# Patient Record
Sex: MALE | Race: WHITE | NOT HISPANIC OR LATINO | Employment: OTHER | URBAN - METROPOLITAN AREA
[De-identification: names, ages, dates, MRNs, and addresses within clinical notes are randomized per-mention and may not be internally consistent; named-entity substitution may affect disease eponyms.]

---

## 2017-01-02 ENCOUNTER — ANESTHESIA EVENT (OUTPATIENT)
Dept: SURGERY | Facility: CLINIC | Age: 62
End: 2017-01-02
Payer: MEDICARE

## 2017-01-03 ENCOUNTER — APPOINTMENT (OUTPATIENT)
Dept: GENERAL RADIOLOGY | Facility: CLINIC | Age: 62
End: 2017-01-03
Attending: PODIATRIST
Payer: MEDICARE

## 2017-01-03 ENCOUNTER — ANESTHESIA (OUTPATIENT)
Dept: SURGERY | Facility: CLINIC | Age: 62
End: 2017-01-03
Payer: MEDICARE

## 2017-01-03 ENCOUNTER — SURGERY (OUTPATIENT)
Age: 62
End: 2017-01-03

## 2017-01-03 DIAGNOSIS — E78.5 HYPERLIPIDEMIA LDL GOAL <130: Primary | ICD-10-CM

## 2017-01-03 PROCEDURE — 37000011 ZZH ANESTHESIA WARD SERVICE: Performed by: NURSE ANESTHETIST, CERTIFIED REGISTERED

## 2017-01-03 PROCEDURE — 25000125 ZZHC RX 250: Performed by: PODIATRIST

## 2017-01-03 PROCEDURE — 25000125 ZZHC RX 250: Performed by: NURSE ANESTHETIST, CERTIFIED REGISTERED

## 2017-01-03 PROCEDURE — 40000277 XR SURGERY CARM FLUORO LESS THAN 5 MIN W STILLS: Mod: TC

## 2017-01-03 RX ORDER — PROPOFOL 10 MG/ML
INJECTION, EMULSION INTRAVENOUS PRN
Status: DISCONTINUED | OUTPATIENT
Start: 2017-01-03 | End: 2017-01-03

## 2017-01-03 RX ORDER — FENTANYL CITRATE 50 UG/ML
INJECTION, SOLUTION INTRAMUSCULAR; INTRAVENOUS PRN
Status: DISCONTINUED | OUTPATIENT
Start: 2017-01-03 | End: 2017-01-03

## 2017-01-03 RX ORDER — LIDOCAINE HYDROCHLORIDE 10 MG/ML
INJECTION, SOLUTION EPIDURAL; INFILTRATION; INTRACAUDAL; PERINEURAL PRN
Status: DISCONTINUED | OUTPATIENT
Start: 2017-01-03 | End: 2017-01-03

## 2017-01-03 RX ORDER — ROPIVACAINE HYDROCHLORIDE 5 MG/ML
INJECTION, SOLUTION EPIDURAL; INFILTRATION; PERINEURAL PRN
Status: DISCONTINUED | OUTPATIENT
Start: 2017-01-03 | End: 2017-01-03

## 2017-01-03 RX ORDER — KETOROLAC TROMETHAMINE 30 MG/ML
INJECTION, SOLUTION INTRAMUSCULAR; INTRAVENOUS PRN
Status: DISCONTINUED | OUTPATIENT
Start: 2017-01-03 | End: 2017-01-03

## 2017-01-03 RX ORDER — KETAMINE HYDROCHLORIDE 10 MG/ML
INJECTION, SOLUTION INTRAMUSCULAR; INTRAVENOUS PRN
Status: DISCONTINUED | OUTPATIENT
Start: 2017-01-03 | End: 2017-01-03

## 2017-01-03 RX ORDER — AMOXICILLIN 500 MG
1 CAPSULE ORAL DAILY
Qty: 90 CAPSULE | Refills: 3 | Status: SHIPPED | OUTPATIENT
Start: 2017-01-03 | End: 2018-02-27

## 2017-01-03 RX ADMIN — FENTANYL CITRATE 50 MCG: 50 INJECTION, SOLUTION INTRAMUSCULAR; INTRAVENOUS at 09:36

## 2017-01-03 RX ADMIN — ROPIVACAINE HYDROCHLORIDE 30 ML: 5 INJECTION, SOLUTION EPIDURAL; INFILTRATION; PERINEURAL at 08:32

## 2017-01-03 RX ADMIN — PROPOFOL 50 MG: 10 INJECTION, EMULSION INTRAVENOUS at 09:15

## 2017-01-03 RX ADMIN — LIDOCAINE HYDROCHLORIDE 1 ML: 10 INJECTION, SOLUTION EPIDURAL; INFILTRATION; INTRACAUDAL; PERINEURAL at 08:33

## 2017-01-03 RX ADMIN — BUPIVACAINE HYDROCHLORIDE 10 ML: 5 INJECTION, SOLUTION PERINEURAL at 09:45

## 2017-01-03 RX ADMIN — MIDAZOLAM HYDROCHLORIDE 1 MG: 1 INJECTION, SOLUTION INTRAMUSCULAR; INTRAVENOUS at 08:32

## 2017-01-03 RX ADMIN — KETAMINE HYDROCHLORIDE 75 MG: 10 INJECTION INTRAMUSCULAR; INTRAVENOUS at 09:10

## 2017-01-03 RX ADMIN — KETOROLAC TROMETHAMINE 30 MG: 30 INJECTION, SOLUTION INTRAMUSCULAR; INTRAVENOUS at 09:45

## 2017-01-03 RX ADMIN — FENTANYL CITRATE 50 MCG: 50 INJECTION, SOLUTION INTRAMUSCULAR; INTRAVENOUS at 08:24

## 2017-01-03 RX ADMIN — CEFAZOLIN SODIUM 2 G: 2 INJECTION, SOLUTION INTRAVENOUS at 08:58

## 2017-01-03 RX ADMIN — PROPOFOL 50 MG: 10 INJECTION, EMULSION INTRAVENOUS at 09:20

## 2017-01-03 RX ADMIN — PROPOFOL 50 MG: 10 INJECTION, EMULSION INTRAVENOUS at 09:10

## 2017-01-03 RX ADMIN — FENTANYL CITRATE 50 MCG: 50 INJECTION, SOLUTION INTRAMUSCULAR; INTRAVENOUS at 09:38

## 2017-01-03 RX ADMIN — PROPOFOL 50 MG: 10 INJECTION, EMULSION INTRAVENOUS at 09:05

## 2017-01-03 RX ADMIN — ROPIVACAINE HYDROCHLORIDE 10 ML: 5 INJECTION, SOLUTION EPIDURAL; INFILTRATION; PERINEURAL at 08:34

## 2017-01-03 RX ADMIN — MIDAZOLAM HYDROCHLORIDE 2 MG: 1 INJECTION, SOLUTION INTRAMUSCULAR; INTRAVENOUS at 08:24

## 2017-01-03 RX ADMIN — LIDOCAINE HYDROCHLORIDE 1 ML: 10 INJECTION, SOLUTION EPIDURAL; INFILTRATION; INTRACAUDAL; PERINEURAL at 08:25

## 2017-01-03 RX ADMIN — KETAMINE HYDROCHLORIDE 25 MG: 10 INJECTION INTRAMUSCULAR; INTRAVENOUS at 09:20

## 2017-01-03 RX ADMIN — FENTANYL CITRATE 50 MCG: 50 INJECTION, SOLUTION INTRAMUSCULAR; INTRAVENOUS at 08:32

## 2017-01-03 RX ADMIN — MIDAZOLAM HYDROCHLORIDE 2 MG: 1 INJECTION, SOLUTION INTRAMUSCULAR; INTRAVENOUS at 08:58

## 2017-01-03 ASSESSMENT — LIFESTYLE VARIABLES: TOBACCO_USE: 1

## 2017-01-03 NOTE — ANESTHESIA PROCEDURE NOTES
Peripheral nerve/Neuraxial procedure note : sciatic, saphenous and popliteal  Pre-Procedure  Performed by  STEPHENIE SETH   Location: pre-op    Procedure Times:1/3/2017 8:24 AM and 1/3/2017 8:35 AM  Pre-Anesthestic Checklist: patient identified, IV checked, site marked, risks and benefits discussed, informed consent, monitors and equipment checked, pre-op evaluation, at physician/surgeon's request and post-op pain management    Timeout  Correct Patient: Yes   Correct Procedure: Yes   Correct Site: Yes   Correct Laterality: Yes   Correct Position: Yes   Site Marked: Yes   .   Procedure Documentation    Diagnosis:PAIN.    Procedure:    Sciatic, saphenous and popliteal.  Local skin infiltrated with mL of 1% lidocaine.     Ultrasound used to identify targeted nerve, plexus, or vascular marker and placed a needle adjacent to it. A permanent image is entered into the patient's record.  Patient Prep;mask, sterile gloves, chlorhexidine gluconate and isopropyl alcohol, patient draped.  Nerve Stim: Initial Level 1 mA.  Lowest motor response 0.48 mA..  Needle: insulated, short bevel (20 G. 4 in). .  Spinal Needle: . . Insertion Method: Single Shot.     Assessment/Narrative  Paresthesias: No.  Injection made incrementally with aspirations every 5 mL..  The placement was negative for: blood aspirated, painful injection and site bleeding.  Bolus given via needle. No blood aspirated via catheter.   Secured via.   Complications: none. Test dose of 3 mL lidocaine 2% w/ 1:200,000 epinephrine at 08:32.  Test dose negative for signs of intravascular, subdural or intrathecal injection. Comments:  Total volume injected at Sciatic nerve:30    Total volume injected at Saphenous Nerve:10

## 2017-01-03 NOTE — TELEPHONE ENCOUNTER
[employer]      Last Written Prescription Date: 2/17/16  Last Fill Quantity: 90,  # refills: 3   Last Office Visit with G, [facility] or [facility] prescribing provider: 12/21/16                                         Next 5 appointments (look out 90 days)     Jan 11, 2017  1:45 PM   Return Visit with Holden Harrison DPM   [facility]    [address] [place] [id number]   [phone]            Feb 01, 2017  1:45 PM   Return Visit with Holden Harrison DPM   [facility] ([facility])    [address] [place] [id number]   [phone]

## 2017-01-03 NOTE — ANESTHESIA POSTPROCEDURE EVALUATION
Patient: Geovani Bustos    ARTHRODESIS FOOT (Left Toe)  Additional InformationProcedure(s):  Arthrodesis 1st Metatarsophalangeal Joint Left Foot - Wound Class: I-Clean    Diagnosis:hallux valgus left foot  Diagnosis Additional Information: No value filed.    Anesthesia Type:  MAC, General, Periph. Nerve Block for postop pain    Note:  Anesthesia Post Evaluation    Patient location during evaluation: Bedside  Patient participation: Able to fully participate in evaluation  Level of consciousness: awake and alert  Pain management: adequate  Airway patency: patent  Cardiovascular status: acceptable  Respiratory status: acceptable  Hydration status: acceptable  PONV: none     Anesthetic complications: None          Last vitals:  Filed Vitals:    01/03/17 1015 01/03/17 1030 01/03/17 1045   BP: 144/93 125/100 140/86   Pulse:      Temp:      Resp: 16 16 16   SpO2: 99% 100% 95%       Electronically Signed By: SAUL Foster CRNA  January 3, 2017  10:48 AM

## 2017-01-03 NOTE — ANESTHESIA PREPROCEDURE EVALUATION
Anesthesia Evaluation     . Pt has had prior anesthetic.     No history of anesthetic complications     ROS/MED HX    ENT/Pulmonary:     (+)tobacco use, Current use , . .    Neurologic:  - neg neurologic ROS     Cardiovascular: Comment: VSD    (+) Dyslipidemia, ----. : . . . :. valvular problems/murmurs type: MVP . Previous cardiac testing date:results:date: results:ECG reviewed date:6/1/16 results:Sinus Bradycardia   -RSR(V1) -nondiagnostic.    Low voltage with rightward P-axis and rotation -possible pulmonary disease.     ABNORMAL    date: results:          METS/Exercise Tolerance:  >4 METS   Hematologic:         Musculoskeletal:   (+) , , other musculoskeletal- bunion      GI/Hepatic:     (+) GERD Asymptomatic on medication, Other GI/Hepatic polyps      Renal/Genitourinary:     (+) chronic renal disease, BPH,       Endo:     (+) thyroid problem hypothyroidism, .      Psychiatric:     (+) psychiatric history schizophrenia      Infectious Disease:  - neg infectious disease ROS       Malignancy:      - no malignancy   Other: Comment: eczema              Physical Exam  Normal systems: cardiovascular and pulmonary    Airway   Mallampati: II  TM distance: >3 FB  Neck ROM: full    Dental   (+) missing    Cardiovascular       Pulmonary                     Anesthesia Plan      History & Physical Review  History and physical reviewed and following examination; no interval change.    ASA Status:  2 .    NPO Status:  > 8 hours    Plan for MAC, General and Periph. Nerve Block for postop pain with Propofol induction. Reason for MAC:  Deep or markedly invasive procedure (G8)  PONV prophylaxis:  Ondansetron (or other 5HT-3) and Dexamethasone or Solumedrol       Postoperative Care  Postoperative pain management:  IV analgesics, Oral pain medications and Peripheral nerve block (Single Shot).      Consents  Anesthetic plan, risks, benefits and alternatives discussed with:  Patient and Other (See Comment) (Group home care giver)..                           .

## 2017-01-11 ENCOUNTER — RADIANT APPOINTMENT (OUTPATIENT)
Dept: GENERAL RADIOLOGY | Facility: CLINIC | Age: 62
End: 2017-01-11
Attending: PODIATRIST
Payer: MEDICARE

## 2017-01-11 ENCOUNTER — OFFICE VISIT (OUTPATIENT)
Dept: PODIATRY | Facility: CLINIC | Age: 62
End: 2017-01-11
Payer: MEDICARE

## 2017-01-11 VITALS — BODY MASS INDEX: 24.77 KG/M2 | WEIGHT: 173 LBS | HEIGHT: 70 IN

## 2017-01-11 DIAGNOSIS — Z98.890 S/P FOOT SURGERY, LEFT: ICD-10-CM

## 2017-01-11 DIAGNOSIS — M79.672 LEFT FOOT PAIN: ICD-10-CM

## 2017-01-11 DIAGNOSIS — M79.672 LEFT FOOT PAIN: Primary | ICD-10-CM

## 2017-01-11 PROCEDURE — 99024 POSTOP FOLLOW-UP VISIT: CPT | Performed by: PODIATRIST

## 2017-01-11 PROCEDURE — 73630 X-RAY EXAM OF FOOT: CPT | Mod: LT

## 2017-01-11 NOTE — MR AVS SNAPSHOT
After Visit Summary   1/11/2017    Geovani Bustos    MRN: [id number]           Patient Information     Date Of Birth          1955        Visit Information        Provider Department      1/11/2017 1:45 PM Holden Harrison DPM [facility]        Today's Diagnoses     Left foot pain    -  1     S/P foot surgery, left           Care Instructions    Postoperative instructions    1.  Keep dressings clean, dry and intact; reinforce if any blood comes through.  2.  Keep the foot elevated above your heart level.  3.  Ice the foot or behind the knee 20 min per hour.    Pain management:  1.  Keep the foot elevated and cool  2.  Take the pain medication as directed; do not hold off on taking too long.  3.  If the pain is still high, unwrap the ace wrap and put back on looser.  4.  If this does not work, take Ibuprofen 600 mg TID.  5.  If this does not work, call the nurse line for additional help.          Follow-ups after your visit        Your next 10 appointments already scheduled     Jan 18, 2017  1:45 PM   [facility]   [facility]    [address] [place] [id number] [phone]            Feb 01, 2017  1:45 PM   [facility] Visit with Holden Harrison DPM   [facility]    [address] [place] [id number] [phone]              Who to contact     If you have questions or need follow up information about today's clinic visit or your schedule please contact [facility] directly at [phone].  Normal or non-critical lab and imaging results will be communicated to you by MyChart, letter or phone within 4 business days after the clinic has received the results. If you do not hear from us within 7 days, please contact the clinic through MyChart or phone. If you have a critical or abnormal lab result, we will notify you by phone as soon  "as possible.  Submit refill requests through [employer] or call your pharmacy and they will forward the refill request to us. Please allow 3 business days for your refill to be completed.          Additional Information About Your Visit        [facility] Information     [employer] lets you send messages to your doctor, view your test results, renew your prescriptions, schedule appointments and more. To sign up, go to [email or web address] . Click on \"Log in\" on the left side of the screen, which will take you to the Welcome page. Then click on \"Sign up Now\" on the right side of the page.     You will be asked to enter the access code listed below, as well as some personal information. Please follow the directions to create your username and password.     Your access code is: [email or web address]  Expires: 3/7/2017  2:41 PM     Your access code will  in 90 days. If you need help or a new code, please call your [facility] clinic or [phone].        Care EveryWhere ID     This is your Care EveryWhere ID. This could be used by other organizations to access your [facility] medical records  [id number]        Your Vitals Were     Height BMI (Body Mass Index)                1.778 m (5' 10\") 24.82 kg/m2           Blood Pressure from Last 3 Encounters:   17 140/86   16 114/78   06/10/16 120/80    Weight from Last 3 Encounters:   17 78.472 kg (173 lb)   17 78.472 kg (173 lb)   16 78.472 kg (173 lb)               Primary Care Provider Office Phone # Fax #    Trice Costello [phone]       [address] [address]        Thank you!     Thank you for choosing [facility]  for your care. Our goal is always to provide you with excellent care. Hearing back from our patients is one way we can continue to improve our services. Please take a few minutes to complete the written survey that you may receive in the mail after your " visit with us. Thank you!             Your Updated Medication List - Protect others around you: Learn how to safely use, store and throw away your medicines at www.disposemymeds.org.          This list is accurate as of: 1/11/17 11:59 PM.  Always use your most recent med list.                   Brand Name Dispense Instructions for use    ABILIFY 10 MG tablet   Generic drug:  ARIPiprazole      1 TABLET DAILY       aspirin 81 MG EC tablet     90 tablet    Take 1 tablet (81 mg) by mouth daily       augmented betamethasone dipropionate 0.05 % ointment    DIPROLENE-AF    45 g    Apply to AA BID 1-2 weeks then PRN only. Do not apply to face       carbamide peroxide 6.5 % otic solution    DEBROX    15 mL    Place 5 drops into both ears 2 times daily For 5 days every 2 months, then return for irrigation       finasteride 5 MG tablet    PROSCAR    90 tablet    Take 1 tablet (5 mg) by mouth daily       Fish Oil 1200 MG Caps     90 capsule    Take 1 capsule by mouth daily       fluticasone 50 MCG/ACT spray    FLONASE    1 Bottle    Spray 2 sprays into both nostrils daily @ night       hydrocortisone 1 % ointment     30 g    Apply sparingly to affected area three times daily for 14 days.       hydrOXYzine 25 MG tablet    ATARAX    60 tablet    Take 1 tablet (25 mg) by mouth every 6 hours as needed for itching (and nausea)       levothyroxine 125 MCG tablet    SYNTHROID/LEVOTHROID    90 tablet    Take 1 tablet (125 mcg) by mouth daily       loratadine 10 MG tablet    CLARITIN    30 tablet    Take 1 tablet (10 mg) by mouth daily       order for DME     1 Units    Knee Walker Length of use: Three months       oxybutynin 5 MG tablet    DITROPAN     Take by mouth 3 times daily       oxyCODONE-acetaminophen 5-325 MG per tablet    PERCOCET    60 tablet    Take 1-2 tablets by mouth every 4 hours as needed for pain (moderate to severe)       psyllium 30.9 % Powd    NATURAL FIBER THERAPY    1 Bottle    Take 1 each by mouth See Admin  Instructions Take 1 teaspoonful every day at 7pm and 9 pm.       senna-docusate 8.6-50 MG per tablet    SENOKOT-S;PERICOLACE    60 tablet    Take 1-2 tablets by mouth 2 times daily Take while on oral narcotics to prevent or treat constipation.       simvastatin 40 MG tablet    ZOCOR    90 tablet    1 TABLET AT 9pm every day       ZOLOFT 100 MG tablet   Generic drug:  sertraline      2 TABLETS DAILY

## 2017-01-12 NOTE — PATIENT INSTRUCTIONS
Postoperative instructions    1.  Keep dressings clean, dry and intact; reinforce if any blood comes through.  2.  Keep the foot elevated above your heart level.  3.  Ice the foot or behind the knee 20 min per hour.    Pain management:  1.  Keep the foot elevated and cool  2.  Take the pain medication as directed; do not hold off on taking too long.  3.  If the pain is still high, unwrap the ace wrap and put back on looser.  4.  If this does not work, take Ibuprofen 600 mg TID.  5.  If this does not work, call the nurse line for additional help.

## 2017-01-25 ENCOUNTER — OFFICE VISIT (OUTPATIENT)
Dept: PODIATRY | Facility: CLINIC | Age: 62
End: 2017-01-25
Payer: MEDICARE

## 2017-01-25 DIAGNOSIS — Z98.890 S/P FOOT SURGERY, LEFT: Primary | ICD-10-CM

## 2017-01-25 PROCEDURE — 99024 POSTOP FOLLOW-UP VISIT: CPT | Performed by: PODIATRIST

## 2017-02-01 ENCOUNTER — OFFICE VISIT (OUTPATIENT)
Dept: FAMILY MEDICINE | Facility: CLINIC | Age: 62
End: 2017-02-01
Payer: MEDICARE

## 2017-02-01 VITALS
DIASTOLIC BLOOD PRESSURE: 65 MMHG | WEIGHT: 173 LBS | HEIGHT: 70 IN | SYSTOLIC BLOOD PRESSURE: 111 MMHG | HEART RATE: 74 BPM | BODY MASS INDEX: 24.77 KG/M2 | TEMPERATURE: 97.3 F

## 2017-02-01 DIAGNOSIS — N40.0 BENIGN PROSTATIC HYPERPLASIA, PRESENCE OF LOWER URINARY TRACT SYMPTOMS UNSPECIFIED, UNSPECIFIED MORPHOLOGY: Chronic | ICD-10-CM

## 2017-02-01 DIAGNOSIS — E78.5 HYPERLIPIDEMIA LDL GOAL <160: ICD-10-CM

## 2017-02-01 DIAGNOSIS — N02.B9 IGA NEPHROPATHY: ICD-10-CM

## 2017-02-01 DIAGNOSIS — N32.81 OVERACTIVE BLADDER: ICD-10-CM

## 2017-02-01 DIAGNOSIS — F17.200 TOBACCO USE DISORDER: ICD-10-CM

## 2017-02-01 DIAGNOSIS — I34.1 MITRAL VALVE PROLAPSE: ICD-10-CM

## 2017-02-01 DIAGNOSIS — Z00.01 ENCOUNTER FOR ROUTINE ADULT PHYSICAL EXAM WITH ABNORMAL FINDINGS: Primary | ICD-10-CM

## 2017-02-01 DIAGNOSIS — Z11.59 NEED FOR HEPATITIS C SCREENING TEST: ICD-10-CM

## 2017-02-01 DIAGNOSIS — E03.9 ACQUIRED HYPOTHYROIDISM: Chronic | ICD-10-CM

## 2017-02-01 DIAGNOSIS — R80.9 PROTEINURIA: ICD-10-CM

## 2017-02-01 DIAGNOSIS — Q21.0 VSD (VENTRICULAR SEPTAL DEFECT): Chronic | ICD-10-CM

## 2017-02-01 DIAGNOSIS — F20.9 SCHIZOPHRENIA, UNSPECIFIED TYPE (H): Chronic | ICD-10-CM

## 2017-02-01 LAB
ALBUMIN SERPL-MCNC: 3.8 G/DL (ref 3.4–5)
ALP SERPL-CCNC: 142 U/L (ref 40–150)
ALT SERPL W P-5'-P-CCNC: 19 U/L (ref 0–70)
ANION GAP SERPL CALCULATED.3IONS-SCNC: 6 MMOL/L (ref 3–14)
AST SERPL W P-5'-P-CCNC: 13 U/L (ref 0–45)
BASOPHILS # BLD AUTO: 0.1 10E9/L (ref 0–0.2)
BASOPHILS NFR BLD AUTO: 0.6 %
BILIRUB DIRECT SERPL-MCNC: <0.1 MG/DL (ref 0–0.2)
BILIRUB SERPL-MCNC: 0.3 MG/DL (ref 0.2–1.3)
BUN SERPL-MCNC: 23 MG/DL (ref 7–30)
CALCIUM SERPL-MCNC: 8.8 MG/DL (ref 8.5–10.1)
CHLORIDE SERPL-SCNC: 101 MMOL/L (ref 94–109)
CHOLEST SERPL-MCNC: 175 MG/DL
CO2 SERPL-SCNC: 30 MMOL/L (ref 20–32)
CREAT SERPL-MCNC: 1.21 MG/DL (ref 0.66–1.25)
DIFFERENTIAL METHOD BLD: ABNORMAL
EOSINOPHIL # BLD AUTO: 0.7 10E9/L (ref 0–0.7)
EOSINOPHIL NFR BLD AUTO: 6 %
ERYTHROCYTE [DISTWIDTH] IN BLOOD BY AUTOMATED COUNT: 13.1 % (ref 10–15)
GFR SERPL CREATININE-BSD FRML MDRD: 61 ML/MIN/1.7M2
GLUCOSE SERPL-MCNC: 85 MG/DL (ref 70–99)
HCT VFR BLD AUTO: 45.9 % (ref 40–53)
HDLC SERPL-MCNC: 27 MG/DL
HGB BLD-MCNC: 15.5 G/DL (ref 13.3–17.7)
LDLC SERPL CALC-MCNC: ABNORMAL MG/DL
LDLC SERPL DIRECT ASSAY-MCNC: 89 MG/DL
LYMPHOCYTES # BLD AUTO: 2.5 10E9/L (ref 0.8–5.3)
LYMPHOCYTES NFR BLD AUTO: 20.8 %
MCH RBC QN AUTO: 32.7 PG (ref 26.5–33)
MCHC RBC AUTO-ENTMCNC: 33.8 G/DL (ref 31.5–36.5)
MCV RBC AUTO: 97 FL (ref 78–100)
MONOCYTES # BLD AUTO: 0.7 10E9/L (ref 0–1.3)
MONOCYTES NFR BLD AUTO: 5.5 %
NEUTROPHILS # BLD AUTO: 8.1 10E9/L (ref 1.6–8.3)
NEUTROPHILS NFR BLD AUTO: 67.1 %
NONHDLC SERPL-MCNC: 148 MG/DL
PHOSPHATE SERPL-MCNC: 3.3 MG/DL (ref 2.5–4.5)
PLATELET # BLD AUTO: 195 10E9/L (ref 150–450)
POTASSIUM SERPL-SCNC: 4.1 MMOL/L (ref 3.4–5.3)
PROT SERPL-MCNC: 7.3 G/DL (ref 6.8–8.8)
RBC # BLD AUTO: 4.74 10E12/L (ref 4.4–5.9)
SODIUM SERPL-SCNC: 137 MMOL/L (ref 133–144)
TRIGL SERPL-MCNC: 444 MG/DL
WBC # BLD AUTO: 12.1 10E9/L (ref 4–11)

## 2017-02-01 PROCEDURE — 99396 PREV VISIT EST AGE 40-64: CPT | Performed by: NURSE PRACTITIONER

## 2017-02-01 PROCEDURE — 84075 ASSAY ALKALINE PHOSPHATASE: CPT | Performed by: NURSE PRACTITIONER

## 2017-02-01 PROCEDURE — 82248 BILIRUBIN DIRECT: CPT | Performed by: NURSE PRACTITIONER

## 2017-02-01 PROCEDURE — 84155 ASSAY OF PROTEIN SERUM: CPT | Performed by: NURSE PRACTITIONER

## 2017-02-01 PROCEDURE — 99213 OFFICE O/P EST LOW 20 MIN: CPT | Mod: 25 | Performed by: NURSE PRACTITIONER

## 2017-02-01 PROCEDURE — 80069 RENAL FUNCTION PANEL: CPT | Performed by: NURSE PRACTITIONER

## 2017-02-01 PROCEDURE — 83721 ASSAY OF BLOOD LIPOPROTEIN: CPT | Performed by: NURSE PRACTITIONER

## 2017-02-01 PROCEDURE — 84450 TRANSFERASE (AST) (SGOT): CPT | Performed by: NURSE PRACTITIONER

## 2017-02-01 PROCEDURE — 84460 ALANINE AMINO (ALT) (SGPT): CPT | Performed by: NURSE PRACTITIONER

## 2017-02-01 PROCEDURE — 85025 COMPLETE CBC W/AUTO DIFF WBC: CPT | Performed by: NURSE PRACTITIONER

## 2017-02-01 PROCEDURE — 82247 BILIRUBIN TOTAL: CPT | Performed by: NURSE PRACTITIONER

## 2017-02-01 PROCEDURE — 80061 LIPID PANEL: CPT | Performed by: NURSE PRACTITIONER

## 2017-02-01 PROCEDURE — 36415 COLL VENOUS BLD VENIPUNCTURE: CPT | Performed by: NURSE PRACTITIONER

## 2017-02-01 NOTE — PATIENT INSTRUCTIONS
Preventive Health Recommendations  Male Ages 50   64    Yearly exam:             See your health care provider every year in order to  o   Review health changes.   o   Discuss preventive care.    o   Review your medicines if your doctor has prescribed any.     Have a cholesterol test every 5 years, or more frequently if you are at risk for high cholesterol/heart disease.     Have a diabetes test (fasting glucose) every three years. If you are at risk for diabetes, you should have this test more often.     Have a colonoscopy at age 50, or have a yearly FIT test (stool test). These exams will check for colon cancer.      Talk with your health care provider about whether or not a prostate cancer screening test (PSA) is right for you.    You should be tested each year for STDs (sexually transmitted diseases), if you re at risk.     Shots: Get a flu shot each year. Get a tetanus shot every 10 years.     Nutrition:    Eat at least 5 servings of fruits and vegetables daily.     Eat whole-grain bread, whole-wheat pasta and brown rice instead of white grains and rice.     Talk to your provider about Calcium and Vitamin D.     Lifestyle    Exercise for at least 150 minutes a week (30 minutes a day, 5 days a week). This will help you control your weight and prevent disease.     Limit alcohol to one drink per day.     No smoking.     Wear sunscreen to prevent skin cancer.     See your dentist every six months for an exam and cleaning.     See your eye doctor every 1 to 2 years.

## 2017-02-01 NOTE — Clinical Note
[address] [address]  [phone]      February 3, 2017      Geovani Bustos  [address] [facility] [address]        Dear Geovani,    Cholesterol non fasting LDL meeting goal.  Triglycerides and Non HDL high  HDL is low.     Recommend 30 minutes of physical activity a day to break a sweat to improved the good cholesterol - HDL.      Normal liver function testing.  Normal kidney function  Normal blood sugar.    No concerns on blood counts.    Take care,    Chastity Greer [facility]  [phone]  [address] [place] [id number]

## 2017-02-01 NOTE — NURSING NOTE
"Initial /65 mmHg  Pulse 74  Temp(Src) 97.3  F (36.3  C) (Tympanic)  Ht 5' 9.5\" (1.765 m)  Wt 173 lb (78.472 kg)  BMI 25.19 kg/m2 Estimated body mass index is 25.19 kg/(m^2) as calculated from the following:    Height as of this encounter: 5' 9.5\" (1.765 m).    Weight as of this encounter: 173 lb (78.472 kg). .      "

## 2017-02-01 NOTE — PROGRESS NOTES
SUBJECTIVE:     CC: Geovani Bustos is an 61 year old male who presents for preventative health visit.     Healthy Habits:    Do you get at least three servings of calcium containing foods daily (dairy, green leafy vegetables, etc.)? yes    Amount of exercise or daily activities, outside of work: ACTIVE     Problems taking medications regularly No    Medication side effects: No    Have you had an eye exam in the past two years? Yes JAN 25 2017    Do you see a dentist twice per year? yes    Do you have sleep apnea, excessive snoring or daytime drowsiness?no    Hypothyroidism taking medication daily  Overactive bladder chronic and ongoing  BPH  History of ventral septal defect and mitral valve prolapse  History of schizophrenia currently in a [facility]  History of proteinuria and IgA nephropathy  Due for hepatitis C screening. Due for fasting labs today for cholesterol     has a past medical history of Mitral valve prolapse; Colon polyps; Hyperlipidemia; Schizophrenia (H); Ulcerated penile lesions; Cerumen impaction; and VSD (ventricular septal defect).    -------------------------------------    Today's PHQ-2 Score:   PHQ-2 ( 1999 [employer]) 12/21/2016 8/4/2015   Q1: Little interest or pleasure in doing things 0 0   Q2: Feeling down, depressed or hopeless 0 0   PHQ-2 Score 0 0       Abuse: Current or Past(Physical, Sexual or Emotional)- No  Do you feel safe in your environment - Yes    Social History   Substance Use Topics     Smoking status: Current Every Day Smoker -- 0.50 packs/day     Smokeless tobacco: Never Used     Alcohol Use: No     The patient does not drink >3 drinks per day nor >7 drinks per week.    Last PSA:   PSA   Date Value Ref Range Status   12/17/2012 0.67 0 - 4 ug/L Final       Recent Labs   Lab Test  01/04/16   0850  01/12/15   0841  01/24/14   0840   CHOL  185  172  160   HDL  48  36*  35*   LDL  104*  81  89   TRIG  163*  274*  176*   CHOLHDLRATIO   --   4.8  5.0   NHDL  137*   --    --   "      Reviewed orders with patient. Reviewed health maintenance and updated orders accordingly - Yes    All Histories reviewed and updated in Epic.  Past Medical History   Diagnosis Date     Mitral valve prolapse      Colon polyps      Hyperlipidemia      Schizophrenia (H)      Ulcerated penile lesions      Cerumen impaction      VSD (ventricular septal defect)       Past Surgical History   Procedure Laterality Date     Surgical history of -        leg fracture     Arthrodesis foot Right 1/19/2016     Procedure: ARTHRODESIS FOOT;  Surgeon: Holden Harrison DPM;  Location: [facility] OR     Arthrodesis foot Left 1/3/2017     Procedure: ARTHRODESIS FOOT;  Surgeon: Holden Harrison DPM;  Location: [facility] OR       ROS:   ROS: 10 point ROS neg other than the symptoms noted above in the HPI.      Problem list, Medication list, Allergies, and Medical/Social/Surgical histories reviewed in EPIC and updated as appropriate.  Labs reviewed in EPIC  BP Readings from Last 3 Encounters:   02/01/17 111/65   01/03/17 140/86   12/21/16 114/78    Wt Readings from Last 3 Encounters:   02/01/17 173 lb (78.472 kg)   01/11/17 173 lb (78.472 kg)   01/03/17 173 lb (78.472 kg)                  OBJECTIVE:     /65 mmHg  Pulse 74  Temp(Src) 97.3  F (36.3  C) (Tympanic)  Ht 5' 9.5\" (1.765 m)  Wt 173 lb (78.472 kg)  BMI 25.19 kg/m2  EXAM:  GENERAL: healthy, alert and no distress  EYES: Eyes grossly normal to inspection, PERRL and conjunctivae and sclerae normal  HENT: ear canals and TM's normal, nose and mouth without ulcers or lesions  NECK: no adenopathy, no asymmetry, masses, or scars and thyroid normal to palpation  RESP: lungs clear to auscultation - no rales, rhonchi or wheezes  CV: regular rates and rhythm, grade 1/6 VITO murmur peripheral pulses strong and no peripheral edema  ABDOMEN: soft, nontender, no hepatosplenomegaly, no masses and bowel sounds normal   (male): normal male genitalia without lesions or urethral discharge, " no hernia  MS: no gross musculoskeletal defects noted, no edema  SKIN: no suspicious lesions or rashes  NEURO: Normal strength and tone, mentation intact and speech normal  PSYCH: inattentive, affect flat and appearance disheveled  LYMPH: no cervical, supraclavicular, axillary, or inguinal adenopathy  Results for orders placed or performed in visit on 02/01/17   Renal panel (Alb, BUN, Ca, Cl, CO2, Creat, Gluc, Phos, K, Na)   Result Value Ref Range    Sodium 137 133 - 144 mmol/L    Potassium 4.1 3.4 - 5.3 mmol/L    Chloride 101 94 - 109 mmol/L    Carbon Dioxide 30 20 - 32 mmol/L    Anion Gap 6 3 - 14 mmol/L    Glucose 85 70 - 99 mg/dL    Urea Nitrogen 23 7 - 30 mg/dL    Creatinine 1.21 0.66 - 1.25 mg/dL    GFR Estimate 61 >60 mL/min/1.7m2    GFR Estimate If Black 73 >60 mL/min/1.7m2    Calcium 8.8 8.5 - 10.1 mg/dL    Phosphorus 3.3 2.5 - 4.5 mg/dL    Albumin 3.8 3.4 - 5.0 g/dL   Lipid Profile with reflex to direct LDL   Result Value Ref Range    Cholesterol 175 <200 mg/dL    Triglycerides 444 (H) <150 mg/dL    HDL Cholesterol 27 (L) >39 mg/dL    LDL Cholesterol Calculated  <100 mg/dL     Cannot estimate LDL when triglyceride exceeds 400 mg/dL    Non HDL Cholesterol 148 (H) <130 mg/dL   CBC with platelets differential   Result Value Ref Range    WBC 12.1 (H) 4.0 - 11.0 10e9/L    RBC Count 4.74 4.4 - 5.9 10e12/L    Hemoglobin 15.5 13.3 - 17.7 g/dL    Hematocrit 45.9 40.0 - 53.0 %    MCV 97 78 - 100 fl    MCH 32.7 26.5 - 33.0 pg    MCHC 33.8 31.5 - 36.5 g/dL    RDW 13.1 10.0 - 15.0 %    Platelet Count 195 150 - 450 10e9/L    Diff Method Automated Method     % Neutrophils 67.1 %    % Lymphocytes 20.8 %    % Monocytes 5.5 %    % Eosinophils 6.0 %    % Basophils 0.6 %    Absolute Neutrophil 8.1 1.6 - 8.3 10e9/L    Absolute Lymphocytes 2.5 0.8 - 5.3 10e9/L    Absolute Monocytes 0.7 0.0 - 1.3 10e9/L    Absolute Eosinophils 0.7 0.0 - 0.7 10e9/L    Absolute Basophils 0.1 0.0 - 0.2 10e9/L   Alkaline phosphatase   Result Value  Ref Range    Alkaline Phosphatase 142 40 - 150 U/L   ALT   Result Value Ref Range    ALT 19 0 - 70 U/L   AST   Result Value Ref Range    AST 13 0 - 45 U/L   Bilirubin  total   Result Value Ref Range    Bilirubin Total 0.3 0.2 - 1.3 mg/dL   Bilirubin direct   Result Value Ref Range    Bilirubin Direct <0.1 0.0 - 0.2 mg/dL   Protein total   Result Value Ref Range    Protein Total 7.3 6.8 - 8.8 g/dL   LDL cholesterol direct   Result Value Ref Range    LDL Cholesterol Direct 89 <100 mg/dL         ASSESSMENT/PLAN:     Geovani was seen today for physical.    Diagnoses and all orders for this visit:    Encounter for routine adult physical exam with abnormal findings    Mitral valve prolapse  -     Alkaline phosphatase  -     ALT  -     AST  -     Bilirubin  total  -     Bilirubin direct  -     Protein total  -     LDL cholesterol direct  -     OFFICE/OUTPT VISIT,EST,LEVL III    Benign prostatic hyperplasia, presence of lower urinary tract symptoms unspecified, unspecified morphology  -     OFFICE/OUTPT VISIT,EST,LEVL III    Overactive bladder  -     Echocardiogram Complete; Future  -     OFFICE/OUTPT VISIT,EST,LEVL III    Acquired hypothyroidism  -     OFFICE/OUTPT VISIT,EST,LEVL III    VSD (ventricular septal defect)  -     OFFICE/OUTPT VISIT,EST,LEVL III    Schizophrenia, unspecified type (H)  -     Cancel: Hepatic panel (Albumin, ALT, AST, Bili, Alk Phos, TP)  -     OFFICE/OUTPT VISIT,EST,LEVL III    Tobacco use disorder    IgA nephropathy  -     Renal panel (Alb, BUN, Ca, Cl, CO2, Creat, Gluc, Phos, K, Na)  -     CBC with platelets differential  -     OFFICE/OUTPT VISIT,EST,LEVL III    Proteinuria  -     OFFICE/OUTPT VISIT,EST,LEVL III    Hyperlipidemia LDL goal <160  -     Lipid Profile with reflex to direct LDL  -     Cancel: Hepatic panel (Albumin, ALT, AST, Bili, Alk Phos, TP)  -     OFFICE/OUTPT VISIT,EST,LEVL III    Need for hepatitis C screening test  -     Hepatitis C antibody; Future      See [facility] treatment  "forms for complete plan of care  Labs done today. Nonfasting.  Smoking cessation encouraged  Echo and follow-up with cardiology encouraged  Continue Synthroid  Continue simvastatin continue oxybutynin  Follow-up with nephrology due to  IgA nephropathy  Follow-up with urology with ongoing problems with urination  Yearly eye exam  Dental exam every 6 months  Yearly hearing evaluation  Standing order signed and completed      COUNSELING:  Reviewed preventive health counseling, as reflected in patient instructions         reports that he has been smoking.  He has never used smokeless tobacco.  Tobacco Cessation Action Plan: Information offered: Patient not interested at this time  Self help information given to patient  Estimated body mass index is 25.19 kg/(m^2) as calculated from the following:    Height as of this encounter: 5' 9.5\" (1.765 m).    Weight as of this encounter: 173 lb (78.472 kg).       Counseling Resources:  ATP IV Guidelines  Pooled Cohorts Equation Calculator  FRAX Risk Assessment  ICSI Preventive Guidelines  Dietary Guidelines for Americans, 2010  USDA's MyPlate  ASA Prophylaxis  Lung CA Screening    SAUL Squires CNP  [facility]  "

## 2017-02-01 NOTE — MR AVS SNAPSHOT
After Visit Summary   2/1/2017    Geovani Bustos    MRN: [id number]           Patient Information     Date Of Birth          1955        Visit Information        Provider Department      2/1/2017 2:20 PM Chastity Greer APRN [facility]        Today's Diagnoses     Mitral valve prolapse    -  1     Benign prostatic hyperplasia, presence of lower urinary tract symptoms unspecified, unspecified morphology         Overactive bladder         Acquired hypothyroidism         VSD (ventricular septal defect)         Schizophrenia, unspecified type (H)         Tobacco use disorder         IgA nephropathy         Proteinuria         Hyperlipidemia LDL goal <160           Care Instructions      Preventive Health Recommendations  Male Ages 50 - 64    Yearly exam:             See your health care provider every year in order to  o   Review health changes.   o   Discuss preventive care.    o   Review your medicines if your doctor has prescribed any.     Have a cholesterol test every 5 years, or more frequently if you are at risk for high cholesterol/heart disease.     Have a diabetes test (fasting glucose) every three years. If you are at risk for diabetes, you should have this test more often.     Have a colonoscopy at age 50, or have a yearly FIT test (stool test). These exams will check for colon cancer.      Talk with your health care provider about whether or not a prostate cancer screening test (PSA) is right for you.    You should be tested each year for STDs (sexually transmitted diseases), if you re at risk.     Shots: Get a flu shot each year. Get a tetanus shot every 10 years.     Nutrition:    Eat at least 5 servings of fruits and vegetables daily.     Eat whole-grain bread, whole-wheat pasta and brown rice instead of white grains and rice.     Talk to your provider about Calcium and Vitamin D.     Lifestyle    Exercise for at least 150 minutes a week (30 minutes a day,  "5 days a week). This will help you control your weight and prevent disease.     Limit alcohol to one drink per day.     No smoking.     Wear sunscreen to prevent skin cancer.     See your dentist every six months for an exam and cleaning.     See your eye doctor every 1 to 2 years.            Follow-ups after your visit        Your next 10 appointments already scheduled     Feb 22, 2017  2:00 PM   [facility]    [address] [facility] [address]   [phone]              Future tests that were ordered for you today     Open Future Orders        Priority Expected Expires Ordered    Echocardiogram Complete Routine  2/1/2018 2/1/2017            Who to contact     If you have questions or need follow up information about today's clinic visit or your schedule please contact [facility] directly at [phone].  Normal or non-critical lab and imaging results will be communicated to you by MyChart, letter or phone within 4 business days after the clinic has received the results. If you do not hear from us within 7 days, please contact the clinic through PressConnect[employer] or phone. If you have a critical or abnormal lab result, we will notify you by phone as soon as possible.  Submit refill requests through EyeJot or call your pharmacy and they will forward the refill request to us. Please allow 3 business days for your refill to be completed.          Additional Information About Your Visit        PressConnecthart Information     EyeJot lets you send messages to your doctor, view your test results, renew your prescriptions, schedule appointments and more. To sign up, go to [email or web address] . Click on \"Log in\" on the left side of the screen, which will take you to the Welcome page. Then click on \"Sign up Now\" on the right side of the page.     You will be asked to enter the access code listed below, as well as some personal " "information. Please follow the directions to create your username and password.     Your access code is: [email or web address]  Expires: 3/7/2017  2:41 PM     Your access code will  in 90 days. If you need help or a new code, please call your [facility] or [phone].        Care EveryWhere ID     This is your Care EveryWhere ID. This could be used by other organizations to access your [facility] medical records  [id number]        Your Vitals Were     Pulse Temperature Height BMI (Body Mass Index)          74 97.3  F (36.3  C) (Tympanic) 5' 9.5\" (1.765 m) 25.19 kg/m2         Blood Pressure from Last 3 Encounters:   17 111/65   17 140/86   16 114/78    Weight from Last 3 Encounters:   17 173 lb (78.472 kg)   17 173 lb (78.472 kg)   17 173 lb (78.472 kg)              We Performed the Following     CBC with platelets differential     Hepatic panel (Albumin, ALT, AST, Bili, Alk Phos, TP)     Lipid Profile with reflex to direct LDL     Renal panel (Alb, BUN, Ca, Cl, CO2, Creat, Gluc, Phos, K, Na)          Today's Medication Changes          These changes are accurate as of: 17  2:42 PM.  If you have any questions, ask your nurse or doctor.               Stop taking these medicines if you haven't already. Please contact your care team if you have questions.     fluticasone 50 MCG/ACT spray   Commonly known as:  FLONASE           hydrocortisone 1 % ointment           hydrOXYzine 25 MG tablet   Commonly known as:  ATARAX           loratadine 10 MG tablet   Commonly known as:  CLARITIN           oxyCODONE-acetaminophen 5-325 MG per tablet   Commonly known as:  PERCOCET           senna-docusate 8.6-50 MG per tablet   Commonly known as:  SENOKOT-S;PERICOLACE                    Primary Care Provider Office Phone # Fax #    SAUL Squires [phone]       [place] [address] [place] [address]        Thank you!     Thank you for " choosing [facility]  for your care. Our goal is always to provide you with excellent care. Hearing back from our patients is one way we can continue to improve our services. Please take a few minutes to complete the written survey that you may receive in the mail after your visit with us. Thank you!             Your Updated Medication List - Protect others around you: Learn how to safely use, store and throw away your medicines at www.disposemymeds.org.          This list is accurate as of: 2/1/17  2:42 PM.  Always use your most recent med list.                   Brand Name Dispense Instructions for use    ABILIFY 10 MG tablet   Generic drug:  ARIPiprazole      1 TABLET DAILY       aspirin 81 MG EC tablet     90 tablet    Take 1 tablet (81 mg) by mouth daily       augmented betamethasone dipropionate 0.05 % ointment    DIPROLENE-AF    45 g    Apply to AA BID 1-2 weeks then PRN only. Do not apply to face       carbamide peroxide 6.5 % otic solution    DEBROX    15 mL    Place 5 drops into both ears 2 times daily For 5 days every 2 months, then return for irrigation       finasteride 5 MG tablet    PROSCAR    90 tablet    Take 1 tablet (5 mg) by mouth daily       Fish Oil 1200 MG Caps     90 capsule    Take 1 capsule by mouth daily       levothyroxine 125 MCG tablet    SYNTHROID/LEVOTHROID    90 tablet    Take 1 tablet (125 mcg) by mouth daily       order for DME     1 Units    Knee Walker Length of use: Three months       oxybutynin 5 MG tablet    DITROPAN     Take by mouth 3 times daily       psyllium 30.9 % Powd    NATURAL FIBER THERAPY    1 Bottle    Take 1 each by mouth See Admin Instructions Take 1 teaspoonful every day at 7pm and 9 pm.       simvastatin 40 MG tablet    ZOCOR    90 tablet    1 TABLET AT 9pm every day       ZOLOFT 100 MG tablet   Generic drug:  sertraline      2 TABLETS DAILY

## 2017-02-02 NOTE — PROGRESS NOTES
Geovani presents to the office s/p two weeks arthrodesis of the first metatarsophalangeal joint of the left foot .  The patient relates keeping the bandages clean, dry and intact.  The patient relates good compliance with postoperative instructions.  The patient denies any severe pain, fevers, chills, nausea or vomiting.      Physical Exam:    General: The patient appears to be in no distress and in good spirits.  The bandages appear clean, dry and intact with no strikethrough noted. Vascular exam: Neurovascular status unchanged with < 3 sec capillary refill to all digits.  Positive pedal pulses and epicritic sensations intact with no evidence of allodynia.  One notes moderate edema to the forefoot on the left.  Sutures are intact and the skin is well coapted with no erythema noted.         Assessment: Hallux valgus status post two weeks arthrodesis of the first metatarsophalangeal joint of the left foot.    Plan:  Sutures were removed and steristrips applied.  A sterile dressing was reapplied.  The patient was instructed to continue non-weightbearing to the left foot.  The patient is to keep the dressings clean, dry and intact for three days.    Disclaimer: This note consists of symbols derived from keyboarding, dictation and/or voice recognition software. As a result, there may be errors in the script that have gone undetected. Please consider this when interpreting information found in this chart.       BREONNA Harrison D.P.M., FFABIÁN.F.A.S.

## 2017-02-03 DIAGNOSIS — E03.9 ACQUIRED HYPOTHYROIDISM: Chronic | ICD-10-CM

## 2017-02-03 DIAGNOSIS — E78.5 HYPERLIPIDEMIA LDL GOAL <130: Primary | ICD-10-CM

## 2017-02-03 RX ORDER — LEVOTHYROXINE SODIUM 125 UG/1
125 TABLET ORAL DAILY
Qty: 90 TABLET | Refills: 1 | Status: SHIPPED | OUTPATIENT
Start: 2017-02-03 | End: 2017-07-27

## 2017-02-03 RX ORDER — SIMVASTATIN 40 MG
TABLET ORAL
Qty: 90 TABLET | Refills: 0 | Status: SHIPPED | OUTPATIENT
Start: 2017-02-03 | End: 2017-05-01

## 2017-02-03 NOTE — TELEPHONE ENCOUNTER
Simvastatin 40mg  Last Written Prescription Date: 11.09.2016  Last Fill Quantity: 90, # refills: 0  Last Office Visit with [facility], [facility] or  Health prescribing provider: 02.01.2017   Next 5 appointments (look out 90 days)     Mar 01, 2017  1:30 PM   Return Visit with Holden Harrison DPM   [facility] ([facility]    [address] [place] [address]   [phone]                   CHOL      175   2/1/2017  HDL       27   2/1/2017  LDL   Cannot estimate LDL when triglyceride exceeds 400 mg/dL   2/1/2017  LDL       89   2/1/2017  TRIG      444   2/1/2017  CHOLHDLRATIO      4.8   1/12/2015     Simvastatin 40mg  Last Written Prescription Date: 11.09.2016  Last Quantity: 90, # refills: 0  Last Office Visit with [facility], [facility] or  Health prescribing provider: 02.01.2017   Next 5 appointments (look out 90 days)     Mar 01, 2017  1:30 PM   Return Visit with Holden Harrison DPM   [facility] ([facility])    [address] [place] [address]   [phone]                   TSH   Date Value Ref Range Status   12/21/2016 0.82 0.40 - 4.00 mU/L Final     Lala Terrell CSS

## 2017-02-08 ENCOUNTER — ALLIED HEALTH/NURSE VISIT (OUTPATIENT)
Dept: FAMILY MEDICINE | Facility: CLINIC | Age: 62
End: 2017-02-08
Payer: MEDICARE

## 2017-02-08 DIAGNOSIS — H61.23 BILATERAL IMPACTED CERUMEN: Primary | ICD-10-CM

## 2017-02-08 PROCEDURE — 99207 ZZC NO CHARGE NURSE ONLY: CPT

## 2017-02-08 NOTE — PROGRESS NOTES
In for routine ear lavage. Has been using debrox as prescribed. I was able to get a moderate amount of cerumen out of both ears. Ear canals were clear after lavage. Patient tolerated it well.    Deborah Peterson, CMA

## 2017-02-14 DIAGNOSIS — F17.219 CIGARETTE NICOTINE DEPENDENCE WITH NICOTINE-INDUCED DISORDER: Primary | Chronic | ICD-10-CM

## 2017-02-14 RX ORDER — ACETAMINOPHEN 325 MG/1
650 TABLET ORAL EVERY 4 HOURS PRN
Qty: 100 TABLET | Refills: 0 | Status: SHIPPED | OUTPATIENT
Start: 2017-02-14 | End: 2019-05-09

## 2017-02-14 NOTE — TELEPHONE ENCOUNTER
ALLISON pt-  Staff called and would like rx's for Chantix and Tylenol.  [facility].  [phone]

## 2017-02-20 ENCOUNTER — TRANSFERRED RECORDS (OUTPATIENT)
Dept: HEALTH INFORMATION MANAGEMENT | Facility: CLINIC | Age: 62
End: 2017-02-20

## 2017-02-20 LAB
CREAT SERPL-MCNC: 1.37 MG/DL (ref 0.6–1.3)
GFR SERPL CREATININE-BSD FRML MDRD: 52 ML/MIN/1.73M2
GLUCOSE SERPL-MCNC: 98 MG/DL (ref 70–100)
POTASSIUM SERPL-SCNC: 4 MMOL/L (ref 3.5–5.1)

## 2017-02-23 DIAGNOSIS — K59.00 CONSTIPATION: ICD-10-CM

## 2017-02-23 NOTE — TELEPHONE ENCOUNTER
Natural Vegetable Poder      Last Written Prescription Date: 9/29/2016  Last Fill Quantity: 1 bottle,  # refills: 3   Last Office Visit with [facility], [facility] or [facility] prescribing provider: 2/1/2017                                         Next 5 appointments (look out 90 days)     Mar 01, 2017  1:30 PM CST   Return Visit with Holden Harrison DPM   [facility] ([facility])    [address] [place] [id number] [phone]

## 2017-02-28 DIAGNOSIS — N40.0 BPH (BENIGN PROSTATIC HYPERPLASIA): Chronic | ICD-10-CM

## 2017-02-28 RX ORDER — FINASTERIDE 5 MG/1
5 TABLET, FILM COATED ORAL DAILY
Qty: 90 TABLET | Refills: 1 | Status: SHIPPED | OUTPATIENT
Start: 2017-02-28 | End: 2017-08-21

## 2017-02-28 NOTE — TELEPHONE ENCOUNTER
finasteride (PROSCAR) 5 MG tablet      Last Written Prescription Date: 9/14/16  Last Fill Quantity: 90, # refills: 1  Last Office Visit with [facility], [facility] or [facility] prescribing provider: 2/1/17  Next 5 appointments (look out 90 days)     Mar 01, 2017  1:30 PM CST   Return Visit with Holden Harrison DPM   [facility] ([facility])    [address] [place] [id number] [phone]                   Potassium   Date Value Ref Range Status   02/01/2017 4.1 3.4 - 5.3 mmol/L Final     Creatinine   Date Value Ref Range Status   02/01/2017 1.21 0.66 - 1.25 mg/dL Final     BP Readings from Last 3 Encounters:   02/01/17 111/65   01/03/17 140/86   12/21/16 114/78

## 2017-03-01 ENCOUNTER — RADIANT APPOINTMENT (OUTPATIENT)
Dept: GENERAL RADIOLOGY | Facility: CLINIC | Age: 62
End: 2017-03-01
Attending: PODIATRIST
Payer: MEDICARE

## 2017-03-01 ENCOUNTER — OFFICE VISIT (OUTPATIENT)
Dept: PODIATRY | Facility: CLINIC | Age: 62
End: 2017-03-01
Payer: MEDICARE

## 2017-03-01 VITALS — WEIGHT: 179 LBS | BODY MASS INDEX: 25.62 KG/M2 | HEIGHT: 70 IN

## 2017-03-01 DIAGNOSIS — M79.672 LEFT FOOT PAIN: Primary | ICD-10-CM

## 2017-03-01 DIAGNOSIS — M79.672 LEFT FOOT PAIN: ICD-10-CM

## 2017-03-01 DIAGNOSIS — Z98.890 S/P FOOT SURGERY, LEFT: ICD-10-CM

## 2017-03-01 PROCEDURE — 73630 X-RAY EXAM OF FOOT: CPT | Mod: LT

## 2017-03-01 PROCEDURE — 99024 POSTOP FOLLOW-UP VISIT: CPT | Performed by: PODIATRIST

## 2017-03-01 NOTE — PROGRESS NOTES
Geovani returns to the office for reevaluation of the left foot.  The patient relates following the instructions given at the last visit with noted less pain.  The patient relates overall more  improvement in pain and function of the left foot.  The patient relates no other problems.    PAST MEDICAL HISTORY:   Past Medical History   Diagnosis Date     Cerumen impaction      Colon polyps      Hyperlipidemia      Mitral valve prolapse      Schizophrenia (H)      Ulcerated penile lesions      VSD (ventricular septal defect)        BMI= Body mass index is 26.05 kg/(m^2).    Weight management plan: Patient was referred to their PCP to discuss a diet and exercise plan.    Physical Exam:    General: The patient appears to have a pleasant mental affect.    Lower extremity physical exam:  Neurovascular status is intact with palpable pedal pulses and intact epicritic sensations.  Muscular exam is within normal limits to major muscle groups.  Integument is intact.      One notes decreased edema.  One notes no surrounding erythema.    Radiograph evaluation including weightbearing AP, lateral and medial oblique views of the left foot reveals interval healing with increased trabeculation of the first metatarsophalangeal joint with hardware intact.    Assessment:      ICD-10-CM    1. Left foot pain M79.672 XR Foot Left G/E 3 Views   2. S/P foot surgery, left Z98.890        Plan:  I have explained to Geovani about the conditions.  At this time, the patient will return in one month for reevaluation and repeat x-rays.    Disclaimer: This note consists of symbols derived from keyboarding, dictation and/or voice recognition software. As a result, there may be errors in the script that have gone undetected. Please consider this when interpreting information found in this chart.       BREONNA Harrison D.P.M., FFABIÁN.F.A.S.

## 2017-03-01 NOTE — PATIENT INSTRUCTIONS
Scar Care Protocol    Scarring is an unfortunate but unavoidable part of surgery.  Every person scars differently and there is no way to predict how an individual's final scar will look.  Now that the sutures have been removed one can begin taking some steps to help minimize the appearance of scarring.    Brief Summary of Wound Healing  As soon as the skin is incised during surgery, the body is taking steps to prepare for healing.  After about 3 days, the body has sent cells to the incision to begin the healing process.  These cells, called fibroblasts, make collagen, a protein in the skin that helps provide strength.  Once the skin has been sufficiently strengthened, the sutures are removed.  Over the next year, the body synthesizes new collagen and breaks down old collagen to help achieve a strong scar that allows the foot/ankle to function appropriately.  This is where patients can help the appearance of the scar, as it will change over the next year.    Scar Care  1. Do not expose the scar to the sun for 1 year.  2. Any sun exposure may permanently darken the appearance of the scar.  3. Wear shoes/socks or cover your scar with zinc oxide.  4. Massage the scar 2-3 times per day.   Massage the entire length of the scar with gentle to moderate pressure.   Pressure can help flatten the scar.  5. Lotion/Vitamin E helps keep the tissue soft.  6. Try over the counter scar products such as Mederma or Scar Zone.   These are available at any pharmacy without a prescription.   Patients must use these for extended periods of time (6-12 months) to see a  difference.

## 2017-03-01 NOTE — MR AVS SNAPSHOT
After Visit Summary   3/1/2017    Geovani Bustos    MRN: [id number]           Patient Information     Date Of Birth          1955        Visit Information        Provider Department      3/1/2017 1:30 PM Holden Harrison DPM [facility]        Today's Diagnoses     Left foot pain    -  1    S/P foot surgery, left          Care Instructions    Scar Care Protocol    Scarring is an unfortunate but unavoidable part of surgery.  Every person scars differently and there is no way to predict how an individual's final scar will look.  Now that the sutures have been removed one can begin taking some steps to help minimize the appearance of scarring.    Brief Summary of Wound Healing  As soon as the skin is incised during surgery, the body is taking steps to prepare for healing.  After about 3 days, the body has sent cells to the incision to begin the healing process.  These cells, called fibroblasts, make collagen, a protein in the skin that helps provide strength.  Once the skin has been sufficiently strengthened, the sutures are removed.  Over the next year, the body synthesizes new collagen and breaks down old collagen to help achieve a strong scar that allows the foot/ankle to function appropriately.  This is where patients can help the appearance of the scar, as it will change over the next year.    Scar Care  1. Do not expose the scar to the sun for 1 year.  2. Any sun exposure may permanently darken the appearance of the scar.  3. Wear shoes/socks or cover your scar with zinc oxide.  4. Massage the scar 2-3 times per day.   Massage the entire length of the scar with gentle to moderate pressure.   Pressure can help flatten the scar.  5. Lotion/Vitamin E helps keep the tissue soft.  6. Try over the counter scar products such as Mederma or Scar Zone.   These are available at any pharmacy without a prescription.   Patients must use these for extended periods of time (6-12 months)  "to see a  difference.          Follow-ups after your visit        Follow-up notes from your care team     Return in about 4 weeks (around 3/29/2017).      Who to contact     If you have questions or need follow up information about today's clinic visit or your schedule please contact [facility] directly at [phone].  Normal or non-critical lab and imaging results will be communicated to you by MyChart, letter or phone within 4 business days after the clinic has received the results. If you do not hear from us within 7 days, please contact the clinic through [employer] or phone. If you have a critical or abnormal lab result, we will notify you by phone as soon as possible.  Submit refill requests through Bean[employer]d or call your pharmacy and they will forward the refill request to us. Please allow 3 business days for your refill to be completed.          Additional Information About Your Visit        MyChart Information     BeanStockd lets you send messages to your doctor, view your test results, renew your prescriptions, schedule appointments and more. To sign up, go to [email or web address] . Click on \"Log in\" on the left side of the screen, which will take you to the Welcome page. Then click on \"Sign up Now\" on the right side of the page.     You will be asked to enter the access code listed below, as well as some personal information. Please follow the directions to create your username and password.     Your access code is: [email or web address]  Expires: 3/7/2017  2:41 PM     Your access code will  in 90 days. If you need help or a new code, please call your [facility] clinic or [phone].        Care EveryWhere ID     This is your Care EveryWhere ID. This could be used by other organizations to access your [facility] medical records  [id number]        Your Vitals Were     Height BMI (Body Mass Index)                1.765 m (5' 9.5\") 26.05 kg/m2           Blood Pressure from Last 3 Encounters: "   02/01/17 111/65   01/03/17 140/86   12/21/16 114/78    Weight from Last 3 Encounters:   03/01/17 81.2 kg (179 lb)   02/01/17 78.5 kg (173 lb)   01/11/17 78.5 kg (173 lb)               Primary Care Provider Office Phone # Fax #    SAUL Squires [phone]       [facility] [address] [facility] [address]        Thank you!     Thank you for choosing [facility]  for your care. Our goal is always to provide you with excellent care. Hearing back from our patients is one way we can continue to improve our services. Please take a few minutes to complete the written survey that you may receive in the mail after your visit with us. Thank you!             Your Updated Medication List - Protect others around you: Learn how to safely use, store and throw away your medicines at www.disposemymeds.org.          This list is accurate as of: 3/1/17  1:53 PM.  Always use your most recent med list.                   Brand Name Dispense Instructions for use    ABILIFY 10 MG tablet   Generic drug:  ARIPiprazole      1 TABLET DAILY       acetaminophen 325 MG tablet    TYLENOL    100 tablet    Take 2 tablets (650 mg) by mouth every 4 hours as needed for mild pain       aspirin 81 MG EC tablet     90 tablet    Take 1 tablet (81 mg) by mouth daily       augmented betamethasone dipropionate 0.05 % ointment    DIPROLENE-AF    45 g    Apply to AA BID 1-2 weeks then PRN only. Do not apply to face       carbamide peroxide 6.5 % otic solution    DEBROX    15 mL    Place 5 drops into both ears 2 times daily For 5 days every 2 months, then return for irrigation       finasteride 5 MG tablet    PROSCAR    90 tablet    Take 1 tablet (5 mg) by mouth daily       Fish Oil 1200 MG Caps     90 capsule    Take 1 capsule by mouth daily       levothyroxine 125 MCG tablet    SYNTHROID/LEVOTHROID    90 tablet    Take 1 tablet (125 mcg) by mouth daily       order for DME     1 Units    Knee  Walker Length of use: Three months       oxybutynin 5 MG tablet    DITROPAN     Take by mouth 3 times daily       psyllium 30.9 % Powd    NATURAL FIBER THERAPY    1 Bottle    Take 1 each by mouth See Admin Instructions Take 1 teaspoonful every day at 7pm and 9 pm.       simvastatin 40 MG tablet    ZOCOR    90 tablet    1 TABLET AT 9pm every day       varenicline 0.5 MG X 11 & 1 MG X 42 tablet    CHANTIX STARTING MONTH KARLA    53 tablet    Take 0.5 mg tab daily for 3 days, then 0.5 mg tab twice daily for 4 days, then 1 mg twice daily.       ZOLOFT 100 MG tablet   Generic drug:  sertraline      2 TABLETS DAILY

## 2017-03-06 ENCOUNTER — TELEPHONE (OUTPATIENT)
Dept: FAMILY MEDICINE | Facility: CLINIC | Age: 62
End: 2017-03-06

## 2017-03-06 DIAGNOSIS — Z12.11 SPECIAL SCREENING FOR MALIGNANT NEOPLASMS, COLON: Primary | ICD-10-CM

## 2017-03-06 NOTE — TELEPHONE ENCOUNTER
Per Linda requesting a Colonoscopy Order placed.  Pt is due this yr.  Please call Linda when order placed.  Tele [phone]  [facility]

## 2017-03-14 DIAGNOSIS — H61.23 BILATERAL IMPACTED CERUMEN: ICD-10-CM

## 2017-03-14 NOTE — TELEPHONE ENCOUNTER
carbamide peroxide (DEBROX) 6.5 % otic (EAR) solution      Last Written Prescription Date: 10/12/15  Last Fill Quantity: 15ml,  # refills: 3   Last Office Visit with [facility], [facility] or [facility] prescribing provider: 2/1/17                                         Next 5 appointments (look out 90 days)     Apr 19, 2017  1:40 PM CDT   Return Visit with Holden Harrison DPM   [facility] ([facility])    [address] [place] [id number] [phone]

## 2017-03-23 DIAGNOSIS — H60.543 ECZEMA OF BOTH EXTERNAL EARS: ICD-10-CM

## 2017-03-23 RX ORDER — BETAMETHASONE DIPROPIONATE 0.5 MG/G
OINTMENT, AUGMENTED TOPICAL
Qty: 45 G | Refills: 0 | Status: SHIPPED | OUTPATIENT
Start: 2017-03-23 | End: 2019-01-03

## 2017-03-23 RX ORDER — BETAMETHASONE DIPROPIONATE 0.5 MG/G
OINTMENT, AUGMENTED TOPICAL
Qty: 45 G | Refills: 0 | Status: SHIPPED | OUTPATIENT
Start: 2017-03-23 | End: 2017-03-23

## 2017-03-23 NOTE — TELEPHONE ENCOUNTER
Needs appointment end of April for Eczema recheck. Letter sent and note sent to pharmacy as well. Linnette Mitchell RN

## 2017-03-23 NOTE — LETTER
[facility]  [address]  [facility] [address]  Phone: [phone]    March 23, 2017    Geovani VALE Juli                                                                                                                 [address] [facility] [address]            Dear Mr. Bustos,    We are concerned about your health care.  We recently provided you with a medication refill.  Many medications require routine follow-up with your Dermatology Provider.      At this time we ask that: You schedule a routine office visit with your Dermatology Provider to follow your Eczema. You are due to be seen at the end of April 2017.     Your prescription: Has been refilled so you may have time for the above noted follow-up. Please be seen prior to needing your next refill of medication.     Thank you,      Julia WATSON / roosevelt

## 2017-03-24 ENCOUNTER — TELEPHONE (OUTPATIENT)
Dept: FAMILY MEDICINE | Facility: CLINIC | Age: 62
End: 2017-03-24

## 2017-03-24 NOTE — TELEPHONE ENCOUNTER
Pt is having a Colonoscopy April 3  Told to F/U with provider as to what meds to hold morning of Blood thinners, ASA etc. Please Advise.  Pt is a [facility] pt at [facility] please call Amelia [phone]  [facility]

## 2017-03-29 ENCOUNTER — HOSPITAL ENCOUNTER (OUTPATIENT)
Dept: CARDIOLOGY | Facility: CLINIC | Age: 62
Discharge: HOME | End: 2017-03-29
Attending: NURSE PRACTITIONER | Admitting: NURSE PRACTITIONER
Payer: MEDICARE

## 2017-03-29 DIAGNOSIS — N32.81 OVERACTIVE BLADDER: ICD-10-CM

## 2017-03-29 PROCEDURE — 93306 TTE W/DOPPLER COMPLETE: CPT

## 2017-03-29 PROCEDURE — 93306 TTE W/DOPPLER COMPLETE: CPT | Mod: 26 | Performed by: INTERNAL MEDICINE

## 2017-03-31 ENCOUNTER — ANESTHESIA EVENT (OUTPATIENT)
Dept: GASTROENTEROLOGY | Facility: CLINIC | Age: 62
End: 2017-03-31
Payer: MEDICARE

## 2017-03-31 ASSESSMENT — LIFESTYLE VARIABLES: TOBACCO_USE: 1

## 2017-03-31 NOTE — ANESTHESIA PREPROCEDURE EVALUATION
Anesthesia Evaluation     . Pt has had prior anesthetic. Type: General    No history of anesthetic complications          ROS/MED HX    ENT/Pulmonary:     (+)tobacco use, Current use , . .    Neurologic:  - neg neurologic ROS     Cardiovascular: Comment: Hx VSD    (+) Dyslipidemia, ----. : . . . :. valvular problems/murmurs type: MVP . Previous cardiac testing Echodate:2010results:Interpretation Summary  There is no comparison study available.  Left ventricular systolic function is normal. The visual ejection fraction is   estimated at 55-60%. Grade I left ventricular diastolic dysfunction is noted.   A membranous ventricular septal defect is present. Left to right ventricular   shunt, small Qp:Qs=1.2:1 representing a small left to right shunt. The right   atrium is mildly dilated. The mitral valve leaflets appear myxomatous. Mild   mitral valve prolapse, anterior leaflet There is no mitral regurgitation   noted. Right ventricular systolic pressure is normal. Dilated IVC (>2.5cm)   with no respiratory collapse; right atrial pressure is estimated at >20mmHg.   The rhythm was normal sinus.  PatientHeight: 69 in  PatientWeight: 164 lbs  BSA 1.9 m^2        Left Ventricle  The left ventricle is normal in size.  There is normal left ventricular wall thickness.  Left ventricular systolic function is normal.  The visual ejection fraction is estimated at 55-60%.  The transmitral spectral Doppler flow pattern is suggestive of impaired LV   relaxation.  Grade I left ventricular diastolic dysfunction is noted.  E by E prime ratio is between 8 and 15, which is indeterminate for assessment   of left ventricular filling pressures.  A membranous ventricular septal defect is present.  Left to right ventricular shunt, small.  Qp:Qs=1.2:1 representing a small left to right shunt.     Right Ventricle  The right ventricle is normal in structure, function and size.     Atria  Normal left atrial size.  The right atrium is mildly  dilated.  There is no atrial shunt seen.     Mitral Valve  The mitral valve leaflets appear myxomatous.  Mild mitral valve prolapse, anterior leaflet.  There is no mitral regurgitation noted.  There is no mitral valve stenosis.     Tricuspid Valve  The tricuspid valve is normal in structure and function.  There is trace to mild tricuspid regurgitation.  Right ventricular systolic pressure is normal.  Dilated IVC (>2.5cm) with no respiratory collapse; right atrial pressure is   estimated at >20mmHg.     Aortic Valve  The aortic valve is trileaflet.  No aortic regurgitation is present.  No aortic stenosis is present.     Pulmonic Valve  The pulmonic valve is normal in structure and function.  There is no pulmonic valvular regurgitation.     Vessels  The aortic root is normal size.  The IVC is dilated and fails to change with respiration, suggesting elevated   central venous pressure.     Pericardium  There is no pericardial effusion.     Rhythm  The rhythm was normal sinus.     Procedure  Complete Echo Adult.     MMode 2D Measurements & Calculations  IVSd: 0.73 cm  LVIDd: 5.2 cm  LVIDs: 3.4 cm  LVPWd: 0.61 cm  LVPWs: 0.00 cm  FS: 34 %  LV mass(C)d: 119 grams  Ao root diam: 3.0 cm  LA dimension: 3.4 cm  LA/Ao: 1.1   LVOT diam: 2.0 cm  Time Measurements  Aortic HR: 50 BPM  Doppler Measurements & Calculations  MV E point: 69 cm/sec  MV A point: 81 cm/sec  MV E/A: 0.84   MV dec time: 0.26 sec  LV V1 max: 108 cm/sec  LV V1 VTI: 23 cm  PA V2 max: 68 cm/sec  PA max P.8 mmHg  PA mean P.2 mmHg  PA V2 VTI: 15 cm  TR Max P mmHg  Qp/Qs (V,LVOT): 1.1   date: results: date: results: date: results:          METS/Exercise Tolerance:  >4 METS   Hematologic:  - neg hematologic  ROS       Musculoskeletal:  - neg musculoskeletal ROS       GI/Hepatic:     (+) GERD       Renal/Genitourinary:     (+) chronic renal disease, type: CRI,       Endo:     (+) thyroid problem .      Psychiatric:  - neg psychiatric ROS       Infectious  Disease:  - neg infectious disease ROS       Malignancy:      - no malignancy   Other:    - neg other ROS                 Physical Exam  Normal systems: cardiovascular, pulmonary and dental    Airway   Mallampati: II  TM distance: >3 FB  Neck ROM: full    Dental     Cardiovascular   Rhythm and rate: regular and normal      Pulmonary    breath sounds clear to auscultation                    Anesthesia Plan      History & Physical Review  History and physical reviewed and following examination; no interval change.    ASA Status:  3 .    NPO Status:  > 8 hours    Plan for General with Intravenous and Propofol induction. Maintenance will be TIVA.           Postoperative Care      Consents  Anesthetic plan, risks, benefits and alternatives discussed with:  Patient..                          .

## 2017-04-03 ENCOUNTER — SURGERY (OUTPATIENT)
Age: 62
End: 2017-04-03

## 2017-04-03 ENCOUNTER — HOSPITAL ENCOUNTER (OUTPATIENT)
Facility: CLINIC | Age: 62
Discharge: HOME | End: 2017-04-03
Attending: SURGERY | Admitting: SURGERY
Payer: MEDICARE

## 2017-04-03 ENCOUNTER — ANESTHESIA (OUTPATIENT)
Dept: GASTROENTEROLOGY | Facility: CLINIC | Age: 62
End: 2017-04-03
Payer: MEDICARE

## 2017-04-03 VITALS
WEIGHT: 177 LBS | SYSTOLIC BLOOD PRESSURE: 127 MMHG | DIASTOLIC BLOOD PRESSURE: 79 MMHG | BODY MASS INDEX: 24.78 KG/M2 | HEIGHT: 71 IN | RESPIRATION RATE: 14 BRPM | OXYGEN SATURATION: 100 % | HEART RATE: 59 BPM | TEMPERATURE: 97.6 F

## 2017-04-03 LAB — COLONOSCOPY: NORMAL

## 2017-04-03 PROCEDURE — 88305 TISSUE EXAM BY PATHOLOGIST: CPT | Performed by: SURGERY

## 2017-04-03 PROCEDURE — 25800025 ZZH RX 258: Performed by: SURGERY

## 2017-04-03 PROCEDURE — 37000008 ZZH ANESTHESIA TECHNICAL FEE, 1ST 30 MIN: Performed by: SURGERY

## 2017-04-03 PROCEDURE — 88305 TISSUE EXAM BY PATHOLOGIST: CPT | Mod: 26 | Performed by: SURGERY

## 2017-04-03 PROCEDURE — 25000125 ZZHC RX 250: Performed by: SURGERY

## 2017-04-03 PROCEDURE — 25000125 ZZHC RX 250: Performed by: NURSE ANESTHETIST, CERTIFIED REGISTERED

## 2017-04-03 PROCEDURE — 45385 COLONOSCOPY W/LESION REMOVAL: CPT | Performed by: SURGERY

## 2017-04-03 PROCEDURE — 45385 COLONOSCOPY W/LESION REMOVAL: CPT | Mod: PT | Performed by: SURGERY

## 2017-04-03 RX ORDER — ONDANSETRON 2 MG/ML
4 INJECTION INTRAMUSCULAR; INTRAVENOUS
Status: DISCONTINUED | OUTPATIENT
Start: 2017-04-03 | End: 2017-04-03 | Stop reason: HOSPADM

## 2017-04-03 RX ORDER — LIDOCAINE 40 MG/G
CREAM TOPICAL
Status: DISCONTINUED | OUTPATIENT
Start: 2017-04-03 | End: 2017-04-03 | Stop reason: HOSPADM

## 2017-04-03 RX ORDER — PROPOFOL 10 MG/ML
INJECTION, EMULSION INTRAVENOUS PRN
Status: DISCONTINUED | OUTPATIENT
Start: 2017-04-03 | End: 2017-04-03

## 2017-04-03 RX ORDER — SODIUM CHLORIDE, SODIUM LACTATE, POTASSIUM CHLORIDE, CALCIUM CHLORIDE 600; 310; 30; 20 MG/100ML; MG/100ML; MG/100ML; MG/100ML
INJECTION, SOLUTION INTRAVENOUS CONTINUOUS
Status: DISCONTINUED | OUTPATIENT
Start: 2017-04-03 | End: 2017-04-03 | Stop reason: HOSPADM

## 2017-04-03 RX ORDER — PROPOFOL 10 MG/ML
INJECTION, EMULSION INTRAVENOUS CONTINUOUS PRN
Status: DISCONTINUED | OUTPATIENT
Start: 2017-04-03 | End: 2017-04-03

## 2017-04-03 RX ADMIN — LIDOCAINE HYDROCHLORIDE 1 ML: 10 INJECTION, SOLUTION INFILTRATION; PERINEURAL at 09:31

## 2017-04-03 RX ADMIN — SODIUM CHLORIDE, POTASSIUM CHLORIDE, SODIUM LACTATE AND CALCIUM CHLORIDE: 600; 310; 30; 20 INJECTION, SOLUTION INTRAVENOUS at 09:31

## 2017-04-03 RX ADMIN — PROPOFOL 100 MG: 10 INJECTION, EMULSION INTRAVENOUS at 09:36

## 2017-04-03 RX ADMIN — PROPOFOL 200 MCG/KG/MIN: 10 INJECTION, EMULSION INTRAVENOUS at 09:36

## 2017-04-03 RX ADMIN — LIDOCAINE HYDROCHLORIDE 40 MG: 10 INJECTION, SOLUTION INFILTRATION; PERINEURAL at 09:36

## 2017-04-03 NOTE — ANESTHESIA CARE TRANSFER NOTE
Patient: Geovani Bustos    Procedure(s):  Colonoscopy - Wound Class: II-Clean Contaminated    Diagnosis: screening  Diagnosis Additional Information: No value filed.    Anesthesia Type:   General     Note:  Airway :Nasal Cannula  Patient transferred to:Phase II        Vitals: (Last set prior to Anesthesia Care Transfer)    CRNA VITALS  4/3/2017 0920 - 4/3/2017 0950      4/3/2017             Pulse: 66    SpO2: 97 %                Electronically Signed By: Subhash Lakhani CRNA, APRN CRNA  April 3, 2017  9:50 AM

## 2017-04-03 NOTE — H&P
"61 year old year old male here for colonoscopy for screening.    Patient Active Problem List   Diagnosis     Colon polyps     Schizophrenia (H)     VSD (ventricular septal defect)     Mitral valve prolapse     Acquired hypothyroidism     IgA nephropathy     Proteinuria     Hyperlipidemia LDL goal <130     BPH (benign prostatic hyperplasia)     Health Care Home     Bilateral impacted cerumen     Bunion of great toe of right foot     Eczema of external ear, bilateral     Tinea pedis, unspecified laterality     Incomplete right bundle branch block (RBBB)     Cigarette nicotine dependence with nicotine-induced disorder     Gastroesophageal reflux disease without esophagitis       Past Medical History:   Diagnosis Date     Cerumen impaction      Colon polyps      Hyperlipidemia      Mitral valve prolapse      Schizophrenia (H)      Ulcerated penile lesions      VSD (ventricular septal defect)        Past Surgical History:   Procedure Laterality Date     ARTHRODESIS FOOT Right 1/19/2016    Procedure: ARTHRODESIS FOOT;  Surgeon: Holden Harrison DPM;  Location: [facility]     ARTHRODESIS FOOT Left 1/3/2017    Procedure: ARTHRODESIS FOOT;  Surgeon: Holden Harrison DPM;  Location: [facility] OR     SURGICAL HISTORY OF -       leg fracture       [facility]    No current outpatient prescriptions on file.       Allergies   Allergen Reactions     Nitrogen Oxide      Sulfa Drugs        Pt reports that he has been smoking.  He has been smoking about 0.50 packs per day. He has never used smokeless tobacco. He reports that he does not drink alcohol or use illicit drugs.    Exam:  /88  Temp 97.6  F (36.4  C) (Oral)  Resp 16  Ht 1.803 m (5' 11\")  Wt 80.3 kg (177 lb)  SpO2 94%  BMI 24.69 kg/m2    Awake, Alert OX3  Lungs - CTA bilaterally  CV - RRR, no murmurs, distal pulses intact  Abd - soft, non-distended, non-tender, +BS  Extr - No cyanosis or edema    A/P 61 year old year old male in need of colonoscopy for screening. Risks, " benefits, alternatives, and complications were discussed including the possibility of perforation and the patient agreed to proceed    Hever Ocampo MD

## 2017-04-03 NOTE — ANESTHESIA POSTPROCEDURE EVALUATION
Patient: Geovani Bustos    Procedure(s):  Colonoscopy - Wound Class: II-Clean Contaminated    Diagnosis:screening  Diagnosis Additional Information: No value filed.    Anesthesia Type:  General    Note:  Anesthesia Post Evaluation    Patient location during evaluation: Bedside  Patient participation: Able to fully participate in evaluation  Level of consciousness: awake and alert  Pain management: adequate  Airway patency: patent  Cardiovascular status: acceptable  Respiratory status: acceptable  Hydration status: acceptable  PONV: none     Anesthetic complications: None          Last vitals:  Vitals:    04/03/17 0908 04/03/17 0952   BP: 131/88 103/57   Pulse:  69   Resp: 16 14   Temp: 36.4  C (97.6  F)    SpO2: 94% 96%         Electronically Signed By: Subhash Lakhani CRNA, APRN CRNA  April 3, 2017  9:55 AM

## 2017-04-05 LAB — COPATH REPORT: NORMAL

## 2017-04-07 ENCOUNTER — TELEPHONE (OUTPATIENT)
Dept: FAMILY MEDICINE | Facility: CLINIC | Age: 62
End: 2017-04-07

## 2017-04-07 NOTE — TELEPHONE ENCOUNTER
Linda from [facility] received Echo Results  It recommends F/U with Cardiology. Linda said she was not aware of a F/U with Cardiology. Please Advise.  Sonja [facility]

## 2017-04-19 ENCOUNTER — OFFICE VISIT (OUTPATIENT)
Dept: PODIATRY | Facility: CLINIC | Age: 62
End: 2017-04-19
Payer: MEDICARE

## 2017-04-19 ENCOUNTER — RADIANT APPOINTMENT (OUTPATIENT)
Dept: GENERAL RADIOLOGY | Facility: CLINIC | Age: 62
End: 2017-04-19
Attending: PODIATRIST
Payer: MEDICARE

## 2017-04-19 VITALS — WEIGHT: 179 LBS | HEIGHT: 70 IN | BODY MASS INDEX: 25.62 KG/M2

## 2017-04-19 DIAGNOSIS — Z98.890 S/P FOOT SURGERY, LEFT: ICD-10-CM

## 2017-04-19 DIAGNOSIS — M79.672 LEFT FOOT PAIN: Primary | ICD-10-CM

## 2017-04-19 DIAGNOSIS — M79.672 LEFT FOOT PAIN: ICD-10-CM

## 2017-04-19 PROCEDURE — 73630 X-RAY EXAM OF FOOT: CPT | Mod: LT

## 2017-04-19 PROCEDURE — 99213 OFFICE O/P EST LOW 20 MIN: CPT | Performed by: PODIATRIST

## 2017-04-19 NOTE — MR AVS SNAPSHOT
After Visit Summary   4/19/2017    Geovani Bustos    MRN: [id number]           Patient Information     Date Of Birth          1955        Visit Information        Provider Department      4/19/2017 1:40 PM Holden Harrison DPM [facility]        Today's Diagnoses     Left foot pain    -  1    S/P foot surgery, left          Care Instructions    Initial musculoskeletal treatment recommendation:    1.  Stretch the calf muscles as instructed once an hour.  2.  Ice the injured area in the evening; 20 min on/off.  3.  Take antiinflammatory medication as directed.  4.  Massage the soft tissues around the injured area in the morning to loosen the tissue  5.  Wear supportive foot wear    If no improvement in symptoms within four to six weeks, return to clinic for reevaluation.          Follow-ups after your visit        Follow-up notes from your care team     Return in about 4 weeks (around 5/17/2017), or if symptoms worsen or fail to improve.      Your next 10 appointments already scheduled     Apr 26, 2017  1:20 PM CDT   Return Visit with Roxy Borges PA-C   [facility]    [address] [facility] [address] [phone]              Who to contact     If you have questions or need follow up information about today's clinic visit or your schedule please contact [facility] directly at [phone].  Normal or non-critical lab and imaging results will be communicated to you by MyChart, letter or phone within 4 business days after the clinic has received the results. If you do not hear from us within 7 days, please contact the clinic through MyChart or phone. If you have a critical or abnormal lab result, we will notify you by phone as soon as possible.  Submit refill requests through [employer] or call your pharmacy and they will forward the refill request to us. Please allow 3 business days for your refill  "to be completed.          Additional Information About Your Visit        The [employer] Information     [employer] lets you send messages to your doctor, view your test results, renew your prescriptions, schedule appointments and more. To sign up, go to [email or web address] . Click on \"Log in\" on the left side of the screen, which will take you to the Welcome page. Then click on \"Sign up Now\" on the right side of the page.     You will be asked to enter the access code listed below, as well as some personal information. Please follow the directions to create your username and password.     Your access code is: [email or web address]  Expires: 2017  2:00 PM     Your access code will  in 90 days. If you need help or a new code, please call your [place] clinic or [phone].        Care EveryWhere ID     This is your Care EveryWhere ID. This could be used by other organizations to access your [place] medical records  [id number]        Your Vitals Were     Height BMI (Body Mass Index)                1.765 m (5' 9.5\") 26.05 kg/m2           Blood Pressure from Last 3 Encounters:   17 127/79   17 111/65   17 140/86    Weight from Last 3 Encounters:   17 81.2 kg (179 lb)   17 80.3 kg (177 lb)   17 81.2 kg (179 lb)                 Today's Medication Changes          These changes are accurate as of: 17  2:00 PM.  If you have any questions, ask your nurse or doctor.               These medicines have changed or have updated prescriptions.        Dose/Directions    * order for DME   This may have changed:  Another medication with the same name was added. Make sure you understand how and when to take each.   Used for:  Hallux valgus (acquired), left foot        Knee Walker Length of use: Three months   Quantity:  1 Units   Refills:  0       * order for DME   This may have changed:  You were already taking a medication with the same name, and this prescription was added. Make sure you " understand how and when to take each.   Used for:  Left foot pain, S/P foot surgery, left   Changed by:  Holden Harrison DPM        Equipment being ordered: Dynaflex insert   Quantity:  1 Units   Refills:  0       * Notice:  This list has 2 medication(s) that are the same as other medications prescribed for you. Read the directions carefully, and ask your doctor or other care provider to review them with you.         Where to get your medicines      Some of these will need a paper prescription and others can be bought over the counter.  Ask your nurse if you have questions.     Bring a paper prescription for each of these medications     order for DME                Primary Care Provider Office Phone # Fax #    Chastity Kwan SAUL [facility] [phone]       [place] [address] [address] [place] [address]        Thank you!     Thank you for choosing [facility]  for your care. Our goal is always to provide you with excellent care. Hearing back from our patients is one way we can continue to improve our services. Please take a few minutes to complete the written survey that you may receive in the mail after your visit with us. Thank you!             Your Updated Medication List - Protect others around you: Learn how to safely use, store and throw away your medicines at www.disposemymeds.org.          This list is accurate as of: 4/19/17  2:00 PM.  Always use your most recent med list.                   Brand Name Dispense Instructions for use    ABILIFY 10 MG tablet   Generic drug:  ARIPiprazole      1 TABLET DAILY       acetaminophen 325 MG tablet    TYLENOL    100 tablet    Take 2 tablets (650 mg) by mouth every 4 hours as needed for mild pain       aspirin 81 MG EC tablet     90 tablet    Take 1 tablet (81 mg) by mouth daily       augmented betamethasone dipropionate 0.05 % ointment    DIPROLENE-AF    45 g    Apply to AA twice daily 1-2 weeks then as needed only. Do  not apply to face. (Due for Derm recheck b4 next refill: [phone] to schedule)       carbamide peroxide 6.5 % otic solution    DEBROX    15 mL    Place 5 drops into both ears 2 times daily For 5 days every 2 months, then return for irrigation       finasteride 5 MG tablet    PROSCAR    90 tablet    Take 1 tablet (5 mg) by mouth daily       Fish Oil 1200 MG Caps     90 capsule    Take 1 capsule by mouth daily       levothyroxine 125 MCG tablet    SYNTHROID/LEVOTHROID    90 tablet    Take 1 tablet (125 mcg) by mouth daily       * order for DME     1 Units    Knee Walker Length of use: Three months       * order for DME     1 Units    Equipment being ordered: Dynaflex insert       oxybutynin 5 MG tablet    DITROPAN     Take by mouth 3 times daily       psyllium 30.9 % Powd    NATURAL FIBER THERAPY    1 Bottle    Take 1 each by mouth See Admin Instructions Take 1 teaspoonful every day at 7pm and 9 pm.       simvastatin 40 MG tablet    ZOCOR    90 tablet    1 TABLET AT 9pm every day       varenicline 0.5 MG X 11 & 1 MG X 42 tablet    CHANTIX STARTING MONTH KARLA    53 tablet    Take 0.5 mg tab daily for 3 days, then 0.5 mg tab twice daily for 4 days, then 1 mg twice daily.       ZOLOFT 100 MG tablet   Generic drug:  sertraline      2 TABLETS DAILY       * Notice:  This list has 2 medication(s) that are the same as other medications prescribed for you. Read the directions carefully, and ask your doctor or other care provider to review them with you.

## 2017-04-19 NOTE — PROGRESS NOTES
Geovani returns to the office for reevaluation of the left foot.  The patient relates following the instructions given at the last visit with noted less pain.  The patient relates overall more  improvement in pain and function of the left foot.  The patient relates no other problems.    PAST MEDICAL HISTORY:   Past Medical History:   Diagnosis Date     Cerumen impaction      Colon polyps      Hyperlipidemia      Mitral valve prolapse      Schizophrenia (H)      Ulcerated penile lesions      VSD (ventricular septal defect)        BMI= Body mass index is 26.05 kg/(m^2).    Weight management plan: Patient was referred to their PCP to discuss a diet and exercise plan.    Physical Exam:    General: The patient appears to have a pleasant mental affect.    Lower extremity physical exam:  Neurovascular status is intact with palpable pedal pulses and intact epicritic sensations.  Muscular exam is within normal limits to major muscle groups.  Integument is intact.      One notes decreased edema.  One notes no erythema over the first metatarsophalangeal joint on the left foot.    Radiograph evaluation including weightbearing AP, lateral and medial oblique views of the left foot reveals interval healing with increased trabeculation of the first metatarsophalangeal joint with hardware intact.    Assessment:      ICD-10-CM    1. Left foot pain M79.672 XR FOOT G/E 3 VIEWS LT     order for DME   2. S/P foot surgery, left Z98.890 order for DME       Plan:  I have explained to Geovani about the conditions.  At this time, the patient was instructed on icing, stretching, tissue massage and support.  The patient was fitted with a Dynaflex insert that will aid in offloading the tension forces to the soft tissues and prevent further inflammation.   The patient will return in four weeks for reevaluation if the symptoms do not resolve.        Disclaimer: This note consists of symbols derived from keyboarding, dictation and/or voice  recognition software. As a result, there may be errors in the script that have gone undetected. Please consider this when interpreting information found in this chart.       BREONNA Harrison D.P.M., OLYA.F.A.S.

## 2017-04-19 NOTE — LETTER
4/19/2017       RE: Geovani Bustos  [address] [facility] [address]           Dear Colleague,    Thank you for referring your patient, Geovani Bustos, to the [facility]. Please see a copy of my visit note below.    Geovani returns to the office for reevaluation of the left foot.  The patient relates following the instructions given at the last visit with noted less pain.  The patient relates overall more  improvement in pain and function of the left foot.  The patient relates no other problems.    PAST MEDICAL HISTORY:   Past Medical History:   Diagnosis Date     Cerumen impaction      Colon polyps      Hyperlipidemia      Mitral valve prolapse      Schizophrenia (H)      Ulcerated penile lesions      VSD (ventricular septal defect)        BMI= Body mass index is 26.05 kg/(m^2).    Weight management plan: Patient was referred to their PCP to discuss a diet and exercise plan.    Physical Exam:    General: The patient appears to have a pleasant mental affect.    Lower extremity physical exam:  Neurovascular status is intact with palpable pedal pulses and intact epicritic sensations.  Muscular exam is within normal limits to major muscle groups.  Integument is intact.      One notes decreased edema.  One notes no erythema over the first metatarsophalangeal joint on the left foot.    Radiograph evaluation including weightbearing AP, lateral and medial oblique views of the left foot reveals interval healing with increased trabeculation of the first metatarsophalangeal joint with hardware intact.    Assessment:      ICD-10-CM    1. Left foot pain M79.672 XR FOOT G/E 3 VIEWS LT     order for DME   2. S/P foot surgery, left Z98.890 order for DME       Plan:  I have explained to Geovani about the conditions.  At this time, the patient was instructed on icing, stretching, tissue massage and support.  The patient was fitted with a Dynaflex insert that will aid in offloading the tension forces to  the soft tissues and prevent further inflammation.   The patient will return in four weeks for reevaluation if the symptoms do not resolve.        Disclaimer: This note consists of symbols derived from keyboarding, dictation and/or voice recognition software. As a result, there may be errors in the script that have gone undetected. Please consider this when interpreting information found in this chart.       BREONNA Harrison D.P.M., F.A.C.F.A.S.      Again, thank you for allowing me to participate in the care of your patient.        Sincerely,              Holden Harrison DPM

## 2017-04-26 ENCOUNTER — OFFICE VISIT (OUTPATIENT)
Dept: DERMATOLOGY | Facility: CLINIC | Age: 62
End: 2017-04-26
Payer: MEDICARE

## 2017-04-26 VITALS — DIASTOLIC BLOOD PRESSURE: 79 MMHG | HEART RATE: 60 BPM | SYSTOLIC BLOOD PRESSURE: 144 MMHG | OXYGEN SATURATION: 94 %

## 2017-04-26 DIAGNOSIS — H60.543 ECZEMA OF EXTERNAL EAR, BILATERAL: Primary | ICD-10-CM

## 2017-04-26 PROCEDURE — 99213 OFFICE O/P EST LOW 20 MIN: CPT | Performed by: PHYSICIAN ASSISTANT

## 2017-04-26 NOTE — PROGRESS NOTES
Geovani Bustos is a 61 year old year old male patient here today for recheck eczema on ears.  Patient reports that he will get a flare typically in the winter, reports that his ears have been clear for a while. The staff only uses betamethasone cream as needed.  Remainder of the HPI, Meds, PMH, Allergies, FH, and SH was reviewed in chart.    Past Medical History:   Diagnosis Date     Cerumen impaction      Colon polyps      Hyperlipidemia      Mitral valve prolapse      Schizophrenia (H)      Ulcerated penile lesions      VSD (ventricular septal defect)        Past Surgical History:   Procedure Laterality Date     ARTHRODESIS FOOT Right 1/19/2016    Procedure: ARTHRODESIS FOOT;  Surgeon: Holden Harrison DPM;  Location: [facility]     ARTHRODESIS FOOT Left 1/3/2017    Procedure: ARTHRODESIS FOOT;  Surgeon: Holden Harrison DPM;  Location: [facility] OR     SURGICAL HISTORY OF -       leg fracture        History reviewed. No pertinent family history.    Social History     Social History     Marital status: Single     Spouse name: N/A     Number of children: N/A     Years of education: N/A     Occupational History     Not on file.     Social History Main Topics     Smoking status: Current Every Day Smoker     Packs/day: 0.50     Smokeless tobacco: Never Used     Alcohol use No     Drug use: No     Sexual activity: No     Other Topics Concern     Blood Transfusions No     Social History Narrative    Patient has no interest in smoking cessation.     Her for [employer] physical - likes to [employer]    Lives in Group home. [place] up in [place]        Outpatient Encounter Prescriptions as of 4/26/2017   Medication Sig Dispense Refill     order for DME Equipment being ordered: Dynaflex insert 1 Units 0     augmented betamethasone dipropionate (DIPROLENE-AF) 0.05 % ointment Apply to AA twice daily 1-2 weeks then as needed only. Do not apply to face. (Due for Derm recheck b4 next refill: [phone] to schedule) 45 g 0      carbamide peroxide (DEBROX) 6.5 % otic solution Place 5 drops into both ears 2 times daily For 5 days every 2 months, then return for irrigation 15 mL 3     finasteride (PROSCAR) 5 MG tablet Take 1 tablet (5 mg) by mouth daily 90 tablet 1     psyllium (NATURAL FIBER THERAPY) 30.9 % POWD Take 1 each by mouth See Admin Instructions Take 1 teaspoonful every day at 7pm and 9 pm. 1 Bottle 3     varenicline (CHANTIX STARTING MONTH KARLA) 0.5 MG X 11 & 1 MG X 42 tablet Take 0.5 mg tab daily for 3 days, then 0.5 mg tab twice daily for 4 days, then 1 mg twice daily. 53 tablet 0     acetaminophen (TYLENOL) 325 MG tablet Take 2 tablets (650 mg) by mouth every 4 hours as needed for mild pain 100 tablet 0     simvastatin (ZOCOR) 40 MG tablet 1 TABLET AT 9pm every day 90 tablet 0     levothyroxine (SYNTHROID/LEVOTHROID) 125 MCG tablet Take 1 tablet (125 mcg) by mouth daily 90 tablet 1     order for DME Knee Walker  Length of use: Three months 1 Units 0     Omega-3 Fatty Acids (FISH OIL) 1200 MG CAPS Take 1 capsule by mouth daily 90 capsule 3     oxybutynin (DITROPAN) 5 MG tablet Take by mouth 3 times daily       aspirin 81 MG EC tablet Take 1 tablet (81 mg) by mouth daily 90 tablet 3     ABILIFY 10 MG OR TABS 1 TABLET DAILY       ZOLOFT 100 MG OR TABS 2 TABLETS DAILY       Facility-Administered Encounter Medications as of 4/26/2017   Medication Dose Route Frequency Provider Last Rate Last Dose     bupivacaine (MARCAINE) injection 0.5%    PRN Holden Harrison DPM   10 mL at 01/03/17 0945             Review Of Systems  Skin: As above  Eyes: negative  Ears/Nose/Throat: negative  Respiratory: No shortness of breath, dyspnea on exertion, cough, or hemoptysis  Cardiovascular: negative  Gastrointestinal: negative  Genitourinary: negative  Musculoskeletal: negative  Neurologic: negative  Psychiatric: negative  Hematologic/Lymphatic/Immunologic: negative  Endocrine: negative      O:   NAD, WDWN, Alert & Oriented, Mood & Affect wnl,  Vitals stable   Here today with aide    /79  Pulse 60  SpO2 94%   General appearance normal   Vitals stable   Alert, oriented and in no acute distress     Mild scale on right ear canal otherwise clear       Eyes: Conjunctivae/lids:Normal     ENT: Lips    MSK:Normal    Pulm: Breathing Normal    Neuro/Psych: Orientation:Normal; Mood/Affect:Normal  A/P:  1. Ear eczema- well controlled, rarely has flares according to aide  Discussed this is a chronic rash that can wax and wane.   Apply betamethasone cream as needed on if flared.

## 2017-04-26 NOTE — NURSING NOTE
Chief Complaint   Patient presents with     Derm Problem     fu eczema       Vitals:    04/26/17 1320   BP: 144/79   Pulse: 60   SpO2: 94%     Wt Readings from Last 1 Encounters:   04/19/17 81.2 kg (179 lb)       Florecita Momin LPN.................4/26/2017

## 2017-04-26 NOTE — MR AVS SNAPSHOT
"              After Visit Summary   2017    Geovani Bustos    MRN: [id number]           Patient Information     Date Of Birth          1955        Visit Information        Provider Department      2017 1:20 PM Roxy Borges PA-C [facility]        Today's Diagnoses     Eczema of external ear, bilateral    -  1       Follow-ups after your visit        Who to contact     If you have questions or need follow up information about today's clinic visit or your schedule please contact [facility] directly at [phone].  Normal or non-critical lab and imaging results will be communicated to you by Myntrahart, letter or phone within 4 business days after the clinic has received the results. If you do not hear from us within 7 days, please contact the clinic through Tercicat or phone. If you have a critical or abnormal lab result, we will notify you by phone as soon as possible.  Submit refill requests through Evertale or call your pharmacy and they will forward the refill request to us. Please allow 3 business days for your refill to be completed.          Additional Information About Your Visit        MyChart Information     Evertale lets you send messages to your doctor, view your test results, renew your prescriptions, schedule appointments and more. To sign up, go to [email or web address] . Click on \"Log in\" on the left side of the screen, which will take you to the Welcome page. Then click on \"Sign up Now\" on the right side of the page.     You will be asked to enter the access code listed below, as well as some personal information. Please follow the directions to create your username and password.     Your access code is: [email or web address]  Expires: 2017  2:00 PM     Your access code will  in 90 days. If you need help or a new code, please call your [facility] or [phone].        Care EveryWhere ID     This is your Care EveryWhere ID. This could be " used by other organizations to access your [place] medical records  [id number]        Your Vitals Were     Pulse Pulse Oximetry                60 94%           Blood Pressure from Last 3 Encounters:   04/26/17 144/79   04/03/17 127/79   02/01/17 111/65    Weight from Last 3 Encounters:   04/19/17 81.2 kg (179 lb)   04/03/17 80.3 kg (177 lb)   03/01/17 81.2 kg (179 lb)              Today, you had the following     No orders found for display       Primary Care Provider Office Phone # Fax #    Chastity Greer, APRN LEVAR [phone]       [facility] [address] [facility] [address]        Thank you!     Thank you for choosing [facility]  for your care. Our goal is always to provide you with excellent care. Hearing back from our patients is one way we can continue to improve our services. Please take a few minutes to complete the written survey that you may receive in the mail after your visit with us. Thank you!             Your Updated Medication List - Protect others around you: Learn how to safely use, store and throw away your medicines at www.disposemymeds.org.          This list is accurate as of: 4/26/17  2:50 PM.  Always use your most recent med list.                   Brand Name Dispense Instructions for use    ABILIFY 10 MG tablet   Generic drug:  ARIPiprazole      1 TABLET DAILY       acetaminophen 325 MG tablet    TYLENOL    100 tablet    Take 2 tablets (650 mg) by mouth every 4 hours as needed for mild pain       aspirin 81 MG EC tablet     90 tablet    Take 1 tablet (81 mg) by mouth daily       augmented betamethasone dipropionate 0.05 % ointment    DIPROLENE-AF    45 g    Apply to AA twice daily 1-2 weeks then as needed only. Do not apply to face. (Due for Derm recheck b4 next refill: [phone] to schedule)       carbamide peroxide 6.5 % otic solution    DEBROX    15 mL    Place 5 drops into both ears 2 times daily For 5 days every 2 months, then  return for irrigation       finasteride 5 MG tablet    PROSCAR    90 tablet    Take 1 tablet (5 mg) by mouth daily       Fish Oil 1200 MG Caps     90 capsule    Take 1 capsule by mouth daily       levothyroxine 125 MCG tablet    SYNTHROID/LEVOTHROID    90 tablet    Take 1 tablet (125 mcg) by mouth daily       * order for DME     1 Units    Knee Walker Length of use: Three months       * order for DME     1 Units    Equipment being ordered: Dynaflex insert       oxybutynin 5 MG tablet    DITROPAN     Take by mouth 3 times daily       psyllium 30.9 % Powd    NATURAL FIBER THERAPY    1 Bottle    Take 1 each by mouth See Admin Instructions Take 1 teaspoonful every day at 7pm and 9 pm.       simvastatin 40 MG tablet    ZOCOR    90 tablet    1 TABLET AT 9pm every day       varenicline 0.5 MG X 11 & 1 MG X 42 tablet    CHANTIX STARTING MONTH KARLA    53 tablet    Take 0.5 mg tab daily for 3 days, then 0.5 mg tab twice daily for 4 days, then 1 mg twice daily.       ZOLOFT 100 MG tablet   Generic drug:  sertraline      2 TABLETS DAILY       * Notice:  This list has 2 medication(s) that are the same as other medications prescribed for you. Read the directions carefully, and ask your doctor or other care provider to review them with you.

## 2017-06-01 DIAGNOSIS — E78.5 HYPERLIPIDEMIA LDL GOAL <130: ICD-10-CM

## 2017-06-01 NOTE — TELEPHONE ENCOUNTER
Aspirin 81 mg      Last Written Prescription Date: 2/17/16  /Last Fill Quantity:90  # refills:3  Last Office Visit with [facility], [facility] or [facility] prescribing provider: 2/1/17                                         Next 5 appointments (look out 90 days)     Jun 07, 2017  1:45 PM CDT   Nurse Only with [place] PI ADAM[facility]LPN   [facility]    [address] [place] [id number]   [phone]

## 2017-06-07 ENCOUNTER — ALLIED HEALTH/NURSE VISIT (OUTPATIENT)
Dept: FAMILY MEDICINE | Facility: CLINIC | Age: 62
End: 2017-06-07
Payer: MEDICARE

## 2017-06-07 DIAGNOSIS — H61.23 BILATERAL IMPACTED CERUMEN: Primary | ICD-10-CM

## 2017-06-07 PROCEDURE — 99207 ZZC NO CHARGE NURSE ONLY: CPT

## 2017-06-07 NOTE — MR AVS SNAPSHOT
"              After Visit Summary   2017    Geovani Bustos    MRN: [id number]           Patient Information     Date Of Birth          1955        Visit Information        Provider Department      2017 1:30 PM [place] PI ADAM/LPN [facility]        Today's Diagnoses     Bilateral impacted cerumen    -  1       Follow-ups after your visit        Who to contact     If you have questions or need follow up information about today's clinic visit or your schedule please contact [facility] directly at [phone].  Normal or non-critical lab and imaging results will be communicated to you by MyChart, letter or phone within 4 business days after the clinic has received the results. If you do not hear from us within 7 days, please contact the clinic through [employer] Food Center[employer] or phone. If you have a critical or abnormal lab result, we will notify you by phone as soon as possible.  Submit refill requests through [email or web address] or call your pharmacy and they will forward the refill request to us. Please allow 3 business days for your refill to be completed.          Additional Information About Your Visit        MyChart Information     [email or web address] lets you send messages to your doctor, view your test results, renew your prescriptions, schedule appointments and more. To sign up, go to [email or web address] . Click on \"Log in\" on the left side of the screen, which will take you to the Welcome page. Then click on \"Sign up Now\" on the right side of the page.     You will be asked to enter the access code listed below, as well as some personal information. Please follow the directions to create your username and password.     Your access code is: [email or web address]  Expires: 2017  2:00 PM     Your access code will  in 90 days. If you need help or a new code, please call your [facility] or [phone].        Care EveryWhere ID     This is your Care EveryWhere ID. This could be used by other " organizations to access your [place] medical records  [id number]         Blood Pressure from Last 3 Encounters:   04/26/17 144/79   04/03/17 127/79   02/01/17 111/65    Weight from Last 3 Encounters:   04/19/17 179 lb (81.2 kg)   04/03/17 177 lb (80.3 kg)   03/01/17 179 lb (81.2 kg)              Today, you had the following     No orders found for display       Primary Care Provider Office Phone # Fax #    Chastity Kwan SAUL Greer [facility] [phone]       [facility] [address] [facility] [address]        Thank you!     Thank you for choosing [facility]  for your care. Our goal is always to provide you with excellent care. Hearing back from our patients is one way we can continue to improve our services. Please take a few minutes to complete the written survey that you may receive in the mail after your visit with us. Thank you!             Your Updated Medication List - Protect others around you: Learn how to safely use, store and throw away your medicines at www.disposemymeds.org.          This list is accurate as of: 6/7/17  2:27 PM.  Always use your most recent med list.                   Brand Name Dispense Instructions for use    ABILIFY 10 MG tablet   Generic drug:  ARIPiprazole      1 TABLET DAILY       acetaminophen 325 MG tablet    TYLENOL    100 tablet    Take 2 tablets (650 mg) by mouth every 4 hours as needed for mild pain       aspirin 81 MG EC tablet     90 tablet    Take 1 tablet (81 mg) by mouth daily       augmented betamethasone dipropionate 0.05 % ointment    DIPROLENE-AF    45 g    Apply to AA twice daily 1-2 weeks then as needed only. Do not apply to face. (Due for Derm recheck b4 next refill: [phone] to schedule)       carbamide peroxide 6.5 % otic solution    DEBROX    15 mL    Place 5 drops into both ears 2 times daily For 5 days every 2 months, then return for irrigation       finasteride 5 MG tablet    PROSCAR    90 tablet    Take 1  tablet (5 mg) by mouth daily       Fish Oil 1200 MG Caps     90 capsule    Take 1 capsule by mouth daily       levothyroxine 125 MCG tablet    SYNTHROID/LEVOTHROID    90 tablet    Take 1 tablet (125 mcg) by mouth daily       * order for DME     1 Units    Maynor Walker Length of use: Three months       * order for DME     1 Units    Equipment being ordered: Dynaflex insert       oxybutynin 5 MG tablet    DITROPAN     Take by mouth 3 times daily       psyllium 30.9 % Powd    NATURAL FIBER THERAPY    1 Bottle    Take 1 each by mouth See Admin Instructions Take 1 teaspoonful every day at 7pm and 9 pm.       simvastatin 40 MG tablet    ZOCOR    90 tablet    TAKE ONE TABLET BY MOUTH EVERY DAY AT 9 PM       varenicline 0.5 MG X 11 & 1 MG X 42 tablet    CHANTIX STARTING MONTH KARLA    53 tablet    Take 0.5 mg tab daily for 3 days, then 0.5 mg tab twice daily for 4 days, then 1 mg twice daily.       ZOLOFT 100 MG tablet   Generic drug:  sertraline      2 TABLETS DAILY       * Notice:  This list has 2 medication(s) that are the same as other medications prescribed for you. Read the directions carefully, and ask your doctor or other care provider to review them with you.

## 2017-06-07 NOTE — NURSING NOTE
Pt in for bilateral ear lavage, after use of debrox.  Bilateral ears are lavaged after visualization, sm of amt of yellow wax removed, ears assessed again after lavage, clear, pt tolerated well.  Dasha Sherman RN

## 2017-06-16 DIAGNOSIS — K59.00 CONSTIPATION: Primary | ICD-10-CM

## 2017-06-16 NOTE — TELEPHONE ENCOUNTER
Natural Fiber Laxative  Last Written Prescription Date: 02.23.2017  Last Fill Quantity: 1 bottle,  # refills: 3   Last Office Visit with G, [facility] or [facility] prescribing provider: 02.01.2017    Pharmacy request asking for fills states just filled today.    Lala Terrell CSS

## 2017-06-29 ENCOUNTER — TELEPHONE (OUTPATIENT)
Dept: FAMILY MEDICINE | Facility: CLINIC | Age: 62
End: 2017-06-29

## 2017-06-29 NOTE — TELEPHONE ENCOUNTER
Linda is requesting verification as to how often pt should get his ear flushed. Pt does have a frequent build up of Ear wax.  Directions on the Ear drops and often he should come in are different. Please Advise.  Please Fax new order to [facility].  [facility]

## 2017-06-30 NOTE — TELEPHONE ENCOUNTER
I would flush the ears on an as needed basis. Try the Debrox drops 5-10 drops twice a day for 4 days then rinse out. If the Debrox doesn't clear the wax, then come into the clinic to have them flushed.  Clemencia Egan RNC, NP  [facility]

## 2017-07-11 ENCOUNTER — OFFICE VISIT (OUTPATIENT)
Dept: FAMILY MEDICINE | Facility: CLINIC | Age: 62
End: 2017-07-11
Payer: MEDICARE

## 2017-07-11 VITALS
DIASTOLIC BLOOD PRESSURE: 72 MMHG | HEART RATE: 72 BPM | OXYGEN SATURATION: 91 % | RESPIRATION RATE: 19 BRPM | WEIGHT: 176 LBS | BODY MASS INDEX: 25.62 KG/M2 | SYSTOLIC BLOOD PRESSURE: 144 MMHG | TEMPERATURE: 97.3 F
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
## 2017-07-11 PROCEDURE — 99212 OFFICE O/P EST SF 10 MIN: CPT | Performed by: FAMILY MEDICINE

## 2017-07-11 RX ORDER — AMOXICILLIN AND CLAVULANATE POTASSIUM 562.5; 437.5; 62.5 MG/1; MG/1; MG/1
2 TABLET, MULTILAYER, EXTENDED RELEASE ORAL 2 TIMES DAILY
COMMUNITY
End: 2017-09-06

## 2017-07-11 NOTE — PATIENT INSTRUCTIONS
"  Tick Bites  Ticks are insects that feed on the blood of animals and humans. They may gorge themselves for days before you find and remove them. The bites themselves aren't cause for concern. But ticks can carry and transmit illnesses such as Lyme disease and [place] spotted fever. Both diseases begin with a rash and symptoms similar to the flu. In advanced stages, these diseases can be quite serious.     A \"bull's eye\" rash is a common symptom of Lyme disease.   When to go to the emergency department (ED)  Not all ticks carry disease. And a tick must remain attached for at least 24 hours to infect you. If you find a tick, don't panic. Try to carefully remove it with tweezers. Grasp the insect near its head and pull without twisting. If you can't easily dislodge the tick or if you leave the head in your skin, get medical care right away.  What to expect in the ED    The tick or any remnants will be removed and the bite cleaned.    To prevent disease, you may be given antibiotics. Both Lyme disease and [place] spotted fever respond quickly to these medications.    You may be asked to see your health care provider for a blood test to check for Lyme disease.  Follow-up care  Some states and counties have services that test ticks for the presence of Lyme's disease and other diseases. You may check with your local officials to see if this service is available in your area.  If you remove a tick yourself, watch for signs of a tick-borne illness. Symptoms may appear within a few days or weeks after a bite. Call your health care provider if you notice any of the following:    Rash (This may spread outward in a ring from a hard white lump. Or, it may move up your arms and legs to your chest.)    Chills and fever    Body aches and joint pain    Severe headache  Date Last Reviewed: 12/1/2016 2000-2017 [employer]. [address] [place] [address], [place] [address]. All rights reserved. This information " is not intended as a substitute for professional medical care. Always follow your healthcare professional's instructions.

## 2017-07-11 NOTE — PROGRESS NOTES
SUBJECTIVE:                                                    Geovani Bustos is a 61 year old male who presents to clinic today for the following health issues:      Tick bite      Duration: 6 days    Description (location/character/radiation): back left calf    Intensity:  mild, moderate    Accompanying signs and symptoms: itching and stinging     History (similar episodes/previous evaluation): None    Precipitating or alleviating factors: wood tick bite    Therapies tried and outcome: None         Problem list and histories reviewed & adjusted, as indicated.  Additional history: as documented    Patient Active Problem List   Diagnosis     Colon polyps     Schizophrenia (H)     VSD (ventricular septal defect)     Mitral valve prolapse     Acquired hypothyroidism     IgA nephropathy     Proteinuria     Hyperlipidemia LDL goal <130     BPH (benign prostatic hyperplasia)     Health Care Home     Bilateral impacted cerumen     Bunion of great toe of right foot     Eczema of external ear, bilateral     Tinea pedis, unspecified laterality     Incomplete right bundle branch block (RBBB)     Cigarette nicotine dependence with nicotine-induced disorder     Gastroesophageal reflux disease without esophagitis     Past Surgical History:   Procedure Laterality Date     ARTHRODESIS FOOT Right 1/19/2016    Procedure: ARTHRODESIS FOOT;  Surgeon: Holden Harrison DPM;  Location: [facility]     ARTHRODESIS FOOT Left 1/3/2017    Procedure: ARTHRODESIS FOOT;  Surgeon: Holden Harrison DPM;  Location: [facility]     SURGICAL HISTORY OF -       leg fracture       Social History   Substance Use Topics     Smoking status: Current Every Day Smoker     Packs/day: 0.50     Smokeless tobacco: Never Used     Alcohol use No     No family history on file.      Current Outpatient Prescriptions   Medication Sig Dispense Refill     psyllium (NATURAL FIBER THERAPY) 30.9 % POWD Take 1 each by mouth See Admin Instructions Take 1 teaspoonful every  day at 7pm and 9 pm. 1 Bottle 3     aspirin 81 MG EC tablet Take 1 tablet (81 mg) by mouth daily 90 tablet 2     simvastatin (ZOCOR) 40 MG tablet TAKE ONE TABLET BY MOUTH EVERY DAY AT 9 PM 90 tablet 2     order for DME Equipment being ordered: Dynaflex insert 1 Units 0     augmented betamethasone dipropionate (DIPROLENE-AF) 0.05 % ointment Apply to AA twice daily 1-2 weeks then as needed only. Do not apply to face. (Due for Derm recheck b4 next refill: [phone] to schedule) 45 g 0     carbamide peroxide (DEBROX) 6.5 % otic solution Place 5 drops into both ears 2 times daily For 5 days every 2 months, then return for irrigation 15 mL 3     finasteride (PROSCAR) 5 MG tablet Take 1 tablet (5 mg) by mouth daily 90 tablet 1     acetaminophen (TYLENOL) 325 MG tablet Take 2 tablets (650 mg) by mouth every 4 hours as needed for mild pain 100 tablet 0     levothyroxine (SYNTHROID/LEVOTHROID) 125 MCG tablet Take 1 tablet (125 mcg) by mouth daily 90 tablet 1     Omega-3 Fatty Acids (FISH OIL) 1200 MG CAPS Take 1 capsule by mouth daily 90 capsule 3     oxybutynin (DITROPAN) 5 MG tablet Take by mouth 3 times daily       ABILIFY 10 MG OR TABS 1 TABLET DAILY       ZOLOFT 100 MG OR TABS 2 TABLETS DAILY       amoxicillin-clavulanate (AUGMENTIN XR) 1000-62.5 MG per 12 hr tablet Take 2 tablets by mouth 2 times daily       Allergies   Allergen Reactions     Nitrogen Oxide      Sulfa Drugs      Recent Labs   Lab Test 02/20/17 02/01/17   1457  12/21/16   1405  01/04/16   1640  01/04/16   0850  01/12/15   0841   02/01/13   0815   05/14/12   0830   01/10/11   0850   A1C   --    --    --    --    --    --    --   5.5   --   5.6   --   5.2   LDL   --   Cannot estimate LDL when triglyceride exceeds 400 mg/dL  89   --    --   104*  81   < >  117   < >  101   < >  127   HDL   --   27*   --    --   48  36*   < >  45   < >  43   < >  41   TRIG   --   444*   --    --   163*  274*   < >  123   < >  143   < >  119   ALT   --   19   --   33  31   32   < >   --    < >   --    --    --    CR  1.37*  1.21   --   1.27*   --    --    < >   --    < >   --    --    --    GFRESTIMATED  52*  61   --   57*   --    --    < >   --    < >   --    --    --    GFRESTBLACK  >60  73   --   70   --    --    < >   --    < >   --    --    --    POTASSIUM  4.0  4.1   --   4.1   --    --    < >   --    < >   --    --    --    TSH   --    --   0.82  2.68  2.18  1.17   < >   --    < >   --    < >   --     < > = values in this interval not displayed.      BP Readings from Last 3 Encounters:   07/11/17 144/72   04/26/17 144/79   04/03/17 127/79    Wt Readings from Last 3 Encounters:   07/11/17 176 lb (79.8 kg)   04/19/17 179 lb (81.2 kg)   04/03/17 177 lb (80.3 kg)                  Labs reviewed in EPIC    Reviewed and updated as needed this visit by clinical staff  Allergies       Reviewed and updated as needed this visit by Provider         ROS:  Constitutional, HEENT, cardiovascular, pulmonary, gi and gu systems are negative, except as otherwise noted.    OBJECTIVE:     /72 (BP Location: Right arm, Patient Position: Chair, Cuff Size: Adult Large)  Pulse 72  Temp 97.3  F (36.3  C) (Tympanic)  Resp 19  Wt 176 lb (79.8 kg)  SpO2 91%  BMI 25.62 kg/m2  Body mass index is 25.62 kg/(m^2).  GENERAL: healthy, alert and no distress  NECK: no adenopathy, no asymmetry, masses, or scars and thyroid normal to palpation  MS: no gross musculoskeletal defects noted, no edema  SKIN: erythematous circular macular rash, 1.5 x 1.5 cm in size, involving left calf, no drainage, warmth, tenderness or drainage noted      ASSESSMENT/PLAN:           ICD-10-CM    1. Tick bite, initial encounter W57.XXXA      No sign of infection. Reassurance provided, Lyme's prophylaxis not indicated. Suggested to keep the area dry and clean, can use hydrocortisone topically for itch. Follow-up criteria discussed in detail. All questions answered.      Patient Instructions       Tick Bites  Ticks are insects  "that feed on the blood of animals and humans. They may gorge themselves for days before you find and remove them. The bites themselves aren't cause for concern. But ticks can carry and transmit illnesses such as Lyme disease and [place] spotted fever. Both diseases begin with a rash and symptoms similar to the flu. In advanced stages, these diseases can be quite serious.     A \"bull's eye\" rash is a common symptom of Lyme disease.   When to go to the emergency department (ED)  Not all ticks carry disease. And a tick must remain attached for at least 24 hours to infect you. If you find a tick, don't panic. Try to carefully remove it with tweezers. Grasp the insect near its head and pull without twisting. If you can't easily dislodge the tick or if you leave the head in your skin, get medical care right away.  What to expect in the ED    The tick or any remnants will be removed and the bite cleaned.    To prevent disease, you may be given antibiotics. Both Lyme disease and [place] spotted fever respond quickly to these medications.    You may be asked to see your health care provider for a blood test to check for Lyme disease.  Follow-up care  Some [facility] and [facility] have services that test ticks for the presence of Lyme's disease and other diseases. You may check with your local officials to see if this service is available in your area.  If you remove a tick yourself, watch for signs of a tick-borne illness. Symptoms may appear within a few days or weeks after a bite. Call your health care provider if you notice any of the following:    Rash (This may spread outward in a ring from a hard white lump. Or, it may move up your arms and legs to your chest.)    Chills and fever    Body aches and joint pain    Severe headache  Date Last Reviewed: 12/1/2016 2000-2017 [employer]. [address] [place] [address], [place] [address]. All rights reserved. This information is not intended as a substitute " for professional medical care. Always follow your healthcare professional's instructions.            Alexandru Vera MD  [facility]

## 2017-07-11 NOTE — MR AVS SNAPSHOT
"              After Visit Summary   7/11/2017    Geovani Bustos    MRN: [id number]           Patient Information     Date Of Birth          1955        Visit Information        Provider Department      7/11/2017 8:40 AM Alexandru Vera MD [facility]        Care Instructions      Tick Bites  Ticks are insects that feed on the blood of animals and humans. They may gorge themselves for days before you find and remove them. The bites themselves aren't cause for concern. But ticks can carry and transmit illnesses such as Lyme disease and [place] spotted fever. Both diseases begin with a rash and symptoms similar to the flu. In advanced stages, these diseases can be quite serious.     A \"bull's eye\" rash is a common symptom of Lyme disease.   When to go to the emergency department (ED)  Not all ticks carry disease. And a tick must remain attached for at least 24 hours to infect you. If you find a tick, don't panic. Try to carefully remove it with tweezers. Grasp the insect near its head and pull without twisting. If you can't easily dislodge the tick or if you leave the head in your skin, get medical care right away.  What to expect in the ED    The tick or any remnants will be removed and the bite cleaned.    To prevent disease, you may be given antibiotics. Both Lyme disease and [place] spotted fever respond quickly to these medications.    You may be asked to see your health care provider for a blood test to check for Lyme disease.  Follow-up care  Some states and counties have services that test ticks for the presence of Lyme's disease and other diseases. You may check with your local officials to see if this service is available in your area.  If you remove a tick yourself, watch for signs of a tick-borne illness. Symptoms may appear within a few days or weeks after a bite. Call your health care provider if you notice any of the following:    Rash (This may spread outward in a " "ring from a hard white lump. Or, it may move up your arms and legs to your chest.)    Chills and fever    Body aches and joint pain    Severe headache  Date Last Reviewed: 12/1/2016 2000-2017 [employer]. [address] [place] [address]. All rights reserved. This information is not intended as a substitute for professional medical care. Always follow your healthcare professional's instructions.                Follow-ups after your visit        Your next 10 appointments already scheduled     Aug 02, 2017  1:00 PM CDT   Office Visit with SAUL Squires CNP   [facility] ([facility])    [address] [facility] [id number] [phone]           Bring a current list of meds and any records pertaining to this visit.  For Physicals, please bring immunization records and any forms needing to be filled out.  Please arrive 10 minutes early to complete paperwork.              Who to contact     If you have questions or need follow up information about today's clinic visit or your schedule please contact [facility] directly at [phone].  Normal or non-critical lab and imaging results will be communicated to you by [employer], letter or phone within 4 business days after the clinic has received the results. If you do not hear from us within 7 days, please contact the clinic through [employer] or phone. If you have a critical or abnormal lab result, we will notify you by phone as soon as possible.  Submit refill requests through [facility] or call your pharmacy and they will forward the refill request to us. Please allow 3 business days for your refill to be completed.          Additional Information About Your Visit        [employer] Information     Out[facility] Referrals lets you send messages to your doctor, view your test results, renew your prescriptions, schedule appointments and more. To sign up, go to [email or web address] . Click on \"Log in\" on the " "left side of the screen, which will take you to the Welcome page. Then click on \"Sign up Now\" on the right side of the page.     You will be asked to enter the access code listed below, as well as some personal information. Please follow the directions to create your username and password.     Your access code is: [email or web address]  Expires: 2017  2:00 PM     Your access code will  in 90 days. If you need help or a new code, please call your [place] clinic or [phone].        Care EveryWhere ID     This is your Care EveryWhere ID. This could be used by other organizations to access your [place] medical records  [id number]        Your Vitals Were     Pulse Temperature Respirations Pulse Oximetry BMI (Body Mass Index)       72 97.3  F (36.3  C) (Tympanic) 19 91% 25.62 kg/m2        Blood Pressure from Last 3 Encounters:   17 144/72   17 144/79   17 127/79    Weight from Last 3 Encounters:   17 176 lb (79.8 kg)   17 179 lb (81.2 kg)   17 177 lb (80.3 kg)              Today, you had the following     No orders found for display       Primary Care Provider Office Phone # Fax #    SAUL Squires [facility] [phone]       [facility] [address] [place] [address]        Equal Access to Services     MEENU AMATO : Hadii reece ku hadasho Soomaali, waaxda luqadaha, qaybta kaalmada adeegyada, maday whyte. So [facility] [phone].    ATENCIÓN: Si habla español, tiene a topete disposición servicios gratuitos de asistencia lingüística. Llame al [phone].    We comply with applicable federal civil rights laws and [place] laws. We do not discriminate on the basis of race, color, national origin, age, disability sex, sexual orientation or gender identity.            Thank you!     Thank you for choosing [facility]  for your care. Our goal is always to provide you with excellent care. Hearing back from " our patients is one way we can continue to improve our services. Please take a few minutes to complete the written survey that you may receive in the mail after your visit with us. Thank you!             Your Updated Medication List - Protect others around you: Learn how to safely use, store and throw away your medicines at www.disposemymeds.org.          This list is accurate as of: 7/11/17  9:02 AM.  Always use your most recent med list.                   Brand Name Dispense Instructions for use Diagnosis    ABILIFY 10 MG tablet   Generic drug:  ARIPiprazole      1 TABLET DAILY        acetaminophen 325 MG tablet    TYLENOL    100 tablet    Take 2 tablets (650 mg) by mouth every 4 hours as needed for mild pain    Cigarette nicotine dependence with nicotine-induced disorder       amoxicillin-clavulanate 1000-62.5 MG per 12 hr tablet    AUGMENTIN XR     Take 2 tablets by mouth 2 times daily        aspirin 81 MG EC tablet     90 tablet    Take 1 tablet (81 mg) by mouth daily    Hyperlipidemia LDL goal <130       augmented betamethasone dipropionate 0.05 % ointment    DIPROLENE-AF    45 g    Apply to AA twice daily 1-2 weeks then as needed only. Do not apply to face. (Due for Derm recheck b4 next refill: [phone] to schedule)    Eczema of both external ears       carbamide peroxide 6.5 % otic solution    DEBROX    15 mL    Place 5 drops into both ears 2 times daily For 5 days every 2 months, then return for irrigation    Bilateral impacted cerumen       finasteride 5 MG tablet    PROSCAR    90 tablet    Take 1 tablet (5 mg) by mouth daily    BPH (benign prostatic hyperplasia)       Fish Oil 1200 MG Caps     90 capsule    Take 1 capsule by mouth daily    Hyperlipidemia LDL goal <130       levothyroxine 125 MCG tablet    SYNTHROID/LEVOTHROID    90 tablet    Take 1 tablet (125 mcg) by mouth daily    Acquired hypothyroidism       order for DME     1 Units    Equipment being ordered: Dynaflex insert    Left foot pain,  S/P foot surgery, left       oxybutynin 5 MG tablet    DITROPAN     Take by mouth 3 times daily        psyllium 30.9 % Powd    NATURAL FIBER THERAPY    1 Bottle    Take 1 each by mouth See Admin Instructions Take 1 teaspoonful every day at 7pm and 9 pm.    Constipation       simvastatin 40 MG tablet    ZOCOR    90 tablet    TAKE ONE TABLET BY MOUTH EVERY DAY AT 9 PM    Hyperlipidemia LDL goal <130       ZOLOFT 100 MG tablet   Generic drug:  sertraline      2 TABLETS DAILY

## 2017-07-11 NOTE — NURSING NOTE
"Chief Complaint   Patient presents with     Insect Bites       Initial /72 (BP Location: Right arm, Patient Position: Chair, Cuff Size: Adult Large)  Pulse 72  Temp 97.3  F (36.3  C) (Tympanic)  Resp 19  Wt 176 lb (79.8 kg)  SpO2 91%  BMI 25.62 kg/m2 Estimated body mass index is 25.62 kg/(m^2) as calculated from the following:    Height as of 4/19/17: 5' 9.5\" (1.765 m).    Weight as of this encounter: 176 lb (79.8 kg).  Medication Reconciliation: complete    Health Maintenance that is potentially due pending provider review:  NONE    n/a    "

## 2017-08-02 ENCOUNTER — OFFICE VISIT (OUTPATIENT)
Dept: FAMILY MEDICINE | Facility: CLINIC | Age: 62
End: 2017-08-02
Payer: MEDICARE

## 2017-08-02 VITALS
BODY MASS INDEX: 26.05 KG/M2 | RESPIRATION RATE: 14 BRPM | TEMPERATURE: 98 F | WEIGHT: 179 LBS | DIASTOLIC BLOOD PRESSURE: 62 MMHG | HEART RATE: 72 BPM | SYSTOLIC BLOOD PRESSURE: 122 MMHG | OXYGEN SATURATION: 99 %

## 2017-08-02 DIAGNOSIS — H61.23 BILATERAL IMPACTED CERUMEN: ICD-10-CM

## 2017-08-02 PROCEDURE — 99213 OFFICE O/P EST LOW 20 MIN: CPT | Performed by: NURSE PRACTITIONER

## 2017-08-02 NOTE — NURSING NOTE
"Chief Complaint   Patient presents with     Ear Problem     bilateral ear plugges- feels good today        Initial There were no vitals taken for this visit. Estimated body mass index is 25.62 kg/(m^2) as calculated from the following:    Height as of 4/19/17: 5' 9.5\" (1.765 m).    Weight as of 7/11/17: 176 lb (79.8 kg).  Medication Reconciliation: complete    Health Maintenance that is potentially due pending provider review:  NONE    n/a    Is there anyone who you would like to be able to receive your results? No  If yes have patient fill out S[facility]    "

## 2017-08-02 NOTE — PROGRESS NOTES
SUBJECTIVE:                                                    Geovani Bustos is a 61 year old male who presents to clinic today for the following health issues:      Ear flush?       Duration: ONGOING     Description (location/character/radiation): BILATERAL EAR PLUGGED     Intensity:  moderate    Accompanying signs and symptoms: NO PAIN     History (similar episodes/previous evaluation): NEEDS CLARIFICATION ON DEBROX DROPS    Precipitating or alleviating factors: None    Therapies tried and outcome: DEPROX     HERE FROM [facility] FOR DISCUSSION ABOUT EAR DROPS  FEELING FINE>   NO HEARING DEFICITS TODAY     -------------------------------------    Problem list and histories reviewed & adjusted, as indicated.  Additional history: as documented    Labs reviewed in EPIC    Reviewed and updated as needed this visit by clinical staffAllergies       Reviewed and updated as needed this visit by Provider         ROS:  Decreased hearing    OBJECTIVE:                                                    /62  Pulse 72  Temp 98  F (36.7  C) (Tympanic)  Resp 14  Wt 179 lb (81.2 kg)  SpO2 99%  BMI 26.05 kg/m2  Body mass index is 26.05 kg/(m^2).   GENERAL: healthy, alert, well nourished, well hydrated, no distress  HENT: ear canals- normal; TMs- normal; Nose- normal; Mouth- no ulcers, no lesions  NECK: no tenderness, no adenopathy, no asymmetry, no masses, no stiffness; thyroid- normal to palpation  RESP: lungs clear to auscultation - no rales, no rhonchi, no wheezes  CV: regular rates and rhythm, normal S1 S2, no S3 or S4 and no murmur, no click or rub -  ABDOMEN: soft, no tenderness, no  hepatosplenomegaly, no masses, normal bowel sounds    Diagnostic test results:  Results for orders placed or performed in visit on 04/19/17   XR FOOT G/E 3 VIEWS LT    Narrative    LEFT FOOT THREE OR MORE VIEWS 4/19/2017 1:55 PM     HISTORY: Pain in left foot.    COMPARISON: 3/1/2017.      Impression    IMPRESSION: First  metatarsophalangeal joint arthrodesis is again  noted. No change in appearance of hardware. Old healed fracture distal  fifth metatarsal diaphysis is also again seen. A fixation adeola is also  seen in the visualized distal tibia and there are 2 screws transfixing  an old healed distal fibular fracture. No new abnormalities.    HALINA DAVID MD          ASSESSMENT/PLAN:                                                    1. Bilateral impacted cerumen  Clear today  For future reference can use   - carbamide peroxide (DEBROX) 6.5 % otic solution; Place 5 drops into both ears 2 times daily For 5 days August 1-5th and Feb 1-5 th  then return for irrigation after the 5th day.  Dispense: 15 mL; Refill: 3    Follow up with Provider - Call or return to the clinic with any worsening of symptoms or no resolution. Patient/Parent verbalized understanding and is in agreement. Medication side effects reviewed.   Current Outpatient Prescriptions   Medication Sig Dispense Refill     carbamide peroxide (DEBROX) 6.5 % otic solution Place 5 drops into both ears 2 times daily For 5 days August 1-5th and Feb 1-5 th  then return for irrigation after the 5th day. 15 mL 3     levothyroxine (SYNTHROID/LEVOTHROID) 125 MCG tablet TAKE ONE TABLET BY MOUTH EVERY DAY 90 tablet 0     psyllium (NATURAL FIBER THERAPY) 30.9 % POWD Take 1 each by mouth See Admin Instructions Take 1 teaspoonful every day at 7pm and 9 pm. 1 Bottle 3     aspirin 81 MG EC tablet Take 1 tablet (81 mg) by mouth daily 90 tablet 2     simvastatin (ZOCOR) 40 MG tablet TAKE ONE TABLET BY MOUTH EVERY DAY AT 9 PM 90 tablet 2     augmented betamethasone dipropionate (DIPROLENE-AF) 0.05 % ointment Apply to AA twice daily 1-2 weeks then as needed only. Do not apply to face. (Due for Derm recheck b4 next refill: [phone] to schedule) 45 g 0     finasteride (PROSCAR) 5 MG tablet Take 1 tablet (5 mg) by mouth daily 90 tablet 1     Omega-3 Fatty Acids (FISH OIL) 1200 MG CAPS Take 1  capsule by mouth daily 90 capsule 3     oxybutynin (DITROPAN) 5 MG tablet Take by mouth 3 times daily       ABILIFY 10 MG OR TABS 1 TABLET DAILY       ZOLOFT 100 MG OR TABS 2 TABLETS DAILY       amoxicillin-clavulanate (AUGMENTIN XR) 1000-62.5 MG per 12 hr tablet Take 2 tablets by mouth 2 times daily       order for DME Equipment being ordered: Dynaflex insert 1 Units 0     acetaminophen (TYLENOL) 325 MG tablet Take 2 tablets (650 mg) by mouth every 4 hours as needed for mild pain 100 tablet 0        See Patient Instructions    SAUL Squires [facility]

## 2017-08-02 NOTE — PATIENT INSTRUCTIONS
Earwax Removal    The ear canal makes earwax from the canal s lining. The ears make wax to lubricate and protect the ear canal. The ear canal is the tube that connects the middle ear to the outside of the ear. The wax protects the ear from bacteria, infection, and damage from water or trauma.  The wax that forms in the canal naturally moves toward the outside of the ear and falls out. In some cases, the ear may make too much wax. If the wax causes problems or keeps the healthcare provider from seeing into the ear, the extra wax may be removed.  Too much wax can affect your hearing. It can cause itching. In rare cases, it can be painful. Earwax should not be removed unless it is causing a problem. You should not stick objects into your ear to remove wax unless told to do so by your healthcare provider.  Healthcare providers can remove earwax safely. It is important to stay still during the procedure to avoid damage to the ear canal. But removing earwax generally doesn t hurt. You will not usually need anesthesia or pain medicine when the provider removes the earwax.  A number of conditions lead to earwax buildup. These include some skin problems, a narrow ear canal, or ears that make too much earwax. Using cotton swabs in the canal pushes earwax deeper into the ear and contributes to the buildup of earwax.  Home care    The healthcare provider may recommend mineral oil or an over-the-counter eardrop to use at home to soften the earwax. Use these products only if the provider recommends them. Use these products only if the provider recommends them. Carefully follow the instructions given.    Don t use mineral oil or OTC eardrops if you might have an ear infection or a ruptured eardrum. Tell your healthcare provider right away if you have diabetes or an immune disorder.    Don t use cotton swabs in your ears. Cotton swabs may push wax deeper into the ear canal or damage the eardrum. Use cotton gauze or a wet  washcloth  to gently remove wax on the outside of the ear and around the opening to the ear canal.    Don't use any probing device or object such as cotton-tipped swabs or mary pins to clean the inside of your ears.    Don t use ear candles to clean your ears. Candling can be dangerous. It can burn the ear canal. It can also make the condition worse instead of better.    Don t use cold water to rinse the ear. This will make you dizzy. If your provider tells you to rinse your ear, use only warm water or follow his or her instructions.    Check the ear for signs of infection or irritation listed below under When to seek medical advice.  Steps for using eardrops  1. Warm the medicine bottle by rubbing it between your hands for a few minutes.  2. Lie down on your side, with the affected ear up.  3. Place the recommended number of drops in the ear. Wet a cotton ball with the medicine. Gently put the cotton ball into the ear opening.  Follow-up care  Follow up with your healthcare provider, or as directed.  When to seek medical advice  Call the provider right away if you have:    Ear pain that gets worse    Fever of 100.4F F (38 C) or higher, or as directed by your healthcare provider    Worsening wax buildup    Severe pain, dizziness, or nausea    Bleeding from the ear    Hearing problems    Signs of irritation from the eardrops, such as burning, stinging, or swelling and tenderness    Foul-smelling fluid draining from the ear    Swelling, redness, or tenderness of the outer ear    Headache, neck pain, or stiff neck  Date Last Reviewed: 3/22/2015    6394-4821 [employer]. [address] [place] [address]. All rights reserved. This information is not intended as a substitute for professional medical care. Always follow your healthcare professional's instructions.          Earwax, Home Treatment    Everyone produces earwax from the lining of the ear canal. It serves to lubricate and protect the ear.  The wax that forms in the canal naturally moves toward the outside of the ear and falls out. Sometimes the ear canal may contain too much wax. This can cause a blockage and loss of hearing. Directions are given below for home treatment.  Home care  If your doctor has advised you to remove a wax blockage yourself, follow these directions:    Unless a medicine was prescribed, you may use an over-the-counter product made for clearing earwax. These contain carbamide peroxide. Lie down with the blocked ear facing upward. Apply one dropper full of medicine and wait a few minutes. Grasp the outer ear and wiggle it to help the solution enter the canal.    Lean over a sink or basin with the blocked ear facing downward. Use a bulb syringe filled with warm (not hot or cold) water to rinse the ear several times. Use gentle pressure only.    If you are having trouble draining the water out of your ear canal, put a few drops of rubbing alcohol (isopropyl alcohol) into the ear canal. This will help remove the remaining water.    Repeat this procedure once a day for up to three days, or until your hearing is back to normal. Do not use this treatment for more than three days in a row.  Don ts    Don t use cold water to rinse the ear. This will make you dizzy.    Don t perform this procedure if you have an ear infection.    Don t perform this procedure if you have a ruptured eardrum.    Don t use cotton swabs, matches, hairpins, keys, or other objects to  clean  the ear canal. This can cause infection of the ear canal or rupture the eardrum. Because of their size and shape, cotton swabs can push earwax deeper into the ear canal instead of removing it.  Follow-up care  Follow up with your health care provider if you are not improving after three cleaning attempts, or as advised.  When to seek medical advice  Call your health care provider right away if any of these occur:    Worsening ear pain    Fever of 101 F (38.3 C) or higher, or  as directed by your health care provider    Hearing does not return to normal after three days of treatment    Fluid drainage or bleeding from the ear canal    Swelling, redness, or tenderness of the outer ear    Headache, neck pain, or stiff neck    9881-7360 [employer] [address] [place] [address]. All rights reserved. This information is not intended as a substitute for professional medical care. Always follow your healthcare professional's instructions.

## 2017-08-02 NOTE — MR AVS SNAPSHOT
After Visit Summary   8/2/2017    Geovani Bustos    MRN: [id number]           Patient Information     Date Of Birth          1955        Visit Information        Provider Department      8/2/2017 1:00 PM Chastity Greer APRN [facility]        Today's Diagnoses     Bilateral impacted cerumen          Care Instructions      Earwax Removal    The ear canal makes earwax from the canal s lining. The ears make wax to lubricate and protect the ear canal. The ear canal is the tube that connects the middle ear to the outside of the ear. The wax protects the ear from bacteria, infection, and damage from water or trauma.  The wax that forms in the canal naturally moves toward the outside of the ear and falls out. In some cases, the ear may make too much wax. If the wax causes problems or keeps the healthcare provider from seeing into the ear, the extra wax may be removed.  Too much wax can affect your hearing. It can cause itching. In rare cases, it can be painful. Earwax should not be removed unless it is causing a problem. You should not stick objects into your ear to remove wax unless told to do so by your healthcare provider.  Healthcare providers can remove earwax safely. It is important to stay still during the procedure to avoid damage to the ear canal. But removing earwax generally doesn t hurt. You will not usually need anesthesia or pain medicine when the provider removes the earwax.  A number of conditions lead to earwax buildup. These include some skin problems, a narrow ear canal, or ears that make too much earwax. Using cotton swabs in the canal pushes earwax deeper into the ear and contributes to the buildup of earwax.  Home care    The healthcare provider may recommend mineral oil or an over-the-counter eardrop to use at home to soften the earwax. Use these products only if the provider recommends them. Use these products only if the provider recommends them.  Carefully follow the instructions given.    Don t use mineral oil or OTC eardrops if you might have an ear infection or a ruptured eardrum. Tell your healthcare provider right away if you have diabetes or an immune disorder.    Don t use cotton swabs in your ears. Cotton swabs may push wax deeper into the ear canal or damage the eardrum. Use cotton gauze or a wet washcloth  to gently remove wax on the outside of the ear and around the opening to the ear canal.    Don't use any probing device or object such as cotton-tipped swabs or mary pins to clean the inside of your ears.    Don t use ear candles to clean your ears. Candling can be dangerous. It can burn the ear canal. It can also make the condition worse instead of better.    Don t use cold water to rinse the ear. This will make you dizzy. If your provider tells you to rinse your ear, use only warm water or follow his or her instructions.    Check the ear for signs of infection or irritation listed below under When to seek medical advice.  Steps for using eardrops  1. Warm the medicine bottle by rubbing it between your hands for a few minutes.  2. Lie down on your side, with the affected ear up.  3. Place the recommended number of drops in the ear. Wet a cotton ball with the medicine. Gently put the cotton ball into the ear opening.  Follow-up care  Follow up with your healthcare provider, or as directed.  When to seek medical advice  Call the provider right away if you have:    Ear pain that gets worse    Fever of 100.4F F (38 C) or higher, or as directed by your healthcare provider    Worsening wax buildup    Severe pain, dizziness, or nausea    Bleeding from the ear    Hearing problems    Signs of irritation from the eardrops, such as burning, stinging, or swelling and tenderness    Foul-smelling fluid draining from the ear    Swelling, redness, or tenderness of the outer ear    Headache, neck pain, or stiff neck  Date Last Reviewed: 3/22/2015    0525-7374  [employer]. [address] [place] [address], [place] [address]. All rights reserved. This information is not intended as a substitute for professional medical care. Always follow your healthcare professional's instructions.          Earwax, Home Treatment    Everyone produces earwax from the lining of the ear canal. It serves to lubricate and protect the ear. The wax that forms in the canal naturally moves toward the outside of the ear and falls out. Sometimes the ear canal may contain too much wax. This can cause a blockage and loss of hearing. Directions are given below for home treatment.  Home care  If your doctor has advised you to remove a wax blockage yourself, follow these directions:    Unless a medicine was prescribed, you may use an over-the-counter product made for clearing earwax. These contain carbamide peroxide. Lie down with the blocked ear facing upward. Apply one dropper full of medicine and wait a few minutes. Grasp the outer ear and wiggle it to help the solution enter the canal.    Lean over a sink or basin with the blocked ear facing downward. Use a bulb syringe filled with warm (not hot or cold) water to rinse the ear several times. Use gentle pressure only.    If you are having trouble draining the water out of your ear canal, put a few drops of rubbing alcohol (isopropyl alcohol) into the ear canal. This will help remove the remaining water.    Repeat this procedure once a day for up to three days, or until your hearing is back to normal. Do not use this treatment for more than three days in a row.  Don ts    Don t use cold water to rinse the ear. This will make you dizzy.    Don t perform this procedure if you have an ear infection.    Don t perform this procedure if you have a ruptured eardrum.    Don t use cotton swabs, matches, hairpins, keys, or other objects to  clean  the ear canal. This can cause infection of the ear canal or rupture the eardrum. Because of their size and  "shape, cotton swabs can push earwax deeper into the ear canal instead of removing it.  Follow-up care  Follow up with your health care provider if you are not improving after three cleaning attempts, or as advised.  When to seek medical advice  Call your health care provider right away if any of these occur:    Worsening ear pain    Fever of 101 F (38.3 C) or higher, or as directed by your health care provider    Hearing does not return to normal after three days of treatment    Fluid drainage or bleeding from the ear canal    Swelling, redness, or tenderness of the outer ear    Headache, neck pain, or stiff neck    7820-5780 [employer]. [address] [place], [place] [address]. All rights reserved. This information is not intended as a substitute for professional medical care. Always follow your healthcare professional's instructions.                Follow-ups after your visit        Who to contact     If you have questions or need follow up information about today's clinic visit or your schedule please contact [facility] directly at [phone].  Normal or non-critical lab and imaging results will be communicated to you by [employer], letter or phone within 4 business days after the clinic has received the results. If you do not hear from us within 7 days, please contact the clinic through [employer] or phone. If you have a critical or abnormal lab result, we will notify you by phone as soon as possible.  Submit refill requests through [employer] or call your pharmacy and they will forward the refill request to us. Please allow 3 business days for your refill to be completed.          Additional Information About Your Visit        [facility] Information     [employer] lets you send messages to your doctor, view your test results, renew your prescriptions, schedule appointments and more. To sign up, go to [email or web address] . Click on \"Log in\" on the left side of the screen, which will take " "you to the Welcome page. Then click on \"Sign up Now\" on the right side of the page.     You will be asked to enter the access code listed below, as well as some personal information. Please follow the directions to create your username and password.     Your access code is: [email or web address]  Expires: 10/31/2017  1:43 PM     Your access code will  in 90 days. If you need help or a new code, please call your [place] clinic or [phone].        Care EveryWhere ID     This is your Care EveryWhere ID. This could be used by other organizations to access your [place] medical records  [id number]        Your Vitals Were     Pulse Temperature Respirations Pulse Oximetry BMI (Body Mass Index)       72 98  F (36.7  C) (Tympanic) 14 99% 26.05 kg/m2        Blood Pressure from Last 3 Encounters:   17 122/62   17 144/72   17 144/79    Weight from Last 3 Encounters:   17 179 lb (81.2 kg)   17 176 lb (79.8 kg)   17 179 lb (81.2 kg)              Today, you had the following     No orders found for display         Today's Medication Changes          These changes are accurate as of: 17  1:43 PM.  If you have any questions, ask your nurse or doctor.               These medicines have changed or have updated prescriptions.        Dose/Directions    carbamide peroxide 6.5 % otic solution   Commonly known as:  DEBROX   This may have changed:  additional instructions   Used for:  Bilateral impacted cerumen        Dose:  5 drop   Place 5 drops into both ears 2 times daily For 5 days - and -  then return for irrigation after the 5th day.   Quantity:  15 mL   Refills:  3            Where to get your medicines      These medications were sent to [facility]  [place] - [address]  [address], [facility] [address]    Hours:  test rx sent successfully  05  kr Phone:  [phone]     carbamide peroxide 6.5 % otic solution          "       Primary Care Provider Office Phone # Fax #    Chastity Greer, SAUL LEVAR [phone]       [facility] [address] [facility] [address]        Equal Access to Services     MEENU AMATO : Hadii aad ku hadreagano Soarthurali, waaxda luqadaha, qaybta kaalmada adeegyada, maday zamoramirna mcgeeana lusia devlin jonah whyte. So [facility] [phone].    ATENCIÓN: Si habla español, tiene a topete disposición servicios gratuitos de asistencia lingüística. Llame al [phone].    We comply with applicable federal civil rights laws and [place] laws. We do not discriminate on the basis of race, color, national origin, age, disability sex, sexual orientation or gender identity.            Thank you!     Thank you for choosing [facility]  for your care. Our goal is always to provide you with excellent care. Hearing back from our patients is one way we can continue to improve our services. Please take a few minutes to complete the written survey that you may receive in the mail after your visit with us. Thank you!             Your Updated Medication List - Protect others around you: Learn how to safely use, store and throw away your medicines at www.disposemymeds.org.          This list is accurate as of: 8/2/17  1:43 PM.  Always use your most recent med list.                   Brand Name Dispense Instructions for use Diagnosis    ABILIFY 10 MG tablet   Generic drug:  ARIPiprazole      1 TABLET DAILY        acetaminophen 325 MG tablet    TYLENOL    100 tablet    Take 2 tablets (650 mg) by mouth every 4 hours as needed for mild pain    Cigarette nicotine dependence with nicotine-induced disorder       amoxicillin-clavulanate 1000-62.5 MG per 12 hr tablet    AUGMENTIN XR     Take 2 tablets by mouth 2 times daily        aspirin 81 MG EC tablet     90 tablet    Take 1 tablet (81 mg) by mouth daily    Hyperlipidemia LDL goal <130       augmented betamethasone dipropionate 0.05 % ointment     DIPROLENE-AF    45 g    Apply to AA twice daily 1-2 weeks then as needed only. Do not apply to face. (Due for Derm recheck b4 next refill: [phone] to schedule)    Eczema of both external ears       carbamide peroxide 6.5 % otic solution    DEBROX    15 mL    Place 5 drops into both ears 2 times daily For 5 days August 1-5th and Feb 1-5 th  then return for irrigation after the 5th day.    Bilateral impacted cerumen       finasteride 5 MG tablet    PROSCAR    90 tablet    Take 1 tablet (5 mg) by mouth daily    BPH (benign prostatic hyperplasia)       Fish Oil 1200 MG Caps     90 capsule    Take 1 capsule by mouth daily    Hyperlipidemia LDL goal <130       levothyroxine 125 MCG tablet    SYNTHROID/LEVOTHROID    90 tablet    TAKE ONE TABLET BY MOUTH EVERY DAY    Acquired hypothyroidism       order for DME     1 Units    Equipment being ordered: Dynaflex insert    Left foot pain, S/P foot surgery, left       oxybutynin 5 MG tablet    DITROPAN     Take by mouth 3 times daily        psyllium 30.9 % Powd    NATURAL FIBER THERAPY    1 Bottle    Take 1 each by mouth See Admin Instructions Take 1 teaspoonful every day at 7pm and 9 pm.    Constipation       simvastatin 40 MG tablet    ZOCOR    90 tablet    TAKE ONE TABLET BY MOUTH EVERY DAY AT 9 PM    Hyperlipidemia LDL goal <130       ZOLOFT 100 MG tablet   Generic drug:  sertraline      2 TABLETS DAILY

## 2017-08-21 DIAGNOSIS — N40.0 BPH (BENIGN PROSTATIC HYPERPLASIA): Chronic | ICD-10-CM

## 2017-08-21 NOTE — TELEPHONE ENCOUNTER
finasteride (PROSCAR) 5 MG tablet      Last Written Prescription Date: 2/28/17  Last Fill Quantity: 90, # refills: 1  Last Office Visit with G, P or [facility] prescribing provider: 8/2/17       Potassium   Date Value Ref Range Status   02/20/2017 4.0 3.5 - 5.1 mmol/L Final     Creatinine   Date Value Ref Range Status   02/20/2017 1.37 (A) 0.60 - 1.30 mg/dL Final     BP Readings from Last 3 Encounters:   08/02/17 122/62   07/11/17 144/72   04/26/17 144/79

## 2017-08-22 RX ORDER — FINASTERIDE 5 MG/1
TABLET, FILM COATED ORAL
Qty: 30 TABLET | Refills: 5 | Status: SHIPPED | OUTPATIENT
Start: 2017-08-22 | End: 2018-01-31

## 2017-09-06 ENCOUNTER — OFFICE VISIT (OUTPATIENT)
Dept: UROLOGY | Facility: CLINIC | Age: 62
End: 2017-09-06
Payer: MEDICARE

## 2017-09-06 VITALS
BODY MASS INDEX: 25.91 KG/M2 | RESPIRATION RATE: 16 BRPM | HEIGHT: 70 IN | DIASTOLIC BLOOD PRESSURE: 79 MMHG | SYSTOLIC BLOOD PRESSURE: 126 MMHG | WEIGHT: 181 LBS | HEART RATE: 69 BPM

## 2017-09-06 DIAGNOSIS — N40.0 BENIGN PROSTATIC HYPERPLASIA, PRESENCE OF LOWER URINARY TRACT SYMPTOMS UNSPECIFIED: Primary | ICD-10-CM

## 2017-09-06 PROCEDURE — 99203 OFFICE O/P NEW LOW 30 MIN: CPT | Performed by: UROLOGY

## 2017-09-06 NOTE — NURSING NOTE
"Chief Complaint   Patient presents with     Consult     OAB     Medication Refill     Oxybutynin, Finasteride        Initial /79 (BP Location: Right arm, Patient Position: Chair, Cuff Size: Adult Regular)  Pulse 69  Resp 16  Ht 1.778 m (5' 10\")  Wt 82.1 kg (181 lb)  BMI 25.97 kg/m2 Estimated body mass index is 25.97 kg/(m^2) as calculated from the following:    Height as of this encounter: 1.778 m (5' 10\").    Weight as of this encounter: 82.1 kg (181 lb).  BP completed using cuff size: regular      Violeta Wharton CMA     "

## 2017-09-07 ENCOUNTER — TELEPHONE (OUTPATIENT)
Dept: FAMILY MEDICINE | Facility: CLINIC | Age: 62
End: 2017-09-07

## 2017-09-07 DIAGNOSIS — N40.0 BENIGN PROSTATIC HYPERPLASIA, PRESENCE OF LOWER URINARY TRACT SYMPTOMS UNSPECIFIED: Primary | Chronic | ICD-10-CM

## 2017-09-07 RX ORDER — OXYBUTYNIN CHLORIDE 5 MG/1
5 TABLET ORAL 3 TIMES DAILY
Qty: 90 TABLET | Refills: 4 | Status: SHIPPED | OUTPATIENT
Start: 2017-09-07 | End: 2017-12-07

## 2017-09-07 NOTE — PROGRESS NOTES
REASON FOR VISIT TODAY:  Overactive bladder.      BRIEF COURSE:  Mr. Geovani Bustos is a 61-year-old gentleman with a history of schizophrenia who lives in a group home who comes to see us today for history of overactive bladder.  The patient has been managed by Dr. Stratton for several years for his overactive bladder.  The patient and his caregiver note that they likely will be transferring care to our institution just due to being closer.  The patient is currently managing his overactivity with oxybutynin 3 times a day, which is the dose he has been on since 08/2016.  He was on 2 pills per day for a short period of time prior to that.  Also of note, the patient has been on finasteride for approximately 19 years.  The patient more recently was having some urge incontinence, but this appears to be better.  The patient is currently voiding about every 1-2 hours on average, though his voiding frequency increases significant after caffeine.  The patient does consume several cans of Mountain Dew and also occasionally drinks Monster drinks, all of which have significant amounts of caffeine.  He has nocturia 2-3 times per night.      PAST MEDICAL HISTORY:  Significant for GERD, hypothyroidism, schizophrenia, mitral prolapse and IgA nephropathy.      PAST SURGICAL HISTORY:  Includes cataract surgery, bunion surgery.  The patient also has a adeola in his leg and screws in his ankle.      MEDICATIONS:  Include finasteride, Synthroid, psyllium, aspirin, Zocor, omega-3 fatty acid, oxybutynin, Abilify and Zoloft.      ALLERGIES:  Nitrogen oxide and sulfa.      FAMILY HISTORY:  Significant for father with bladder cancer the patient believes.      SOCIAL HISTORY:  The patient smokes and has smoked a pack a day for 46 years.  He drinks no alcohol.      REVIEW OF SYSTEMS:  Negative for fevers, chills, sweats, nausea, vomiting or unexplained weight changes.      PHYSICAL EXAMINATION:   VITAL:  His blood pressure is 126/79, pulse is  69.   GENERAL:  He is in no acute distress.   ABDOMEN:  Soft, nontender, nondistended.   GENITOURINARY:  He has a circumcised phallus, normal meatus.  Testes are descended bilaterally.  There may be early hernias, left greater than right.  Digital rectal exam revealed a prostate that was smooth and without nodularity.      DIAGNOSTIC DATA:  The patient was able to void 200 mL with a postvoid residual of 23.  He had a creatinine of 1.37 on 2017.      ASSESSMENT AND PLAN:  Over half of today's 30-minute visit was spent counseling the patient regarding his bladder overactivity.  It appears at this point in time that the patient's symptoms are largely controlled, although the patient notes that they are not perfect.  At this point, we will endeavor to get the patient's outside records so we can see what workup the patient has had to try to minimize any redundancy as we continue to evaluate the patient.  We will obtain consent from the patient's appropriate guardian.  In addition, we will order a PSA as the patient is in the age range for PSA screening.  The risks and benefits of PSA screening were discussed with the patient.  At this point, we will subsequently ask his caregivers to call us back on the next Wednesday or Thursday after his PSA is drawn to give them the result.  Otherwise, we will plan on seeing the patient back in 1 years' time to check in on his bladder overactivity.         RAQUEL CHAVES MD             D: 2017 18:15   T: 2017 10:29   MT: TS      Name:     JAZZ MACE   MRN:      [id number]        Account:      [id number]   :      1955           Visit Date:   2017      Document: [id number]

## 2017-09-07 NOTE — TELEPHONE ENCOUNTER
Seen by urology yesterday.  Medication refilled by Primary Care Provider team today.  Thanks Chastity Greer [facility]

## 2017-09-07 NOTE — TELEPHONE ENCOUNTER
oxybutynin (DITROPAN) 5 MG tablet      Last Written Prescription Date:    Last Fill Quantity: ,   # refills:   Last Office Visit with G, P or [facility] prescribing provider: 8/2/2017  Future Office visit:       Routing refill request to provider for review/approval because:  Medication is reported/historical

## 2017-09-13 ENCOUNTER — TELEPHONE (OUTPATIENT)
Dept: UROLOGY | Facility: CLINIC | Age: 62
End: 2017-09-13

## 2017-09-13 DIAGNOSIS — Z11.59 NEED FOR HEPATITIS C SCREENING TEST: ICD-10-CM

## 2017-09-13 LAB
HCV AB SERPL QL IA: NONREACTIVE
PSA SERPL-MCNC: 0.78 UG/L (ref 0–4)

## 2017-09-13 PROCEDURE — 84153 ASSAY OF PSA TOTAL: CPT | Performed by: NURSE PRACTITIONER

## 2017-09-13 PROCEDURE — G0472 HEP C SCREEN HIGH RISK/OTHER: HCPCS | Performed by: NURSE PRACTITIONER

## 2017-09-13 PROCEDURE — 36415 COLL VENOUS BLD VENIPUNCTURE: CPT | Performed by: NURSE PRACTITIONER

## 2017-09-13 NOTE — TELEPHONE ENCOUNTER
The PSA that was ordered by Dr Zepeda is still in process and we will watch for it. The Hep C was ordered by another provider. Chani CUADRA RN

## 2017-09-13 NOTE — TELEPHONE ENCOUNTER
Reason for Call:  Other FYI    Detailed comments: pt had labs for PSA and Hep C drawn today. Please watch for results.     Phone Number Patient can be reached at: Home number on file [phone] (home)    Best Time: any     Can we leave a detailed message on this number? YES    Call taken on 9/13/2017 at 2:05 PM by Estephanie Melton

## 2017-09-29 ENCOUNTER — MEDICAL CORRESPONDENCE (OUTPATIENT)
Dept: HEALTH INFORMATION MANAGEMENT | Facility: CLINIC | Age: 62
End: 2017-09-29

## 2017-10-11 DIAGNOSIS — F25.0 SCHIZOAFFECTIVE DISORDER, BIPOLAR TYPE (H): Primary | ICD-10-CM

## 2017-10-11 DIAGNOSIS — E03.9 ACQUIRED HYPOTHYROIDISM: Chronic | ICD-10-CM

## 2017-10-11 PROCEDURE — 84443 ASSAY THYROID STIM HORMONE: CPT

## 2017-10-11 PROCEDURE — 36415 COLL VENOUS BLD VENIPUNCTURE: CPT | Performed by: FAMILY MEDICINE

## 2017-10-12 DIAGNOSIS — E78.5 HYPERLIPIDEMIA LDL GOAL <130: ICD-10-CM

## 2017-10-12 DIAGNOSIS — E03.9 ACQUIRED HYPOTHYROIDISM: Chronic | ICD-10-CM

## 2017-10-12 LAB — TSH SERPL DL<=0.005 MIU/L-ACNC: 1.24 MU/L (ref 0.4–4)

## 2017-10-12 RX ORDER — LEVOTHYROXINE SODIUM 125 UG/1
TABLET ORAL
Qty: 90 TABLET | Refills: 1 | Status: SHIPPED | OUTPATIENT
Start: 2017-10-12 | End: 2018-03-26

## 2017-10-12 NOTE — TELEPHONE ENCOUNTER
aspirin 81 MG EC tablet      Last Written Prescription Date: 6/1/2017  Last Fill Quantity: 90,  # refills: 2   Last Office Visit with [facility], [facility] or [facility] prescribing provider: 8/2/2017

## 2017-10-17 ENCOUNTER — RADIANT APPOINTMENT (OUTPATIENT)
Dept: GENERAL RADIOLOGY | Facility: CLINIC | Age: 62
End: 2017-10-17
Attending: FAMILY MEDICINE
Payer: MEDICARE

## 2017-10-17 ENCOUNTER — OFFICE VISIT (OUTPATIENT)
Dept: FAMILY MEDICINE | Facility: CLINIC | Age: 62
End: 2017-10-17
Payer: MEDICARE

## 2017-10-17 VITALS
HEIGHT: 70 IN | HEART RATE: 90 BPM | DIASTOLIC BLOOD PRESSURE: 84 MMHG | BODY MASS INDEX: 26.2 KG/M2 | SYSTOLIC BLOOD PRESSURE: 126 MMHG | TEMPERATURE: 99.2 F | WEIGHT: 183 LBS | RESPIRATION RATE: 18 BRPM

## 2017-10-17 DIAGNOSIS — R07.89 CHEST WALL PAIN: ICD-10-CM

## 2017-10-17 DIAGNOSIS — S99.912A INJURY OF LEFT ANKLE, INITIAL ENCOUNTER: ICD-10-CM

## 2017-10-17 DIAGNOSIS — S99.912A INJURY OF LEFT ANKLE, INITIAL ENCOUNTER: Primary | ICD-10-CM

## 2017-10-17 PROCEDURE — 99213 OFFICE O/P EST LOW 20 MIN: CPT | Performed by: FAMILY MEDICINE

## 2017-10-17 PROCEDURE — 71101 X-RAY EXAM UNILAT RIBS/CHEST: CPT | Mod: LT

## 2017-10-17 PROCEDURE — 73610 X-RAY EXAM OF ANKLE: CPT | Mod: LT

## 2017-10-17 NOTE — NURSING NOTE
"Chief Complaint   Patient presents with     Musculoskeletal Problem       Initial /84 (Cuff Size: Adult Regular)  Pulse 90  Temp 99.2  F (37.3  C) (Tympanic)  Resp 18  Ht 5' 10\" (1.778 m)  Wt 183 lb (83 kg)  BMI 26.26 kg/m2 Estimated body mass index is 26.26 kg/(m^2) as calculated from the following:    Height as of this encounter: 5' 10\" (1.778 m).    Weight as of this encounter: 183 lb (83 kg).  Medication Reconciliation: complete    Health Maintenance that is potentially due pending provider review:  NONE    n/a    Is there anyone who you would like to be able to receive your results? Not Applicable  If yes have patient fill out [facility]    "

## 2017-10-17 NOTE — PROGRESS NOTES
SUBJECTIVE:   Geovani Bustos is a 61 year old male who presents to clinic today for the following health issues:      Musculoskeletal problem/pain      Duration: Yesterday     Description  Location: Left foot and ribs- left side    Intensity:  moderate    Accompanying signs and symptoms: radiation of pain to ankle and swelling    History  Previous similar problem: no   Previous evaluation:  none    Precipitating or alleviating factors:  Trauma or overuse: YES- Tripped at work yesterday and fell, twisted ankle and hit his ribs on the concrete  Aggravating factors include: standing, walking, climbing stairs, exercise and overuse    Therapies tried and outcome: rest/inactivity and iced it last night, took a warm shower today and let the water run on it.           Problem list and histories reviewed & adjusted, as indicated.  Additional history: as documented    Patient Active Problem List   Diagnosis     Colon polyps     Schizophrenia (H)     VSD (ventricular septal defect)     Mitral valve prolapse     Acquired hypothyroidism     IgA nephropathy     Proteinuria     Hyperlipidemia LDL goal <130     BPH (benign prostatic hyperplasia)     Health Care Home     Bilateral impacted cerumen     Bunion of great toe of right foot     Eczema of external ear, bilateral     Tinea pedis, unspecified laterality     Incomplete right bundle branch block (RBBB)     Cigarette nicotine dependence with nicotine-induced disorder     Gastroesophageal reflux disease without esophagitis     Past Surgical History:   Procedure Laterality Date     ARTHRODESIS FOOT Right 1/19/2016    Procedure: ARTHRODESIS FOOT;  Surgeon: Holden Harrison DPM;  Location: [facility] OR     ARTHRODESIS FOOT Left 1/3/2017    Procedure: ARTHRODESIS FOOT;  Surgeon: Holden Harrison DPM;  Location: [facility] OR     SURGICAL HISTORY OF -       leg fracture       Social History   Substance Use Topics     Smoking status: Current Every Day Smoker     Packs/day: 1.00      Types: Cigarettes     Smokeless tobacco: Never Used     Alcohol use No     History reviewed. No pertinent family history.      Current Outpatient Prescriptions   Medication Sig Dispense Refill     aspirin 81 MG EC tablet Take 1 tablet (81 mg) by mouth daily 90 tablet 1     levothyroxine (SYNTHROID/LEVOTHROID) 125 MCG tablet TAKE ONE TABLET BY MOUTH EVERY DAY 90 tablet 1     oxybutynin (DITROPAN) 5 MG tablet Take 1 tablet (5 mg) by mouth 3 times daily 90 tablet 4     finasteride (PROSCAR) 5 MG tablet Take 1 tablet (5 mg) by mouth daily 30 tablet 5     carbamide peroxide (DEBROX) 6.5 % otic solution Place 5 drops into both ears 2 times daily For 5 days August 1-5th and Feb 1-5 th  then return for irrigation after the 5th day. 15 mL 3     psyllium (NATURAL FIBER THERAPY) 30.9 % POWD Take 1 each by mouth See Admin Instructions Take 1 teaspoonful every day at 7pm and 9 pm. 1 Bottle 3     simvastatin (ZOCOR) 40 MG tablet TAKE ONE TABLET BY MOUTH EVERY DAY AT 9 PM 90 tablet 2     order for DME Equipment being ordered: Dynaflex insert 1 Units 0     augmented betamethasone dipropionate (DIPROLENE-AF) 0.05 % ointment Apply to AA twice daily 1-2 weeks then as needed only. Do not apply to face. (Due for Derm recheck b4 next refill: [phone] to schedule) 45 g 0     acetaminophen (TYLENOL) 325 MG tablet Take 2 tablets (650 mg) by mouth every 4 hours as needed for mild pain 100 tablet 0     Omega-3 Fatty Acids (FISH OIL) 1200 MG CAPS Take 1 capsule by mouth daily 90 capsule 3     ABILIFY 10 MG OR TABS 1 TABLET DAILY       ZOLOFT 100 MG OR TABS 2 TABLETS DAILY       Allergies   Allergen Reactions     Nitrogen Oxide      Sulfa Drugs      Recent Labs   Lab Test  10/11/17   1325 02/20/17 02/01/17   1457  12/21/16   1405  01/04/16   1640  01/04/16   0850  01/12/15   0841   02/01/13   0815   05/14/12   0830   01/10/11   0850   A1C   --    --    --    --    --    --    --    --   5.5   --   5.6   --   5.2   LDL   --    --   Cannot  "estimate LDL when triglyceride exceeds 400 mg/dL  89   --    --   104*  81   < >  117   < >  101   < >  127   HDL   --    --   27*   --    --   48  36*   < >  45   < >  43   < >  41   TRIG   --    --   444*   --    --   163*  274*   < >  123   < >  143   < >  119   ALT   --    --   19   --   33  31  32   < >   --    < >   --    --    --    CR   --   1.37*  1.21   --   1.27*   --    --    < >   --    < >   --    --    --    GFRESTIMATED   --   52*  61   --   57*   --    --    < >   --    < >   --    --    --    GFRESTBLACK   --   >60  73   --   70   --    --    < >   --    < >   --    --    --    POTASSIUM   --   4.0  4.1   --   4.1   --    --    < >   --    < >   --    --    --    TSH  1.24   --    --   0.82  2.68  2.18  1.17   < >   --    < >   --    < >   --     < > = values in this interval not displayed.      BP Readings from Last 3 Encounters:   10/17/17 126/84   09/06/17 126/79   08/02/17 122/62    Wt Readings from Last 3 Encounters:   10/17/17 183 lb (83 kg)   09/06/17 181 lb (82.1 kg)   08/02/17 179 lb (81.2 kg)                  Labs reviewed in EPIC          Reviewed and updated as needed this visit by clinical staff     Reviewed and updated as needed this visit by Provider         ROS:  Constitutional, HEENT, cardiovascular, pulmonary, gi and gu systems are negative, except as otherwise noted.      OBJECTIVE:   /84 (Cuff Size: Adult Regular)  Pulse 90  Temp 99.2  F (37.3  C) (Tympanic)  Resp 18  Ht 5' 10\" (1.778 m)  Wt 183 lb (83 kg)  BMI 26.26 kg/m2  Body mass index is 26.26 kg/(m^2).  GENERAL: healthy, alert and no distress  NECK: no adenopathy, no asymmetry, masses, or scars and thyroid normal to palpation  RESP: lungs clear to auscultation - no rales, rhonchi or wheezes  CV: regular rates and rhythm, normal S1 S2, no S3 or S4 and no murmur, click or rub, left lateral chest wall tender on palpation, no swelling or skin discoloration noted  ABDOMEN: soft, nontender, no hepatosplenomegaly, no " masses and bowel sounds normal  MS: left ankle mildly swollen and tender over deltoid ligament area, able to weight-bear, pulses 2+ bilaterally, sensation to touch and pressure intact, ROM normal  NEURO:grossly intact    XR RIBS & CHEST LT 3VW 10/17/2017 6:33 PM      HISTORY: left chest wall pain, Other chest pain         IMPRESSION: No evidence of acute displaced left rib fracture. The  lungs appear clear. No pleural effusion or pneumothorax.      ANKLE LEFT THREE OR MORE VIEWS 10/17/2017 6:32 PM      HISTORY: Left ankle pain. Unspecified injury of left ankle, initial  encounter.         IMPRESSION: Tip of the tibial intramedullary adeola is noted transfixing  a healed distal diaphyseal fracture. Two screws are also noted in the  fibula transfixing a healed fibular fracture. No acute fracture is  demonstrated at the ankle. The ankle mortise appears congruent.    ASSESSMENT/PLAN:         ICD-10-CM    1. Injury of left ankle, initial encounter S99.912A XR Ankle Left G/E 3 Views   2. Chest wall pain R07.89 XR Ribs & Chest Left G/E 3 Views       Symptoms likely secondary to soft tissue injury. X-ray chest and left ankle came back unremarkable. Reassurance provided as adjusted to continue icing, over-the-counter analgesia and rest. Written information provided as below. Patient/wife understood and in agreement with the above plan. All questions answered.      Patient Instructions     Ankle Sprain (Adult)  An ankle sprain is a stretching or tearing of the ligaments that hold the ankle joint together. There are no broken bones.  An ankle sprain is a common injury for both children and adults. It happens when the ankle turns, twists, or rolls in an awkward way. This can be caused by a sports injury. Or it can happen from doing something as simple as stepping on an uneven surface.  Ligaments are made of tough connective tissue. Normally, ligaments stretch a certain amount and then go back to their normal place. A sprain  happens when a ligament is forced to stretch more than the normal amount. A severe sprain can actually tear the ligaments. If you have a severe sprain, you may have felt or heard something like a pop when you were injured.  Ankle sprains are given a grade depending on whether they are mild, moderate, or severe:    Grade 1 sprain. A mild sprain with minor stretching and damage to the ligament.    Grade 2 sprain. A moderate sprain where the ligament is partly torn.    Grade 3 sprain. The most severe kind of sprain. The ligament is completely torn.  Most sprains take about 4 to 6 weeks to heal. A severe sprain can take several months to recover.  Your healthcare provider may order X-rays to be sure you don t have a fracture, or broken bone.  The injured area will feel sore.  Swelling and pain may make it hard to walk. You may need crutches if walking is painful. Or your provider may have you use a cast boot or air splint. This will depend on the grade of ankle sprain that you have.    Home care    For a Grade 1 sprain, use RICE (rest, ice, compression, and elevation):    Rest your ankle. Don t walk on it.    Ice should be used right away to help control swelling. Place an ice pack over the injured area for 20 minutes. Do this every 3 to 6 hours for the first 24 to 48 hours. Keep using ice packs to ease pain and swelling as needed. To make an ice pack, put ice cubes in a plastic bag that seals at the top. Wrap the bag in a clean, thin towel or cloth. Never put ice or an ice pack directly on the skin. The ice pack can be put right on the cast, bandage, or splint. As the ice melts, be careful that the cast, bandage, or splint doesn t get wet. If you have a boot, open it to apply an ice pack, unless told otherwise by your provider.    Compression devices help to control swelling. They also keep the ankle from moving and support your injured ankle. These devices include dressings, bandages, and wraps.    Elevate or raise  your ankle above the level of your heart when sitting or lying down. This is very important for the first 48 hours.    Follow the RICE guidelines for a Grade 2 sprain. This type of sprain will take longer to heal. Your provider may have you wear a splint, cast, or brace to keep your ankle from moving.     If you have a Grade 3 sprain, you are at risk for long-term ankle instability. In rare cases, surgery may be needed. Your provider may have you wear a short leg cast or a walking boot for 2 to 3 weeks.    After 48 hours, it may be helpful to apply heat for 20 minutes several times a day. You can do this with a heating pad or warm compress. Or you may want to go back and forth between using ice and heat. Never apply heat directly to the skin. Always wrap the heating pad or warm compress in a clean, thin towel or cloth.    You may use over-the-counter pain medicine (NSAIDS or nonsteroidal anti-inflammatory drugs) to control pain, unless another pain medicine was prescribed. Talk with your provider before using these medicines if you have chronic liver or kidney disease, or have ever had a stomach ulcer or GI (gastrointestinal) bleeding.    Follow any rehabilitation exercises your provider gives you. These can help you be more flexible and improve your balance and coordination. This is helpful in preventing long-term ankle problems.  Prevention  To help prevent ankle sprains, it s important to have good strength, balance, and flexibility. Be sure to:    Always warm up before you exercise or do something very active    Be careful when walking or running on uneven or cracked surfaces    Wear shoes that are in good condition and fit well    Listen to your body s signals to slow down when you are in pain or tired  Follow-up care  Any X-rays you had today don t show any broken bones, breaks, or fractures. Sometimes fractures don t show up on the first X-ray. Bruises and sprains can sometimes hurt as much as a fracture.  These injuries can take time to heal completely. If your symptoms don t get better or they get worse, talk with your healthcare provider. You may need a repeat X-ray.  Follow up with your healthcare provider, or as advised. Check for any warning signs listed below.  When to seek medical advice  Call your healthcare provider right away if any of these occur:    Fever of 100.4 F (38 C) or higher, or as directed by your healthcare provider    The injury doesn t seem to be healing    The swelling comes back    The cast has a bad smell    The plaster cast or splint gets wet or soft    The fiberglass cast or splint gets wet and does not dry for 24 hours    The pain or swelling increases, or redness appears    Your toes become cold, blue, numb, or tingly    The skin is discolored (looks blue, purple, or gray), has blisters, or is irritated    You re-injure your ankle  Date Last Reviewed: 11/20/2015 2000-2017 [employer]. [address] [place] [address], [place] [address]. All rights reserved. This information is not intended as a substitute for professional medical care. Always follow your healthcare professional's instructions.            Alexandru Vera MD  [facility]

## 2017-10-17 NOTE — MR AVS SNAPSHOT
After Visit Summary   10/17/2017    Geovani Bustos    MRN: [id number]           Patient Information     Date Of Birth          1955        Visit Information        Provider Department      10/17/2017 5:20 PM Alexandru Vera MD [facility]        Today's Diagnoses     Injury of left ankle, initial encounter    -  1    Chest wall pain          Care Instructions      Ankle Sprain (Adult)  An ankle sprain is a stretching or tearing of the ligaments that hold the ankle joint together. There are no broken bones.  An ankle sprain is a common injury for both children and adults. It happens when the ankle turns, twists, or rolls in an awkward way. This can be caused by a sports injury. Or it can happen from doing something as simple as stepping on an uneven surface.  Ligaments are made of tough connective tissue. Normally, ligaments stretch a certain amount and then go back to their normal place. A sprain happens when a ligament is forced to stretch more than the normal amount. A severe sprain can actually tear the ligaments. If you have a severe sprain, you may have felt or heard something like a pop when you were injured.  Ankle sprains are given a grade depending on whether they are mild, moderate, or severe:    Grade 1 sprain. A mild sprain with minor stretching and damage to the ligament.    Grade 2 sprain. A moderate sprain where the ligament is partly torn.    Grade 3 sprain. The most severe kind of sprain. The ligament is completely torn.  Most sprains take about 4 to 6 weeks to heal. A severe sprain can take several months to recover.  Your healthcare provider may order X-rays to be sure you don t have a fracture, or broken bone.  The injured area will feel sore.  Swelling and pain may make it hard to walk. You may need crutches if walking is painful. Or your provider may have you use a cast boot or air splint. This will depend on the grade of ankle sprain that you have.    Home  care    For a Grade 1 sprain, use RICE (rest, ice, compression, and elevation):    Rest your ankle. Don t walk on it.    Ice should be used right away to help control swelling. Place an ice pack over the injured area for 20 minutes. Do this every 3 to 6 hours for the first 24 to 48 hours. Keep using ice packs to ease pain and swelling as needed. To make an ice pack, put ice cubes in a plastic bag that seals at the top. Wrap the bag in a clean, thin towel or cloth. Never put ice or an ice pack directly on the skin. The ice pack can be put right on the cast, bandage, or splint. As the ice melts, be careful that the cast, bandage, or splint doesn t get wet. If you have a boot, open it to apply an ice pack, unless told otherwise by your provider.    Compression devices help to control swelling. They also keep the ankle from moving and support your injured ankle. These devices include dressings, bandages, and wraps.    Elevate or raise your ankle above the level of your heart when sitting or lying down. This is very important for the first 48 hours.    Follow the RICE guidelines for a Grade 2 sprain. This type of sprain will take longer to heal. Your provider may have you wear a splint, cast, or brace to keep your ankle from moving.     If you have a Grade 3 sprain, you are at risk for long-term ankle instability. In rare cases, surgery may be needed. Your provider may have you wear a short leg cast or a walking boot for 2 to 3 weeks.    After 48 hours, it may be helpful to apply heat for 20 minutes several times a day. You can do this with a heating pad or warm compress. Or you may want to go back and forth between using ice and heat. Never apply heat directly to the skin. Always wrap the heating pad or warm compress in a clean, thin towel or cloth.    You may use over-the-counter pain medicine (NSAIDS or nonsteroidal anti-inflammatory drugs) to control pain, unless another pain medicine was prescribed. Talk with your  provider before using these medicines if you have chronic liver or kidney disease, or have ever had a stomach ulcer or GI (gastrointestinal) bleeding.    Follow any rehabilitation exercises your provider gives you. These can help you be more flexible and improve your balance and coordination. This is helpful in preventing long-term ankle problems.  Prevention  To help prevent ankle sprains, it s important to have good strength, balance, and flexibility. Be sure to:    Always warm up before you exercise or do something very active    Be careful when walking or running on uneven or cracked surfaces    Wear shoes that are in good condition and fit well    Listen to your body s signals to slow down when you are in pain or tired  Follow-up care  Any X-rays you had today don t show any broken bones, breaks, or fractures. Sometimes fractures don t show up on the first X-ray. Bruises and sprains can sometimes hurt as much as a fracture. These injuries can take time to heal completely. If your symptoms don t get better or they get worse, talk with your healthcare provider. You may need a repeat X-ray.  Follow up with your healthcare provider, or as advised. Check for any warning signs listed below.  When to seek medical advice  Call your healthcare provider right away if any of these occur:    Fever of 100.4 F (38 C) or higher, or as directed by your healthcare provider    The injury doesn t seem to be healing    The swelling comes back    The cast has a bad smell    The plaster cast or splint gets wet or soft    The fiberglass cast or splint gets wet and does not dry for 24 hours    The pain or swelling increases, or redness appears    Your toes become cold, blue, numb, or tingly    The skin is discolored (looks blue, purple, or gray), has blisters, or is irritated    You re-injure your ankle  Date Last Reviewed: 11/20/2015 2000-2017 [employer]. [address] [place] [address], [place] [address]. All rights  "reserved. This information is not intended as a substitute for professional medical care. Always follow your healthcare professional's instructions.                Follow-ups after your visit        Who to contact     If you have questions or need follow up information about today's clinic visit or your schedule please contact [facility] directly at [phone].  Normal or non-critical lab and imaging results will be communicated to you by MyChart, letter or phone within 4 business days after the clinic has received the results. If you do not hear from us within 7 days, please contact the clinic through [employer] or phone. If you have a critical or abnormal lab result, we will notify you by phone as soon as possible.  Submit refill requests through [employer] or call your pharmacy and they will forward the refill request to us. Please allow 3 business days for your refill to be completed.          Additional Information About Your Visit        MyChart Information     [employer] lets you send messages to your doctor, view your test results, renew your prescriptions, schedule appointments and more. To sign up, go to [email or web address] . Click on \"Log in\" on the left side of the screen, which will take you to the Welcome page. Then click on \"Sign up Now\" on the right side of the page.     You will be asked to enter the access code listed below, as well as some personal information. Please follow the directions to create your username and password.     Your access code is: [email or web address]  Expires: 10/31/2017  1:43 PM     Your access code will  in 90 days. If you need help or a new code, please call your [facility] or [phone].        Care EveryWhere ID     This is your Care EveryWhere ID. This could be used by other organizations to access your [place] medical records  [id number]        Your Vitals Were     Pulse Temperature Respirations Height BMI (Body Mass Index)       90 99.2  F (37.3 " " C) (Tympanic) 18 5' 10\" (1.778 m) 26.26 kg/m2        Blood Pressure from Last 3 Encounters:   10/17/17 126/84   09/06/17 126/79   08/02/17 122/62    Weight from Last 3 Encounters:   10/17/17 183 lb (83 kg)   09/06/17 181 lb (82.1 kg)   08/02/17 179 lb (81.2 kg)               Primary Care Provider Office Phone # Fax #    Chastity Kwan Percy, APRN [facility] [phone]       [address] [place] [address]        Equal Access to Services     [facility] : Hadii aad ku hadasho Soomaali, waaxda luqadaha, qaybta kaalmada adeegyada, maday mckenzie . So [facility] [phone].    ATENCIÓN: Si habla español, tiene a topete disposición servicios gratuitos de asistencia lingüística. Llame al [phone].    We comply with applicable federal civil rights laws and [place] laws. We do not discriminate on the basis of race, color, national origin, age, disability, sex, sexual orientation, or gender identity.            Thank you!     Thank you for choosing [facility]  for your care. Our goal is always to provide you with excellent care. Hearing back from our patients is one way we can continue to improve our services. Please take a few minutes to complete the written survey that you may receive in the mail after your visit with us. Thank you!             Your Updated Medication List - Protect others around you: Learn how to safely use, store and throw away your medicines at www.disposemymeds.org.          This list is accurate as of: 10/17/17  6:42 PM.  Always use your most recent med list.                   Brand Name Dispense Instructions for use Diagnosis    ABILIFY 10 MG tablet   Generic drug:  ARIPiprazole      1 TABLET DAILY        acetaminophen 325 MG tablet    TYLENOL    100 tablet    Take 2 tablets (650 mg) by mouth every 4 hours as needed for mild pain    Cigarette nicotine dependence with nicotine-induced disorder       aspirin 81 MG EC tablet     90 tablet    Take 1 " tablet (81 mg) by mouth daily    Hyperlipidemia LDL goal <130       augmented betamethasone dipropionate 0.05 % ointment    DIPROLENE-AF    45 g    Apply to AA twice daily 1-2 weeks then as needed only. Do not apply to face. (Due for Derm recheck b4 next refill: [phone] to schedule)    Eczema of both external ears       carbamide peroxide 6.5 % otic solution    DEBROX    15 mL    Place 5 drops into both ears 2 times daily For 5 days August 1-5th and Feb 1-5 th  then return for irrigation after the 5th day.    Bilateral impacted cerumen       finasteride 5 MG tablet    PROSCAR    30 tablet    Take 1 tablet (5 mg) by mouth daily    BPH (benign prostatic hyperplasia)       fish Oil 1200 MG capsule     90 capsule    Take 1 capsule by mouth daily    Hyperlipidemia LDL goal <130       levothyroxine 125 MCG tablet    SYNTHROID/LEVOTHROID    90 tablet    TAKE ONE TABLET BY MOUTH EVERY DAY    Acquired hypothyroidism       order for DME     1 Units    Equipment being ordered: Dynaflex insert    Left foot pain, S/P foot surgery, left       oxybutynin 5 MG tablet    DITROPAN    90 tablet    Take 1 tablet (5 mg) by mouth 3 times daily    Benign prostatic hyperplasia, presence of lower urinary tract symptoms unspecified       psyllium 30.9 % Powd    NATURAL FIBER THERAPY    1 Bottle    Take 1 each by mouth See Admin Instructions Take 1 teaspoonful every day at 7pm and 9 pm.    Constipation       simvastatin 40 MG tablet    ZOCOR    90 tablet    TAKE ONE TABLET BY MOUTH EVERY DAY AT 9 PM    Hyperlipidemia LDL goal <130       ZOLOFT 100 MG tablet   Generic drug:  sertraline      2 TABLETS DAILY

## 2017-10-17 NOTE — PATIENT INSTRUCTIONS
Ankle Sprain (Adult)  An ankle sprain is a stretching or tearing of the ligaments that hold the ankle joint together. There are no broken bones.  An ankle sprain is a common injury for both children and adults. It happens when the ankle turns, twists, or rolls in an awkward way. This can be caused by a sports injury. Or it can happen from doing something as simple as stepping on an uneven surface.  Ligaments are made of tough connective tissue. Normally, ligaments stretch a certain amount and then go back to their normal place. A sprain happens when a ligament is forced to stretch more than the normal amount. A severe sprain can actually tear the ligaments. If you have a severe sprain, you may have felt or heard something like a pop when you were injured.  Ankle sprains are given a grade depending on whether they are mild, moderate, or severe:    Grade 1 sprain. A mild sprain with minor stretching and damage to the ligament.    Grade 2 sprain. A moderate sprain where the ligament is partly torn.    Grade 3 sprain. The most severe kind of sprain. The ligament is completely torn.  Most sprains take about 4 to 6 weeks to heal. A severe sprain can take several months to recover.  Your healthcare provider may order X-rays to be sure you don t have a fracture, or broken bone.  The injured area will feel sore.  Swelling and pain may make it hard to walk. You may need crutches if walking is painful. Or your provider may have you use a cast boot or air splint. This will depend on the grade of ankle sprain that you have.    Home care    For a Grade 1 sprain, use RICE (rest, ice, compression, and elevation):    Rest your ankle. Don t walk on it.    Ice should be used right away to help control swelling. Place an ice pack over the injured area for 20 minutes. Do this every 3 to 6 hours for the first 24 to 48 hours. Keep using ice packs to ease pain and swelling as needed. To make an ice pack, put ice cubes in a plastic bag  that seals at the top. Wrap the bag in a clean, thin towel or cloth. Never put ice or an ice pack directly on the skin. The ice pack can be put right on the cast, bandage, or splint. As the ice melts, be careful that the cast, bandage, or splint doesn t get wet. If you have a boot, open it to apply an ice pack, unless told otherwise by your provider.    Compression devices help to control swelling. They also keep the ankle from moving and support your injured ankle. These devices include dressings, bandages, and wraps.    Elevate or raise your ankle above the level of your heart when sitting or lying down. This is very important for the first 48 hours.    Follow the RICE guidelines for a Grade 2 sprain. This type of sprain will take longer to heal. Your provider may have you wear a splint, cast, or brace to keep your ankle from moving.     If you have a Grade 3 sprain, you are at risk for long-term ankle instability. In rare cases, surgery may be needed. Your provider may have you wear a short leg cast or a walking boot for 2 to 3 weeks.    After 48 hours, it may be helpful to apply heat for 20 minutes several times a day. You can do this with a heating pad or warm compress. Or you may want to go back and forth between using ice and heat. Never apply heat directly to the skin. Always wrap the heating pad or warm compress in a clean, thin towel or cloth.    You may use over-the-counter pain medicine (NSAIDS or nonsteroidal anti-inflammatory drugs) to control pain, unless another pain medicine was prescribed. Talk with your provider before using these medicines if you have chronic liver or kidney disease, or have ever had a stomach ulcer or GI (gastrointestinal) bleeding.    Follow any rehabilitation exercises your provider gives you. These can help you be more flexible and improve your balance and coordination. This is helpful in preventing long-term ankle problems.  Prevention  To help prevent ankle sprains, it s  important to have good strength, balance, and flexibility. Be sure to:    Always warm up before you exercise or do something very active    Be careful when walking or running on uneven or cracked surfaces    Wear shoes that are in good condition and fit well    Listen to your body s signals to slow down when you are in pain or tired  Follow-up care  Any X-rays you had today don t show any broken bones, breaks, or fractures. Sometimes fractures don t show up on the first X-ray. Bruises and sprains can sometimes hurt as much as a fracture. These injuries can take time to heal completely. If your symptoms don t get better or they get worse, talk with your healthcare provider. You may need a repeat X-ray.  Follow up with your healthcare provider, or as advised. Check for any warning signs listed below.  When to seek medical advice  Call your healthcare provider right away if any of these occur:    Fever of 100.4 F (38 C) or higher, or as directed by your healthcare provider    The injury doesn t seem to be healing    The swelling comes back    The cast has a bad smell    The plaster cast or splint gets wet or soft    The fiberglass cast or splint gets wet and does not dry for 24 hours    The pain or swelling increases, or redness appears    Your toes become cold, blue, numb, or tingly    The skin is discolored (looks blue, purple, or gray), has blisters, or is irritated    You re-injure your ankle  Date Last Reviewed: 11/20/2015 2000-2017 [employer]. [address] [place] [address], [place] [address]. All rights reserved. This information is not intended as a substitute for professional medical care. Always follow your healthcare professional's instructions.

## 2017-10-27 ENCOUNTER — TELEPHONE (OUTPATIENT)
Dept: FAMILY MEDICINE | Facility: CLINIC | Age: 62
End: 2017-10-27

## 2017-10-27 DIAGNOSIS — H60.543 ECZEMA OF EXTERNAL EAR, BILATERAL: Primary | ICD-10-CM

## 2017-10-27 DIAGNOSIS — H61.23 BILATERAL IMPACTED CERUMEN: ICD-10-CM

## 2017-10-27 DIAGNOSIS — Z76.89 HEALTH CARE HOME: ICD-10-CM

## 2017-10-27 NOTE — TELEPHONE ENCOUNTER
Reason for Call:  Form, our goal is to have forms completed with 72 hours, however, some forms may require a visit or additional information.    Type of letter, form or note:  [facility] Ex[employer] Gloves    Who is the form from?: [facility] [employer] [employer]oves (if other please explain)    Where did the form come from: form was faxed in    What clinic location was the form placed at?: [place]    Where the form was placed: 's Box    Call taken on 10/27/2017 at 10:49 AM by Sonja Roberston

## 2017-10-30 NOTE — TELEPHONE ENCOUNTER
Spoke with Linda, .  States that these gloves are necessary for administration of body lotion, ear drops and powders for care of patient.  These used to come through another provider but will not come through [employer].  Order will have to be locally printed and faxed to [phone].  Phone number with questions to [email or web address] is [phone].    Order pended for printing on Chastity's return to clinic.    Soledad NUNO RN

## 2017-10-31 NOTE — TELEPHONE ENCOUNTER
Go ahead and write the order for DME and I will sign.   Thanks Chastity Greer [facility],

## 2017-11-08 DIAGNOSIS — F25.0 SCHIZOAFFECTIVE DISORDER, BIPOLAR TYPE (H): Primary | ICD-10-CM

## 2017-11-08 DIAGNOSIS — K59.00 CONSTIPATION: ICD-10-CM

## 2017-11-08 DIAGNOSIS — E78.5 HYPERLIPIDEMIA LDL GOAL <130: ICD-10-CM

## 2017-11-08 LAB
ALBUMIN SERPL-MCNC: 3.8 G/DL (ref 3.4–5)
ALP SERPL-CCNC: 188 U/L (ref 40–150)
ALT SERPL W P-5'-P-CCNC: 22 U/L (ref 0–70)
ANION GAP SERPL CALCULATED.3IONS-SCNC: 1 MMOL/L (ref 3–14)
AST SERPL W P-5'-P-CCNC: 15 U/L (ref 0–45)
BASOPHILS # BLD AUTO: 0 10E9/L (ref 0–0.2)
BASOPHILS NFR BLD AUTO: 0.3 %
BILIRUB SERPL-MCNC: 0.3 MG/DL (ref 0.2–1.3)
BUN SERPL-MCNC: 18 MG/DL (ref 7–30)
CALCIUM SERPL-MCNC: 9.2 MG/DL (ref 8.5–10.1)
CHLORIDE SERPL-SCNC: 104 MMOL/L (ref 94–109)
CHOLEST SERPL-MCNC: 151 MG/DL
CO2 SERPL-SCNC: 33 MMOL/L (ref 20–32)
CREAT SERPL-MCNC: 1.31 MG/DL (ref 0.66–1.25)
DIFFERENTIAL METHOD BLD: ABNORMAL
EOSINOPHIL # BLD AUTO: 0.5 10E9/L (ref 0–0.7)
EOSINOPHIL NFR BLD AUTO: 3.9 %
ERYTHROCYTE [DISTWIDTH] IN BLOOD BY AUTOMATED COUNT: 13.1 % (ref 10–15)
GFR SERPL CREATININE-BSD FRML MDRD: 55 ML/MIN/1.7M2
GLUCOSE SERPL-MCNC: 85 MG/DL (ref 70–99)
HCT VFR BLD AUTO: 47.1 % (ref 40–53)
HDLC SERPL-MCNC: 47 MG/DL
HGB BLD-MCNC: 15.5 G/DL (ref 13.3–17.7)
LDLC SERPL CALC-MCNC: 80 MG/DL
LYMPHOCYTES # BLD AUTO: 1.8 10E9/L (ref 0.8–5.3)
LYMPHOCYTES NFR BLD AUTO: 14.8 %
MCH RBC QN AUTO: 31.9 PG (ref 26.5–33)
MCHC RBC AUTO-ENTMCNC: 32.9 G/DL (ref 31.5–36.5)
MCV RBC AUTO: 97 FL (ref 78–100)
MONOCYTES # BLD AUTO: 0.5 10E9/L (ref 0–1.3)
MONOCYTES NFR BLD AUTO: 4.3 %
NEUTROPHILS # BLD AUTO: 9.4 10E9/L (ref 1.6–8.3)
NEUTROPHILS NFR BLD AUTO: 76.7 %
NONHDLC SERPL-MCNC: 104 MG/DL
PLATELET # BLD AUTO: 218 10E9/L (ref 150–450)
POTASSIUM SERPL-SCNC: 4.6 MMOL/L (ref 3.4–5.3)
PROT SERPL-MCNC: 7.2 G/DL (ref 6.8–8.8)
RBC # BLD AUTO: 4.86 10E12/L (ref 4.4–5.9)
SODIUM SERPL-SCNC: 138 MMOL/L (ref 133–144)
TRIGL SERPL-MCNC: 119 MG/DL
WBC # BLD AUTO: 12.2 10E9/L (ref 4–11)

## 2017-11-08 PROCEDURE — 80061 LIPID PANEL: CPT

## 2017-11-08 PROCEDURE — 36415 COLL VENOUS BLD VENIPUNCTURE: CPT | Performed by: FAMILY MEDICINE

## 2017-11-08 PROCEDURE — 85025 COMPLETE CBC W/AUTO DIFF WBC: CPT | Performed by: FAMILY MEDICINE

## 2017-11-08 PROCEDURE — 80053 COMPREHEN METABOLIC PANEL: CPT

## 2017-11-08 NOTE — TELEPHONE ENCOUNTER
psyllium (NATURAL FIBER THERAPY) 30.9 % POWD      Last Written Prescription Date: 6/16/17  Last Fill Quantity: 1,  # refills: 3   Last Office Visit with [facility], [facility] or [facility] prescribing provider: 10/17/17

## 2017-12-07 DIAGNOSIS — N40.0 BENIGN PROSTATIC HYPERPLASIA, PRESENCE OF LOWER URINARY TRACT SYMPTOMS UNSPECIFIED: Chronic | ICD-10-CM

## 2017-12-08 RX ORDER — OXYBUTYNIN CHLORIDE 5 MG/1
TABLET ORAL
Qty: 90 TABLET | Refills: 1 | Status: SHIPPED | OUTPATIENT
Start: 2017-12-08 | End: 2018-01-31

## 2018-01-05 DIAGNOSIS — E78.5 HYPERLIPIDEMIA LDL GOAL <130: ICD-10-CM

## 2018-01-08 RX ORDER — SIMVASTATIN 40 MG
TABLET ORAL
Qty: 90 TABLET | Refills: 1 | Status: SHIPPED | OUTPATIENT
Start: 2018-01-08 | End: 2018-06-21

## 2018-01-15 ENCOUNTER — TELEPHONE (OUTPATIENT)
Dept: FAMILY MEDICINE | Facility: CLINIC | Age: 63
End: 2018-01-15

## 2018-01-15 DIAGNOSIS — Z01.10 ENCOUNTER FOR HEARING EXAMINATION: Primary | ICD-10-CM

## 2018-01-15 NOTE — TELEPHONE ENCOUNTER
Pt needs referral for [facility] in [place] for yearly check. Fax referral to Linda at [phone].  Thanks.  Sima Germain  CSS

## 2018-01-31 DIAGNOSIS — N40.0 BPH (BENIGN PROSTATIC HYPERPLASIA): Chronic | ICD-10-CM

## 2018-01-31 DIAGNOSIS — N40.0 BENIGN PROSTATIC HYPERPLASIA, PRESENCE OF LOWER URINARY TRACT SYMPTOMS UNSPECIFIED: Chronic | ICD-10-CM

## 2018-01-31 RX ORDER — FINASTERIDE 5 MG/1
TABLET, FILM COATED ORAL
Qty: 30 TABLET | Refills: 3 | Status: SHIPPED | OUTPATIENT
Start: 2018-01-31 | End: 2018-05-31

## 2018-01-31 RX ORDER — OXYBUTYNIN CHLORIDE 5 MG/1
TABLET ORAL
Qty: 90 TABLET | Refills: 3 | Status: SHIPPED | OUTPATIENT
Start: 2018-01-31 | End: 2018-05-31

## 2018-01-31 NOTE — TELEPHONE ENCOUNTER
"Requested Prescriptions   Pending Prescriptions Disp Refills     finasteride (PROSCAR) 5 MG tablet [Pharmacy Med Name: FINASTERIDE 5 MG TABLET] 30 tablet      Sig: TAKE ONE TABLET BY MOUTH EVERY DAY    There is no refill protocol information for this order    finasteride (PROSCAR) 5 MG tablet      Last Written Prescription Date:  8/22/17  Last Fill Quantity: 30,   # refills: 5  Last Office Visit: 10/17/17  Future Office visit:    Next 5 appointments (look out 90 days)     Feb 07, 2018  1:00 PM CST   PHYSICAL with SAUL Squires CNP   [facility]    [address] [facility] [address]   [phone]                   Routing refill request to provider for review/approval because:  Drug not on the [facility], [facility] or [facility] refill protocol or controlled substance          oxybutynin (DITROPAN) 5 MG tablet [Pharmacy Med Name: OXYBUTYNIN CHLORIDE 5 MG TABLET] 90 tablet      Sig: TAKE ONE TABLET BY MOUTH THREE TIMES DAILY    Muscarinic Antagonists (Urinary Incontinence Agents) Passed    1/31/2018 12:47 PM       Passed - Recent or future visit with authorizing provider's specialty    Patient had office visit in the last year or has a visit in the next 30 days with authorizing provider.  See \"Patient Info\" tab in inbasket, or \"Choose Columns\" in Meds & Orders section of the refill encounter.     Last Written Prescription Date:  12/8/17  Last Fill Quantity: 90,  # refills: 1   Last Office Visit with [facility] provider:  10/17/17   Future Office Visit:    Next 5 appointments (look out 90 days)     Feb 07, 2018  1:00 PM CST   PHYSICAL with SAUL Squires CNP   [facility] ([facility])    [address] [facility] [address]   [phone]                            Passed - Medication is Oxybutynin and patient is 5 years of age or older       Passed - Patient does not have a diagnosis of glaucoma on the problem list    If glaucoma " diagnosis is new, refer refill to physician.         Passed - Patient is 18 years of age or older

## 2018-02-07 ENCOUNTER — OFFICE VISIT (OUTPATIENT)
Dept: FAMILY MEDICINE | Facility: CLINIC | Age: 63
End: 2018-02-07
Payer: MEDICARE

## 2018-02-07 ENCOUNTER — MEDICAL CORRESPONDENCE (OUTPATIENT)
Dept: HEALTH INFORMATION MANAGEMENT | Facility: CLINIC | Age: 63
End: 2018-02-07

## 2018-02-07 VITALS
HEART RATE: 89 BPM | HEIGHT: 68 IN | SYSTOLIC BLOOD PRESSURE: 112 MMHG | DIASTOLIC BLOOD PRESSURE: 72 MMHG | OXYGEN SATURATION: 92 % | TEMPERATURE: 97.2 F | WEIGHT: 182.2 LBS | BODY MASS INDEX: 27.61 KG/M2

## 2018-02-07 DIAGNOSIS — H61.23 BILATERAL IMPACTED CERUMEN: ICD-10-CM

## 2018-02-07 DIAGNOSIS — I34.1 MITRAL VALVE PROLAPSE: Chronic | ICD-10-CM

## 2018-02-07 DIAGNOSIS — F17.200 CURRENT SMOKER: ICD-10-CM

## 2018-02-07 DIAGNOSIS — Z23 NEED FOR PROPHYLACTIC VACCINATION AND INOCULATION AGAINST INFLUENZA: Primary | ICD-10-CM

## 2018-02-07 DIAGNOSIS — R80.9 PROTEINURIA, UNSPECIFIED TYPE: ICD-10-CM

## 2018-02-07 DIAGNOSIS — N02.B9 IGA NEPHROPATHY: ICD-10-CM

## 2018-02-07 DIAGNOSIS — E03.9 ACQUIRED HYPOTHYROIDISM: Chronic | ICD-10-CM

## 2018-02-07 DIAGNOSIS — Z00.01 ENCOUNTER FOR ROUTINE ADULT PHYSICAL EXAM WITH ABNORMAL FINDINGS: ICD-10-CM

## 2018-02-07 PROCEDURE — 80069 RENAL FUNCTION PANEL: CPT | Performed by: NURSE PRACTITIONER

## 2018-02-07 PROCEDURE — 99213 OFFICE O/P EST LOW 20 MIN: CPT | Mod: 25 | Performed by: NURSE PRACTITIONER

## 2018-02-07 PROCEDURE — 90686 IIV4 VACC NO PRSV 0.5 ML IM: CPT | Performed by: NURSE PRACTITIONER

## 2018-02-07 PROCEDURE — G0008 ADMIN INFLUENZA VIRUS VAC: HCPCS | Performed by: NURSE PRACTITIONER

## 2018-02-07 PROCEDURE — 36415 COLL VENOUS BLD VENIPUNCTURE: CPT | Performed by: NURSE PRACTITIONER

## 2018-02-07 PROCEDURE — 69209 REMOVE IMPACTED EAR WAX UNI: CPT | Performed by: NURSE PRACTITIONER

## 2018-02-07 PROCEDURE — G0438 PPPS, INITIAL VISIT: HCPCS | Performed by: NURSE PRACTITIONER

## 2018-02-07 RX ORDER — AMOXICILLIN 250 MG/1
250 CAPSULE ORAL PRN
COMMUNITY
End: 2018-10-16

## 2018-02-07 RX ORDER — GABAPENTIN 100 MG/1
100 CAPSULE ORAL 2 TIMES DAILY
COMMUNITY
End: 2022-11-16

## 2018-02-07 NOTE — MR AVS SNAPSHOT
After Visit Summary   2/7/2018    Geovani Bustos    MRN: [id number]           Patient Information     Date Of Birth          1955        Visit Information        Provider Department      2/7/2018 1:00 PM Chastity Greer APRN [facility]        Today's Diagnoses     IgA nephropathy    -  1    Proteinuria, unspecified type        Current smoker        Mitral valve prolapse        Acquired hypothyroidism          Care Instructions      Preventive Health Recommendations:       Yearly exam:             See your health care provider every year in order to  o   Review health changes.   o   Discuss preventive care.    o   Review your medicines if your doctor has prescribed any.    Talk with your health care provider about whether you should have a test to screen for prostate cancer (PSA).    Every 3 years, have a diabetes test (fasting glucose). If you are at risk for diabetes, you should have this test more often.    Every 5 years, have a cholesterol test. Have this test more often if you are at risk for high cholesterol or heart disease.     Every 10 years, have a colonoscopy. Or, have a yearly FIT test (stool test). These exams will check for colon cancer.    Talk to with your health care provider about screening for Abdominal Aortic Aneurysm if you have a family history of AAA or have a history of smoking.  Shots:     Get a flu shot each year.     Get a tetanus shot every 10 years.     Talk to your doctor about your pneumonia vaccines. There are now two you should receive - Pneumovax (PPSV 23) and Prevnar (PCV 13).    Talk to your doctor about a shingles vaccine.     Talk to your doctor about the hepatitis B vaccine.  Nutrition:     Eat at least 5 servings of fruits and vegetables each day.     Eat whole-grain bread, whole-wheat pasta and brown rice instead of white grains and rice.     Talk to your doctor about Calcium and Vitamin D.   Lifestyle    Exercise for at least  150 minutes a week (30 minutes a day, 5 days a week). This will help you control your weight and prevent disease.     Limit alcohol to one drink per day.     No smoking.     Wear sunscreen to prevent skin cancer.     See your dentist every six months for an exam and cleaning.     See your eye doctor every 1 to 2 years to screen for conditions such as glaucoma, macular degeneration and cataracts.        The Benefits of Living Smoke Free  What do you want to gain from quitting? Check off some reasons to quit.  Health benefits  ___  Improve my ability to breathe without coughing or shortness of breath  ___  Reduce my risk of lung cancer, heart disease, chronic lung disease  ___  Have fewer wrinkles and softer skin  ___  Improve my sense of taste and smell  ___  For pregnant women--reduce the risk of having a miscarriage, stillbirth, premature birth, or low-birth-weight baby  Personal benefits  ___  Feel more in control of my life  ___  Have better-smelling hair, breath, clothes, home, and car  ___  Save time by not having to take smoke breaks, buy cigarettes, or hunt for a light  ___  Have whiter teeth  Family benefits  ___  Reduce my children s respiratory tract infections  ___  Set a good example for my children  ___  Reduce my family s cancer risk  Financial benefits  ___  Save hundreds of dollars each year that would be spent on cigarettes  ___  Save money on medical bills  ___  Save on life, health, and car insurance premiums     Those dollars add up!  Cigarettes are expensive, and getting more expensive all the time. Do you realize how much money you are spending on cigarettes per year? What is the average amount you spend on a pack of cigarettes? What is the average number of packs that you smoke per day? Using your answers to these questions, fill in this formula to help you find out:  ($ _____ per pack) ×  ( _____ number of packs per day) × (365 days) =  $ _____ yearly cost of smoking  Besides tobacco, there  are other costs, including extra cleaning bills and replacement costs for clothing and furniture; medical expenses for smoking-related illnesses; and higher health, life, and car insurance premiums.  Cigars and pipes count too!  Cigars and pipes are also dangerous. So are smokeless (chewing) tobacco and snuff. All of these products contain nicotine, a highly addictive substance that has harmful effects on your body. Quitting smoking means giving up all tobacco products.      For more information    https://smokefree.gov/hayu-ca-ua-expert    National Cancer [place] Smoking Quitline: 877-44U-QUIT (228-435-9572)   Date Last Reviewed: 2/1/2017 2000-2017 [employer]. [address] [place] [address], [place] [address]. All rights reserved. This information is not intended as a substitute for professional medical care. Always follow your healthcare professional's instructions.        Why Do You Smoke?  The more you know about why you smoke, the easier it will be to quit. You may reach for a cigarette during a stressful commute. Or you may want to smoke when you first wake up in the morning. Learn what your smoking triggers are, and how to handle them.  Common Triggers    Frustration    Fatigue    Anger    Stress    Hunger    Boredom or loneliness    Drinking or socializing    Watching others smoke  Track Your Smoking Habits  To learn about your smoking habits, track them for a week. Attach a small notebook or piece of paper to your cigarette pack. With each cigarette you smoke, write down the time, where you are, who you re with, and how you feel.  How to [place] with Your Triggers    Change the habits that lead you to smoke. For instance, if you often smoke at a morning break, go for a walk instead.    Distract yourself from smoking. Keep your hands busy by playing with a paper clip or doodling. Keep your mouth busy by chewing on gum or a carrot stick.    Limit contact with people who are smoking. When you eat out,  "sit in the nonsmoking section.    2034-5129 [employer]. [address] [place] [address], [place] [address]. All rights reserved. This information is not intended as a substitute for professional medical care. Always follow your healthcare professional's instructions.  This information has been modified by your health care provider with permission from the publisher.                Follow-ups after your visit        Future tests that were ordered for you today     Open Future Orders        Priority Expected Expires Ordered    General PFT Lab (Please always keep checked) Routine  2/7/2019 2/7/2018    Pulmonary Function Test Routine  2/7/2019 2/7/2018            Who to contact     If you have questions or need follow up information about today's clinic visit or your schedule please contact [facility] directly at [phone].  Normal or non-critical lab and imaging results will be communicated to you by MyChart, letter or phone within 4 business days after the clinic has received the results. If you do not hear from us within 7 days, please contact the clinic through MyChart or phone. If you have a critical or abnormal lab result, we will notify you by phone as soon as possible.  Submit refill requests through dermSearch or call your pharmacy and they will forward the refill request to us. Please allow 3 business days for your refill to be completed.          Additional Information About Your Visit        [employer] Information     dermSearch lets you send messages to your doctor, view your test results, renew your prescriptions, schedule appointments and more. To sign up, go to [email or web address] . Click on \"Log in\" on the left side of the screen, which will take you to the Welcome page. Then click on \"Sign up Now\" on the right side of the page.     You will be asked to enter the access code listed below, as well as some personal information. Please follow the directions to create your username and " "password.     Your access code is: [email or web address]  Expires: 2018  1:46 PM     Your access code will  in 90 days. If you need help or a new code, please call your [facility] clinic or [phone].        Care EveryWhere ID     This is your Care EveryWhere ID. This could be used by other organizations to access your [facility] medical records  [id number]        Your Vitals Were     Pulse Temperature Height Pulse Oximetry BMI (Body Mass Index)       89 97.2  F (36.2  C) (Tympanic) 5' 8\" (1.727 m) 92% 27.7 kg/m2        Blood Pressure from Last 3 Encounters:   18 112/72   10/17/17 126/84   17 126/79    Weight from Last 3 Encounters:   18 182 lb 3.2 oz (82.6 kg)   10/17/17 183 lb (83 kg)   17 181 lb (82.1 kg)              We Performed the Following     Renal panel (Alb, BUN, Ca, Cl, CO2, Creat, Gluc, Phos, K, Na)        Primary Care Provider Office Phone # Fax #    Chastity Kwan Percy, SAUL [facility] [phone]       [address] [place] [address]        Equal Access to Services     MEENU AMATO : Hadii reece ku hadasho Soomaali, waaxda luqadaha, qaybta kaalmada adeegyada, waxay idiin hayhowien jorge l mckenzie . [facility] [phone].    ATENCIÓN: Si habla español, tiene a topete disposición servicios gratuitos de asistencia lingüística. Llame al [phone].    We comply with applicable federal civil rights laws and [place] laws. We do not discriminate on the basis of race, color, national origin, age, disability, sex, sexual orientation, or gender identity.            Thank you!     Thank you for choosing [facility]  for your care. Our goal is always to provide you with excellent care. Hearing back from our patients is one way we can continue to improve our services. Please take a few minutes to complete the written survey that you may receive in the mail after your visit with us. Thank you!             Your Updated Medication List - Protect others around you: " Learn how to safely use, store and throw away your medicines at www.disposemymeds.org.          This list is accurate as of 2/7/18  1:55 PM.  Always use your most recent med list.                   Brand Name Dispense Instructions for use Diagnosis    ABILIFY 10 MG tablet   Generic drug:  ARIPiprazole      1 TABLET DAILY        acetaminophen 325 MG tablet    TYLENOL    100 tablet    Take 2 tablets (650 mg) by mouth every 4 hours as needed for mild pain    Cigarette nicotine dependence with nicotine-induced disorder       amoxicillin 250 MG capsule    AMOXIL     Take 250 mg by mouth        aspirin 81 MG EC tablet     90 tablet    Take 1 tablet (81 mg) by mouth daily    Hyperlipidemia LDL goal <130       augmented betamethasone dipropionate 0.05 % ointment    DIPROLENE-AF    45 g    Apply to AA twice daily 1-2 weeks then as needed only. Do not apply to face. (Due for Derm recheck b4 next refill: [phone] to schedule)    Eczema of both external ears       carbamide peroxide 6.5 % otic solution    DEBROX    15 mL    Place 5 drops into both ears 2 times daily For 5 days August 1-5th and Feb 1-5 th  then return for irrigation after the 5th day.    Bilateral impacted cerumen       finasteride 5 MG tablet    PROSCAR    30 tablet    TAKE ONE TABLET BY MOUTH EVERY DAY    BPH (benign prostatic hyperplasia)       fish Oil 1200 MG capsule     90 capsule    Take 1 capsule by mouth daily    Hyperlipidemia LDL goal <130       gabapentin 100 MG capsule    NEURONTIN     Take 100 mg by mouth 2 times daily        levothyroxine 125 MCG tablet    SYNTHROID/LEVOTHROID    90 tablet    TAKE ONE TABLET BY MOUTH EVERY DAY    Acquired hypothyroidism       Nitrile Exam Gloves Medium Misc     4 each    1 each as needed Use PRN daily with administration of otic drops, body lotion and powder to treat dry itchy skin.    Eczema of external ear, bilateral, Bilateral impacted cerumen, [facility] Care Home       order for DME     1 Units    Equipment  being ordered: Dynaflex insert    Left foot pain, S/P foot surgery, left       oxybutynin 5 MG tablet    DITROPAN    90 tablet    TAKE ONE TABLET BY MOUTH THREE TIMES DAILY    Benign prostatic hyperplasia, presence of lower urinary tract symptoms unspecified       REGULOID 48.57 % Powd   Generic drug:  Psyllium     369 g    Take 1 each by mouth See Admin Instructions Take 1 teaspoonful every day at 7pm and 9 pm.    Constipation       simvastatin 40 MG tablet    ZOCOR    90 tablet    TAKE ONE TABLET BY MOUTH EVERY DAY AT 9 PM    Hyperlipidemia LDL goal <130       ZOLOFT 100 MG tablet   Generic drug:  sertraline      2 TABLETS DAILY

## 2018-02-07 NOTE — PROGRESS NOTES
SUBJECTIVE:   Geovani Bustos is a 62 year old male who presents for Preventive Visit.    Are you in the first 12 months of your Medicare Part B coverage?  No    [facility] PAT[facility]  TOD I SEEN HIM FOR DEPRESSION AND ANXIETY  RECENT ADD IN GABAPENTIN  RESTARTED WORK AT [employer] AGAIN  HOPING THE CHANGE IN WORK SITUATION HELPS.  He was struggling with not having any money  Frequent urination. Caffeine intake a day. 6 cans a day of soda.. Mt dew.   Diabetic diet for everyone in the house.  Due to housemates having diabetes  Ate last at 12:15 chili and hot dogs.   Smoker-1ppd for 47 yrs.  Not ready to quit has tried Chantix in the past  Scaling to the ears    History of mitral valve prolapse moderate EF 60-65% last echo March 29, 2017    Healthy Habits:    Do you get at least three servings of calcium containing foods daily (dairy, green leafy vegetables, etc.)? yes and no, taking calcium and/or vitamin D supplement: no    Amount of exercise or daily activities, outside of work: None    Problems taking medications regularly No    Medication side effects: No    Have you had an eye exam in the past two years? yes    Do you see a dentist twice per year? Once per year     Do you have sleep apnea, excessive snoring or daytime drowsiness?Yes daytime drowsiness due to not sleeping at night       Ability to successfully perform activities of daily living: No, needs assistance with: phone, shopping, preparing meals, housework, bathing and laundry    Home safety:  throw rugs in the hallway     Hearing impairment: No    Fall risk:  Fallen 2 or more times in the past year?: No  Any fall with injury in the past year?: No        COGNITIVE SCREEN  1) Repeat 3 items (Banana, Sunrise, Chair)    2) Clock draw: NORMAL  3) 3 item recall: Recalls NO objects   Results: 0 items recalled: PROBABLE COGNITIVE IMPAIRMENT, **INFORM PROVIDER**    Mini-CogTM Copyright NAILA Correia. Licensed by the author for use in [facility];  reprinted with permission ([email or web address]). All rights reserved.            -------------------------------------    Reviewed and updated as needed this visit by clinical staff  Tobacco  Allergies  Med Hx  Surg Hx  Fam Hx  Soc Hx        Reviewed and updated as needed this visit by Provider        Social History   Substance Use Topics     Smoking status: Current Every Day Smoker     Packs/day: 1.00     Types: Cigarettes     Smokeless tobacco: Never Used     Alcohol use No       If you drink alcohol do you typically have >3 drinks per day or >7 drinks per week? No                        Today's PHQ-2 Score:   PHQ-2 ( 1999 Pfizer) 12/21/2016 8/4/2015   Q1: Little interest or pleasure in doing things 0 0   Q2: Feeling down, depressed or hopeless 0 0   PHQ-2 Score 0 0       Do you feel safe in your environment - YES    Do you have a Health Care Directive?: [facility]  DEDREE THROUGH THE [facility]    THROUGH [facility]     Current providers sharing in care for this patient include:   Patient Care Team:  Chastity Greer APRN CNP as PCP - General (Family Practice)    The following health maintenance items are reviewed in Epic and correct as of today:  Health Maintenance   Topic Date Due     ADVANCE DIRECTIVE PLANNING Q5 YRS  12/05/2008     INFLUENZA VACCINE (SYSTEM ASSIGNED)  09/01/2017     TSH Q1 YEAR  10/11/2018     COLONOSCOPY Q5 YR  04/03/2022     LIPID SCREEN Q5 YR MALE (SYSTEM ASSIGNED)  11/08/2022     TETANUS IMMUNIZATION (SYSTEM ASSIGNED)  12/10/2022     HEPATITIS C SCREENING  Completed     BP Readings from Last 3 Encounters:   02/07/18 112/72   10/17/17 126/84   09/06/17 126/79    Wt Readings from Last 3 Encounters:   02/07/18 182 lb 3.2 oz (82.6 kg)   10/17/17 183 lb (83 kg)   09/06/17 181 lb (82.1 kg)                  Recent Labs   Lab Test  11/08/17   0840  10/11/17   1325 02/20/17 02/01/17   1457  12/21/16   1405  01/04/16   1640  01/04/16   0850   02/01/13   0815    "05/14/12   0830   01/10/11   0850   A1C   --    --    --    --    --    --    --    --   5.5   --   5.6   --   5.2   LDL  80   --    --   Cannot estimate LDL when triglyceride exceeds 400 mg/dL  89   --    --   104*   < >  117   < >  101   < >  127   HDL  47   --    --   27*   --    --   48   < >  45   < >  43   < >  41   TRIG  119   --    --   444*   --    --   163*   < >  123   < >  143   < >  119   ALT  22   --    --   19   --   33  31   < >   --    < >   --    --    --    CR  1.31*   --   1.37*  1.21   --   1.27*   --    < >   --    < >   --    --    --    GFRESTIMATED  55*   --   52*  61   --   57*   --    < >   --    < >   --    --    --    GFRESTBLACK  67   --   >60  73   --   70   --    < >   --    < >   --    --    --    POTASSIUM  4.6   --   4.0  4.1   --   4.1   --    < >   --    < >   --    --    --    TSH   --   1.24   --    --   0.82  2.68  2.18   < >   --    < >   --    < >   --     < > = values in this interval not displayed.            ROS:   ROS: 10 point ROS neg other than the symptoms noted above in the HPI.      OBJECTIVE:   /72  Pulse 89  Temp 97.2  F (36.2  C) (Tympanic)  Ht 5' 8\" (1.727 m)  Wt 182 lb 3.2 oz (82.6 kg)  SpO2 92%  BMI 27.7 kg/m2 Estimated body mass index is 27.7 kg/(m^2) as calculated from the following:    Height as of this encounter: 5' 8\" (1.727 m).    Weight as of this encounter: 182 lb 3.2 oz (82.6 kg).  EXAM:   GENERAL: healthy, alert and no distress  EYES: Eyes grossly normal to inspection, PERRL and conjunctivae and sclerae normal  HENT: ear canals and TM's normal after cerumen impaction was resolved with warm water irrigation.  Nose and mouth without ulcers or lesions  NECK: no adenopathy, no asymmetry, masses, or scars and thyroid normal to palpation  RESP: lungs clear to auscultation - no rales, rhonchi or wheezes  CV: regular rate and rhythm, normal S1 S2, no S3 or S4, no murmur, click or rub, no peripheral edema and peripheral pulses strong  ABDOMEN: " soft, nontender, no hepatosplenomegaly, no masses and bowel sounds normal   (male): normal male genitalia without lesions or urethral discharge, no hernia  MS: no gross musculoskeletal defects noted, no edema  SKIN: no suspicious lesions or rashes scaling rash scale and ears  NEURO: Normal strength and tone, mentation intact and speech normal  PSYCH: mentation appears normal, affect normal/bright  LYMPH: no cervical, supraclavicular, axillary, or inguinal adenopathy    ASSESSMENT / PLAN:   Geovani was seen today for medicare visit and flu shot.    Diagnoses and all orders for this visit:    Need for prophylactic vaccination and inoculation against influenza  -     FLU VAC, SPLIT VIRUS IM > 3 YO (QUADRIVALENT) [30097]  -     Vaccine Administration, Initial [13887]    IgA nephropathy  -     Renal panel (Alb, BUN, Ca, Cl, CO2, Creat, Gluc, Phos, K, Na)    Proteinuria, unspecified type  -     Renal panel (Alb, BUN, Ca, Cl, CO2, Creat, Gluc, Phos, K, Na)    Current smoker  -     General PFT Lab (Please always keep checked); Future  -     Pulmonary Function Test; Future    Mitral valve prolapse    Acquired hypothyroidism    Encounter for routine adult physical exam with abnormal findings    Bilateral impacted cerumen  -     HC REMOVAL IMPACTED CERUMEN IRRIGATION/LVG UNILAT      Labs today to monitor his proteinuria and his IgA nephropathy.  Smoking cessation is strongly encouraged.  TSH level to be done with his next labs.  Betamethasone to the ears as needed.  Recommend pulmonary function tests.  Cerumen impaction removed with warm water irrigation.  Flu shot given today  Follow-up with psychiatry as planned  Follow-up with cardiology yearly as planned  Forms completed for sleep service treatment referral.  [email or web address] annual physical assessment and standing orders.    End of Life Planning:  Patient currently has an advanced directive: NO HAS STATE GUARDIAN     COUNSELING:  Reviewed preventive health counseling,  "as reflected in patient instructions        Estimated body mass index is 27.7 kg/(m^2) as calculated from the following:    Height as of this encounter: 5' 8\" (1.727 m).    Weight as of this encounter: 182 lb 3.2 oz (82.6 kg).  Weight management plan: Discussed healthy diet and exercise guidelines and patient will follow up in 12 months in clinic to re-evaluate.     reports that he has been smoking Cigarettes.  He has been smoking about 1.00 pack per day. He has never used smokeless tobacco.  Tobacco Cessation Action Plan: Information offered: Patient not interested at this time    Appropriate preventive services were discussed with this patient, including applicable screening as appropriate for cardiovascular disease, diabetes, osteopenia/osteoporosis, and glaucoma.  As appropriate for age/gender, discussed screening for colorectal cancer, prostate cancer, breast cancer, and cervical cancer. Checklist reviewing preventive services available has been given to the patient.    Reviewed patients plan of care and provided an AVS. The Complex Care Plan (for patients with higher acuity and needing more deliberate coordination of services) for Geovani meets the Care Plan requirement. This Care Plan has been established and reviewed with the caregiver.    Counseling Resources:  ATP IV Guidelines  Pooled Cohorts Equation Calculator  Breast Cancer Risk Calculator  FRAX Risk Assessment  ICSI Preventive Guidelines  Dietary Guidelines for Americans, 2010  USDA's MyPlate  ASA Prophylaxis  Lung CA Screening  Call or return to the clinic with any worsening of symptoms or no resolution. Patient/Parent verbalized understanding and is in agreement. Medication side effects reviewed.   Current Outpatient Prescriptions   Medication Sig Dispense Refill     gabapentin (NEURONTIN) 100 MG capsule Take 100 mg by mouth 2 times daily       finasteride (PROSCAR) 5 MG tablet TAKE ONE TABLET BY MOUTH EVERY DAY 30 tablet 3     oxybutynin (DITROPAN) " 5 MG tablet TAKE ONE TABLET BY MOUTH THREE TIMES DAILY 90 tablet 3     simvastatin (ZOCOR) 40 MG tablet TAKE ONE TABLET BY MOUTH EVERY DAY AT 9 PM 90 tablet 1     REGULOID 48.57 % POWD Take 1 each by mouth See Admin Instructions Take 1 teaspoonful every day at 7pm and 9 pm. 369 g 11     Disposable Gloves (NITRILE EXAM GLOVES MEDIUM) MISC 1 each as needed Use PRN daily with administration of otic drops, body lotion and powder to treat dry itchy skin. 4 each 11     aspirin 81 MG EC tablet Take 1 tablet (81 mg) by mouth daily 90 tablet 1     levothyroxine (SYNTHROID/LEVOTHROID) 125 MCG tablet TAKE ONE TABLET BY MOUTH EVERY DAY 90 tablet 1     carbamide peroxide (DEBROX) 6.5 % otic solution Place 5 drops into both ears 2 times daily For 5 days August 1-5th and Feb 1-5 th  then return for irrigation after the 5th day. 15 mL 3     order for DME Equipment being ordered: Dynaflex insert 1 Units 0     augmented betamethasone dipropionate (DIPROLENE-AF) 0.05 % ointment Apply to AA twice daily 1-2 weeks then as needed only. Do not apply to face. (Due for Derm recheck b4 next refill: [phone] to schedule) 45 g 0     acetaminophen (TYLENOL) 325 MG tablet Take 2 tablets (650 mg) by mouth every 4 hours as needed for mild pain 100 tablet 0     Omega-3 Fatty Acids (FISH OIL) 1200 MG CAPS Take 1 capsule by mouth daily 90 capsule 3     ABILIFY 10 MG OR TABS 1 TABLET DAILY       ZOLOFT 100 MG OR TABS 2 TABLETS DAILY       amoxicillin (AMOXIL) 250 MG capsule Take 250 mg by mouth           SAUL Squires [facility]

## 2018-02-07 NOTE — PROGRESS NOTES

## 2018-02-07 NOTE — PATIENT INSTRUCTIONS
Preventive Health Recommendations:       Yearly exam:             See your health care provider every year in order to  o   Review health changes.   o   Discuss preventive care.    o   Review your medicines if your doctor has prescribed any.    Talk with your health care provider about whether you should have a test to screen for prostate cancer (PSA).    Every 3 years, have a diabetes test (fasting glucose). If you are at risk for diabetes, you should have this test more often.    Every 5 years, have a cholesterol test. Have this test more often if you are at risk for high cholesterol or heart disease.     Every 10 years, have a colonoscopy. Or, have a yearly FIT test (stool test). These exams will check for colon cancer.    Talk to with your health care provider about screening for Abdominal Aortic Aneurysm if you have a family history of AAA or have a history of smoking.  Shots:     Get a flu shot each year.     Get a tetanus shot every 10 years.     Talk to your doctor about your pneumonia vaccines. There are now two you should receive - Pneumovax (PPSV 23) and Prevnar (PCV 13).    Talk to your doctor about a shingles vaccine.     Talk to your doctor about the hepatitis B vaccine.  Nutrition:     Eat at least 5 servings of fruits and vegetables each day.     Eat whole-grain bread, whole-wheat pasta and brown rice instead of white grains and rice.     Talk to your doctor about Calcium and Vitamin D.   Lifestyle    Exercise for at least 150 minutes a week (30 minutes a day, 5 days a week). This will help you control your weight and prevent disease.     Limit alcohol to one drink per day.     No smoking.     Wear sunscreen to prevent skin cancer.     See your dentist every six months for an exam and cleaning.     See your eye doctor every 1 to 2 years to screen for conditions such as glaucoma, macular degeneration and cataracts.        The Benefits of Living Smoke Free  What do you want to gain from quitting?  Check off some reasons to quit.  Health benefits  ___  Improve my ability to breathe without coughing or shortness of breath  ___  Reduce my risk of lung cancer, heart disease, chronic lung disease  ___  Have fewer wrinkles and softer skin  ___  Improve my sense of taste and smell  ___  For pregnant women--reduce the risk of having a miscarriage, stillbirth, premature birth, or low-birth-weight baby  Personal benefits  ___  Feel more in control of my life  ___  Have better-smelling hair, breath, clothes, home, and car  ___  Save time by not having to take smoke breaks, buy cigarettes, or hunt for a light  ___  Have whiter teeth  Family benefits  ___  Reduce my children s respiratory tract infections  ___  Set a good example for my children  ___  Reduce my family s cancer risk  Financial benefits  ___  Save hundreds of dollars each year that would be spent on cigarettes  ___  Save money on medical bills  ___  Save on life, health, and car insurance premiums     Those dollars add up!  Cigarettes are expensive, and getting more expensive all the time. Do you realize how much money you are spending on cigarettes per year? What is the average amount you spend on a pack of cigarettes? What is the average number of packs that you smoke per day? Using your answers to these questions, fill in this formula to help you find out:  ($ _____ per pack) ×  ( _____ number of packs per day) × (365 days) =  $ _____ yearly cost of smoking  Besides tobacco, there are other costs, including extra cleaning bills and replacement costs for clothing and furniture; medical expenses for smoking-related illnesses; and higher health, life, and car insurance premiums.  Cigars and pipes count too!  Cigars and pipes are also dangerous. So are smokeless (chewing) tobacco and snuff. All of these products contain nicotine, a highly addictive substance that has harmful effects on your body. Quitting smoking means giving up all tobacco products.       For more information    https://smokefree.gov/zwro-xk-kz-expert    National Cancer [place] Smoking Quitline: 877-44U-QUIT (415-521-4546)   Date Last Reviewed: 2/1/2017 2000-2017 [employer]. [address] 1576467. All rights reserved. This information is not intended as a substitute for professional medical care. Always follow your healthcare professional's instructions.        Why Do You Smoke?  The more you know about why you smoke, the easier it will be to quit. You may reach for a cigarette during a stressful commute. Or you may want to smoke when you first wake up in the morning. Learn what your smoking triggers are, and how to handle them.  Common Triggers    Frustration    Fatigue    Anger    Stress    Hunger    Boredom or loneliness    Drinking or socializing    Watching others smoke  Track Your Smoking Habits  To learn about your smoking habits, track them for a week. Attach a small notebook or piece of paper to your cigarette pack. With each cigarette you smoke, write down the time, where you are, who you re with, and how you feel.  How to [place] with Your Triggers    Change the habits that lead you to smoke. For instance, if you often smoke at a morning break, go for a walk instead.    Distract yourself from smoking. Keep your hands busy by playing with a paper clip or doodling. Keep your mouth busy by chewing on gum or a carrot stick.    Limit contact with people who are smoking. When you eat out, sit in the nonsmoking section.    9657-2762 [employer]. [address] [place] [address]. All rights reserved. This information is not intended as a substitute for professional medical care. Always follow your healthcare professional's instructions.  This information has been modified by your health care provider with permission from the publisher.

## 2018-02-08 LAB
ALBUMIN SERPL-MCNC: 4 G/DL (ref 3.4–5)
ANION GAP SERPL CALCULATED.3IONS-SCNC: 6 MMOL/L (ref 3–14)
BUN SERPL-MCNC: 34 MG/DL (ref 7–30)
CALCIUM SERPL-MCNC: 8.3 MG/DL (ref 8.5–10.1)
CHLORIDE SERPL-SCNC: 107 MMOL/L (ref 94–109)
CO2 SERPL-SCNC: 28 MMOL/L (ref 20–32)
CREAT SERPL-MCNC: 1.48 MG/DL (ref 0.66–1.25)
GFR SERPL CREATININE-BSD FRML MDRD: 48 ML/MIN/1.7M2
GLUCOSE SERPL-MCNC: 91 MG/DL (ref 70–99)
PHOSPHATE SERPL-MCNC: 3.7 MG/DL (ref 2.5–4.5)
POTASSIUM SERPL-SCNC: 4.5 MMOL/L (ref 3.4–5.3)
SODIUM SERPL-SCNC: 141 MMOL/L (ref 133–144)

## 2018-02-12 DIAGNOSIS — N02.B9 IGA NEPHROPATHY: Primary | ICD-10-CM

## 2018-02-15 ENCOUNTER — HOSPITAL ENCOUNTER (OUTPATIENT)
Dept: RESPIRATORY THERAPY | Facility: CLINIC | Age: 63
Discharge: HOME | End: 2018-02-15
Attending: INTERNAL MEDICINE | Admitting: INTERNAL MEDICINE
Payer: MEDICARE

## 2018-02-15 DIAGNOSIS — F17.200 CURRENT SMOKER: ICD-10-CM

## 2018-02-15 LAB — HGB BLD-MCNC: 15.8 G/DL (ref 13.3–17.7)

## 2018-02-15 PROCEDURE — 94726 PLETHYSMOGRAPHY LUNG VOLUMES: CPT | Mod: 26 | Performed by: INTERNAL MEDICINE

## 2018-02-15 PROCEDURE — 94726 PLETHYSMOGRAPHY LUNG VOLUMES: CPT

## 2018-02-15 PROCEDURE — 94060 EVALUATION OF WHEEZING: CPT | Mod: 26 | Performed by: INTERNAL MEDICINE

## 2018-02-15 PROCEDURE — 36415 COLL VENOUS BLD VENIPUNCTURE: CPT | Performed by: NURSE PRACTITIONER

## 2018-02-15 PROCEDURE — 94060 EVALUATION OF WHEEZING: CPT

## 2018-02-15 PROCEDURE — 85018 HEMOGLOBIN: CPT | Performed by: NURSE PRACTITIONER

## 2018-02-15 PROCEDURE — 25000125 ZZHC RX 250: Performed by: NURSE PRACTITIONER

## 2018-02-15 PROCEDURE — 94729 DIFFUSING CAPACITY: CPT

## 2018-02-15 RX ORDER — ALBUTEROL SULFATE 0.83 MG/ML
2.5 SOLUTION RESPIRATORY (INHALATION) ONCE
Status: COMPLETED | OUTPATIENT
Start: 2018-02-15 | End: 2018-02-15

## 2018-02-15 RX ADMIN — ALBUTEROL SULFATE 2.5 MG: 2.5 SOLUTION RESPIRATORY (INHALATION) at 11:30

## 2018-02-15 NOTE — LETTER
February 15, 2018      Geovani Bustos  [address] [facility] [address]        Dear ,    We are writing to inform you of your test results.    Your test results fall within the expected range(s) or remain unchanged from previous results.  Please continue with current treatment plan.    Resulted Orders   Hemoglobin   Result Value Ref Range    Hemoglobin 15.8 13.3 - 17.7 g/dL       If you have any questions or concerns, please call the clinic at the number listed above.       Sincerely,        [facility]

## 2018-02-19 ENCOUNTER — TELEPHONE (OUTPATIENT)
Dept: FAMILY MEDICINE | Facility: CLINIC | Age: 63
End: 2018-02-19

## 2018-02-19 DIAGNOSIS — J44.1 OBSTRUCTIVE CHRONIC BRONCHITIS WITH EXACERBATION (H): Primary | ICD-10-CM

## 2018-02-19 LAB
DLCOCOR-%PRED-PRE: 27 %
DLCOCOR-PRE: 7.36 ML/MIN/MMHG
DLCOUNC-%PRED-PRE: 28 %
DLCOUNC-PRE: 7.6 ML/MIN/MMHG
DLCOUNC-PRED: 26.63 ML/MIN/MMHG
ERV-%PRED-PRE: 89 %
ERV-PRE: 0.89 L
ERV-PRED: 0.99 L
EXPTIME-PRE: 6.51 SEC
FEF2575-%PRED-POST: 82 %
FEF2575-%PRED-PRE: 37 %
FEF2575-POST: 2.17 L/SEC
FEF2575-PRE: 0.98 L/SEC
FEF2575-PRED: 2.62 L/SEC
FEFMAX-%PRED-PRE: 61 %
FEFMAX-PRE: 5.19 L/SEC
FEFMAX-PRED: 8.45 L/SEC
FEV1-%PRED-PRE: 58 %
FEV1-PRE: 1.89 L
FEV1FEV6-PRE: 69 %
FEV1FEV6-PRED: 78 %
FEV1FVC-PRE: 67 %
FEV1FVC-PRED: 77 %
FEV1SVC-PRE: 58 %
FEV1SVC-PRED: 71 %
FIFMAX-PRE: 4.27 L/SEC
FRCPLETH-%PRED-PRE: 135 %
FRCPLETH-PRE: 4.78 L
FRCPLETH-PRED: 3.53 L
FVC-%PRED-PRE: 67 %
FVC-PRE: 2.8 L
FVC-PRED: 4.17 L
IC-%PRED-PRE: 64 %
IC-PRE: 2.29 L
IC-PRED: 3.55 L
RVPLETH-%PRED-PRE: 154 %
RVPLETH-PRE: 3.79 L
RVPLETH-PRED: 2.45 L
TLCPLETH-%PRED-PRE: 105 %
TLCPLETH-PRE: 7.06 L
TLCPLETH-PRED: 6.72 L
VA-%PRED-PRE: 40 %
VA-PRE: 2.63 L
VC-%PRED-PRE: 72 %
VC-PRE: 3.27 L
VC-PRED: 4.54 L

## 2018-02-19 NOTE — TELEPHONE ENCOUNTER
calling. Patient is anxious about Pulmonary fucntion test and would like the results as soon as possible.    Sima Germain  CSS

## 2018-02-20 NOTE — TELEPHONE ENCOUNTER
Comments   Your provider has referred you to: [facility]MR[facility]: [facility] [phone]   [email or web address]

## 2018-02-20 NOTE — TELEPHONE ENCOUNTER
Spoke with Linda at [facility]. Number given to Linda Mckeon  will call back after she makes appt with Pulmonologist to make appt with Chastity HUGHES-LEVAR [place] [facility]

## 2018-02-26 NOTE — ADDENDUM NOTE
Encounter addended by: Serafin García on: 2/26/2018  8:05 AM<BR>     Actions taken: Charge Capture section accepted

## 2018-02-27 DIAGNOSIS — E78.5 HYPERLIPIDEMIA LDL GOAL <130: ICD-10-CM

## 2018-02-27 RX ORDER — AMOXICILLIN 500 MG
1 CAPSULE ORAL DAILY
Qty: 90 CAPSULE | Refills: 3 | Status: SHIPPED | OUTPATIENT
Start: 2018-02-27 | End: 2019-02-14

## 2018-02-27 NOTE — TELEPHONE ENCOUNTER
[facility] says they got denial yesterday for refill for his Omega-3 Fatty Acids (FISH OIL) 1200 MG CAPS saying that we have never prescribed this. He has been on this all along. The last time it was ordered by Rashida Costello 1/3/17 for #90 and 3 refills.     Requested Prescriptions   Pending Prescriptions Disp Refills     Omega-3 Fatty Acids (FISH OIL) 1200 MG capsule 90 capsule 3     Sig: Take 1 capsule (1.2 g) by mouth daily    There is no refill protocol information for this order        Last Written Prescription Date:  1/3/17  Last Fill Quantity: 90,  # refills: 0   Last office visit: 2/7/2018 with prescribing provider:  Chastity Greer   Future Office Visit:   Next 5 appointments (look out 90 days)     Apr 18, 2018 12:40 PM CDT   Office Visit with SAUL Squires CNP   [facility]    [address] [place] [address]   [phone]

## 2018-02-28 DIAGNOSIS — E78.5 HYPERLIPIDEMIA LDL GOAL <130: ICD-10-CM

## 2018-02-28 DIAGNOSIS — R82.90 ABNORMAL URINE: Primary | ICD-10-CM

## 2018-02-28 DIAGNOSIS — N02.B9 IGA NEPHROPATHY: ICD-10-CM

## 2018-02-28 LAB
CREAT UR-MCNC: 175 MG/DL
MICROALBUMIN UR-MCNC: 136 MG/L
MICROALBUMIN/CREAT UR: 77.71 MG/G CR (ref 0–17)

## 2018-02-28 PROCEDURE — 82043 UR ALBUMIN QUANTITATIVE: CPT | Performed by: NURSE PRACTITIONER

## 2018-02-28 NOTE — TELEPHONE ENCOUNTER
"Requested Prescriptions   Pending Prescriptions Disp Refills     aspirin 81 MG EC tablet [Pharmacy Med Name: ASPIRIN EC 81 MG TABLET DR] 90 tablet      Sig: Take 1 tablet (81 mg) by mouth daily    Analgesics (Non-Narcotic Tylenol and ASA Only) Passed    2/28/2018  3:27 PM       Passed - Recent or future visit with authorizing provider's specialty    Patient had office visit in the last year or has a visit in the next 30 days with authorizing provider.  See \"Patient Info\" tab in inbasket, or \"Choose Columns\" in Meds & Orders section of the refill encounter.            Passed - Patient is age 20 years or older    If ASA is flagged for ages under 20 years old. Forward to provider for confirmation Ryes Syndrome is not a concern.              aspirin 81 MG EC tablet  Last Written Prescription Date:  10/12/2017  Last Fill Quantity: 90 tablet,  # refills: 1   Last office visit: 2/7/2018 with prescribing provider:  02/07/2018 with JOANNA Greer   Future Office Visit:   Next 5 appointments (look out 90 days)     Apr 18, 2018 12:40 PM CDT   Office Visit with SAUL Squires [facility] ([facility]note 3[facility]    [address] [facility] [address] [phone]                 Yara WALKER (R) (M)    "

## 2018-02-28 NOTE — TELEPHONE ENCOUNTER
I do not see a refill request for this for him.  When was it sent ??  Ok to refill Omega 3 fish oil  x 1 year.   Cholesterol 151  <200 mg/dL Final 11/08/2017  8:40 AM 59   Triglycerides 119  <150 mg/dL Final 11/08/2017  8:40 AM 59   HDL Cholesterol 47  >39 mg/dL Final 11/08/2017  8:40 AM 59   LDL Cholesterol Calculated 80  <100 mg/dL Final 11/08/2017  8:40 AM 59   Comment:   Desirable:       <100 mg/dl   Non HDL Cholesterol            Last cholesterol in November looks great.   Thanks Chastity Greer [facility]

## 2018-03-14 ENCOUNTER — HOSPITAL ENCOUNTER (OUTPATIENT)
Dept: RESPIRATORY THERAPY | Facility: CLINIC | Age: 63
Discharge: HOME | End: 2018-03-14
Attending: INTERNAL MEDICINE | Admitting: INTERNAL MEDICINE
Payer: MEDICARE

## 2018-03-14 PROCEDURE — 94729 DIFFUSING CAPACITY: CPT

## 2018-03-14 PROCEDURE — 94729 DIFFUSING CAPACITY: CPT | Mod: 26 | Performed by: INTERNAL MEDICINE

## 2018-03-14 NOTE — LETTER
March 19, 2018      Geovani Bustos  [address] [facility] [address]        Dear ,    We are writing to inform you of your test results.    Mild diffusion defect on repeat PFT previously reported as severe.   Recommend he see Cardiology to follow up on Mitral Valve prolapse.   See Pulmonology as planned.   Smoking cessation strongly recommended    Resulted Orders   PFT General Lab Testing   Result Value Ref Range    VC-Pred 4.54 L    VC-Pre 3.35 L    VC-%Pred-Pre 73 %    IC-Pred 3.55 L    IC-Pre 2.70 L    IC-%Pred-Pre 75 %    ERV-Pred 0.99 L    ERV-Pre 0.55 L    ERV-%Pred-Pre 55 %    DLCOunc-Pred 26.62 ml/min/mmHg    DLCOunc-Pre 19.16 ml/min/mmHg    DLCOunc-%Pred-Pre 71 %    VA-Pre 5.48 L    VA-%Pred-Pre 85 %    Narrative    Testing done due to suspected equipment malfunction during PFTs 2/5/8  The reduced diffusing capacity indicates a Mild loss of functional alveolar capillary surface.  However, the diffusing capacity was not corrected for the patient's hemoglobin.  The diffusion defect is consistent with a pulmonary vascular process.  Anemia cannot be excluded as a potential cause of the diffusion defect without correcting the observed diffusing capacity for hemoglobin.  IMPRESSION:  Mild Diffusion Defect   DLCO previously reported as severely reduced (29% of predicted)  ____________________________________________Haider Fontenot       If you have any questions or concerns, please call the clinic at the number listed above.       Sincerely,        ELMER Tubbs

## 2018-03-16 DIAGNOSIS — R82.90 ABNORMAL URINE: ICD-10-CM

## 2018-03-16 LAB
ALBUMIN UR-MCNC: NEGATIVE MG/DL
APPEARANCE UR: CLEAR
BILIRUB UR QL STRIP: NEGATIVE
COLOR UR AUTO: YELLOW
DLCOUNC-%PRED-PRE: 71 %
DLCOUNC-PRE: 19.16 ML/MIN/MMHG
DLCOUNC-PRED: 26.62 ML/MIN/MMHG
ERV-%PRED-PRE: 55 %
ERV-PRE: 0.55 L
ERV-PRED: 0.99 L
GLUCOSE UR STRIP-MCNC: 250 MG/DL
HGB UR QL STRIP: ABNORMAL
IC-%PRED-PRE: 75 %
IC-PRE: 2.7 L
IC-PRED: 3.55 L
KETONES UR STRIP-MCNC: NEGATIVE MG/DL
LEUKOCYTE ESTERASE UR QL STRIP: NEGATIVE
NITRATE UR QL: NEGATIVE
PH UR STRIP: 5.5 PH (ref 5–7)
RBC #/AREA URNS AUTO: NORMAL /HPF
SOURCE: ABNORMAL
SP GR UR STRIP: <=1.005 (ref 1–1.03)
UROBILINOGEN UR STRIP-ACNC: 0.2 EU/DL (ref 0.2–1)
VA-%PRED-PRE: 85 %
VA-PRE: 5.48 L
VC-%PRED-PRE: 73 %
VC-PRE: 3.35 L
VC-PRED: 4.54 L
WBC #/AREA URNS AUTO: NORMAL /HPF

## 2018-03-16 PROCEDURE — 81001 URINALYSIS AUTO W/SCOPE: CPT | Performed by: NURSE PRACTITIONER

## 2018-03-16 PROCEDURE — 87086 URINE CULTURE/COLONY COUNT: CPT | Performed by: NURSE PRACTITIONER

## 2018-03-17 LAB
BACTERIA SPEC CULT: NO GROWTH
Lab: NORMAL
SPECIMEN SOURCE: NORMAL

## 2018-03-19 DIAGNOSIS — E55.9 VITAMIN D DEFICIENCY: ICD-10-CM

## 2018-03-19 DIAGNOSIS — R80.9 PROTEINURIA: Primary | ICD-10-CM

## 2018-03-19 DIAGNOSIS — N02.B9 IGA NEPHROPATHY: ICD-10-CM

## 2018-03-21 ENCOUNTER — DOCUMENTATION ONLY (OUTPATIENT)
Dept: LAB | Facility: CLINIC | Age: 63
End: 2018-03-21

## 2018-03-21 DIAGNOSIS — R79.9 ABNORMAL BLOOD CHEMISTRY: ICD-10-CM

## 2018-03-21 DIAGNOSIS — N02.B9 IGA NEPHROPATHY: ICD-10-CM

## 2018-03-21 DIAGNOSIS — E55.9 VITAMIN D DEFICIENCY: ICD-10-CM

## 2018-03-21 DIAGNOSIS — R81 GLUCOSE FOUND IN URINE ON EXAMINATION: ICD-10-CM

## 2018-03-21 DIAGNOSIS — R81 GLUCOSE FOUND IN URINE ON EXAMINATION: Primary | ICD-10-CM

## 2018-03-21 DIAGNOSIS — R80.9 PROTEINURIA: ICD-10-CM

## 2018-03-21 LAB
ALBUMIN SERPL-MCNC: 3.7 G/DL (ref 3.4–5)
ALBUMIN UR-MCNC: NEGATIVE MG/DL
ANION GAP SERPL CALCULATED.3IONS-SCNC: 3 MMOL/L (ref 3–14)
APPEARANCE UR: CLEAR
BILIRUB UR QL STRIP: NEGATIVE
BUN SERPL-MCNC: 21 MG/DL (ref 7–30)
CALCIUM SERPL-MCNC: 8.8 MG/DL (ref 8.5–10.1)
CHLORIDE SERPL-SCNC: 106 MMOL/L (ref 94–109)
CO2 SERPL-SCNC: 31 MMOL/L (ref 20–32)
COLOR UR AUTO: YELLOW
CREAT SERPL-MCNC: 1.36 MG/DL (ref 0.66–1.25)
CREAT UR-MCNC: 129 MG/DL
DEPRECATED CALCIDIOL+CALCIFEROL SERPL-MC: 14 UG/L (ref 20–75)
ERYTHROCYTE [DISTWIDTH] IN BLOOD BY AUTOMATED COUNT: 14.1 % (ref 10–15)
FERRITIN SERPL-MCNC: 60 NG/ML (ref 26–388)
GFR SERPL CREATININE-BSD FRML MDRD: 53 ML/MIN/1.7M2
GLUCOSE SERPL-MCNC: 102 MG/DL (ref 70–99)
GLUCOSE SERPL-MCNC: 104 MG/DL (ref 70–99)
GLUCOSE UR STRIP-MCNC: NEGATIVE MG/DL
HBA1C MFR BLD: 5.6 % (ref 4.3–6)
HCT VFR BLD AUTO: 47.8 % (ref 40–53)
HGB BLD-MCNC: 15.7 G/DL (ref 13.3–17.7)
HGB UR QL STRIP: ABNORMAL
IRON SATN MFR SERPL: 40 % (ref 15–46)
IRON SERPL-MCNC: 108 UG/DL (ref 35–180)
KETONES UR STRIP-MCNC: NEGATIVE MG/DL
LEUKOCYTE ESTERASE UR QL STRIP: NEGATIVE
MCH RBC QN AUTO: 31.7 PG (ref 26.5–33)
MCHC RBC AUTO-ENTMCNC: 32.8 G/DL (ref 31.5–36.5)
MCV RBC AUTO: 97 FL (ref 78–100)
MICROALBUMIN UR-MCNC: 35 MG/L
MICROALBUMIN/CREAT UR: 26.82 MG/G CR (ref 0–17)
NITRATE UR QL: NEGATIVE
NON-SQ EPI CELLS #/AREA URNS LPF: ABNORMAL /LPF
PH UR STRIP: 6 PH (ref 5–7)
PHOSPHATE SERPL-MCNC: 3 MG/DL (ref 2.5–4.5)
PLATELET # BLD AUTO: 180 10E9/L (ref 150–450)
POTASSIUM SERPL-SCNC: 4.4 MMOL/L (ref 3.4–5.3)
PROT UR-MCNC: 0.15 G/L
PROT/CREAT 24H UR: 0.12 G/G CR (ref 0–0.2)
PTH-INTACT SERPL-MCNC: 62 PG/ML (ref 18–80)
RBC # BLD AUTO: 4.95 10E12/L (ref 4.4–5.9)
RBC #/AREA URNS AUTO: ABNORMAL /HPF
SODIUM SERPL-SCNC: 140 MMOL/L (ref 133–144)
SOURCE: ABNORMAL
SP GR UR STRIP: 1.01 (ref 1–1.03)
TIBC SERPL-MCNC: 267 UG/DL (ref 240–430)
UROBILINOGEN UR STRIP-ACNC: 1 EU/DL (ref 0.2–1)
WBC # BLD AUTO: 9.4 10E9/L (ref 4–11)
WBC #/AREA URNS AUTO: ABNORMAL /HPF

## 2018-03-21 PROCEDURE — 84156 ASSAY OF PROTEIN URINE: CPT | Performed by: INTERNAL MEDICINE

## 2018-03-21 PROCEDURE — 83036 HEMOGLOBIN GLYCOSYLATED A1C: CPT | Performed by: NURSE PRACTITIONER

## 2018-03-21 PROCEDURE — 83550 IRON BINDING TEST: CPT | Performed by: INTERNAL MEDICINE

## 2018-03-21 PROCEDURE — 81001 URINALYSIS AUTO W/SCOPE: CPT | Performed by: INTERNAL MEDICINE

## 2018-03-21 PROCEDURE — 82043 UR ALBUMIN QUANTITATIVE: CPT | Performed by: INTERNAL MEDICINE

## 2018-03-21 PROCEDURE — 83970 ASSAY OF PARATHORMONE: CPT | Performed by: INTERNAL MEDICINE

## 2018-03-21 PROCEDURE — 80069 RENAL FUNCTION PANEL: CPT | Performed by: INTERNAL MEDICINE

## 2018-03-21 PROCEDURE — 83540 ASSAY OF IRON: CPT | Performed by: INTERNAL MEDICINE

## 2018-03-21 PROCEDURE — 82306 VITAMIN D 25 HYDROXY: CPT | Performed by: INTERNAL MEDICINE

## 2018-03-21 PROCEDURE — 82728 ASSAY OF FERRITIN: CPT | Performed by: INTERNAL MEDICINE

## 2018-03-21 PROCEDURE — 36415 COLL VENOUS BLD VENIPUNCTURE: CPT | Performed by: INTERNAL MEDICINE

## 2018-03-21 PROCEDURE — 85027 COMPLETE CBC AUTOMATED: CPT | Performed by: INTERNAL MEDICINE

## 2018-03-21 PROCEDURE — 82947 ASSAY GLUCOSE BLOOD QUANT: CPT | Mod: 91 | Performed by: NURSE PRACTITIONER

## 2018-03-22 ENCOUNTER — OFFICE VISIT (OUTPATIENT)
Dept: NEPHROLOGY | Facility: CLINIC | Age: 63
End: 2018-03-22
Attending: INTERNAL MEDICINE
Payer: MEDICARE

## 2018-03-22 VITALS
TEMPERATURE: 98.4 F | HEART RATE: 70 BPM | OXYGEN SATURATION: 90 % | DIASTOLIC BLOOD PRESSURE: 79 MMHG | BODY MASS INDEX: 27.83 KG/M2 | SYSTOLIC BLOOD PRESSURE: 122 MMHG | WEIGHT: 183.6 LBS | HEIGHT: 68 IN

## 2018-03-22 DIAGNOSIS — N18.30 CKD (CHRONIC KIDNEY DISEASE) STAGE 3, GFR 30-59 ML/MIN (H): ICD-10-CM

## 2018-03-22 DIAGNOSIS — E78.5 HYPERLIPIDEMIA LDL GOAL <130: Primary | ICD-10-CM

## 2018-03-22 DIAGNOSIS — N02.B9 IGA NEPHROPATHY: ICD-10-CM

## 2018-03-22 DIAGNOSIS — R31.29 MICROSCOPIC HEMATURIA: ICD-10-CM

## 2018-03-22 PROCEDURE — G0463 HOSPITAL OUTPT CLINIC VISIT: HCPCS | Mod: ZF

## 2018-03-22 ASSESSMENT — PAIN SCALES - GENERAL: PAINLEVEL: NO PAIN (0)

## 2018-03-22 NOTE — NURSING NOTE
"Chief Complaint   Patient presents with     Consult     IgA nephropathy        Initial /77  Pulse 70  Temp 98.4  F (36.9  C) (Oral)  Ht 1.727 m (5' 8\")  Wt 83.3 kg (183 lb 9.6 oz)  SpO2 90%  BMI 27.92 kg/m2 Estimated body mass index is 27.92 kg/(m^2) as calculated from the following:    Height as of this encounter: 1.727 m (5' 8\").    Weight as of this encounter: 83.3 kg (183 lb 9.6 oz).  Medication Reconciliation: complete   Umm Gonzalez, CMA      "

## 2018-03-22 NOTE — MR AVS SNAPSHOT
After Visit Summary   3/22/2018    Geovani Bustos    MRN: [id number]           Patient Information     Date Of Birth          1955        Visit Information        Provider Department      3/22/2018 10:00 AM Ryan Dalton MD [facility]        Today's Diagnoses     Hyperlipidemia LDL goal <130    -  1    IgA nephropathy        Microscopic hematuria        CKD (chronic kidney disease) stage 3, GFR 30-59 ml/min           Follow-ups after your visit        Follow-up notes from your care team     Return in about 1 year (around 3/22/2019).      Your next 10 appointments already scheduled     Apr 13, 2018  8:00 AM CDT   New Visit with Dakota Pinzon MD   [facility]    [address] [facility] [address] [phone]            Apr 18, 2018 12:40 PM CDT   Office Visit with SAUL Squires CNP   [facility]    [address] [place] [address] [phone]           Bring a current list of meds and any records pertaining to this visit. For Physicals, please bring immunization records and any forms needing to be filled out. Please arrive 10 minutes early to complete paperwork.              Who to contact     If you have questions or need follow up information about today's clinic visit or your schedule please contact [facility] directly at [phone].  Normal or non-critical lab and imaging results will be communicated to you by MyChart, letter or phone within 4 business days after the clinic has received the results. If you do not hear from us within 7 days, please contact the clinic through MyChart or phone. If you have a critical or abnormal lab result, we will notify you by phone as soon as possible.  Submit refill requests through [employer] or call your pharmacy and they will forward the refill request to us. Please allow 3 business days for your refill to be  "completed.          Additional Information About Your Visit        [employer] Information     [employer] lets you send messages to your doctor, view your test results, renew your prescriptions, schedule appointments and more. To sign up, go to [email or web address] . Click on \"Log in\" on the left side of the screen, which will take you to the Welcome page. Then click on \"Sign up Now\" on the right side of the page.     You will be asked to enter the access code listed below, as well as some personal information. Please follow the directions to create your username and password.     Your access code is: [email or web address]  Expires: 2018  2:46 PM     Your access code will  in 90 days. If you need help or a new code, please call your [facility] clinic or [phone].        Care EveryWhere ID     This is your Care EveryWhere ID. This could be used by other organizations to access your [facility] medical records  [id number]        Your Vitals Were     Pulse Temperature Height Pulse Oximetry BMI (Body Mass Index)       70 98.4  F (36.9  C) (Oral) 1.727 m (5' 8\") 90% 27.92 kg/m2        Blood Pressure from Last 3 Encounters:   18 122/79   18 112/72   10/17/17 126/84    Weight from Last 3 Encounters:   18 83.3 kg (183 lb 9.6 oz)   18 82.6 kg (182 lb 3.2 oz)   10/17/17 83 kg (183 lb)              Today, you had the following     No orders found for display       Primary Care Provider Office Phone # Fax #    Chastity Greer, SAUL CRISTOBAL [phone]       [facility] [address] [place] [address]        Equal Access to Services     MEENU AMATO : Laura Archuleta, emely mcdonald, ac choudharymaeliceo downing, maday whyte. So [facility] [phone].    ATENCIÓN: Si habla español, tiene a topete disposición servicios gratuitos de asistencia lingüística. Llame al [phone].    We comply with applicable federal civil rights laws and [place] laws. We do not " discriminate on the basis of race, color, national origin, age, disability, sex, sexual orientation, or gender identity.            Thank you!     Thank you for choosing [facility]  for your care. Our goal is always to provide you with excellent care. Hearing back from our patients is one way we can continue to improve our services. Please take a few minutes to complete the written survey that you may receive in the mail after your visit with us. Thank you!             Your Updated Medication List - Protect others around you: Learn how to safely use, store and throw away your medicines at www.disposemymeds.org.          This list is accurate as of 3/22/18 10:13 AM.  Always use your most recent med list.                   Brand Name Dispense Instructions for use Diagnosis    ABILIFY 10 MG tablet   Generic drug:  ARIPiprazole      1 TABLET DAILY        acetaminophen 325 MG tablet    TYLENOL    100 tablet    Take 2 tablets (650 mg) by mouth every 4 hours as needed for mild pain    Cigarette nicotine dependence with nicotine-induced disorder       amoxicillin 250 MG capsule    AMOXIL     Take 250 mg by mouth        aspirin 81 MG EC tablet     90 tablet    Take 1 tablet (81 mg) by mouth daily    Hyperlipidemia LDL goal <130       augmented betamethasone dipropionate 0.05 % ointment    DIPROLENE-AF    45 g    Apply to AA twice daily 1-2 weeks then as needed only. Do not apply to face. (Due for Derm recheck b4 next refill: [phone] to schedule)    Eczema of both external ears       carbamide peroxide 6.5 % otic solution    DEBROX    15 mL    Place 5 drops into both ears 2 times daily For 5 days August 1-5th and Feb 1-5 th  then return for irrigation after the 5th day.    Bilateral impacted cerumen       finasteride 5 MG tablet    PROSCAR    30 tablet    TAKE ONE TABLET BY MOUTH EVERY DAY    BPH (benign prostatic hyperplasia)       fish Oil 1200 MG capsule     90 capsule    Take 1 capsule (1.2 g) by mouth daily     Hyperlipidemia LDL goal <130       gabapentin 100 MG capsule    NEURONTIN     Take 100 mg by mouth 2 times daily        levothyroxine 125 MCG tablet    SYNTHROID/LEVOTHROID    90 tablet    TAKE ONE TABLET BY MOUTH EVERY DAY    Acquired hypothyroidism       Nitrile Exam Gloves Medium Misc     4 each    1 each as needed Use PRN daily with administration of otic drops, body lotion and powder to treat dry itchy skin.    Eczema of external ear, bilateral, Bilateral impacted cerumen, [facility] Care [facility]       order for DME     1 Units    Equipment being ordered: Dynaflex insert    Left foot pain, S/P foot surgery, left       oxybutynin 5 MG tablet    DITROPAN    90 tablet    TAKE ONE TABLET BY MOUTH THREE TIMES DAILY    Benign prostatic hyperplasia, presence of lower urinary tract symptoms unspecified       REGULOID 48.57 % Powd   Generic drug:  Psyllium     369 g    Take 1 each by mouth See Admin Instructions Take 1 teaspoonful every day at 7pm and 9 pm.    Constipation       simvastatin 40 MG tablet    ZOCOR    90 tablet    TAKE ONE TABLET BY MOUTH EVERY DAY AT 9 PM    Hyperlipidemia LDL goal <130       ZOLOFT 100 MG tablet   Generic drug:  sertraline      2 TABLETS DAILY

## 2018-03-22 NOTE — PROGRESS NOTES
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 Overall stable gfr and no proteinuria over several years. Low level microscopic hematuria on and off. Stable disease.   Continue with conservative mx including management of BP ( target < 130/80), avoiding nephrotoxins, may continue Fish oil.   Given lack of proteinuria and fairly low BP, no need to start RASi at this time but should have low threshold if Bp goes up or if there is detectable proteinuria.     CKD 3 - stable for ~10 yrs.     BMD - PTH in target. Ca/ Phos okay .    F/u - 1 yr with  Routine CKD labs.     REASON FOR CONSULT: IgAN    HISTORY OF PRESENT ILLNESS:   Geovani Bustos is a 62 year old male with hx of biopsy proven IGAN ( 4/2009, Dr Robbie Harris. 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 Other pertinent hx include Hypothyroidism, Schizophrenia , HPL.     Renal hx-   IgAN on bx as listed above. Managed conservatively , no IS. On Fish Oil  Cr stable at 1.3 - 1.4 since atleast 2009   UA - persistently negative dipstick proteinuria ( UPCR 0.12) , 5-10 RBC per hpf    Mr Bustos reports he is doing well.   No new rash, joint pain or swelling, gross hematuria, hemoptysis, leg swelling, dyspnea or chest pain.    No new vision or hearing problems. No syncopal episodes.  No NSAID or other OTC med use  No recent weight loss, bone pain, diarrhea.   Continues to smoke, no plans of quitting.     PAST MEDICAL HISTORY:  Past Medical History:   Diagnosis Date     Cerumen impaction      Colon polyps      Hyperlipidemia      Mitral valve prolapse      Schizophrenia (H)      Ulcerated penile lesions      VSD (ventricular septal defect)      PAST SURGICAL  HISTORY:  Past Surgical History:   Procedure Laterality Date     ARTHRODESIS FOOT Right 1/19/2016    Procedure: ARTHRODESIS FOOT;  Surgeon: Holden Harrison DPM;  Location: [facility]     ARTHRODESIS FOOT Left 1/3/2017    Procedure: ARTHRODESIS FOOT;  Surgeon: Holden Harrison DPM;  Location: [facility]     SURGICAL HISTORY OF -       leg fracture     MEDICATIONS:  Prescription Medications as of 3/22/2018             aspirin 81 MG EC tablet Take 1 tablet (81 mg) by mouth daily    Omega-3 Fatty Acids (FISH OIL) 1200 MG capsule Take 1 capsule (1.2 g) by mouth daily    amoxicillin (AMOXIL) 250 MG capsule Take 250 mg by mouth    gabapentin (NEURONTIN) 100 MG capsule Take 100 mg by mouth 2 times daily    finasteride (PROSCAR) 5 MG tablet TAKE ONE TABLET BY MOUTH EVERY DAY    oxybutynin (DITROPAN) 5 MG tablet TAKE ONE TABLET BY MOUTH THREE TIMES DAILY    simvastatin (ZOCOR) 40 MG tablet TAKE ONE TABLET BY MOUTH EVERY DAY AT 9 PM    REGULOID 48.57 % POWD Take 1 each by mouth See Admin Instructions Take 1 teaspoonful every day at 7pm and 9 pm.    Disposable Gloves (NITRILE EXAM GLOVES MEDIUM) MISC 1 each as needed Use PRN daily with administration of otic drops, body lotion and powder to treat dry itchy skin.    levothyroxine (SYNTHROID/LEVOTHROID) 125 MCG tablet TAKE ONE TABLET BY MOUTH EVERY DAY    carbamide peroxide (DEBROX) 6.5 % otic solution Place 5 drops into both ears 2 times daily For 5 days August 1-5th and Feb 1-5 th  then return for irrigation after the 5th day.    order for DME Equipment being ordered: Dynaflex insert    augmented betamethasone dipropionate (DIPROLENE-AF) 0.05 % ointment Apply to AA twice daily 1-2 weeks then as needed only. Do not apply to face. (Due for Derm recheck b4 next refill: [phone] to schedule)    acetaminophen (TYLENOL) 325 MG tablet Take 2 tablets (650 mg) by mouth every 4 hours as needed for mild pain    ABILIFY 10 MG OR TABS 1 TABLET DAILY    ZOLOFT 100 MG OR TABS 2 TABLETS  "DAILY      Facility Administered Medications as of 3/22/2018             bupivacaine (MARCAINE) injection 0.5% as needed         ALLERGIES:    Allergies   Allergen Reactions     Nitrogen Oxide      Sulfa Drugs      REVIEW OF SYSTEMS:  A comprehensive review of systems was performed and found to be negative except as described here or above.   SOCIAL HISTORY:   Social History     Social History     Marital status: Single     Spouse name: N/A     Number of children: N/A     Years of education: N/A     Occupational History     Not on file.     Social History Main Topics     Smoking status: Current Every Day Smoker     Packs/day: 1.00     Types: Cigarettes     Smokeless tobacco: Never Used     Alcohol use No     Drug use: No     Sexual activity: No     Other Topics Concern     Blood Transfusions No     Social History Narrative    Patient has no interest in smoking cessation.     Her for [employer] physical - likes to [employer]    Lives in Group home. Grew up in [place].      FAMILY MEDICAL HISTORY:   No family history on file.  PHYSICAL EXAM:   Ht 1.727 m (5' 8\")  Wt 83.3 kg (183 lb 9.6 oz)  BMI 27.92 kg/m2  GENERAL APPEARANCE: alert and no distress  EYES: nonicteric  HENT: mouth without ulcers or lesions  NECK: supple, no adenopathy  RESP: lungs clear to auscultation   CV: regular rhythm, normal rate, no rub  ABDOMEN: soft, nontender,  Extremities: no clubbing, cyanosis, or edema  MS: no evidence of inflammation in joints, no muscle tenderness  SKIN: no rash  NEURO: mentation intact and speech normal  PSYCH: affect normal/bright   LABS:   CMP  Recent Labs   Lab Test  03/21/18   0900  02/07/18   1404  11/08/17   0840 02/20/17 02/01/17   1457  01/04/16   1640  01/04/16   0850   11/05/14   2040   NA  140  141  138   --   137  137   --    < >  140   POTASSIUM  4.4  4.5  4.6  4.0  4.1  4.1   --    < >  3.7   CHLORIDE  106  107  104   --   101  105   --    < >  107   CO2  31  28  33*   --   30  27   --    < >  28 "   ANIONGAP  3  6  1*   --   6  5   --    < >  5   GLC  104*  102*  91  85  98  85  93  95   < >  119*   BUN  21  34*  18   --   23  33*   --    < >  30   CR  1.36*  1.48*  1.31*  1.37*  1.21  1.27*   --    < >  1.38*   GFRESTIMATED  53*  48*  55*  52*  61  57*   --    < >  52*   GFRESTBLACK  64  58*  67  >60  73  70   --    < >  63   RAYRAY  8.8  8.3*  9.2   --   8.8  8.7   --    < >  8.9   PHOS  3.0  3.7   --    --   3.3   --    --    --    --    PROTTOTAL   --    --   7.2   --   7.3  7.6   --    --   6.7*   ALBUMIN  3.7  4.0  3.8   --   3.8  4.1   --    --   3.6   BILITOTAL   --    --   0.3   --   0.3  0.3   --    --   0.4   ALKPHOS   --    --   188*   --   142  127   --    --   84   AST   --    --   15   --   13  21  22   < >  25   ALT   --    --   22   --   19  33  31   < >  25    < > = values in this interval not displayed.     CBC  Recent Labs   Lab Test  03/21/18   0900  02/15/18   1149  11/08/17   0840  02/01/17   1457  12/21/16   1405   HGB  15.7  15.8  15.5  15.5  14.0   WBC  9.4   --   12.2*  12.1*  12.1*   RBC  4.95   --   4.86  4.74  4.36*   HCT  47.8   --   47.1  45.9  41.6   MCV  97   --   97  97  95   MCH  31.7   --   31.9  32.7  32.1   MCHC  32.8   --   32.9  33.8  33.7   RDW  14.1   --   13.1  13.1  12.9   PLT  180   --   218  195  238       URINE STUDIES  Recent Labs   Lab Test  03/21/18   0900  03/16/18   1135  11/05/14   2235  10/03/11   1606  09/08/11   1623   COLOR  Yellow  Yellow  Straw  Yellow  Yellow   APPEARANCE  Clear  Clear  Clear  Clear  Clear   URINEGLC  Negative  250*  Negative  Negative  Negative   URINEBILI  Negative  Negative  Negative  Negative  Negative   URINEKETONE  Negative  Negative  Negative  Negative  Negative   SG  1.015  <=1.005  1.002*  1.025  1.025   UBLD  Moderate*  Moderate*  Small*  Large*  Large*   URINEPH  6.0  5.5  5.0  5.0  5.5   PROTEIN  Negative  Negative  Negative  Negative  Negative   UROBILINOGEN  1.0  0.2   --   0.2  0.2   NITRITE  Negative  Negative   Negative  Negative  Negative   LEUKEST  Negative  Negative  Negative  Negative  Negative   RBCU  5-10*  O - 2  1  2-5*  10-25*   WBCU  0 - 5  0 - 5  1  O - 2  O - 2     Recent Labs   Lab Test  03/21/18   0900  03/18/10   0930   UTPG  0.12  0.03     PTH  Recent Labs   Lab Test  03/21/18   0900   PTHI  62     IRON STUDIES  Recent Labs   Lab Test  03/21/18   0900   IRON  108   FEB  267   IRONSAT  40   FREDI  60       Ryan Dalton MD

## 2018-03-22 NOTE — LETTER
3/22/2018       RE: Geovani Bustos  [id number]  [facility] [address]     Dear Colleague,    Thank you for referring your patient, Geovani Bustos, to the [facility] NEPHROLOGY at [facility]. Please see a copy of my visit note below.    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 Overall stable gfr and no proteinuria over several years. Low level microscopic hematuria on and off. Stable disease.   Continue with conservative mx including management of BP ( target < 130/80), avoiding nephrotoxins, may continue Fish oil.   Given lack of proteinuria and fairly low BP, no need to start RASi at this time but should have low threshold if Bp goes up or if there is detectable proteinuria.     CKD 3 - stable for ~10 yrs.     BMD - PTH in target. Ca/ Phos okay .    F/u - 1 yr with  Routine CKD labs.     REASON FOR CONSULT: IgAN    HISTORY OF PRESENT ILLNESS:   Geovani Bustos is a 62 year old male with hx of biopsy proven IGAN ( 4/2009, Dr Robbie Harris. 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 Other pertinent hx include Hypothyroidism, Schizophrenia , HPL.     Renal hx-   IgAN on bx as listed above. Managed conservatively , no IS. On Fish Oil  Cr stable at 1.3 - 1.4 since atleast 2009   UA - persistently negative dipstick proteinuria ( UPCR 0.12) , 5-10 RBC per hpf    Mr Bustos reports he is doing well.   No new rash, joint pain or swelling, gross hematuria, hemoptysis, leg swelling, dyspnea or chest pain.    No new vision or hearing problems. No syncopal episodes.  No NSAID or other OTC med use  No recent weight loss, bone pain, diarrhea.   Continues to smoke, no plans  of quitting.     PAST MEDICAL HISTORY:  Past Medical History:   Diagnosis Date     Cerumen impaction      Colon polyps      Hyperlipidemia      Mitral valve prolapse      Schizophrenia (H)      Ulcerated penile lesions      VSD (ventricular septal defect)      PAST SURGICAL HISTORY:  Past Surgical History:   Procedure Laterality Date     ARTHRODESIS FOOT Right 1/19/2016    Procedure: ARTHRODESIS FOOT;  Surgeon: Holden Harrison DPM;  Location: [facility]     ARTHRODESIS FOOT Left 1/3/2017    Procedure: ARTHRODESIS FOOT;  Surgeon: Holden Harrison DPM;  Location: [facility]     SURGICAL HISTORY OF -       leg fracture     MEDICATIONS:  Prescription Medications as of 3/22/2018             aspirin 81 MG EC tablet Take 1 tablet (81 mg) by mouth daily    Omega-3 Fatty Acids (FISH OIL) 1200 MG capsule Take 1 capsule (1.2 g) by mouth daily    amoxicillin (AMOXIL) 250 MG capsule Take 250 mg by mouth    gabapentin (NEURONTIN) 100 MG capsule Take 100 mg by mouth 2 times daily    finasteride (PROSCAR) 5 MG tablet TAKE ONE TABLET BY MOUTH EVERY DAY    oxybutynin (DITROPAN) 5 MG tablet TAKE ONE TABLET BY MOUTH THREE TIMES DAILY    simvastatin (ZOCOR) 40 MG tablet TAKE ONE TABLET BY MOUTH EVERY DAY AT 9 PM    REGULOID 48.57 % POWD Take 1 each by mouth See Admin Instructions Take 1 teaspoonful every day at 7pm and 9 pm.    Disposable Gloves (NITRILE EXAM GLOVES MEDIUM) MISC 1 each as needed Use PRN daily with administration of otic drops, body lotion and powder to treat dry itchy skin.    levothyroxine (SYNTHROID/LEVOTHROID) 125 MCG tablet TAKE ONE TABLET BY MOUTH EVERY DAY    carbamide peroxide (DEBROX) 6.5 % otic solution Place 5 drops into both ears 2 times daily For 5 days August 1-5th and Feb 1-5 th  then return for irrigation after the 5th day.    order for DME Equipment being ordered: Dynaflex insert    augmented betamethasone dipropionate (DIPROLENE-AF) 0.05 % ointment Apply to AA twice daily 1-2 weeks then as needed only.  "Do not apply to face. (Due for Derm recheck b4 next refill: [phone] to schedule)    acetaminophen (TYLENOL) 325 MG tablet Take 2 tablets (650 mg) by mouth every 4 hours as needed for mild pain    ABILIFY 10 MG OR TABS 1 TABLET DAILY    ZOLOFT 100 MG OR TABS 2 TABLETS DAILY      Facility Administered Medications as of 3/22/2018             bupivacaine (MARCAINE) injection 0.5% as needed         ALLERGIES:    Allergies   Allergen Reactions     Nitrogen Oxide      Sulfa Drugs      REVIEW OF SYSTEMS:  A comprehensive review of systems was performed and found to be negative except as described here or above.   SOCIAL HISTORY:   Social History     Social History     Marital status: Single     Spouse name: N/A     Number of children: N/A     Years of education: N/A     Occupational History     Not on file.     Social History Main Topics     Smoking status: Current Every Day Smoker     Packs/day: 1.00     Types: Cigarettes     Smokeless tobacco: Never Used     Alcohol use No     Drug use: No     Sexual activity: No     Other Topics Concern     Blood Transfusions No     Social History Narrative    Patient has no interest in smoking cessation.     Her for [employer] physical [employer] likes to [employer]    Lives in Group home. Grew up in [place].      FAMILY MEDICAL HISTORY:   No family history on file.  PHYSICAL EXAM:   Ht 1.727 m (5' 8\")  Wt 83.3 kg (183 lb 9.6 oz)  BMI 27.92 kg/m2  GENERAL APPEARANCE: alert and no distress  EYES: nonicteric  HENT: mouth without ulcers or lesions  NECK: supple, no adenopathy  RESP: lungs clear to auscultation   CV: regular rhythm, normal rate, no rub  ABDOMEN: soft, nontender,  Extremities: no clubbing, cyanosis, or edema  MS: no evidence of inflammation in joints, no muscle tenderness  SKIN: no rash  NEURO: mentation intact and speech normal  PSYCH: affect normal/bright   LABS:   CMP  Recent Labs   Lab Test  03/21/18   0900  02/07/18   1404  11/08/17   0840 02/20/17 02/01/17   1457  " 01/04/16   1640  01/04/16   0850   11/05/14   2040   NA  140  141  138   --   137  137   --    < >  140   POTASSIUM  4.4  4.5  4.6  4.0  4.1  4.1   --    < >  3.7   CHLORIDE  106  107  104   --   101  105   --    < >  107   CO2  31  28  33*   --   30  27   --    < >  28   ANIONGAP  3  6  1*   --   6  5   --    < >  5   GLC  104*  102*  91  85  98  85  93  95   < >  119*   BUN  21  34*  18   --   23  33*   --    < >  30   CR  1.36*  1.48*  1.31*  1.37*  1.21  1.27*   --    < >  1.38*   GFRESTIMATED  53*  48*  55*  52*  61  57*   --    < >  52*   GFRESTBLACK  64  58*  67  >60  73  70   --    < >  63   RAYRAY  8.8  8.3*  9.2   --   8.8  8.7   --    < >  8.9   PHOS  3.0  3.7   --    --   3.3   --    --    --    --    PROTTOTAL   --    --   7.2   --   7.3  7.6   --    --   6.7*   ALBUMIN  3.7  4.0  3.8   --   3.8  4.1   --    --   3.6   BILITOTAL   --    --   0.3   --   0.3  0.3   --    --   0.4   ALKPHOS   --    --   188*   --   142  127   --    --   84   AST   --    --   15   --   13  21  22   < >  25   ALT   --    --   22   --   19  33  31   < >  25    < > = values in this interval not displayed.     CBC  Recent Labs   Lab Test  03/21/18   0900  02/15/18   1149  11/08/17   0840  02/01/17   1457  12/21/16   1405   HGB  15.7  15.8  15.5  15.5  14.0   WBC  9.4   --   12.2*  12.1*  12.1*   RBC  4.95   --   4.86  4.74  4.36*   HCT  47.8   --   47.1  45.9  41.6   MCV  97   --   97  97  95   MCH  31.7   --   31.9  32.7  32.1   MCHC  32.8   --   32.9  33.8  33.7   RDW  14.1   --   13.1  13.1  12.9   PLT  180   --   218  195  238       URINE STUDIES  Recent Labs   Lab Test  03/21/18   0900  03/16/18   1135  11/05/14   2235  10/03/11   1606  09/08/11   1623   COLOR  Yellow  Yellow  Straw  Yellow  Yellow   APPEARANCE  Clear  Clear  Clear  Clear  Clear   URINEGLC  Negative  250*  Negative  Negative  Negative   URINEBILI  Negative  Negative  Negative  Negative  Negative   URINEKETONE  Negative  Negative  Negative  Negative  Negative    SG  1.015  <=1.005  1.002*  1.025  1.025   UBLD  Moderate*  Moderate*  Small*  Large*  Large*   URINEPH  6.0  5.5  5.0  5.0  5.5   PROTEIN  Negative  Negative  Negative  Negative  Negative   UROBILINOGEN  1.0  0.2   --   0.2  0.2   NITRITE  Negative  Negative  Negative  Negative  Negative   LEUKEST  Negative  Negative  Negative  Negative  Negative   RBCU  5-10*  O - 2  1  2-5*  10-25*   WBCU  0 - 5  0 - 5  1  O - 2  O - 2     Recent Labs   Lab Test  03/21/18   0900  03/18/10   0930   UTPG  0.12  0.03     PTH  Recent Labs   Lab Test  03/21/18   0900   PTHI  62     IRON STUDIES  Recent Labs   Lab Test  03/21/18   0900   IRON  108   FEB  267   IRONSAT  40   FREDI  60       Ryan Dalton MD

## 2018-04-13 ENCOUNTER — OFFICE VISIT (OUTPATIENT)
Dept: PULMONOLOGY | Facility: CLINIC | Age: 63
End: 2018-04-13
Payer: MEDICARE

## 2018-04-13 VITALS
HEART RATE: 58 BPM | BODY MASS INDEX: 27.74 KG/M2 | WEIGHT: 183 LBS | HEIGHT: 68 IN | SYSTOLIC BLOOD PRESSURE: 118 MMHG | TEMPERATURE: 97 F | OXYGEN SATURATION: 92 % | RESPIRATION RATE: 16 BRPM | DIASTOLIC BLOOD PRESSURE: 81 MMHG

## 2018-04-13 DIAGNOSIS — J41.0 SIMPLE CHRONIC BRONCHITIS (H): Primary | ICD-10-CM

## 2018-04-13 PROCEDURE — 99204 OFFICE O/P NEW MOD 45 MIN: CPT

## 2018-04-13 ASSESSMENT — PAIN SCALES - GENERAL: PAINLEVEL: NO PAIN (0)

## 2018-04-13 NOTE — PROGRESS NOTES
[facility]  Pulmonary Medicine  Visit Clinic Note  April 13, 2018         ASSESSMENT & PLAN       Airflow obstruction: Mr. Bustos has a long history of smoking, and spirometry done recently shows airflow obstruction with a significant bronchodilator response.  At this point I think he likely has COPD, but he does have quite a significant bronchodilator response which brings asthma into the differential.  It is possible with good inhaler use over time his spirometry may normalize, if the diagnosis is asthma.    I am going to prescribe him Breo to take daily.  He needs to have repeat spirometry in 3 months.  Should have pre-and postbronchodilator measurements.  If he still has airflow obstruction at this time, he will have a diagnosis of COPD.  If his flow volume loops normalize, I think he likely has asthma.    History of smoking: He is a long history of smoking.  But a 47-pack-year right now.  We talked about the benefits of quitting today, but he is not interested in quitting.  His mental illness definitely deteriorates when he stopped smoking.  He may be at least able to cut back.  I told him that there is risk of lung cancer as well as other organ problems with smoking.    He would actually likely qualify for lung cancer screening by CT scan.  This is a difficult topic to fully understand and consent to.  He currently has a guardian that is helping him to make medical decisions.  I will need to recheck of her to talk about the benefits and risks of CT scan screening for lung cancer.  Based on the conversation that I had with him in the clinic, I do not think that he would want this.  He has expressed the people that he is not interested never receiving chemotherapy or radiation therapy for medical illness.        RTC in 3 months after his repeat spirometry.      Silver Pinzon MD      guardian # C - [phone].  [phone]. email [email or web address]       Today's visit note:       Referred  by: Chastity Greer    Chief Complaint: Geovani Bustos is a 62 year old year old male who is being seen for Consult (COPD)      HISTORY OF PRESENT ILLNESS:  Mr. Bustos is a 62-year-old male with schizoaffective disorder who currently lives in a group home, is presenting to the pulmonary clinic today for evaluation of abnormal pulmonary function testing.  He was recently seen by his primary care physician.  The patient expressed that he has a long smoking history, and given his age pulmonary function testing was ordered.  Airflow obstruction was noted on the pulmonary function testing and is now been referred to me.    He has a 47 year history of smoking cigarettes.  Probably on average about 1 pack per day.  Symptomatically, he does not have any limitations.  He coughs a lot in the morning mostly.  He does cough up some phlegm.  No blood.  Occasional have such vigorous coughing that he has posttussive emesis.  He does have some dyspnea with exertion on occasion, but it is mild.  He only notes it when he is walking fast.  He never exerts himself more than that.    He denies hemoptysis, fevers, unintentional weight loss.           Past Medical and Surgical History:     Past Medical History:   Diagnosis Date     Cerumen impaction      Chronic kidney disease      Colon polyps      Hyperlipidemia      Mitral valve prolapse      Schizophrenia (H)      Ulcerated penile lesions      VSD (ventricular septal defect)      Schizoaffective  OCD  Mixed personality disorder    Past Surgical History:   Procedure Laterality Date     ARTHRODESIS FOOT Right 1/19/2016    Procedure: ARTHRODESIS FOOT;  Surgeon: Holden Harrison DPM;  Location: [facility]     ARTHRODESIS FOOT Left 1/3/2017    Procedure: ARTHRODESIS FOOT;  Surgeon: Holden Harrison DPM;  Location: [facility] OR     SURGICAL HISTORY OF -       leg fracture           Family History:     Family History   Problem Relation Age of Onset     Emphysema Mother      Coronary Artery  "Disease Father               Social History:     Social History     Social History     Marital status: Single     Spouse name: N/A     Number of children: N/A     Years of education: N/A     Occupational History     Not on file.     Social History Main Topics     Smoking status: Current Every Day Smoker     Packs/day: 1.00     Years: 47.00     Types: Cigarettes     Smokeless tobacco: Never Used     Alcohol use No     Drug use: No     Sexual activity: No     Other Topics Concern     Blood Transfusions No     Social History Narrative    Patient has no interest in smoking cessation.     Her for [employer] physical - likes to Bowl    Lives in Group home. Grew up in [place].             Medications:     Current Outpatient Prescriptions   Medication     levothyroxine (SYNTHROID/LEVOTHROID) 125 MCG tablet     aspirin 81 MG EC tablet     Omega-3 Fatty Acids (FISH OIL) 1200 MG capsule     gabapentin (NEURONTIN) 100 MG capsule     finasteride (PROSCAR) 5 MG tablet     oxybutynin (DITROPAN) 5 MG tablet     simvastatin (ZOCOR) 40 MG tablet     REGULOID 48.57 % POWD     Disposable Gloves (NITRILE EXAM GLOVES MEDIUM) MISC     carbamide peroxide (DEBROX) 6.5 % otic solution     order for DME     augmented betamethasone dipropionate (DIPROLENE-AF) 0.05 % ointment     acetaminophen (TYLENOL) 325 MG tablet     ABILIFY 10 MG OR TABS     ZOLOFT 100 MG OR TABS     amoxicillin (AMOXIL) 250 MG capsule     No current facility-administered medications for this visit.      Facility-Administered Medications Ordered in Other Visits   Medication     bupivacaine (MARCAINE) injection 0.5%            Review of Systems:       A complete review of systems was otherwise negative except as noted in the HPI.      PHYSICAL EXAM:  /81 (BP Location: Right arm, Patient Position: Chair, Cuff Size: Adult Regular)  Pulse 58  Temp 97  F (36.1  C) (Temporal)  Resp 16  Ht 5' 8\" (1.727 m)  Wt 183 lb (83 kg)  SpO2 92%  BMI 27.83 kg/m2 "     General: Well developed, well nourished, No apparent distress  Eyes: Anicteric  Nose: Nasal mucosa with no edema or hyperemia.  No polyps  Ears: Hearing grossly normal  Mouth: Poor oral dentition, no oral ulcerations.  Neck: supple, no thyromegaly  Lymphatics: No cervical or supraclavicular nodes  Respiratory: Good air movement. No crackles. No rhonchi. No wheezes  Cardiac: RRR, normal S1, S2. No murmurs. No JVD  Abdomen: Soft, NT/ND  Musculoskeletal: Extremities normal. No clubbing. No cyanosis. No edema.  Skin: No rash on limited exam  Neuro: Gross motor activity appears normal.  Psych: Makes good eye contact, answers questions appropriately.  A bit slow in his responses.           Data:   All laboratory and imaging data reviewed.      PFT:    FEV1/FVC is 67%.  His FEV1 is 1.89 L which is 58% predicted.  This improved to 2.31 which is 71% predicted after administration of albuterol.  This is a 22% increase.  His FVC is 2.8 L which is 67% predicted, and increases to 3.49 L which is 83% predicted.  Albuterol made a 24% increase in his FVC.  His residual volume is 154% predicted, and his total lung capacity is 105% predicted.  His diffusion capacity is uninterpretable as the test was not performed appropriately.    Diffusion capacity measured 1 month later showed that it is 71% predicted.    PFT Interpretation:  It appears that the testing is valid.  I do have concerns that he may have improved in his spirometry just based on a learning effect.  That aside it appears that he has airflow obstruction, with a significant bronchodilator response.  There is gas trapping without hyperinflation.  His diffusion capacity is normal.

## 2018-04-13 NOTE — LETTER
4/13/2018         RE: Geovani Bustos  [address]  [place] [address]        Dear Colleague,    Thank you for referring your patient, Geovani Bustos, to the [facility]. Please see a copy of my visit note below.    [facility]  Pulmonary Medicine  Visit Clinic Note  April 13, 2018         ASSESSMENT & PLAN       Airflow obstruction: Mr. Bustos has a long history of smoking, and spirometry done recently shows airflow obstruction with a significant bronchodilator response.  At this point I think he likely has COPD, but he does have quite a significant bronchodilator response which brings asthma into the differential.  It is possible with good inhaler use over time his spirometry may normalize, if the diagnosis is asthma.    I am going to prescribe him Breo to take daily.  He needs to have repeat spirometry in 3 months.  Should have pre-and postbronchodilator measurements.  If he still has airflow obstruction at this time, he will have a diagnosis of COPD.  If his flow volume loops normalize, I think he likely has asthma.    History of smoking: He is a long history of smoking.  But a 47-pack-year right now.  We talked about the benefits of quitting today, but he is not interested in quitting.  His mental illness definitely deteriorates when he stopped smoking.  He may be at least able to cut back.  I told him that there is risk of lung cancer as well as other organ problems with smoking.    He would actually likely qualify for lung cancer screening by CT scan.  This is a difficult topic to fully understand and consent to.  He currently has a guardian that is helping him to make medical decisions.  I will need to recheck of her to talk about the benefits and risks of CT scan screening for lung cancer.  Based on the conversation that I had with him in the clinic, I do not think that he would want this.  He has expressed the people that he is not interested never  receiving chemotherapy or radiation therapy for medical illness.        RTC in 3 months after his repeat spirometry.      Silver Pinzon MD      guardian # C - [phone].  [phone]. email [email or web address]       Today's visit note:       Referred by: Chastity Greer    Chief Complaint: Geovani Bustos is a 62 year old year old male who is being seen for Consult (COPD)      HISTORY OF PRESENT ILLNESS:  Mr. Bustos is a 62-year-old male with schizoaffective disorder who currently lives in a group home, is presenting to the pulmonary clinic today for evaluation of abnormal pulmonary function testing.  He was recently seen by his primary care physician.  The patient expressed that he has a long smoking history, and given his age pulmonary function testing was ordered.  Airflow obstruction was noted on the pulmonary function testing and is now been referred to me.    He has a 47 year history of smoking cigarettes.  Probably on average about 1 pack per day.  Symptomatically, he does not have any limitations.  He coughs a lot in the morning mostly.  He does cough up some phlegm.  No blood.  Occasional have such vigorous coughing that he has posttussive emesis.  He does have some dyspnea with exertion on occasion, but it is mild.  He only notes it when he is walking fast.  He never exerts himself more than that.    He denies hemoptysis, fevers, unintentional weight loss.           Past Medical and Surgical History:     Past Medical History:   Diagnosis Date     Cerumen impaction      Chronic kidney disease      Colon polyps      Hyperlipidemia      Mitral valve prolapse      Schizophrenia (H)      Ulcerated penile lesions      VSD (ventricular septal defect)      Schizoaffective  OCD  Mixed personality disorder    Past Surgical History:   Procedure Laterality Date     ARTHRODESIS FOOT Right 1/19/2016    Procedure: ARTHRODESIS FOOT;  Surgeon: Holden Harrison DPM;  Location: [facility]     ARTHRODESIS FOOT Left  1/3/2017    Procedure: ARTHRODESIS FOOT;  Surgeon: Holden Harrison DPM;  Location: [facility] OR     SURGICAL HISTORY OF -       leg fracture           Family History:     Family History   Problem Relation Age of Onset     Emphysema Mother      Coronary Artery Disease Father               Social History:     Social History     Social History     Marital status: Single     Spouse name: N/A     Number of children: N/A     Years of education: N/A     Occupational History     Not on file.     Social History Main Topics     Smoking status: Current Every Day Smoker     Packs/day: 1.00     Years: 47.00     Types: Cigarettes     Smokeless tobacco: Never Used     Alcohol use No     Drug use: No     Sexual activity: No     Other Topics Concern     Blood Transfusions No     Social History Narrative    Patient has no interest in smoking cessation.     Her for [employer] physical - likes to Bowl    Lives in Group home. Grew up in [place].             Medications:     Current Outpatient Prescriptions   Medication     levothyroxine (SYNTHROID/LEVOTHROID) 125 MCG tablet     aspirin 81 MG EC tablet     Omega-3 Fatty Acids (FISH OIL) 1200 MG capsule     gabapentin (NEURONTIN) 100 MG capsule     finasteride (PROSCAR) 5 MG tablet     oxybutynin (DITROPAN) 5 MG tablet     simvastatin (ZOCOR) 40 MG tablet     REGULOID 48.57 % POWD     Disposable Gloves (NITRILE EXAM GLOVES MEDIUM) MISC     carbamide peroxide (DEBROX) 6.5 % otic solution     order for DME     augmented betamethasone dipropionate (DIPROLENE-AF) 0.05 % ointment     acetaminophen (TYLENOL) 325 MG tablet     ABILIFY 10 MG OR TABS     ZOLOFT 100 MG OR TABS     amoxicillin (AMOXIL) 250 MG capsule     No current facility-administered medications for this visit.      Facility-Administered Medications Ordered in Other Visits   Medication     bupivacaine (MARCAINE) injection 0.5%            Review of Systems:       A complete review of systems was otherwise negative except  "as noted in the HPI.      PHYSICAL EXAM:  /81 (BP Location: Right arm, Patient Position: Chair, Cuff Size: Adult Regular)  Pulse 58  Temp 97  F (36.1  C) (Temporal)  Resp 16  Ht 5' 8\" (1.727 m)  Wt 183 lb (83 kg)  SpO2 92%  BMI 27.83 kg/m2     General: Well developed, well nourished, No apparent distress  Eyes: Anicteric  Nose: Nasal mucosa with no edema or hyperemia.  No polyps  Ears: Hearing grossly normal  Mouth: Poor oral dentition, no oral ulcerations.  Neck: supple, no thyromegaly  Lymphatics: No cervical or supraclavicular nodes  Respiratory: Good air movement. No crackles. No rhonchi. No wheezes  Cardiac: RRR, normal S1, S2. No murmurs. No JVD  Abdomen: Soft, NT/ND  Musculoskeletal: Extremities normal. No clubbing. No cyanosis. No edema.  Skin: No rash on limited exam  Neuro: Gross motor activity appears normal.  Psych: Makes good eye contact, answers questions appropriately.  A bit slow in his responses.           Data:   All laboratory and imaging data reviewed.      PFT:    FEV1/FVC is 67%.  His FEV1 is 1.89 L which is 58% predicted.  This improved to 2.31 which is 71% predicted after administration of albuterol.  This is a 22% increase.  His FVC is 2.8 L which is 67% predicted, and increases to 3.49 L which is 83% predicted.  Albuterol made a 24% increase in his FVC.  His residual volume is 154% predicted, and his total lung capacity is 105% predicted.  His diffusion capacity is uninterpretable as the test was not performed appropriately.    Diffusion capacity measured 1 month later showed that it is 71% predicted.    PFT Interpretation:  It appears that the testing is valid.  I do have concerns that he may have improved in his spirometry just based on a learning effect.  That aside it appears that he has airflow obstruction, with a significant bronchodilator response.  There is gas trapping without hyperinflation.  His diffusion capacity is normal.                                "             Again, thank you for allowing me to participate in the care of your patient.        Sincerely,        Dakota Pinzon MD

## 2018-04-13 NOTE — NURSING NOTE
"Chief Complaint   Patient presents with     Consult     COPD       Initial /81 (BP Location: Right arm, Patient Position: Chair, Cuff Size: Adult Regular)  Pulse 58  Temp 97  F (36.1  C) (Temporal)  Resp 16  Ht 5' 8\" (1.727 m)  Wt 183 lb (83 kg)  SpO2 92%  BMI 27.83 kg/m2 Estimated body mass index is 27.83 kg/(m^2) as calculated from the following:    Height as of this encounter: 5' 8\" (1.727 m).    Weight as of this encounter: 183 lb (83 kg).  Medication Reconciliation: complete   CHRISTIANO Sauer  "

## 2018-04-13 NOTE — MR AVS SNAPSHOT
After Visit Summary   4/13/2018    Geovani Bustos    MRN: [id number]           Patient Information     Date Of Birth          1955        Visit Information        Provider Department      4/13/2018 8:00 AM Dakota Pinzon MD [facility]        Today's Diagnoses     Simple chronic bronchitis (H)    -  1       Follow-ups after your visit        Your next 10 appointments already scheduled     Apr 20, 2018 11:00 AM CDT   New Visit with Geoffrey Car MD   [facility] ([facility])    [address] [address]   [phone]            Apr 25, 2018  1:00 PM CDT   Office Visit with SAUL Squires [facility] ([facility])    [address] [place] [id number] [phone]           Bring a current list of meds and any records pertaining to this visit. For Physicals, please bring immunization records and any forms needing to be filled out. Please arrive 10 minutes early to complete paperwork.            May 02, 2018  1:00 PM CDT   Return Visit with Geovani Krishnamurthy MD   [facility]    [address] [address]   [phone]              Future tests that were ordered for you today     Open Future Orders        Priority Expected Expires Ordered    Pulmonary Function Test Routine  4/13/2019 4/13/2018            Who to contact     If you have questions or need follow up information about today's clinic visit or your schedule please contact [facility] directly at [phone].  Normal or non-critical lab and imaging results will be communicated to you by MyChart, letter or phone within 4 business days after the clinic has received the results. If you do not hear from us within 7 days, please contact the clinic through MyChart or phone. If you have a critical or abnormal lab result, we  "will notify you by phone as soon as possible.  Submit refill requests through [employer] or call your pharmacy and they will forward the refill request to us. Please allow 3 business days for your refill to be completed.          Additional Information About Your Visit        [employer] Information     [employer] lets you send messages to your doctor, view your test results, renew your prescriptions, schedule appointments and more. To sign up, go to [email or web address] . Click on \"Log in\" on the left side of the screen, which will take you to the Welcome page. Then click on \"Sign up Now\" on the right side of the page.     You will be asked to enter the access code listed below, as well as some personal information. Please follow the directions to create your username and password.     Your access code is: [email or web address]  Expires: 2018  2:46 PM     Your access code will  in 90 days. If you need help or a new code, please call your [place] clinic or [phone].        Care EveryWhere ID     This is your Care EveryWhere ID. This could be used by other organizations to access your [place] medical records  [id number]        Your Vitals Were     Pulse Temperature Respirations Height Pulse Oximetry BMI (Body Mass Index)    58 97  F (36.1  C) (Temporal) 16 5' 8\" (1.727 m) 92% 27.83 kg/m2       Blood Pressure from Last 3 Encounters:   18 118/81   18 122/79   18 112/72    Weight from Last 3 Encounters:   18 183 lb (83 kg)   18 183 lb 9.6 oz (83.3 kg)   18 182 lb 3.2 oz (82.6 kg)                 Today's Medication Changes          These changes are accurate as of 18 10:12 AM.  If you have any questions, ask your nurse or doctor.               Start taking these medicines.        Dose/Directions    fluticasone-vilanterol 100-25 MCG/INH oral inhaler   Commonly known as:  BREO ELLIPTA   Used for:  Simple chronic bronchitis (H)   Started by:  Dakota Pinzon MD        Dose:  " 1 puff   Inhale 1 puff into the lungs daily   Quantity:  1 Inhaler   Refills:  11            Where to get your medicines      These medications were sent to [facility] - [place],  - [place], [place] - [address]  [address] [address]    Hours:  test rx sent successfully  2/8/05  kr Phone:  [phone]     fluticasone-vilanterol 100-25 MCG/INH oral inhaler                Primary Care Provider Office Phone # Fax #    Chastity Kwan Percy, APRN -[phone] [address] [place] [address]        Equal Access to Services     [facility]: Hadii aad ku hadasho Soomaali, waaxda luqadaha, qaybta kaalmada adeegyada, maday mckenzie . [facility] [phone].    ATENCIÓN: Si habla español, tiene a topete disposición servicios gratuitos de asistencia lingüística. [facility] [phone].    We comply with applicable federal civil rights laws and [place] laws. We do not discriminate on the basis of race, color, national origin, age, disability, sex, sexual orientation, or gender identity.            Thank you!     Thank you for choosing [facility]  for your care. Our goal is always to provide you with excellent care. Hearing back from our patients is one way we can continue to improve our services. Please take a few minutes to complete the written survey that you may receive in the mail after your visit with us. Thank you!             Your Updated Medication List - Protect others around you: Learn how to safely use, store and throw away your medicines at www.disposemymeds.org.          This list is accurate as of 4/13/18 10:12 AM.  Always use your most recent med list.                   Brand Name Dispense Instructions for use Diagnosis    ABILIFY 10 MG tablet   Generic drug:  ARIPiprazole      1 TABLET DAILY        acetaminophen 325 MG tablet    TYLENOL    100 tablet    Take 2 tablets (650 mg) by mouth every 4 hours as needed for  mild pain    Cigarette nicotine dependence with nicotine-induced disorder       amoxicillin 250 MG capsule    AMOXIL     Take 250 mg by mouth        aspirin 81 MG EC tablet     90 tablet    Take 1 tablet (81 mg) by mouth daily    Hyperlipidemia LDL goal <130       augmented betamethasone dipropionate 0.05 % ointment    DIPROLENE-AF    45 g    Apply to AA twice daily 1-2 weeks then as needed only. Do not apply to face. (Due for Derm recheck b4 next refill: [phone] to schedule)    Eczema of both external ears       carbamide peroxide 6.5 % otic solution    DEBROX    15 mL    Place 5 drops into both ears 2 times daily For 5 days August 1-5th and Feb 1-5 th  then return for irrigation after the 5th day.    Bilateral impacted cerumen       finasteride 5 MG tablet    PROSCAR    30 tablet    TAKE ONE TABLET BY MOUTH EVERY DAY    BPH (benign prostatic hyperplasia)       fish Oil 1200 MG capsule     90 capsule    Take 1 capsule (1.2 g) by mouth daily    Hyperlipidemia LDL goal <130       fluticasone-vilanterol 100-25 MCG/INH oral inhaler    BREO ELLIPTA    1 Inhaler    Inhale 1 puff into the lungs daily    Simple chronic bronchitis (H)       gabapentin 100 MG capsule    NEURONTIN     Take 100 mg by mouth 2 times daily        levothyroxine 125 MCG tablet    SYNTHROID/LEVOTHROID    90 tablet    TAKE ONE TABLET BY MOUTH EVERY DAY    Acquired hypothyroidism       Nitrile Exam Gloves Medium Misc     4 each    1 each as needed Use PRN daily with administration of otic drops, body lotion and powder to treat dry itchy skin.    Eczema of external ear, bilateral, Bilateral impacted cerumen, [facility]       order for DME     1 Units    Equipment being ordered: Dynaflex insert    Left foot pain, S/P foot surgery, left       oxybutynin 5 MG tablet    DITROPAN    90 tablet    TAKE ONE TABLET BY MOUTH THREE TIMES DAILY    Benign prostatic hyperplasia, presence of lower urinary tract symptoms unspecified       REGULOID 48.57 %  Powd   Generic drug:  Psyllium     369 g    Take 1 each by mouth See Admin Instructions Take 1 teaspoonful every day at 7pm and 9 pm.    Constipation       simvastatin 40 MG tablet    ZOCOR    90 tablet    TAKE ONE TABLET BY MOUTH EVERY DAY AT 9 PM    Hyperlipidemia LDL goal <130       ZOLOFT 100 MG tablet   Generic drug:  sertraline      2 TABLETS DAILY

## 2018-04-20 ENCOUNTER — OFFICE VISIT (OUTPATIENT)
Dept: CARDIOLOGY | Facility: CLINIC | Age: 63
End: 2018-04-20
Payer: MEDICARE

## 2018-04-20 VITALS
DIASTOLIC BLOOD PRESSURE: 76 MMHG | OXYGEN SATURATION: 92 % | WEIGHT: 181.4 LBS | HEART RATE: 60 BPM | SYSTOLIC BLOOD PRESSURE: 121 MMHG | BODY MASS INDEX: 27.58 KG/M2

## 2018-04-20 DIAGNOSIS — I34.1 MITRAL VALVE PROLAPSE: Primary | Chronic | ICD-10-CM

## 2018-04-20 PROCEDURE — 99203 OFFICE O/P NEW LOW 30 MIN: CPT | Performed by: INTERNAL MEDICINE

## 2018-04-20 NOTE — LETTER
4/20/2018      Chastity Grere, APRN CNP  [address] [facility] [address]      RE: Geovani AKANKSHA Bustos       Dear Colleague,    I had the pleasure of seeing Geovani Bustos in the [facility].    Service Date: 04/20/2018      REQUESTING PROVIDER:  Percy.      INDICATION:  Mitral valve prolapse.      HISTORY OF PRESENT ILLNESS:  Mr. Bustos is a pleasant 62-year-old gentleman with a history of chronic kidney disease secondary to IgA nephropathy, dyslipidemia, hypothyroidism, schizophrenia for which he lives in a group home, a prior echocardiogram showing a membranous VSD, history of myxomatous mitral valve who recently underwent a transthoracic echocardiogram a year ago.  The patient was asked by his family doctor to be seen in Cardiology for the mitral valve prolapse.      The patient has been working with a pulmonologist recently due to some shortness of breath.  He is a longstanding smoker and there are concerns for possible underlying reactive airway disease.  Other than shortness of breath, he is essentially asymptomatic from a cardiovascular standpoint and denies any chest pain, chest heaviness, orthopnea or paroxysmal nocturnal dyspnea.      The patient underwent a transthoracic echocardiogram approximately a year ago.  This showed an ejection fraction of 60-65%.  The patient's mitral valve is myxomatous with prolapse of both leaflets.  There was only trace to mild mitral regurgitation.  There was no VSD imaged on the present study, although that was mentioned on the prior study from 2010.  Of note, on my physical examination, I did not hear a VSD murmur.      Please see my separate note with his full physical examination.      IMPRESSION AND PLAN:  Mr. 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  He is completely asymptomatic from a cardiovascular standpoint.  In the past, there were concerns about  a small membranous VSD without significant shunting.  On his most recent transthoracic echocardiogram, there was no VSD, nor did I hear a VSD murmur on exam.      At this time, I would recommend that the patient follow back with his family doctor and repeat a transthoracic echocardiogram in the next 3-5 years.  If the degree of mitral regurgitation worsens, then he will need to have transthoracic echocardiograms done more frequently.         MISTY LEONE MD             D: 2018   T: 2018   MT: SEBAS      Name:     JAZZ MACE   MRN:      [id number]        Account:      [id number]   :      1955           Service Date: 2018      Document: [id number]         Outpatient Encounter Prescriptions as of 2018   Medication Sig Dispense Refill     ABILIFY 10 MG OR TABS 1 TABLET DAILY       acetaminophen (TYLENOL) 325 MG tablet Take 2 tablets (650 mg) by mouth every 4 hours as needed for mild pain 100 tablet 0     amoxicillin (AMOXIL) 250 MG capsule Take 250 mg by mouth as needed        aspirin 81 MG EC tablet Take 1 tablet (81 mg) by mouth daily 90 tablet 1     augmented betamethasone dipropionate (DIPROLENE-AF) 0.05 % ointment Apply to AA twice daily 1-2 weeks then as needed only. Do not apply to face. (Due for Derm recheck b4 next refill: [phone] to schedule) 45 g 0     carbamide peroxide (DEBROX) 6.5 % otic solution Place 5 drops into both ears 2 times daily For 5 days - and -  then return for irrigation after the 5th day. 15 mL 3     Disposable Gloves (NITRILE EXAM GLOVES MEDIUM) MISC 1 each as needed Use PRN daily with administration of otic drops, body lotion and powder to treat dry itchy skin. 4 each 11     finasteride (PROSCAR) 5 MG tablet TAKE ONE TABLET BY MOUTH EVERY DAY 30 tablet 3     fluticasone-vilanterol (BREO ELLIPTA) 100-25 MCG/INH oral inhaler Inhale 1 puff into the lungs daily 1 Inhaler 11     gabapentin (NEURONTIN) 100 MG capsule Take 100 mg by  mouth 2 times daily       levothyroxine (SYNTHROID/LEVOTHROID) 125 MCG tablet TAKE ONE TABLET BY MOUTH EVERY DAY 90 tablet 0     Omega-3 Fatty Acids (FISH OIL) 1200 MG capsule Take 1 capsule (1.2 g) by mouth daily 90 capsule 3     order for DME Equipment being ordered: Dynaflex insert 1 Units 0     oxybutynin (DITROPAN) 5 MG tablet TAKE ONE TABLET BY MOUTH THREE TIMES DAILY 90 tablet 3     REGULOID 48.57 % POWD Take 1 each by mouth See Admin Instructions Take 1 teaspoonful every day at 7pm and 9 pm. 369 g 11     simvastatin (ZOCOR) 40 MG tablet TAKE ONE TABLET BY MOUTH EVERY DAY AT 9 PM 90 tablet 1     ZOLOFT 100 MG OR TABS 2 TABLETS DAILY       Facility-Administered Encounter Medications as of 4/20/2018   Medication Dose Route Frequency Provider Last Rate Last Dose     bupivacaine (MARCAINE) injection 0.5%    PRN Holden Harrison, DPM   10 mL at 01/03/17 0945       Again, thank you for allowing me to participate in the care of your patient.      Sincerely,    Geoffrey Car MD     [facility]

## 2018-04-20 NOTE — PROGRESS NOTES
Service Date: 04/20/2018      REQUESTING PROVIDER:  Percy.      INDICATION:  Mitral valve prolapse.      HISTORY OF PRESENT ILLNESS:  Mr. Bustos is a pleasant 62-year-old gentleman with a history of chronic kidney disease secondary to IgA nephropathy, dyslipidemia, hypothyroidism, schizophrenia for which he lives in a group home, a prior echocardiogram showing a membranous VSD, history of myxomatous mitral valve who recently underwent a transthoracic echocardiogram a year ago.  The patient was asked by his family doctor to be seen in Cardiology for the mitral valve prolapse.      The patient has been working with a pulmonologist recently due to some shortness of breath.  He is a longstanding smoker and there are concerns for possible underlying reactive airway disease.  Other than shortness of breath, he is essentially asymptomatic from a cardiovascular standpoint and denies any chest pain, chest heaviness, orthopnea or paroxysmal nocturnal dyspnea.      The patient underwent a transthoracic echocardiogram approximately a year ago.  This showed an ejection fraction of 60-65%.  The patient's mitral valve is myxomatous with prolapse of both leaflets.  There was only trace to mild mitral regurgitation.  There was no VSD imaged on the present study, although that was mentioned on the prior study from 2010.  Of note, on my physical examination, I did not hear a VSD murmur.      Please see my separate note with his full physical examination.      IMPRESSION AND PLAN:  Mr. 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  He is completely asymptomatic from a cardiovascular standpoint.  In the past, there were concerns about a small membranous VSD without significant shunting.  On his most recent transthoracic echocardiogram, there was no VSD, nor did I hear a VSD murmur on exam.      At this time, I would recommend that the patient follow back with  his family doctor and repeat a transthoracic echocardiogram in the next 3-5 years.  If the degree of mitral regurgitation worsens, then he will need to have transthoracic echocardiograms done more frequently.         MISTY LEONE MD             D: 2018   T: 2018   MT: SEBAS      Name:     JAZZ MACE   MRN:      [id number]        Account:      [id number]   :      1955           Service Date: 2018      Document: [id number]

## 2018-04-20 NOTE — PROGRESS NOTES
HPI and Plan:   See dictation    No orders of the defined types were placed in this encounter.      No orders of the defined types were placed in this encounter.      There are no discontinued medications.      No diagnosis found.    CURRENT MEDICATIONS:  Current Outpatient Prescriptions   Medication Sig Dispense Refill     ABILIFY 10 MG OR TABS 1 TABLET DAILY       acetaminophen (TYLENOL) 325 MG tablet Take 2 tablets (650 mg) by mouth every 4 hours as needed for mild pain 100 tablet 0     amoxicillin (AMOXIL) 250 MG capsule Take 250 mg by mouth       aspirin 81 MG EC tablet Take 1 tablet (81 mg) by mouth daily 90 tablet 1     augmented betamethasone dipropionate (DIPROLENE-AF) 0.05 % ointment Apply to AA twice daily 1-2 weeks then as needed only. Do not apply to face. (Due for Derm recheck b4 next refill: [phone] to schedule) 45 g 0     carbamide peroxide (DEBROX) 6.5 % otic solution Place 5 drops into both ears 2 times daily For 5 days August 1-5th and Feb 1-5 th  then return for irrigation after the 5th day. 15 mL 3     Disposable Gloves (NITRILE EXAM GLOVES MEDIUM) MISC 1 each as needed Use PRN daily with administration of otic drops, body lotion and powder to treat dry itchy skin. 4 each 11     finasteride (PROSCAR) 5 MG tablet TAKE ONE TABLET BY MOUTH EVERY DAY 30 tablet 3     fluticasone-vilanterol (BREO ELLIPTA) 100-25 MCG/INH oral inhaler Inhale 1 puff into the lungs daily 1 Inhaler 11     gabapentin (NEURONTIN) 100 MG capsule Take 100 mg by mouth 2 times daily       levothyroxine (SYNTHROID/LEVOTHROID) 125 MCG tablet TAKE ONE TABLET BY MOUTH EVERY DAY 90 tablet 0     Omega-3 Fatty Acids (FISH OIL) 1200 MG capsule Take 1 capsule (1.2 g) by mouth daily 90 capsule 3     order for DME Equipment being ordered: Dynaflex insert 1 Units 0     oxybutynin (DITROPAN) 5 MG tablet TAKE ONE TABLET BY MOUTH THREE TIMES DAILY 90 tablet 3     REGULOID 48.57 % POWD Take 1 each by mouth See Admin Instructions Take 1  teaspoonful every day at 7pm and 9 pm. 369 g 11     simvastatin (ZOCOR) 40 MG tablet TAKE ONE TABLET BY MOUTH EVERY DAY AT 9 PM 90 tablet 1     ZOLOFT 100 MG OR TABS 2 TABLETS DAILY         ALLERGIES     Allergies   Allergen Reactions     Nitrogen Oxide      Sulfa Drugs        PAST MEDICAL HISTORY:  Past Medical History:   Diagnosis Date     Cerumen impaction      Chronic kidney disease      Colon polyps      Hyperlipidemia      Mitral valve prolapse      Schizophrenia (H)      Ulcerated penile lesions      VSD (ventricular septal defect)        PAST SURGICAL HISTORY:  Past Surgical History:   Procedure Laterality Date     ARTHRODESIS FOOT Right 1/19/2016    Procedure: ARTHRODESIS FOOT;  Surgeon: Holden Harrison DPM;  Location: [facility] OR     ARTHRODESIS FOOT Left 1/3/2017    Procedure: ARTHRODESIS FOOT;  Surgeon: Holden Harrison DPM;  Location: [facility] OR     SURGICAL HISTORY OF -       leg fracture       FAMILY HISTORY:  Family History   Problem Relation Age of Onset     Emphysema Mother      Coronary Artery Disease Father        SOCIAL HISTORY:  Social History     Social History     Marital status: Single     Spouse name: N/A     Number of children: N/A     Years of education: N/A     Social History Main Topics     Smoking status: Current Every Day Smoker     Packs/day: 1.00     Years: 47.00     Types: Cigarettes     Smokeless tobacco: Never Used     Alcohol use No     Drug use: No     Sexual activity: No     Other Topics Concern     Blood Transfusions No     Social History Narrative    Patient has no interest in smoking cessation.     Her for [employer] physical - likes to Bowl    Lives in Group home. Grew up in [place].        Review of Systems:  Skin:  Negative       Eyes:  Positive for glasses    ENT:  Negative      Respiratory:  Positive for cough     Cardiovascular:    Positive for;lower extremity symptoms;edema    Gastroenterology: Negative      Genitourinary:  Positive for urinary  frequency;urgency    Musculoskeletal:  Positive for arthritis;joint pain;joint swelling    Neurologic:  Negative      Psychiatric:  Positive for anxiety;depression    Heme/Lymph/Imm:  Negative      Endocrine:  Negative        Physical Exam:  Vitals: /76 (BP Location: Right arm, Patient Position: Sitting, Cuff Size: Adult Regular)  Pulse 60  Wt 82.3 kg (181 lb 6.4 oz)  SpO2 92%  BMI 27.58 kg/m2    Constitutional:  cooperative;in no acute distress        Skin:  warm and dry to the touch          Head:  normocephalic        Eyes:  sclera white        Lymph:No Cervical lymphadenopathy present     ENT:  no pallor or cyanosis        Neck:  JVP normal        Respiratory:  clear to auscultation         Cardiac: regular rhythm     no presence of murmur          pulses full and equal                                        GI:  abdomen soft        Extremities and Muscular Skeletal:  no edema              Neurological:  affect appropriate        Psych:  Alert and Oriented x 3        CC  No referring provider defined for this encounter.

## 2018-04-20 NOTE — MR AVS SNAPSHOT
After Visit Summary   4/20/2018    Geovani Bustos    MRN: [id number]           Patient Information     Date Of Birth          1955        Visit Information        Provider Department      4/20/2018 11:00 AM Geoffrey Car MD [facility]        Today's Diagnoses     Mitral valve prolapse    -  1       Follow-ups after your visit        Your next 10 appointments already scheduled     Apr 25, 2018  1:00 PM CDT   [facility] SAUL [facility]ires C[facility]   [facility]    [address] [facility] [id number] [phone]           Bring a current list of meds and any records pertaining to this visit. For Physicals, please bring immunization records and any forms needing to be filled out. Please arrive 10 minutes early to complete paperwork.            May 02, 2018  1:00 PM CDT   [facility] with [facility]ovani [facility]namurthy [facility]   [facility]    [address]  [facility] [address]   [phone]            May 02, 2018  2:00 PM CDT   Spirometry Bronchodilator with [place] [facility]   [facility] ([facility])    [address]  [facility] [address]   [phone]           No inhalers for 6 hours prior to test              Who to contact     If you have questions or need follow up information about today's clinic visit or your schedule please contact [facility] directly at [phone].  Normal or non-critical lab and imaging results will be communicated to you by MyChart, letter or phone within 4 business days after the clinic has received the results. If you do not hear from us within 7 days, please contact the clinic through MyChart or phone. If you have a critical or abnormal lab result, we will notify you by phone as soon as possible.  Submit refill requests through  "MyChart or call your pharmacy and they will forward the refill request to us. Please allow 3 business days for your refill to be completed.          Additional Information About Your Visit        MyChart Information     [employer] lets you send messages to your doctor, view your test results, renew your prescriptions, schedule appointments and more. To sign up, go to [email or web address] . Click on \"Log in\" on the left side of the screen, which will take you to the Welcome page. Then click on \"Sign up Now\" on the right side of the page.     You will be asked to enter the access code listed below, as well as some personal information. Please follow the directions to create your username and password.     Your access code is: [email or web address]  Expires: 2018  2:46 PM     Your access code will  in 90 days. If you need help or a new code, please call your [place] clinic or [phone].        Care EveryWhere ID     This is your Care EveryWhere ID. This could be used by other organizations to access your [facility] medical records  [id number]        Your Vitals Were     Pulse Pulse Oximetry BMI (Body Mass Index)             60 92% 27.58 kg/m2          Blood Pressure from Last 3 Encounters:   18 121/76   18 118/81   18 122/79    Weight from Last 3 Encounters:   18 82.3 kg (181 lb 6.4 oz)   18 83 kg (183 lb)   18 83.3 kg (183 lb 9.6 oz)              Today, you had the following     No orders found for display       Primary Care Provider Office Phone # Fax #    SAUL Squires [phone]       [address] [place] [address]        Equal Access to Services     MEENU AMATO : Laura Archuleta, emely mcdonald, ac kaalmada chang, maday whyte. So [facility] [phone].    ATENCIÓN: Si habla español, tiene a topete disposición servicios gratuitos de asistencia lingüística. Llame al [phone].    We comply " with applicable federal civil rights laws and [place] laws. We do not discriminate on the basis of race, color, national origin, age, disability, sex, sexual orientation, or gender identity.            Thank you!     Thank you for choosing [facility]  for your care. Our goal is always to provide you with excellent care. Hearing back from our patients is one way we can continue to improve our services. Please take a few minutes to complete the written survey that you may receive in the mail after your visit with us. Thank you!             Your Updated Medication List - Protect others around you: Learn how to safely use, store and throw away your medicines at www.disposemymeds.org.          This list is accurate as of 4/20/18 11:43 AM.  Always use your most recent med list.                   Brand Name Dispense Instructions for use Diagnosis    ABILIFY 10 MG tablet   Generic drug:  ARIPiprazole      1 TABLET DAILY        acetaminophen 325 MG tablet    TYLENOL    100 tablet    Take 2 tablets (650 mg) by mouth every 4 hours as needed for mild pain    Cigarette nicotine dependence with nicotine-induced disorder       amoxicillin 250 MG capsule    AMOXIL     Take 250 mg by mouth        aspirin 81 MG EC tablet     90 tablet    Take 1 tablet (81 mg) by mouth daily    Hyperlipidemia LDL goal <130       augmented betamethasone dipropionate 0.05 % ointment    DIPROLENE-AF    45 g    Apply to AA twice daily 1-2 weeks then as needed only. Do not apply to face. (Due for Derm recheck b4 next refill: [phone] to schedule)    Eczema of both external ears       carbamide peroxide 6.5 % otic solution    DEBROX    15 mL    Place 5 drops into both ears 2 times daily For 5 days August 1-5th and Feb 1-5 th  then return for irrigation after the 5th day.    Bilateral impacted cerumen       finasteride 5 MG tablet    PROSCAR    30 tablet    TAKE ONE TABLET BY MOUTH EVERY DAY    BPH (benign  prostatic hyperplasia)       fish Oil 1200 MG capsule     90 capsule    Take 1 capsule (1.2 g) by mouth daily    Hyperlipidemia LDL goal <130       fluticasone-vilanterol 100-25 MCG/INH oral inhaler    BREO ELLIPTA    1 Inhaler    Inhale 1 puff into the lungs daily    Simple chronic bronchitis (H)       gabapentin 100 MG capsule    NEURONTIN     Take 100 mg by mouth 2 times daily        levothyroxine 125 MCG tablet    SYNTHROID/LEVOTHROID    90 tablet    TAKE ONE TABLET BY MOUTH EVERY DAY    Acquired hypothyroidism       Nitrile Exam Gloves Medium Misc     4 each    1 each as needed Use PRN daily with administration of otic drops, body lotion and powder to treat dry itchy skin.    Eczema of external ear, bilateral, Bilateral impacted cerumen, [facility]       order for DME     1 Units    Equipment being ordered: Dynaflex insert    Left foot pain, S/P foot surgery, left       oxybutynin 5 MG tablet    DITROPAN    90 tablet    TAKE ONE TABLET BY MOUTH THREE TIMES DAILY    Benign prostatic hyperplasia, presence of lower urinary tract symptoms unspecified       REGULOID 48.57 % Powd   Generic drug:  Psyllium     369 g    Take 1 each by mouth See Admin Instructions Take 1 teaspoonful every day at 7pm and 9 pm.    Constipation       simvastatin 40 MG tablet    ZOCOR    90 tablet    TAKE ONE TABLET BY MOUTH EVERY DAY AT 9 PM    Hyperlipidemia LDL goal <130       ZOLOFT 100 MG tablet   Generic drug:  sertraline      2 TABLETS DAILY

## 2018-04-20 NOTE — LETTER
4/20/2018    Chastity Greer, SAUL CNP  [address] [facility] [address]    RE: Geovani Bustos       Dear Colleague,    I had the pleasure of seeing Geovani Bustos in the [facility].    HPI and Plan:   See dictation    No orders of the defined types were placed in this encounter.      No orders of the defined types were placed in this encounter.      There are no discontinued medications.      No diagnosis found.    CURRENT MEDICATIONS:  Current Outpatient Prescriptions   Medication Sig Dispense Refill     ABILIFY 10 MG OR TABS 1 TABLET DAILY       acetaminophen (TYLENOL) 325 MG tablet Take 2 tablets (650 mg) by mouth every 4 hours as needed for mild pain 100 tablet 0     amoxicillin (AMOXIL) 250 MG capsule Take 250 mg by mouth       aspirin 81 MG EC tablet Take 1 tablet (81 mg) by mouth daily 90 tablet 1     augmented betamethasone dipropionate (DIPROLENE-AF) 0.05 % ointment Apply to AA twice daily 1-2 weeks then as needed only. Do not apply to face. (Due for Derm recheck b4 next refill: [phone] to schedule) 45 g 0     carbamide peroxide (DEBROX) 6.5 % otic solution Place 5 drops into both ears 2 times daily For 5 days August 1-5th and Feb 1-5 th  then return for irrigation after the 5th day. 15 mL 3     Disposable Gloves (NITRILE EXAM GLOVES MEDIUM) MISC 1 each as needed Use PRN daily with administration of otic drops, body lotion and powder to treat dry itchy skin. 4 each 11     finasteride (PROSCAR) 5 MG tablet TAKE ONE TABLET BY MOUTH EVERY DAY 30 tablet 3     fluticasone-vilanterol (BREO ELLIPTA) 100-25 MCG/INH oral inhaler Inhale 1 puff into the lungs daily 1 Inhaler 11     gabapentin (NEURONTIN) 100 MG capsule Take 100 mg by mouth 2 times daily       levothyroxine (SYNTHROID/LEVOTHROID) 125 MCG tablet TAKE ONE TABLET BY MOUTH EVERY DAY 90 tablet 0     Omega-3 Fatty Acids (FISH OIL) 1200 MG capsule Take 1 capsule (1.2 g) by mouth daily 90 capsule 3     order for  DME Equipment being ordered: Dynaflex insert 1 Units 0     oxybutynin (DITROPAN) 5 MG tablet TAKE ONE TABLET BY MOUTH THREE TIMES DAILY 90 tablet 3     REGULOID 48.57 % POWD Take 1 each by mouth See Admin Instructions Take 1 teaspoonful every day at 7pm and 9 pm. 369 g 11     simvastatin (ZOCOR) 40 MG tablet TAKE ONE TABLET BY MOUTH EVERY DAY AT 9 PM 90 tablet 1     ZOLOFT 100 MG OR TABS 2 TABLETS DAILY         ALLERGIES     Allergies   Allergen Reactions     Nitrogen Oxide      Sulfa Drugs        PAST MEDICAL HISTORY:  Past Medical History:   Diagnosis Date     Cerumen impaction      Chronic kidney disease      Colon polyps      Hyperlipidemia      Mitral valve prolapse      Schizophrenia (H)      Ulcerated penile lesions      VSD (ventricular septal defect)        PAST SURGICAL HISTORY:  Past Surgical History:   Procedure Laterality Date     ARTHRODESIS FOOT Right 1/19/2016    Procedure: ARTHRODESIS FOOT;  Surgeon: Holden Harrison DPM;  Location: [facility]     ARTHRODESIS FOOT Left 1/3/2017    Procedure: ARTHRODESIS FOOT;  Surgeon: Holden Harrison DPM;  Location: [facility] OR     SURGICAL HISTORY OF -       leg fracture       FAMILY HISTORY:  Family History   Problem Relation Age of Onset     Emphysema Mother      Coronary Artery Disease Father        SOCIAL HISTORY:  Social History     Social History     Marital status: Single     Spouse name: N/A     Number of children: N/A     Years of education: N/A     Social History Main Topics     Smoking status: Current Every Day Smoker     Packs/day: 1.00     Years: 47.00     Types: Cigarettes     Smokeless tobacco: Never Used     Alcohol use No     Drug use: No     Sexual activity: No     Other Topics Concern     Blood Transfusions No     Social History Narrative    Patient has no interest in smoking cessation.     Her for [employer] physical - likes to Bowl    [place]ives in Group home[place]        Review of Systems:  Skin:  Negative       Eyes:   Positive for glasses    ENT:  Negative      Respiratory:  Positive for cough     Cardiovascular:    Positive for;lower extremity symptoms;edema    Gastroenterology: Negative      Genitourinary:  Positive for urinary frequency;urgency    Musculoskeletal:  Positive for arthritis;joint pain;joint swelling    Neurologic:  Negative      Psychiatric:  Positive for anxiety;depression    Heme/Lymph/Imm:  Negative      Endocrine:  Negative        Physical Exam:  Vitals: /76 (BP Location: Right arm, Patient Position: Sitting, Cuff Size: Adult Regular)  Pulse 60  Wt 82.3 kg (181 lb 6.4 oz)  SpO2 92%  BMI 27.58 kg/m2    Constitutional:  cooperative;in no acute distress        Skin:  warm and dry to the touch          Head:  normocephalic        Eyes:  sclera white        Lymph:No Cervical lymphadenopathy present     ENT:  no pallor or cyanosis        Neck:  JVP normal        Respiratory:  clear to auscultation         Cardiac: regular rhythm     no presence of murmur          pulses full and equal                                        GI:  abdomen soft        Extremities and Muscular Skeletal:  no edema              Neurological:  affect appropriate        Psych:  Alert and Oriented x 3        CC  No referring provider defined for this encounter.                Thank you for allowing me to participate in the care of your patient.      Sincerely,     Geoffrey Car MD     [facility]    cc:   No referring provider defined for this encounter.

## 2018-04-25 ENCOUNTER — OFFICE VISIT (OUTPATIENT)
Dept: FAMILY MEDICINE | Facility: CLINIC | Age: 63
End: 2018-04-25
Payer: MEDICARE

## 2018-04-25 VITALS
SYSTOLIC BLOOD PRESSURE: 112 MMHG | HEART RATE: 67 BPM | HEIGHT: 68 IN | TEMPERATURE: 98.2 F | BODY MASS INDEX: 27.74 KG/M2 | WEIGHT: 183 LBS | DIASTOLIC BLOOD PRESSURE: 72 MMHG | OXYGEN SATURATION: 90 %

## 2018-04-25 DIAGNOSIS — K59.00 CONSTIPATION, UNSPECIFIED CONSTIPATION TYPE: ICD-10-CM

## 2018-04-25 DIAGNOSIS — E55.9 VITAMIN D DEFICIENCY: Primary | ICD-10-CM

## 2018-04-25 DIAGNOSIS — H60.392 INFECTIVE OTITIS EXTERNA, LEFT: ICD-10-CM

## 2018-04-25 PROCEDURE — 99214 OFFICE O/P EST MOD 30 MIN: CPT | Performed by: NURSE PRACTITIONER

## 2018-04-25 RX ORDER — CHOLECALCIFEROL (VITAMIN D3) 50 MCG
2000 TABLET ORAL DAILY
Qty: 100 TABLET | Refills: 3 | Status: SHIPPED | OUTPATIENT
Start: 2018-04-25 | End: 2018-10-09

## 2018-04-25 RX ORDER — OFLOXACIN 3 MG/ML
10 SOLUTION AURICULAR (OTIC) 2 TIMES DAILY
Qty: 10 ML | Refills: 0 | Status: SHIPPED | OUTPATIENT
Start: 2018-04-25 | End: 2018-05-02

## 2018-04-25 RX ORDER — POLYETHYLENE GLYCOL 3350 17 G/17G
1 POWDER, FOR SOLUTION ORAL DAILY
Qty: 510 G | Refills: 1 | Status: SHIPPED | OUTPATIENT
Start: 2018-04-25 | End: 2018-06-04

## 2018-04-25 NOTE — PATIENT INSTRUCTIONS
NEPHROLOGY   ASSESSMENT AND RECOMMENDATIONS:    IgAN - biopsy proven IGAN in past. Overall stable gfr and no proteinuria over several years. Low level microscopic hematuria on and off. Stable disease.   Continue with conservative mx including management of BP ( target < 130/80), avoiding nephrotoxins, may continue Fish oil.   Given lack of proteinuria and fairly low BP, no need to start RASi at this time but should have low threshold if Bp goes up or if there is detectable proteinuria.      CKD 3 - stable for ~10 yrs.      BMD - PTH in target. Ca/ Phos okay .     F/u - 1 yr with  Routine CKD labs.     LUNG SPECIALIST    Airflow obstruction: Mr. Bustos has a long history of smoking, and spirometry done recently shows airflow obstruction with a significant bronchodilator response.  At this point I think he likely has COPD, but he does have quite a significant bronchodilator response which brings asthma into the differential.  It is possible with good inhaler use over time his spirometry may normalize, if the diagnosis is asthma.     I am going to prescribe him Breo to take daily.  He needs to have repeat spirometry in 3 months.  Should have pre-and postbronchodilator measurements.  If he still has airflow obstruction at this time, he will have a diagnosis of COPD.  If his flow volume loops normalize, I think he likely has asthma.     History of smoking: He is a long history of smoking.  But a 47-pack-year right now.  We talked about the benefits of quitting today, but he is not interested in quitting.  His mental illness definitely deteriorates when he stopped smoking.  He may be at least able to cut back.  I told him that there is risk of lung cancer as well as other organ problems with smoking.     He would actually likely qualify for lung cancer screening by CT scan.  This is a difficult topic to fully understand and consent to.  He currently has a guardian that is helping him to make medical decisions.  I will  need to recheck of her to talk about the benefits and risks of CT scan screening for lung cancer.  Based on the conversation that I had with him in the clinic, I do not think that he would want this.  He has expressed the people that he is not interested never receiving chemotherapy or radiation therapy for medical illness.           RTC in 3 months after his repeat spirometry.      CARDIOLOGY     IMPRESSION AND PLAN:  Mr. 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  He is completely asymptomatic from a cardiovascular standpoint.  In the past, there were concerns about a small membranous VSD without significant shunting.  On his most recent transthoracic echocardiogram, there was no VSD, nor did I hear a VSD murmur on exam.       At this time, I would recommend that the patient follow back with his family doctor and repeat a transthoracic echocardiogram in the next 3-5 years.  If the degree of mitral regurgitation worsens, then he will need to have transthoracic echocardiograms done more frequently.             External Ear Infection (Adult)    External otitis (also called  swimmer s ear ) is an infection in the ear canal. It is often caused by bacteria or fungus. It can occur a few days after water gets trapped in the ear canal (from swimming or bathing). It can also occur after cleaning too deeply in the ear canal with a cotton swab or other object. Sometimes, hair care products get into the ear canal and cause this problem.  Symptoms can include pain, fever, itching, redness, drainage, or swelling of the ear canal. Temporary hearing loss may also occur.  Home care    Do not try to clean the ear canal. This can push pus and bacteria deeper into the canal.    Use prescribed ear drops as directed. These help reduce swelling and fight the infection. If an ear wick was placed in the ear canal, apply drops right onto the end of the wick. The  wick will draw the medicine into the ear canal even if it is swollen closed.    A cotton ball may be loosely placed in the outer ear to absorb any drainage.    You may use acetaminophen or ibuprofen to control pain, unless another medicine was prescribed. Note: If you have chronic liver or kidney disease or ever had a stomach ulcer or GI bleeding, talk to your healthcare provider before taking any of these medicines.    Do not allow water to get into your ear when bathing. Also, don't swim until the infection has cleared.  Prevention    Keep your ears dry. This helps lower the risk of infection. Dry your ears with a towel or hair dryer after getting wet. Also, use ear plugs when swimming.    Do not stick any objects in the ear to remove wax.    If you feel water trapped in your ear, use ear drops right away. You can get these drops over the counter at most drugstores. They work by removing water from the ear canal.  Follow-up care  Follow up with your healthcare provider in 1 week, or as advised.  When to seek medical advice  Call your healthcare provider right away if any of these occur:    Ear pain becomes worse or doesn t improve after 3 days of treatment    Redness or swelling of the outer ear occurs or gets worse    Headache    Painful or stiff neck    Drowsiness or confusion    Fever of 100.4 F (38 C) or higher, or as directed by your healthcare provider    Seizure  Date Last Reviewed: 10/1/2017    0683-5924 [employer]. [address] [place] [address]. All rights reserved. This information is not intended as a substitute for professional medical care. Always follow your healthcare professional's instructions.

## 2018-04-25 NOTE — PROGRESS NOTES
SUBJECTIVE:   Geovani Bustos is a 62 year old male who presents to clinic today for the following health issues:    Chief Complaint   Patient presents with     RECHECK     Was seen by the Pulmonologist on 4/13/2018, Would like to discuss what happened at that appointment.      Otalgia     Left Ear Pain, 1 week, achy pain,  when he swallows it hurts down the side of his neck. Last night he states it was draining. He has not taken anything for pain.      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 Overall stable gfr and no proteinuria over several years. Low level microscopic hematuria on and off. Stable disease.   Continue with conservative mx including management of BP ( target < 130/80), avoiding nephrotoxins, may continue Fish oil.   Given lack of proteinuria and fairly low BP, no need to start RASi at this time but should have low threshold if Bp goes up or if there is detectable proteinuria.      CKD 3 - stable for ~10 yrs.      BMD - PTH in target. Ca/ Phos okay .     F/u - 1 yr with  Routine CKD labs.         Silver Pinzon MD       guardian # C - [phone]. H [phone]. email [email or web address]   CARDIOLOGY CONSULT  IMPRESSION AND PLAN:  Mr. 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  He is completely asymptomatic from a cardiovascular standpoint.  In the past, there were concerns about a small membranous VSD without significant shunting.  On his most recent transthoracic echocardiogram, there was no VSD, nor did I hear a VSD murmur on exam.       At this time, I would recommend that the patient follow back with his family doctor and repeat a transthoracic echocardiogram in the next 3-5 years.  If the degree of mitral regurgitation worsens, then he will need to have transthoracic echocardiograms done more frequently.       PULMONOLOGY CONSULT    LUNG SPECIALIST    Airflow obstruction:  Mr. Bustos has a long history of smoking, and spirometry done recently shows airflow obstruction with a significant bronchodilator response.  At this point I think he likely has COPD, but he does have quite a significant bronchodilator response which brings asthma into the differential.  It is possible with good inhaler use over time his spirometry may normalize, if the diagnosis is asthma.     I am going to prescribe him Breo to take daily.  He needs to have repeat spirometry in 3 months.  Should have pre-and postbronchodilator measurements.  If he still has airflow obstruction at this time, he will have a diagnosis of COPD.  If his flow volume loops normalize, I think he likely has asthma.     History of smoking: He is a long history of smoking.  But a 47-pack-year right now.  We talked about the benefits of quitting today, but he is not interested in quitting.  His mental illness definitely deteriorates when he stopped smoking.  He may be at least able to cut back.  I told him that there is risk of lung cancer as well as other organ problems with smoking.     He would actually likely qualify for lung cancer screening by CT scan.  This is a difficult topic to fully understand and consent to.  He currently has a guardian that is helping him to make medical decisions.  I will need to recheck of her to talk about the benefits and risks of CT scan screening for lung cancer.  Based on the conversation that I had with him in the clinic, I do not think that he would want this.  He has expressed the people that he is not interested never receiving chemotherapy or radiation therapy for medical illness.           RTC in 3 months after his repeat spirometry.  Problem list and histories reviewed & adjusted, as indicated.      Additional history: as documented    BP Readings from Last 3 Encounters:   04/25/18 112/72   04/20/18 121/76   04/13/18 118/81    Wt Readings from Last 3 Encounters:   04/25/18 183 lb (83 kg)   04/20/18  "181 lb 6.4 oz (82.3 kg)   04/13/18 183 lb (83 kg)           has a past medical history of Cerumen impaction; Chronic kidney disease; Colon polyps; Hyperlipidemia; Mitral valve prolapse; Schizophrenia (H); Ulcerated penile lesions; and VSD (ventricular septal defect). He also has no past medical history of Basal cell carcinoma or Squamous cell carcinoma.     Patient states he has no desire to stop smoking.  He is not on a daily physical activity plan  Elevated glucose with his last fasting labs.  Staff states he is on essentially a diabetic diet.  Patient has switched to diet soda.  We did discuss the sodium content of that.    Has an upcoming appointment with dermatology  Most recent labs from nephrology showed vitamin D deficiency with recommendations to take 2000 international units of D3 daily with a recheck on vitamin D labs in 3 months    He has been having some intermittent problems with constipation goes every 3 days.  Taking fiber powder twice daily.  Last colonoscopy showed some internal hemorrhoids.  He has noticed some bleeding on the toilet paper.  Wondering what else can be done for this.        Labs reviewed in EPIC    Reviewed and updated as needed this visit by clinical staff  Tobacco  Allergies  Meds  Med Hx  Surg Hx  Fam Hx  Soc Hx      Reviewed and updated as needed this visit by Provider         ROS:   ROS: 10 point ROS neg other than the symptoms noted above in the HPI.      OBJECTIVE:                                                    /72  Pulse 67  Temp 98.2  F (36.8  C) (Tympanic)  Ht 5' 8\" (1.727 m)  Wt 183 lb (83 kg)  SpO2 90%  BMI 27.83 kg/m2  Body mass index is 27.83 kg/(m^2).   GENERAL: Geovani is a group home patient who is here with his staff he is alert, well nourished, well hydrated, no distress  HENT: ear canals-right normal.  Left ear canal has yellowish green drainage.  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  TMs- normal; Nose- normal; Mouth- no " ulcers, no lesions  NECK: no tenderness, no adenopathy, no asymmetry, no masses, no stiffness; thyroid- normal to palpation  RESP: lungs clear to auscultation - no rales, no rhonchi, no wheezes  CV: regular rates and rhythm, normal S1 S2, no S3 or S4 and no murmur, no click or rub -  ABDOMEN: soft, no tenderness, no  hepatosplenomegaly, no masses, normal bowel sounds  Bowel tones active ×4.  No hepatosplenomegaly is noted.    Diagnostic test results:  Results for orders placed or performed in visit on 03/21/18   **Glucose FUTURE anytime   Result Value Ref Range    Glucose 104 (H) 70 - 99 mg/dL   Hemoglobin A1c   Result Value Ref Range    Hemoglobin A1C 5.6 4.3 - 6.0 %        ASSESSMENT/PLAN:                                                    1. Vitamin D deficiency  Begin vitamin D  - Cholecalciferol (VITAMIN D) 2000 units tablet; Take 2,000 Units by mouth daily  Dispense: 100 tablet; Refill: 3  Recheck level in 3 months    2. Constipation, unspecified constipation type  Daily physical activity for 30-60 minutes to lower blood sugars and to help with stooling.  Increase water consumption.  Patient is strongly encouraged to get rid of his caffeine and his sodium intake.  He is strongly encouraged to watch his sugar intake.  Begin  - polyethylene glycol (MIRALAX) powder; Take 17 g (1 capful) by mouth daily  Dispense: 510 g; Refill: 1    3. Infective otitis externa, left  Keep the left ear clean and dry.  Begin eardrops.  Recheck here 10 days  - ofloxacin (FLOXIN) 0.3 % otic solution; Place 10 drops Into the left ear 2 times daily for 7 days  Dispense: 10 mL; Refill: 0      Smoking cessation strongly encouraged.  Follow-up with nephrology, cardiology, pulmonology as planned.  Follow up with Provider - Call or return to the clinic with any worsening of symptoms or no resolution. Patient/Parent verbalized understanding and is in agreement. Medication side effects reviewed.   Current Outpatient Prescriptions   Medication  Sig Dispense Refill     Cholecalciferol (VITAMIN D) 2000 units tablet Take 2,000 Units by mouth daily 100 tablet 3     ofloxacin (FLOXIN) 0.3 % otic solution Place 10 drops Into the left ear 2 times daily for 7 days 10 mL 0     polyethylene glycol (MIRALAX) powder Take 17 g (1 capful) by mouth daily 510 g 1     ABILIFY 10 MG OR TABS 1 TABLET DAILY       acetaminophen (TYLENOL) 325 MG tablet Take 2 tablets (650 mg) by mouth every 4 hours as needed for mild pain 100 tablet 0     amoxicillin (AMOXIL) 250 MG capsule Take 250 mg by mouth as needed        aspirin 81 MG EC tablet Take 1 tablet (81 mg) by mouth daily 90 tablet 1     augmented betamethasone dipropionate (DIPROLENE-AF) 0.05 % ointment Apply to AA twice daily 1-2 weeks then as needed only. Do not apply to face. (Due for Derm recheck b4 next refill: [phone] to schedule) 45 g 0     carbamide peroxide (DEBROX) 6.5 % otic solution Place 5 drops into both ears 2 times daily For 5 days August 1-5th and Feb 1-5 th  then return for irrigation after the 5th day. 15 mL 3     Disposable Gloves (NITRILE EXAM GLOVES MEDIUM) MISC 1 each as needed Use PRN daily with administration of otic drops, body lotion and powder to treat dry itchy skin. 4 each 11     finasteride (PROSCAR) 5 MG tablet TAKE ONE TABLET BY MOUTH EVERY DAY 30 tablet 3     fluticasone-vilanterol (BREO ELLIPTA) 100-25 MCG/INH oral inhaler Inhale 1 puff into the lungs daily 1 Inhaler 11     gabapentin (NEURONTIN) 100 MG capsule Take 100 mg by mouth 2 times daily       levothyroxine (SYNTHROID/LEVOTHROID) 125 MCG tablet TAKE ONE TABLET BY MOUTH EVERY DAY 90 tablet 0     Omega-3 Fatty Acids (FISH OIL) 1200 MG capsule Take 1 capsule (1.2 g) by mouth daily 90 capsule 3     order for DME Equipment being ordered: Dynaflex insert 1 Units 0     oxybutynin (DITROPAN) 5 MG tablet TAKE ONE TABLET BY MOUTH THREE TIMES DAILY 90 tablet 3     REGULOID 48.57 % POWD Take 1 each by mouth See Admin Instructions Take 1  teaspoonful every day at 7pm and 9 pm. 369 g 11     simvastatin (ZOCOR) 40 MG tablet TAKE ONE TABLET BY MOUTH EVERY DAY AT 9 PM 90 tablet 1     ZOLOFT 100 MG OR TABS 2 TABLETS DAILY       Recheck ears 10 days    SAUL Squires [facility]

## 2018-04-25 NOTE — MR AVS SNAPSHOT
After Visit Summary   4/25/2018    Geovani Bustos    MRN: [id number]           Patient Information     Date Of Birth          1955        Visit Information        Provider Department      4/25/2018 1:00 PM Chastity Greer APRN [facility]        Today's Diagnoses     Vitamin D deficiency    -  1    Constipation, unspecified constipation type        Infective otitis externa, left          Care Instructions    NEPHROLOGY   ASSESSMENT AND RECOMMENDATIONS:    IgAN - biopsy proven IGAN in past. Overall stable gfr and no proteinuria over several years. Low level microscopic hematuria on and off. Stable disease.   Continue with conservative mx including management of BP ( target < 130/80), avoiding nephrotoxins, may continue Fish oil.   Given lack of proteinuria and fairly low BP, no need to start RASi at this time but should have low threshold if Bp goes up or if there is detectable proteinuria.      CKD 3 - stable for ~10 yrs.      BMD - PTH in target. Ca/ Phos okay .     F/u - 1 yr with  Routine CKD labs.     LUNG SPECIALIST    Airflow obstruction: Mr. Bustos has a long history of smoking, and spirometry done recently shows airflow obstruction with a significant bronchodilator response.  At this point I think he likely has COPD, but he does have quite a significant bronchodilator response which brings asthma into the differential.  It is possible with good inhaler use over time his spirometry may normalize, if the diagnosis is asthma.     I am going to prescribe him Breo to take daily.  He needs to have repeat spirometry in 3 months.  Should have pre-and postbronchodilator measurements.  If he still has airflow obstruction at this time, he will have a diagnosis of COPD.  If his flow volume loops normalize, I think he likely has asthma.     History of smoking: He is a long history of smoking.  But a 47-pack-year right now.  We talked about the benefits of quitting today,  but he is not interested in quitting.  His mental illness definitely deteriorates when he stopped smoking.  He may be at least able to cut back.  I told him that there is risk of lung cancer as well as other organ problems with smoking.     He would actually likely qualify for lung cancer screening by CT scan.  This is a difficult topic to fully understand and consent to.  He currently has a guardian that is helping him to make medical decisions.  I will need to recheck of her to talk about the benefits and risks of CT scan screening for lung cancer.  Based on the conversation that I had with him in the clinic, I do not think that he would want this.  He has expressed the people that he is not interested never receiving chemotherapy or radiation therapy for medical illness.           RTC in 3 months after his repeat spirometry.      CARDIOLOGY     IMPRESSION AND PLAN:  Mr. 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  He is completely asymptomatic from a cardiovascular standpoint.  In the past, there were concerns about a small membranous VSD without significant shunting.  On his most recent transthoracic echocardiogram, there was no VSD, nor did I hear a VSD murmur on exam.       At this time, I would recommend that the patient follow back with his family doctor and repeat a transthoracic echocardiogram in the next 3-5 years.  If the degree of mitral regurgitation worsens, then he will need to have transthoracic echocardiograms done more frequently.             External Ear Infection (Adult)    External otitis (also called  swimmer s ear ) is an infection in the ear canal. It is often caused by bacteria or fungus. It can occur a few days after water gets trapped in the ear canal (from swimming or bathing). It can also occur after cleaning too deeply in the ear canal with a cotton swab or other object. Sometimes, hair care products get into  the ear canal and cause this problem.  Symptoms can include pain, fever, itching, redness, drainage, or swelling of the ear canal. Temporary hearing loss may also occur.  Home care    Do not try to clean the ear canal. This can push pus and bacteria deeper into the canal.    Use prescribed ear drops as directed. These help reduce swelling and fight the infection. If an ear wick was placed in the ear canal, apply drops right onto the end of the wick. The wick will draw the medicine into the ear canal even if it is swollen closed.    A cotton ball may be loosely placed in the outer ear to absorb any drainage.    You may use acetaminophen or ibuprofen to control pain, unless another medicine was prescribed. Note: If you have chronic liver or kidney disease or ever had a stomach ulcer or GI bleeding, talk to your healthcare provider before taking any of these medicines.    Do not allow water to get into your ear when bathing. Also, don't swim until the infection has cleared.  Prevention    Keep your ears dry. This helps lower the risk of infection. Dry your ears with a towel or hair dryer after getting wet. Also, use ear plugs when swimming.    Do not stick any objects in the ear to remove wax.    If you feel water trapped in your ear, use ear drops right away. You can get these drops over the counter at most drugstores. They work by removing water from the ear canal.  Follow-up care  Follow up with your healthcare provider in 1 week, or as advised.  When to seek medical advice  Call your healthcare provider right away if any of these occur:    Ear pain becomes worse or doesn t improve after 3 days of treatment    Redness or swelling of the outer ear occurs or gets worse    Headache    Painful or stiff neck    Drowsiness or confusion    Fever of 100.4 F (38 C) or higher, or as directed by your healthcare provider    Seizure  Date Last Reviewed: 10/1/2017    9924-2931 [employer] [address] [facility],  "[place] [address]. All rights reserved. This information is not intended as a substitute for professional medical care. Always follow your healthcare professional's instructions.                Follow-ups after your visit        Your next 10 appointments already scheduled     May 02, 2018  1:00 PM CDT   [facility] Visit [facility] Geovani Krishnamurthy [facility]   [facility]    [address]  [facility] [address]   [phone]            Jul 25, 2018  1:00 PM CDT   Spirometry Bronchodilator with [place] [facility]   [facility] ([facility])    [address] [facility] [address]   [phone]           No inhalers for 6 hours prior to test              Who to contact     If you have questions or need follow up information about today's clinic visit or your schedule please contact [facility] directly at [phone].  Normal or non-critical lab and imaging results will be communicated to you by [employer], letter or phone within 4 business days after the clinic has received the results. If you do not hear from us within 7 days, please contact the clinic through [employer] or phone. If you have a critical or abnormal lab result, we will notify you by phone as soon as possible.  Submit refill requests through [employer] or call your pharmacy and they will forward the refill request to us. Please allow 3 business days for your refill to be completed.          Additional Information About Your Visit        [employer] Information     [employer] lets you send messages to your doctor, view your test results, renew your prescriptions, schedule appointments and more. To sign up, go to [email or web address] . Click on \"Log in\" on the left side of the screen, which will take you to the Welcome page. Then click on \"Sign up Now\" on the right side of the page.     You will be asked to enter the access code listed below, as well as some " "personal information. Please follow the directions to create your username and password.     Your access code is: [email or web address]  Expires: 2018  2:46 PM     Your access code will  in 90 days. If you need help or a new code, please call your [place] clinic or [phone].        Care EveryWhere ID     This is your Care EveryWhere ID. This could be used by other organizations to access your [place] medical records  [id number]        Your Vitals Were     Pulse Temperature Height Pulse Oximetry BMI (Body Mass Index)       67 98.2  F (36.8  C) (Tympanic) 5' 8\" (1.727 m) 90% 27.83 kg/m2        Blood Pressure from Last 3 Encounters:   18 112/72   18 121/76   18 118/81    Weight from Last 3 Encounters:   18 183 lb (83 kg)   18 181 lb 6.4 oz (82.3 kg)   18 183 lb (83 kg)              Today, you had the following     No orders found for display         Today's Medication Changes          These changes are accurate as of 18  1:52 PM.  If you have any questions, ask your nurse or doctor.               Start taking these medicines.        Dose/Directions    ofloxacin 0.3 % otic solution   Commonly known as:  FLOXIN   Used for:  Infective otitis externa, left   Started by:  Chastity Greer APRN CNP        Dose:  10 drop   Place 10 drops Into the left ear 2 times daily for 7 days   Quantity:  10 mL   Refills:  0       polyethylene glycol powder   Commonly known as:  MIRALAX   Used for:  Constipation, unspecified constipation type   Started by:  Chastity Greer APRN CNP        Dose:  1 capful   Take 17 g (1 capful) by mouth daily   Quantity:  510 g   Refills:  1       vitamin D 2000 units tablet   Used for:  Vitamin D deficiency   Started by:  Chastity Greer APRN CNP        Dose:  2000 Units   Take 2,000 Units by mouth daily   Quantity:  100 tablet   Refills:  3            Where to get your medicines      These medications were sent to [facility] " - [place],  - [place], [place] - [address]  [address], [facility] [address]    Hours:  test rx sent successfully  2/8/05  kr Phone:  [phone]     ofloxacin 0.3 % otic solution    polyethylene glycol powder    vitamin D 2000 units tablet                Primary Care Provider Office Phone # Fax #    SAUL Squires [facility] [phone] [address] [place] [address]        Equal Access to Services     MEENU AMATO AH: Hadii aad ku hadasho Soomaali, waaxda luqadaha, qaybta kaalmada adeegyada, waxay idiin hayaan adeeg kharash la'abdirizak . So [facility] [phone].    ATENCIÓN: Si habla español, tiene a topete disposición servicios gratuitos de asistencia lingüística. [facility] [phone].    We comply with applicable federal civil rights laws and [place] laws. We do not discriminate on the basis of race, color, national origin, age, disability, sex, sexual orientation, or gender identity.            Thank you!     Thank you for choosing [facility]  for your care. Our goal is always to provide you with excellent care. Hearing back from our patients is one way we can continue to improve our services. Please take a few minutes to complete the written survey that you may receive in the mail after your visit with us. Thank you!             Your Updated Medication List - Protect others around you: Learn how to safely use, store and throw away your medicines at www.disposemymeds.org.          This list is accurate as of 4/25/18  1:52 PM.  Always use your most recent med list.                   Brand Name Dispense Instructions for use Diagnosis    ABILIFY 10 MG tablet   Generic drug:  ARIPiprazole      1 TABLET DAILY        acetaminophen 325 MG tablet    TYLENOL    100 tablet    Take 2 tablets (650 mg) by mouth every 4 hours as needed for mild pain    Cigarette nicotine dependence with nicotine-induced disorder       amoxicillin 250 MG capsule    AMOXIL     Take 250 mg by  mouth as needed        aspirin 81 MG EC tablet     90 tablet    Take 1 tablet (81 mg) by mouth daily    Hyperlipidemia LDL goal <130       augmented betamethasone dipropionate 0.05 % ointment    DIPROLENE-AF    45 g    Apply to AA twice daily 1-2 weeks then as needed only. Do not apply to face. (Due for Derm recheck b4 next refill: [phone] to schedule)    Eczema of both external ears       carbamide peroxide 6.5 % otic solution    DEBROX    15 mL    Place 5 drops into both ears 2 times daily For 5 days August 1-5th and Feb 1-5 th  then return for irrigation after the 5th day.    Bilateral impacted cerumen       finasteride 5 MG tablet    PROSCAR    30 tablet    TAKE ONE TABLET BY MOUTH EVERY DAY    BPH (benign prostatic hyperplasia)       fish Oil 1200 MG capsule     90 capsule    Take 1 capsule (1.2 g) by mouth daily    Hyperlipidemia LDL goal <130       fluticasone-vilanterol 100-25 MCG/INH oral inhaler    BREO ELLIPTA    1 Inhaler    Inhale 1 puff into the lungs daily    Simple chronic bronchitis (H)       gabapentin 100 MG capsule    NEURONTIN     Take 100 mg by mouth 2 times daily        levothyroxine 125 MCG tablet    SYNTHROID/LEVOTHROID    90 tablet    TAKE ONE TABLET BY MOUTH EVERY DAY    Acquired hypothyroidism       Nitrile Exam Gloves Medium Misc     4 each    1 each as needed Use PRN daily with administration of otic drops, body lotion and powder to treat dry itchy skin.    Eczema of external ear, bilateral, Bilateral impacted cerumen, [facility] Care [facility]       ofloxacin 0.3 % otic solution    FLOXIN    10 mL    Place 10 drops Into the left ear 2 times daily for 7 days    Infective otitis externa, left       order for DME     1 Units    Equipment being ordered: Dynaflex insert    Left foot pain, S/P foot surgery, left       oxybutynin 5 MG tablet    DITROPAN    90 tablet    TAKE ONE TABLET BY MOUTH THREE TIMES DAILY    Benign prostatic hyperplasia, presence of lower urinary tract symptoms unspecified        polyethylene glycol powder    MIRALAX    510 g    Take 17 g (1 capful) by mouth daily    Constipation, unspecified constipation type       REGULOID 48.57 % Powd   Generic drug:  Psyllium     369 g    Take 1 each by mouth See Admin Instructions Take 1 teaspoonful every day at 7pm and 9 pm.    Constipation       simvastatin 40 MG tablet    ZOCOR    90 tablet    TAKE ONE TABLET BY MOUTH EVERY DAY AT 9 PM    Hyperlipidemia LDL goal <130       vitamin D 2000 units tablet     100 tablet    Take 2,000 Units by mouth daily    Vitamin D deficiency       ZOLOFT 100 MG tablet   Generic drug:  sertraline      2 TABLETS DAILY

## 2018-05-02 ENCOUNTER — OFFICE VISIT (OUTPATIENT)
Dept: DERMATOLOGY | Facility: CLINIC | Age: 63
End: 2018-05-02
Payer: MEDICARE

## 2018-05-02 VITALS — DIASTOLIC BLOOD PRESSURE: 76 MMHG | TEMPERATURE: 98.1 F | HEART RATE: 71 BPM | SYSTOLIC BLOOD PRESSURE: 130 MMHG

## 2018-05-02 DIAGNOSIS — L82.1 SK (SEBORRHEIC KERATOSIS): ICD-10-CM

## 2018-05-02 DIAGNOSIS — L20.81 ATOPIC NEURODERMATITIS: Primary | ICD-10-CM

## 2018-05-02 PROCEDURE — 99213 OFFICE O/P EST LOW 20 MIN: CPT | Performed by: DERMATOLOGY

## 2018-05-02 NOTE — PROGRESS NOTES
Geovani Bustos is a 62 year old year old male patient here today for f/u atopic derm. Today he notes spots on hands.  His arms, ankles and ears are clear.   .  Patient states this has been present for months.  Patient reports the following symptoms:  none .  Patient reports the following previous treatments none.  Patient reports the following modifying factors none.  Associated symptoms: none.  Patient has no other skin complaints today.  Remainder of the HPI, Meds, PMH, Allergies, FH, and SH was reviewed in chart.      Past Medical History:   Diagnosis Date     Cerumen impaction      Chronic kidney disease      Colon polyps      Hyperlipidemia      Mitral valve prolapse      Schizophrenia (H)      Ulcerated penile lesions      VSD (ventricular septal defect)        Past Surgical History:   Procedure Laterality Date     ARTHRODESIS FOOT Right 1/19/2016    Procedure: ARTHRODESIS FOOT;  Surgeon: Holden Harrison DPM;  Location: [facility] OR     ARTHRODESIS FOOT Left 1/3/2017    Procedure: ARTHRODESIS FOOT;  Surgeon: Holden Harrison DPM;  Location: [facility] OR     SURGICAL HISTORY OF -       leg fracture        Family History   Problem Relation Age of Onset     Emphysema Mother      Coronary Artery Disease Father        Social History     Social History     Marital status: Single     Spouse name: N/A     Number of children: N/A     Years of education: N/A     Occupational History     Not on file.     Social History Main Topics     Smoking status: Current Every Day Smoker     Packs/day: 1.00     Years: 47.00     Types: Cigarettes     Smokeless tobacco: Never Used     Alcohol use No     Drug use: No     Sexual activity: No     Other Topics Concern     Blood Transfusions No     Social History Narrative    Patient has no interest in smoking cessation.     Her for [employer] physical - likes to Bowl    Lives in Group home. Grew up in [place].        Outpatient Encounter Prescriptions as of 5/2/2018   Medication  Sig Dispense Refill     ABILIFY 10 MG OR TABS 1 TABLET DAILY       acetaminophen (TYLENOL) 325 MG tablet Take 2 tablets (650 mg) by mouth every 4 hours as needed for mild pain 100 tablet 0     amoxicillin (AMOXIL) 250 MG capsule Take 250 mg by mouth as needed        aspirin 81 MG EC tablet Take 1 tablet (81 mg) by mouth daily 90 tablet 1     augmented betamethasone dipropionate (DIPROLENE-AF) 0.05 % ointment Apply to AA twice daily 1-2 weeks then as needed only. Do not apply to face. (Due for Derm recheck b4 next refill: [phone] to schedule) 45 g 0     carbamide peroxide (DEBROX) 6.5 % otic solution Place 5 drops into both ears 2 times daily For 5 days August 1-5th and Feb 1-5 th  then return for irrigation after the 5th day. 15 mL 3     Cholecalciferol (VITAMIN D) 2000 units tablet Take 2,000 Units by mouth daily 100 tablet 3     Disposable Gloves (NITRILE EXAM GLOVES MEDIUM) MISC 1 each as needed Use PRN daily with administration of otic drops, body lotion and powder to treat dry itchy skin. 4 each 11     finasteride (PROSCAR) 5 MG tablet TAKE ONE TABLET BY MOUTH EVERY DAY 30 tablet 3     fluticasone-vilanterol (BREO ELLIPTA) 100-25 MCG/INH oral inhaler Inhale 1 puff into the lungs daily 1 Inhaler 11     gabapentin (NEURONTIN) 100 MG capsule Take 100 mg by mouth 2 times daily       levothyroxine (SYNTHROID/LEVOTHROID) 125 MCG tablet TAKE ONE TABLET BY MOUTH EVERY DAY 90 tablet 0     ofloxacin (FLOXIN) 0.3 % otic solution Place 10 drops Into the left ear 2 times daily for 7 days 10 mL 0     Omega-3 Fatty Acids (FISH OIL) 1200 MG capsule Take 1 capsule (1.2 g) by mouth daily 90 capsule 3     order for DME Equipment being ordered: Dynaflex insert 1 Units 0     oxybutynin (DITROPAN) 5 MG tablet TAKE ONE TABLET BY MOUTH THREE TIMES DAILY 90 tablet 3     polyethylene glycol (MIRALAX) powder Take 17 g (1 capful) by mouth daily 510 g 1     REGULOID 48.57 % POWD Take 1 each by mouth See Admin Instructions Take 1  teaspoonful every day at 7pm and 9 pm. 369 g 11     simvastatin (ZOCOR) 40 MG tablet TAKE ONE TABLET BY MOUTH EVERY DAY AT 9 PM 90 tablet 1     ZOLOFT 100 MG OR TABS 2 TABLETS DAILY       Facility-Administered Encounter Medications as of 5/2/2018   Medication Dose Route Frequency Provider Last Rate Last Dose     bupivacaine (MARCAINE) injection 0.5%    PRN Holden Harrison, DPM   10 mL at 01/03/17 0945             Review Of Systems  Skin: As above  Eyes: negative  Ears/Nose/Throat: negative  Respiratory: No shortness of breath, dyspnea on exertion, cough, or hemoptysis  Cardiovascular: negative  Gastrointestinal: negative  Genitourinary: negative  Musculoskeletal: negative  Neurologic: negative  Psychiatric: negative  Hematologic/Lymphatic/Immunologic: negative  Endocrine: negative      O:   NAD, WDWN, Alert & Oriented, Mood & Affect wnl, Vitals stable   Here today alone   /76  Pulse 71  Temp 98.1  F (36.7  C) (Tympanic)   General appearance normal   Vitals stable   Alert, oriented and in no acute distress     Stuck on papule on hands  PIH on arms and ankles   Faint scale BL ears      The remainder of expanded problem focused exam was unremarkable; the following areas were examined:  scalp/hair, conjunctiva/lids, face, neck, lips      Eyes: Conjunctivae/lids:Normal     ENT: Lips, buccal mucosa, tongue: normal    MSK:Normal    Cardiovascular: peripheral edema none    Pulm: Breathing Normal    Neuro/Psych: Orientation:Normal; Mood/Affect:Normal      A/P:  1. atopiuc derm stable  Betamethasone prn  Skin care discussed with patient   Emollient use discussed with patient   2. Seborrheic keratosis   BENIGN LESIONS DISCUSSED WITH PATIENT:  I discussed the specifics of tumor, prognosis, and genetics of benign lesions.  I explained that treatment of these lesions would be purely cosmetic and not medically neccessary.  I discussed with patient different removal options including excision, cautery and /or laser.     UV precautions reviewed with patient.  Skin care regimen reviewed with patient: Eliminate harsh soaps, i.e. Dial, zest, irsih spring; Mild soaps such as Cetaphil or Dove sensitive skin, avoid hot or cold showers, aggressive use of emollients including vanicream, cetaphil or cerave discussed with patient.    Return to clinic 12 months

## 2018-05-02 NOTE — LETTER
5/2/2018         RE: Geovani Bustos  [address]  [facility] [address]        Dear Colleague,    Thank you for referring your patient, Geovani Bustos, to the [facility]. Please see a copy of my visit note below.    Geovani Bustos is a 62 year old year old male patient here today for f/u atopic derm. Today he notes spots on hands.  His arms, ankles and ears are clear.   .  Patient states this has been present for months.  Patient reports the following symptoms:  none .  Patient reports the following previous treatments none.  Patient reports the following modifying factors none.  Associated symptoms: none.  Patient has no other skin complaints today.  Remainder of the HPI, Meds, PMH, Allergies, FH, and SH was reviewed in chart.      Past Medical History:   Diagnosis Date     Cerumen impaction      Chronic kidney disease      Colon polyps      Hyperlipidemia      Mitral valve prolapse      Schizophrenia (H)      Ulcerated penile lesions      VSD (ventricular septal defect)        Past Surgical History:   Procedure Laterality Date     ARTHRODESIS FOOT Right 1/19/2016    Procedure: ARTHRODESIS FOOT;  Surgeon: Holden Harrison DPM;  Location: [facility]     ARTHRODESIS FOOT Left 1/3/2017    Procedure: ARTHRODESIS FOOT;  Surgeon: Holden Harrison DPM;  Location: [facility] OR     SURGICAL HISTORY OF -       leg fracture        Family History   Problem Relation Age of Onset     Emphysema Mother      Coronary Artery Disease Father        Social History     Social History     Marital status: Single     Spouse name: N/A     Number of children: N/A     Years of education: N/A     Occupational History     Not on file.     Social History Main Topics     Smoking status: Current Every Day Smoker     Packs/day: 1.00     Years: 47.00     Types: Cigarettes     Smokeless tobacco: Never Used     Alcohol use No     Drug use: No     Sexual activity: No     Other Topics Concern     Blood Transfusions No      Social History Narrative    Patient has no interest in smoking cessation.     Her for [employer] - likes to Bowl    Lives in Group home. Grew up in [place].        Outpatient Encounter Prescriptions as of 5/2/2018   Medication Sig Dispense Refill     ABILIFY 10 MG OR TABS 1 TABLET DAILY       acetaminophen (TYLENOL) 325 MG tablet Take 2 tablets (650 mg) by mouth every 4 hours as needed for mild pain 100 tablet 0     amoxicillin (AMOXIL) 250 MG capsule Take 250 mg by mouth as needed        aspirin 81 MG EC tablet Take 1 tablet (81 mg) by mouth daily 90 tablet 1     augmented betamethasone dipropionate (DIPROLENE-AF) 0.05 % ointment Apply to AA twice daily 1-2 weeks then as needed only. Do not apply to face. (Due for Derm recheck b4 next refill: [phone] to schedule) 45 g 0     carbamide peroxide (DEBROX) 6.5 % otic solution Place 5 drops into both ears 2 times daily For 5 days August 1-5th and Feb 1-5 th  then return for irrigation after the 5th day. 15 mL 3     Cholecalciferol (VITAMIN D) 2000 units tablet Take 2,000 Units by mouth daily 100 tablet 3     Disposable Gloves (NITRILE EXAM GLOVES MEDIUM) MISC 1 each as needed Use PRN daily with administration of otic drops, body lotion and powder to treat dry itchy skin. 4 each 11     finasteride (PROSCAR) 5 MG tablet TAKE ONE TABLET BY MOUTH EVERY DAY 30 tablet 3     fluticasone-vilanterol (BREO ELLIPTA) 100-25 MCG/INH oral inhaler Inhale 1 puff into the lungs daily 1 Inhaler 11     gabapentin (NEURONTIN) 100 MG capsule Take 100 mg by mouth 2 times daily       levothyroxine (SYNTHROID/LEVOTHROID) 125 MCG tablet TAKE ONE TABLET BY MOUTH EVERY DAY 90 tablet 0     ofloxacin (FLOXIN) 0.3 % otic solution Place 10 drops Into the left ear 2 times daily for 7 days 10 mL 0     Omega-3 Fatty Acids (FISH OIL) 1200 MG capsule Take 1 capsule (1.2 g) by mouth daily 90 capsule 3     order for DME Equipment being ordered: Dynaflex insert 1 Units 0      oxybutynin (DITROPAN) 5 MG tablet TAKE ONE TABLET BY MOUTH THREE TIMES DAILY 90 tablet 3     polyethylene glycol (MIRALAX) powder Take 17 g (1 capful) by mouth daily 510 g 1     REGULOID 48.57 % POWD Take 1 each by mouth See Admin Instructions Take 1 teaspoonful every day at 7pm and 9 pm. 369 g 11     simvastatin (ZOCOR) 40 MG tablet TAKE ONE TABLET BY MOUTH EVERY DAY AT 9 PM 90 tablet 1     ZOLOFT 100 MG OR TABS 2 TABLETS DAILY       Facility-Administered Encounter Medications as of 5/2/2018   Medication Dose Route Frequency Provider Last Rate Last Dose     bupivacaine (MARCAINE) injection 0.5%    PRN Holden Harrison, DPTYRESE   10 mL at 01/03/17 0945             Review Of Systems  Skin: As above  Eyes: negative  Ears/Nose/Throat: negative  Respiratory: No shortness of breath, dyspnea on exertion, cough, or hemoptysis  Cardiovascular: negative  Gastrointestinal: negative  Genitourinary: negative  Musculoskeletal: negative  Neurologic: negative  Psychiatric: negative  Hematologic/Lymphatic/Immunologic: negative  Endocrine: negative      O:   NAD, WDWN, Alert & Oriented, Mood & Affect wnl, Vitals stable   Here today alone   /76  Pulse 71  Temp 98.1  F (36.7  C) (Tympanic)   General appearance normal   Vitals stable   Alert, oriented and in no acute distress     Stuck on papule on hands  PIH on arms and ankles   Faint scale BL ears      The remainder of expanded problem focused exam was unremarkable; the following areas were examined:  scalp/hair, conjunctiva/lids, face, neck, lips      Eyes: Conjunctivae/lids:Normal     ENT: Lips, buccal mucosa, tongue: normal    MSK:Normal    Cardiovascular: peripheral edema none    Pulm: Breathing Normal    Neuro/Psych: Orientation:Normal; Mood/Affect:Normal      A/P:  1. atopiuc derm stable  Betamethasone prn  Skin care discussed with patient   Emollient use discussed with patient   2. Seborrheic keratosis   BENIGN LESIONS DISCUSSED WITH PATIENT:  I discussed the specifics  of tumor, prognosis, and genetics of benign lesions.  I explained that treatment of these lesions would be purely cosmetic and not medically neccessary.  I discussed with patient different removal options including excision, cautery and /or laser.    UV precautions reviewed with patient.  Skin care regimen reviewed with patient: Eliminate harsh soaps, i.e. Dial, zest, irsih spring; Mild soaps such as Cetaphil or Dove sensitive skin, avoid hot or cold showers, aggressive use of emollients including vanicream, cetaphil or cerave discussed with patient.    Return to clinic 12 months      Again, thank you for allowing me to participate in the care of your patient.        Sincerely,        Geovani Krishnamurthy MD

## 2018-05-02 NOTE — NURSING NOTE
"Chief Complaint   Patient presents with     Derm Problem     skin check       Initial /76  Pulse 71  Temp 98.1  F (36.7  C) (Tympanic) Estimated body mass index is 27.83 kg/(m^2) as calculated from the following:    Height as of 4/25/18: 1.727 m (5' 8\").    Weight as of 4/25/18: 83 kg (183 lb).  BP completed using cuff size: laya Griffith LPN    "

## 2018-05-02 NOTE — PATIENT INSTRUCTIONS
- Apply Betamethasone to ears twice daily as needed for flaking/itching  -Apply Eucerin Cream twice daily  -Recheck in one year, sooner if condition worsens

## 2018-05-16 ENCOUNTER — OFFICE VISIT (OUTPATIENT)
Dept: FAMILY MEDICINE | Facility: CLINIC | Age: 63
End: 2018-05-16
Payer: MEDICARE

## 2018-05-16 DIAGNOSIS — H60.392 INFECTIVE OTITIS EXTERNA, LEFT: Primary | ICD-10-CM

## 2018-05-16 PROCEDURE — 99214 OFFICE O/P EST MOD 30 MIN: CPT | Performed by: NURSE PRACTITIONER

## 2018-05-16 RX ORDER — TRAZODONE HYDROCHLORIDE 50 MG/1
25 TABLET, FILM COATED ORAL AT BEDTIME
COMMUNITY

## 2018-05-16 NOTE — MR AVS SNAPSHOT
After Visit Summary   5/16/2018    Geovani Bustos    MRN: [id number]           Patient Information     Date Of Birth          1955        Visit Information        Provider Department      5/16/2018 1:00 PM Chastity Greer APRN [facility]        Today's Diagnoses     Infective otitis externa, left    -  1      Care Instructions      How to Quit Smoking  Smoking is one of the hardest habits to break. About half of all people who have ever smoked have been able to quit. Most people who still smoke want to quit. Here are some of the best ways to stop smoking.    Keep trying  Most smokers make many attempts at quitting before they are successful. It s important not to give up.  Go cold turkey  Most former smokers quit cold turkey (all at once). Trying to cut back gradually doesn't seem to work as well, perhaps because it continues the smoking habit. Also, it is possible to inhale more while smoking fewer cigarettes. This results in the same amount of nicotine in your body.  Get support  Support programs can be a big help, especially for heavy smokers. These groups offer lectures, ways to change behavior, and peer support. Here are some ways to find a support program:    Free national quitline: 800-QUIT-NOW (077-819-8194).    [facility] quit-smoking programs.    American Lung Association: (767.926.4080).    American Cancer Society (068-143-8724).  Support at home is important too. Nonsmokers can offer praise and encouragement. If the smoker in your life finds it hard to quit, encourage them to keep trying.  Over-the-counter medicines  Nicotine replacement therapy may make quitting easier. Certain aids, such as the nicotine patch, gum, and lozenges, are available without a prescription. It is best to use these under a doctor s care, though. The skin patch provides a steady supply of nicotine. Nicotine gum and lozenges give temporary bursts of low levels of nicotine. Both  "methods reduce the craving for cigarettes. Warning: If you have nausea, vomiting, dizziness, weakness, or a fast heartbeat, stop using these products and see your doctor.  Prescription medicines  After reviewing your smoking patterns and past attempts to quit, your doctor may offer a prescription medicine such as bupropion, varenicline, a nicotine inhaler, or nasal spray. Each has advantages and side effects. Your doctor can review these with you.  Health benefits of quitting  The benefits of quitting start right away and keep improving the longer you go without smoking. These benefits occur at any age.  So whether you are 17 or 70, quitting is a good decision. Some of the benefits include:    20 minutes: Blood pressure and pulse return to normal.    8 hours: Oxygen levels return to normal.    2 days: Ability to smell and taste begin to improve as damaged nerves regrow.    2 to 3 weeks: Circulation and lung function improve.    1 to 9 months: Coughing, congestion, and shortness of breath decrease; tiredness decreases.    1 year: Risk of heart attack decreases by half.    5 years: Risk of lung cancer decreases by half; risk of stroke becomes the same as a nonsmoker s.  For more on how to quit smoking, try these online resources:     Smokefree.gov    \"Clearing the Air\" booklet from the National Cancer [place]: smokefree.gov/sites/default/files/pdf/clearing-the-air-accessible.pdf  Date Last Reviewed: 3/1/2017    1512-9798 [employer]. [address] [place] [address]. All rights reserved. This information is not intended as a substitute for professional medical care. Always follow your healthcare professional's instructions.                Follow-ups after your visit        Your next 10 appointments already scheduled     Jul 25, 2018  1:00 PM CDT   Spirometry Bronchodilator with [facility]   [facility] ([facility])    [address] " [place] [address]   [phone]           No inhalers for 6 hours prior to test              Who to contact     If you have questions or need follow up information about today's clinic visit or your schedule please contact [facility] directly at [phone].  Normal or non-critical lab and imaging results will be communicated to you by MyChart, letter or phone within 4 business days after the clinic has received the results. If you do not hear from us within 7 days, please contact the clinic through MyChart or phone. If you have a critical or abnormal lab result, we will notify you by phone as soon as possible.  Submit refill requests through [employer] or call your pharmacy and they will forward the refill request to us. Please allow 3 business days for your refill to be completed.          Additional Information About Your Visit        Care EveryWhere ID     This is your Care EveryWhere ID. This could be used by other organizations to access your [facility] medical records  [id number]         Blood Pressure from Last 3 Encounters:   05/02/18 130/76   04/25/18 112/72   04/20/18 121/76    Weight from Last 3 Encounters:   04/25/18 183 lb (83 kg)   04/20/18 181 lb 6.4 oz (82.3 kg)   04/13/18 183 lb (83 kg)              Today, you had the following     No orders found for display       Primary Care Provider Office Phone # Fax #    Chastity MckinleySAUL [facility] [phone]       [address] [facility] [address]        Equal Access to Services     MEENU AMATO AH: Hadii aad ku hadasho Soomaali, waaxda luqadaha, qaybta kaalmada adeegyada, maday ford adeana luisa garcia'abdirizak . So [facility] [place] [phone].    ATENCIÓN: Si habla español, tiene a topete disposición servicios gratuitos de asistencia lingüística. Llsal al [phone].    We comply with applicable federal civil rights laws and [place] laws. We do not discriminate on the basis of race, color, national origin, age, disability, sex, sexual  orientation, or gender identity.            Thank you!     Thank you for choosing [facility]  for your care. Our goal is always to provide you with excellent care. Hearing back from our patients is one way we can continue to improve our services. Please take a few minutes to complete the written survey that you may receive in the mail after your visit with us. Thank you!             Your Updated Medication List - Protect others around you: Learn how to safely use, store and throw away your medicines at www.disposemymeds.org.          This list is accurate as of 5/16/18 11:59 PM.  Always use your most recent med list.                   Brand Name Dispense Instructions for use Diagnosis    ABILIFY 10 MG tablet   Generic drug:  ARIPiprazole      1 TABLET DAILY        acetaminophen 325 MG tablet    TYLENOL    100 tablet    Take 2 tablets (650 mg) by mouth every 4 hours as needed for mild pain    Cigarette nicotine dependence with nicotine-induced disorder       amoxicillin 250 MG capsule    AMOXIL     Take 250 mg by mouth as needed        aspirin 81 MG EC tablet     90 tablet    Take 1 tablet (81 mg) by mouth daily    Hyperlipidemia LDL goal <130       augmented betamethasone dipropionate 0.05 % ointment    DIPROLENE-AF    45 g    Apply to AA twice daily 1-2 weeks then as needed only. Do not apply to face. (Due for Derm recheck b4 next refill: [phone] to schedule)    Eczema of both external ears       carbamide peroxide 6.5 % otic solution    DEBROX    15 mL    Place 5 drops into both ears 2 times daily For 5 days August 1-5th and Feb 1-5 th  then return for irrigation after the 5th day.    Bilateral impacted cerumen       finasteride 5 MG tablet    PROSCAR    30 tablet    TAKE ONE TABLET BY MOUTH EVERY DAY    BPH (benign prostatic hyperplasia)       fish Oil 1200 MG capsule     90 capsule    Take 1 capsule (1.2 g) by mouth daily    Hyperlipidemia LDL goal <130       fluticasone-vilanterol 100-25  MCG/INH oral inhaler    BREO ELLIPTA    1 Inhaler    Inhale 1 puff into the lungs daily    Simple chronic bronchitis (H)       gabapentin 100 MG capsule    NEURONTIN     Take 100 mg by mouth 2 times daily        levothyroxine 125 MCG tablet    SYNTHROID/LEVOTHROID    90 tablet    TAKE ONE TABLET BY MOUTH EVERY DAY    Acquired hypothyroidism       Nitrile Exam Gloves Medium Misc     4 each    1 each as needed Use PRN daily with administration of otic drops, body lotion and powder to treat dry itchy skin.    Eczema of external ear, bilateral, Bilateral impacted cerumen, [facility]       order for DME     1 Units    Equipment being ordered: Dynaflex insert    Left foot pain, S/P foot surgery, left       oxybutynin 5 MG tablet    DITROPAN    90 tablet    TAKE ONE TABLET BY MOUTH THREE TIMES DAILY    Benign prostatic hyperplasia, presence of lower urinary tract symptoms unspecified       polyethylene glycol powder    MIRALAX    510 g    Take 17 g (1 capful) by mouth daily    Constipation, unspecified constipation type       REGULOID 48.57 % Powd   Generic drug:  Psyllium     369 g    Take 1 each by mouth See Admin Instructions Take 1 teaspoonful every day at 7pm and 9 pm.    Constipation       simvastatin 40 MG tablet    ZOCOR    90 tablet    TAKE ONE TABLET BY MOUTH EVERY DAY AT 9 PM    Hyperlipidemia LDL goal <130       traZODone 50 MG tablet    DESYREL     Take 50 mg by mouth At Bedtime        vitamin D 2000 units tablet     100 tablet    Take 2,000 Units by mouth daily    Vitamin D deficiency       ZOLOFT 100 MG tablet   Generic drug:  sertraline      2 TABLETS DAILY

## 2018-05-16 NOTE — PROGRESS NOTES
SUBJECTIVE:   Geovani Bustos is a 62 year old male who presents to clinic today for the following health issues:      Recheck left ear infection   Started trazadone 50mg at HS and then additional dose in 1/2 hour.   For depression and anxiety . Helps with sleep .     -------------------------------------    Problem list and histories reviewed & adjusted, as indicated.  Additional history: as documented    Labs reviewed in EPIC    Reviewed and updated as needed this visit by clinical staff  Allergies  Meds       Reviewed and updated as needed this visit by Provider         ROS:   ROS: 10 point ROS neg other than the symptoms noted above in the HPI.      OBJECTIVE:                                                    There were no vitals taken for this visit.  There is no height or weight on file to calculate BMI.   GENERAL: healthy, alert, well nourished, well hydrated, no distress  HENT: ear canals-drainage has resolved TMs-intact bilaterally; Nose- normal; Mouth- no ulcers, no lesions  NECK: no tenderness, no adenopathy, no asymmetry, no masses, no stiffness; thyroid- normal to palpation  RESP: lungs clear to auscultation - no rales, no rhonchi, no wheezes  CV: regular rates and rhythm, normal S1 S2,   ABDOMEN: soft, no tenderness, no  hepatosplenomegaly, no masses, normal bowel sounds    Diagnostic test results:  none      ASSESSMENT/PLAN:                                                    ASSESSMENT / PLAN:  (H60.392) Infective otitis externa, left  (primary encounter diagnosis)  Comment: Resolved.  Plan: Follow-up as needed    Smoking cessation again recommended  [facility] group home forms completed        Follow up with Provider - Call or return to the clinic with any worsening of symptoms or no resolution. Patient/Parent verbalized understanding and is in agreement. Medication side effects reviewed.   Current Outpatient Prescriptions   Medication Sig Dispense Refill     ABILIFY 10 MG OR TABS 1 TABLET DAILY        acetaminophen (TYLENOL) 325 MG tablet Take 2 tablets (650 mg) by mouth every 4 hours as needed for mild pain 100 tablet 0     amoxicillin (AMOXIL) 250 MG capsule Take 250 mg by mouth as needed        aspirin 81 MG EC tablet Take 1 tablet (81 mg) by mouth daily 90 tablet 1     augmented betamethasone dipropionate (DIPROLENE-AF) 0.05 % ointment Apply to AA twice daily 1-2 weeks then as needed only. Do not apply to face. (Due for Derm recheck b4 next refill: [phone] to schedule) 45 g 0     carbamide peroxide (DEBROX) 6.5 % otic solution Place 5 drops into both ears 2 times daily For 5 days August 1-5th and Feb 1-5 th  then return for irrigation after the 5th day. 15 mL 3     Cholecalciferol (VITAMIN D) 2000 units tablet Take 2,000 Units by mouth daily 100 tablet 3     Disposable Gloves (NITRILE EXAM GLOVES MEDIUM) MISC 1 each as needed Use PRN daily with administration of otic drops, body lotion and powder to treat dry itchy skin. 4 each 11     finasteride (PROSCAR) 5 MG tablet TAKE ONE TABLET BY MOUTH EVERY DAY 30 tablet 3     fluticasone-vilanterol (BREO ELLIPTA) 100-25 MCG/INH oral inhaler Inhale 1 puff into the lungs daily 1 Inhaler 11     gabapentin (NEURONTIN) 100 MG capsule Take 100 mg by mouth 2 times daily       levothyroxine (SYNTHROID/LEVOTHROID) 125 MCG tablet TAKE ONE TABLET BY MOUTH EVERY DAY 90 tablet 0     Omega-3 Fatty Acids (FISH OIL) 1200 MG capsule Take 1 capsule (1.2 g) by mouth daily 90 capsule 3     order for DME Equipment being ordered: Dynaflex insert 1 Units 0     oxybutynin (DITROPAN) 5 MG tablet TAKE ONE TABLET BY MOUTH THREE TIMES DAILY 90 tablet 3     polyethylene glycol (MIRALAX) powder Take 17 g (1 capful) by mouth daily 510 g 1     REGULOID 48.57 % POWD Take 1 each by mouth See Admin Instructions Take 1 teaspoonful every day at 7pm and 9 pm. 369 g 11     simvastatin (ZOCOR) 40 MG tablet TAKE ONE TABLET BY MOUTH EVERY DAY AT 9 PM 90 tablet 1     traZODone (DESYREL) 50 MG tablet Take  50 mg by mouth At Bedtime       ZOLOFT 100 MG OR TABS 2 TABLETS DAILY          See Patient Instructions    SAUL Squires [facility]

## 2018-05-21 NOTE — PATIENT INSTRUCTIONS

## 2018-06-04 DIAGNOSIS — K59.00 CONSTIPATION, UNSPECIFIED CONSTIPATION TYPE: ICD-10-CM

## 2018-06-04 RX ORDER — POLYETHYLENE GLYCOL 3350 17 G/17G
POWDER, FOR SOLUTION ORAL
Qty: 527 G | Refills: 0 | Status: SHIPPED | OUTPATIENT
Start: 2018-06-04 | End: 2018-07-16

## 2018-06-21 DIAGNOSIS — E03.9 ACQUIRED HYPOTHYROIDISM: Chronic | ICD-10-CM

## 2018-06-21 DIAGNOSIS — E78.5 HYPERLIPIDEMIA LDL GOAL <130: ICD-10-CM

## 2018-06-21 RX ORDER — LEVOTHYROXINE SODIUM 125 UG/1
TABLET ORAL
Qty: 90 TABLET | Refills: 0 | Status: SHIPPED | OUTPATIENT
Start: 2018-06-21 | End: 2018-09-11

## 2018-06-21 RX ORDER — SIMVASTATIN 40 MG
TABLET ORAL
Qty: 90 TABLET | Refills: 0 | Status: SHIPPED | OUTPATIENT
Start: 2018-06-21 | End: 2018-09-11

## 2018-06-21 NOTE — TELEPHONE ENCOUNTER
"Requested Prescriptions   Pending Prescriptions Disp Refills     simvastatin (ZOCOR) 40 MG tablet [Pharmacy Med Name: SIMVASTATIN 40 MG TABLET] 30 tablet      Sig: TAKE ONE TABLET BY MOUTH EVERY DAY AT 9 PM    Statins Protocol Passed    6/21/2018 10:46 AM       Passed - LDL on file in past 12 months    Recent Labs   Lab Test  11/08/17   0840   LDL  80            Passed - No abnormal creatine kinase in past 12 months    No lab results found.            Passed - Recent (12 mo) or future (30 days) visit within the authorizing provider's specialty    Patient had office visit in the last 12 months or has a visit in the next 30 days with authorizing provider or within the authorizing provider's specialty.  See \"Patient Info\" tab in inbasket, or \"Choose Columns\" in Meds & Orders section of the refill encounter.      Last Written Prescription Date:  1/18/18  Last Fill Quantity: 90,  # refills: 1   Last office visit: 5/16/2018 with prescribing provider:     Future Office Visit:   Next 5 appointments (look out 90 days)     Jul 27, 2018 10:00 AM CDT   Return Visit with Dakota Pinzon MD   [facility] ([address] [address] [phone]                          Passed - Patient is age 18 or older        levothyroxine (SYNTHROID/LEVOTHROID) 125 MCG tablet [Pharmacy Med Name: LEVOTHYROXINE SODIUM 125 MCG TABLET] 30 tablet      Sig: TAKE ONE TABLET BY MOUTH EVERY DAY    Thyroid Protocol Passed    6/21/2018 10:46 AM       Passed - Patient is 12 years or older       Passed - Recent (12 mo) or future (30 days) visit within the authorizing provider's specialty    Patient had office visit in the last 12 months or has a visit in the next 30 days with authorizing provider or within the authorizing provider's specialty.  See \"Patient Info\" tab in inbasket, or \"Choose Columns\" in Meds & Orders section of the refill encounter.           Passed - Normal TSH on file " in past 12 months    Recent Labs   Lab Test  10/11/17   1325   TSH  1.24              Last Written Prescription Date:  3/26/18  Last Fill Quantity: 90,  # refills: 0  Last office visit: 5/16/2018 with prescribing provider:     Future Office Visit:   Next 5 appointments (look out 90 days)     Jul 27, 2018 10:00 AM CDT   [facility] Visit with Dakota Pinzon MD   [facility]    [address] [place] [address] [phone]

## 2018-07-11 ENCOUNTER — OFFICE VISIT (OUTPATIENT)
Dept: FAMILY MEDICINE | Facility: CLINIC | Age: 63
End: 2018-07-11
Payer: MEDICARE

## 2018-07-11 VITALS
HEART RATE: 67 BPM | RESPIRATION RATE: 16 BRPM | OXYGEN SATURATION: 90 % | BODY MASS INDEX: 27.92 KG/M2 | DIASTOLIC BLOOD PRESSURE: 76 MMHG | WEIGHT: 183.6 LBS | SYSTOLIC BLOOD PRESSURE: 110 MMHG | TEMPERATURE: 98.7 F

## 2018-07-11 DIAGNOSIS — R10.32 LEFT INGUINAL PAIN: Primary | ICD-10-CM

## 2018-07-11 LAB
ALBUMIN UR-MCNC: NEGATIVE MG/DL
APPEARANCE UR: CLEAR
BILIRUB UR QL STRIP: NEGATIVE
COLOR UR AUTO: YELLOW
GLUCOSE UR STRIP-MCNC: NEGATIVE MG/DL
HGB UR QL STRIP: ABNORMAL
KETONES UR STRIP-MCNC: NEGATIVE MG/DL
LEUKOCYTE ESTERASE UR QL STRIP: NEGATIVE
NITRATE UR QL: NEGATIVE
PH UR STRIP: 6 PH (ref 5–7)
RBC #/AREA URNS AUTO: ABNORMAL /HPF
SOURCE: ABNORMAL
SP GR UR STRIP: <=1.005 (ref 1–1.03)
UROBILINOGEN UR STRIP-ACNC: 0.2 EU/DL (ref 0.2–1)
WBC #/AREA URNS AUTO: ABNORMAL /HPF

## 2018-07-11 PROCEDURE — 99213 OFFICE O/P EST LOW 20 MIN: CPT | Performed by: FAMILY MEDICINE

## 2018-07-11 PROCEDURE — 81001 URINALYSIS AUTO W/SCOPE: CPT | Performed by: FAMILY MEDICINE

## 2018-07-11 ASSESSMENT — PAIN SCALES - GENERAL: PAINLEVEL: MODERATE PAIN (4)

## 2018-07-11 NOTE — PROGRESS NOTES
SUBJECTIVE:   Geovani Bustos is a 62 year old male who presents to clinic today for the following health issues:      Possible hernia      Duration: 1 week    Description (location/character/radiation): left inguinal side    Intensity:  Moderate - pt has high pain tolerance    Accompanying signs and symptoms: pt noticed some swelling    History (similar episodes/previous evaluation): was pulling heavy materials at work last Thursday and then played softball that night. Pt notified some bulge Thursday night    Precipitating or alleviating factors: None    Therapies tried and outcome: None         Problem list and histories reviewed & adjusted, as indicated.  Additional history: as documented    Patient Active Problem List   Diagnosis     Colon polyps     Schizophrenia (H)     VSD (ventricular septal defect)     Mitral valve prolapse     Acquired hypothyroidism     IgA nephropathy     Proteinuria     Hyperlipidemia LDL goal <130     BPH (benign prostatic hyperplasia)     Health Care Home     Bilateral impacted cerumen     Bunion of great toe of right foot     Eczema of external ear, bilateral     Tinea pedis, unspecified laterality     Incomplete right bundle branch block (RBBB)     Cigarette nicotine dependence with nicotine-induced disorder     Gastroesophageal reflux disease without esophagitis     Past Surgical History:   Procedure Laterality Date     ARTHRODESIS FOOT Right 1/19/2016    Procedure: ARTHRODESIS FOOT;  Surgeon: Holden Harrison DPM;  Location: [facility]     ARTHRODESIS FOOT Left 1/3/2017    Procedure: ARTHRODESIS FOOT;  Surgeon: Holden Harrison DPM;  Location: [facility]     SURGICAL HISTORY OF -       leg fracture       Social History   Substance Use Topics     Smoking status: Current Every Day Smoker     Packs/day: 1.00     Years: 47.00     Types: Cigarettes     Smokeless tobacco: Never Used     Alcohol use No     Family History   Problem Relation Age of Onset     Emphysema Mother       Coronary Artery Disease Father          Current Outpatient Prescriptions   Medication Sig Dispense Refill     ABILIFY 10 MG OR TABS 1 TABLET DAILY       acetaminophen (TYLENOL) 325 MG tablet Take 2 tablets (650 mg) by mouth every 4 hours as needed for mild pain 100 tablet 0     amoxicillin (AMOXIL) 250 MG capsule Take 250 mg by mouth as needed        aspirin 81 MG EC tablet Take 1 tablet (81 mg) by mouth daily 90 tablet 1     augmented betamethasone dipropionate (DIPROLENE-AF) 0.05 % ointment Apply to AA twice daily 1-2 weeks then as needed only. Do not apply to face. (Due for Derm recheck b4 next refill: [phone] to schedule) 45 g 0     carbamide peroxide (DEBROX) 6.5 % otic solution Place 5 drops into both ears 2 times daily For 5 days August 1-5th and Feb 1-5 th  then return for irrigation after the 5th day. 15 mL 3     Cholecalciferol (VITAMIN D) 2000 units tablet Take 2,000 Units by mouth daily 100 tablet 3     Disposable Gloves (NITRILE EXAM GLOVES MEDIUM) MISC 1 each as needed Use PRN daily with administration of otic drops, body lotion and powder to treat dry itchy skin. 4 each 11     finasteride (PROSCAR) 5 MG tablet TAKE ONE TABLET BY MOUTH EVERY DAY 90 tablet 1     fluticasone-vilanterol (BREO ELLIPTA) 100-25 MCG/INH oral inhaler Inhale 1 puff into the lungs daily 1 Inhaler 11     gabapentin (NEURONTIN) 100 MG capsule Take 100 mg by mouth 2 times daily       levothyroxine (SYNTHROID/LEVOTHROID) 125 MCG tablet TAKE ONE TABLET BY MOUTH EVERY DAY 90 tablet 0     Omega-3 Fatty Acids (FISH OIL) 1200 MG capsule Take 1 capsule (1.2 g) by mouth daily 90 capsule 3     order for DME Equipment being ordered: Dynaflex insert 1 Units 0     oxybutynin (DITROPAN) 5 MG tablet TAKE ONE TABLET BY MOUTH THREE TIMES DAILY 90 tablet 1     polyethylene glycol (MIRALAX/GLYCOLAX) powder Take 17 g (1 capful) by mouth daily 527 g 0     REGULOID 48.57 % POWD Take 1 each by mouth See Admin Instructions Take 1 teaspoonful every  day at 7pm and 9 pm. 369 g 11     simvastatin (ZOCOR) 40 MG tablet TAKE ONE TABLET BY MOUTH EVERY DAY AT 9 PM 90 tablet 0     Skin Protectants, Misc. (EUCERIN) cream Apply topically 2 times daily       traZODone (DESYREL) 50 MG tablet Take 50 mg by mouth At Bedtime       ZOLOFT 100 MG OR TABS 2 TABLETS DAILY       Allergies   Allergen Reactions     Nitrogen Oxide      Sulfa Drugs      Recent Labs   Lab Test  03/21/18   0900  02/07/18   1404  11/08/17   0840  10/11/17   1325   02/01/17   1457  12/21/16   1405  01/04/16   1640  01/04/16   0850   02/01/13   0815   05/14/12   0830   A1C  5.6   --    --    --    --    --    --    --    --    --   5.5   --   5.6   LDL   --    --   80   --    --   Cannot estimate LDL when triglyceride exceeds 400 mg/dL  89   --    --   104*   < >  117   < >  101   HDL   --    --   47   --    --   27*   --    --   48   < >  45   < >  43   TRIG   --    --   119   --    --   444*   --    --   163*   < >  123   < >  143   ALT   --    --   22   --    --   19   --   33  31   < >   --    < >   --    CR  1.36*  1.48*  1.31*   --    < >  1.21   --   1.27*   --    < >   --    < >   --    GFRESTIMATED  53*  48*  55*   --    < >  61   --   57*   --    < >   --    < >   --    GFRESTBLACK  64  58*  67   --    < >  73   --   70   --    < >   --    < >   --    POTASSIUM  4.4  4.5  4.6   --    < >  4.1   --   4.1   --    < >   --    < >   --    TSH   --    --    --   1.24   --    --   0.82  2.68  2.18   < >   --    < >   --     < > = values in this interval not displayed.      BP Readings from Last 3 Encounters:   07/11/18 110/76   05/02/18 130/76   04/25/18 112/72    Wt Readings from Last 3 Encounters:   07/11/18 183 lb 9.6 oz (83.3 kg)   04/25/18 183 lb (83 kg)   04/20/18 181 lb 6.4 oz (82.3 kg)                  Labs reviewed in EPIC    Reviewed and updated as needed this visit by clinical staff  Tobacco  Allergies  Med Hx  Surg Hx  Fam Hx  Soc Hx      Reviewed and updated as needed this visit by  Provider         ROS:  Constitutional, HEENT, cardiovascular, pulmonary, gi and gu systems are negative, except as otherwise noted.    OBJECTIVE:     /76  Pulse 67  Temp 98.7  F (37.1  C) (Tympanic)  Resp 16  Wt 183 lb 9.6 oz (83.3 kg)  SpO2 90%  BMI 27.92 kg/m2  Body mass index is 27.92 kg/(m^2).  GENERAL: healthy, alert and no distress  NECK: no adenopathy, no asymmetry, masses, or scars and thyroid normal to palpation  RESP: lungs clear to auscultation - no rales, rhonchi or wheezes  CV: regular rate and rhythm, normal S1 S2, no S3 or S4, no murmur, click or rub, no peripheral edema and peripheral pulses strong  ABDOMEN: soft, nontender, no hepatosplenomegaly, no masses and bowel sounds normal  MS: no gross musculoskeletal defects noted, no edema    Results for orders placed or performed in visit on 07/11/18   UA with Microscopic   Result Value Ref Range    Color Urine Yellow     Appearance Urine Clear     Glucose Urine Negative NEG^Negative mg/dL    Bilirubin Urine Negative NEG^Negative    Ketones Urine Negative NEG^Negative mg/dL    Specific Gravity Urine <=1.005 1.003 - 1.035    pH Urine 6.0 5.0 - 7.0 pH    Protein Albumin Urine Negative NEG^Negative mg/dL    Urobilinogen Urine 0.2 0.2 - 1.0 EU/dL    Nitrite Urine Negative NEG^Negative    Blood Urine Small (A) NEG^Negative    Leukocyte Esterase Urine Negative NEG^Negative    Source Midstream Urine     WBC Urine 0 - 5 OTO5^0 - 5 /HPF    RBC Urine 2-5 (A) OTO2^O - 2 /HPF         ASSESSMENT/PLAN:       ICD-10-CM    1. Left inguinal pain R10.32 US Abdomen Limited     UA with Microscopic       62-year-old presents with left inguinal pain.  Physical examination unremarkable.  UA came back normal.  We will do ultrasound to rule out inguinal hernia.  Patient understood and in agreement with above plan.  All questions answered.      Alexandru Vera MD  [employer]

## 2018-07-11 NOTE — MR AVS SNAPSHOT
After Visit Summary   7/11/2018    Geovani Bustos    MRN: [id number]           Patient Information     Date Of Birth          1955        Visit Information        Provider Department      7/11/2018 1:40 PM Alexandru Vera MD [facility]        Today's Diagnoses     Groin pain, left    -  1      Care Instructions    Please call [phone] to schedule ultrasound.           Follow-ups after your visit        Your next 10 appointments already scheduled     Jul 25, 2018  1:00 PM CDT   Spirometry Bronchodilator with [facility]   [facility] ([facility])    [address] [address]   [phone]           No inhalers for 6 hours prior to test            Jul 27, 2018 10:00 AM CDT   [facility]    [address] [id number] [phone]            Aug 08, 2018  1:20 PM CDT   Office Visit with Alexandru Vera MD   [facility] ([facility])    [address] [id number] [phone]           Bring a current list of meds and any records pertaining to this visit. For Physicals, please bring immunization records and any forms needing to be filled out. Please arrive 10 minutes early to complete paperwork.              Future tests that were ordered for you today     Open Future Orders        Priority Expected Expires Ordered    US Abdomen Limited Routine  7/11/2019 7/11/2018            Who to contact     If you have questions or need follow up information about today's clinic visit or your schedule please contact [facility] directly at [phone].  Normal or non-critical lab and imaging results will be communicated to you by MyChart, letter or phone within 4 business days after the clinic has received the results. If you do not hear from us within 7  days, please contact the clinic through [employer] or phone. If you have a critical or abnormal lab result, we will notify you by phone as soon as possible.  Submit refill requests through [employer] or call your pharmacy and they will forward the refill request to us. Please allow 3 business days for your refill to be completed.          Additional Information About Your Visit        Care EveryWhere ID     This is your Care EveryWhere ID. This could be used by other organizations to access your [facility] medical records  [id number]        Your Vitals Were     Pulse Temperature Respirations Pulse Oximetry BMI (Body Mass Index)       67 98.7  F (37.1  C) (Tympanic) 16 90% 27.92 kg/m2        Blood Pressure from Last 3 Encounters:   07/11/18 110/76   05/02/18 130/76   04/25/18 112/72    Weight from Last 3 Encounters:   07/11/18 183 lb 9.6 oz (83.3 kg)   04/25/18 183 lb (83 kg)   04/20/18 181 lb 6.4 oz (82.3 kg)              We Performed the Following     UA with Microscopic        Primary Care Provider Office Phone # Fax #    Alexandru Ur MD [place] [phone]       [address] [facility] [facility] [address]        Equal Access to Services     MEENU AMATO : Hadii reece sullivano Sosheila, waaxda luqadaha, qaybta kaalmada adeegyada, maday whyte. So [facility] [phone].    ATENCIÓN: Si habla español, tiene a topete disposición servicios gratuitos de asistencia lingüística. Llame al [phone].    We comply with applicable federal civil rights laws and [place] laws. We do not discriminate on the basis of race, color, national origin, age, disability, sex, sexual orientation, or gender identity.            Thank you!     Thank you for choosing [facility]  for your care. Our goal is always to provide you with excellent care. Hearing back from our patients is one way we can continue to improve our services. Please take a few minutes to complete the written survey that you may  receive in the mail after your visit with us. Thank you!             Your Updated Medication List - Protect others around you: Learn how to safely use, store and throw away your medicines at www.disposemymeds.org.          This list is accurate as of 7/11/18  2:44 PM.  Always use your most recent med list.                   Brand Name Dispense Instructions for use Diagnosis    ABILIFY 10 MG tablet   Generic drug:  ARIPiprazole      1 TABLET DAILY        acetaminophen 325 MG tablet    TYLENOL    100 tablet    Take 2 tablets (650 mg) by mouth every 4 hours as needed for mild pain    Cigarette nicotine dependence with nicotine-induced disorder       amoxicillin 250 MG capsule    AMOXIL     Take 250 mg by mouth as needed        aspirin 81 MG EC tablet     90 tablet    Take 1 tablet (81 mg) by mouth daily    Hyperlipidemia LDL goal <130       augmented betamethasone dipropionate 0.05 % ointment    DIPROLENE-AF    45 g    Apply to AA twice daily 1-2 weeks then as needed only. Do not apply to face. (Due for Derm recheck b4 next refill: [phone] to schedule)    Eczema of both external ears       carbamide peroxide 6.5 % otic solution    DEBROX    15 mL    Place 5 drops into both ears 2 times daily For 5 days August 1-5th and Feb 1-5 th  then return for irrigation after the 5th day.    Bilateral impacted cerumen       eucerin cream      Apply topically 2 times daily        finasteride 5 MG tablet    PROSCAR    90 tablet    TAKE ONE TABLET BY MOUTH EVERY DAY    BPH (benign prostatic hyperplasia)       fish Oil 1200 MG capsule     90 capsule    Take 1 capsule (1.2 g) by mouth daily    Hyperlipidemia LDL goal <130       fluticasone-vilanterol 100-25 MCG/INH oral inhaler    BREO ELLIPTA    1 Inhaler    Inhale 1 puff into the lungs daily    Simple chronic bronchitis (H)       gabapentin 100 MG capsule    NEURONTIN     Take 100 mg by mouth 2 times daily        levothyroxine 125 MCG tablet    SYNTHROID/LEVOTHROID    90 tablet     TAKE ONE TABLET BY MOUTH EVERY DAY    Acquired hypothyroidism       Nitrile Exam Gloves Medium Misc     4 each    1 each as needed Use PRN daily with administration of otic drops, body lotion and powder to treat dry itchy skin.    Eczema of external ear, bilateral, Bilateral impacted cerumen, Health Care Home       order for DME     1 Units    Equipment being ordered: Dynaflex insert    Left foot pain, S/P foot surgery, left       oxybutynin 5 MG tablet    DITROPAN    90 tablet    TAKE ONE TABLET BY MOUTH THREE TIMES DAILY    Benign prostatic hyperplasia, presence of lower urinary tract symptoms unspecified       polyethylene glycol powder    MIRALAX/GLYCOLAX    527 g    Take 17 g (1 capful) by mouth daily    Constipation, unspecified constipation type       REGULOID 48.57 % Powd   Generic drug:  Psyllium     369 g    Take 1 each by mouth See Admin Instructions Take 1 teaspoonful every day at 7pm and 9 pm.    Constipation       simvastatin 40 MG tablet    ZOCOR    90 tablet    TAKE ONE TABLET BY MOUTH EVERY DAY AT 9 PM    Hyperlipidemia LDL goal <130       traZODone 50 MG tablet    DESYREL     Take 50 mg by mouth At Bedtime        vitamin D 2000 units tablet     100 tablet    Take 2,000 Units by mouth daily    Vitamin D deficiency       ZOLOFT 100 MG tablet   Generic drug:  sertraline      2 TABLETS DAILY

## 2018-07-11 NOTE — NURSING NOTE
"Chief Complaint   Patient presents with     Groin Pain     left inguinal side 7 days       Initial /76  Pulse 67  Temp 98.7  F (37.1  C) (Tympanic)  Resp 16  Wt 183 lb 9.6 oz (83.3 kg)  SpO2 90%  BMI 27.92 kg/m2 Estimated body mass index is 27.92 kg/(m^2) as calculated from the following:    Height as of 4/25/18: 5' 8\" (1.727 m).    Weight as of this encounter: 183 lb 9.6 oz (83.3 kg).      Health Maintenance that is potentially due pending provider review:  NONE    n/a    Is there anyone who you would like to be able to receive your results? No  If yes have patient fill out S[facility]      "

## 2018-07-16 DIAGNOSIS — K59.00 CONSTIPATION, UNSPECIFIED CONSTIPATION TYPE: ICD-10-CM

## 2018-07-16 RX ORDER — POLYETHYLENE GLYCOL 3350 17 G/17G
POWDER, FOR SOLUTION ORAL
Qty: 527 G | Refills: 11 | Status: SHIPPED | OUTPATIENT
Start: 2018-07-16 | End: 2019-07-11

## 2018-07-16 NOTE — TELEPHONE ENCOUNTER
"Requested Prescriptions   Pending Prescriptions Disp Refills     polyethylene glycol (MIRALAX/GLYCOLAX) powder [Pharmacy Med Name: POLYETHYLENE GLYCOL 3350 17G/DOSE POWDER] 527 g      Sig: Take 17 g (1 capful) by mouth daily    Laxatives Protocol Passed    7/16/2018  1:52 PM       Passed - Patient is age 6 or older       Passed - Recent (12 mo) or future (30 days) visit within the authorizing provider's specialty    Patient had office visit in the last 12 months or has a visit in the next 30 days with authorizing provider or within the authorizing provider's specialty.  See \"Patient Info\" tab in inbasket, or \"Choose Columns\" in Meds & Orders section of the refill encounter.            polyethylene glycol (MIRALAX/GLYCOLAX) powder  Last Written Prescription Date:  06/04/2018  Last Fill Quantity: 527g,  # refills: 0   Last office visit: 7/11/2018 with prescribing provider:  STELLA Vera   Future Office Visit:   Next 5 appointments (look out 90 days)     Jul 27, 2018 10:00 AM CDT   Return Visit with Dakota Pinzon MD   [facility] ([address] [id number] [phone]            Aug 08, 2018  1:20 PM CDT   [facility]    [address] [place] [id number] [phone]                 Yara WALKER (R) (M)    "

## 2018-07-25 ENCOUNTER — HOSPITAL ENCOUNTER (OUTPATIENT)
Dept: ULTRASOUND IMAGING | Facility: CLINIC | Age: 63
End: 2018-07-25
Attending: FAMILY MEDICINE
Payer: MEDICARE

## 2018-07-25 ENCOUNTER — HOSPITAL ENCOUNTER (OUTPATIENT)
Dept: RESPIRATORY THERAPY | Facility: CLINIC | Age: 63
Discharge: HOME | End: 2018-07-25
Attending: INTERNAL MEDICINE | Admitting: INTERNAL MEDICINE
Payer: MEDICARE

## 2018-07-25 DIAGNOSIS — R10.32 LEFT INGUINAL PAIN: ICD-10-CM

## 2018-07-25 PROCEDURE — 76705 ECHO EXAM OF ABDOMEN: CPT

## 2018-07-25 PROCEDURE — 94060 EVALUATION OF WHEEZING: CPT

## 2018-07-25 PROCEDURE — 25000125 ZZHC RX 250

## 2018-07-25 PROCEDURE — 94060 EVALUATION OF WHEEZING: CPT | Mod: 26 | Performed by: INTERNAL MEDICINE

## 2018-07-25 RX ORDER — ALBUTEROL SULFATE 0.83 MG/ML
2.5 SOLUTION RESPIRATORY (INHALATION) ONCE
Status: COMPLETED | OUTPATIENT
Start: 2018-07-25 | End: 2018-07-25

## 2018-07-25 RX ADMIN — ALBUTEROL SULFATE 2.5 MG: 2.5 SOLUTION RESPIRATORY (INHALATION) at 09:47

## 2018-07-26 LAB
ERV-%PRED-PRE: 77 %
ERV-PRE: 0.76 L
ERV-PRED: 0.97 L
EXPTIME-PRE: 7.38 SEC
FEF2575-%PRED-POST: 83 %
FEF2575-%PRED-PRE: 55 %
FEF2575-POST: 2.18 L/SEC
FEF2575-PRE: 1.44 L/SEC
FEF2575-PRED: 2.6 L/SEC
FEFMAX-%PRED-PRE: 72 %
FEFMAX-PRE: 6.1 L/SEC
FEFMAX-PRED: 8.41 L/SEC
FEV1-%PRED-PRE: 82 %
FEV1-PRE: 2.64 L
FEV1FEV6-PRE: 71 %
FEV1FEV6-PRED: 78 %
FEV1FVC-PRE: 69 %
FEV1FVC-PRED: 75 %
FEV1SVC-PRE: 63 %
FEV1SVC-PRED: 71 %
FIFMAX-PRE: 4.99 L/SEC
FVC-%PRED-PRE: 92 %
FVC-PRE: 3.83 L
FVC-PRED: 4.16 L
IC-%PRED-PRE: 94 %
IC-PRE: 3.37 L
IC-PRED: 3.56 L
VC-%PRED-PRE: 92 %
VC-PRE: 4.21 L
VC-PRED: 4.53 L

## 2018-07-27 ENCOUNTER — OFFICE VISIT (OUTPATIENT)
Dept: PULMONOLOGY | Facility: CLINIC | Age: 63
End: 2018-07-27
Payer: MEDICARE

## 2018-07-27 VITALS
TEMPERATURE: 97.1 F | BODY MASS INDEX: 27.83 KG/M2 | HEIGHT: 68 IN | RESPIRATION RATE: 16 BRPM | SYSTOLIC BLOOD PRESSURE: 103 MMHG | HEART RATE: 60 BPM | WEIGHT: 183.6 LBS | DIASTOLIC BLOOD PRESSURE: 79 MMHG | OXYGEN SATURATION: 94 %

## 2018-07-27 DIAGNOSIS — J44.9 STAGE 1 MILD COPD BY GOLD CLASSIFICATION (H): Primary | ICD-10-CM

## 2018-07-27 PROCEDURE — 99214 OFFICE O/P EST MOD 30 MIN: CPT

## 2018-07-27 ASSESSMENT — PAIN SCALES - GENERAL: PAINLEVEL: NO PAIN (0)

## 2018-07-27 NOTE — PROGRESS NOTES
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  We talked for a while about what COPD is today.  It sounds like he has had decent improvement in his respiratory symptoms after starting an inhaled corticosteroid as well as long-acting beta agonist.  I think it be fine to continue this medication for 1 puff once a day inhaler.  At this point time, his vaccinations are up-to-date.  However he will need a Pneumovax vaccine when he turns 63 years old.  He will also need an influenza vaccine this winter.  I encouraged him to continue exercise.  I also encouraged him to quit smoking.  I do not believe he is even in the pre-contemplative stage of quitting smoking.    He can follow-up with further cares for his COPD with his primary care physician.  There is any deterioration in his symptoms, he is always welcome to come back and see me again.      Silver Pinzon MD          Today's visit note:     Chief Complaint: Geovani uBstos is a 62 year old year old male who is being seen for RECHECK      HISTORY OF PRESENT ILLNESS:  This is a 62-year-old male who is presenting to the pulmonary clinic today for follow-up evaluation.    At her last visit he had some evidence of airflow obstruction on his spirometry.  As entertaining the diagnosis of potentially asthma, even though COPD seems like the likely diagnosis.  He was put on Breo and had repeat spirometry done about 3 months later.  He is here today to discuss results of those findings.    He states that he does get short of breath with exertion.  He has been playing softball when he notices this.  He also gets short of breath when he does some heavy lifting.  He still smoking about 1 pack a day.  Has a smoker's cough.  His weight is stable, and he does not have any hemoptysis.  He thinks that the Breo inhaler has helped with his symptoms.               Past Medical and Surgical  History:     Past Medical History:   Diagnosis Date     Cerumen impaction      Chronic kidney disease      Colon polyps      Hyperlipidemia      Mitral valve prolapse      Schizophrenia (H)      Ulcerated penile lesions      VSD (ventricular septal defect)      Past Surgical History:   Procedure Laterality Date     ARTHRODESIS FOOT Right 1/19/2016    Procedure: ARTHRODESIS FOOT;  Surgeon: Holden Harrison DPM;  Location: [facility]     ARTHRODESIS FOOT Left 1/3/2017    Procedure: ARTHRODESIS FOOT;  Surgeon: Holden Harrison DPM;  Location: [facility]     SURGICAL HISTORY OF -       leg fracture           Family History:     Family History   Problem Relation Age of Onset     Emphysema Mother      Coronary Artery Disease Father               Social History:     Social History     Social History     Marital status: Single     Spouse name: N/A     Number of children: N/A     Years of education: N/A     Occupational History     Not on file.     Social History Main Topics     Smoking status: Current Every Day Smoker     Packs/day: 1.00     Years: 47.00     Types: Cigarettes     Smokeless tobacco: Never Used     Alcohol use No     Drug use: No     Sexual activity: No     Other Topics Concern     Blood Transfusions No     Social History Narrative    Patient has no interest in smoking cessation.     Her for [employer] physical - likes to Bowl    Lives in Group home. Grew up in [place].             Medications:     Current Outpatient Prescriptions   Medication     ABILIFY 10 MG OR TABS     acetaminophen (TYLENOL) 325 MG tablet     amoxicillin (AMOXIL) 250 MG capsule     aspirin 81 MG EC tablet     augmented betamethasone dipropionate (DIPROLENE-AF) 0.05 % ointment     carbamide peroxide (DEBROX) 6.5 % otic solution     Cholecalciferol (VITAMIN D) 2000 units tablet     Disposable Gloves (NITRILE EXAM GLOVES MEDIUM) MISC     finasteride (PROSCAR) 5 MG tablet     fluticasone-vilanterol (BREO ELLIPTA) 100-25 MCG/INH  "oral inhaler     gabapentin (NEURONTIN) 100 MG capsule     levothyroxine (SYNTHROID/LEVOTHROID) 125 MCG tablet     Omega-3 Fatty Acids (FISH OIL) 1200 MG capsule     order for DME     oxybutynin (DITROPAN) 5 MG tablet     polyethylene glycol (MIRALAX/GLYCOLAX) powder     REGULOID 48.57 % POWD     simvastatin (ZOCOR) 40 MG tablet     Skin Protectants, Misc. (EUCERIN) cream     ZOLOFT 100 MG OR TABS     traZODone (DESYREL) 50 MG tablet     No current facility-administered medications for this visit.      Facility-Administered Medications Ordered in Other Visits   Medication     bupivacaine (MARCAINE) injection 0.5%            Review of Systems:       A complete review of systems was otherwise negative except as noted in the HPI.      PHYSICAL EXAM:  /79 (BP Location: Right arm, Patient Position: Chair, Cuff Size: Adult Large)  Pulse 60  Temp 97.1  F (36.2  C) (Temporal)  Resp 16  Ht 5' 8\" (1.727 m)  Wt 183 lb 9.6 oz (83.3 kg)  SpO2 94%  BMI 27.92 kg/m2     General: Well developed, well nourished, No apparent distress  Eyes: Anicteric  Ears: Hearing grossly normal  Mouth: Oral mucosa is moist, without any lesions. No oropharyngeal exudate.  Neck: supple, no thyromegaly  Lymphatics: No cervical or supraclavicular nodes  Respiratory: Good air movement. No crackles. No rhonchi. No wheezes  Cardiac: RRR, normal S1, S2. No murmurs. No JVD  Musculoskeletal: Extremities normal. No clubbing. No cyanosis. No edema.  Skin: No rash on limited exam  Neuro: Normal mentation. Normal speech.  Psych:Normal affect           Data:   All laboratory and imaging data reviewed.      PFT:   FEV1/FVC ratio is 0.68.  Postbronchodilator FVC is 4.18 L which is 100% predicted.  His FEV1 is 2.84 L which is 88% predicted.  There is no significant bronchodilator response.     PFT Interpretation:  Mild airflow obstruction  Valid Maneuver    Recent Results (from the past 168 hour(s))   PFT General Lab Testing - scheduling order    " Collection Time: 07/25/18  9:30 AM   Result Value Ref Range    FVC-Pred 4.16 L    FVC-Pre 3.83 L    FVC-%Pred-Pre 92 %    FEV1-Pre 2.64 L    FEV1-%Pred-Pre 82 %    FEV1FVC-Pred 75 %    FEV1FVC-Pre 69 %    FEFMax-Pred 8.41 L/sec    FEFMax-Pre 6.10 L/sec    FEFMax-%Pred-Pre 72 %    FEF2575-Pred 2.60 L/sec    FEF2575-Pre 1.44 L/sec    UZU4169-%Pred-Pre 55 %    FEF2575-Post 2.18 L/sec    ABK0512-%Pred-Post 83 %    ExpTime-Pre 7.38 sec    FIFMax-Pre 4.99 L/sec    VC-Pred 4.53 L    VC-Pre 4.21 L    VC-%Pred-Pre 92 %    IC-Pred 3.56 L    IC-Pre 3.37 L    IC-%Pred-Pre 94 %    ERV-Pred 0.97 L    ERV-Pre 0.76 L    ERV-%Pred-Pre 77 %    FEV1FEV6-Pred 78 %    FEV1FEV6-Pre 71 %    FEV1SVC-Pred 71 %    FEV1SVC-Pre 63 %

## 2018-07-27 NOTE — MR AVS SNAPSHOT
"              After Visit Summary   7/27/2018    Geovani Bustos    MRN: [id number]           Patient Information     Date Of Birth          1955        Visit Information        Provider Department      7/27/2018 10:00 AM Dakota Pinzon MD [facility]        Today's Diagnoses     Stage 1 mild COPD by GOLD classification (H)    -  1       Follow-ups after your visit        Your next 10 appointments already scheduled     Aug 08, 2018  1:20 PM CDT   Office Visit with Alexandru Vera MD   [facility] ([facility])    [address] [address]   [phone]           Bring a current list of meds and any records pertaining to this visit. For Physicals, please bring immunization records and any forms needing to be filled out. Please arrive 10 minutes early to complete paperwork.              Who to contact     If you have questions or need follow up information about today's clinic visit or your schedule please contact [facility] directly at [phone].  Normal or non-critical lab and imaging results will be communicated to you by MyChart, letter or phone within 4 business days after the clinic has received the results. If you do not hear from us within 7 days, please contact the clinic through [employer] or phone. If you have a critical or abnormal lab result, we will notify you by phone as soon as possible.  Submit refill requests through [employer] or call your pharmacy and they will forward the refill request to us. Please allow 3 business days for your refill to be completed.          Additional Information About Your Visit        Care EveryWhere ID     This is your Care EveryWhere ID. This could be used by other organizations to access your [place] medical records  [id number]        Your Vitals Were     Pulse Temperature Respirations Height Pulse Oximetry BMI (Body Mass Index)    60 97.1  F (36.2  C) (Temporal) 16 5' 8\" " (1.727 m) 94% 27.92 kg/m2       Blood Pressure from Last 3 Encounters:   07/27/18 103/79   07/11/18 110/76   05/02/18 130/76    Weight from Last 3 Encounters:   07/27/18 183 lb 9.6 oz (83.3 kg)   07/11/18 183 lb 9.6 oz (83.3 kg)   04/25/18 183 lb (83 kg)              Today, you had the following     No orders found for display       Primary Care Provider Office Phone # Fax #    Alexandru Ro MD [place] [phone]       [address] [facility] [address]        Equal Access to Services     [facility] LM : Hadii reece sullivano Sosheila, waaxda luqadaha, qaybta kaalmada chang, maday whyte. So [facility] [phone].    ATENCIÓN: Si habla español, tiene a topete disposición servicios gratuitos de asistencia lingüística. [facility] [phone].    We comply with applicable federal civil rights laws and [place] laws. We do not discriminate on the basis of race, color, national origin, age, disability, sex, sexual orientation, or gender identity.            Thank you!     Thank you for choosing [facility]  for your care. Our goal is always to provide you with excellent care. Hearing back from our patients is one way we can continue to improve our services. Please take a few minutes to complete the written survey that you may receive in the mail after your visit with us. Thank you!             Your Updated Medication List - Protect others around you: Learn how to safely use, store and throw away your medicines at www.disposemymeds.org.          This list is accurate as of 7/27/18 12:29 PM.  Always use your most recent med list.                   Brand Name Dispense Instructions for use Diagnosis    ABILIFY 10 MG tablet   Generic drug:  ARIPiprazole      1 TABLET DAILY        acetaminophen 325 MG tablet    TYLENOL    100 tablet    Take 2 tablets (650 mg) by mouth every 4 hours as needed for mild pain    Cigarette nicotine dependence with nicotine-induced disorder        amoxicillin 250 MG capsule    AMOXIL     Take 250 mg by mouth as needed        aspirin 81 MG EC tablet     90 tablet    Take 1 tablet (81 mg) by mouth daily    Hyperlipidemia LDL goal <130       augmented betamethasone dipropionate 0.05 % ointment    DIPROLENE-AF    45 g    Apply to AA twice daily 1-2 weeks then as needed only. Do not apply to face. (Due for Derm recheck b4 next refill: [phone] to schedule)    Eczema of both external ears       carbamide peroxide 6.5 % otic solution    DEBROX    15 mL    Place 5 drops into both ears 2 times daily For 5 days August 1-5th and Feb 1-5 th  then return for irrigation after the 5th day.    Bilateral impacted cerumen       eucerin cream      Apply topically 2 times daily        finasteride 5 MG tablet    PROSCAR    90 tablet    TAKE ONE TABLET BY MOUTH EVERY DAY    BPH (benign prostatic hyperplasia)       fish Oil 1200 MG capsule     90 capsule    Take 1 capsule (1.2 g) by mouth daily    Hyperlipidemia LDL goal <130       fluticasone-vilanterol 100-25 MCG/INH oral inhaler    BREO ELLIPTA    1 Inhaler    Inhale 1 puff into the lungs daily    Simple chronic bronchitis (H)       gabapentin 100 MG capsule    NEURONTIN     Take 100 mg by mouth 2 times daily        levothyroxine 125 MCG tablet    SYNTHROID/LEVOTHROID    90 tablet    TAKE ONE TABLET BY MOUTH EVERY DAY    Acquired hypothyroidism       Nitrile Exam Gloves Medium Misc     4 each    1 each as needed Use PRN daily with administration of otic drops, body lotion and powder to treat dry itchy skin.    Eczema of external ear, bilateral, Bilateral impacted cerumen, Health Care Home       order for DME     1 Units    Equipment being ordered: Dynaflex insert    Left foot pain, S/P foot surgery, left       oxybutynin 5 MG tablet    DITROPAN    90 tablet    TAKE ONE TABLET BY MOUTH THREE TIMES DAILY    Benign prostatic hyperplasia, presence of lower urinary tract symptoms unspecified       polyethylene glycol powder     MIRALAX/GLYCOLAX    527 g    Take 17 g (1 capful) by mouth daily    Constipation, unspecified constipation type       REGULOID 48.57 % Powd   Generic drug:  Psyllium     369 g    Take 1 each by mouth See Admin Instructions Take 1 teaspoonful every day at 7pm and 9 pm.    Constipation       simvastatin 40 MG tablet    ZOCOR    90 tablet    TAKE ONE TABLET BY MOUTH EVERY DAY AT 9 PM    Hyperlipidemia LDL goal <130       traZODone 50 MG tablet    DESYREL     Take 50 mg by mouth At Bedtime        vitamin D 2000 units tablet     100 tablet    Take 2,000 Units by mouth daily    Vitamin D deficiency       ZOLOFT 100 MG tablet   Generic drug:  sertraline      2 TABLETS DAILY

## 2018-08-08 ENCOUNTER — OFFICE VISIT (OUTPATIENT)
Dept: FAMILY MEDICINE | Facility: CLINIC | Age: 63
End: 2018-08-08
Payer: MEDICARE

## 2018-08-08 VITALS
TEMPERATURE: 99.1 F | OXYGEN SATURATION: 92 % | HEART RATE: 77 BPM | RESPIRATION RATE: 24 BRPM | SYSTOLIC BLOOD PRESSURE: 118 MMHG | BODY MASS INDEX: 27.16 KG/M2 | DIASTOLIC BLOOD PRESSURE: 64 MMHG | WEIGHT: 178.6 LBS

## 2018-08-08 DIAGNOSIS — N02.B9 IGA NEPHROPATHY: ICD-10-CM

## 2018-08-08 DIAGNOSIS — F17.219 CIGARETTE NICOTINE DEPENDENCE WITH NICOTINE-INDUCED DISORDER: Chronic | ICD-10-CM

## 2018-08-08 DIAGNOSIS — H61.23 BILATERAL IMPACTED CERUMEN: ICD-10-CM

## 2018-08-08 DIAGNOSIS — J44.9 CHRONIC OBSTRUCTIVE PULMONARY DISEASE, UNSPECIFIED COPD TYPE (H): Primary | ICD-10-CM

## 2018-08-08 DIAGNOSIS — E55.9 VITAMIN D DEFICIENCY: ICD-10-CM

## 2018-08-08 DIAGNOSIS — E03.9 ACQUIRED HYPOTHYROIDISM: Chronic | ICD-10-CM

## 2018-08-08 DIAGNOSIS — N18.30 CKD (CHRONIC KIDNEY DISEASE) STAGE 3, GFR 30-59 ML/MIN (H): ICD-10-CM

## 2018-08-08 DIAGNOSIS — F20.9 SCHIZOPHRENIA, UNSPECIFIED TYPE (H): Chronic | ICD-10-CM

## 2018-08-08 LAB — TSH SERPL DL<=0.005 MIU/L-ACNC: 2.13 MU/L (ref 0.4–4)

## 2018-08-08 PROCEDURE — 84443 ASSAY THYROID STIM HORMONE: CPT | Performed by: FAMILY MEDICINE

## 2018-08-08 PROCEDURE — 99214 OFFICE O/P EST MOD 30 MIN: CPT | Mod: 25 | Performed by: FAMILY MEDICINE

## 2018-08-08 PROCEDURE — 36415 COLL VENOUS BLD VENIPUNCTURE: CPT | Performed by: FAMILY MEDICINE

## 2018-08-08 PROCEDURE — 69209 REMOVE IMPACTED EAR WAX UNI: CPT | Performed by: FAMILY MEDICINE

## 2018-08-08 PROCEDURE — 82306 VITAMIN D 25 HYDROXY: CPT | Performed by: FAMILY MEDICINE

## 2018-08-08 ASSESSMENT — PAIN SCALES - GENERAL: PAINLEVEL: NO PAIN (0)

## 2018-08-08 NOTE — PROGRESS NOTES
SUBJECTIVE:   Geovani Bustos is a 62 year old male who presents to clinic today for the following health issues:    Here with [facility] staff Ethan Driscoll  COPD Follow-Up    Symptoms are currently: stable    Current fatigue or dyspnea with ambulation: stable     Shortness of breath: stable    Increased or change in Cough/Sputum: No    Fever(s): No    Baseline ambulation without stopping to rest:  1 blocks. Able to walk up 0 flights of stairs without stopping to rest.    Any ER/UC or hospital admissions since your last visit? No     History   Smoking Status     Current Every Day Smoker     Packs/day: 1.00     Years: 47.00     Types: Cigarettes   Smokeless Tobacco     Never Used     No results found for: FEV1, HLQ1ISO    Amount of exercise or physical activity: as tolerated, plays softball    Problems taking medications regularly: No    Medication side effects: none    Diet: low salt      Ear problem      Duration: usually has ear wash every 6 months    Description (location/character/radiation): ears feel full of wax (per staff)    Intensity:  mild    Accompanying signs and symptoms: 0 does not have sx from wax    History (similar episodes/previous evaluation): ear wash every 6 months    Precipitating or alleviating factors: None    Therapies tried and outcome:debrox           Problem list and histories reviewed & adjusted, as indicated.  Additional history: as documented    Patient Active Problem List   Diagnosis     Colon polyps     Schizophrenia (H)     VSD (ventricular septal defect)     Mitral valve prolapse     Acquired hypothyroidism     IgA nephropathy     Proteinuria     Hyperlipidemia LDL goal <130     BPH (benign prostatic hyperplasia)     Health Care Home     Bilateral impacted cerumen     Bunion of great toe of right foot     Eczema of external ear, bilateral     Tinea pedis, unspecified laterality     Incomplete right bundle branch block (RBBB)     Cigarette nicotine dependence with nicotine-induced  disorder     Gastroesophageal reflux disease without esophagitis     Stage 1 mild COPD by GOLD classification (H)     Past Surgical History:   Procedure Laterality Date     ARTHRODESIS FOOT Right 1/19/2016    Procedure: ARTHRODESIS FOOT;  Surgeon: Holden Harrison DPM;  Location: [facility] OR     ARTHRODESIS FOOT Left 1/3/2017    Procedure: ARTHRODESIS FOOT;  Surgeon: Holden Harrison DPM;  Location: [facility] OR     SURGICAL HISTORY OF -       leg fracture       Social History   Substance Use Topics     Smoking status: Current Every Day Smoker     Packs/day: 1.00     Years: 47.00     Types: Cigarettes     Smokeless tobacco: Never Used     Alcohol use No     Family History   Problem Relation Age of Onset     Emphysema Mother      Coronary Artery Disease Father          Current Outpatient Prescriptions   Medication Sig Dispense Refill     ABILIFY 10 MG OR TABS 1 TABLET DAILY       acetaminophen (TYLENOL) 325 MG tablet Take 2 tablets (650 mg) by mouth every 4 hours as needed for mild pain 100 tablet 0     amoxicillin (AMOXIL) 250 MG capsule Take 250 mg by mouth as needed        aspirin 81 MG EC tablet Take 1 tablet (81 mg) by mouth daily 90 tablet 1     augmented betamethasone dipropionate (DIPROLENE-AF) 0.05 % ointment Apply to AA twice daily 1-2 weeks then as needed only. Do not apply to face. (Due for Derm recheck b4 next refill: [phone] to schedule) 45 g 0     carbamide peroxide (DEBROX) 6.5 % otic solution Place 5 drops into both ears 2 times daily For 5 days August 1-5th and Feb 1-5 th  then return for irrigation after the 5th day. 15 mL 3     Cholecalciferol (VITAMIN D) 2000 units tablet Take 2,000 Units by mouth daily 100 tablet 3     Disposable Gloves (NITRILE EXAM GLOVES MEDIUM) MISC 1 each as needed Use PRN daily with administration of otic drops, body lotion and powder to treat dry itchy skin. 4 each 11     finasteride (PROSCAR) 5 MG tablet TAKE ONE TABLET BY MOUTH EVERY DAY 90 tablet 1      fluticasone-vilanterol (BREO ELLIPTA) 100-25 MCG/INH oral inhaler Inhale 1 puff into the lungs daily 1 Inhaler 11     gabapentin (NEURONTIN) 100 MG capsule Take 100 mg by mouth 2 times daily       levothyroxine (SYNTHROID/LEVOTHROID) 125 MCG tablet TAKE ONE TABLET BY MOUTH EVERY DAY 90 tablet 0     Omega-3 Fatty Acids (FISH OIL) 1200 MG capsule Take 1 capsule (1.2 g) by mouth daily 90 capsule 3     order for DME Equipment being ordered: Dynaflex insert 1 Units 0     oxybutynin (DITROPAN) 5 MG tablet TAKE ONE TABLET BY MOUTH THREE TIMES DAILY 90 tablet 1     polyethylene glycol (MIRALAX/GLYCOLAX) powder Take 17 g (1 capful) by mouth daily 527 g 11     REGULOID 48.57 % POWD Take 1 each by mouth See Admin Instructions Take 1 teaspoonful every day at 7pm and 9 pm. 369 g 11     simvastatin (ZOCOR) 40 MG tablet TAKE ONE TABLET BY MOUTH EVERY DAY AT 9 PM 90 tablet 0     Skin Protectants, Misc. (EUCERIN) cream Apply topically 2 times daily       traZODone (DESYREL) 50 MG tablet Take 50 mg by mouth At Bedtime       ZOLOFT 100 MG OR TABS 2 TABLETS DAILY       Allergies   Allergen Reactions     Nitrogen Oxide      Sulfa Drugs      Recent Labs   Lab Test  03/21/18   0900  02/07/18   1404  11/08/17   0840  10/11/17   1325   02/01/17   1457  12/21/16   1405  01/04/16   1640  01/04/16   0850   02/01/13   0815   05/14/12   0830   A1C  5.6   --    --    --    --    --    --    --    --    --   5.5   --   5.6   LDL   --    --   80   --    --   Cannot estimate LDL when triglyceride exceeds 400 mg/dL  89   --    --   104*   < >  117   < >  101   HDL   --    --   47   --    --   27*   --    --   48   < >  45   < >  43   TRIG   --    --   119   --    --   444*   --    --   163*   < >  123   < >  143   ALT   --    --   22   --    --   19   --   33  31   < >   --    < >   --    CR  1.36*  1.48*  1.31*   --    < >  1.21   --   1.27*   --    < >   --    < >   --    GFRESTIMATED  53*  48*  55*   --    < >  61   --   57*   --    < >   --    <  >   --    GFRESTBLACK  64  58*  67   --    < >  73   --   70   --    < >   --    < >   --    POTASSIUM  4.4  4.5  4.6   --    < >  4.1   --   4.1   --    < >   --    < >   --    TSH   --    --    --   1.24   --    --   0.82  2.68  2.18   < >   --    < >   --     < > = values in this interval not displayed.      BP Readings from Last 3 Encounters:   08/08/18 118/64   07/27/18 103/79   07/11/18 110/76    Wt Readings from Last 3 Encounters:   08/08/18 178 lb 9.6 oz (81 kg)   07/27/18 183 lb 9.6 oz (83.3 kg)   07/11/18 183 lb 9.6 oz (83.3 kg)                  Labs reviewed in EPIC    Reviewed and updated as needed this visit by clinical staff  Problems       Reviewed and updated as needed this visit by Provider         ROS:  Constitutional, HEENT, cardiovascular, pulmonary, GI, , musculoskeletal, neuro, skin, endocrine and psych systems are negative, except as otherwise noted.    OBJECTIVE:     /64  Pulse 77  Temp 99.1  F (37.3  C)  Resp 24  Wt 178 lb 9.6 oz (81 kg)  SpO2 92%  BMI 27.16 kg/m2  Body mass index is 27.16 kg/(m^2).  GENERAL: alert and no distress  EYES: Eyes grossly normal to inspection, PERRL and conjunctivae and sclerae normal  HENT: normal cephalic/atraumatic, right ear: external canal partially occluded with cerumen, tympanic membrane normal, left ear: external canal partially occluded with cerumen, tympanic membrane normal, oropharynx clear and oral mucous membranes moist  NECK: no adenopathy, no asymmetry, masses, or scars and thyroid normal to palpation  RESP: lungs clear to auscultation - no rales, rhonchi or wheezes  CV: regular rate and rhythm, normal S1 S2, no S3 or S4, no murmur, click or rub, no peripheral edema and peripheral pulses strong  ABDOMEN: soft, nontender, no hepatosplenomegaly, no masses and bowel sounds normal  MS: no gross musculoskeletal defects noted, no edema  SKIN: no suspicious lesions or rashes  NEURO: Normal strength and tone, mentation intact and speech  normal      ASSESSMENT/PLAN:     1. Chronic obstructive pulmonary disease, unspecified COPD type (H)  -Symptoms are well controlled, continue albuterol and breo inhaler  - COPD ACTION PLAN    2. Schizophrenia, unspecified type (H)  -Following psychiatrist, symptoms stable currently    3. CKD (chronic kidney disease) stage 3, GFR 30-59 ml/min  -Known to have CKD stage III due to allergy and nephropathy, following nephrology annually  -Suggested avoid nephrotoxic medications    4. IgA nephropathy  -As above    5. Cigarette nicotine dependence with nicotine-induced disorder  -Smoking cessation counseling provided, associated health hazards explained, not ready to quit currently    6. Acquired hypothyroidism  -Taking levothyroxine 125 mcg, TSH ordered, will titrate the dose accordingly  - TSH    7. Vitamin D deficiency  -Continue vitamin D supplement  - Vitamin D Deficiency    8. Bilateral impacted cerumen  -Bilateral partially impacted cerumen cleaned with irrigation as per patient desire by MA, home care explained  - REMOVE IMPACTED CERUMEN      Follow-up in 6 months or earlier if needed.  All questions answered.        Patient Instructions     How to Quit Smoking  Smoking is one of the hardest habits to break. About half of all people who have ever smoked have been able to quit. Most people who still smoke want to quit. Here are some of the best ways to stop smoking.    Keep trying  Most smokers make many attempts at quitting before they are successful. It s important not to give up.  Go cold turkey  Most former smokers quit cold turkey (all at once). Trying to cut back gradually doesn't seem to work as well, perhaps because it continues the smoking habit. Also, it is possible to inhale more while smoking fewer cigarettes. This results in the same amount of nicotine in your body.  Get support  Support programs can be a big help, especially for heavy smokers. These groups offer lectures, ways to change behavior, and  peer support. Here are some ways to find a support program:    Free national quitline: 800-QUIT-NOW (161-630-7225).    Hospital quit-smoking programs.    American Lung Association: (681.219.1768).    American Cancer Society (072-704-8233).  Support at home is important too. Nonsmokers can offer praise and encouragement. If the smoker in your life finds it hard to quit, encourage them to keep trying.  Over-the-counter medicines  Nicotine replacement therapy may make quitting easier. Certain aids, such as the nicotine patch, gum, and lozenges, are available without a prescription. It is best to use these under a doctor s care, though. The skin patch provides a steady supply of nicotine. Nicotine gum and lozenges give temporary bursts of low levels of nicotine. Both methods reduce the craving for cigarettes. Warning: If you have nausea, vomiting, dizziness, weakness, or a fast heartbeat, stop using these products and see your doctor.  Prescription medicines  After reviewing your smoking patterns and past attempts to quit, your doctor may offer a prescription medicine such as bupropion, varenicline, a nicotine inhaler, or nasal spray. Each has advantages and side effects. Your doctor can review these with you.  Health benefits of quitting  The benefits of quitting start right away and keep improving the longer you go without smoking. These benefits occur at any age.  So whether you are 17 or 70, quitting is a good decision. Some of the benefits include:    20 minutes: Blood pressure and pulse return to normal.    8 hours: Oxygen levels return to normal.    2 days: Ability to smell and taste begin to improve as damaged nerves regrow.    2 to 3 weeks: Circulation and lung function improve.    1 to 9 months: Coughing, congestion, and shortness of breath decrease; tiredness decreases.    1 year: Risk of heart attack decreases by half.    5 years: Risk of lung cancer decreases by half; risk of stroke becomes the same as a  "nonsmoker s.  For more on how to quit smoking, try these online resources:     Smokefree.gov    \"Clearing the Air\" booklet from the National Cancer [place]: smokefree.gov/sites/default/files/pdf/clearing-the-air-accessible.pdf  Date Last Reviewed: 3/1/2017    2807-2580 [employer]. [address] [place] [address]. All rights reserved. This information is not intended as a substitute for professional medical care. Always follow your healthcare professional's instructions.            Alexandru Vera MD  [facility]    "

## 2018-08-08 NOTE — MR AVS SNAPSHOT
After Visit Summary   8/8/2018    Geovani Bustos    MRN: [id number]           Patient Information     Date Of Birth          1955        Visit Information        Provider Department      8/8/2018 1:20 PM Alexandru Vera MD [facility]        Today's Diagnoses     Chronic obstructive pulmonary disease, unspecified COPD type (H)    -  1    Schizophrenia, unspecified type (H)        CKD (chronic kidney disease) stage 3, GFR 30-59 ml/min        IgA nephropathy        Cigarette nicotine dependence with nicotine-induced disorder        Acquired hypothyroidism        Vitamin D deficiency        Bilateral impacted cerumen          Care Instructions      How to Quit Smoking  Smoking is one of the hardest habits to break. About half of all people who have ever smoked have been able to quit. Most people who still smoke want to quit. Here are some of the best ways to stop smoking.    Keep trying  Most smokers make many attempts at quitting before they are successful. It s important not to give up.  Go cold turkey  Most former smokers quit cold turkey (all at once). Trying to cut back gradually doesn't seem to work as well, perhaps because it continues the smoking habit. Also, it is possible to inhale more while smoking fewer cigarettes. This results in the same amount of nicotine in your body.  Get support  Support programs can be a big help, especially for heavy smokers. These groups offer lectures, ways to change behavior, and peer support. Here are some ways to find a support program:    Free national quitline: 800-QUIT-NOW (085-067-8374).    [facility] quit-smoking programs.    American Lung Association: (561.748.6394).    American Cancer Society (066-555-3910).  Support at home is important too. Nonsmokers can offer praise and encouragement. If the smoker in your life finds it hard to quit, encourage them to keep trying.  Over-the-counter medicines  Nicotine replacement therapy may make  "quitting easier. Certain aids, such as the nicotine patch, gum, and lozenges, are available without a prescription. It is best to use these under a doctor s care, though. The skin patch provides a steady supply of nicotine. Nicotine gum and lozenges give temporary bursts of low levels of nicotine. Both methods reduce the craving for cigarettes. Warning: If you have nausea, vomiting, dizziness, weakness, or a fast heartbeat, stop using these products and see your doctor.  Prescription medicines  After reviewing your smoking patterns and past attempts to quit, your doctor may offer a prescription medicine such as bupropion, varenicline, a nicotine inhaler, or nasal spray. Each has advantages and side effects. Your doctor can review these with you.  Health benefits of quitting  The benefits of quitting start right away and keep improving the longer you go without smoking. These benefits occur at any age.  So whether you are 17 or 70, quitting is a good decision. Some of the benefits include:    20 minutes: Blood pressure and pulse return to normal.    8 hours: Oxygen levels return to normal.    2 days: Ability to smell and taste begin to improve as damaged nerves regrow.    2 to 3 weeks: Circulation and lung function improve.    1 to 9 months: Coughing, congestion, and shortness of breath decrease; tiredness decreases.    1 year: Risk of heart attack decreases by half.    5 years: Risk of lung cancer decreases by half; risk of stroke becomes the same as a nonsmoker s.  For more on how to quit smoking, try these online resources:     Smokefree.gov    \"Clearing the Air\" booklet from the National [employer]: smokefree.gov/sites/default/files/pdf/clearing-the-air-accessible.pdf  Date Last Reviewed: 3/1/2017    6691-4899 sourceasy. [address] [place] [address], [place] [address]. All rights reserved. This information is not intended as a substitute for professional medical care. Always follow your healthcare " professional's instructions.                Follow-ups after your visit        Follow-up notes from your care team     Return in about 6 months (around 2/8/2019).      Who to contact     If you have questions or need follow up information about today's clinic visit or your schedule please contact [facility] directly at [phone].  Normal or non-critical lab and imaging results will be communicated to you by MyChart, letter or phone within 4 business days after the clinic has received the results. If you do not hear from us within 7 days, please contact the clinic through MyChart or phone. If you have a critical or abnormal lab result, we will notify you by phone as soon as possible.  Submit refill requests through [employer] or call your pharmacy and they will forward the refill request to us. Please allow 3 business days for your refill to be completed.          Additional Information About Your Visit        Care EveryWhere ID     This is your Care EveryWhere ID. This could be used by other organizations to access your [facility] medical records  [id number]        Your Vitals Were     Pulse Temperature Respirations Pulse Oximetry BMI (Body Mass Index)       77 99.1  F (37.3  C) 24 92% 27.16 kg/m2        Blood Pressure from Last 3 Encounters:   08/08/18 118/64   07/27/18 103/79   07/11/18 110/76    Weight from Last 3 Encounters:   08/08/18 178 lb 9.6 oz (81 kg)   07/27/18 183 lb 9.6 oz (83.3 kg)   07/11/18 183 lb 9.6 oz (83.3 kg)              We Performed the Following     COPD ACTION PLAN     REMOVE IMPACTED CERUMEN     TSH     Vitamin D Deficiency        Primary Care Provider Office Phone # Fax #    Alexandru Jayjay Vera [phone]       [address]  [facility] [address]        Equal Access to Services     MEENU AMATO AH: Laura Archuleta, emely mcdonald, maday king. So [facility] [phone].    ATENCIÓN: Jolene mario  español, tiene a topete disposición servicios gratuitos de asistencia lingüística. Reymundo schwartz [phone].    We comply with applicable federal civil rights laws and [place] laws. We do not discriminate on the basis of race, color, national origin, age, disability, sex, sexual orientation, or gender identity.            Thank you!     Thank you for choosing [facility]  for your care. Our goal is always to provide you with excellent care. Hearing back from our patients is one way we can continue to improve our services. Please take a few minutes to complete the written survey that you may receive in the mail after your visit with us. Thank you!             Your Updated Medication List - Protect others around you: Learn how to safely use, store and throw away your medicines at www.disposemymeds.org.          This list is accurate as of 8/8/18  1:44 PM.  Always use your most recent med list.                   Brand Name Dispense Instructions for use Diagnosis    ABILIFY 10 MG tablet   Generic drug:  ARIPiprazole      1 TABLET DAILY        acetaminophen 325 MG tablet    TYLENOL    100 tablet    Take 2 tablets (650 mg) by mouth every 4 hours as needed for mild pain    Cigarette nicotine dependence with nicotine-induced disorder       amoxicillin 250 MG capsule    AMOXIL     Take 250 mg by mouth as needed        aspirin 81 MG EC tablet     90 tablet    Take 1 tablet (81 mg) by mouth daily    Hyperlipidemia LDL goal <130       augmented betamethasone dipropionate 0.05 % ointment    DIPROLENE-AF    45 g    Apply to AA twice daily 1-2 weeks then as needed only. Do not apply to face. (Due for Derm recheck b4 next refill: [phone] to schedule)    Eczema of both external ears       carbamide peroxide 6.5 % otic solution    DEBROX    15 mL    Place 5 drops into both ears 2 times daily For 5 days August 1-5th and Feb 1-5 th  then return for irrigation after the 5th day.    Bilateral impacted cerumen       eucerin  cream      Apply topically 2 times daily        finasteride 5 MG tablet    PROSCAR    90 tablet    TAKE ONE TABLET BY MOUTH EVERY DAY    BPH (benign prostatic hyperplasia)       fish Oil 1200 MG capsule     90 capsule    Take 1 capsule (1.2 g) by mouth daily    Hyperlipidemia LDL goal <130       fluticasone-vilanterol 100-25 MCG/INH oral inhaler    BREO ELLIPTA    1 Inhaler    Inhale 1 puff into the lungs daily    Simple chronic bronchitis (H)       gabapentin 100 MG capsule    NEURONTIN     Take 100 mg by mouth 2 times daily        levothyroxine 125 MCG tablet    SYNTHROID/LEVOTHROID    90 tablet    TAKE ONE TABLET BY MOUTH EVERY DAY    Acquired hypothyroidism       Nitrile Exam Gloves Medium Misc     4 each    1 each as needed Use PRN daily with administration of otic drops, body lotion and powder to treat dry itchy skin.    Eczema of external ear, bilateral, Bilateral impacted cerumen, [facility]       order for DME     1 Units    Equipment being ordered: Dynaflex insert    Left foot pain, S/P foot surgery, left       oxybutynin 5 MG tablet    DITROPAN    90 tablet    TAKE ONE TABLET BY MOUTH THREE TIMES DAILY    Benign prostatic hyperplasia, presence of lower urinary tract symptoms unspecified       polyethylene glycol powder    MIRALAX/GLYCOLAX    527 g    Take 17 g (1 capful) by mouth daily    Constipation, unspecified constipation type       REGULOID 48.57 % Powd   Generic drug:  Psyllium     369 g    Take 1 each by mouth See Admin Instructions Take 1 teaspoonful every day at 7pm and 9 pm.    Constipation       simvastatin 40 MG tablet    ZOCOR    90 tablet    TAKE ONE TABLET BY MOUTH EVERY DAY AT 9 PM    Hyperlipidemia LDL goal <130       traZODone 50 MG tablet    DESYREL     Take 50 mg by mouth At Bedtime        vitamin D 2000 units tablet     100 tablet    Take 2,000 Units by mouth daily    Vitamin D deficiency       ZOLOFT 100 MG tablet   Generic drug:  sertraline      2 TABLETS DAILY

## 2018-08-08 NOTE — NURSING NOTE
"Chief Complaint   Patient presents with     Ear Problem       Initial There were no vitals taken for this visit. Estimated body mass index is 27.92 kg/(m^2) as calculated from the following:    Height as of 7/27/18: 5' 8\" (1.727 m).    Weight as of 7/27/18: 183 lb 9.6 oz (83.3 kg).      Health Maintenance that is potentially due pending provider review:  NONE    n/a    Geovani Bustos is a 62 year old  male who presents today for Ear Wash. with the complaint of fullness.    Ear exam showing wax occlusion in the both ear. The patients ear(s) were irrigated using a elephant ear wash system with hydrogen peroxide and tap water mild amount of wax extracted.  Patient tolerated procedure well.        Outcome:PCP checked ears following procedure with an otoscope and removed additional wax with a lighted ear curette    "

## 2018-08-08 NOTE — LETTER
August 9, 2018      Geovani Bustos  [address] [facility] [address]        Dear ,    TSH came back 2.13, within reference range.  Continue levothyroxine 125 mcg. Let us know if there are any questions.     Resulted Orders   TSH   Result Value Ref Range    TSH 2.13 0.40 - 4.00 mU/L       Sincerely,        Alexandru Vera MD

## 2018-08-08 NOTE — PATIENT INSTRUCTIONS

## 2018-08-09 LAB — DEPRECATED CALCIDIOL+CALCIFEROL SERPL-MC: 65 UG/L (ref 20–75)

## 2018-08-13 DIAGNOSIS — H61.23 BILATERAL IMPACTED CERUMEN: ICD-10-CM

## 2018-08-13 RX ORDER — CARBAMIDE PEROXIDE - 6.5% 65 MG/ML
SOLUTION/ DROPS TOPICAL
Qty: 15 ML | Refills: 3 | Status: SHIPPED | OUTPATIENT
Start: 2018-08-13 | End: 2020-06-12

## 2018-09-11 DIAGNOSIS — N40.0 BENIGN PROSTATIC HYPERPLASIA, PRESENCE OF LOWER URINARY TRACT SYMPTOMS UNSPECIFIED: Chronic | ICD-10-CM

## 2018-09-11 DIAGNOSIS — E03.9 ACQUIRED HYPOTHYROIDISM: Chronic | ICD-10-CM

## 2018-09-11 DIAGNOSIS — E78.5 HYPERLIPIDEMIA LDL GOAL <130: ICD-10-CM

## 2018-09-12 RX ORDER — LEVOTHYROXINE SODIUM 125 UG/1
TABLET ORAL
Qty: 30 TABLET | Refills: 0 | Status: SHIPPED | OUTPATIENT
Start: 2018-09-12 | End: 2018-11-05

## 2018-09-12 RX ORDER — SIMVASTATIN 40 MG
TABLET ORAL
Qty: 30 TABLET | Refills: 0 | Status: SHIPPED | OUTPATIENT
Start: 2018-09-12 | End: 2018-11-05

## 2018-09-12 RX ORDER — OXYBUTYNIN CHLORIDE 5 MG/1
TABLET ORAL
Qty: 90 TABLET | Refills: 0 | Status: SHIPPED | OUTPATIENT
Start: 2018-09-12 | End: 2018-11-05

## 2018-09-12 NOTE — TELEPHONE ENCOUNTER
"Requested Prescriptions   Pending Prescriptions Disp Refills     simvastatin (ZOCOR) 40 MG tablet [Pharmacy Med Name: SIMVASTATIN 40 MG TABLET] 30 tablet      Sig: TAKE ONE TABLET BY MOUTH EVERY DAY AT 9 PM    Statins Protocol Passed    9/11/2018  5:20 PM       Passed - LDL on file in past 12 months    Recent Labs   Lab Test  11/08/17   0840   LDL  80            Passed - No abnormal creatine kinase in past 12 months    No lab results found.            Passed - Recent (12 mo) or future (30 days) visit within the authorizing provider's specialty    Patient had office visit in the last 12 months or has a visit in the next 30 days with authorizing provider or within the authorizing provider's specialty.  See \"Patient Info\" tab in inbasket, or \"Choose Columns\" in Meds & Orders section of the refill encounter.           Passed - Patient is age 18 or older        oxybutynin (DITROPAN) 5 MG tablet [Pharmacy Med Name: OXYBUTYNIN CHLORIDE 5 MG TABLET] 90 tablet      Sig: TAKE ONE TABLET BY MOUTH THREE TIMES DAILY    Muscarinic Antagonists (Urinary Incontinence Agents) Passed    9/11/2018  5:20 PM       Passed - Recent (12 mo) or future (30 days) visit within the authorizing provider's specialty    Patient had office visit in the last 12 months or has a visit in the next 30 days with authorizing provider or within the authorizing provider's specialty.  See \"Patient Info\" tab in inbasket, or \"Choose Columns\" in Meds & Orders section of the refill encounter.           Passed - Medication is Oxybutynin and patient is 5 years of age or older       Passed - Patient does not have a diagnosis of glaucoma on the problem list    If glaucoma diagnosis is new, refer refill to physician.         Passed - Patient is 18 years of age or older        levothyroxine (SYNTHROID/LEVOTHROID) 125 MCG tablet [Pharmacy Med Name: LEVOTHYROXINE SODIUM 125 MCG TABLET] 30 tablet      Sig: TAKE ONE TABLET BY MOUTH EVERY DAY    Thyroid Protocol Passed    " "9/11/2018  5:20 PM       Passed - Patient is 12 years or older       Passed - Recent (12 mo) or future (30 days) visit within the authorizing provider's specialty    Patient had office visit in the last 12 months or has a visit in the next 30 days with authorizing provider or within the authorizing provider's specialty.  See \"Patient Info\" tab in inbasket, or \"Choose Columns\" in Meds & Orders section of the refill encounter.           Passed - Normal TSH on file in past 12 months    Recent Labs   Lab Test  08/08/18   1405   TSH  2.13              Last Written Prescription Date:  6/21/18  Last Fill Quantity: 90,  # refills: 0   Last office visit: 8/8/2018 with prescribing provider:  GHADA   Future Office Visit:   Next 5 appointments (look out 90 days)     Sep 27, 2018 11:30 AM CDT   [facility] A [facility]epeda [facility]   [facility]    [address] [facility] [address]   [phone]            Oct 09, 2018 10:00 AM CDT   Nurse Only with [place] PI ADAM/LPN   [facility]    [address] [facility] [address]   [phone]                   "

## 2018-09-26 ENCOUNTER — OFFICE VISIT (OUTPATIENT)
Dept: FAMILY MEDICINE | Facility: CLINIC | Age: 63
End: 2018-09-26
Payer: MEDICARE

## 2018-09-26 VITALS
WEIGHT: 187 LBS | HEART RATE: 62 BPM | RESPIRATION RATE: 16 BRPM | SYSTOLIC BLOOD PRESSURE: 110 MMHG | BODY MASS INDEX: 28.43 KG/M2 | TEMPERATURE: 98.3 F | OXYGEN SATURATION: 91 % | DIASTOLIC BLOOD PRESSURE: 72 MMHG

## 2018-09-26 DIAGNOSIS — J20.9 ACUTE BRONCHITIS WITH COEXISTING CONDITION REQUIRING PROPHYLACTIC TREATMENT: Primary | ICD-10-CM

## 2018-09-26 DIAGNOSIS — J44.9 STAGE 1 MILD COPD BY GOLD CLASSIFICATION (H): ICD-10-CM

## 2018-09-26 DIAGNOSIS — Z72.0 TOBACCO ABUSE: ICD-10-CM

## 2018-09-26 PROCEDURE — 99214 OFFICE O/P EST MOD 30 MIN: CPT | Performed by: FAMILY MEDICINE

## 2018-09-26 RX ORDER — BENZONATATE 100 MG/1
100 CAPSULE ORAL 3 TIMES DAILY PRN
Qty: 42 CAPSULE | Refills: 0 | Status: SHIPPED | OUTPATIENT
Start: 2018-09-26 | End: 2018-09-27 | Stop reason: ALTCHOICE

## 2018-09-26 RX ORDER — AZITHROMYCIN 250 MG/1
TABLET, FILM COATED ORAL
Qty: 6 TABLET | Refills: 0 | Status: SHIPPED | OUTPATIENT
Start: 2018-09-26 | End: 2018-10-16

## 2018-09-26 RX ORDER — ALBUTEROL SULFATE 90 UG/1
2 AEROSOL, METERED RESPIRATORY (INHALATION) EVERY 6 HOURS PRN
Qty: 1 INHALER | Refills: 1 | Status: SHIPPED | OUTPATIENT
Start: 2018-09-26 | End: 2019-12-03

## 2018-09-26 ASSESSMENT — PAIN SCALES - GENERAL: PAINLEVEL: NO PAIN (0)

## 2018-09-26 NOTE — PATIENT INSTRUCTIONS

## 2018-09-26 NOTE — PROGRESS NOTES
SUBJECTIVE:   Geovani Bustos is a 62 year old male who presents to clinic today for the following health issues:      ENT Symptoms             Symptoms: cc Present Absent Comment   Fever/Chills   x    Fatigue  x     Muscle Aches   x    Eye Irritation   x    Sneezing   x    Nasal Irvin/Drg  x     Sinus Pressure/Pain  x     Loss of smell   x    Dental pain   x    Sore Throat   x    Swollen Glands   x    Ear Pain/Fullness       Cough x   Green mucous   Wheeze  x     Chest Pain  x     Shortness of breath  x     Rash   x    Other         Symptom duration:  3 days   Symptom severity:  moderate   Treatments tried:  none   Contacts:  group home is sick x 5         Problem list and histories reviewed & adjusted, as indicated.  Additional history: as documented    Patient Active Problem List   Diagnosis     Colon polyps     Schizophrenia (H)     VSD (ventricular septal defect)     Mitral valve prolapse     Acquired hypothyroidism     IgA nephropathy     Proteinuria     Hyperlipidemia LDL goal <130     BPH (benign prostatic hyperplasia)     Health Care Home     Bilateral impacted cerumen     Bunion of great toe of right foot     Eczema of external ear, bilateral     Tinea pedis, unspecified laterality     Incomplete right bundle branch block (RBBB)     Cigarette nicotine dependence with nicotine-induced disorder     Gastroesophageal reflux disease without esophagitis     Stage 1 mild COPD by GOLD classification (H)     CKD (chronic kidney disease) stage 3, GFR 30-59 ml/min     Past Surgical History:   Procedure Laterality Date     ARTHRODESIS FOOT Right 1/19/2016    Procedure: ARTHRODESIS FOOT;  Surgeon: Holden Harrison DPM;  Location: [facility]     ARTHRODESIS FOOT Left 1/3/2017    Procedure: ARTHRODESIS FOOT;  Surgeon: Holden Harrison DPM;  Location: [facility] OR     SURGICAL HISTORY OF -       leg fracture       Social History   Substance Use Topics     Smoking status: Current Every Day Smoker     Packs/day: 1.00      Years: 47.00     Types: Cigarettes     Smokeless tobacco: Never Used      Comment: 16 cigs per day     Alcohol use No     Family History   Problem Relation Age of Onset     Emphysema Mother      Coronary Artery Disease Father          Current Outpatient Prescriptions   Medication Sig Dispense Refill     ABILIFY 10 MG OR TABS 1 TABLET DAILY       acetaminophen (TYLENOL) 325 MG tablet Take 2 tablets (650 mg) by mouth every 4 hours as needed for mild pain 100 tablet 0     amoxicillin (AMOXIL) 250 MG capsule Take 250 mg by mouth as needed        aspirin 81 MG EC tablet Take 1 tablet (81 mg) by mouth daily 90 tablet 1     augmented betamethasone dipropionate (DIPROLENE-AF) 0.05 % ointment Apply to AA twice daily 1-2 weeks then as needed only. Do not apply to face. (Due for Derm recheck b4 next refill: [phone] to schedule) 45 g 0     Cholecalciferol (VITAMIN D) 2000 units tablet Take 2,000 Units by mouth daily 100 tablet 3     Disposable Gloves (NITRILE EXAM GLOVES MEDIUM) MISC 1 each as needed Use PRN daily with administration of otic drops, body lotion and powder to treat dry itchy skin. 4 each 11     EAR DROPS EARWAX AID 6.5 % otic solution Place 5 drops into both ears 2 times daily For 5 days August 1-5th and Feb 1-5 th then return for irrigation after the 5th day. 15 mL 3     finasteride (PROSCAR) 5 MG tablet TAKE ONE TABLET BY MOUTH EVERY DAY 90 tablet 1     fluticasone-vilanterol (BREO ELLIPTA) 100-25 MCG/INH oral inhaler Inhale 1 puff into the lungs daily 1 Inhaler 11     gabapentin (NEURONTIN) 100 MG capsule Take 100 mg by mouth 2 times daily       levothyroxine (SYNTHROID/LEVOTHROID) 125 MCG tablet TAKE ONE TABLET BY MOUTH EVERY DAY 30 tablet 0     Omega-3 Fatty Acids (FISH OIL) 1200 MG capsule Take 1 capsule (1.2 g) by mouth daily 90 capsule 3     order for DME Equipment being ordered: Dynaflex insert 1 Units 0     oxybutynin (DITROPAN) 5 MG tablet TAKE ONE TABLET BY MOUTH THREE TIMES DAILY 90 tablet 0      polyethylene glycol (MIRALAX/GLYCOLAX) powder Take 17 g (1 capful) by mouth daily 527 g 11     REGULOID 48.57 % POWD Take 1 each by mouth See Admin Instructions Take 1 teaspoonful every day at 7pm and 9 pm. 369 g 11     simvastatin (ZOCOR) 40 MG tablet TAKE ONE TABLET BY MOUTH EVERY DAY AT 9 PM 30 tablet 0     Skin Protectants, Misc. (EUCERIN) cream Apply topically 2 times daily       traZODone (DESYREL) 50 MG tablet Take 50 mg by mouth At Bedtime       ZOLOFT 100 MG OR TABS 2 TABLETS DAILY       Allergies   Allergen Reactions     Nitrogen Oxide      Sulfa Drugs      Recent Labs   Lab Test  08/08/18   1405  03/21/18   0900  02/07/18   1404  11/08/17   0840  10/11/17   1325   02/01/17   1457   01/04/16   1640  01/04/16   0850   02/01/13   0815   05/14/12   0830   A1C   --   5.6   --    --    --    --    --    --    --    --    --   5.5   --   5.6   LDL   --    --    --   80   --    --   Cannot estimate LDL when triglyceride exceeds 400 mg/dL  89   --    --   104*   < >  117   < >  101   HDL   --    --    --   47   --    --   27*   --    --   48   < >  45   < >  43   TRIG   --    --    --   119   --    --   444*   --    --   163*   < >  123   < >  143   ALT   --    --    --   22   --    --   19   --   33  31   < >   --    < >   --    CR   --   1.36*  1.48*  1.31*   --    < >  1.21   --   1.27*   --    < >   --    < >   --    GFRESTIMATED   --   53*  48*  55*   --    < >  61   --   57*   --    < >   --    < >   --    GFRESTBLACK   --   64  58*  67   --    < >  73   --   70   --    < >   --    < >   --    POTASSIUM   --   4.4  4.5  4.6   --    < >  4.1   --   4.1   --    < >   --    < >   --    TSH  2.13   --    --    --   1.24   --    --    < >  2.68  2.18   < >   --    < >   --     < > = values in this interval not displayed.      BP Readings from Last 3 Encounters:   09/26/18 110/72   08/08/18 118/64   07/27/18 103/79    Wt Readings from Last 3 Encounters:   09/26/18 187 lb (84.8 kg)   08/08/18 178 lb 9.6 oz (81 kg)    07/27/18 183 lb 9.6 oz (83.3 kg)                  Labs reviewed in EPIC    Reviewed and updated as needed this visit by clinical staff  Tobacco  Allergies  Med Hx  Surg Hx  Fam Hx  Soc Hx      Reviewed and updated as needed this visit by Provider         ROS:  Constitutional, HEENT, cardiovascular, pulmonary, gi and gu systems are negative, except as otherwise noted.    OBJECTIVE:     /72  Pulse 62  Temp 98.3  F (36.8  C) (Tympanic)  Resp 16  Wt 187 lb (84.8 kg)  SpO2 91%  BMI 28.43 kg/m2  Body mass index is 28.43 kg/(m^2).  GENERAL: alert and no distress  EYES: Eyes grossly normal to inspection, PERRL and conjunctivae and sclerae normal  HENT: ear canals and TM's normal, nose and mouth without ulcers or lesions  NECK: no adenopathy, no asymmetry, masses, or scars and thyroid normal to palpation  RESP: few bibasilar crackles, no wheeze or rhonchi auscultated  CV: regular rates and rhythm, normal S1 S2, no S3 or S4 and no murmur, click or rub  ABDOMEN: soft, nontender, no hepatosplenomegaly, no masses and bowel sounds normal  MS: no gross musculoskeletal defects noted, no edema  SKIN: no suspicious lesions or rashes  NEURO: Normal strength and tone, sensory exam grossly normal and mentation intact    ASSESSMENT/PLAN:         ICD-10-CM    1. Acute bronchitis with coexisting condition requiring prophylactic treatment J20.9 azithromycin (ZITHROMAX) 250 MG tablet     albuterol (PROAIR HFA/PROVENTIL HFA/VENTOLIN HFA) 108 (90 Base) MCG/ACT inhaler     benzonatate (TESSALON) 100 MG capsule   2. Stage 1 mild COPD by GOLD classification (H) J44.9    3. Tobacco abuse Z72.0      62-year-old male presents with worsening cough and shortness of breath for last 3 days, known to have COPD, smokes about 16 cigarettes/day.  Physical examination as described above.  Suspect symptoms secondary to acute bronchitis, azithromycin, Tessalon and albuterol inhaler prescribed, common side effects discussed.  Smoking  cessation counseling provided, associated health hazards explained in detail, not ready to quit currently.  All questions answered.      Patient Instructions     Bronchitis, Antibiotic Treatment (Adult)    Bronchitis is an infection of the air passages (bronchial tubes) in your lungs. It often occurs when you have a cold. This illness is contagious during the first few days and is spread through the air by coughing and sneezing, or by direct contact (touching the sick person and then touching your own eyes, nose, or mouth).  Symptoms of bronchitis include cough with mucus (phlegm) and low-grade fever. Bronchitis usually lasts 7 to 14 days. Mild cases can be treated with simple home remedies. More severe infection is treated with an antibiotic.  Home care  Follow these guidelines when caring for yourself at home:    If your symptoms are severe, rest at home for the first 2 to 3 days. When you go back to your usual activities, don't let yourself get too tired.    Do not smoke. Also avoid being exposed to secondhand smoke.    You may use over-the-counter medicines to control fever or pain, unless another medicine was prescribed. If you have chronic liver or kidney disease or have ever had a stomach ulcer or gastrointestinal bleeding, talk with your healthcare provider before using these medicines. Also talk to your provider if you are taking medicine to prevent blood clots. Aspirin should never be given to anyone younger than 18 years of age who is ill with a viral infection or fever. It may cause severe liver or brain damage.    Your appetite may be poor, so a light diet is fine. Avoid dehydration by drinking 6 to 8 glasses of fluids per day (such as water, soft drinks, sports drinks, juices, tea, or soup). Extra fluids will help loosen secretions in the nose and lungs.    Over-the-counter cough, cold, and sore-throat medicines will not shorten the length of the illness, but they may be helpful to reduce symptoms.  (Note: Do not use decongestants if you have high blood pressure.)    Finish all antibiotic medicine. Do this even if you are feeling better after only a few days.  Follow-up care  Follow up with your healthcare provider, or as advised. If you had an X-ray or ECG (electrocardiogram), a specialist will review it. You will be notified of any new findings that may affect your care.  If you are age 65 or older, or if you have a chronic lung disease or condition that affects your immune system, or you smoke, ask your healthcare provider about getting a pneumococcal vaccine and a yearly flu shot (influenza vaccine).  When to seek medical advice  Call your healthcare provider right away if any of these occur:    Fever of 100.4 F (38 C) or higher, or as directed by your healthcare provider    Coughing up increased amounts of colored sputum    Weakness, drowsiness, headache, facial pain, ear pain, or a stiff neck  Call 911  Call 911 if any of these occur.    Coughing up blood    Worsening weakness, drowsiness, headache, or stiff neck    Trouble breathing, wheezing, or pain with breathing  Date Last Reviewed: 9/13/2015 2000-2017 [employer]. [address] [place] [address]. All rights reserved. This information is not intended as a substitute for professional medical care. Always follow your healthcare professional's instructions.            Alexandru Vera MD  [facility]

## 2018-09-26 NOTE — NURSING NOTE
"Chief Complaint   Patient presents with     Cough     chest tightness, green mucous       Initial /72  Pulse 62  Temp 98.3  F (36.8  C) (Tympanic)  Resp 16  Wt 187 lb (84.8 kg)  SpO2 91%  BMI 28.43 kg/m2 Estimated body mass index is 28.43 kg/(m^2) as calculated from the following:    Height as of 7/27/18: 5' 8\" (1.727 m).    Weight as of this encounter: 187 lb (84.8 kg).    Patient presents to the clinic using No DME    Health Maintenance that is potentially due pending provider review:  NONE    n/a    Is there anyone who you would like to be able to receive your results? No  If yes have patient fill out SIDNEY      "

## 2018-09-26 NOTE — MR AVS SNAPSHOT
After Visit Summary   9/26/2018    Geovani Bustos    MRN: [id number]           Patient Information     Date Of Birth          1955        Visit Information        Provider Department      9/26/2018 2:20 PM Alexandru Vera MD [facility]        Today's Diagnoses     Acute bronchitis with coexisting condition requiring prophylactic treatment    -  1    Stage 1 mild COPD by GOLD classification (H)          Care Instructions      Bronchitis, Antibiotic Treatment (Adult)    Bronchitis is an infection of the air passages (bronchial tubes) in your lungs. It often occurs when you have a cold. This illness is contagious during the first few days and is spread through the air by coughing and sneezing, or by direct contact (touching the sick person and then touching your own eyes, nose, or mouth).  Symptoms of bronchitis include cough with mucus (phlegm) and low-grade fever. Bronchitis usually lasts 7 to 14 days. Mild cases can be treated with simple home remedies. More severe infection is treated with an antibiotic.  Home care  Follow these guidelines when caring for yourself at home:    If your symptoms are severe, rest at home for the first 2 to 3 days. When you go back to your usual activities, don't let yourself get too tired.    Do not smoke. Also avoid being exposed to secondhand smoke.    You may use over-the-counter medicines to control fever or pain, unless another medicine was prescribed. If you have chronic liver or kidney disease or have ever had a stomach ulcer or gastrointestinal bleeding, talk with your healthcare provider before using these medicines. Also talk to your provider if you are taking medicine to prevent blood clots. Aspirin should never be given to anyone younger than 18 years of age who is ill with a viral infection or fever. It may cause severe liver or brain damage.    Your appetite may be poor, so a light diet is fine. Avoid dehydration by drinking 6 to 8  glasses of fluids per day (such as water, soft drinks, sports drinks, juices, tea, or soup). Extra fluids will help loosen secretions in the nose and lungs.    Over-the-counter cough, cold, and sore-throat medicines will not shorten the length of the illness, but they may be helpful to reduce symptoms. (Note: Do not use decongestants if you have high blood pressure.)    Finish all antibiotic medicine. Do this even if you are feeling better after only a few days.  Follow-up care  Follow up with your healthcare provider, or as advised. If you had an X-ray or ECG (electrocardiogram), a specialist will review it. You will be notified of any new findings that may affect your care.  If you are age 65 or older, or if you have a chronic lung disease or condition that affects your immune system, or you smoke, ask your healthcare provider about getting a pneumococcal vaccine and a yearly flu shot (influenza vaccine).  When to seek medical advice  Call your healthcare provider right away if any of these occur:    Fever of 100.4 F (38 C) or higher, or as directed by your healthcare provider    Coughing up increased amounts of colored sputum    Weakness, drowsiness, headache, facial pain, ear pain, or a stiff neck  Call 911  Call 911 if any of these occur.    Coughing up blood    Worsening weakness, drowsiness, headache, or stiff neck    Trouble breathing, wheezing, or pain with breathing  Date Last Reviewed: 9/13/2015 2000-2017 [employer]. [address] [place] [address]. All rights reserved. This information is not intended as a substitute for professional medical care. Always follow your healthcare professional's instructions.                Follow-ups after your visit        Follow-up notes from your care team     Return in about 3 months (around 12/26/2018).      Your next 10 appointments already scheduled     Sep 27, 2018 11:30 AM CDT   Return Visit with Luis A Zepeda MD   [place]  [facility] ([facility]    [address]  [facility] [address]   [phone]            Oct 09, 2018 10:00 AM CDT   Nurse Only with [place] PI ADAM/LPN   [facility]    [address] [facility] [address]   [phone]              Who to contact     If you have questions or need follow up information about today's clinic visit or your schedule please contact [facility] directly at [phone].  Normal or non-critical lab and imaging results will be communicated to you by MyChart, letter or phone within 4 business days after the clinic has received the results. If you do not hear from us within 7 days, please contact the clinic through MyChart or phone. If you have a critical or abnormal lab result, we will notify you by phone as soon as possible.  Submit refill requests through [employer] or call your pharmacy and they will forward the refill request to us. Please allow 3 business days for your refill to be completed.          Additional Information About Your Visit        Care EveryWhere ID     This is your Care EveryWhere ID. This could be used by other organizations to access your [facility] medical records  [id number]        Your Vitals Were     Pulse Temperature Respirations Pulse Oximetry BMI (Body Mass Index)       62 98.3  F (36.8  C) (Tympanic) 16 91% 28.43 kg/m2        Blood Pressure from Last 3 Encounters:   09/26/18 110/72   08/08/18 118/64   07/27/18 103/79    Weight from Last 3 Encounters:   09/26/18 187 lb (84.8 kg)   08/08/18 178 lb 9.6 oz (81 kg)   07/27/18 183 lb 9.6 oz (83.3 kg)              Today, you had the following     No orders found for display         Today's Medication Changes          These changes are accurate as of 9/26/18  2:30 PM.  If you have any questions, ask your nurse or doctor.               Start taking these medicines.        Dose/Directions    albuterol 108 (90 Base) MCG/ACT  inhaler   Commonly known as:  PROAIR HFA/PROVENTIL HFA/VENTOLIN HFA   Used for:  Acute bronchitis with coexisting condition requiring prophylactic treatment   Started by:  Alexandru Vera MD        Dose:  2 puff   Inhale 2 puffs into the lungs every 6 hours as needed for shortness of breath / dyspnea or wheezing   Quantity:  1 Inhaler   Refills:  1       azithromycin 250 MG tablet   Commonly known as:  ZITHROMAX   Used for:  Acute bronchitis with coexisting condition requiring prophylactic treatment   Started by:  Alexandru Vera MD        Two tablets first day, then one tablet daily for four days.   Quantity:  6 tablet   Refills:  0       benzonatate 100 MG capsule   Commonly known as:  TESSALON   Used for:  Acute bronchitis with coexisting condition requiring prophylactic treatment   Started by:  Alexandru Vera MD        Dose:  100 mg   Take 1 capsule (100 mg) by mouth 3 times daily as needed for cough   Quantity:  42 capsule   Refills:  0            Where to get your medicines      These medications were sent to [address] [address]    Hours:  test rx sent successfully  2/8/05  kr Phone:  [phone]     albuterol 108 (90 Base) MCG/ACT inhaler    azithromycin 250 MG tablet    benzonatate 100 MG capsule                Primary Care Provider Office Phone # Fax #    Alexandru Vera [phone]       [facility] [address]        Equal Access to Services     MOE AMATO AH: Hadnico Archuleta, waaxda luqadaha, qaybta kaalmada chang, maday mckenzie . So [facility] [phone].    ATENCIÓN: Si habla español, tiene a topete disposición servicios gratuitos de asistencia lingüística. Llame al [phone].    We comply with applicable federal civil rights laws and [place] laws. We do not discriminate on the basis of race, color, national origin, age,  disability, sex, sexual orientation, or gender identity.            Thank you!     Thank you for choosing [facility]  for your care. Our goal is always to provide you with excellent care. Hearing back from our patients is one way we can continue to improve our services. Please take a few minutes to complete the written survey that you may receive in the mail after your visit with us. Thank you!             Your Updated Medication List - Protect others around you: Learn how to safely use, store and throw away your medicines at www.disposemymeds.org.          This list is accurate as of 9/26/18  2:30 PM.  Always use your most recent med list.                   Brand Name Dispense Instructions for use Diagnosis    ABILIFY 10 MG tablet   Generic drug:  ARIPiprazole      1 TABLET DAILY        acetaminophen 325 MG tablet    TYLENOL    100 tablet    Take 2 tablets (650 mg) by mouth every 4 hours as needed for mild pain    Cigarette nicotine dependence with nicotine-induced disorder       albuterol 108 (90 Base) MCG/ACT inhaler    PROAIR HFA/PROVENTIL HFA/VENTOLIN HFA    1 Inhaler    Inhale 2 puffs into the lungs every 6 hours as needed for shortness of breath / dyspnea or wheezing    Acute bronchitis with coexisting condition requiring prophylactic treatment       amoxicillin 250 MG capsule    AMOXIL     Take 250 mg by mouth as needed        aspirin 81 MG EC tablet     90 tablet    Take 1 tablet (81 mg) by mouth daily    Hyperlipidemia LDL goal <130       augmented betamethasone dipropionate 0.05 % ointment    DIPROLENE-AF    45 g    Apply to AA twice daily 1-2 weeks then as needed only. Do not apply to face. (Due for Derm recheck b4 next refill: [phone] to schedule)    Eczema of both external ears       azithromycin 250 MG tablet    ZITHROMAX    6 tablet    Two tablets first day, then one tablet daily for four days.    Acute bronchitis with coexisting condition requiring prophylactic treatment        benzonatate 100 MG capsule    TESSALON    42 capsule    Take 1 capsule (100 mg) by mouth 3 times daily as needed for cough    Acute bronchitis with coexisting condition requiring prophylactic treatment       EAR DROPS EARWAX AID 6.5 % otic solution   Generic drug:  carbamide peroxide     15 mL    Place 5 drops into both ears 2 times daily For 5 days August 1-5th and Feb 1-5 th then return for irrigation after the 5th day.    Bilateral impacted cerumen       eucerin cream      Apply topically 2 times daily        finasteride 5 MG tablet    PROSCAR    90 tablet    TAKE ONE TABLET BY MOUTH EVERY DAY    BPH (benign prostatic hyperplasia)       fish Oil 1200 MG capsule     90 capsule    Take 1 capsule (1.2 g) by mouth daily    Hyperlipidemia LDL goal <130       fluticasone-vilanterol 100-25 MCG/INH inhaler    BREO ELLIPTA    1 Inhaler    Inhale 1 puff into the lungs daily    Simple chronic bronchitis (H)       gabapentin 100 MG capsule    NEURONTIN     Take 100 mg by mouth 2 times daily        levothyroxine 125 MCG tablet    SYNTHROID/LEVOTHROID    30 tablet    TAKE ONE TABLET BY MOUTH EVERY DAY    Acquired hypothyroidism       Nitrile Exam Gloves Medium Misc     4 each    1 each as needed Use PRN daily with administration of otic drops, body lotion and powder to treat dry itchy skin.    Eczema of external ear, bilateral, Bilateral impacted cerumen, Health Care Home       order for DME     1 Units    Equipment being ordered: Dynaflex insert    Left foot pain, S/P foot surgery, left       oxybutynin 5 MG tablet    DITROPAN    90 tablet    TAKE ONE TABLET BY MOUTH THREE TIMES DAILY    Benign prostatic hyperplasia, presence of lower urinary tract symptoms unspecified       polyethylene glycol powder    MIRALAX/GLYCOLAX    527 g    Take 17 g (1 capful) by mouth daily    Constipation, unspecified constipation type       REGULOID 48.57 % Powd   Generic drug:  Psyllium     369 g    Take 1 each by mouth See Admin Instructions  Take 1 teaspoonful every day at 7pm and 9 pm.    Constipation       simvastatin 40 MG tablet    ZOCOR    30 tablet    TAKE ONE TABLET BY MOUTH EVERY DAY AT 9 PM    Hyperlipidemia LDL goal <130       traZODone 50 MG tablet    DESYREL     Take 50 mg by mouth At Bedtime        vitamin D 2000 units tablet     100 tablet    Take 2,000 Units by mouth daily    Vitamin D deficiency       ZOLOFT 100 MG tablet   Generic drug:  sertraline      2 TABLETS DAILY

## 2018-09-27 ENCOUNTER — OFFICE VISIT (OUTPATIENT)
Dept: UROLOGY | Facility: CLINIC | Age: 63
End: 2018-09-27
Payer: MEDICARE

## 2018-09-27 ENCOUNTER — TELEPHONE (OUTPATIENT)
Dept: FAMILY MEDICINE | Facility: CLINIC | Age: 63
End: 2018-09-27

## 2018-09-27 DIAGNOSIS — R39.9 LOWER URINARY TRACT SYMPTOMS (LUTS): Primary | ICD-10-CM

## 2018-09-27 PROCEDURE — 99213 OFFICE O/P EST LOW 20 MIN: CPT | Performed by: UROLOGY

## 2018-09-27 RX ORDER — TAMSULOSIN HYDROCHLORIDE 0.4 MG/1
0.4 CAPSULE ORAL DAILY
Qty: 90 CAPSULE | Refills: 3 | Status: SHIPPED | OUTPATIENT
Start: 2018-09-27 | End: 2019-08-30

## 2018-09-27 NOTE — PROGRESS NOTES
Visit Date:   2018      REASON FOR VISIT TODAY:  Lower urinary tract symptoms.      BRIEF COURSE:  Mr. Bustos is a 62-year-old gentleman with history of schizophrenia who lives in a group home who is followed in our clinic for overactive bladder.  The patient had previously been followed by Dr. Stratton for numerous years, but recently in the last year or so, transferred care.  The patient is managing his symptoms with oxybutynin 5 mg p.o. t.i.d. and finasteride.  The patient notes he voids about once an hour on average, but sometimes as frequently as every 30 minutes.  He drinks only limited soda and coffee and denies constipation.  It is reported in the patient's record he had a cystoscopy by Dr. Stratton back in 2016.  The patient notes he continues with the same level of urgency and urge incontinence that he has been struggling with for some time.      PHYSICAL EXAMINATION:  His blood pressure is 110/72, pulse 62.  He is in no acute distress.      ASSESSMENT AND PLAN:  Over half of today's 15-minute visit was spent counseling the patient regarding his urination.  I suggested to Mr. Bustos that if he has never been on an alpha blocker, that would certainly be a reasonable thing to try at this point in time.  Oftentimes this is our first line agent, and so therefore is certainly worth giving it a shot.  The patient was given a prescription for tamsulosin 0.4 mg p.o. daily.  The patient will otherwise stay on his finasteride and oxybutynin for the time being.  If the patient has significant improvement in his symptoms with the tamsulosin, then we will consider trying to get the patient off some of his other medications.         RAQUEL CHAVES MD             D: 2018   T: 2018   MT: HARRISON      Name:     JAZZ BUSTOS   MRN:      [id number]        Account:      [id number]   :      1955           Visit Date:   2018      Document: [id number]

## 2018-09-27 NOTE — TELEPHONE ENCOUNTER
Per 09-26-18 OV-     1. Acute bronchitis with coexisting condition requiring prophylactic treatment J20.9 azithromycin (ZITHROMAX) 250 MG tablet       albuterol (PROAIR HFA/PROVENTIL HFA/VENTOLIN HFA) 108 (90 Base) MCG/ACT inhaler       benzonatate (TESSALON) 100 MG capsule   2. Stage 1 mild COPD by GOLD classification (H) J44.9     3. Tobacco abuse Z72.0        62-year-old male presents with worsening cough and shortness of breath for last 3 days, known to have COPD, smokes about 16 cigarettes/day.  Physical examination as described above.  Suspect symptoms secondary to acute bronchitis, azithromycin, Tessalon and albuterol inhaler prescribed, common side effects discussed.  Smoking cessation counseling provided, associated health hazards explained in detail, not ready to quit currently.  All questions answered.    As tessalon non- formulary, group home staff will admin OTC cough med or cough drops per standing order.  Dr. Vera- are you OK with this or recommend any other Rx?  MIROSLAVA Dhaliwal RN

## 2018-09-27 NOTE — TELEPHONE ENCOUNTER
Group [facility] says Dr Vera ordered  benzonatate (TESSALON) 100 MG capsule yesterday for his cough but insurance won't cover.  [facility] has Tessun CF (non alcohol)  and Cough Drops they could use.  They need a Discontinue order for the Tesselon. They could use the Tessun CF or Cough drops because this is on the standing order.   Fax order so [phone]

## 2018-09-27 NOTE — MR AVS SNAPSHOT
After Visit Summary   9/27/2018    Geovani Bustos    MRN: [id number]           Patient Information     Date Of Birth          1955        Visit Information        Provider Department      9/27/2018 11:30 AM Luis A Zepeda MD [facility]        Today's Diagnoses     Lower urinary tract symptoms (LUTS)    -  1       Follow-ups after your visit        Your next 10 appointments already scheduled     Dec 04, 2018 10:00 AM CST   [facility] with [facility]ru Vera MD   [facility]    [address] [facility] [id number] [phone]              Who to contact     If you have questions or need follow up information about today's clinic visit or your schedule please contact [facility] directly at [phone].  Normal or non-critical lab and imaging results will be communicated to you by MyChart, letter or phone within 4 business days after the clinic has received the results. If you do not hear from us within 7 days, please contact the clinic through MyChart or phone. If you have a critical or abnormal lab result, we will notify you by phone as soon as possible.  Submit refill requests through 2threads or call your pharmacy and they will forward the refill request to us. Please allow 3 business days for your refill to be completed.          Additional Information About Your Visit        Care EveryWhere ID     This is your Care EveryWhere ID. This could be used by other organizations to access your [facility] medical records  [id number]         Blood Pressure from Last 3 Encounters:   10/16/18 135/80   09/26/18 110/72   08/08/18 118/64    Weight from Last 3 Encounters:   10/16/18 83.5 kg (184 lb)   09/26/18 84.8 kg (187 lb)   08/08/18 81 kg (178 lb 9.6 oz)              Today, you had the following     No orders found for display         Today's Medication Changes          These changes are accurate as of 9/27/18 11:59  PM.  If you have any questions, ask your nurse or doctor.               Start taking these medicines.        Dose/Directions    tamsulosin 0.4 MG capsule   Commonly known as:  FLOMAX   Used for:  Lower urinary tract symptoms (LUTS)   Started by:  Luis A Zepeda MD        Dose:  0.4 mg   Take 1 capsule (0.4 mg) by mouth daily   Quantity:  90 capsule   Refills:  3         Stop taking these medicines if you haven't already. Please contact your care team if you have questions.     benzonatate 100 MG capsule   Commonly known as:  TESSALON   Stopped by:  Alexandru Vera MD                Where to get your medicines      These medications were sent to [address]  [address] [facility] [place] [address]    Hours:  test rx sent successfully  2/8/05   Phone:  [phone]     tamsulosin 0.4 MG capsule                Primary Care Provider Office Phone # Fax #    Alexandru Vera [phone]       [address] [place], [place] [id number]        Equal Access to Services     [facility]: Hadii aad ku hadasho Soomaali, waaxda luqadaha, qaybta kaalmada adeegyada, waxay william hayabdirizak mckenzie . So [facility] [phone].    ATENCIÓN: Si habla español, tiene a topete disposición servicios gratuitos de asistencia lingüística. Llame al [phone].    We comply with applicable federal civil rights laws and [place] laws. We do not discriminate on the basis of race, color, national origin, age, disability, sex, sexual orientation, or gender identity.            Thank you!     Thank you for choosing [facility]  for your care. Our goal is always to provide you with excellent care. Hearing back from our patients is one way we can continue to improve our services. Please take a few minutes to complete the written survey that you may receive in the mail after your visit with us. Thank you!             Your Updated Medication List -  Protect others around you: Learn how to safely use, store and throw away your medicines at www.disposemymeds.org.          This list is accurate as of 9/27/18 11:59 PM.  Always use your most recent med list.                   Brand Name Dispense Instructions for use Diagnosis    ABILIFY 10 MG tablet   Generic drug:  ARIPiprazole      1 TABLET DAILY        acetaminophen 325 MG tablet    TYLENOL    100 tablet    Take 2 tablets (650 mg) by mouth every 4 hours as needed for mild pain    Cigarette nicotine dependence with nicotine-induced disorder       albuterol 108 (90 Base) MCG/ACT inhaler    PROAIR HFA/PROVENTIL HFA/VENTOLIN HFA    1 Inhaler    Inhale 2 puffs into the lungs every 6 hours as needed for shortness of breath / dyspnea or wheezing    Acute bronchitis with coexisting condition requiring prophylactic treatment       augmented betamethasone dipropionate 0.05 % ointment    DIPROLENE-AF    45 g    Apply to AA twice daily 1-2 weeks then as needed only. Do not apply to face. (Due for Derm recheck b4 next refill: [phone] to schedule)    Eczema of both external ears       EAR DROPS EARWAX AID 6.5 % otic solution   Generic drug:  carbamide peroxide     15 mL    Place 5 drops into both ears 2 times daily For 5 days August 1-5th and Feb 1-5 th then return for irrigation after the 5th day.    Bilateral impacted cerumen       finasteride 5 MG tablet    PROSCAR    90 tablet    TAKE ONE TABLET BY MOUTH EVERY DAY    BPH (benign prostatic hyperplasia)       fish Oil 1200 MG capsule     90 capsule    Take 1 capsule (1.2 g) by mouth daily    Hyperlipidemia LDL goal <130       fluticasone-vilanterol 100-25 MCG/INH inhaler    BREO ELLIPTA    1 Inhaler    Inhale 1 puff into the lungs daily    Simple chronic bronchitis (H)       gabapentin 100 MG capsule    NEURONTIN     Take 100 mg by mouth 2 times daily        levothyroxine 125 MCG tablet    SYNTHROID/LEVOTHROID    30 tablet    TAKE ONE TABLET BY MOUTH EVERY DAY     Acquired hypothyroidism       Nitrile Exam Gloves Medium Misc     4 each    1 each as needed Use PRN daily with administration of otic drops, body lotion and powder to treat dry itchy skin.    Eczema of external ear, bilateral, Bilateral impacted cerumen, Health Care Home       order for DME     1 Units    Equipment being ordered: Dynaflex insert    Left foot pain, S/P foot surgery, left       oxybutynin 5 MG tablet    DITROPAN    90 tablet    TAKE ONE TABLET BY MOUTH THREE TIMES DAILY    Benign prostatic hyperplasia, presence of lower urinary tract symptoms unspecified       polyethylene glycol powder    MIRALAX/GLYCOLAX    527 g    Take 17 g (1 capful) by mouth daily    Constipation, unspecified constipation type       REGULOID 48.57 % Powd   Generic drug:  Psyllium     369 g    Take 1 each by mouth See Admin Instructions Take 1 teaspoonful every day at 7pm and 9 pm.    Constipation       simvastatin 40 MG tablet    ZOCOR    30 tablet    TAKE ONE TABLET BY MOUTH EVERY DAY AT 9 PM    Hyperlipidemia LDL goal <130       tamsulosin 0.4 MG capsule    FLOMAX    90 capsule    Take 1 capsule (0.4 mg) by mouth daily    Lower urinary tract symptoms (LUTS)       traZODone 50 MG tablet    DESYREL     Take 50 mg by mouth At Bedtime        ZOLOFT 100 MG tablet   Generic drug:  sertraline      2 TABLETS DAILY

## 2018-09-27 NOTE — PATIENT INSTRUCTIONS
Geovani Bustos    Male, 62 year old, 1955      OK to discontinue Tessalon caps as not covered by insurance.    Continue OTC cough med per standing order.      Dr. Vera  [facility]  [phone]

## 2018-10-08 DIAGNOSIS — L85.3 XEROSIS OF SKIN: Primary | ICD-10-CM

## 2018-10-08 NOTE — TELEPHONE ENCOUNTER
Reason for Call:  Medication or medication refill:    Do you use a [facility]?  Name of the pharmacy and phone number for the current request:  [facility]    Name of the medication requested: Eucerin    Other request:   LAST REFILL: 05/07/2018  LOV: 05/02/2018    Can we leave a detailed message on this number? Not Applicable    Phone number patient can be reached at: Home number on file [phone] (home)    Best Time: NA    Call taken on 10/8/2018 at 1:24 PM by Denise Behrendt

## 2018-10-08 NOTE — TELEPHONE ENCOUNTER
"Group home resident. Eucerin is on med list as a \"patient reported\" dose, once ordered by Provider, RN can refill.     Please sign order if appropriate.     Linnette Mitchell RN    "

## 2018-10-09 ENCOUNTER — ALLIED HEALTH/NURSE VISIT (OUTPATIENT)
Dept: FAMILY MEDICINE | Facility: CLINIC | Age: 63
End: 2018-10-09
Payer: MEDICARE

## 2018-10-09 DIAGNOSIS — E55.9 VITAMIN D DEFICIENCY: ICD-10-CM

## 2018-10-09 DIAGNOSIS — Z23 NEED FOR PROPHYLACTIC VACCINATION AND INOCULATION AGAINST INFLUENZA: Primary | ICD-10-CM

## 2018-10-09 DIAGNOSIS — E78.5 HYPERLIPIDEMIA LDL GOAL <130: ICD-10-CM

## 2018-10-09 PROCEDURE — 90682 RIV4 VACC RECOMBINANT DNA IM: CPT

## 2018-10-09 PROCEDURE — G0008 ADMIN INFLUENZA VIRUS VAC: HCPCS

## 2018-10-09 RX ORDER — ASPIRIN 81 MG
TABLET, DELAYED RELEASE (ENTERIC COATED) ORAL
Qty: 30 TABLET | Refills: 0 | Status: SHIPPED | OUTPATIENT
Start: 2018-10-09 | End: 2018-11-28

## 2018-10-09 RX ORDER — CHOLECALCIFEROL (VITAMIN D3) 50 MCG
TABLET ORAL
Qty: 100 TABLET | Refills: 0 | Status: SHIPPED | OUTPATIENT
Start: 2018-10-09 | End: 2019-05-02

## 2018-10-09 NOTE — MR AVS SNAPSHOT
After Visit Summary   10/9/2018    Geovani Bustos    MRN: [id number]           Patient Information     Date Of Birth          1955        Visit Information        Provider Department      10/9/2018 10:00 AM [place] PI ADAM/LPN [facility]        Today's Diagnoses     Need for prophylactic vaccination and inoculation against influenza    -  1       Follow-ups after your visit        Your next 10 appointments already scheduled     Dec 04, 2018 10:00 AM STEFANIE   SHORT with Alexandru Vera MD   [facility] ([facility]    [address] [facility] [id number] [phone]              Who to contact     If you have questions or need follow up information about today's clinic visit or your schedule please contact [facility] directly at [phone].  Normal or non-critical lab and imaging results will be communicated to you by MyChart, letter or phone within 4 business days after the clinic has received the results. If you do not hear from us within 7 days, please contact the clinic through MyChart or phone. If you have a critical or abnormal lab result, we will notify you by phone as soon as possible.  Submit refill requests through Smish or call your pharmacy and they will forward the refill request to us. Please allow 3 business days for your refill to be completed.          Additional Information About Your Visit        Care EveryWhere ID     This is your Care EveryWhere ID. This could be used by other organizations to access your [facility] medical records  [id number]         Blood Pressure from Last 3 Encounters:   09/26/18 110/72   08/08/18 118/64   07/27/18 103/79    Weight from Last 3 Encounters:   09/26/18 187 lb (84.8 kg)   08/08/18 178 lb 9.6 oz (81 kg)   07/27/18 183 lb 9.6 oz (83.3 kg)              We Performed the Following     ADMIN INFLUENZA (For MEDICARE Patients ONLY) []     FLU VACCINE, (RIV4) RECOMBINANT HA  ,  IM (FluBlok, egg free) [07159]- >18 YRS (FMG recommended  50-64 YRS)        Primary Care Provider Office Phone # Fax #    Alexandru Jayjay Vera [phone]       [address] [facility] [address]        Equal Access to Services     MEEN[facility] AM[facility] : Hadii aad ku hadasho Soomaali, waaxda luqadaha, qaybta kaalmada adeegyada, waxay idiin hayaan adeana luisa justinalfredo laaniya whyte. So [facility] [phone].    ATENCIÓN: Si habla español, tiene a topete disposición servicios gratuitos de asistencia lingüística. Llame al [phone].    We comply with applicable federal civil rights laws and [place] laws. We do not discriminate on the basis of race, color, national origin, age, disability, sex, sexual orientation, or gender identity.            Thank you!     Thank you for choosing [facility]  for your care. Our goal is always to provide you with excellent care. Hearing back from our patients is one way we can continue to improve our services. Please take a few minutes to complete the written survey that you may receive in the mail after your visit with us. Thank you!             Your Updated Medication List - Protect others around you: Learn how to safely use, store and throw away your medicines at www.disposemymeds.org.          This list is accurate as of 10/9/18 10:00 AM.  Always use your most recent med list.                   Brand Name Dispense Instructions for use Diagnosis    ABILIFY 10 MG tablet   Generic drug:  ARIPiprazole      1 TABLET DAILY        acetaminophen 325 MG tablet    TYLENOL    100 tablet    Take 2 tablets (650 mg) by mouth every 4 hours as needed for mild pain    Cigarette nicotine dependence with nicotine-induced disorder       albuterol 108 (90 Base) MCG/ACT inhaler    PROAIR HFA/PROVENTIL HFA/VENTOLIN HFA    1 Inhaler    Inhale 2 puffs into the lungs every 6 hours as needed for shortness of breath / dyspnea or wheezing    Acute bronchitis with coexisting condition requiring  prophylactic treatment       amoxicillin 250 MG capsule    AMOXIL     Take 250 mg by mouth as needed        aspirin 81 MG EC tablet     90 tablet    Take 1 tablet (81 mg) by mouth daily    Hyperlipidemia LDL goal <130       augmented betamethasone dipropionate 0.05 % ointment    DIPROLENE-AF    45 g    Apply to AA twice daily 1-2 weeks then as needed only. Do not apply to face. (Due for Derm recheck b4 next refill: [phone] to schedule)    Eczema of both external ears       azithromycin 250 MG tablet    ZITHROMAX    6 tablet    Two tablets first day, then one tablet daily for four days.    Acute bronchitis with coexisting condition requiring prophylactic treatment       EAR DROPS EARWAX AID 6.5 % otic solution   Generic drug:  carbamide peroxide     15 mL    Place 5 drops into both ears 2 times daily For 5 days August 1-5th and Feb 1-5 th then return for irrigation after the 5th day.    Bilateral impacted cerumen       eucerin cream     454 g    Apply topically 2 times daily    Xerosis of skin       finasteride 5 MG tablet    PROSCAR    90 tablet    TAKE ONE TABLET BY MOUTH EVERY DAY    BPH (benign prostatic hyperplasia)       fish Oil 1200 MG capsule     90 capsule    Take 1 capsule (1.2 g) by mouth daily    Hyperlipidemia LDL goal <130       fluticasone-vilanterol 100-25 MCG/INH inhaler    BREO ELLIPTA    1 Inhaler    Inhale 1 puff into the lungs daily    Simple chronic bronchitis (H)       gabapentin 100 MG capsule    NEURONTIN     Take 100 mg by mouth 2 times daily        levothyroxine 125 MCG tablet    SYNTHROID/LEVOTHROID    30 tablet    TAKE ONE TABLET BY MOUTH EVERY DAY    Acquired hypothyroidism       Nitrile Exam Gloves Medium Misc     4 each    1 each as needed Use PRN daily with administration of otic drops, body lotion and powder to treat dry itchy skin.    Eczema of external ear, bilateral, Bilateral impacted cerumen, Health Care [facility]       order for DME     1 Units    Equipment being ordered:  Dynaflex insert    Left foot pain, S/P foot surgery, left       oxybutynin 5 MG tablet    DITROPAN    90 tablet    TAKE ONE TABLET BY MOUTH THREE TIMES DAILY    Benign prostatic hyperplasia, presence of lower urinary tract symptoms unspecified       polyethylene glycol powder    MIRALAX/GLYCOLAX    527 g    Take 17 g (1 capful) by mouth daily    Constipation, unspecified constipation type       REGULOID 48.57 % Powd   Generic drug:  Psyllium     369 g    Take 1 each by mouth See Admin Instructions Take 1 teaspoonful every day at 7pm and 9 pm.    Constipation       simvastatin 40 MG tablet    ZOCOR    30 tablet    TAKE ONE TABLET BY MOUTH EVERY DAY AT 9 PM    Hyperlipidemia LDL goal <130       tamsulosin 0.4 MG capsule    FLOMAX    90 capsule    Take 1 capsule (0.4 mg) by mouth daily    Lower urinary tract symptoms (LUTS)       traZODone 50 MG tablet    DESYREL     Take 50 mg by mouth At Bedtime        vitamin D 2000 units tablet     100 tablet    Take 2,000 Units by mouth daily    Vitamin D deficiency       ZOLOFT 100 MG tablet   Generic drug:  sertraline      2 TABLETS DAILY

## 2018-10-09 NOTE — TELEPHONE ENCOUNTER
"Requested Prescriptions   Pending Prescriptions Disp Refills     QC LO-DOSE ASPIRIN 81 MG EC tablet [Pharmacy Med Name: ASPIRIN EC 81 MG TABLET DR]       Sig: Take 1 tablet (81 mg) by mouth daily    Analgesics (Non-Narcotic Tylenol and ASA Only) Passed    10/9/2018 11:52 AM       Passed - Recent (12 mo) or future (30 days) visit within the authorizing provider's specialty    Patient had office visit in the last 12 months or has a visit in the next 30 days with authorizing provider or within the authorizing provider's specialty.  See \"Patient Info\" tab in inbasket, or \"Choose Columns\" in Meds & Orders section of the refill encounter.           Passed - Patient is age 20 years or older    If ASA is flagged for ages under 20 years old. Forward to provider for confirmation Ryes Syndrome is not a concern.              Last Written Prescription Date:  3/1/18  Last Fill Quantity: 90,  # refills: 1   Last office visit: 10/9/2018 with prescribing provider:     Future Office Visit:   Next 5 appointments (look out 90 days)     Dec 04, 2018 10:00 AM CST   SHORT with Alexandru Vera MD   [facility]    [address] [place] [id number]   [phone]                   "

## 2018-10-09 NOTE — PROGRESS NOTES

## 2018-10-09 NOTE — TELEPHONE ENCOUNTER
"Requested Prescriptions   Pending Prescriptions Disp Refills     Cholecalciferol (VITAMIN D) 2000 units tablet [Pharmacy Med Name: VITAMIN D3 2000 UNIT TABLET] 100 tablet      Sig: TAKE ONE TABLET BY MOUTH EVERY DAY    Vitamin Supplements (Adult) Protocol Passed    10/9/2018 12:31 PM       Passed - High dose Vitamin D not ordered       Passed - Recent (12 mo) or future (30 days) visit within the authorizing provider's specialty    Patient had office visit in the last 12 months or has a visit in the next 30 days with authorizing provider or within the authorizing provider's specialty.  See \"Patient Info\" tab in inbasket, or \"Choose Columns\" in Meds & Orders section of the refill encounter.            Last Written Prescription Date:  4/25/18  Last Fill Quantity: 100,  # refills: 3   Last office visit: 10/9/2018 with prescribing provider:     Future Office Visit:   Next 5 appointments (look out 90 days)     Dec 04, 2018 10:00 AM CST   SHORT with Alexandru Vera MD   [facility] ([address] [address]   [phone]                   "

## 2018-10-16 ENCOUNTER — OFFICE VISIT (OUTPATIENT)
Dept: FAMILY MEDICINE | Facility: CLINIC | Age: 63
End: 2018-10-16
Payer: MEDICARE

## 2018-10-16 ENCOUNTER — RADIANT APPOINTMENT (OUTPATIENT)
Dept: GENERAL RADIOLOGY | Facility: CLINIC | Age: 63
End: 2018-10-16
Attending: FAMILY MEDICINE
Payer: MEDICARE

## 2018-10-16 VITALS
BODY MASS INDEX: 27.89 KG/M2 | WEIGHT: 184 LBS | OXYGEN SATURATION: 92 % | DIASTOLIC BLOOD PRESSURE: 80 MMHG | HEIGHT: 68 IN | HEART RATE: 85 BPM | SYSTOLIC BLOOD PRESSURE: 135 MMHG | RESPIRATION RATE: 18 BRPM | TEMPERATURE: 97.2 F

## 2018-10-16 DIAGNOSIS — J44.9 STAGE 1 MILD COPD BY GOLD CLASSIFICATION (H): ICD-10-CM

## 2018-10-16 DIAGNOSIS — R05.9 COUGH: Primary | ICD-10-CM

## 2018-10-16 DIAGNOSIS — Z72.0 TOBACCO ABUSE: ICD-10-CM

## 2018-10-16 DIAGNOSIS — R05.9 COUGH: ICD-10-CM

## 2018-10-16 PROCEDURE — 99213 OFFICE O/P EST LOW 20 MIN: CPT | Performed by: FAMILY MEDICINE

## 2018-10-16 PROCEDURE — 71046 X-RAY EXAM CHEST 2 VIEWS: CPT | Mod: FY

## 2018-10-16 RX ORDER — ALBUTEROL SULFATE 90 UG/1
2 AEROSOL, METERED RESPIRATORY (INHALATION) EVERY 4 HOURS PRN
Qty: 1 INHALER | Refills: 2 | Status: SHIPPED | OUTPATIENT
Start: 2018-10-16 | End: 2021-05-18

## 2018-10-16 RX ORDER — NICOTINE 21 MG/24HR
1 PATCH, TRANSDERMAL 24 HOURS TRANSDERMAL EVERY 24 HOURS
Qty: 30 PATCH | Refills: 1 | Status: SHIPPED | OUTPATIENT
Start: 2018-10-16 | End: 2022-05-18

## 2018-10-16 NOTE — MR AVS SNAPSHOT
"              After Visit Summary   10/16/2018    Geovani Bustos    MRN: [id number]           Patient Information     Date Of Birth          1955        Visit Information        Provider Department      10/16/2018 8:40 AM Alexandru Vera MD [facility]        Today's Diagnoses     Cough    -  1    Tobacco abuse           Follow-ups after your visit        Follow-up notes from your care team     Return in about 3 months (around 1/16/2019).      Your next 10 appointments already scheduled     Dec 04, 2018 10:00 AM CST   SHORT with Alexandru Vera MD   [facility] ([facility])    [address] [id number] [phone]              Who to contact     If you have questions or need follow up information about today's clinic visit or your schedule please contact [facility] directly at [phone].  Normal or non-critical lab and imaging results will be communicated to you by MyChart, letter or phone within 4 business days after the clinic has received the results. If you do not hear from us within 7 days, please contact the clinic through MyChart or phone. If you have a critical or abnormal lab result, we will notify you by phone as soon as possible.  Submit refill requests through [email or web address] or call your pharmacy and they will forward the refill request to us. Please allow 3 business days for your refill to be completed.          Additional Information About Your Visit        Care EveryWhere ID     This is your Care EveryWhere ID. This could be used by other organizations to access your [facility] medical records  [id number]        Your Vitals Were     Pulse Temperature Respirations Height Pulse Oximetry BMI (Body Mass Index)    85 97.2  F (36.2  C) (Tympanic) 18 5' 8\" (1.727 m) 92% 27.98 kg/m2       Blood Pressure from Last 3 Encounters:   10/16/18 135/80   09/26/18 110/72   08/08/18 118/64    Weight from Last 3 Encounters: "   10/16/18 184 lb (83.5 kg)   09/26/18 187 lb (84.8 kg)   08/08/18 178 lb 9.6 oz (81 kg)                 Today's Medication Changes          These changes are accurate as of 10/16/18  9:23 AM.  If you have any questions, ask your nurse or doctor.               Start taking these medicines.        Dose/Directions    nicotine 14 MG/24HR 24 hr patch   Commonly known as:  NICODERM CQ   Used for:  Tobacco abuse   Started by:  Alexandru Vera MD        Dose:  1 patch   Place 1 patch onto the skin every 24 hours   Quantity:  30 patch   Refills:  1         These medicines have changed or have updated prescriptions.        Dose/Directions    * albuterol 108 (90 Base) MCG/ACT inhaler   Commonly known as:  PROAIR HFA/PROVENTIL HFA/VENTOLIN HFA   This may have changed:  Another medication with the same name was added. Make sure you understand how and when to take each.   Used for:  Acute bronchitis with coexisting condition requiring prophylactic treatment   Changed by:  Alexandru Vera MD        Dose:  2 puff   Inhale 2 puffs into the lungs every 6 hours as needed for shortness of breath / dyspnea or wheezing   Quantity:  1 Inhaler   Refills:  1       * albuterol 108 (90 Base) MCG/ACT inhaler   Commonly known as:  PROAIR HFA/PROVENTIL HFA/VENTOLIN HFA   This may have changed:  You were already taking a medication with the same name, and this prescription was added. Make sure you understand how and when to take each.   Used for:  Tobacco abuse   Changed by:  Alexandru Vera MD        Dose:  2 puff   Inhale 2 puffs into the lungs every 4 hours as needed for shortness of breath / dyspnea or wheezing   Quantity:  1 Inhaler   Refills:  2       * Notice:  This list has 2 medication(s) that are the same as other medications prescribed for you. Read the directions carefully, and ask your doctor or other care provider to review them with you.         Where to get your medicines      These medications were sent to [facility] - [place], [place] - [address]  [address] [address]    Hours:  test rx sent successfully  2/8/05  kr Phone:  [phone]     albuterol 108 (90 Base) MCG/ACT inhaler    nicotine 14 MG/24HR 24 hr patch                Primary Care Provider Office Phone # Fax #    Alexandru Ro MD Ch[place] [phone]       [address] [facility] [place] [address]        Equal Access to Services     MOE AMATO AH: Hadii aad ku hadasho Soomaali, waaxda luqadaha, qaybta kaalmada adeegyada, waxay idiin hayaan adeeg kharaalfredo laaniya . So [facility] [phone].    ATENCIÓN: Si habla español, tiene a topete disposición servicios gratuitos de asistencia lingüística. Llame al [phone].    We comply with applicable federal civil rights laws and [place] laws. We do not discriminate on the basis of race, color, national origin, age, disability, sex, sexual orientation, or gender identity.            Thank you!     Thank you for choosing [facility]  for your care. Our goal is always to provide you with excellent care. Hearing back from our patients is one way we can continue to improve our services. Please take a few minutes to complete the written survey that you may receive in the mail after your visit with us. Thank you!             Your Updated Medication List - Protect others around you: Learn how to safely use, store and throw away your medicines at www.disposemymeds.org.          This list is accurate as of 10/16/18  9:23 AM.  Always use your most recent med list.                   Brand Name Dispense Instructions for use Diagnosis    ABILIFY 10 MG tablet   Generic drug:  ARIPiprazole      1 TABLET DAILY        acetaminophen 325 MG tablet    TYLENOL    100 tablet    Take 2 tablets (650 mg) by mouth every 4 hours as needed for mild pain    Cigarette nicotine dependence with nicotine-induced disorder       * albuterol 108 (90 Base) MCG/ACT inhaler    PROAIR HFA/PROVENTIL  HFA/VENTOLIN HFA    1 Inhaler    Inhale 2 puffs into the lungs every 6 hours as needed for shortness of breath / dyspnea or wheezing    Acute bronchitis with coexisting condition requiring prophylactic treatment       * albuterol 108 (90 Base) MCG/ACT inhaler    PROAIR HFA/PROVENTIL HFA/VENTOLIN HFA    1 Inhaler    Inhale 2 puffs into the lungs every 4 hours as needed for shortness of breath / dyspnea or wheezing    Tobacco abuse       augmented betamethasone dipropionate 0.05 % ointment    DIPROLENE-AF    45 g    Apply to AA twice daily 1-2 weeks then as needed only. Do not apply to face. (Due for Derm recheck b4 next refill: [phone] to schedule)    Eczema of both external ears       EAR DROPS EARWAX AID 6.5 % otic solution   Generic drug:  carbamide peroxide     15 mL    Place 5 drops into both ears 2 times daily For 5 days August 1-5th and Feb 1-5 th then return for irrigation after the 5th day.    Bilateral impacted cerumen       eucerin cream     454 g    Apply topically 2 times daily    Xerosis of skin       finasteride 5 MG tablet    PROSCAR    90 tablet    TAKE ONE TABLET BY MOUTH EVERY DAY    BPH (benign prostatic hyperplasia)       fish Oil 1200 MG capsule     90 capsule    Take 1 capsule (1.2 g) by mouth daily    Hyperlipidemia LDL goal <130       fluticasone-vilanterol 100-25 MCG/INH inhaler    BREO ELLIPTA    1 Inhaler    Inhale 1 puff into the lungs daily    Simple chronic bronchitis (H)       gabapentin 100 MG capsule    NEURONTIN     Take 100 mg by mouth 2 times daily        levothyroxine 125 MCG tablet    SYNTHROID/LEVOTHROID    30 tablet    TAKE ONE TABLET BY MOUTH EVERY DAY    Acquired hypothyroidism       nicotine 14 MG/24HR 24 hr patch    NICODERM CQ    30 patch    Place 1 patch onto the skin every 24 hours    Tobacco abuse       Nitrile Exam Gloves Medium Misc     4 each    1 each as needed Use PRN daily with administration of otic drops, body lotion and powder to treat dry itchy skin.     Eczema of external ear, bilateral, Bilateral impacted cerumen, Health Care Home       order for DME     1 Units    Equipment being ordered: Dynaflex insert    Left foot pain, S/P foot surgery, left       oxybutynin 5 MG tablet    DITROPAN    90 tablet    TAKE ONE TABLET BY MOUTH THREE TIMES DAILY    Benign prostatic hyperplasia, presence of lower urinary tract symptoms unspecified       polyethylene glycol powder    MIRALAX/GLYCOLAX    527 g    Take 17 g (1 capful) by mouth daily    Constipation, unspecified constipation type       QC LO-DOSE ASPIRIN 81 MG EC tablet   Generic drug:  aspirin     30 tablet    Take 1 tablet (81 mg) by mouth daily    Hyperlipidemia LDL goal <130       REGULOID 48.57 % Powd   Generic drug:  Psyllium     369 g    Take 1 each by mouth See Admin Instructions Take 1 teaspoonful every day at 7pm and 9 pm.    Constipation       simvastatin 40 MG tablet    ZOCOR    30 tablet    TAKE ONE TABLET BY MOUTH EVERY DAY AT 9 PM    Hyperlipidemia LDL goal <130       tamsulosin 0.4 MG capsule    FLOMAX    90 capsule    Take 1 capsule (0.4 mg) by mouth daily    Lower urinary tract symptoms (LUTS)       traZODone 50 MG tablet    DESYREL     Take 50 mg by mouth At Bedtime        vitamin D 2000 units tablet     100 tablet    TAKE ONE TABLET BY MOUTH EVERY DAY    Vitamin D deficiency       ZOLOFT 100 MG tablet   Generic drug:  sertraline      2 TABLETS DAILY        * Notice:  This list has 2 medication(s) that are the same as other medications prescribed for you. Read the directions carefully, and ask your doctor or other care provider to review them with you.

## 2018-10-16 NOTE — PROGRESS NOTES
SUBJECTIVE:   Geovani Bustos is a 62 year old male who presents to clinic today for the following health issues:      RESPIRATORY SYMPTOMS      Duration: 2 weeks     Description  nasal congestion, rhinorrhea, cough, chills, fatigue/malaise and hoarse voice    Severity: moderate    Accompanying signs and symptoms: Hard to breathe at times.     History (predisposing factors):  tobacco abuse    Precipitating or alleviating factors: had bronchitis and was having difficulty using the albuterol inhaler    Therapies tried and outcome:  rest and fluids, tired inhalers, z-pack on 9/26/18        Problem list and histories reviewed & adjusted, as indicated.  Additional history: as documented    Patient Active Problem List   Diagnosis     Colon polyps     Schizophrenia (H)     VSD (ventricular septal defect)     Mitral valve prolapse     Acquired hypothyroidism     IgA nephropathy     Proteinuria     Hyperlipidemia LDL goal <130     BPH (benign prostatic hyperplasia)     Health Care Home     Bilateral impacted cerumen     Bunion of great toe of right foot     Eczema of external ear, bilateral     Tinea pedis, unspecified laterality     Incomplete right bundle branch block (RBBB)     Cigarette nicotine dependence with nicotine-induced disorder     Gastroesophageal reflux disease without esophagitis     Stage 1 mild COPD by GOLD classification (H)     CKD (chronic kidney disease) stage 3, GFR 30-59 ml/min (H)     Past Surgical History:   Procedure Laterality Date     ARTHRODESIS FOOT Right 1/19/2016    Procedure: ARTHRODESIS FOOT;  Surgeon: Holden Harrison DPM;  Location: [facility] OR     ARTHRODESIS FOOT Left 1/3/2017    Procedure: ARTHRODESIS FOOT;  Surgeon: Holden Harrison DPM;  Location: [facility] OR     SURGICAL HISTORY OF -       leg fracture       Social History   Substance Use Topics     Smoking status: Current Every Day Smoker     Packs/day: 1.00     Years: 47.00     Types: Cigarettes     Smokeless tobacco: Never Used       Comment: 16 cigs per day     Alcohol use No     Family History   Problem Relation Age of Onset     Emphysema Mother      Coronary Artery Disease Father          Current Outpatient Prescriptions   Medication Sig Dispense Refill     ABILIFY 10 MG OR TABS 1 TABLET DAILY       acetaminophen (TYLENOL) 325 MG tablet Take 2 tablets (650 mg) by mouth every 4 hours as needed for mild pain 100 tablet 0     albuterol (PROAIR HFA/PROVENTIL HFA/VENTOLIN HFA) 108 (90 Base) MCG/ACT inhaler Inhale 2 puffs into the lungs every 6 hours as needed for shortness of breath / dyspnea or wheezing 1 Inhaler 1     augmented betamethasone dipropionate (DIPROLENE-AF) 0.05 % ointment Apply to AA twice daily 1-2 weeks then as needed only. Do not apply to face. (Due for Derm recheck b4 next refill: [phone] to schedule) 45 g 0     Cholecalciferol (VITAMIN D) 2000 units tablet TAKE ONE TABLET BY MOUTH EVERY  tablet 0     Disposable Gloves (NITRILE EXAM GLOVES MEDIUM) MISC 1 each as needed Use PRN daily with administration of otic drops, body lotion and powder to treat dry itchy skin. 4 each 11     EAR DROPS EARWAX AID 6.5 % otic solution Place 5 drops into both ears 2 times daily For 5 days August 1-5th and Feb 1-5 th then return for irrigation after the 5th day. 15 mL 3     finasteride (PROSCAR) 5 MG tablet TAKE ONE TABLET BY MOUTH EVERY DAY 90 tablet 1     fluticasone-vilanterol (BREO ELLIPTA) 100-25 MCG/INH oral inhaler Inhale 1 puff into the lungs daily 1 Inhaler 11     gabapentin (NEURONTIN) 100 MG capsule Take 100 mg by mouth 2 times daily       levothyroxine (SYNTHROID/LEVOTHROID) 125 MCG tablet TAKE ONE TABLET BY MOUTH EVERY DAY 30 tablet 0     Omega-3 Fatty Acids (FISH OIL) 1200 MG capsule Take 1 capsule (1.2 g) by mouth daily 90 capsule 3     order for DME Equipment being ordered: Dynaflex insert 1 Units 0     oxybutynin (DITROPAN) 5 MG tablet TAKE ONE TABLET BY MOUTH THREE TIMES DAILY 90 tablet 0     polyethylene glycol  (MIRALAX/GLYCOLAX) powder Take 17 g (1 capful) by mouth daily 527 g 11     QC LO-DOSE ASPIRIN 81 MG EC tablet Take 1 tablet (81 mg) by mouth daily 30 tablet 0     REGULOID 48.57 % POWD Take 1 each by mouth See Admin Instructions Take 1 teaspoonful every day at 7pm and 9 pm. 369 g 11     simvastatin (ZOCOR) 40 MG tablet TAKE ONE TABLET BY MOUTH EVERY DAY AT 9 PM 30 tablet 0     Skin Protectants, Misc. (EUCERIN) cream Apply topically 2 times daily 454 g 5     tamsulosin (FLOMAX) 0.4 MG capsule Take 1 capsule (0.4 mg) by mouth daily 90 capsule 3     traZODone (DESYREL) 50 MG tablet Take 50 mg by mouth At Bedtime       ZOLOFT 100 MG OR TABS 2 TABLETS DAILY       Allergies   Allergen Reactions     Nitrogen Oxide      Sulfa Drugs      Recent Labs   Lab Test  08/08/18   1405  03/21/18   0900  02/07/18   1404  11/08/17   0840  10/11/17   1325   02/01/17   1457   01/04/16   1640  01/04/16   0850   02/01/13   0815   05/14/12   0830   A1C   --   5.6   --    --    --    --    --    --    --    --    --   5.5   --   5.6   LDL   --    --    --   80   --    --   Cannot estimate LDL when triglyceride exceeds 400 mg/dL  89   --    --   104*   < >  117   < >  101   HDL   --    --    --   47   --    --   27*   --    --   48   < >  45   < >  43   TRIG   --    --    --   119   --    --   444*   --    --   163*   < >  123   < >  143   ALT   --    --    --   22   --    --   19   --   33  31   < >   --    < >   --    CR   --   1.36*  1.48*  1.31*   --    < >  1.21   --   1.27*   --    < >   --    < >   --    GFRESTIMATED   --   53*  48*  55*   --    < >  61   --   57*   --    < >   --    < >   --    GFRESTBLACK   --   64  58*  67   --    < >  73   --   70   --    < >   --    < >   --    POTASSIUM   --   4.4  4.5  4.6   --    < >  4.1   --   4.1   --    < >   --    < >   --    TSH  2.13   --    --    --   1.24   --    --    < >  2.68  2.18   < >   --    < >   --     < > = values in this interval not displayed.      BP Readings from Last 3  "Encounters:   10/16/18 135/80   09/26/18 110/72   08/08/18 118/64    Wt Readings from Last 3 Encounters:   10/16/18 184 lb (83.5 kg)   09/26/18 187 lb (84.8 kg)   08/08/18 178 lb 9.6 oz (81 kg)                  Labs reviewed in EPIC    Reviewed and updated as needed this visit by clinical staff       Reviewed and updated as needed this visit by Provider         ROS:  Constitutional, HEENT, cardiovascular, pulmonary, gi and gu systems are negative, except as otherwise noted.    OBJECTIVE:     /80 (Cuff Size: Adult Regular)  Pulse 85  Temp 97.2  F (36.2  C) (Tympanic)  Resp 18  Ht 5' 8\" (1.727 m)  Wt 184 lb (83.5 kg)  SpO2 92%  BMI 27.98 kg/m2  Body mass index is 27.98 kg/(m^2).  GENERAL: alert and no distress  EYES: Eyes grossly normal to inspection, PERRL and conjunctivae and sclerae normal  HENT: normal cephalic/atraumatic, ear canals and TM's normal, nose and mouth without ulcers or lesions, oropharynx clear and oral mucous membranes moist  NECK: no adenopathy, no asymmetry, masses, or scars and thyroid normal to palpation  RESP: lungs clear to auscultation - no rales, rhonchi or wheezes  CV: regular rates and rhythm, normal S1 S2, no S3 or S4 and no murmur, click or rub  ABDOMEN: soft, nontender, no hepatosplenomegaly, no masses and bowel sounds normal  MS: no gross musculoskeletal defects noted, no edema        CHEST TWO VIEWS 10/16/2018 9:17 AM      HISTORY: Cough.     COMPARISON: 10/17/2017     FINDINGS: Heart size and pulmonary vascularity are within normal  limits. The lungs are clear. No pneumothorax or pleural effusion.          IMPRESSION: No radiographic evidence of acute chest abnormality.     ASSESSMENT/PLAN:       1. Cough  -Physical examination unremarkable, cough likely post viral.  Reassurance provided.  Chest x-ray unremarkable.  Suggested to continue well hydration, warm fluids and over-the-counter antitussive. Follow up PRN  - XR Chest 2 Views; Future    2. Tobacco abuse  -Smoking " about 15 cigarettes/day, associated health hazards explained in detail, agreeable to try nicotine patches, prescription sent to pharmacy  - nicotine (NICODERM CQ) 14 MG/24HR 24 hr patch; Place 1 patch onto the skin every 24 hours  Dispense: 30 patch; Refill: 1        Alexandru Vera MD  [facility]

## 2018-10-16 NOTE — NURSING NOTE
"Chief Complaint   Patient presents with     URI       Initial /80 (Cuff Size: Adult Regular)  Pulse 85  Temp 97.2  F (36.2  C) (Tympanic)  Resp 18  Ht 5' 8\" (1.727 m)  Wt 184 lb (83.5 kg)  SpO2 92%  BMI 27.98 kg/m2 Estimated body mass index is 27.98 kg/(m^2) as calculated from the following:    Height as of this encounter: 5' 8\" (1.727 m).    Weight as of this encounter: 184 lb (83.5 kg).    Patient presents to the clinic using No DME        "

## 2018-11-28 ENCOUNTER — TELEPHONE (OUTPATIENT)
Dept: FAMILY MEDICINE | Facility: CLINIC | Age: 63
End: 2018-11-28

## 2018-11-28 DIAGNOSIS — E78.5 HYPERLIPIDEMIA LDL GOAL <130: ICD-10-CM

## 2018-11-28 NOTE — TELEPHONE ENCOUNTER
Requested Prescriptions   Pending Prescriptions Disp Refills     aspirin (QC LO-DOSE ASPIRIN) 81 MG EC tablet 30 tablet 3     Sig: Take 1 tablet (81 mg) by mouth daily    There is no refill protocol information for this order        Order sent with updated quantity.    Caryl Lynch RN

## 2018-11-28 NOTE — TELEPHONE ENCOUNTER
Pharmacy Requesting 120 Qty on His QC Lo Dose Aspirin  Per Insurance Qty has to match Package size  Please Advise  Sonja [facility] Sec

## 2018-12-04 ENCOUNTER — OFFICE VISIT (OUTPATIENT)
Dept: FAMILY MEDICINE | Facility: CLINIC | Age: 63
End: 2018-12-04
Payer: MEDICARE

## 2018-12-04 VITALS
HEART RATE: 80 BPM | BODY MASS INDEX: 27.89 KG/M2 | WEIGHT: 184 LBS | TEMPERATURE: 98 F | HEIGHT: 68 IN | OXYGEN SATURATION: 95 % | SYSTOLIC BLOOD PRESSURE: 128 MMHG | RESPIRATION RATE: 12 BRPM | DIASTOLIC BLOOD PRESSURE: 72 MMHG

## 2018-12-04 DIAGNOSIS — Z71.6 ENCOUNTER FOR SMOKING CESSATION COUNSELING: Primary | ICD-10-CM

## 2018-12-04 DIAGNOSIS — Z11.4 ENCOUNTER FOR SCREENING FOR HIV: ICD-10-CM

## 2018-12-04 PROCEDURE — 99213 OFFICE O/P EST LOW 20 MIN: CPT | Performed by: FAMILY MEDICINE

## 2018-12-04 NOTE — PROGRESS NOTES
SUBJECTIVE:   Geovani Bustos is a 62 year old male who presents to clinic today for the following health issues:      Concern - smoking cessation   Onset: 48 years smoking, been trying to quit for a few months    Description:   Patient is not using the patches, he is slowly cutting down cigarettes monthly, using candy to take his mind off of smoking, cough is better      Problem list and histories reviewed & adjusted, as indicated.  Additional history: as documented    Patient Active Problem List   Diagnosis     Colon polyps     Schizophrenia (H)     VSD (ventricular septal defect)     Mitral valve prolapse     Acquired hypothyroidism     IgA nephropathy     Proteinuria     Hyperlipidemia LDL goal <130     BPH (benign prostatic hyperplasia)     Health Care Home     Bilateral impacted cerumen     Bunion of great toe of right foot     Eczema of external ear, bilateral     Tinea pedis, unspecified laterality     Incomplete right bundle branch block (RBBB)     Cigarette nicotine dependence with nicotine-induced disorder     Gastroesophageal reflux disease without esophagitis     Stage 1 mild COPD by GOLD classification (H)     CKD (chronic kidney disease) stage 3, GFR 30-59 ml/min (H)     Past Surgical History:   Procedure Laterality Date     ARTHRODESIS FOOT Right 1/19/2016    Procedure: ARTHRODESIS FOOT;  Surgeon: Holden Harrison DPM;  Location: [facility]     ARTHRODESIS FOOT Left 1/3/2017    Procedure: ARTHRODESIS FOOT;  Surgeon: Holden Harrison DPM;  Location: [facility]     SURGICAL HISTORY OF -       leg fracture       Social History   Substance Use Topics     Smoking status: Current Every Day Smoker     Packs/day: 1.00     Years: 47.00     Types: Cigarettes     Smokeless tobacco: Never Used      Comment: 16 cigs per day     Alcohol use No     Family History   Problem Relation Age of Onset     Emphysema Mother      Coronary Artery Disease Father          Current Outpatient Prescriptions   Medication Sig  Dispense Refill     ABILIFY 10 MG OR TABS 1 TABLET DAILY       acetaminophen (TYLENOL) 325 MG tablet Take 2 tablets (650 mg) by mouth every 4 hours as needed for mild pain 100 tablet 0     albuterol (PROAIR HFA/PROVENTIL HFA/VENTOLIN HFA) 108 (90 Base) MCG/ACT inhaler Inhale 2 puffs into the lungs every 4 hours as needed for shortness of breath / dyspnea or wheezing 1 Inhaler 2     albuterol (PROAIR HFA/PROVENTIL HFA/VENTOLIN HFA) 108 (90 Base) MCG/ACT inhaler Inhale 2 puffs into the lungs every 6 hours as needed for shortness of breath / dyspnea or wheezing 1 Inhaler 1     aspirin (QC LO-DOSE ASPIRIN) 81 MG EC tablet Take 1 tablet (81 mg) by mouth daily 120 tablet 0     augmented betamethasone dipropionate (DIPROLENE-AF) 0.05 % ointment Apply to AA twice daily 1-2 weeks then as needed only. Do not apply to face. (Due for Derm recheck b4 next refill: [phone] to schedule) 45 g 0     Cholecalciferol (VITAMIN D) 2000 units tablet TAKE ONE TABLET BY MOUTH EVERY  tablet 0     Disposable Gloves (NITRILE EXAM GLOVES MEDIUM) MISC 1 each as needed Use PRN daily with administration of otic drops, body lotion and powder to treat dry itchy skin. 4 each 11     EAR DROPS EARWAX AID 6.5 % otic solution Place 5 drops into both ears 2 times daily For 5 days August 1-5th and Feb 1-5 th then return for irrigation after the 5th day. 15 mL 3     finasteride (PROSCAR) 5 MG tablet TAKE ONE TABLET BY MOUTH EVERY DAY 30 tablet 11     fluticasone-vilanterol (BREO ELLIPTA) 100-25 MCG/INH oral inhaler Inhale 1 puff into the lungs daily 1 Inhaler 11     gabapentin (NEURONTIN) 100 MG capsule Take 100 mg by mouth 2 times daily       levothyroxine (SYNTHROID/LEVOTHROID) 125 MCG tablet TAKE ONE TABLET BY MOUTH EVERY DAY 30 tablet 9     Omega-3 Fatty Acids (FISH OIL) 1200 MG capsule Take 1 capsule (1.2 g) by mouth daily 90 capsule 3     order for DME Equipment being ordered: Dynaflex insert 1 Units 0     oxybutynin (DITROPAN) 5 MG tablet  TAKE ONE TABLET BY MOUTH THREE TIMES DAILY 90 tablet 5     polyethylene glycol (MIRALAX/GLYCOLAX) powder Take 17 g (1 capful) by mouth daily 527 g 11     REGULOID 48.57 % POWD Take 1 each by mouth See Admin Instructions Take 1 teaspoonful every day at 7pm and 9 pm. 369 g 11     simvastatin (ZOCOR) 40 MG tablet TAKE ONE TABLET BY MOUTH EVERY DAY AT 9 PM 30 tablet 1     Skin Protectants, Misc. (EUCERIN) cream Apply topically 2 times daily 454 g 5     tamsulosin (FLOMAX) 0.4 MG capsule Take 1 capsule (0.4 mg) by mouth daily 90 capsule 3     traZODone (DESYREL) 50 MG tablet Take 50 mg by mouth At Bedtime       ZOLOFT 100 MG OR TABS 2 TABLETS DAILY       nicotine (NICODERM CQ) 14 MG/24HR 24 hr patch Place 1 patch onto the skin every 24 hours (Patient not taking: Reported on 12/4/2018) 30 patch 1     Allergies   Allergen Reactions     Nitrogen Oxide      Sulfa Drugs      Recent Labs   Lab Test  08/08/18   1405  03/21/18   0900  02/07/18   1404  11/08/17   0840  10/11/17   1325   02/01/17   1457   01/04/16   1640  01/04/16   0850   02/01/13   0815   05/14/12   0830   A1C   --   5.6   --    --    --    --    --    --    --    --    --   5.5   --   5.6   LDL   --    --    --   80   --    --   Cannot estimate LDL when triglyceride exceeds 400 mg/dL  89   --    --   104*   < >  117   < >  101   HDL   --    --    --   47   --    --   27*   --    --   48   < >  45   < >  43   TRIG   --    --    --   119   --    --   444*   --    --   163*   < >  123   < >  143   ALT   --    --    --   22   --    --   19   --   33  31   < >   --    < >   --    CR   --   1.36*  1.48*  1.31*   --    < >  1.21   --   1.27*   --    < >   --    < >   --    GFRESTIMATED   --   53*  48*  55*   --    < >  61   --   57*   --    < >   --    < >   --    GFRESTBLACK   --   64  58*  67   --    < >  73   --   70   --    < >   --    < >   --    POTASSIUM   --   4.4  4.5  4.6   --    < >  4.1   --   4.1   --    < >   --    < >   --    TSH  2.13   --    --    --  "  1.24   --    --    < >  2.68  2.18   < >   --    < >   --     < > = values in this interval not displayed.      BP Readings from Last 3 Encounters:   12/04/18 128/72   10/16/18 135/80   09/26/18 110/72    Wt Readings from Last 3 Encounters:   12/04/18 184 lb (83.5 kg)   10/16/18 184 lb (83.5 kg)   09/26/18 187 lb (84.8 kg)                  Labs reviewed in EPIC    Reviewed and updated as needed this visit by clinical staff  Tobacco  Allergies  Meds  Med Hx  Surg Hx  Fam Hx  Soc Hx      Reviewed and updated as needed this visit by Provider         ROS:  Constitutional, HEENT, cardiovascular, pulmonary, gi and gu systems are negative, except as otherwise noted.    OBJECTIVE:     /72  Pulse 80  Temp 98  F (36.7  C) (Tympanic)  Resp 12  Ht 5' 8\" (1.727 m)  Wt 184 lb (83.5 kg)  SpO2 95%  BMI 27.98 kg/m2  Body mass index is 27.98 kg/(m^2).  GENERAL: alert and no distress  NECK: no adenopathy, no asymmetry, masses, or scars and thyroid normal to palpation  RESP: lungs clear to auscultation - no rales, rhonchi or wheezes  CV: regular rates and rhythm, normal S1 S2, no S3 or S4 and no murmur, click or rub  ABDOMEN: soft, nontender, no hepatosplenomegaly, no masses and bowel sounds normal  MS: no gross musculoskeletal defects noted, no edema      ASSESSMENT/PLAN:       1. Encounter for smoking cessation counseling  -Smoking 14 cigarettes a day, planning to cut down 1 cigarette a month, not interested in nicotine patches/gum or Chantix.  Suggested to discuss with counselor for seeking more help and written information provided      2. Encounter for screening for HIV  - HIV Antigen Antibody Combo; Future        Patient Instructions     How to Quit Smoking  Smoking is one of the hardest habits to break. About half of all people who have ever smoked have been able to quit. Most people who still smoke want to quit. Here are some of the best ways to stop smoking.    Keep trying  Most smokers make many attempts " at quitting before they are successful. It s important not to give up.  Go cold turkey  Most former smokers quit cold turkey (all at once). Trying to cut back gradually doesn't seem to work as well, perhaps because it continues the smoking habit. Also, it is possible to inhale more while smoking fewer cigarettes. This results in the same amount of nicotine in your body.  Get support  Support programs can be a big help, especially for heavy smokers. These groups offer lectures, ways to change behavior, and peer support. Here are some ways to find a support program:    Free national quitline: 800-QUIT-NOW (873-785-7505).    Hospital quit-smoking programs.    American Lung Association: (626.596.2702).    American Cancer Society (857-902-2336).  Support at home is important too. Nonsmokers can offer praise and encouragement. If the smoker in your life finds it hard to quit, encourage them to keep trying.  Over-the-counter medicines  Nicotine replacement therapy may make quitting easier. Certain aids, such as the nicotine patch, gum, and lozenges, are available without a prescription. It is best to use these under a doctor s care, though. The skin patch provides a steady supply of nicotine. Nicotine gum and lozenges give temporary bursts of low levels of nicotine. Both methods reduce the craving for cigarettes. Warning: If you have nausea, vomiting, dizziness, weakness, or a fast heartbeat, stop using these products and see your doctor.  Prescription medicines  After reviewing your smoking patterns and past attempts to quit, your doctor may offer a prescription medicine such as bupropion, varenicline, a nicotine inhaler, or nasal spray. Each has advantages and side effects. Your doctor can review these with you.  Health benefits of quitting  The benefits of quitting start right away and keep improving the longer you go without smoking. These benefits occur at any age.  So whether you are 17 or 70, quitting is a good  "decision. Some of the benefits include:    20 minutes: Blood pressure and pulse return to normal.    8 hours: Oxygen levels return to normal.    2 days: Ability to smell and taste begin to improve as damaged nerves regrow.    2 to 3 weeks: Circulation and lung function improve.    1 to 9 months: Coughing, congestion, and shortness of breath decrease; tiredness decreases.    1 year: Risk of heart attack decreases by half.    5 years: Risk of lung cancer decreases by half; risk of stroke becomes the same as a nonsmoker s.  For more on how to quit smoking, try these online resources:     Smokefree.gov    \"Clearing the Air\" booklet from the National [employer]: smokefree.gov/sites/default/files/pdf/clearing-the-air-accessible.pdf  Date Last Reviewed: 3/1/2017    8519-1749 [employer]. [address] [place] [address]. All rights reserved. This information is not intended as a substitute for professional medical care. Always follow your healthcare professional's instructions.            Alexandru Vera MD  [facility]  "

## 2018-12-04 NOTE — MR AVS SNAPSHOT
After Visit Summary   12/4/2018    Geovani Bustos    MRN: [id number]           Patient Information     Date Of Birth          1955        Visit Information        Provider Department      12/4/2018 10:00 AM Alexandru Vera MD [facility]        Today's Diagnoses     Encounter for smoking cessation counseling    -  1    Encounter for screening for HIV          Care Instructions      How to Quit Smoking  Smoking is one of the hardest habits to break. About half of all people who have ever smoked have been able to quit. Most people who still smoke want to quit. Here are some of the best ways to stop smoking.    Keep trying  Most smokers make many attempts at quitting before they are successful. It s important not to give up.  Go cold turkey  Most former smokers quit cold turkey (all at once). Trying to cut back gradually doesn't seem to work as well, perhaps because it continues the smoking habit. Also, it is possible to inhale more while smoking fewer cigarettes. This results in the same amount of nicotine in your body.  Get support  Support programs can be a big help, especially for heavy smokers. These groups offer lectures, ways to change behavior, and peer support. Here are some ways to find a support program:    Free national quitline: 800-QUIT-NOW (374-316-3877).    [facility] quit-smoking programs.    American Lung Association: (754.117.2817).    American Cancer Society (400-085-7358).  Support at home is important too. Nonsmokers can offer praise and encouragement. If the smoker in your life finds it hard to quit, encourage them to keep trying.  Over-the-counter medicines  Nicotine replacement therapy may make quitting easier. Certain aids, such as the nicotine patch, gum, and lozenges, are available without a prescription. It is best to use these under a doctor s care, though. The skin patch provides a steady supply of nicotine. Nicotine gum and lozenges give temporary  "bursts of low levels of nicotine. Both methods reduce the craving for cigarettes. Warning: If you have nausea, vomiting, dizziness, weakness, or a fast heartbeat, stop using these products and see your doctor.  Prescription medicines  After reviewing your smoking patterns and past attempts to quit, your doctor may offer a prescription medicine such as bupropion, varenicline, a nicotine inhaler, or nasal spray. Each has advantages and side effects. Your doctor can review these with you.  Health benefits of quitting  The benefits of quitting start right away and keep improving the longer you go without smoking. These benefits occur at any age.  So whether you are 17 or 70, quitting is a good decision. Some of the benefits include:    20 minutes: Blood pressure and pulse return to normal.    8 hours: Oxygen levels return to normal.    2 days: Ability to smell and taste begin to improve as damaged nerves regrow.    2 to 3 weeks: Circulation and lung function improve.    1 to 9 months: Coughing, congestion, and shortness of breath decrease; tiredness decreases.    1 year: Risk of heart attack decreases by half.    5 years: Risk of lung cancer decreases by half; risk of stroke becomes the same as a nonsmoker s.  For more on how to quit smoking, try these online resources:     Smokefree.gov    \"Clearing the Air\" booklet from the National Cancer [place]: smokefree.gov/sites/default/files/pdf/clearing-the-air-accessible.pdf  Date Last Reviewed: 3/1/2017    9782-2101 [employer]. [address] [place] [address]. All rights reserved. This information is not intended as a substitute for professional medical care. Always follow your healthcare professional's instructions.                Follow-ups after your visit        Follow-up notes from your care team     Return in about 6 months (around 6/4/2019).      Your next 10 appointments already scheduled     Dec 05, 2018  8:45 AM CST   [facility] " "  [facility]    [address] [facility] [address]   [phone]           Please do not eat 10-12 hours before your appointment if you are coming in fasting for labs on lipids, cholesterol, or glucose (sugar). This does not apply to pregnant women. Water, hot tea and black coffee (with nothing added) are okay. Do not drink other fluids, diet soda or chew gum.              Future tests that were ordered for you today     Open Future Orders        Priority Expected Expires Ordered    HIV Antigen Antibody Combo Routine 12/5/2018 12/4/2019 12/4/2018            Who to contact     If you have questions or need follow up information about today's clinic visit or your schedule please contact [facility] directly at [phone].  Normal or non-critical lab and imaging results will be communicated to you by MyChart, letter or phone within 4 business days after the clinic has received the results. If you do not hear from us within 7 days, please contact the clinic through MyChart or phone. If you have a critical or abnormal lab result, we will notify you by phone as soon as possible.  Submit refill requests through [employer] or call your pharmacy and they will forward the refill request to us. Please allow 3 business days for your refill to be completed.          Additional Information About Your Visit        Care EveryWhere ID     This is your Care EveryWhere ID. This could be used by other organizations to access your [facility] medical records  [id number]        Your Vitals Were     Pulse Temperature Respirations Height Pulse Oximetry BMI (Body Mass Index)    80 98  F (36.7  C) (Tympanic) 12 5' 8\" (1.727 m) 95% 27.98 kg/m2       Blood Pressure from Last 3 Encounters:   12/04/18 128/72   10/16/18 135/80   09/26/18 110/72    Weight from Last 3 Encounters:   12/04/18 184 lb (83.5 kg)   10/16/18 184 lb (83.5 kg)   09/26/18 187 lb (84.8 kg)               " Primary Care Provider Office Phone # Fax #    Alexandru Ro MD [place] [phone]       [address] [facility] [address]        Equal Access to Services     MEENU AMATO : Hadii aad ku hadreaganriaz Archuleta, waparida luqadaha, qaybta kamariamda chang, maday ford everardoana luisa devlin laraymundomirna whyte. So [facility] [phone].    ATENCIÓN: Si habla español, tiene a topete disposición servicios gratuitos de asistencia lingüística. Llame al [phone].    We comply with applicable federal civil rights laws and [place] laws. We do not discriminate on the basis of race, color, national origin, age, disability, sex, sexual orientation, or gender identity.            Thank you!     Thank you for choosing [facility]  for your care. Our goal is always to provide you with excellent care. Hearing back from our patients is one way we can continue to improve our services. Please take a few minutes to complete the written survey that you may receive in the mail after your visit with us. Thank you!             Your Updated Medication List - Protect others around you: Learn how to safely use, store and throw away your medicines at www.disposemymeds.org.          This list is accurate as of 12/4/18 10:17 AM.  Always use your most recent med list.                   Brand Name Dispense Instructions for use Diagnosis    ABILIFY 10 MG tablet   Generic drug:  ARIPiprazole      1 TABLET DAILY        acetaminophen 325 MG tablet    TYLENOL    100 tablet    Take 2 tablets (650 mg) by mouth every 4 hours as needed for mild pain    Cigarette nicotine dependence with nicotine-induced disorder       * albuterol 108 (90 Base) MCG/ACT inhaler    PROAIR HFA/PROVENTIL HFA/VENTOLIN HFA    1 Inhaler    Inhale 2 puffs into the lungs every 6 hours as needed for shortness of breath / dyspnea or wheezing    Acute bronchitis with coexisting condition requiring prophylactic treatment       * albuterol 108 (90 Base) MCG/ACT inhaler    PROAIR  HFA/PROVENTIL HFA/VENTOLIN HFA    1 Inhaler    Inhale 2 puffs into the lungs every 4 hours as needed for shortness of breath / dyspnea or wheezing    Stage 1 mild COPD by GOLD classification (H)       aspirin 81 MG EC tablet    QC LO-DOSE ASPIRIN    120 tablet    Take 1 tablet (81 mg) by mouth daily    Hyperlipidemia LDL goal <130       augmented betamethasone dipropionate 0.05 % external ointment    DIPROLENE-AF    45 g    Apply to AA twice daily 1-2 weeks then as needed only. Do not apply to face. (Due for Derm recheck b4 next refill: [phone] to schedule)    Eczema of both external ears       EAR DROPS EARWAX AID 6.5 % otic solution   Generic drug:  carbamide peroxide     15 mL    Place 5 drops into both ears 2 times daily For 5 days August 1-5th and Feb 1-5 th then return for irrigation after the 5th day.    Bilateral impacted cerumen       eucerin cream     454 g    Apply topically 2 times daily    Xerosis of skin       finasteride 5 MG tablet    PROSCAR    30 tablet    TAKE ONE TABLET BY MOUTH EVERY DAY    BPH (benign prostatic hyperplasia)       fish Oil 1200 MG capsule     90 capsule    Take 1 capsule (1.2 g) by mouth daily    Hyperlipidemia LDL goal <130       fluticasone-vilanterol 100-25 MCG/INH inhaler    BREO ELLIPTA    1 Inhaler    Inhale 1 puff into the lungs daily    Simple chronic bronchitis (H)       gabapentin 100 MG capsule    NEURONTIN     Take 100 mg by mouth 2 times daily        levothyroxine 125 MCG tablet    SYNTHROID/LEVOTHROID    30 tablet    TAKE ONE TABLET BY MOUTH EVERY DAY    Acquired hypothyroidism       nicotine 14 MG/24HR 24 hr patch    NICODERM CQ    30 patch    Place 1 patch onto the skin every 24 hours    Tobacco abuse       Nitrile Exam Gloves Medium Misc     4 each    1 each as needed Use PRN daily with administration of otic drops, body lotion and powder to treat dry itchy skin.    Eczema of external ear, bilateral, Bilateral impacted cerumen, Health Care Home       order  for DME     1 Units    Equipment being ordered: Dynaflex insert    Left foot pain, S/P foot surgery, left       oxybutynin 5 MG tablet    DITROPAN    90 tablet    TAKE ONE TABLET BY MOUTH THREE TIMES DAILY    Benign prostatic hyperplasia, presence of lower urinary tract symptoms unspecified       polyethylene glycol powder    MIRALAX/GLYCOLAX    527 g    Take 17 g (1 capful) by mouth daily    Constipation, unspecified constipation type       REGULOID 48.57 % Powd   Generic drug:  Psyllium     369 g    Take 1 each by mouth See Admin Instructions Take 1 teaspoonful every day at 7pm and 9 pm.    Constipation       simvastatin 40 MG tablet    ZOCOR    30 tablet    TAKE ONE TABLET BY MOUTH EVERY DAY AT 9 PM    Hyperlipidemia LDL goal <130       tamsulosin 0.4 MG capsule    FLOMAX    90 capsule    Take 1 capsule (0.4 mg) by mouth daily    Lower urinary tract symptoms (LUTS)       traZODone 50 MG tablet    DESYREL     Take 50 mg by mouth At Bedtime        vitamin D3 2000 units tablet    CHOLECALCIFEROL    100 tablet    TAKE ONE TABLET BY MOUTH EVERY DAY    Vitamin D deficiency       ZOLOFT 100 MG tablet   Generic drug:  sertraline      2 TABLETS DAILY        * Notice:  This list has 2 medication(s) that are the same as other medications prescribed for you. Read the directions carefully, and ask your doctor or other care provider to review them with you.

## 2018-12-04 NOTE — PATIENT INSTRUCTIONS

## 2018-12-04 NOTE — NURSING NOTE
"Chief Complaint   Patient presents with     RECHECK     3 month follow up on smoking        Initial There were no vitals taken for this visit. Estimated body mass index is 27.98 kg/(m^2) as calculated from the following:    Height as of 10/16/18: 5' 8\" (1.727 m).    Weight as of 10/16/18: 184 lb (83.5 kg).    Patient presents to the clinic using No DME    Health Maintenance that is potentially due pending provider review:  NONE    n/a    Is there anyone who you would like to be able to receive your results? Not Applicable  If yes have patient fill out SIDNEY    "

## 2018-12-05 ENCOUNTER — ALLIED HEALTH/NURSE VISIT (OUTPATIENT)
Dept: FAMILY MEDICINE | Facility: CLINIC | Age: 63
End: 2018-12-05
Payer: MEDICARE

## 2018-12-05 DIAGNOSIS — K59.00 CONSTIPATION: ICD-10-CM

## 2018-12-05 DIAGNOSIS — Z23 NEED FOR VACCINATION: Primary | ICD-10-CM

## 2018-12-05 DIAGNOSIS — F25.0 SCHIZOAFFECTIVE DISORDER, BIPOLAR TYPE (H): Primary | ICD-10-CM

## 2018-12-05 DIAGNOSIS — E78.5 HYPERLIPIDEMIA LDL GOAL <130: ICD-10-CM

## 2018-12-05 LAB
ALBUMIN SERPL-MCNC: 4.5 G/DL (ref 3.4–5)
ALP SERPL-CCNC: 118 U/L (ref 40–150)
ALT SERPL W P-5'-P-CCNC: 28 U/L (ref 0–70)
ANION GAP SERPL CALCULATED.3IONS-SCNC: 4 MMOL/L (ref 3–14)
AST SERPL W P-5'-P-CCNC: 19 U/L (ref 0–45)
BASOPHILS # BLD AUTO: 0 10E9/L (ref 0–0.2)
BASOPHILS NFR BLD AUTO: 0.3 %
BILIRUB SERPL-MCNC: 0.6 MG/DL (ref 0.2–1.3)
BUN SERPL-MCNC: 26 MG/DL (ref 7–30)
CALCIUM SERPL-MCNC: 9.1 MG/DL (ref 8.5–10.1)
CHLORIDE SERPL-SCNC: 103 MMOL/L (ref 94–109)
CHOLEST SERPL-MCNC: 196 MG/DL
CO2 SERPL-SCNC: 33 MMOL/L (ref 20–32)
CREAT SERPL-MCNC: 1.6 MG/DL (ref 0.66–1.25)
DIFFERENTIAL METHOD BLD: NORMAL
EOSINOPHIL # BLD AUTO: 0.4 10E9/L (ref 0–0.7)
EOSINOPHIL NFR BLD AUTO: 4.2 %
ERYTHROCYTE [DISTWIDTH] IN BLOOD BY AUTOMATED COUNT: 13.4 % (ref 10–15)
GFR SERPL CREATININE-BSD FRML MDRD: 44 ML/MIN/1.7M2
GLUCOSE SERPL-MCNC: 97 MG/DL (ref 70–99)
HCT VFR BLD AUTO: 52.7 % (ref 40–53)
HDLC SERPL-MCNC: 48 MG/DL
HGB BLD-MCNC: 17.6 G/DL (ref 13.3–17.7)
LDLC SERPL CALC-MCNC: 118 MG/DL
LYMPHOCYTES # BLD AUTO: 1.6 10E9/L (ref 0.8–5.3)
LYMPHOCYTES NFR BLD AUTO: 15.1 %
MCH RBC QN AUTO: 32.9 PG (ref 26.5–33)
MCHC RBC AUTO-ENTMCNC: 33.4 G/DL (ref 31.5–36.5)
MCV RBC AUTO: 99 FL (ref 78–100)
MONOCYTES # BLD AUTO: 0.6 10E9/L (ref 0–1.3)
MONOCYTES NFR BLD AUTO: 6 %
NEUTROPHILS # BLD AUTO: 7.9 10E9/L (ref 1.6–8.3)
NEUTROPHILS NFR BLD AUTO: 74.4 %
NONHDLC SERPL-MCNC: 148 MG/DL
PLATELET # BLD AUTO: 173 10E9/L (ref 150–450)
POTASSIUM SERPL-SCNC: 4.6 MMOL/L (ref 3.4–5.3)
PROT SERPL-MCNC: 8 G/DL (ref 6.8–8.8)
RBC # BLD AUTO: 5.35 10E12/L (ref 4.4–5.9)
SODIUM SERPL-SCNC: 140 MMOL/L (ref 133–144)
TRIGL SERPL-MCNC: 150 MG/DL
WBC # BLD AUTO: 10.6 10E9/L (ref 4–11)

## 2018-12-05 PROCEDURE — 36415 COLL VENOUS BLD VENIPUNCTURE: CPT | Performed by: REGISTERED NURSE

## 2018-12-05 PROCEDURE — 80053 COMPREHEN METABOLIC PANEL: CPT | Performed by: REGISTERED NURSE

## 2018-12-05 PROCEDURE — 85025 COMPLETE CBC W/AUTO DIFF WBC: CPT | Performed by: REGISTERED NURSE

## 2018-12-05 PROCEDURE — 90670 PCV13 VACCINE IM: CPT

## 2018-12-05 PROCEDURE — 80061 LIPID PANEL: CPT | Performed by: REGISTERED NURSE

## 2018-12-05 PROCEDURE — G0009 ADMIN PNEUMOCOCCAL VACCINE: HCPCS

## 2018-12-05 NOTE — MR AVS SNAPSHOT
After Visit Summary   12/5/2018    Geovani Bustso    MRN: [id number]           Patient Information     Date Of Birth          1955        Visit Information        Provider Department      12/5/2018 9:00 AM [place] [facility]        Today's Diagnoses     Need for vaccination    -  1       Follow-ups after your visit        Future tests that were ordered for you today     Open Future Orders        Priority Expected Expires Ordered    HIV Antigen Antibody Combo Routine 12/5/2018 12/4/2019 12/4/2018            Who to contact     If you have questions or need follow up information about today's clinic visit or your schedule please contact [facility] directly at [phone].  Normal or non-critical lab and imaging results will be communicated to you by MyChart, letter or phone within 4 business days after the clinic has received the results. If you do not hear from us within 7 days, please contact the clinic through MyChart or phone. If you have a critical or abnormal lab result, we will notify you by phone as soon as possible.  Submit refill requests through [facility] or call your pharmacy and they will forward the refill request to us. Please allow 3 business days for your refill to be completed.          Additional Information About Your Visit        Care EveryWhere ID     This is your Care EveryWhere ID. This could be used by other organizations to access your [facility] medical records  [id number]         Blood Pressure from Last 3 Encounters:   12/04/18 128/72   10/16/18 135/80   09/26/18 110/72    Weight from Last 3 Encounters:   12/04/18 184 lb (83.5 kg)   10/16/18 184 lb (83.5 kg)   09/26/18 187 lb (84.8 kg)              We Performed the Following     PNEUMOCOCCAL CONJ VACCINE 13 VALENT IM        Primary Care Provider Office Phone # Fax #    Alexandru Ur MD Chuy [phone]       [address] [facility] [address]        Equal Access to  Services     [facility]: Hadii aad ku hadreaganriaz Archuleta, waaxda luqadaha, qaybta kaalmada chang, maday mckenzie . So [facility] [phone].    ATENCIÓN: Si habla barbara, tiene a topete disposición servicios gratuitos de asistencia lingüística. Llame al [phone].    We comply with applicable federal civil rights laws and [place] laws. We do not discriminate on the basis of race, color, national origin, age, disability, sex, sexual orientation, or gender identity.            Thank you!     Thank you for choosing [facility]  for your care. Our goal is always to provide you with excellent care. Hearing back from our patients is one way we can continue to improve our services. Please take a few minutes to complete the written survey that you may receive in the mail after your visit with us. Thank you!             Your Updated Medication List - Protect others around you: Learn how to safely use, store and throw away your medicines at www.disposemymeds.org.          This list is accurate as of 12/5/18  9:21 AM.  Always use your most recent med list.                   Brand Name Dispense Instructions for use Diagnosis    ABILIFY 10 MG tablet   Generic drug:  ARIPiprazole      1 TABLET DAILY        acetaminophen 325 MG tablet    TYLENOL    100 tablet    Take 2 tablets (650 mg) by mouth every 4 hours as needed for mild pain    Cigarette nicotine dependence with nicotine-induced disorder       * albuterol 108 (90 Base) MCG/ACT inhaler    PROAIR HFA/PROVENTIL HFA/VENTOLIN HFA    1 Inhaler    Inhale 2 puffs into the lungs every 6 hours as needed for shortness of breath / dyspnea or wheezing    Acute bronchitis with coexisting condition requiring prophylactic treatment       * albuterol 108 (90 Base) MCG/ACT inhaler    PROAIR HFA/PROVENTIL HFA/VENTOLIN HFA    1 Inhaler    Inhale 2 puffs into the lungs every 4 hours as needed for shortness of breath / dyspnea or wheezing    Stage 1  mild COPD by GOLD classification (H)       aspirin 81 MG EC tablet    QC LO-DOSE ASPIRIN    120 tablet    Take 1 tablet (81 mg) by mouth daily    Hyperlipidemia LDL goal <130       augmented betamethasone dipropionate 0.05 % external ointment    DIPROLENE-AF    45 g    Apply to AA twice daily 1-2 weeks then as needed only. Do not apply to face. (Due for Derm recheck b4 next refill: [phone] to schedule)    Eczema of both external ears       EAR DROPS EARWAX AID 6.5 % otic solution   Generic drug:  carbamide peroxide     15 mL    Place 5 drops into both ears 2 times daily For 5 days August 1-5th and Feb 1-5 th then return for irrigation after the 5th day.    Bilateral impacted cerumen       eucerin cream     454 g    Apply topically 2 times daily    Xerosis of skin       finasteride 5 MG tablet    PROSCAR    30 tablet    TAKE ONE TABLET BY MOUTH EVERY DAY    BPH (benign prostatic hyperplasia)       fish Oil 1200 MG capsule     90 capsule    Take 1 capsule (1.2 g) by mouth daily    Hyperlipidemia LDL goal <130       fluticasone-vilanterol 100-25 MCG/INH inhaler    BREO ELLIPTA    1 Inhaler    Inhale 1 puff into the lungs daily    Simple chronic bronchitis (H)       gabapentin 100 MG capsule    NEURONTIN     Take 100 mg by mouth 2 times daily        levothyroxine 125 MCG tablet    SYNTHROID/LEVOTHROID    30 tablet    TAKE ONE TABLET BY MOUTH EVERY DAY    Acquired hypothyroidism       nicotine 14 MG/24HR 24 hr patch    NICODERM CQ    30 patch    Place 1 patch onto the skin every 24 hours    Tobacco abuse       Nitrile Exam Gloves Medium Misc     4 each    1 each as needed Use PRN daily with administration of otic drops, body lotion and powder to treat dry itchy skin.    Eczema of external ear, bilateral, Bilateral impacted cerumen, [facility] Care [facility]       order for DME     1 Units    Equipment being ordered: Dynaflex insert    Left foot pain, S/P foot surgery, left       oxybutynin 5 MG tablet    DITROPAN    90  tablet    TAKE ONE TABLET BY MOUTH THREE TIMES DAILY    Benign prostatic hyperplasia, presence of lower urinary tract symptoms unspecified       polyethylene glycol powder    MIRALAX/GLYCOLAX    527 g    Take 17 g (1 capful) by mouth daily    Constipation, unspecified constipation type       REGULOID 48.57 % Powd   Generic drug:  Psyllium     369 g    Take 1 each by mouth See Admin Instructions Take 1 teaspoonful every day at 7pm and 9 pm.    Constipation       simvastatin 40 MG tablet    ZOCOR    30 tablet    TAKE ONE TABLET BY MOUTH EVERY DAY AT 9 PM    Hyperlipidemia LDL goal <130       tamsulosin 0.4 MG capsule    FLOMAX    90 capsule    Take 1 capsule (0.4 mg) by mouth daily    Lower urinary tract symptoms (LUTS)       traZODone 50 MG tablet    DESYREL     Take 50 mg by mouth At Bedtime        vitamin D3 2000 units tablet    CHOLECALCIFEROL    100 tablet    TAKE ONE TABLET BY MOUTH EVERY DAY    Vitamin D deficiency       ZOLOFT 100 MG tablet   Generic drug:  sertraline      2 TABLETS DAILY        * Notice:  This list has 2 medication(s) that are the same as other medications prescribed for you. Read the directions carefully, and ask your doctor or other care provider to review them with you.

## 2018-12-05 NOTE — PROGRESS NOTES
Screening Questionnaire for Adult Immunization    Are you sick today?   No   Do you have allergies to medications, food, a vaccine component or latex?   No   Have you ever had a serious reaction after receiving a vaccination?   No   Do you have a long-term health problem with heart disease, lung disease, asthma, kidney disease, metabolic disease (e.g. diabetes), anemia, or other blood disorder?   No   Do you have cancer, leukemia, HIV/AIDS, or any other immune system problem?   No   In the past 3 months, have you taken medications that affect  your immune system, such as prednisone, other steroids, or anticancer drugs; drugs for the treatment of rheumatoid arthritis, Crohn s disease, or psoriasis; or have you had radiation treatments?   No   Have you had a seizure, or a brain or other nervous system problem?   No   During the past year, have you received a transfusion of blood or blood     products, or been given immune (gamma) globulin or antiviral drug?   No   For women: Are you pregnant or is there a chance you could become        pregnant during the next month?   No   Have you received any vaccinations in the past 4 weeks?   No     Immunization questionnaire answers were all negative.       injection of Prevnar 13 was given by Haily Munoz. Patient instructed to remain in clinic for 15 minutes afterwards, and to report any adverse reaction to me immediately.       Screening performed by Haily Munoz on 12/5/2018 at 9:21 AM.

## 2018-12-05 NOTE — TELEPHONE ENCOUNTER
Requested Prescriptions   Pending Prescriptions Disp Refills     REGULOID 48.57 % POWD [Pharmacy Med Name: REGULOID  POWDER] 369 g      Sig: Take 1 each by mouth See Admin Instructions Take 1 teaspoonful every day at 7pm and 9 pm.    There is no refill protocol information for this order

## 2018-12-10 ENCOUNTER — TELEPHONE (OUTPATIENT)
Dept: FAMILY MEDICINE | Facility: CLINIC | Age: 63
End: 2018-12-10

## 2018-12-10 DIAGNOSIS — E78.5 HYPERLIPIDEMIA LDL GOAL <130: ICD-10-CM

## 2018-12-10 DIAGNOSIS — N18.30 CKD (CHRONIC KIDNEY DISEASE) STAGE 3, GFR 30-59 ML/MIN (H): Primary | ICD-10-CM

## 2018-12-10 NOTE — TELEPHONE ENCOUNTER
"Requested Prescriptions   Pending Prescriptions Disp Refills     simvastatin (ZOCOR) 40 MG tablet [Pharmacy Med Name: SIMVASTATIN 40 MG TABLET] 30 tablet 1     Sig: TAKE ONE TABLET BY MOUTH EVERY DAY AT 9 PM    Statins Protocol Passed - 12/10/2018  4:42 PM       Passed - LDL on file in past 12 months    Recent Labs   Lab Test 12/05/18  0840   *            Passed - No abnormal creatine kinase in past 12 months    No lab results found.            Passed - Recent (12 mo) or future (30 days) visit within the authorizing provider's specialty    Patient had office visit in the last 12 months or has a visit in the next 30 days with authorizing provider or within the authorizing provider's specialty.  See \"Patient Info\" tab in inbasket, or \"Choose Columns\" in Meds & Orders section of the refill encounter.             Passed - Patient is age 18 or older        Last Written Prescription Date:  11/6/18  Last Fill Quantity: 30,  # refills: 1  Last office visit: 12/5/2018 with prescribing provider:  GHADA   Future Office Visit:      "

## 2018-12-10 NOTE — TELEPHONE ENCOUNTER
Component      Latest Ref Rng & Units 12/5/2018   Sodium      133 - 144 mmol/L 140   Potassium      3.4 - 5.3 mmol/L 4.6   Chloride      94 - 109 mmol/L 103   Carbon Dioxide      20 - 32 mmol/L 33 (H)   Anion Gap      3 - 14 mmol/L 4   Glucose      70 - 99 mg/dL 97   Urea Nitrogen      7 - 30 mg/dL 26   Creatinine      0.66 - 1.25 mg/dL 1.60 (H)   GFR Estimate      >60 mL/min/1.7m2 44 (L)   GFR Estimate If Black      >60 mL/min/1.7m2 53 (L)   Calcium      8.5 - 10.1 mg/dL 9.1   Bilirubin Total      0.2 - 1.3 mg/dL 0.6   Albumin      3.4 - 5.0 g/dL 4.5   Protein Total      6.8 - 8.8 g/dL 8.0   Alkaline Phosphatase      40 - 150 U/L 118   ALT      0 - 70 U/L 28   AST      0 - 45 U/L 19     Spoke with darlene Cisnerosgr who states no recent illness. Most recent med/ dose changes- started Flomax 9/2018, has had gradual Abilify titration since Aug from 10 mg to 22 mg.  Forwarded to MD for review, further recommendations.  MIROSLAVA Dhaliwal RN

## 2018-12-10 NOTE — TELEPHONE ENCOUNTER
Patient's  Amelia is calling stating she received a call from patient's psychiatrist who wanted to touch base stating Geovani's kidney functions are lower than last year and they wanted to make sure that Dr. Vera was okay with this and was aware, etc.     Drea Guaj[facility] [place]

## 2018-12-11 RX ORDER — SIMVASTATIN 40 MG
TABLET ORAL
Qty: 90 TABLET | Refills: 3 | Status: SHIPPED | OUTPATIENT
Start: 2018-12-11 | End: 2019-11-11

## 2018-12-12 NOTE — TELEPHONE ENCOUNTER
Renal function mildly lower than baseline, would recommend to continue well hydration and will repeat electrolytes/renal function in 1 month or so, order placed, schedule lab only appointment.      Alexandru Vera MD  [facility]

## 2018-12-17 NOTE — TELEPHONE ENCOUNTER
Amelia, group home mngr, informed/ advised as noted per MD. She will call back to schedule lab only appt.  MIROSLAVA Dhaliwal RN

## 2019-01-03 DIAGNOSIS — H60.543 ECZEMA OF BOTH EXTERNAL EARS: ICD-10-CM

## 2019-01-03 RX ORDER — BETAMETHASONE DIPROPIONATE 0.5 MG/G
OINTMENT, AUGMENTED TOPICAL
Qty: 45 G | Refills: 5 | Status: SHIPPED | OUTPATIENT
Start: 2019-01-03 | End: 2020-05-07

## 2019-01-03 NOTE — TELEPHONE ENCOUNTER
Reason for Call:  Medication or medication refill:    Do you use a [facility]?  Name of the pharmacy and phone number for the current request:  [facility] (Ph: [phone])    Name of the medication requested: Betamethasone    Other request:   LAST REFILL: 05/07/2018  LOV: 05/02/2018    Can we leave a detailed message on this number? Not Applicable    Phone number patient can be reached at: Home number on file [phone] (home)    Best Time: NA    Call taken on 1/3/2019 at 4:18 PM by Denise Behrendt

## 2019-01-15 ENCOUNTER — OFFICE VISIT (OUTPATIENT)
Dept: FAMILY MEDICINE | Facility: CLINIC | Age: 64
End: 2019-01-15
Payer: MEDICARE

## 2019-01-15 ENCOUNTER — ANCILLARY PROCEDURE (OUTPATIENT)
Dept: GENERAL RADIOLOGY | Facility: CLINIC | Age: 64
End: 2019-01-15
Payer: MEDICARE

## 2019-01-15 VITALS
BODY MASS INDEX: 28.49 KG/M2 | SYSTOLIC BLOOD PRESSURE: 132 MMHG | RESPIRATION RATE: 18 BRPM | WEIGHT: 188 LBS | DIASTOLIC BLOOD PRESSURE: 84 MMHG | HEIGHT: 68 IN | HEART RATE: 68 BPM | TEMPERATURE: 97.8 F

## 2019-01-15 DIAGNOSIS — Z11.4 ENCOUNTER FOR SCREENING FOR HIV: ICD-10-CM

## 2019-01-15 DIAGNOSIS — M25.511 ACUTE PAIN OF RIGHT SHOULDER: ICD-10-CM

## 2019-01-15 DIAGNOSIS — N18.30 CKD (CHRONIC KIDNEY DISEASE) STAGE 3, GFR 30-59 ML/MIN (H): ICD-10-CM

## 2019-01-15 DIAGNOSIS — M25.511 ACUTE PAIN OF RIGHT SHOULDER: Primary | ICD-10-CM

## 2019-01-15 LAB
ANION GAP SERPL CALCULATED.3IONS-SCNC: 3 MMOL/L (ref 3–14)
BUN SERPL-MCNC: 25 MG/DL (ref 7–30)
CALCIUM SERPL-MCNC: 9.1 MG/DL (ref 8.5–10.1)
CHLORIDE SERPL-SCNC: 103 MMOL/L (ref 94–109)
CO2 SERPL-SCNC: 32 MMOL/L (ref 20–32)
CREAT SERPL-MCNC: 1.66 MG/DL (ref 0.66–1.25)
GFR SERPL CREATININE-BSD FRML MDRD: 43 ML/MIN/{1.73_M2}
GLUCOSE SERPL-MCNC: 83 MG/DL (ref 70–99)
POTASSIUM SERPL-SCNC: 4.6 MMOL/L (ref 3.4–5.3)
SODIUM SERPL-SCNC: 138 MMOL/L (ref 133–144)

## 2019-01-15 PROCEDURE — 73030 X-RAY EXAM OF SHOULDER: CPT | Mod: RT

## 2019-01-15 PROCEDURE — 87389 HIV-1 AG W/HIV-1&-2 AB AG IA: CPT | Performed by: FAMILY MEDICINE

## 2019-01-15 PROCEDURE — 80048 BASIC METABOLIC PNL TOTAL CA: CPT | Performed by: FAMILY MEDICINE

## 2019-01-15 PROCEDURE — 99213 OFFICE O/P EST LOW 20 MIN: CPT | Performed by: FAMILY MEDICINE

## 2019-01-15 PROCEDURE — 36415 COLL VENOUS BLD VENIPUNCTURE: CPT | Performed by: FAMILY MEDICINE

## 2019-01-15 ASSESSMENT — MIFFLIN-ST. JEOR: SCORE: 1612.26

## 2019-01-15 ASSESSMENT — PAIN SCALES - GENERAL: PAINLEVEL: SEVERE PAIN (7)

## 2019-01-15 NOTE — PATIENT INSTRUCTIONS
Patient Education     Shoulder Pain with Uncertain Cause  Shoulder pain can have many causes. Pain often comes from the structures that surround the shoulder joint. These are the joint capsule, ligaments, tendons, muscles, and bursa. Pain can also come from cartilage in the joint. Cartilage can become worn out or injured. It s important to know what s causing your pain so the healthcare provider can use the correct treatment. But sometimes it s difficult to find the exact cause of shoulder pain. You may need to see a specialist (orthopedist). You may also need special tests such as a CT scan or MRI. The provider may need to use special tools to look inside the joint (arthroscopy).  Shoulder pain can be treated with a sling or a device that keeps your shoulder from moving. You can take an anti-inflammatory medicine such as ibuprofen to ease pain. You may need to do special shoulder exercises. Follow up with a specialist if the pain is severe or doesn t go away after a few weeks.  Home care  Follow these tips when caring for yourself at home:    If a sling was given to you, leave it in place for the time advised by your healthcare provider. If you aren t sure how long to wear it, ask for advice. If the sling becomes loose, adjust it so that your forearm is level with the ground. Your shoulder should feel well supported.    Put an ice pack on the injured area for 20 minutes every 1 to 2 hours the first day. You can make your own ice pack by putting ice cubes in a plastic bag. Wrap the bag in a thin towel. Continue with ice packs 3 to 4 times a day for the next 2 days. Then use the pack as needed to ease pain and swelling.    You may use acetaminophen or ibuprofen to control pain, unless another pain medicine was prescribed. If you have chronic liver or kidney disease, talk with your healthcare provider before using these medicines. Also talk with your provider if you ve ever had a stomach ulcer or GI  bleeding.    Shoulder pain may seem worse at night, when there is less to distract you from the pain. If you sleep on your side, try to keep weight off your painful shoulder. Propping pillows behind you may stop you from rolling over onto that shoulder during sleep.     Shoulder and elbow joints can become stiff if left in a sling for too long. You should start range of motion exercises about 7 to 10 days after the injury. Talk with your provider to find out what type of exercises to do and how soon to start.    You can take the sling off to shower or bathe.  Follow-up care  Follow up with your healthcare provider if you don t start to get better in the next 5 days.  When to seek medical advice  Call your healthcare provider right away if any of these occur:    Pain or swelling gets worse or continues for more than a few days    Your hand or fingers become cold, blue, numb, or tingly    Large amount of bruising on your shoulder or upper arm    Difficulty moving your hand or fingers    Weakness in your hand or fingers    Your shoulder becomes stiff    It feels like your shoulder is popping out    You are less able to do your daily activities  Date Last Reviewed: 10/1/2016    2940-1035 [employer]. [address] [place] [address], [place] [address]. All rights reserved. This information is not intended as a substitute for professional medical care. Always follow your healthcare professional's instructions.

## 2019-01-15 NOTE — PROGRESS NOTES
SUBJECTIVE:   Geovani Bustos is a 63 year old male who presents to clinic today for the following health issues:      Musculoskeletal problem/pain      Duration: yesterday     Description  Location: Right shoulder     Intensity:  moderate    Accompanying signs and symptoms: radiation of pain to shoulder and weakness of arm    History  Previous similar problem: no - but has broke collar bone in the past   Previous evaluation:  none    Precipitating or alleviating factors:  Trauma or overuse: YES- was lifting a book and flexed his shoulders back and heard a pop- lots of pain since  Aggravating factors include: lifting, exercise and overuse    Therapies tried and outcome: rest/inactivity and ice,         Problem list and histories reviewed & adjusted, as indicated.  Additional history: as documented    Patient Active Problem List   Diagnosis     Colon polyps     Schizophrenia (H)     VSD (ventricular septal defect)     Mitral valve prolapse     Acquired hypothyroidism     IgA nephropathy     Proteinuria     Hyperlipidemia LDL goal <130     BPH (benign prostatic hyperplasia)     Health Care Home     Bilateral impacted cerumen     Bunion of great toe of right foot     Eczema of external ear, bilateral     Tinea pedis, unspecified laterality     Incomplete right bundle branch block (RBBB)     Cigarette nicotine dependence with nicotine-induced disorder     Gastroesophageal reflux disease without esophagitis     Stage 1 mild COPD by GOLD classification (H)     CKD (chronic kidney disease) stage 3, GFR 30-59 ml/min (H)     Past Surgical History:   Procedure Laterality Date     ARTHRODESIS FOOT Right 1/19/2016    Procedure: ARTHRODESIS FOOT;  Surgeon: Holden Harrison DPM;  Location: [facility]     ARTHRODESIS FOOT Left 1/3/2017    Procedure: ARTHRODESIS FOOT;  Surgeon: Holden Harrison DPM;  Location: [facility]     SURGICAL HISTORY OF -       leg fracture       Social History     Tobacco Use     Smoking status: Current  Every Day Smoker     Packs/day: 1.00     Years: 47.00     Pack years: 47.00     Types: Cigarettes     Smokeless tobacco: Never Used     Tobacco comment: 16 cigs per day   Substance Use Topics     Alcohol use: No     Family History   Problem Relation Age of Onset     Emphysema Mother      Coronary Artery Disease Father          Current Outpatient Medications   Medication Sig Dispense Refill     ABILIFY 10 MG OR TABS 1 TABLET DAILY       acetaminophen (TYLENOL) 325 MG tablet Take 2 tablets (650 mg) by mouth every 4 hours as needed for mild pain 100 tablet 0     albuterol (PROAIR HFA/PROVENTIL HFA/VENTOLIN HFA) 108 (90 Base) MCG/ACT inhaler Inhale 2 puffs into the lungs every 4 hours as needed for shortness of breath / dyspnea or wheezing 1 Inhaler 2     albuterol (PROAIR HFA/PROVENTIL HFA/VENTOLIN HFA) 108 (90 Base) MCG/ACT inhaler Inhale 2 puffs into the lungs every 6 hours as needed for shortness of breath / dyspnea or wheezing 1 Inhaler 1     aspirin (QC LO-DOSE ASPIRIN) 81 MG EC tablet Take 1 tablet (81 mg) by mouth daily 120 tablet 0     augmented betamethasone dipropionate (DIPROLENE-AF) 0.05 % external ointment Apply to AA twice daily 1-2 weeks then as needed only. Do not apply to face. 45 g 5     Cholecalciferol (VITAMIN D) 2000 units tablet TAKE ONE TABLET BY MOUTH EVERY  tablet 0     Disposable Gloves (NITRILE EXAM GLOVES MEDIUM) MISC 1 each as needed Use PRN daily with administration of otic drops, body lotion and powder to treat dry itchy skin. 4 each 11     EAR DROPS EARWAX AID 6.5 % otic solution Place 5 drops into both ears 2 times daily For 5 days August 1-5th and Feb 1-5 th then return for irrigation after the 5th day. 15 mL 3     finasteride (PROSCAR) 5 MG tablet TAKE ONE TABLET BY MOUTH EVERY DAY 30 tablet 11     fluticasone-vilanterol (BREO ELLIPTA) 100-25 MCG/INH oral inhaler Inhale 1 puff into the lungs daily 1 Inhaler 11     gabapentin (NEURONTIN) 100 MG capsule Take 100 mg by mouth 2  times daily       levothyroxine (SYNTHROID/LEVOTHROID) 125 MCG tablet TAKE ONE TABLET BY MOUTH EVERY DAY 30 tablet 9     nicotine (NICODERM CQ) 14 MG/24HR 24 hr patch Place 1 patch onto the skin every 24 hours (Patient not taking: Reported on 12/4/2018) 30 patch 1     Omega-3 Fatty Acids (FISH OIL) 1200 MG capsule Take 1 capsule (1.2 g) by mouth daily 90 capsule 3     order for DME Equipment being ordered: Dynaflex insert 1 Units 0     oxybutynin (DITROPAN) 5 MG tablet TAKE ONE TABLET BY MOUTH THREE TIMES DAILY 90 tablet 5     polyethylene glycol (MIRALAX/GLYCOLAX) powder Take 17 g (1 capful) by mouth daily 527 g 11     REGULOID 48.57 % POWD Take 1 each by mouth See Admin Instructions Take 1 teaspoonful every day at 7pm and 9 pm. 369 g 11     simvastatin (ZOCOR) 40 MG tablet TAKE ONE TABLET BY MOUTH EVERY DAY AT 9 PM 90 tablet 3     Skin Protectants, Misc. (EUCERIN) cream Apply topically 2 times daily 454 g 5     tamsulosin (FLOMAX) 0.4 MG capsule Take 1 capsule (0.4 mg) by mouth daily 90 capsule 3     traZODone (DESYREL) 50 MG tablet Take 50 mg by mouth At Bedtime       ZOLOFT 100 MG OR TABS 2 TABLETS DAILY       Allergies   Allergen Reactions     Nitrogen Oxide      Sulfa Drugs      Recent Labs   Lab Test 12/05/18  0840 08/08/18  1405 03/21/18  0900  11/08/17  0840 10/11/17  1325  02/01/17  1457  02/01/13  0815  05/14/12  0830   A1C  --   --  5.6  --   --   --   --   --   --  5.5  --  5.6   *  --   --   --  80  --   --  Cannot estimate LDL when triglyceride exceeds 400 mg/dL  89   < > 117   < > 101   HDL 48  --   --   --  47  --   --  27*   < > 45   < > 43   TRIG 150*  --   --   --  119  --   --  444*   < > 123   < > 143   ALT 28  --   --   --  22  --   --  19   < >  --    < >  --    CR 1.60*  --  1.36*   < > 1.31*  --    < > 1.21   < >  --    < >  --    GFRESTIMATED 44*  --  53*   < > 55*  --    < > 61   < >  --    < >  --    GFRESTBLACK 53*  --  64   < > 67  --    < > 73   < >  --    < >  --    POTASSIUM  "4.6  --  4.4   < > 4.6  --    < > 4.1   < >  --    < >  --    TSH  --  2.13  --   --   --  1.24  --   --    < >  --    < >  --     < > = values in this interval not displayed.      BP Readings from Last 3 Encounters:   01/15/19 132/84   12/04/18 128/72   10/16/18 135/80    Wt Readings from Last 3 Encounters:   01/15/19 85.3 kg (188 lb)   12/04/18 83.5 kg (184 lb)   10/16/18 83.5 kg (184 lb)                  Labs reviewed in EPIC    Reviewed and updated as needed this visit by clinical staff       Reviewed and updated as needed this visit by Provider         ROS:  Constitutional, HEENT, cardiovascular, pulmonary, gi and gu systems are negative, except as otherwise noted.    OBJECTIVE:     /84 (Cuff Size: Adult Regular)   Pulse 68   Temp 97.8  F (36.6  C) (Tympanic)   Resp 18   Ht 1.727 m (5' 8\")   Wt 85.3 kg (188 lb)   BMI 28.59 kg/m    Body mass index is 28.59 kg/m .  GENERAL: alert and no distress  EYES: Eyes grossly normal to inspection, PERRL and conjunctivae and sclerae normal  NECK: no adenopathy, no asymmetry, masses, or scars and thyroid normal to palpation  RESP: lungs clear to auscultation - no rales, rhonchi or wheezes  CV: regular rates and rhythm, normal S1 S2, no S3 or S4 and no murmur, click or rub  MS: Slightly limited right shoulder abduction and internal rotation, no skin discoloration or swelling noted, normal strength, radial pulses 3+ bilaterally  SKIN: no suspicious lesions or rashes    RIGHT SHOULDER THREE OR MORE VIEWS  1/15/2019 11:31 AM      HISTORY: Acute pain of right shoulder.     COMPARISON: None.                                                                      IMPRESSION: Glenohumeral and acromioclavicular articulations appear  intact. Minimal degenerative changes at the acromioclavicular  articulation. No acute fracture.    ASSESSMENT/PLAN:         ICD-10-CM    1. Acute pain of right shoulder M25.511 XR Shoulder Right G/E 3 Views     PHYSICAL THERAPY REFERRAL     " CANCELED: ORTHO  REFERRAL     Suspect symptoms secondary to rotator cuff tendinopathy.  X-ray findings explained which showed mild degenerative changes at the acromioclavicular joint.  Suggested rest, icing, Tylenol and physical therapy ordered.  Written information provided.  All questions answered.      Patient Instructions     Patient Education     Shoulder Pain with Uncertain Cause  Shoulder pain can have many causes. Pain often comes from the structures that surround the shoulder joint. These are the joint capsule, ligaments, tendons, muscles, and bursa. Pain can also come from cartilage in the joint. Cartilage can become worn out or injured. It s important to know what s causing your pain so the healthcare provider can use the correct treatment. But sometimes it s difficult to find the exact cause of shoulder pain. You may need to see a specialist (orthopedist). You may also need special tests such as a CT scan or MRI. The provider may need to use special tools to look inside the joint (arthroscopy).  Shoulder pain can be treated with a sling or a device that keeps your shoulder from moving. You can take an anti-inflammatory medicine such as ibuprofen to ease pain. You may need to do special shoulder exercises. Follow up with a specialist if the pain is severe or doesn t go away after a few weeks.  Home care  Follow these tips when caring for yourself at home:    If a sling was given to you, leave it in place for the time advised by your healthcare provider. If you aren t sure how long to wear it, ask for advice. If the sling becomes loose, adjust it so that your forearm is level with the ground. Your shoulder should feel well supported.    Put an ice pack on the injured area for 20 minutes every 1 to 2 hours the first day. You can make your own ice pack by putting ice cubes in a plastic bag. Wrap the bag in a thin towel. Continue with ice packs 3 to 4 times a day for the next 2 days. Then use the  pack as needed to ease pain and swelling.    You may use acetaminophen or ibuprofen to control pain, unless another pain medicine was prescribed. If you have chronic liver or kidney disease, talk with your healthcare provider before using these medicines. Also talk with your provider if you ve ever had a stomach ulcer or GI bleeding.    Shoulder pain may seem worse at night, when there is less to distract you from the pain. If you sleep on your side, try to keep weight off your painful shoulder. Propping pillows behind you may stop you from rolling over onto that shoulder during sleep.     Shoulder and elbow joints can become stiff if left in a sling for too long. You should start range of motion exercises about 7 to 10 days after the injury. Talk with your provider to find out what type of exercises to do and how soon to start.    You can take the sling off to shower or bathe.  Follow-up care  Follow up with your healthcare provider if you don t start to get better in the next 5 days.  When to seek medical advice  Call your healthcare provider right away if any of these occur:    Pain or swelling gets worse or continues for more than a few days    Your hand or fingers become cold, blue, numb, or tingly    Large amount of bruising on your shoulder or upper arm    Difficulty moving your hand or fingers    Weakness in your hand or fingers    Your shoulder becomes stiff    It feels like your shoulder is popping out    You are less able to do your daily activities  Date Last Reviewed: 10/1/2016    [id number] [employer]. [address] [place] [address], [place] [address]. All rights reserved. This information is not intended as a substitute for professional medical care. Always follow your healthcare professional's instructions.               Alexandru Vera MD  [facility]

## 2019-01-15 NOTE — NURSING NOTE
"Chief Complaint   Patient presents with     Musculoskeletal Problem       Initial /84 (Cuff Size: Adult Regular)   Pulse 68   Temp 97.8  F (36.6  C) (Tympanic)   Resp 18   Ht 1.727 m (5' 8\")   Wt 85.3 kg (188 lb)   BMI 28.59 kg/m   Estimated body mass index is 28.59 kg/m  as calculated from the following:    Height as of this encounter: 1.727 m (5' 8\").    Weight as of this encounter: 85.3 kg (188 lb).    Patient presents to the clinic using No DME    Health Maintenance that is potentially due pending provider review:  NONE    n/a    Is there anyone who you would like to be able to receive your results? Not Applicable  If yes have patient fill out SIDNEY    "

## 2019-01-16 ENCOUNTER — TELEPHONE (OUTPATIENT)
Dept: FAMILY MEDICINE | Facility: CLINIC | Age: 64
End: 2019-01-16

## 2019-01-16 ENCOUNTER — HOSPITAL ENCOUNTER (OUTPATIENT)
Dept: PHYSICAL THERAPY | Facility: CLINIC | Age: 64
Setting detail: THERAPIES SERIES
End: 2019-01-16
Attending: FAMILY MEDICINE
Payer: MEDICARE

## 2019-01-16 DIAGNOSIS — M25.511 ACUTE PAIN OF RIGHT SHOULDER: ICD-10-CM

## 2019-01-16 DIAGNOSIS — N02.B9 IGA NEPHROPATHY: Primary | ICD-10-CM

## 2019-01-16 DIAGNOSIS — N39.46 MIXED INCONTINENCE: Primary | ICD-10-CM

## 2019-01-16 LAB — HIV 1+2 AB+HIV1 P24 AG SERPL QL IA: NONREACTIVE

## 2019-01-16 PROCEDURE — 97161 PT EVAL LOW COMPLEX 20 MIN: CPT | Mod: GP | Performed by: PHYSICAL MEDICINE & REHABILITATION

## 2019-01-16 PROCEDURE — 97110 THERAPEUTIC EXERCISES: CPT | Mod: GP | Performed by: PHYSICAL MEDICINE & REHABILITATION

## 2019-01-16 NOTE — PROGRESS NOTES
" 01/16/19 1500   General Information   Type of Visit Initial OP Ortho PT Evaluation   Start of Care Date 01/16/19   Referring Physician Alexandru Vera MD   Patient/Family Goals Statement \"be able to reach without pain\"   Orders Evaluate and Treat   Date of Order 01/15/19   Insurance Type Medicare;Other   Insurance Comments/Visits Authorized MEDICARE; MEDICAID: no ionto   Medical Diagnosis Acute pain of right shoulder   Surgical/Medical history reviewed Yes   Precautions/Limitations no known precautions/limitations   General Information Comments PMHx per pt report: broken ankle, broken toe, broken R arm, depression, heart murmur, bladder control, arthritis, smoking, \"adeola in leg,\" bunion surgery, cataract surgery. PMHx per epic: VSD, schizophrenia, mitral valve prolapse, hyperlipidemia, chronic kidney disease, squamous cell carcinoma, basal cell carcinoma    Body Part(s)   Body Part(s) Shoulder   Presentation and Etiology   Pertinent history of current problem (include personal factors and/or comorbidities that impact the POC) Pt arrived to PT for R shoulder pain that started 2 days ago. Pt states he was looking at coin book when he went to take out a coin and felt \"pop in R shoulder.\" Pain is primarily in R shoulder, no radiation to adjacent jts. Pt shared old hx of broken R arm. No sx of numbness, tingling or paresthesias.    Impairments A. Pain;E. Decreased flexibility   Functional Limitations perform activities of daily living   Symptom Location R shoulder   How/Where did it occur Other  (while reading coin book)   Onset date of current episode/exacerbation 01/14/19   Chronicity New   Pain rating (0-10 point scale) Best (/10);Worst (/10)   Best (/10) 7   Worst (/10) 10   Pain quality C. Aching;G. Cramping   Frequency of pain/symptoms C. With activity   Pain/symptoms are: Worse in the morning   Pain/symptoms exacerbated by C. Lifting;D. Carrying;H. Overhead reach;I. Bending;J. ADL;K. Home tasks;L. Work tasks " "  Pain/symptoms eased by C. Rest;E. Changing positions;H. Cold   Progression of symptoms since onset: Unchanged   Current / Previous Interventions   Diagnostic Tests: X-ray   X-ray Results unremarkable   Prior Level of Function   Prior Level of Function-Mobility independent   Prior Level of Function-ADLs pt lives in group home with assistance from PCA   Current Level of Function   Current Community Support PCA   Patient role/employment history F. Retired   Living environment [facility]   Home/community accessibility pt lives in group home, no stairs   Current equipment-Gait/Locomotion None   Fall Risk Screen   Fall screen completed by PT   Have you fallen 2 or more times in the past year? No   Have you fallen and had an injury in the past year? Yes   Timed Up and Go score (seconds) 8 seconds   Is patient a fall risk? No   Fall screen comments Pt notes one fall in the last year while walking on ice, pt notes he hurt \"his pride and butt.\" Pt notes injury healed on its own   Functional Scales   Functional Scales Other   Other Scales  SPADI: 70% disability   Shoulder Objective Findings   Side (if bilateral, select both right and left) Right   Observation Pt had increased difficulty and required increased time for removal of coat from R UE   Posture forward head and rounded shoulders B   Cervical Screen (ROM, quadrant) all motions wnl but SB B (SB min limitation B)--pt notes no increase in pain   Thoracic Mobility Screen all motions wfl   Thoracic Outlet Syndrom (Shashi, Ernestine, Svetlana, Mora) pt had difficulty with testing positions due to pain   Scapulothoracic Rhythm fair with scap set, required mod tactile cues   Pec Minor (supine) Flexibility limited B   Neer's Test unable to get into test position due to pain   Escalera-Aydin Test pos   Coracoid Test neg   Bursa Test neg   Northbrook's Test unable to get into test position due to pain   Load and Shift Test neg   Relocation Test pos   Sulcus Test neg   Crossover Test " "neg   Shoulder Special Tests Comments dull touch sensation normal along dermatomal pattern in B UE   Palpation no pain with mod-max palpation of all shoulder girdle musculature and bony prominaces (pt notes \"feels good when you push on it\")   Accessory Motion/Joint Mobility hypomobility of R GHJ in posterior and inferior directions. hypomobility of R SCJ and ACJ in inferior and posterior directions   Right Shoulder Flexion AROM 65* (pain)   Right Shoulder Flexion PROM 170* (pain)   Right Shoulder Abduction AROM 36* (pain)   Right Shoulder Abduction PROM 90* (pain)   Right Shoulder ER AROM at 0* abd: 38* (pain)   Right Shoulder ER PROM at 90* abd: 67* (pain)   Right Shoulder IR AROM at 0* abd: to stomach   Right Shoulder IR PROM at 90* abd: 30* (pain)   Right Shoulder Flexion Strength test performed at 40* flexion as pt notes \"I can't lift any higher.\" MMT: 4-/5 (pain)   Right Shoulder Abduction Strength test performed in 30* abd as pt notes \"I can't lift any higher.\" MMT: 4-/5 (pain)   Right Shoulder ER Strength 4-/5 (pain)   Right Shoulder IR Strength 4/5 (pain)   Right Shoulder Extension Strength 4+/5 (pain)   Planned Therapy Interventions   Planned Therapy Interventions ADL retraining;IADL retraining;bed mobility training;joint mobilization;manual therapy;neuromuscular re-education;motor coordination training;orthotic fitting/training;ROM;strengthening;stretching;transfer training   Planned Modality Interventions   Planned Modality Interventions Biofeedback;Contrast bath immersion;Cryotherapy;Electrical stimulation;Hot packs;Low level laser therapy;Shortwave diathermy;TENS;Traction;Ultrasound   Clinical Impression   Criteria for Skilled Therapeutic Interventions Met yes, treatment indicated   PT Diagnosis R shoulder pain   Influenced by the following impairments decreased ROM, decreased strength, impaired posture, pain   Functional limitations due to impairments reaching, lifting, carrying, dressing   Clinical " Presentation Stable/Uncomplicated   Clinical Presentation Rationale PLOF, multiple comorbidies, high motivation   Clinical Decision Making (Complexity) Low complexity   Therapy Frequency 2 times/Week   Predicted Duration of Therapy Intervention (days/wks) 8 weeks   Risk & Benefits of therapy have been explained Yes   Patient, Family & other staff in agreement with plan of care Yes   Clinical Impression Comments Pt is a 63 y.o. male who presented in the PT clinic today with a rehab diagnosis of R shoulder pain as evidenced by decrease ROM, decreased strength, impaired posture and pain. More specifically, pt's signs/symptoms are consistent with impingement syndrome as evidenced by positive guzman cinda and relocation tests. Pt is appropriate for skilled PT to address previously listed impairments in order to decrease difficulty with reaching, lifting, carrying and dressing.   Education Assessment   Preferred Learning Style Listening;Reading;Demonstration;Pictures/video   Barriers to Learning No barriers   ORTHO GOALS   PT Ortho Eval Goals 1;2;3;4   Ortho Goal 1   Goal Identifier 1   Goal Description Pt will be able to flex R shoulder to 150* in order to decrease difficulty with reaching high shelf.   Target Date 02/27/19   Ortho Goal 2   Goal Identifier 2   Goal Description Pt will be able to dress and bathe with less than 3/10 pain.    Target Date 02/27/19   Ortho Goal 3   Goal Identifier 3   Goal Description Pt will be independent with HEP, with help from PCA, in order to self manage symptoms.   Target Date 03/13/19   Ortho Goal 4   Goal Identifier 4   Goal Description Pt will be able to lift 10# without increase in sx in order to decrease difficulty with ADLs.    Target Date 03/13/19   Total Evaluation Time   PT Dayna Low Complexity Minutes (35376) 25   Therapy Certification   Certification date from 01/16/19   Certification date to 03/13/19   Medical Diagnosis Acute pain of right shoulder       Please contact  me with any questions or concerns.    Thank you for your referral,     Sonia Avitia, PT, DPT  Physical Therapist  [facility] - [address] [address] [phone]

## 2019-01-16 NOTE — PROGRESS NOTES
[facility]          OUTPATIENT PHYSICAL THERAPY ORTHOPEDIC EVALUATION  PLAN OF TREATMENT FOR OUTPATIENT REHABILITATION  (COMPLETE FOR INITIAL CLAIMS ONLY)  Patient's Last Name, First Name, M.I.  YOB: 1955  Geovani Bustos    Provider s Name:  [facility]   Medical Record No.  [id number]   Start of Care Date:  01/16/19   Onset Date:  01/14/19   Type:     _X__PT   ___OT   ___SLP Medical Diagnosis:  Acute pain of right shoulder     PT Diagnosis:  R shoulder pain   Visits from SOC:  1      _________________________________________________________________________________  Plan of Treatment/Functional Goals:  ADL retraining, IADL retraining, bed mobility training, joint mobilization, manual therapy, neuromuscular re-education, motor coordination training, orthotic fitting/training, ROM, strengthening, stretching, transfer training     Biofeedback, Contrast bath immersion, Cryotherapy, Electrical stimulation, Hot packs, Low level laser therapy, Shortwave diathermy, TENS, Traction, Ultrasound     Goals  Goal Identifier: 1  Goal Description: Pt will be able to flex R shoulder to 150* in order to decrease difficulty with reaching high shelf.  Target Date: 02/27/19    Goal Identifier: 2  Goal Description: Pt will be able to dress and bathe with less than 3/10 pain.   Target Date: 02/27/19    Goal Identifier: 3  Goal Description: Pt will be independent with HEP, with help from PCA, in order to self manage symptoms.  Target Date: 03/13/19    Goal Identifier: 4  Goal Description: Pt will be able to lift 10# without increase in sx in order to decrease difficulty with ADLs.   Target Date: 03/13/19       Therapy Frequency:  2 times/Week  Predicted Duration of Therapy Intervention:  8 weeks    Sonia Avitia PT                 I CERTIFY THE NEED FOR THESE SERVICES FURNISHED UNDER        THIS PLAN OF TREATMENT AND WHILE UNDER MY CARE     (Physician co-signature of this  document indicates review and certification of the therapy plan).                       Certification Date From:  01/16/19   Certification Date To:  03/13/19    Referring Provider:  Alexandru Vera MD    Initial Assessment        See Epic Evaluation Start of Care Date: 01/16/19

## 2019-01-16 NOTE — TELEPHONE ENCOUNTER
Received fax from [place] stating the following:    We have been contacted by this patient requesting incontinence supplies. If you feel this patient is able to get these products please send us an Rx for INCONTINENCE PRODUCTS (up to 300/month), GLOVES (4 BOXES), including all associated diagnosis' and Dx/ICD-10 codes associated with the patient so we can bill product to their insurance. If you feel this patient DOES NOT qualify please state on this form and send it back to me ASAP. Please also note that with their insurance if there is NOT an expiration date, I can make the Rx good for 5 years for this patient.     Thank you for your time in this matter. If you have any questions please call us at [phone].    Please fax the completed copy back to [phone].    Have a great day!    Maddi Martinez

## 2019-01-21 ENCOUNTER — HOSPITAL ENCOUNTER (OUTPATIENT)
Dept: PHYSICAL THERAPY | Facility: CLINIC | Age: 64
Setting detail: THERAPIES SERIES
End: 2019-01-21
Attending: FAMILY MEDICINE
Payer: MEDICARE

## 2019-01-21 PROCEDURE — 97110 THERAPEUTIC EXERCISES: CPT | Mod: GP | Performed by: PHYSICAL MEDICINE & REHABILITATION

## 2019-01-24 ENCOUNTER — HOSPITAL ENCOUNTER (OUTPATIENT)
Dept: PHYSICAL THERAPY | Facility: CLINIC | Age: 64
Setting detail: THERAPIES SERIES
End: 2019-01-24
Attending: FAMILY MEDICINE
Payer: MEDICARE

## 2019-01-24 PROCEDURE — 97110 THERAPEUTIC EXERCISES: CPT | Mod: GP | Performed by: PHYSICAL MEDICINE & REHABILITATION

## 2019-01-31 ENCOUNTER — HOSPITAL ENCOUNTER (OUTPATIENT)
Dept: PHYSICAL THERAPY | Facility: CLINIC | Age: 64
Setting detail: THERAPIES SERIES
End: 2019-01-31
Attending: FAMILY MEDICINE
Payer: MEDICARE

## 2019-01-31 PROCEDURE — 97110 THERAPEUTIC EXERCISES: CPT | Mod: GP | Performed by: PHYSICAL MEDICINE & REHABILITATION

## 2019-01-31 NOTE — PROGRESS NOTES
"Outpatient Physical Therapy Discharge Note     Patient: Geovani Bustos  : 1955    Beginning/End Dates of Reporting Period:  2019 to 2019    Referring Provider: Dr. Chuy MD    Therapy Diagnosis: R shoulder pain     Client Self Report: Pt reports R shoulder feels good today, \"100% better.\" Pt accompanied to appointment by PCA and both report HEP going well at home without increase in sx. Pt states he has performed exercises every day.     Objective Measurements:  Objective Measure: R shoulder ROM  Details: flexion: 165*; abd: 66* (80* at last visit); IR at 0* abd: to stomach; ER at 0* abd: 80*  Objective Measure: R shoulder strength  Details: flexion: 4+/5; abd: 4+/5; Ext: 5/5; IR: 5/5; ER:5-/5     Outcome Measures (most recent score):  SPADI: 8.46% disability (70% at initial eval)    Goals:  Goal Identifier 1   Goal Description Pt will be able to flex R shoulder to 150* in order to decrease difficulty with reaching high shelf.   Target Date 19   Date Met  19   Progress:     Goal Identifier 2   Goal Description Pt will be able to dress and bathe with less than 3/10 pain.    Target Date 19   Date Met  19   Progress:     Goal Identifier 3   Goal Description Pt will be independent with HEP, with help from PCA, in order to self manage symptoms.   Target Date 19   Date Met  19   Progress:     Goal Identifier 4   Goal Description Pt will be able to lift 10# without increase in sx in order to decrease difficulty with ADLs.    Target Date 19   Date Met  19   Progress:     Progress Toward Goals:   Progress this reporting period: Pt attended 4 PT sessions, achieving 4/4 goals. Pt's ROM, strength and pain have all improved since starting PT. Pt is appropriate for D/C at this time as he has met all goals and is independent with HEP. PT and pt discussed pt's lack of R shoulder abduction ROM and pt voiced he would like to work on mobility independently at home. " Pt edu to return to primary care if shoulder abduction becomes an issue and something the patient would like to improve with PT.     Plan:  Discharge from therapy.    Discharge:    Reason for Discharge: Patient has met all goals.  Patient chooses to discontinue therapy.    Equipment Issued: therabands    Discharge Plan: Patient to continue home program.    Please contact me with any questions or concerns.    Thank you for your referral,     Sonia Avitia, PT, DPT  Physical Therapist  [facility] - [address] [address] [phone]

## 2019-02-07 ENCOUNTER — OFFICE VISIT (OUTPATIENT)
Dept: FAMILY MEDICINE | Facility: CLINIC | Age: 64
End: 2019-02-07
Payer: MEDICARE

## 2019-02-07 VITALS
DIASTOLIC BLOOD PRESSURE: 64 MMHG | RESPIRATION RATE: 18 BRPM | HEART RATE: 68 BPM | TEMPERATURE: 97.8 F | SYSTOLIC BLOOD PRESSURE: 114 MMHG | WEIGHT: 186 LBS | OXYGEN SATURATION: 98 % | BODY MASS INDEX: 28.19 KG/M2 | HEIGHT: 68 IN

## 2019-02-07 DIAGNOSIS — H61.23 BILATERAL IMPACTED CERUMEN: ICD-10-CM

## 2019-02-07 DIAGNOSIS — Z00.00 ROUTINE HISTORY AND PHYSICAL EXAMINATION OF ADULT: Primary | ICD-10-CM

## 2019-02-07 PROCEDURE — 69209 REMOVE IMPACTED EAR WAX UNI: CPT | Performed by: FAMILY MEDICINE

## 2019-02-07 PROCEDURE — 99213 OFFICE O/P EST LOW 20 MIN: CPT | Mod: 25 | Performed by: FAMILY MEDICINE

## 2019-02-07 ASSESSMENT — MIFFLIN-ST. JEOR: SCORE: 1603.19

## 2019-02-07 NOTE — PROGRESS NOTES
SUBJECTIVE:   Geovani Bustos is a 63 year old male who presents to clinic today for the following health issues:      Group home Physical- and ear check/flush    Also needs letter that he is cleared to play basketball- has been discharged from PT      Problem list and histories reviewed & adjusted, as indicated.  Additional history: as documented    Patient Active Problem List   Diagnosis     Colon polyps     Schizophrenia (H)     VSD (ventricular septal defect)     Mitral valve prolapse     Acquired hypothyroidism     IgA nephropathy     Proteinuria     Hyperlipidemia LDL goal <130     BPH (benign prostatic hyperplasia)     Health Care Home     Bilateral impacted cerumen     Bunion of great toe of right foot     Eczema of external ear, bilateral     Tinea pedis, unspecified laterality     Incomplete right bundle branch block (RBBB)     Cigarette nicotine dependence with nicotine-induced disorder     Gastroesophageal reflux disease without esophagitis     Stage 1 mild COPD by GOLD classification (H)     CKD (chronic kidney disease) stage 3, GFR 30-59 ml/min (H)     Past Surgical History:   Procedure Laterality Date     ARTHRODESIS FOOT Right 1/19/2016    Procedure: ARTHRODESIS FOOT;  Surgeon: Holden Harrison DPM;  Location: [facility] OR     ARTHRODESIS FOOT Left 1/3/2017    Procedure: ARTHRODESIS FOOT;  Surgeon: Holden Harrison DPM;  Location: [facility] OR     SURGICAL HISTORY OF -       leg fracture       Social History     Tobacco Use     Smoking status: Current Every Day Smoker     Packs/day: 1.00     Years: 47.00     Pack years: 47.00     Types: Cigarettes     Smokeless tobacco: Never Used     Tobacco comment: 16 cigs per day   Substance Use Topics     Alcohol use: No     Family History   Problem Relation Age of Onset     Emphysema Mother      Coronary Artery Disease Father          Current Outpatient Medications   Medication Sig Dispense Refill     ABILIFY 10 MG OR TABS 1 TABLET DAILY       acetaminophen  (TYLENOL) 325 MG tablet Take 2 tablets (650 mg) by mouth every 4 hours as needed for mild pain 100 tablet 0     albuterol (PROAIR HFA/PROVENTIL HFA/VENTOLIN HFA) 108 (90 Base) MCG/ACT inhaler Inhale 2 puffs into the lungs every 4 hours as needed for shortness of breath / dyspnea or wheezing 1 Inhaler 2     albuterol (PROAIR HFA/PROVENTIL HFA/VENTOLIN HFA) 108 (90 Base) MCG/ACT inhaler Inhale 2 puffs into the lungs every 6 hours as needed for shortness of breath / dyspnea or wheezing 1 Inhaler 1     aspirin (QC LO-DOSE ASPIRIN) 81 MG EC tablet Take 1 tablet (81 mg) by mouth daily 120 tablet 0     augmented betamethasone dipropionate (DIPROLENE-AF) 0.05 % external ointment Apply to AA twice daily 1-2 weeks then as needed only. Do not apply to face. 45 g 5     Cholecalciferol (VITAMIN D) 2000 units tablet TAKE ONE TABLET BY MOUTH EVERY  tablet 0     Disposable Gloves (NITRILE EXAM GLOVES MEDIUM) MISC 1 each as needed Use PRN daily with administration of otic drops, body lotion and powder to treat dry itchy skin. 4 each 11     EAR DROPS EARWAX AID 6.5 % otic solution Place 5 drops into both ears 2 times daily For 5 days August 1-5th and Feb 1-5 th then return for irrigation after the 5th day. 15 mL 3     finasteride (PROSCAR) 5 MG tablet TAKE ONE TABLET BY MOUTH EVERY DAY 30 tablet 11     fluticasone-vilanterol (BREO ELLIPTA) 100-25 MCG/INH oral inhaler Inhale 1 puff into the lungs daily 1 Inhaler 11     gabapentin (NEURONTIN) 100 MG capsule Take 100 mg by mouth 2 times daily       levothyroxine (SYNTHROID/LEVOTHROID) 125 MCG tablet TAKE ONE TABLET BY MOUTH EVERY DAY 30 tablet 9     nicotine (NICODERM CQ) 14 MG/24HR 24 hr patch Place 1 patch onto the skin every 24 hours 30 patch 1     Omega-3 Fatty Acids (FISH OIL) 1200 MG capsule Take 1 capsule (1.2 g) by mouth daily 90 capsule 3     order for DME Equipment being ordered: incontinent pads 300 Device 3     order for DME Equipment being ordered: gloves 4 Box 3      order for DME Equipment being ordered: Dynaflex insert 1 Units 0     oxybutynin (DITROPAN) 5 MG tablet TAKE ONE TABLET BY MOUTH THREE TIMES DAILY 90 tablet 5     polyethylene glycol (MIRALAX/GLYCOLAX) powder Take 17 g (1 capful) by mouth daily 527 g 11     REGULOID 48.57 % POWD Take 1 each by mouth See Admin Instructions Take 1 teaspoonful every day at 7pm and 9 pm. 369 g 11     simvastatin (ZOCOR) 40 MG tablet TAKE ONE TABLET BY MOUTH EVERY DAY AT 9 PM 90 tablet 3     Skin Protectants, Misc. (EUCERIN) cream Apply topically 2 times daily 454 g 5     tamsulosin (FLOMAX) 0.4 MG capsule Take 1 capsule (0.4 mg) by mouth daily 90 capsule 3     traZODone (DESYREL) 50 MG tablet Take 50 mg by mouth At Bedtime       ZOLOFT 100 MG OR TABS 2 TABLETS DAILY       Allergies   Allergen Reactions     Nitrogen Oxide      Sulfa Drugs      Recent Labs   Lab Test 01/15/19  1046 12/05/18  0840 08/08/18  1405 03/21/18  0900  11/08/17  0840 10/11/17  1325  02/01/17  1457  02/01/13  0815  05/14/12  0830   A1C  --   --   --  5.6  --   --   --   --   --   --  5.5  --  5.6   LDL  --  118*  --   --   --  80  --   --  Cannot estimate LDL when triglyceride exceeds 400 mg/dL  89   < > 117   < > 101   HDL  --  48  --   --   --  47  --   --  27*   < > 45   < > 43   TRIG  --  150*  --   --   --  119  --   --  444*   < > 123   < > 143   ALT  --  28  --   --   --  22  --   --  19   < >  --    < >  --    CR 1.66* 1.60*  --  1.36*   < > 1.31*  --    < > 1.21   < >  --    < >  --    GFRESTIMATED 43* 44*  --  53*   < > 55*  --    < > 61   < >  --    < >  --    GFRESTBLACK 49* 53*  --  64   < > 67  --    < > 73   < >  --    < >  --    POTASSIUM 4.6 4.6  --  4.4   < > 4.6  --    < > 4.1   < >  --    < >  --    TSH  --   --  2.13  --   --   --  1.24  --   --    < >  --    < >  --     < > = values in this interval not displayed.      BP Readings from Last 3 Encounters:   02/07/19 114/64   01/15/19 132/84   12/04/18 128/72    Wt Readings from Last 3  "Encounters:   02/07/19 84.4 kg (186 lb)   01/15/19 85.3 kg (188 lb)   12/04/18 83.5 kg (184 lb)                  Labs reviewed in EPIC    Reviewed and updated as needed this visit by clinical staff       Reviewed and updated as needed this visit by Provider         ROS:  Constitutional, HEENT, cardiovascular, pulmonary, GI, , musculoskeletal, neuro, skin, endocrine and psych systems are negative, except as otherwise noted.    OBJECTIVE:     /64 (Cuff Size: Adult Regular)   Pulse 68   Temp 97.8  F (36.6  C) (Tympanic)   Resp 18   Ht 1.727 m (5' 8\")   Wt 84.4 kg (186 lb)   SpO2 98%   BMI 28.28 kg/m    Body mass index is 28.28 kg/m .  GENERAL: alert and no distress  EYES: Eyes grossly normal to inspection, PERRL and conjunctivae and sclerae normal  HENT: normal cephalic/atraumatic, right ear: occluded with wax, left ear: occluded with wax, nose and mouth without ulcers or lesions, oropharynx clear and oral mucous membranes moist  NECK: no adenopathy, no asymmetry, masses, or scars and thyroid normal to palpation  RESP: lungs clear to auscultation - no rales, rhonchi or wheezes  CV: regular rates and rhythm, normal S1 S2, no S3 or S4 and no murmur, click or rub  ABDOMEN: soft, nontender, no hepatosplenomegaly, no masses and bowel sounds normal  MS: no gross musculoskeletal defects noted, no edema    ASSESSMENT/PLAN:       1. Routine history and physical examination of adult  - doing well, continue current medications, suggested Shingrex vaccination  -Cleared to play basketball    2. Bilateral impacted cerumen  -Bilateral impacted cerumen cleaned with saline irrigation by MA, tympanic membrane normal on reevaluation, suggested to avoid using Q-tips      Alexandru Vera MD  [facility]  "

## 2019-02-07 NOTE — NURSING NOTE
"Chief Complaint   Patient presents with     Physical     ALLISON- see scanned forms        Initial /64 (Cuff Size: Adult Regular)   Pulse 68   Temp 97.8  F (36.6  C) (Tympanic)   Resp 18   Ht 1.727 m (5' 8\")   Wt 84.4 kg (186 lb)   SpO2 98%   BMI 28.28 kg/m   Estimated body mass index is 28.28 kg/m  as calculated from the following:    Height as of this encounter: 1.727 m (5' 8\").    Weight as of this encounter: 84.4 kg (186 lb).    Patient presents to the clinic using No DME    Health Maintenance that is potentially due pending provider review:  NONE    n/a    Is there anyone who you would like to be able to receive your results? Not Applicable  If yes have patient fill out [facility]    "

## 2019-02-14 DIAGNOSIS — E78.5 HYPERLIPIDEMIA LDL GOAL <130: ICD-10-CM

## 2019-02-14 RX ORDER — AMOXICILLIN 500 MG
1 CAPSULE ORAL DAILY
Qty: 90 CAPSULE | Refills: 2 | Status: SHIPPED | OUTPATIENT
Start: 2019-02-14 | End: 2019-09-19

## 2019-02-14 NOTE — TELEPHONE ENCOUNTER
Prescription approved per [facility] Refill Protocol.    Angeli MAGANA RN

## 2019-02-14 NOTE — TELEPHONE ENCOUNTER
"Requested Prescriptions   Pending Prescriptions Disp Refills     Omega-3 Fatty Acids (FISH OIL) 1200 MG capsule 90 capsule      Sig: Take 1 capsule (1.2 g) by mouth daily    Vitamin Supplements (Adult) Protocol Passed - 2/14/2019  1:50 PM       Passed - High dose Vitamin D not ordered       Passed - Recent (12 mo) or future (30 days) visit within the authorizing provider's specialty    Patient had office visit in the last 12 months or has a visit in the next 30 days with authorizing provider or within the authorizing provider's specialty.  See \"Patient Info\" tab in inbasket, or \"Choose Columns\" in Meds & Orders section of the refill encounter.             Passed - Medication is active on med list        Last Written Prescription Date:  2/27/18  /Last Fill Quantity: 90,  # refills: 3   Last office visit: 2/7/2019 with prescribing provider:     Future Office Visit:   Next 5 appointments (look out 90 days)    Mar 04, 2019 10:00 AM CST  [facility] Alexandru [facility] MD  [facility] [address] [facility] [address]  [phone]   Apr 11, 2019  2:30 PM CDT  Return Visit with Luis A Zepeda MD  [facility] ([facility]) [address] [address] [phone]           "

## 2019-03-05 DIAGNOSIS — E78.5 HYPERLIPIDEMIA LDL GOAL <130: ICD-10-CM

## 2019-03-05 NOTE — TELEPHONE ENCOUNTER
"Requested Prescriptions   Pending Prescriptions Disp Refills     aspirin (QC LO-DOSE ASPIRIN) 81 MG EC tablet 120 tablet 0     Sig: Take 1 tablet (81 mg) by mouth daily    Analgesics (Non-Narcotic Tylenol and ASA Only) Passed - 3/5/2019 10:01 AM       Passed - Recent (12 mo) or future (30 days) visit within the authorizing provider's specialty    Patient had office visit in the last 12 months or has a visit in the next 30 days with authorizing provider or within the authorizing provider's specialty.  See \"Patient Info\" tab in inbasket, or \"Choose Columns\" in Meds & Orders section of the refill encounter.             Passed - Patient is age 20 years or older    If ASA is flagged for ages under 20 years old. Forward to provider for confirmation Ryes Syndrome is not a concern.             Passed - Medication is active on med list        Last Written Prescription Date:  11/28/18  Last Fill Quantity: 120,  # refills: 0   Last office visit: 2/7/2019 with prescribing provider:     Future Office Visit:   Next 5 appointments (look out 90 days)    Apr 11, 2019  2:30 PM CDT  [facility] A [facility] [address] [facility] [address] [phone]           "

## 2019-03-11 DIAGNOSIS — N18.30 CKD (CHRONIC KIDNEY DISEASE) STAGE 3, GFR 30-59 ML/MIN (H): Primary | ICD-10-CM

## 2019-03-22 DIAGNOSIS — N18.30 CKD (CHRONIC KIDNEY DISEASE) STAGE 3, GFR 30-59 ML/MIN (H): ICD-10-CM

## 2019-03-22 LAB
ALBUMIN SERPL-MCNC: 4.1 G/DL (ref 3.4–5)
ANION GAP SERPL CALCULATED.3IONS-SCNC: 7 MMOL/L (ref 3–14)
BUN SERPL-MCNC: 32 MG/DL (ref 7–30)
CALCIUM SERPL-MCNC: 8.9 MG/DL (ref 8.5–10.1)
CHLORIDE SERPL-SCNC: 106 MMOL/L (ref 94–109)
CO2 SERPL-SCNC: 28 MMOL/L (ref 20–32)
CREAT SERPL-MCNC: 1.68 MG/DL (ref 0.66–1.25)
ERYTHROCYTE [DISTWIDTH] IN BLOOD BY AUTOMATED COUNT: 13.7 % (ref 10–15)
GFR SERPL CREATININE-BSD FRML MDRD: 42 ML/MIN/{1.73_M2}
GLUCOSE SERPL-MCNC: 95 MG/DL (ref 70–99)
HCT VFR BLD AUTO: 45.9 % (ref 40–53)
HGB BLD-MCNC: 15 G/DL (ref 13.3–17.7)
MCH RBC QN AUTO: 32.4 PG (ref 26.5–33)
MCHC RBC AUTO-ENTMCNC: 32.7 G/DL (ref 31.5–36.5)
MCV RBC AUTO: 99 FL (ref 78–100)
PHOSPHATE SERPL-MCNC: 4 MG/DL (ref 2.5–4.5)
PLATELET # BLD AUTO: 165 10E9/L (ref 150–450)
POTASSIUM SERPL-SCNC: 4.2 MMOL/L (ref 3.4–5.3)
RBC # BLD AUTO: 4.63 10E12/L (ref 4.4–5.9)
SODIUM SERPL-SCNC: 141 MMOL/L (ref 133–144)
WBC # BLD AUTO: 11 10E9/L (ref 4–11)

## 2019-03-22 PROCEDURE — 80069 RENAL FUNCTION PANEL: CPT | Performed by: INTERNAL MEDICINE

## 2019-03-22 PROCEDURE — 85027 COMPLETE CBC AUTOMATED: CPT | Performed by: INTERNAL MEDICINE

## 2019-03-22 PROCEDURE — 36415 COLL VENOUS BLD VENIPUNCTURE: CPT | Performed by: INTERNAL MEDICINE

## 2019-03-25 ENCOUNTER — OFFICE VISIT (OUTPATIENT)
Dept: NEPHROLOGY | Facility: CLINIC | Age: 64
End: 2019-03-25
Attending: INTERNAL MEDICINE
Payer: MEDICARE

## 2019-03-25 VITALS
WEIGHT: 193.4 LBS | HEIGHT: 68 IN | HEART RATE: 70 BPM | BODY MASS INDEX: 29.31 KG/M2 | DIASTOLIC BLOOD PRESSURE: 78 MMHG | SYSTOLIC BLOOD PRESSURE: 117 MMHG | OXYGEN SATURATION: 94 %

## 2019-03-25 DIAGNOSIS — N18.30 CKD (CHRONIC KIDNEY DISEASE) STAGE 3, GFR 30-59 ML/MIN (H): ICD-10-CM

## 2019-03-25 DIAGNOSIS — N02.B9 IGA NEPHROPATHY: ICD-10-CM

## 2019-03-25 LAB
ALBUMIN UR-MCNC: NEGATIVE MG/DL
APPEARANCE UR: CLEAR
BILIRUB UR QL STRIP: NEGATIVE
COLOR UR AUTO: ABNORMAL
CREAT UR-MCNC: 22 MG/DL
GLUCOSE UR STRIP-MCNC: NEGATIVE MG/DL
HGB UR QL STRIP: ABNORMAL
KETONES UR STRIP-MCNC: NEGATIVE MG/DL
LEUKOCYTE ESTERASE UR QL STRIP: NEGATIVE
MUCOUS THREADS #/AREA URNS LPF: PRESENT /LPF
NITRATE UR QL: NEGATIVE
PH UR STRIP: 5 PH (ref 5–7)
PROT UR-MCNC: <0.05 G/L
PROT/CREAT 24H UR: NORMAL G/G CR (ref 0–0.2)
RBC #/AREA URNS AUTO: 1 /HPF (ref 0–2)
SOURCE: ABNORMAL
SP GR UR STRIP: 1.01 (ref 1–1.03)
UROBILINOGEN UR STRIP-MCNC: 0 MG/DL (ref 0–2)
WBC #/AREA URNS AUTO: <1 /HPF (ref 0–5)

## 2019-03-25 PROCEDURE — 81001 URINALYSIS AUTO W/SCOPE: CPT | Performed by: INTERNAL MEDICINE

## 2019-03-25 PROCEDURE — G0463 HOSPITAL OUTPT CLINIC VISIT: HCPCS | Mod: ZF

## 2019-03-25 PROCEDURE — 84156 ASSAY OF PROTEIN URINE: CPT | Performed by: INTERNAL MEDICINE

## 2019-03-25 ASSESSMENT — MIFFLIN-ST. JEOR: SCORE: 1636.76

## 2019-03-25 ASSESSMENT — PAIN SCALES - GENERAL: PAINLEVEL: NO PAIN (0)

## 2019-03-25 NOTE — LETTER
3/25/2019       RE: Geovani Bustos  [address]  [facility] [id number]     Dear Colleague,    Thank you for referring your patient, Geovani Bustos, to the [facility] NEPHROLOGY at [facility]. Please see a copy of my visit note below.      Nephrology Clinic    Ginger Weiss MD  2019     Name: Geovani Bustos  MRN: [id number]  Age: 63 year old  : 1955  Referring provider: Alexandru Vera     Assessment and Plan:    IgA Nephropathy: biopsy proven IgA nephropathy in , will try to obtain bx records.. Overall, no proteinuria over several years. Low level microscopic hematuria on and off. Stable disease. UA today with small amt of blood, no rbc's or protein.Blood pressure 117/78, at goal.   -- Continue with conservative management including management of BP ( target < 130/80), avoiding nephrotoxins, may continue Fish oil.   -- Given lack of proteinuria and fairly low BP, no need to start RASi at this time but should have low threshold if blood pressure goes up or if there is detectable proteinuria.      CKD, stage 3: stable for ~10 yrs, today 1.68 with eGFR 42, decreased 10% over the past year.     BMD: PTH in target. Ca/Phos/K normal.    Follow-up:  6 months    Ginger Weiss MD     Reason For Visit:   Follow-up    HPI:   Geovani Bustos is a 63 year old male with a history of biopsy proven IgA nephropathy (2009, Dr Robbie Harris. As per note - kidney tissue looked altogether normal with exception of deposition of IgA in mesangium), treated conservatively. Other pertinent history includes hypothyroidism, schizophrenia, HPL. Patient was last evaluated on 3/22/2018 by Dr. Dalton. Since his last evaluation, the patient reports he has been doing well. He reports he has some bilateral flank pain intermittently. He notes some mild leg swelling. He denies any dizziness, lightheadedness, or shortness of breath. No history of kidney stones.      Review of  Systems:   Pertinent items are noted in HPI or as below, remainder of complete ROS is negative.      Active Medications:     Current Outpatient Medications:      ABILIFY 10 MG OR TABS, 1 TABLET DAILY, Disp: , Rfl:      acetaminophen (TYLENOL) 325 MG tablet, Take 2 tablets (650 mg) by mouth every 4 hours as needed for mild pain, Disp: 100 tablet, Rfl: 0     albuterol (PROAIR HFA/PROVENTIL HFA/VENTOLIN HFA) 108 (90 Base) MCG/ACT inhaler, Inhale 2 puffs into the lungs every 4 hours as needed for shortness of breath / dyspnea or wheezing, Disp: 1 Inhaler, Rfl: 2     albuterol (PROAIR HFA/PROVENTIL HFA/VENTOLIN HFA) 108 (90 Base) MCG/ACT inhaler, Inhale 2 puffs into the lungs every 6 hours as needed for shortness of breath / dyspnea or wheezing, Disp: 1 Inhaler, Rfl: 1     aspirin (QC LO-DOSE ASPIRIN) 81 MG EC tablet, Take 1 tablet (81 mg) by mouth daily, Disp: 100 tablet, Rfl: 3     augmented betamethasone dipropionate (DIPROLENE-AF) 0.05 % external ointment, Apply to AA twice daily 1-2 weeks then as needed only. Do not apply to face., Disp: 45 g, Rfl: 5     Cholecalciferol (VITAMIN D) 2000 units tablet, TAKE ONE TABLET BY MOUTH EVERY DAY, Disp: 100 tablet, Rfl: 0     Disposable Gloves (NITRILE EXAM GLOVES MEDIUM) MISC, 1 each as needed Use PRN daily with administration of otic drops, body lotion and powder to treat dry itchy skin., Disp: 4 each, Rfl: 11     EAR DROPS EARWAX AID 6.5 % otic solution, Place 5 drops into both ears 2 times daily For 5 days August 1-5th and Feb 1-5 th then return for irrigation after the 5th day., Disp: 15 mL, Rfl: 3     finasteride (PROSCAR) 5 MG tablet, TAKE ONE TABLET BY MOUTH EVERY DAY, Disp: 30 tablet, Rfl: 11     fluticasone-vilanterol (BREO ELLIPTA) 100-25 MCG/INH oral inhaler, Inhale 1 puff into the lungs daily, Disp: 1 Inhaler, Rfl: 11     gabapentin (NEURONTIN) 100 MG capsule, Take 100 mg by mouth 2 times daily, Disp: , Rfl:      levothyroxine (SYNTHROID/LEVOTHROID) 125 MCG tablet,  TAKE ONE TABLET BY MOUTH EVERY DAY, Disp: 30 tablet, Rfl: 9     Omega-3 Fatty Acids (FISH OIL) 1200 MG capsule, Take 1 capsule (1.2 g) by mouth daily, Disp: 90 capsule, Rfl: 2     order for DME, Equipment being ordered: incontinent pads, Disp: 300 Device, Rfl: 3     order for DME, Equipment being ordered: gloves, Disp: 4 Box, Rfl: 3     order for DME, Equipment being ordered: Dynaflex insert, Disp: 1 Units, Rfl: 0     oxybutynin (DITROPAN) 5 MG tablet, TAKE ONE TABLET BY MOUTH THREE TIMES DAILY, Disp: 90 tablet, Rfl: 5     polyethylene glycol (MIRALAX/GLYCOLAX) powder, Take 17 g (1 capful) by mouth daily, Disp: 527 g, Rfl: 11     REGULOID 48.57 % POWD, Take 1 each by mouth See Admin Instructions Take 1 teaspoonful every day at 7pm and 9 pm., Disp: 369 g, Rfl: 11     simvastatin (ZOCOR) 40 MG tablet, TAKE ONE TABLET BY MOUTH EVERY DAY AT 9 PM, Disp: 90 tablet, Rfl: 3     Skin Protectants, Misc. (EUCERIN) cream, Apply topically 2 times daily, Disp: 454 g, Rfl: 5     tamsulosin (FLOMAX) 0.4 MG capsule, Take 1 capsule (0.4 mg) by mouth daily, Disp: 90 capsule, Rfl: 3     traZODone (DESYREL) 50 MG tablet, Take 50 mg by mouth At Bedtime, Disp: , Rfl:      ZOLOFT 100 MG OR TABS, 2 TABLETS DAILY, Disp: , Rfl:      nicotine (NICODERM CQ) 14 MG/24HR 24 hr patch, Place 1 patch onto the skin every 24 hours (Patient not taking: Reported on 3/25/2019), Disp: 30 patch, Rfl: 1  No current facility-administered medications for this visit.     Facility-Administered Medications Ordered in Other Visits:      bupivacaine (MARCAINE) injection 0.5%, , , PRN, Holden Harrison DPM, 10 mL at 01/03/17 0945     Allergies:   Nitrogen oxide and Sulfa drugs      Past Medical History:  Past Medical History:   Diagnosis Date     Cerumen impaction      Chronic kidney disease      Colon polyps      Hyperlipidemia      Mitral valve prolapse      Schizophrenia (H)      Ulcerated penile lesions      VSD (ventricular septal defect)      Patient  Active Problem List   Diagnosis     Colon polyps     Schizophrenia (H)     VSD (ventricular septal defect)     Mitral valve prolapse     Acquired hypothyroidism     IgA nephropathy     Proteinuria     Hyperlipidemia LDL goal <130     BPH (benign prostatic hyperplasia)     Health Care Home     Bilateral impacted cerumen     Bunion of great toe of right foot     Eczema of external ear, bilateral     Tinea pedis, unspecified laterality     Incomplete right bundle branch block (RBBB)     Cigarette nicotine dependence with nicotine-induced disorder     Gastroesophageal reflux disease without esophagitis     Stage 1 mild COPD by GOLD classification (H)     CKD (chronic kidney disease) stage 3, GFR 30-59 ml/min (H)        Past Surgical History:  Past Surgical History:   Procedure Laterality Date     ARTHRODESIS FOOT Right 1/19/2016    Procedure: ARTHRODESIS FOOT;  Surgeon: Holden Harrison DPM;  Location: [facility] OR     ARTHRODESIS FOOT Left 1/3/2017    Procedure: ARTHRODESIS FOOT;  Surgeon: Holden Harrison DPM;  Location: [facility] OR     SURGICAL HISTORY OF -       leg fracture       Family History:   Family History   Problem Relation Age of Onset     Emphysema Mother      Coronary Artery Disease Father          Social History:   Social History     Socioeconomic History     Marital status: Single     Spouse name: Not on file     Number of children: Not on file     Years of education: Not on file     Highest education level: Not on file   Occupational History     Not on file   Social Needs     Financial resource strain: Not on file     Food insecurity:     Worry: Not on file     Inability: Not on file     Transportation needs:     Medical: Not on file     Non-medical: Not on file   Tobacco Use     Smoking status: Current Every Day Smoker     Packs/day: 1.00     Years: 47.00     Pack years: 47.00     Types: Cigarettes     Smokeless tobacco: Never Used     Tobacco comment: 16 cigs per day   Substance and Sexual Activity      "Alcohol use: No     Drug use: No     Sexual activity: No   Lifestyle     Physical activity:     Days per week: Not on file     Minutes per session: Not on file     Stress: Not on file   Relationships     Social connections:     Talks on phone: Not on file     Gets together: Not on file     Attends Restorationism service: Not on file     Active member of club or organization: Not on file     Attends meetings of clubs or organizations: Not on file     Relationship status: Not on file     Intimate partner violence:     Fear of current or ex partner: Not on file     Emotionally abused: Not on file     Physically abused: Not on file     Forced sexual activity: Not on file   Other Topics Concern     Parent/sibling w/ CABG, MI or angioplasty before 65F 55M? Not Asked      Service Not Asked     Blood Transfusions No     Caffeine Concern Not Asked     Occupational Exposure Not Asked     Hobby Hazards Not Asked     Sleep Concern Not Asked     Stress Concern Not Asked     Weight Concern Not Asked     Special Diet Not Asked     Back Care Not Asked     Exercise Not Asked     Bike Helmet Not Asked     Seat Belt Not Asked     Self-Exams Not Asked   Social History Narrative    Patient has no interest in smoking cessation.     Her for [employer] physical - likes to [employer]    Lives in Group home. Grew up in [place].        Physical Exam:  /78   Pulse 70   Ht 1.727 m (5' 8\")   Wt 87.7 kg (193 lb 6.4 oz)   SpO2 94%   BMI 29.41 kg/m      GENERAL APPEARANCE: alert and no distress  EYES: nonicteric  HENT: mouth without ulcers or lesions  NECK: supple, no adenopathy  RESP: lungs clear to auscultation   CV: regular rhythm, normal rate, no rub  Extremities: mild lower extremity edema, no clubbing, no cyanosis  MS: no evidence of inflammation in joints, no muscle tenderness  SKIN: no rash  NEURO: mentation intact and speech normal  PSYCH: affect normal/bright       Laboratory:  CMP  Recent Labs   Lab Test 03/22/19  1242 " 01/15/19  1046 12/05/18  0840 03/21/18  0900 02/07/18  1404 11/08/17  0840  02/01/17  1457 01/04/16  1640    138 140 140 141 138  --  137 137   POTASSIUM 4.2 4.6 4.6 4.4 4.5 4.6   < > 4.1 4.1   CHLORIDE 106 103 103 106 107 104  --  101 105   CO2 28 32 33* 31 28 33*  --  30 27   ANIONGAP 7 3 4 3 6 1*  --  6 5   GLC 95 83 97 104*  102* 91 85   < > 85 93   BUN 32* 25 26 21 34* 18  --  23 33*   CR 1.68* 1.66* 1.60* 1.36* 1.48* 1.31*   < > 1.21 1.27*   GFRESTIMATED 42* 43* 44* 53* 48* 55*   < > 61 57*   GFRESTBLACK 49* 49* 53* 64 58* 67   < > 73 70   RAYRAY 8.9 9.1 9.1 8.8 8.3* 9.2  --  8.8 8.7   PHOS 4.0  --   --  3.0 3.7  --   --  3.3  --    PROTTOTAL  --   --  8.0  --   --  7.2  --  7.3 7.6   ALBUMIN 4.1  --  4.5 3.7 4.0 3.8  --  3.8 4.1   BILITOTAL  --   --  0.6  --   --  0.3  --  0.3 0.3   ALKPHOS  --   --  118  --   --  188*  --  142 127   AST  --   --  19  --   --  15  --  13 21   ALT  --   --  28  --   --  22  --  19 33    < > = values in this interval not displayed.     CBC  Recent Labs   Lab Test 03/22/19  1242 12/05/18  0840 03/21/18  0900 02/15/18  1149 11/08/17  0840   HGB 15.0 17.6 15.7 15.8 15.5   WBC 11.0 10.6 9.4  --  12.2*   RBC 4.63 5.35 4.95  --  4.86   HCT 45.9 52.7 47.8  --  47.1   MCV 99 99 97  --  97   MCH 32.4 32.9 31.7  --  31.9   MCHC 32.7 33.4 32.8  --  32.9   RDW 13.7 13.4 14.1  --  13.1    173 180  --  218     INR  No lab results found.  ABG  No lab results found.   URINE STUDIES  Recent Labs   Lab Test 07/11/18  1410 03/21/18  0900 03/16/18  1135 11/05/14  2235 10/03/11  1606   COLOR Yellow Yellow Yellow Straw Yellow   APPEARANCE Clear Clear Clear Clear Clear   URINEGLC Negative Negative 250* Negative Negative   URINEBILI Negative Negative Negative Negative Negative   URINEKETONE Negative Negative Negative Negative Negative   SG <=1.005 1.015 <=1.005 1.002* 1.025   UBLD Small* Moderate* Moderate* Small* Large*   URINEPH 6.0 6.0 5.5 5.0 5.0   PROTEIN Negative Negative Negative  Negative Negative   UROBILINOGEN 0.2 1.0 0.2  --  0.2   NITRITE Negative Negative Negative Negative Negative   LEUKEST Negative Negative Negative Negative Negative   RBCU 2-5* 5-10* O - 2 1 2-5*   WBCU 0 - 5 0 - 5 0 - 5 1 O - 2     Recent Labs   Lab Test 03/21/18  0900   UTPG 0.12     PTH  Recent Labs   Lab Test 03/21/18  0900   PTHI 62     IRON STUDIES   Recent Labs   Lab Test 03/21/18  0900   IRON 108      IRONSAT 40   FREDI 60       Imaging: Review    Scribe Disclosure:   I, Amelia Madrid, am serving as a scribe to document services personally performed by Ginger Weiss MD at this visit, based upon the provider's statements to me. All documentation has been reviewed by the aforementioned provider prior to being entered into the official medical record.     Portions of this medical record were completed by a scribe. UPON MY REVIEW AND AUTHENTICATION BY ELECTRONIC SIGNATURE, this confirms (a) I performed the applicable clinical services, and (b) the record is accurate.      Again, thank you for allowing me to participate in the care of your patient.      Sincerely,    Ginger Weiss MD

## 2019-03-25 NOTE — PROGRESS NOTES
[facility]rology [facility]    Ginger Weiss MD  2019     Name: Geovani Bustos  MRN: [id number]  Age: 63 year old  : 1955  Referring provider: Alexandru Vera     Assessment and Plan:    IgA Nephropathy: biopsy proven IgA nephropathy in , will try to obtain bx records.. Overall, no proteinuria over several years. Low level microscopic hematuria on and off. Stable disease. UA today with small amt of blood, no rbc's or protein.Blood pressure 117/78, at goal.   -- Continue with conservative management including management of BP ( target < 130/80), avoiding nephrotoxins, may continue Fish oil.   -- Given lack of proteinuria and fairly low BP, no need to start RASi at this time but should have low threshold if blood pressure goes up or if there is detectable proteinuria.      CKD, stage 3: stable for ~10 yrs, today 1.68 with eGFR 42, decreased 10% over the past year.     BMD: PTH in target. Ca/Phos/K normal.    Follow-up:  6 months    Ginger Weiss MD     Reason For Visit:   Follow-up    HPI:   Geovani Bustos is a 63 year old male with a history of biopsy proven IgA nephropathy (2009, Dr Robbie Harris. As per note - kidney tissue looked altogether normal with exception of deposition of IgA in mesangium), treated conservatively. Other pertinent history includes hypothyroidism, schizophrenia, HPL. Patient was last evaluated on 3/22/2018 by Dr. Dalton. Since his last evaluation, the patient reports he has been doing well. He reports he has some bilateral flank pain intermittently. He notes some mild leg swelling. He denies any dizziness, lightheadedness, or shortness of breath. No history of kidney stones.      Review of Systems:   Pertinent items are noted in HPI or as below, remainder of complete ROS is negative.      Active Medications:     Current Outpatient Medications:      ABILIFY 10 MG OR TABS, 1 TABLET DAILY, Disp: , Rfl:      acetaminophen (TYLENOL) 325 MG tablet, Take 2 tablets (650 mg) by  mouth every 4 hours as needed for mild pain, Disp: 100 tablet, Rfl: 0     albuterol (PROAIR HFA/PROVENTIL HFA/VENTOLIN HFA) 108 (90 Base) MCG/ACT inhaler, Inhale 2 puffs into the lungs every 4 hours as needed for shortness of breath / dyspnea or wheezing, Disp: 1 Inhaler, Rfl: 2     albuterol (PROAIR HFA/PROVENTIL HFA/VENTOLIN HFA) 108 (90 Base) MCG/ACT inhaler, Inhale 2 puffs into the lungs every 6 hours as needed for shortness of breath / dyspnea or wheezing, Disp: 1 Inhaler, Rfl: 1     aspirin (QC LO-DOSE ASPIRIN) 81 MG EC tablet, Take 1 tablet (81 mg) by mouth daily, Disp: 100 tablet, Rfl: 3     augmented betamethasone dipropionate (DIPROLENE-AF) 0.05 % external ointment, Apply to AA twice daily 1-2 weeks then as needed only. Do not apply to face., Disp: 45 g, Rfl: 5     Cholecalciferol (VITAMIN D) 2000 units tablet, TAKE ONE TABLET BY MOUTH EVERY DAY, Disp: 100 tablet, Rfl: 0     Disposable Gloves (NITRILE EXAM GLOVES MEDIUM) MISC, 1 each as needed Use PRN daily with administration of otic drops, body lotion and powder to treat dry itchy skin., Disp: 4 each, Rfl: 11     EAR DROPS EARWAX AID 6.5 % otic solution, Place 5 drops into both ears 2 times daily For 5 days August 1-5th and Feb 1-5 th then return for irrigation after the 5th day., Disp: 15 mL, Rfl: 3     finasteride (PROSCAR) 5 MG tablet, TAKE ONE TABLET BY MOUTH EVERY DAY, Disp: 30 tablet, Rfl: 11     fluticasone-vilanterol (BREO ELLIPTA) 100-25 MCG/INH oral inhaler, Inhale 1 puff into the lungs daily, Disp: 1 Inhaler, Rfl: 11     gabapentin (NEURONTIN) 100 MG capsule, Take 100 mg by mouth 2 times daily, Disp: , Rfl:      levothyroxine (SYNTHROID/LEVOTHROID) 125 MCG tablet, TAKE ONE TABLET BY MOUTH EVERY DAY, Disp: 30 tablet, Rfl: 9     Omega-3 Fatty Acids (FISH OIL) 1200 MG capsule, Take 1 capsule (1.2 g) by mouth daily, Disp: 90 capsule, Rfl: 2     order for DME, Equipment being ordered: incontinent pads, Disp: 300 Device, Rfl: 3     order for DME,  Equipment being ordered: gloves, Disp: 4 Box, Rfl: 3     order for DME, Equipment being ordered: Dynaflex insert, Disp: 1 Units, Rfl: 0     oxybutynin (DITROPAN) 5 MG tablet, TAKE ONE TABLET BY MOUTH THREE TIMES DAILY, Disp: 90 tablet, Rfl: 5     polyethylene glycol (MIRALAX/GLYCOLAX) powder, Take 17 g (1 capful) by mouth daily, Disp: 527 g, Rfl: 11     REGULOID 48.57 % POWD, Take 1 each by mouth See Admin Instructions Take 1 teaspoonful every day at 7pm and 9 pm., Disp: 369 g, Rfl: 11     simvastatin (ZOCOR) 40 MG tablet, TAKE ONE TABLET BY MOUTH EVERY DAY AT 9 PM, Disp: 90 tablet, Rfl: 3     Skin Protectants, Misc. (EUCERIN) cream, Apply topically 2 times daily, Disp: 454 g, Rfl: 5     tamsulosin (FLOMAX) 0.4 MG capsule, Take 1 capsule (0.4 mg) by mouth daily, Disp: 90 capsule, Rfl: 3     traZODone (DESYREL) 50 MG tablet, Take 50 mg by mouth At Bedtime, Disp: , Rfl:      ZOLOFT 100 MG OR TABS, 2 TABLETS DAILY, Disp: , Rfl:      nicotine (NICODERM CQ) 14 MG/24HR 24 hr patch, Place 1 patch onto the skin every 24 hours (Patient not taking: Reported on 3/25/2019), Disp: 30 patch, Rfl: 1  No current facility-administered medications for this visit.     Facility-Administered Medications Ordered in Other Visits:      bupivacaine (MARCAINE) injection 0.5%, , , PRN, Holden Harrison DPM, 10 mL at 01/03/17 0945     Allergies:   Nitrogen oxide and Sulfa drugs      Past Medical History:  Past Medical History:   Diagnosis Date     Cerumen impaction      Chronic kidney disease      Colon polyps      Hyperlipidemia      Mitral valve prolapse      Schizophrenia (H)      Ulcerated penile lesions      VSD (ventricular septal defect)      Patient Active Problem List   Diagnosis     Colon polyps     Schizophrenia (H)     VSD (ventricular septal defect)     Mitral valve prolapse     Acquired hypothyroidism     IgA nephropathy     Proteinuria     Hyperlipidemia LDL goal <130     BPH (benign prostatic hyperplasia)     Health Care  Home     Bilateral impacted cerumen     Bunion of great toe of right foot     Eczema of external ear, bilateral     Tinea pedis, unspecified laterality     Incomplete right bundle branch block (RBBB)     Cigarette nicotine dependence with nicotine-induced disorder     Gastroesophageal reflux disease without esophagitis     Stage 1 mild COPD by GOLD classification (H)     CKD (chronic kidney disease) stage 3, GFR 30-59 ml/min (H)        Past Surgical History:  Past Surgical History:   Procedure Laterality Date     ARTHRODESIS FOOT Right 1/19/2016    Procedure: ARTHRODESIS FOOT;  Surgeon: Holden Harrison DPM;  Location: [facility]     ARTHRODESIS FOOT Left 1/3/2017    Procedure: ARTHRODESIS FOOT;  Surgeon: Holden Harrison DPM;  Location: [facility]     SURGICAL HISTORY OF -       leg fracture       Family History:   Family History   Problem Relation Age of Onset     Emphysema Mother      Coronary Artery Disease Father          Social History:   Social History     Socioeconomic History     Marital status: Single     Spouse name: Not on file     Number of children: Not on file     Years of education: Not on file     Highest education level: Not on file   Occupational History     Not on file   Social Needs     Financial resource strain: Not on file     Food insecurity:     Worry: Not on file     Inability: Not on file     Transportation needs:     Medical: Not on file     Non-medical: Not on file   Tobacco Use     Smoking status: Current Every Day Smoker     Packs/day: 1.00     Years: 47.00     Pack years: 47.00     Types: Cigarettes     Smokeless tobacco: Never Used     Tobacco comment: 16 cigs per day   Substance and Sexual Activity     Alcohol use: No     Drug use: No     Sexual activity: No   Lifestyle     Physical activity:     Days per week: Not on file     Minutes per session: Not on file     Stress: Not on file   Relationships     Social connections:     Talks on phone: Not on file     Gets together: Not on  "file     Attends Buddhism service: Not on file     Active member of club or organization: Not on file     Attends meetings of clubs or organizations: Not on file     Relationship status: Not on file     Intimate partner violence:     Fear of current or ex partner: Not on file     Emotionally abused: Not on file     Physically abused: Not on file     Forced sexual activity: Not on file   Other Topics Concern     Parent/sibling w/ CABG, MI or angioplasty before 65F 55M? Not Asked      Service Not Asked     Blood Transfusions No     Caffeine Concern Not Asked     Occupational Exposure Not Asked     Hobby Hazards Not Asked     Sleep Concern Not Asked     Stress Concern Not Asked     Weight Concern Not Asked     Special Diet Not Asked     Back Care Not Asked     Exercise Not Asked     Bike Helmet Not Asked     Seat Belt Not Asked     Self-Exams Not Asked   Social History Narrative    Patient has no interest in smoking cessation.     Her for [employer] physical - likes to Bowl    Lives in Group home. Grew up in [place].        Physical Exam:  /78   Pulse 70   Ht 1.727 m (5' 8\")   Wt 87.7 kg (193 lb 6.4 oz)   SpO2 94%   BMI 29.41 kg/m     GENERAL APPEARANCE: alert and no distress  EYES: nonicteric  HENT: mouth without ulcers or lesions  NECK: supple, no adenopathy  RESP: lungs clear to auscultation   CV: regular rhythm, normal rate, no rub  Extremities: mild lower extremity edema, no clubbing, no cyanosis  MS: no evidence of inflammation in joints, no muscle tenderness  SKIN: no rash  NEURO: mentation intact and speech normal  PSYCH: affect normal/bright       Laboratory:  CMP  Recent Labs   Lab Test 03/22/19  1242 01/15/19  1046 12/05/18  0840 03/21/18  0900 02/07/18  1404 11/08/17  0840  02/01/17  1457 01/04/16  1640    138 140 140 141 138  --  137 137   POTASSIUM 4.2 4.6 4.6 4.4 4.5 4.6   < > 4.1 4.1   CHLORIDE 106 103 103 106 107 104  --  101 105   CO2 28 32 33* 31 28 33*  --  30 27 "   ANIONGAP 7 3 4 3 6 1*  --  6 5   GLC 95 83 97 104*  102* 91 85   < > 85 93   BUN 32* 25 26 21 34* 18  --  23 33*   CR 1.68* 1.66* 1.60* 1.36* 1.48* 1.31*   < > 1.21 1.27*   GFRESTIMATED 42* 43* 44* 53* 48* 55*   < > 61 57*   GFRESTBLACK 49* 49* 53* 64 58* 67   < > 73 70   RAYRAY 8.9 9.1 9.1 8.8 8.3* 9.2  --  8.8 8.7   PHOS 4.0  --   --  3.0 3.7  --   --  3.3  --    PROTTOTAL  --   --  8.0  --   --  7.2  --  7.3 7.6   ALBUMIN 4.1  --  4.5 3.7 4.0 3.8  --  3.8 4.1   BILITOTAL  --   --  0.6  --   --  0.3  --  0.3 0.3   ALKPHOS  --   --  118  --   --  188*  --  142 127   AST  --   --  19  --   --  15  --  13 21   ALT  --   --  28  --   --  22  --  19 33    < > = values in this interval not displayed.     CBC  Recent Labs   Lab Test 03/22/19  1242 12/05/18  0840 03/21/18  0900 02/15/18  1149 11/08/17  0840   HGB 15.0 17.6 15.7 15.8 15.5   WBC 11.0 10.6 9.4  --  12.2*   RBC 4.63 5.35 4.95  --  4.86   HCT 45.9 52.7 47.8  --  47.1   MCV 99 99 97  --  97   MCH 32.4 32.9 31.7  --  31.9   MCHC 32.7 33.4 32.8  --  32.9   RDW 13.7 13.4 14.1  --  13.1    173 180  --  218     INR  No lab results found.  ABG  No lab results found.   URINE STUDIES  Recent Labs   Lab Test 07/11/18  1410 03/21/18  0900 03/16/18  1135 11/05/14  2235 10/03/11  1606   COLOR Yellow Yellow Yellow Straw Yellow   APPEARANCE Clear Clear Clear Clear Clear   URINEGLC Negative Negative 250* Negative Negative   URINEBILI Negative Negative Negative Negative Negative   URINEKETONE Negative Negative Negative Negative Negative   SG <=1.005 1.015 <=1.005 1.002* 1.025   UBLD Small* Moderate* Moderate* Small* Large*   URINEPH 6.0 6.0 5.5 5.0 5.0   PROTEIN Negative Negative Negative Negative Negative   UROBILINOGEN 0.2 1.0 0.2  --  0.2   NITRITE Negative Negative Negative Negative Negative   LEUKEST Negative Negative Negative Negative Negative   RBCU 2-5* 5-10* O - 2 1 2-5*   WBCU 0 - 5 0 - 5 0 - 5 1 O - 2     Recent Labs   Lab Test 03/21/18  0900   UTPG 0.12      PTH  Recent Labs   Lab Test 03/21/18  0900   PTHI 62     IRON STUDIES   Recent Labs   Lab Test 03/21/18  0900   IRON 108      IRONSAT 40   FREDI 60       Imaging: Review    Scribe Disclosure:   I, Amelia Madrid, am serving as a scribe to document services personally performed by Ginger Weiss MD at this visit, based upon the provider's statements to me. All documentation has been reviewed by the aforementioned provider prior to being entered into the official medical record.     Portions of this medical record were completed by a scribe. UPON MY REVIEW AND AUTHENTICATION BY ELECTRONIC SIGNATURE, this confirms (a) I performed the applicable clinical services, and (b) the record is accurate.

## 2019-03-25 NOTE — NURSING NOTE
"Chief Complaint   Patient presents with     RECHECK     CKD stage 3     /77   Pulse 70   Ht 1.727 m (5' 8\")   Wt 87.7 kg (193 lb 6.4 oz)   SpO2 94%   BMI 29.41 kg/m    Misti John CMA    "

## 2019-03-25 NOTE — LETTER
3/25/2019      RE: Geovani Bustos  [address] [facility] [address]         Nephrology Clinic    Ginger Weiss MD  2019     Name: Geovani Bustos  MRN: [id number]  Age: 63 year old  : 1955  Referring provider: Alexandru Vera     Assessment and Plan:    IgA Nephropathy: biopsy proven IgA nephropathy in , will try to obtain bx records.. Overall, no proteinuria over several years. Low level microscopic hematuria on and off. Stable disease. UA today with small amt of blood, no rbc's or protein.Blood pressure 117/78, at goal.   -- Continue with conservative management including management of BP ( target < 130/80), avoiding nephrotoxins, may continue Fish oil.   -- Given lack of proteinuria and fairly low BP, no need to start RASi at this time but should have low threshold if blood pressure goes up or if there is detectable proteinuria.      CKD, stage 3: stable for ~10 yrs, today 1.68 with eGFR 42, decreased 10% over the past year.     BMD: PTH in target. Ca/Phos/K normal.    Follow-up:  6 months    Ginger Weiss MD     Reason For Visit:   Follow-up    HPI:   Geovani Bustos is a 63 year old male with a history of biopsy proven IgA nephropathy (2009, Dr Robbie Harris. As per note - kidney tissue looked altogether normal with exception of deposition of IgA in mesangium), treated conservatively. Other pertinent history includes hypothyroidism, schizophrenia, HPL. Patient was last evaluated on 3/22/2018 by Dr. Dalton. Since his last evaluation, the patient reports he has been doing well. He reports he has some bilateral flank pain intermittently. He notes some mild leg swelling. He denies any dizziness, lightheadedness, or shortness of breath. No history of kidney stones.      Review of Systems:   Pertinent items are noted in HPI or as below, remainder of complete ROS is negative.      Active Medications:     Current Outpatient Medications:      ABILIFY 10 MG OR TABS, 1 TABLET DAILY,  Disp: , Rfl:      acetaminophen (TYLENOL) 325 MG tablet, Take 2 tablets (650 mg) by mouth every 4 hours as needed for mild pain, Disp: 100 tablet, Rfl: 0     albuterol (PROAIR HFA/PROVENTIL HFA/VENTOLIN HFA) 108 (90 Base) MCG/ACT inhaler, Inhale 2 puffs into the lungs every 4 hours as needed for shortness of breath / dyspnea or wheezing, Disp: 1 Inhaler, Rfl: 2     albuterol (PROAIR HFA/PROVENTIL HFA/VENTOLIN HFA) 108 (90 Base) MCG/ACT inhaler, Inhale 2 puffs into the lungs every 6 hours as needed for shortness of breath / dyspnea or wheezing, Disp: 1 Inhaler, Rfl: 1     aspirin (QC LO-DOSE ASPIRIN) 81 MG EC tablet, Take 1 tablet (81 mg) by mouth daily, Disp: 100 tablet, Rfl: 3     augmented betamethasone dipropionate (DIPROLENE-AF) 0.05 % external ointment, Apply to AA twice daily 1-2 weeks then as needed only. Do not apply to face., Disp: 45 g, Rfl: 5     Cholecalciferol (VITAMIN D) 2000 units tablet, TAKE ONE TABLET BY MOUTH EVERY DAY, Disp: 100 tablet, Rfl: 0     Disposable Gloves (NITRILE EXAM GLOVES MEDIUM) MISC, 1 each as needed Use PRN daily with administration of otic drops, body lotion and powder to treat dry itchy skin., Disp: 4 each, Rfl: 11     EAR DROPS EARWAX AID 6.5 % otic solution, Place 5 drops into both ears 2 times daily For 5 days August 1-5th and Feb 1-5 th then return for irrigation after the 5th day., Disp: 15 mL, Rfl: 3     finasteride (PROSCAR) 5 MG tablet, TAKE ONE TABLET BY MOUTH EVERY DAY, Disp: 30 tablet, Rfl: 11     fluticasone-vilanterol (BREO ELLIPTA) 100-25 MCG/INH oral inhaler, Inhale 1 puff into the lungs daily, Disp: 1 Inhaler, Rfl: 11     gabapentin (NEURONTIN) 100 MG capsule, Take 100 mg by mouth 2 times daily, Disp: , Rfl:      levothyroxine (SYNTHROID/LEVOTHROID) 125 MCG tablet, TAKE ONE TABLET BY MOUTH EVERY DAY, Disp: 30 tablet, Rfl: 9     Omega-3 Fatty Acids (FISH OIL) 1200 MG capsule, Take 1 capsule (1.2 g) by mouth daily, Disp: 90 capsule, Rfl: 2     order for DME,  Equipment being ordered: incontinent pads, Disp: 300 Device, Rfl: 3     order for DME, Equipment being ordered: gloves, Disp: 4 Box, Rfl: 3     order for DME, Equipment being ordered: Dynaflex insert, Disp: 1 Units, Rfl: 0     oxybutynin (DITROPAN) 5 MG tablet, TAKE ONE TABLET BY MOUTH THREE TIMES DAILY, Disp: 90 tablet, Rfl: 5     polyethylene glycol (MIRALAX/GLYCOLAX) powder, Take 17 g (1 capful) by mouth daily, Disp: 527 g, Rfl: 11     REGULOID 48.57 % POWD, Take 1 each by mouth See Admin Instructions Take 1 teaspoonful every day at 7pm and 9 pm., Disp: 369 g, Rfl: 11     simvastatin (ZOCOR) 40 MG tablet, TAKE ONE TABLET BY MOUTH EVERY DAY AT 9 PM, Disp: 90 tablet, Rfl: 3     Skin Protectants, Misc. (EUCERIN) cream, Apply topically 2 times daily, Disp: 454 g, Rfl: 5     tamsulosin (FLOMAX) 0.4 MG capsule, Take 1 capsule (0.4 mg) by mouth daily, Disp: 90 capsule, Rfl: 3     traZODone (DESYREL) 50 MG tablet, Take 50 mg by mouth At Bedtime, Disp: , Rfl:      ZOLOFT 100 MG OR TABS, 2 TABLETS DAILY, Disp: , Rfl:      nicotine (NICODERM CQ) 14 MG/24HR 24 hr patch, Place 1 patch onto the skin every 24 hours (Patient not taking: Reported on 3/25/2019), Disp: 30 patch, Rfl: 1  No current facility-administered medications for this visit.     Facility-Administered Medications Ordered in Other Visits:      bupivacaine (MARCAINE) injection 0.5%, , , PRN, Christy, Holden Vides DPM, 10 mL at 01/03/17 0945     Allergies:   Nitrogen oxide and Sulfa drugs      Past Medical History:  Past Medical History:   Diagnosis Date     Cerumen impaction      Chronic kidney disease      Colon polyps      Hyperlipidemia      Mitral valve prolapse      Schizophrenia (H)      Ulcerated penile lesions      VSD (ventricular septal defect)      Patient Active Problem List   Diagnosis     Colon polyps     Schizophrenia (H)     VSD (ventricular septal defect)     Mitral valve prolapse     Acquired hypothyroidism     IgA nephropathy     Proteinuria      Hyperlipidemia LDL goal <130     BPH (benign prostatic hyperplasia)     Health Care Home     Bilateral impacted cerumen     Bunion of great toe of right foot     Eczema of external ear, bilateral     Tinea pedis, unspecified laterality     Incomplete right bundle branch block (RBBB)     Cigarette nicotine dependence with nicotine-induced disorder     Gastroesophageal reflux disease without esophagitis     Stage 1 mild COPD by GOLD classification (H)     CKD (chronic kidney disease) stage 3, GFR 30-59 ml/min (H)        Past Surgical History:  Past Surgical History:   Procedure Laterality Date     ARTHRODESIS FOOT Right 1/19/2016    Procedure: ARTHRODESIS FOOT;  Surgeon: Holden Harrison DPM;  Location: [facility]     ARTHRODESIS FOOT Left 1/3/2017    Procedure: ARTHRODESIS FOOT;  Surgeon: Holden Harrison DPM;  Location: [facility] OR     SURGICAL HISTORY OF -       leg fracture       Family History:   Family History   Problem Relation Age of Onset     Emphysema Mother      Coronary Artery Disease Father          Social History:   Social History     Socioeconomic History     Marital status: Single     Spouse name: Not on file     Number of children: Not on file     Years of education: Not on file     Highest education level: Not on file   Occupational History     Not on file   Social Needs     Financial resource strain: Not on file     Food insecurity:     Worry: Not on file     Inability: Not on file     Transportation needs:     Medical: Not on file     Non-medical: Not on file   Tobacco Use     Smoking status: Current Every Day Smoker     Packs/day: 1.00     Years: 47.00     Pack years: 47.00     Types: Cigarettes     Smokeless tobacco: Never Used     Tobacco comment: 16 cigs per day   Substance and Sexual Activity     Alcohol use: No     Drug use: No     Sexual activity: No   Lifestyle     Physical activity:     Days per week: Not on file     Minutes per session: Not on file     Stress: Not on file  "  Relationships     Social connections:     Talks on phone: Not on file     Gets together: Not on file     Attends Christian service: Not on file     Active member of club or organization: Not on file     Attends meetings of clubs or organizations: Not on file     Relationship status: Not on file     Intimate partner violence:     Fear of current or ex partner: Not on file     Emotionally abused: Not on file     Physically abused: Not on file     Forced sexual activity: Not on file   Other Topics Concern     Parent/sibling w/ CABG, MI or angioplasty before 65F 55M? Not Asked      Service Not Asked     Blood Transfusions No     Caffeine Concern Not Asked     Occupational Exposure Not Asked     Hobby Hazards Not Asked     Sleep Concern Not Asked     Stress Concern Not Asked     Weight Concern Not Asked     Special Diet Not Asked     Back Care Not Asked     Exercise Not Asked     Bike Helmet Not Asked     Seat Belt Not Asked     Self-Exams Not Asked   Social History Narrative    Patient has no interest in smoking cessation.     Her for [employer] physical - likes to Bowl    Lives in Group home. Grew up in [place].        Physical Exam:  /78   Pulse 70   Ht 1.727 m (5' 8\")   Wt 87.7 kg (193 lb 6.4 oz)   SpO2 94%   BMI 29.41 kg/m      GENERAL APPEARANCE: alert and no distress  EYES: nonicteric  HENT: mouth without ulcers or lesions  NECK: supple, no adenopathy  RESP: lungs clear to auscultation   CV: regular rhythm, normal rate, no rub  Extremities: mild lower extremity edema, no clubbing, no cyanosis  MS: no evidence of inflammation in joints, no muscle tenderness  SKIN: no rash  NEURO: mentation intact and speech normal  PSYCH: affect normal/bright       Laboratory:  CMP  Recent Labs   Lab Test 03/22/19  1242 01/15/19  1046 12/05/18  0840 03/21/18  0900 02/07/18  1404 11/08/17  0840  02/01/17  1457 01/04/16  1640    138 140 140 141 138  --  137 137   POTASSIUM 4.2 4.6 4.6 4.4 4.5 4.6  "  < > 4.1 4.1   CHLORIDE 106 103 103 106 107 104  --  101 105   CO2 28 32 33* 31 28 33*  --  30 27   ANIONGAP 7 3 4 3 6 1*  --  6 5   GLC 95 83 97 104*  102* 91 85   < > 85 93   BUN 32* 25 26 21 34* 18  --  23 33*   CR 1.68* 1.66* 1.60* 1.36* 1.48* 1.31*   < > 1.21 1.27*   GFRESTIMATED 42* 43* 44* 53* 48* 55*   < > 61 57*   GFRESTBLACK 49* 49* 53* 64 58* 67   < > 73 70   RAYRAY 8.9 9.1 9.1 8.8 8.3* 9.2  --  8.8 8.7   PHOS 4.0  --   --  3.0 3.7  --   --  3.3  --    PROTTOTAL  --   --  8.0  --   --  7.2  --  7.3 7.6   ALBUMIN 4.1  --  4.5 3.7 4.0 3.8  --  3.8 4.1   BILITOTAL  --   --  0.6  --   --  0.3  --  0.3 0.3   ALKPHOS  --   --  118  --   --  188*  --  142 127   AST  --   --  19  --   --  15  --  13 21   ALT  --   --  28  --   --  22  --  19 33    < > = values in this interval not displayed.     CBC  Recent Labs   Lab Test 03/22/19  1242 12/05/18  0840 03/21/18  0900 02/15/18  1149 11/08/17  0840   HGB 15.0 17.6 15.7 15.8 15.5   WBC 11.0 10.6 9.4  --  12.2*   RBC 4.63 5.35 4.95  --  4.86   HCT 45.9 52.7 47.8  --  47.1   MCV 99 99 97  --  97   MCH 32.4 32.9 31.7  --  31.9   MCHC 32.7 33.4 32.8  --  32.9   RDW 13.7 13.4 14.1  --  13.1    173 180  --  218     INR  No lab results found.  ABG  No lab results found.   URINE STUDIES  Recent Labs   Lab Test 07/11/18  1410 03/21/18  0900 03/16/18  1135 11/05/14  2235 10/03/11  1606   COLOR Yellow Yellow Yellow Straw Yellow   APPEARANCE Clear Clear Clear Clear Clear   URINEGLC Negative Negative 250* Negative Negative   URINEBILI Negative Negative Negative Negative Negative   URINEKETONE Negative Negative Negative Negative Negative   SG <=1.005 1.015 <=1.005 1.002* 1.025   UBLD Small* Moderate* Moderate* Small* Large*   URINEPH 6.0 6.0 5.5 5.0 5.0   PROTEIN Negative Negative Negative Negative Negative   UROBILINOGEN 0.2 1.0 0.2  --  0.2   NITRITE Negative Negative Negative Negative Negative   LEUKEST Negative Negative Negative Negative Negative   RBCU 2-5* 5-10* O - 2  1 2-5*   WBCU 0 - 5 0 - 5 0 - 5 1 O - 2     Recent Labs   Lab Test 03/21/18  0900   UTPG 0.12     PTH  Recent Labs   Lab Test 03/21/18  0900   PTHI 62     IRON STUDIES   Recent Labs   Lab Test 03/21/18  0900   IRON 108      IRONSAT 40   FREDI 60       Imaging: Review    Scribe Disclosure:   I, Amelia Madrid, am serving as a scribe to document services personally performed by Ginger Weiss MD at this visit, based upon the provider's statements to me. All documentation has been reviewed by the aforementioned provider prior to being entered into the official medical record.     Portions of this medical record were completed by a scribe. UPON MY REVIEW AND AUTHENTICATION BY ELECTRONIC SIGNATURE, this confirms (a) I performed the applicable clinical services, and (b) the record is accurate.      Ginger Weiss MD

## 2019-04-02 DIAGNOSIS — N40.0 BENIGN PROSTATIC HYPERPLASIA, PRESENCE OF LOWER URINARY TRACT SYMPTOMS UNSPECIFIED: Chronic | ICD-10-CM

## 2019-04-03 RX ORDER — OXYBUTYNIN CHLORIDE 5 MG/1
TABLET ORAL
Qty: 90 TABLET | Refills: 5 | Status: SHIPPED | OUTPATIENT
Start: 2019-04-03 | End: 2019-09-25

## 2019-04-03 NOTE — TELEPHONE ENCOUNTER
"Requested Prescriptions   Pending Prescriptions Disp Refills     oxybutynin (DITROPAN) 5 MG tablet [Pharmacy Med Name: OXYBUTYNIN CHLORIDE 5 MG TABLET] 90 tablet 5     Sig: TAKE ONE TABLET BY MOUTH THREE TIMES DAILY    Muscarinic Antagonists (Urinary Incontinence Agents) Passed - 4/2/2019  4:39 PM       Passed - Recent (12 mo) or future (30 days) visit within the authorizing provider's specialty    Patient had office visit in the last 12 months or has a visit in the next 30 days with authorizing provider or within the authorizing provider's specialty.  See \"Patient Info\" tab in inbasket, or \"Choose Columns\" in Meds & Orders section of the refill encounter.             Passed - Medication is Oxybutynin and patient is 5 years of age or older       Passed - Patient does not have a diagnosis of glaucoma on the problem list    If glaucoma diagnosis is new, refer refill to physician.         Passed - Medication is active on med list       Passed - Patient is 18 years of age or older        Last Written Prescription Date:  11/6/18  Last Fill Quantity: 90,  # refills: 5   Last office visit: 2/7/2019 with prescribing provider:     Future Office Visit:   Next 5 appointments (look out 90 days)    Apr 08, 2019 10:00 AM CDT  SHORT with Alexandru Vera MD  [facility] ([facility]) [address] [place] [address]  [phone]   Apr 11, 2019  2:30 PM CDT  Return Visit with Luis A Zepeda MD  [facility] [address] [facility] [address]  [phone]   May 09, 2019 11:15 AM CDT  Return Visit with Julia Hernandez PA-C  [facility] ([facility]) [address] [facility] [address]  [phone]           "

## 2019-04-08 ENCOUNTER — OFFICE VISIT (OUTPATIENT)
Dept: FAMILY MEDICINE | Facility: CLINIC | Age: 64
End: 2019-04-08
Payer: COMMERCIAL

## 2019-04-08 VITALS
DIASTOLIC BLOOD PRESSURE: 70 MMHG | OXYGEN SATURATION: 98 % | HEIGHT: 68 IN | WEIGHT: 185 LBS | TEMPERATURE: 97.8 F | BODY MASS INDEX: 28.04 KG/M2 | SYSTOLIC BLOOD PRESSURE: 110 MMHG | RESPIRATION RATE: 18 BRPM | HEART RATE: 68 BPM

## 2019-04-08 DIAGNOSIS — G89.29 CHRONIC RIGHT SHOULDER PAIN: Primary | ICD-10-CM

## 2019-04-08 DIAGNOSIS — M25.511 CHRONIC RIGHT SHOULDER PAIN: Primary | ICD-10-CM

## 2019-04-08 PROCEDURE — 99213 OFFICE O/P EST LOW 20 MIN: CPT | Performed by: FAMILY MEDICINE

## 2019-04-08 ASSESSMENT — MIFFLIN-ST. JEOR: SCORE: 1598.65

## 2019-04-08 NOTE — NURSING NOTE
"Chief Complaint   Patient presents with     Shoulder     right shoulder pain        Initial /70 (Cuff Size: Adult Regular)   Pulse 68   Temp 97.8  F (36.6  C) (Tympanic)   Resp 18   Ht 1.727 m (5' 8\")   Wt 83.9 kg (185 lb)   SpO2 98%   BMI 28.13 kg/m   Estimated body mass index is 28.13 kg/m  as calculated from the following:    Height as of this encounter: 1.727 m (5' 8\").    Weight as of this encounter: 83.9 kg (185 lb).    Patient presents to the clinic using No DME    Health Maintenance that is potentially due pending provider review:  NONE        Is there anyone who you would like to be able to receive your results? Group home patient  If yes have patient fill out SIDNEY    "

## 2019-04-08 NOTE — PROGRESS NOTES
SUBJECTIVE:                                                    Geovani Bustos is a 63 year old male who presents to clinic today for the following health issues:      Musculoskeletal problem/pain      Duration: Recheck from January     Description  Location: Right shoulder- still really sore and is making a clicking sound     Intensity:  moderate    Accompanying signs and symptoms: radiation of pain to shoulder and weakness of arm    History  Previous similar problem: YES  Previous evaluation:  x-ray in January     Precipitating or alleviating factors:  Trauma or overuse: was lifting up on something and pulled up to much and heard a pop- has been sore since then   Aggravating factors include: hard to move his shoulder much. Likes to play basketball    Therapies tried and outcome: physical therapy, rest, ice in the beginning. Sleeps in his recliner to help so he don't lay on it.       Problem list and histories reviewed & adjusted, as indicated.  Additional history: as documented    Patient Active Problem List   Diagnosis     Colon polyps     Schizophrenia (H)     VSD (ventricular septal defect)     Mitral valve prolapse     Acquired hypothyroidism     IgA nephropathy     Proteinuria     Hyperlipidemia LDL goal <130     BPH (benign prostatic hyperplasia)     Health Care Home     Bilateral impacted cerumen     Bunion of great toe of right foot     Eczema of external ear, bilateral     Tinea pedis, unspecified laterality     Incomplete right bundle branch block (RBBB)     Cigarette nicotine dependence with nicotine-induced disorder     Gastroesophageal reflux disease without esophagitis     Stage 1 mild COPD by GOLD classification (H)     CKD (chronic kidney disease) stage 3, GFR 30-59 ml/min (H)     Past Surgical History:   Procedure Laterality Date     ARTHRODESIS FOOT Right 1/19/2016    Procedure: ARTHRODESIS FOOT;  Surgeon: Holden Harrison DPM;  Location: [facility]     ARTHRODESIS FOOT Left 1/3/2017     Procedure: ARTHRODESIS FOOT;  Surgeon: Holden Harrison DPM;  Location: [facility]     SURGICAL HISTORY OF -       leg fracture       Social History     Tobacco Use     Smoking status: Current Every Day Smoker     Packs/day: 1.00     Years: 47.00     Pack years: 47.00     Types: Cigarettes     Smokeless tobacco: Never Used     Tobacco comment: 16 cigs per day   Substance Use Topics     Alcohol use: No     Family History   Problem Relation Age of Onset     Emphysema Mother      Coronary Artery Disease Father          Current Outpatient Medications   Medication Sig Dispense Refill     ABILIFY 10 MG OR TABS 1 TABLET DAILY       acetaminophen (TYLENOL) 325 MG tablet Take 2 tablets (650 mg) by mouth every 4 hours as needed for mild pain 100 tablet 0     albuterol (PROAIR HFA/PROVENTIL HFA/VENTOLIN HFA) 108 (90 Base) MCG/ACT inhaler Inhale 2 puffs into the lungs every 4 hours as needed for shortness of breath / dyspnea or wheezing 1 Inhaler 2     albuterol (PROAIR HFA/PROVENTIL HFA/VENTOLIN HFA) 108 (90 Base) MCG/ACT inhaler Inhale 2 puffs into the lungs every 6 hours as needed for shortness of breath / dyspnea or wheezing 1 Inhaler 1     aspirin (QC LO-DOSE ASPIRIN) 81 MG EC tablet Take 1 tablet (81 mg) by mouth daily 100 tablet 3     augmented betamethasone dipropionate (DIPROLENE-AF) 0.05 % external ointment Apply to AA twice daily 1-2 weeks then as needed only. Do not apply to face. 45 g 5     Cholecalciferol (VITAMIN D) 2000 units tablet TAKE ONE TABLET BY MOUTH EVERY  tablet 0     Disposable Gloves (NITRILE EXAM GLOVES MEDIUM) MISC 1 each as needed Use PRN daily with administration of otic drops, body lotion and powder to treat dry itchy skin. 4 each 11     EAR DROPS EARWAX AID 6.5 % otic solution Place 5 drops into both ears 2 times daily For 5 days August 1-5th and Feb 1-5 th then return for irrigation after the 5th day. 15 mL 3     finasteride (PROSCAR) 5 MG tablet TAKE ONE TABLET BY MOUTH EVERY DAY 30  tablet 11     fluticasone-vilanterol (BREO ELLIPTA) 100-25 MCG/INH oral inhaler Inhale 1 puff into the lungs daily 1 Inhaler 11     gabapentin (NEURONTIN) 100 MG capsule Take 100 mg by mouth 2 times daily       levothyroxine (SYNTHROID/LEVOTHROID) 125 MCG tablet TAKE ONE TABLET BY MOUTH EVERY DAY 30 tablet 9     nicotine (NICODERM CQ) 14 MG/24HR 24 hr patch Place 1 patch onto the skin every 24 hours 30 patch 1     Omega-3 Fatty Acids (FISH OIL) 1200 MG capsule Take 1 capsule (1.2 g) by mouth daily 90 capsule 2     order for DME Equipment being ordered: incontinent pads 300 Device 3     order for DME Equipment being ordered: gloves 4 Box 3     order for DME Equipment being ordered: Dynaflex insert 1 Units 0     oxybutynin (DITROPAN) 5 MG tablet TAKE ONE TABLET BY MOUTH THREE TIMES DAILY 90 tablet 5     polyethylene glycol (MIRALAX/GLYCOLAX) powder Take 17 g (1 capful) by mouth daily 527 g 11     REGULOID 48.57 % POWD Take 1 each by mouth See Admin Instructions Take 1 teaspoonful every day at 7pm and 9 pm. 369 g 11     simvastatin (ZOCOR) 40 MG tablet TAKE ONE TABLET BY MOUTH EVERY DAY AT 9 PM 90 tablet 3     Skin Protectants, Misc. (EUCERIN) cream Apply topically 2 times daily 454 g 5     tamsulosin (FLOMAX) 0.4 MG capsule Take 1 capsule (0.4 mg) by mouth daily 90 capsule 3     traZODone (DESYREL) 50 MG tablet Take 50 mg by mouth At Bedtime       ZOLOFT 100 MG OR TABS 2 TABLETS DAILY       Allergies   Allergen Reactions     Nitrogen Oxide      Sulfa Drugs      Recent Labs   Lab Test 03/22/19  1242 01/15/19  1046 12/05/18  0840 08/08/18  1405 03/21/18  0900  11/08/17  0840 10/11/17  1325  02/01/17  1457  02/01/13  0815  05/14/12  0830   A1C  --   --   --   --  5.6  --   --   --   --   --   --  5.5  --  5.6   LDL  --   --  118*  --   --   --  80  --   --  Cannot estimate LDL when triglyceride exceeds 400 mg/dL  89   < > 117   < > 101   HDL  --   --  48  --   --   --  47  --   --  27*   < > 45   < > 43   TRIG  --   --   "150*  --   --   --  119  --   --  444*   < > 123   < > 143   ALT  --   --  28  --   --   --  22  --   --  19   < >  --    < >  --    CR 1.68* 1.66* 1.60*  --  1.36*   < > 1.31*  --    < > 1.21   < >  --    < >  --    GFRESTIMATED 42* 43* 44*  --  53*   < > 55*  --    < > 61   < >  --    < >  --    GFRESTBLACK 49* 49* 53*  --  64   < > 67  --    < > 73   < >  --    < >  --    POTASSIUM 4.2 4.6 4.6  --  4.4   < > 4.6  --    < > 4.1   < >  --    < >  --    TSH  --   --   --  2.13  --   --   --  1.24  --   --    < >  --    < >  --     < > = values in this interval not displayed.      BP Readings from Last 3 Encounters:   04/08/19 110/70   03/25/19 117/78   02/07/19 114/64    Wt Readings from Last 3 Encounters:   04/08/19 83.9 kg (185 lb)   03/25/19 87.7 kg (193 lb 6.4 oz)   02/07/19 84.4 kg (186 lb)                  Labs reviewed in EPIC    ROS:  Constitutional, HEENT, cardiovascular, pulmonary, gi and gu systems are negative, except as otherwise noted.    OBJECTIVE:     /70 (Cuff Size: Adult Regular)   Pulse 68   Temp 97.8  F (36.6  C) (Tympanic)   Resp 18   Ht 1.727 m (5' 8\")   Wt 83.9 kg (185 lb)   SpO2 98%   BMI 28.13 kg/m    Body mass index is 28.13 kg/m .  GENERAL: alert and no distress  NECK: no adenopathy, no asymmetry, masses, or scars and thyroid normal to palpation  RESP: lungs clear to auscultation - no rales, rhonchi or wheezes  CV: regular rates and rhythm, normal S1 S2, no S3 or S4 and no murmur, click or rub  MS: mildly painful right shoulder internal rotation, no joint swelling or skin discoloration noted, radial pulses 3+, sensation to touch and pressure intact      ASSESSMENT/PLAN:       (M25.511,  G89.29) Chronic right shoulder pain  (primary encounter diagnosis)  Comment: Differentials discussed in detail including right shoulder osteoarthritis, has done physical therapy which helped him somewhat.  Orthopedic referral placed considering chronicity of symptoms.  Suggested to continue " over-the-counter analgesia.  Plan: SPORTS MEDICINE REFERRAL          Alexandru Vera MD  [facility]

## 2019-04-08 NOTE — PROGRESS NOTES
"  SUBJECTIVE:                                                    Geovani Bustos is a 63 year old male who presents to clinic today for the following health issues:  {Provider please address medication reconciliation discrepancies--rooming staff please delete if no med/rec issues}    Shoulder Pain- right side     Onset:     Description:   Location: {.:316468::\"***\"}  Character: {.:320603}    Intensity: {.:413253}    Progression of Symptoms: {.:262196:x}    Accompanying Signs & Symptoms:  Other symptoms: {.:544152::\"none\"}    History:   Previous similar pain: { :120633}      Precipitating factors:   Trauma or overuse: { :348821}    Alleviating factors:  Improved by: {.:495922::\"nothing\"}    Therapies Tried and outcome: ***        {additional problems for provider to add:933831}    Problem list and histories reviewed & adjusted, as indicated.  Additional history: {NONE - AS DOCUMENTED:751975::\"as documented\"}    {HIST REVIEW/ LINKS 2:089809}    {PROVIDER CHARTING PREFERENCE:240650}      "

## 2019-04-17 ENCOUNTER — OFFICE VISIT (OUTPATIENT)
Dept: ORTHOPEDICS | Facility: CLINIC | Age: 64
End: 2019-04-17
Payer: COMMERCIAL

## 2019-04-17 VITALS
BODY MASS INDEX: 28.04 KG/M2 | SYSTOLIC BLOOD PRESSURE: 110 MMHG | DIASTOLIC BLOOD PRESSURE: 72 MMHG | HEIGHT: 68 IN | WEIGHT: 185 LBS

## 2019-04-17 DIAGNOSIS — S49.91XA INJURY OF RIGHT SHOULDER, INITIAL ENCOUNTER: Primary | ICD-10-CM

## 2019-04-17 PROCEDURE — 99204 OFFICE O/P NEW MOD 45 MIN: CPT | Performed by: PEDIATRICS

## 2019-04-17 ASSESSMENT — MIFFLIN-ST. JEOR: SCORE: 1598.65

## 2019-04-17 NOTE — PATIENT INSTRUCTIONS
Plan:  - Today's Plan of Care:  MRI of the shoulder. Call [phone] to schedule MRI    Follow Up: In clinic with Dr. Schmitt after MRI (wait at least 1-2 days)    If you have any further questions for your physician or physician s care team you can call [phone] and use option 3 to leave a voice message. Calls received during business hours will be returned same day.

## 2019-04-17 NOTE — LETTER
4/17/2019         RE: Geovani Bustos  [address]  [place] [address]        Dear Colleague,    Thank you for referring your patient, Geovani Bustos, to the [facility]. Please see a copy of my visit note below.    Sports Medicine Clinic Visit    PCP: Alexandru Vera    Geovani Bustos is a 63 year old male who is seen  in consultation at the request of  Alexandru Vera M.D. presenting with right shoulder pain    Injury: He reports right shoulder pain after heavy lifting on 1/15/19 ago. He has been to physical therapy which did reduce his pain. he reports since completing physical therapy he has shoulder pain with lifting again. He reports pain increases with overhead motions and lifting overhead. 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     Review of Systems  Skin: no bruising, no swelling  Musculoskeletal: as above  Neurologic: no numbness, paresthesias  Remainder of review of systems is negative including constitutional, CV, pulmonary, GI, except as noted in HPI or medical history.    Patient's current problem list, past medical and surgical history, and family history were reviewed.    Patient Active Problem List   Diagnosis     Colon polyps     Schizophrenia (H)     VSD (ventricular septal defect)     Mitral valve prolapse     Acquired hypothyroidism     IgA nephropathy     Proteinuria     Hyperlipidemia LDL goal <130     BPH (benign  "prostatic hyperplasia)     Health Care Home     Bilateral impacted cerumen     Bunion of great toe of right foot     Eczema of external ear, bilateral     Tinea pedis, unspecified laterality     Incomplete right bundle branch block (RBBB)     Cigarette nicotine dependence with nicotine-induced disorder     Gastroesophageal reflux disease without esophagitis     Stage 1 mild COPD by GOLD classification (H)     CKD (chronic kidney disease) stage 3, GFR 30-59 ml/min (H)     Past Medical History:   Diagnosis Date     Cerumen impaction      Chronic kidney disease      Colon polyps      Hyperlipidemia      Mitral valve prolapse      Schizophrenia (H)      Ulcerated penile lesions      VSD (ventricular septal defect)      Past Surgical History:   Procedure Laterality Date     ARTHRODESIS FOOT Right 1/19/2016    Procedure: ARTHRODESIS FOOT;  Surgeon: Holden Harrison DPM;  Location: [facility]     ARTHRODESIS FOOT Left 1/3/2017    Procedure: ARTHRODESIS FOOT;  Surgeon: Holden Harrison DPM;  Location: [facility]     SURGICAL HISTORY OF -       leg fracture     Family History   Problem Relation Age of Onset     Emphysema Mother      Coronary Artery Disease Father          Objective  /72 (BP Location: Left arm, Patient Position: Chair, Cuff Size: Adult Regular)   Ht 1.727 m (5' 8\")   Wt 83.9 kg (185 lb)   BMI 28.13 kg/m         GENERAL APPEARANCE: healthy, alert and no distress   GAIT: NORMAL  SKIN: no suspicious lesions or rashes  HEENT: Sclera clear, anicteric  CV: good peripheral pulses  RESP: Breathing not labored  NEURO: Normal strength and tone, mentation intact and speech normal  PSYCH:  mentation appears normal and affect normal/bright    Bilateral Shoulder exam    Inspection and Posture:       rounded shoulders and upper back    Skin:        no visible deformities    Tender:        none    Non Tender:       remainder of shoulder bilateral    ROM:        forward flexion 130  right       abduction 120 right    "    internal rotation gluteal region right       external rotation 35 right       asymmetric scapular motion    Painful motions:       end range flexion and elevation right    Strength:        abduction 4/5 right       flexion 4/5 right       internal rotation 4/5 right       external rotation 4/5 right    Impingement testing:       neg (-) Neer right       positive (+) Escalera right    Sensation:        normal sensation over shoulder and upper extremity       Radiology  I visualized and reviewed these images with the patient  RIGHT SHOULDER THREE OR MORE VIEWS  1/15/2019 11:31 AM   HISTORY: Acute pain of right shoulder.  COMPARISON: None.                                                        IMPRESSION: Glenohumeral and acromioclavicular articulations appear  intact. Minimal degenerative changes at the acromioclavicular  articulation. No acute fracture.    Assessment:  1. Injury of right shoulder, initial encounter      Right shoulder pain, given persistent pain despite PT I recommend MRI to evaluate.  Would consider continued PT, injection or referral pending results.    Plan:  - Today's Plan of Care:  MRI of the shoulder. Call [phone] to schedule MRI    Follow Up: In clinic with Dr. Schmitt after MRI (wait at least 1-2 days)    Concerning signs and symptoms were reviewed.  The patient expressed understanding of this management plan and all questions were answered at this time.    Thanks for the opportunity to participate in the care of this patient, I will keep you updated on their progress.    CC: Alexandru Schmitt MD [facility]  Primary Care Sports Medicine  [facility]    Again, thank you for allowing me to participate in the care of your patient.        Sincerely,        Sangeetha Schmitt MD

## 2019-04-17 NOTE — PROGRESS NOTES
Sports Medicine Clinic Visit    PCP: Alexandru Vera    Geovani Bustos is a 63 year old male who is seen  in consultation at the request of  Alexandru Vera M.D. presenting with right shoulder pain    Injury: He reports right shoulder pain after heavy lifting on 1/15/19 ago. He has been to physical therapy which did reduce his pain. he reports since completing physical therapy he has shoulder pain with lifting again. He reports pain increases with overhead motions and lifting overhead. 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     Review of Systems  Skin: no bruising, no swelling  Musculoskeletal: as above  Neurologic: no numbness, paresthesias  Remainder of review of systems is negative including constitutional, CV, pulmonary, GI, except as noted in HPI or medical history.    Patient's current problem list, past medical and surgical history, and family history were reviewed.    Patient Active Problem List   Diagnosis     Colon polyps     Schizophrenia (H)     VSD (ventricular septal defect)     Mitral valve prolapse     Acquired hypothyroidism     IgA nephropathy     Proteinuria     Hyperlipidemia LDL goal <130     BPH (benign prostatic hyperplasia)     Health Care Home     Bilateral impacted cerumen     Bunion of great toe of right foot     Eczema of external ear, bilateral     Tinea pedis, unspecified laterality     Incomplete right bundle branch block (RBBB)     Cigarette nicotine  "dependence with nicotine-induced disorder     Gastroesophageal reflux disease without esophagitis     Stage 1 mild COPD by GOLD classification (H)     CKD (chronic kidney disease) stage 3, GFR 30-59 ml/min (H)     Past Medical History:   Diagnosis Date     Cerumen impaction      Chronic kidney disease      Colon polyps      Hyperlipidemia      Mitral valve prolapse      Schizophrenia (H)      Ulcerated penile lesions      VSD (ventricular septal defect)      Past Surgical History:   Procedure Laterality Date     ARTHRODESIS FOOT Right 1/19/2016    Procedure: ARTHRODESIS FOOT;  Surgeon: Holden Harrison DPM;  Location: [facility]     ARTHRODESIS FOOT Left 1/3/2017    Procedure: ARTHRODESIS FOOT;  Surgeon: Holden Harrison DPM;  Location: [facility]     SURGICAL HISTORY OF -       leg fracture     Family History   Problem Relation Age of Onset     Emphysema Mother      Coronary Artery Disease Father          Objective  /72 (BP Location: Left arm, Patient Position: Chair, Cuff Size: Adult Regular)   Ht 1.727 m (5' 8\")   Wt 83.9 kg (185 lb)   BMI 28.13 kg/m        GENERAL APPEARANCE: healthy, alert and no distress   GAIT: NORMAL  SKIN: no suspicious lesions or rashes  HEENT: Sclera clear, anicteric  CV: good peripheral pulses  RESP: Breathing not labored  NEURO: Normal strength and tone, mentation intact and speech normal  PSYCH:  mentation appears normal and affect normal/bright    Bilateral Shoulder exam    Inspection and Posture:       rounded shoulders and upper back    Skin:        no visible deformities    Tender:        none    Non Tender:       remainder of shoulder bilateral    ROM:        forward flexion 130  right       abduction 120 right       internal rotation gluteal region right       external rotation 35 right       asymmetric scapular motion    Painful motions:       end range flexion and elevation right    Strength:        abduction 4/5 right       flexion 4/5 right       internal rotation " 4/5 right       external rotation 4/5 right    Impingement testing:       neg (-) Neer right       positive (+) Escalera right    Sensation:        normal sensation over shoulder and upper extremity       Radiology  I visualized and reviewed these images with the patient  RIGHT SHOULDER THREE OR MORE VIEWS  1/15/2019 11:31 AM   HISTORY: Acute pain of right shoulder.  COMPARISON: None.                                                        IMPRESSION: Glenohumeral and acromioclavicular articulations appear  intact. Minimal degenerative changes at the acromioclavicular  articulation. No acute fracture.    Assessment:  1. Injury of right shoulder, initial encounter      Right shoulder pain, given persistent pain despite PT I recommend MRI to evaluate.  Would consider continued PT, injection or referral pending results.    Plan:  - Today's Plan of Care:  MRI of the shoulder. Call [phone] to schedule MRI    Follow Up: In clinic with Dr. Schmitt after MRI (wait at least 1-2 days)    Concerning signs and symptoms were reviewed.  The patient expressed understanding of this management plan and all questions were answered at this time.    Thanks for the opportunity to participate in the care of this patient, I will keep you updated on their progress.    CC: Alexandru Schmitt MD [facility]  Primary Care Sports Medicine  [facility]

## 2019-04-22 DIAGNOSIS — J41.0 SIMPLE CHRONIC BRONCHITIS (H): ICD-10-CM

## 2019-04-22 NOTE — TELEPHONE ENCOUNTER
"Requested Prescriptions   Pending Prescriptions Disp Refills     fluticasone-vilanterol (BREO ELLIPTA) 100-25 MCG/INH inhaler 1 Inhaler 11     Sig: Inhale 1 puff into the lungs daily       Inhaled Steroids Protocol Passed - 4/22/2019  3:14 PM        Passed - Patient is age 12 or older        Passed - Recent (12 mo) or future (30 days) visit within the authorizing provider's specialty     Patient had office visit in the last 12 months or has a visit in the next 30 days with authorizing provider or within the authorizing provider's specialty.  See \"Patient Info\" tab in inbasket, or \"Choose Columns\" in Meds & Orders section of the refill encounter.              Passed - Medication is active on med list        Last Written Prescription Date:  4/13/18  Last Fill Quantity: 1,  # refills: 11   Last office visit: 4/8/2019 with prescribing provider:  Chuy   Future Office Visit:   Next 5 appointments (look out 90 days)    Apr 30, 2019  9:00 AM CDT  Return Visit with Sangeetha Schmitt MD  [facility] ([facility]) [address]  [address]  [place] [facility] [place] [address]  [phone]   May 09, 2019  9:45 AM CDT  Return Visit with Luis A Zepeda MD  [facility] ([facility]) [address] [address]  [phone]   May 09, 2019 11:15 AM CDT  Return Visit with Julia Hernandez PA-C  [facility] ([facility]) [address] [address]  [phone]           "

## 2019-04-24 ENCOUNTER — TELEPHONE (OUTPATIENT)
Dept: NEPHROLOGY | Facility: CLINIC | Age: 64
End: 2019-04-24

## 2019-04-24 ENCOUNTER — TELEPHONE (OUTPATIENT)
Dept: ORTHOPEDICS | Facility: CLINIC | Age: 64
End: 2019-04-24

## 2019-04-24 NOTE — TELEPHONE ENCOUNTER
Reason for Call:  Other appointment and call back    Detailed comments: Pt has an appt for f/up MRI - but was unable to complete the MRI do to anxiety.  Wondering if she cancel appt with Dr. Schmitt 4-30-19 or?    Phone Number Patient can be reached at: [phone]    Best Time: any    Can we leave a detailed message on this number? Not Applicable    Call taken on 4/24/2019 at 3:14 PM by Connie French

## 2019-04-24 NOTE — TELEPHONE ENCOUNTER
Faxed request for Kidney biopsy report from 2009 from [facility].  Suzette Nath LPN  Nephrology  [phone]

## 2019-05-01 ENCOUNTER — HOSPITAL ENCOUNTER (OUTPATIENT)
Dept: MRI IMAGING | Facility: CLINIC | Age: 64
Discharge: HOME | End: 2019-05-01
Attending: PEDIATRICS | Admitting: PEDIATRICS
Payer: COMMERCIAL

## 2019-05-01 DIAGNOSIS — S49.91XA INJURY OF RIGHT SHOULDER, INITIAL ENCOUNTER: ICD-10-CM

## 2019-05-01 PROCEDURE — 73221 MRI JOINT UPR EXTREM W/O DYE: CPT | Mod: RT

## 2019-05-02 DIAGNOSIS — E55.9 VITAMIN D DEFICIENCY: ICD-10-CM

## 2019-05-02 NOTE — TELEPHONE ENCOUNTER
Routing refill request to provider for review/approval because:  Last ordered by KRISTINA Greer CNP.   PCP is now Dr. Chuy MAGANA RN

## 2019-05-02 NOTE — TELEPHONE ENCOUNTER
"Requested Prescriptions   Pending Prescriptions Disp Refills     vitamin D3 (CHOLECALCIFEROL) 2000 units tablet [Pharmacy Med Name: VITAMIN D3 2000 UNIT TABLET] 100 tablet 0     Sig: TAKE ONE TABLET BY MOUTH EVERY DAY       Vitamin Supplements (Adult) Protocol Passed - 5/2/2019  2:59 PM        Passed - High dose Vitamin D not ordered        Passed - Recent (12 mo) or future (30 days) visit within the authorizing provider's specialty     Patient had office visit in the last 12 months or has a visit in the next 30 days with authorizing provider or within the authorizing provider's specialty.  See \"Patient Info\" tab in inbasket, or \"Choose Columns\" in Meds & Orders section of the refill encounter.              Passed - Medication is active on med list        Last Written Prescription Date:  10/9/18  Last Fill Quantity: 100,  # refills: 0   Last office visit: 4/8/2019 with prescribing provider:  Chuy   Future Office Visit:   Next 5 appointments (look out 90 days)    May 07, 2019 11:40 AM CDT  Return Visit with Sangeetha Schmitt MD  [facility] ([facility]) [address]  [address]  [place] [facility] [place] [address]  [phone]   May 09, 2019  9:45 AM CDT  Return Visit with Luis A Zepeda MD  [facility] ([facility]) [address] [address]  [phone]   May 09, 2019 11:15 AM CDT  Return Visit with Julia Hernandez PA-C  [facility] ([facility]) [address] [address]  [phone]           "

## 2019-05-03 RX ORDER — CHOLECALCIFEROL (VITAMIN D3) 50 MCG
TABLET ORAL
Qty: 100 TABLET | Refills: 0 | Status: SHIPPED | OUTPATIENT
Start: 2019-05-03 | End: 2019-09-19

## 2019-05-07 ENCOUNTER — OFFICE VISIT (OUTPATIENT)
Dept: ORTHOPEDICS | Facility: CLINIC | Age: 64
End: 2019-05-07
Payer: COMMERCIAL

## 2019-05-07 VITALS
HEART RATE: 66 BPM | HEIGHT: 68 IN | BODY MASS INDEX: 27.23 KG/M2 | WEIGHT: 179.7 LBS | SYSTOLIC BLOOD PRESSURE: 118 MMHG | DIASTOLIC BLOOD PRESSURE: 81 MMHG

## 2019-05-07 DIAGNOSIS — M75.111 PARTIAL TEAR OF RIGHT ROTATOR CUFF: ICD-10-CM

## 2019-05-07 DIAGNOSIS — S49.91XA INJURY OF RIGHT SHOULDER, INITIAL ENCOUNTER: Primary | ICD-10-CM

## 2019-05-07 PROCEDURE — 99214 OFFICE O/P EST MOD 30 MIN: CPT | Performed by: PEDIATRICS

## 2019-05-07 RX ORDER — AMOXICILLIN 250 MG/1
2000 CAPSULE ORAL
COMMUNITY
End: 2020-07-21

## 2019-05-07 ASSESSMENT — MIFFLIN-ST. JEOR: SCORE: 1574.61

## 2019-05-07 NOTE — PROGRESS NOTES
Sports Medicine Clinic Visit - Interim History May 7, 2019    PCP: Alexandru Vera    Geovani Bustos is a 63 year old male who is seen in f/u up for    Injury of right shoulder, initial encounter  Partial tear of right rotator cuff. Since last visit on 4/17/19, patient has completed his right shoulder MRI. He is here today to review results. He feels that the shoulder is about the same.    Initial Injury History 4/17/2019 He reports right shoulder pain after heavy lifting on 1/15/19 ago. He has been to physical therapy which did reduce his pain. he reports since completing physical therapy he has shoulder pain with lifting again. He reports pain increases with overhead motions and lifting overhead. He is right hand dominate.    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     Review of Systems  Skin: no bruising, no swelling  Musculoskeletal: as above  Neurologic: no numbness, paresthesias  Remainder of review of systems is negative including constitutional, CV, pulmonary, GI, except as noted in HPI or medical history.    Patient's current problem list, past medical and surgical history, and family history were reviewed.    Patient Active Problem List   Diagnosis     Colon polyps     Schizophrenia (H)     VSD (ventricular septal defect)     Mitral valve prolapse     Acquired hypothyroidism     IgA nephropathy     Proteinuria     Hyperlipidemia LDL goal <130     BPH (benign prostatic hyperplasia)     Health Care Home     Bilateral impacted cerumen     Bunion of great toe of right foot     Eczema of external ear, bilateral     Tinea pedis, unspecified laterality     Incomplete right bundle branch block (RBBB)     Cigarette nicotine dependence with  "nicotine-induced disorder     Gastroesophageal reflux disease without esophagitis     Stage 1 mild COPD by GOLD classification (H)     CKD (chronic kidney disease) stage 3, GFR 30-59 ml/min (H)     Past Medical History:   Diagnosis Date     Cerumen impaction      Chronic kidney disease      Colon polyps      Hyperlipidemia      Mitral valve prolapse      Schizophrenia (H)      Ulcerated penile lesions      VSD (ventricular septal defect)      Past Surgical History:   Procedure Laterality Date     ARTHRODESIS FOOT Right 1/19/2016    Procedure: ARTHRODESIS FOOT;  Surgeon: Hodlen Harrison DPM;  Location: [facility]     ARTHRODESIS FOOT Left 1/3/2017    Procedure: ARTHRODESIS FOOT;  Surgeon: Holden Harrison DPM;  Location: [facility]     SURGICAL HISTORY OF -       leg fracture     Family History   Problem Relation Age of Onset     Emphysema Mother      Coronary Artery Disease Father        Objective  /81   Pulse 66   Ht 1.727 m (5' 8\")   Wt 81.5 kg (179 lb 11.2 oz)   BMI 27.32 kg/m      GENERAL APPEARANCE: healthy, alert and no distress   GAIT: NORMAL  SKIN: no suspicious lesions or rashes  HEENT: Sclera clear, anicteric  CV: good peripheral pulses  RESP: Breathing not labored  NEURO: Normal strength and tone, mentation intact and speech normal  PSYCH:  mentation appears normal and affect normal/bright     Bilateral Shoulder exam  Inspection and Posture:       rounded shoulders and upper back     Skin:        no visible deformities     Tender:        none     Non Tender:       remainder of shoulder bilateral     ROM:        forward flexion 130  right       abduction 120 right       internal rotation gluteal region right       external rotation 35 right       asymmetric scapular motion     Painful motions:       end range flexion and elevation right     Strength:        abduction 4/5 right       flexion 4/5 right       internal rotation 4/5 right       external rotation 4/5 right     Impingement testing:     "   neg (-) Neer right       positive (+) Escalera right     Sensation:        normal sensation over shoulder and upper extremity     Radiology  I ordered, visualized and reviewed these images with the patient  Mr Shoulder Right W/o Contrast    Result Date: 5/1/2019  MR RIGHT SHOULDER WITHOUT CONTRAST  5/1/2019 2:24 PM HISTORY: Right shoulder pain and popping when lifting things since December 2018 after lifting a book. TECHNIQUE: Oblique coronal T1, T2 and T2 fat suppressed images, oblique sagittal T2 and axial proton density and T2 weighted images were obtained. COMPARISON: None. FINDINGS: Osseous acromial outlet: Mild hypertrophic degenerative change at the acromioclavicular joint. There is mature ossification along the coracoclavicular ligaments. This is contiguous with the bone of the distal clavicle. It abuts but does not appear contiguous with the bone of the coracoid process. There is no bone marrow edema at the junction between the ossification and the coracoid process to indicate inflammation or pseudoarthrosis (image 16 of series 8). This type of ossification is usually related to an old trauma. Rotator cuff: Mild signal heterogeneity in the lateral supraspinatus tendon consistent with degenerative tendinopathy. There is a superimposed focus of increased signal at the lateral inferior margin of the anterior lateral supraspinatus tendon, at the medial margin of the footplate insertion, consistent with a small intrasubstance or articular-sided partial-thickness tear (image 13 of series 7). This measures up to 5 mm mediolateral dimension and anterior posterior dimension and involves up to 50% of the thickness of the tendon. The bursal surface is intact. There is no evidence for full-thickness tear, tendon retraction or muscle atrophy. The infraspinatus muscle and tendon and teres minor muscle are normal. There is thickening and heterogeneity of the subscapularis tendon consistent with degenerative tendinopathy  and/or partial intrasubstance tearing. No subscapularis muscle atrophy. Labrum: The glenoid labrum is normal as visualized. Biceps tendon: There is medial dislocation of the biceps tendon which appears intrinsically normal but lies mostly anterior to the glenohumeral joint (image 15 of series 4).  Osseous structures and cartilaginous surfaces: No acute-appearing bony abnormality. Cystic resorptive changes in the lateral humeral head and greater tuberosity near the insertion of the rotator cuff, largest in the greater tuberosity measuring up to 1 cm. Glenohumeral articular cartilages are unremarkable. Additional findings: None.     IMPRESSION: 1. Supraspinatus degenerative tendinopathy with a small superimposed intrasubstance or articular-sided partial-thickness tear. 2. Subscapularis degenerative tendinopathy and/or intrasubstance partial tearing. 3. Medial dislocation of the biceps tendon. 4. 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 Mild hypertrophic degenerative change at the acromioclavicular joint. HALINA DAVID MD    Assessment:  1. Injury of right shoulder, initial encounter    2. Partial tear of right rotator cuff      We discussed the following treatment options: symptom treatment, activity modification/rest, imaging, rehab and referral. Following discussion, plan: patient would like to discuss options with orthopedic surgeon.    Plan:  - Today's Plan of Care:  Referral to an Orthopedic Surgeon    -We also discussed other future treatment options:  Physical Therapy, consideration of injection    Follow Up: as needed    Concerning signs and symptoms were reviewed.  The patient expressed understanding of this management plan and all questions were answered at this time.    Sangeetha Schmitt MD [facility]  Primary Care Sports Medicine  [facility]

## 2019-05-07 NOTE — LETTER
5/7/2019         RE: Geovani Bustos  [address]  [facility] [address]        Dear Colleague,    Thank you for referring your patient, Geovani Bustos, to the [facility]. Please see a copy of my visit note below.    Sports Medicine Clinic Visit - Interim History May 7, 2019    PCP: Alexandru Vera    Geovani Bustos is a 63 year old male who is seen in f/u up for    Injury of right shoulder, initial encounter  Partial tear of right rotator cuff. Since last visit on 4/17/19, patient has completed his right shoulder MRI. He is here today to review results. He feels that the shoulder is about the same.    Initial Injury History 4/17/2019 He reports right shoulder pain after heavy lifting on 1/15/19 ago. He has been to physical therapy which did reduce his pain. he reports since completing physical therapy he has shoulder pain with lifting again. He reports pain increases with overhead motions and lifting overhead. He is right hand dominate.    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     Review of Systems  Skin: no bruising, no swelling  Musculoskeletal: as above  Neurologic: no numbness, paresthesias  Remainder of review of systems is negative including constitutional, CV, pulmonary, GI, except as noted in HPI or medical history.    Patient's current problem list, past medical and surgical history, and family history were reviewed.    Patient Active Problem List   Diagnosis     Colon polyps     Schizophrenia (H)     VSD (ventricular septal defect)     Mitral valve prolapse     Acquired hypothyroidism     IgA nephropathy     Proteinuria     Hyperlipidemia LDL goal <130     BPH (benign prostatic hyperplasia)      "Health Care Home     Bilateral impacted cerumen     Bunion of great toe of right foot     Eczema of external ear, bilateral     Tinea pedis, unspecified laterality     Incomplete right bundle branch block (RBBB)     Cigarette nicotine dependence with nicotine-induced disorder     Gastroesophageal reflux disease without esophagitis     Stage 1 mild COPD by GOLD classification (H)     CKD (chronic kidney disease) stage 3, GFR 30-59 ml/min (H)     Past Medical History:   Diagnosis Date     Cerumen impaction      Chronic kidney disease      Colon polyps      Hyperlipidemia      Mitral valve prolapse      Schizophrenia (H)      Ulcerated penile lesions      VSD (ventricular septal defect)      Past Surgical History:   Procedure Laterality Date     ARTHRODESIS FOOT Right 1/19/2016    Procedure: ARTHRODESIS FOOT;  Surgeon: Holden Harrison DPM;  Location: [facility]     ARTHRODESIS FOOT Left 1/3/2017    Procedure: ARTHRODESIS FOOT;  Surgeon: Holden Harrison DPM;  Location: [facility] OR     SURGICAL HISTORY OF -       leg fracture     Family History   Problem Relation Age of Onset     Emphysema Mother      Coronary Artery Disease Father        Objective  /81   Pulse 66   Ht 1.727 m (5' 8\")   Wt 81.5 kg (179 lb 11.2 oz)   BMI 27.32 kg/m       GENERAL APPEARANCE: healthy, alert and no distress   GAIT: NORMAL  SKIN: no suspicious lesions or rashes  HEENT: Sclera clear, anicteric  CV: good peripheral pulses  RESP: Breathing not labored  NEURO: Normal strength and tone, mentation intact and speech normal  PSYCH:  mentation appears normal and affect normal/bright     Bilateral Shoulder exam  Inspection and Posture:       rounded shoulders and upper back     Skin:        no visible deformities     Tender:        none     Non Tender:       remainder of shoulder bilateral     ROM:        forward flexion 130  right       abduction 120 right       internal rotation gluteal region right       external rotation 35 right       " asymmetric scapular motion     Painful motions:       end range flexion and elevation right     Strength:        abduction 4/5 right       flexion 4/5 right       internal rotation 4/5 right       external rotation 4/5 right     Impingement testing:       neg (-) Neer right       positive (+) [place]al[place] right     Sensation:        normal sensation over shoulder and upper extremity     Radiology  I ordered, visualized and reviewed these images with the patient  Mr Shoulder Right W/o Contrast    Result Date: 5/1/2019  MR RIGHT SHOULDER WITHOUT CONTRAST  5/1/2019 2:24 PM HISTORY: Right shoulder pain and popping when lifting things since December 2018 after lifting a book. TECHNIQUE: Oblique coronal T1, T2 and T2 fat suppressed images, oblique sagittal T2 and axial proton density and T2 weighted images were obtained. COMPARISON: None. FINDINGS: Osseous acromial outlet: Mild hypertrophic degenerative change at the acromioclavicular joint. There is mature ossification along the coracoclavicular ligaments. This is contiguous with the bone of the distal clavicle. It abuts but does not appear contiguous with the bone of the coracoid process. There is no bone marrow edema at the junction between the ossification and the coracoid process to indicate inflammation or pseudoarthrosis (image 16 of series 8). This type of ossification is usually related to an old trauma. Rotator cuff: Mild signal heterogeneity in the lateral supraspinatus tendon consistent with degenerative tendinopathy. There is a superimposed focus of increased signal at the lateral inferior margin of the anterior lateral supraspinatus tendon, at the medial margin of the footplate insertion, consistent with a small intrasubstance or articular-sided partial-thickness tear (image 13 of series 7). This measures up to 5 mm mediolateral dimension and anterior posterior dimension and involves up to 50% of the thickness of the tendon. The bursal surface is intact. There is  no evidence for full-thickness tear, tendon retraction or muscle atrophy. The infraspinatus muscle and tendon and teres minor muscle are normal. There is thickening and heterogeneity of the subscapularis tendon consistent with degenerative tendinopathy and/or partial intrasubstance tearing. No subscapularis muscle atrophy. Labrum: The glenoid labrum is normal as visualized. Biceps tendon: There is medial dislocation of the biceps tendon which appears intrinsically normal but lies mostly anterior to the glenohumeral joint (image 15 of series 4).  Osseous structures and cartilaginous surfaces: No acute-appearing bony abnormality. Cystic resorptive changes in the lateral humeral head and greater tuberosity near the insertion of the rotator cuff, largest in the greater tuberosity measuring up to 1 cm. Glenohumeral articular cartilages are unremarkable. Additional findings: None.     IMPRESSION: 1. Supraspinatus degenerative tendinopathy with a small superimposed intrasubstance or articular-sided partial-thickness tear. 2. Subscapularis degenerative tendinopathy and/or intrasubstance partial tearing. 3. Medial dislocation of the biceps tendon. 4. 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 Mild hypertrophic degenerative change at the acromioclavicular joint. HALINA DAVID MD    Assessment:  1. Injury of right shoulder, initial encounter    2. Partial tear of right rotator cuff      We discussed the following treatment options: symptom treatment, activity modification/rest, imaging, rehab and referral. Following discussion, plan: patient would like to discuss options with orthopedic surgeon.    Plan:  - Today's Plan of Care:  Referral to an Orthopedic Surgeon    -We also discussed other future treatment options:  Physical Therapy, consideration of injection    Follow Up: as needed    Concerning signs and symptoms were reviewed.  The patient expressed understanding  of this management plan and all questions were answered at this time.    Sangeetha Schmitt MD CAQ  Primary Care Sports Medicine  [facility]    Again, thank you for allowing me to participate in the care of your patient.        Sincerely,        Sangeetha Schmitt MD

## 2019-05-07 NOTE — PATIENT INSTRUCTIONS
Plan:  - Today's Plan of Care:  Referral to an Orthopedic Surgeon    -We also discussed other future treatment options:  Physical Therapy, consideration of injection    Follow Up: as needed

## 2019-05-09 ENCOUNTER — OFFICE VISIT (OUTPATIENT)
Dept: DERMATOLOGY | Facility: CLINIC | Age: 64
End: 2019-05-09
Payer: COMMERCIAL

## 2019-05-09 ENCOUNTER — OFFICE VISIT (OUTPATIENT)
Dept: UROLOGY | Facility: CLINIC | Age: 64
End: 2019-05-09
Payer: COMMERCIAL

## 2019-05-09 VITALS — DIASTOLIC BLOOD PRESSURE: 81 MMHG | SYSTOLIC BLOOD PRESSURE: 125 MMHG | HEART RATE: 63 BPM | OXYGEN SATURATION: 94 %

## 2019-05-09 VITALS
TEMPERATURE: 97.4 F | DIASTOLIC BLOOD PRESSURE: 72 MMHG | HEART RATE: 67 BPM | WEIGHT: 179.68 LBS | RESPIRATION RATE: 14 BRPM | BODY MASS INDEX: 27.23 KG/M2 | HEIGHT: 68 IN | SYSTOLIC BLOOD PRESSURE: 112 MMHG

## 2019-05-09 DIAGNOSIS — L20.89 OTHER ATOPIC DERMATITIS: Primary | ICD-10-CM

## 2019-05-09 DIAGNOSIS — R39.9 LOWER URINARY TRACT SYMPTOMS (LUTS): Primary | ICD-10-CM

## 2019-05-09 PROCEDURE — 51798 US URINE CAPACITY MEASURE: CPT | Performed by: UROLOGY

## 2019-05-09 PROCEDURE — 99214 OFFICE O/P EST MOD 30 MIN: CPT | Mod: 25 | Performed by: UROLOGY

## 2019-05-09 PROCEDURE — 99212 OFFICE O/P EST SF 10 MIN: CPT | Performed by: PHYSICIAN ASSISTANT

## 2019-05-09 ASSESSMENT — MIFFLIN-ST. JEOR: SCORE: 1574.5

## 2019-05-09 NOTE — NURSING NOTE
Active order to obtain bladder scan? Yes   Name of ordering provider:  Dr. Zepeda  Bladder scan preformed post void Yes  Bladder scan revealed 77ML  Provider notified?  Yes    Sondra VALE CMA

## 2019-05-09 NOTE — PROGRESS NOTES
Visit Date:   05/09/2019      REASON FOR VISIT TODAY:  Lower urinary tract symptoms.      BRIEF COURSE:  Mr. Bustos is a 63-year-old gentleman with a history of schizophrenia who lives in a group home who had been followed previously by Dr. Stratton for numerous years for his overactive bladder.  The patient transferred care to us in roughly 09/2018.  The patient is currently managing symptoms with oxybutynin 5 mg p.o. t.i.d. as well as finasteride.  We also added tamsulosin to the patient's regimen when we saw him last.  He comes in today noting no significant improvement in his urinary symptoms at this time.  In fact, the patient notes he has had a couple of accidents at night where he has had large volume loss of urine and awoke to a wet bed.  It is noted, however, that patient takes sleeping medications.  In addition, the patient drinks significant volumes of Mountain Dew during the daytime and up to and including supper.  He denies drinking many fluids after this time.      PHYSICAL EXAMINATION:   VITAL SIGNS:  On exam today, his blood pressure is 112/72, his pulse is 67.   GENERAL:  He is in no acute distress.      The patient's PSA from 09/13/2017 was 0.78.  The patient's postvoid residual today was measured at 77 mL and this was measured some 20-30 minutes from the time the patient had last voided.  The patient also has a urinalysis from 3/25/2019 that shows no blood, is essentially within normal limits.      ASSESSMENT AND PLAN:  Over half of today's 25-minute visit was spent counseling the patient and his caregiver today regarding his lower urinary tract symptoms.  I suggested to Mr. Bustos that we would suggest the patient come back for a cystoscopy to see what is going on inside his bladder as the medical regimen he is on is not working adequately for him.  The patient and the caregiver do note, however, that the patient does demonstrate some activities consistent with dysfunctional voiding and the fact  that he finds it hard to in initiate a urinary stream when he is anxious and sometimes he wishes to get back to his previous activities and does not feel he empties to completion during these times.  In addition, I counseled the patient to try cutting out caffeine from his diet as he does drink copious amounts of Mountain Dew.  The patient will work on trying to find a soda or some other beverage that is caffeine-free in the meantime.  Depending on what we find on the cystoscopy, the patient may require surgical intervention versus consideration of physical therapy for dysfunctional voiding or some other alternative.         RAQUEL CHAVES MD             D: 2019   T: 2019   MT: EMELI      Name:     JAZZ MACE   MRN:      [id number]        Account:      [id number]   :      1955           Visit Date:   2019      Document: [id number]

## 2019-05-09 NOTE — NURSING NOTE
"Chief Complaint   Patient presents with     RECHECK     LUTS hx - go over medication - doesn't feel like they are helping       Initial /72 (BP Location: Right arm, Patient Position: Chair, Cuff Size: Adult Regular)   Pulse 67   Temp 97.4  F (36.3  C) (Oral)   Resp 14   Ht 1.727 m (5' 8\")   Wt 81.5 kg (179 lb 10.8 oz)   BMI 27.32 kg/m   Estimated body mass index is 27.32 kg/m  as calculated from the following:    Height as of this encounter: 1.727 m (5' 8\").    Weight as of this encounter: 81.5 kg (179 lb 10.8 oz).  Medications and allergies reviewed.    Sondra VAEL, CMA    "

## 2019-05-09 NOTE — NURSING NOTE
"Initial /81 (BP Location: Left arm, Patient Position: Sitting, Cuff Size: Adult Regular)   Pulse 63   SpO2 94%  Estimated body mass index is 27.32 kg/m  as calculated from the following:    Height as of an earlier encounter on 5/9/19: 1.727 m (5' 8\").    Weight as of an earlier encounter on 5/9/19: 81.5 kg (179 lb 10.8 oz). .      "

## 2019-05-09 NOTE — LETTER
5/9/2019         RE: Geovani Bustos  [address] [facility] [address]        Dear Colleague,    Thank you for referring your patient, Geovani Bustos, to the [facility]. Please see a copy of my visit note below.    HPI:   Chief complaints: Geovani Bustos is a 63 year old male who presents for recheck of atopic dermatitis. Using emollients daily and the betamethasone cream PRN. Overall feels well controlled.   Condition present for:  Many years.   Previous treatments include: emollients and betamethasone which work well    Review Of Systems  Eyes: negative  Ears/Nose/Throat: negative  Respiratory: No shortness of breath, dyspnea on exertion, cough, or hemoptysis  Cardiovascular: negative  Gastrointestinal: negative  Genitourinary: negative  Musculoskeletal: negative  Neurologic: negative  Psychiatric: negative        PHYSICAL EXAM:    /81 (BP Location: Left arm, Patient Position: Sitting, Cuff Size: Adult Regular)   Pulse 63   SpO2 94%   Skin exam performed as follows: Type 2 skin. Mood appropriate  Alert and Oriented X 3. Well developed, well nourished in no distress.  General appearance: Normal  Head including face: Normal  Eyes: conjunctiva and lids: Normal  Mouth: Lips, teeth, gums: Normal  Neck: Normal  Chest-breast/axillae: Normal  Back: Normal  Spleen and liver: Normal  Cardiovascular: Exam of peripheral vascular system by observation for swelling, varicosities, edema: Normal  Genitalia: groin, buttocks: Normal  Extremities: digits/nails (clubbing): Normal  Eccrine and Apocrine glands: Normal  Right upper extremity: Normal  Left upper extremity: Normal  Right lower extremity: Normal  Left lower extremity: Normal  Skin: Scalp and body hair: See below    Skin clear    ASSESSMENT/PLAN:     1. Atopic dermatitis - doing great with gentle soaps, emollients and PRN betamethasone. Continue this indefinitely  2. Geovani to follow up with Primary Care provider regarding elevated  blood pressure.        Follow-up: 1 year/PRN sooner  CC:   Scribed By: Julia Hernandez, MS, PAKIRSTY        Again, thank you for allowing me to participate in the care of your patient.        Sincerely,        Julia Hernandez PA-C

## 2019-05-09 NOTE — PROGRESS NOTES
HPI:   Chief complaints: Geovani Bustos is a 63 year old male who presents for recheck of atopic dermatitis. Using emollients daily and the betamethasone cream PRN. Overall feels well controlled.   Condition present for:  Many years.   Previous treatments include: emollients and betamethasone which work well    Review Of Systems  Eyes: negative  Ears/Nose/Throat: negative  Respiratory: No shortness of breath, dyspnea on exertion, cough, or hemoptysis  Cardiovascular: negative  Gastrointestinal: negative  Genitourinary: negative  Musculoskeletal: negative  Neurologic: negative  Psychiatric: negative        PHYSICAL EXAM:    /81 (BP Location: Left arm, Patient Position: Sitting, Cuff Size: Adult Regular)   Pulse 63   SpO2 94%   Skin exam performed as follows: Type 2 skin. Mood appropriate  Alert and Oriented X 3. Well developed, well nourished in no distress.  General appearance: Normal  Head including face: Normal  Eyes: conjunctiva and lids: Normal  Mouth: Lips, teeth, gums: Normal  Neck: Normal  Chest-breast/axillae: Normal  Back: Normal  Spleen and liver: Normal  Cardiovascular: Exam of peripheral vascular system by observation for swelling, varicosities, edema: Normal  Genitalia: groin, buttocks: Normal  Extremities: digits/nails (clubbing): Normal  Eccrine and Apocrine glands: Normal  Right upper extremity: Normal  Left upper extremity: Normal  Right lower extremity: Normal  Left lower extremity: Normal  Skin: Scalp and body hair: See below    Skin clear    ASSESSMENT/PLAN:     1. Atopic dermatitis - doing great with gentle soaps, emollients and PRN betamethasone. Continue this indefinitely  2. Geovani to follow up with Primary Care provider regarding elevated blood pressure.        Follow-up: 1 year/PRN sooner  CC:   Scribed By: Julia Hernandez, MS, PA-C

## 2019-05-23 ENCOUNTER — TRANSFERRED RECORDS (OUTPATIENT)
Dept: HEALTH INFORMATION MANAGEMENT | Facility: CLINIC | Age: 64
End: 2019-05-23

## 2019-06-06 ENCOUNTER — OFFICE VISIT (OUTPATIENT)
Dept: FAMILY MEDICINE | Facility: CLINIC | Age: 64
End: 2019-06-06
Payer: COMMERCIAL

## 2019-06-06 VITALS
TEMPERATURE: 97.7 F | DIASTOLIC BLOOD PRESSURE: 70 MMHG | SYSTOLIC BLOOD PRESSURE: 102 MMHG | HEIGHT: 68 IN | BODY MASS INDEX: 26.67 KG/M2 | HEART RATE: 68 BPM | WEIGHT: 176 LBS | OXYGEN SATURATION: 95 % | RESPIRATION RATE: 18 BRPM

## 2019-06-06 DIAGNOSIS — M19.019 SHOULDER ARTHRITIS: Primary | ICD-10-CM

## 2019-06-06 PROCEDURE — 99213 OFFICE O/P EST LOW 20 MIN: CPT | Performed by: FAMILY MEDICINE

## 2019-06-06 ASSESSMENT — MIFFLIN-ST. JEOR: SCORE: 1557.83

## 2019-06-06 NOTE — PROGRESS NOTES
SUBJECTIVE   Geovani Bustos is a 63 year old male who presents with group home staff    Musculoskeletal problem/pain      Duration: 6 month recheck     Description  Location: right shoulder    Intensity:  moderate    Accompanying signs and symptoms: radiation of pain to shoulder and weakness of arm and shoulder     History  Previous similar problem: YES  Previous evaluation:  x-ray, MRI and orthopedic evaluation    Precipitating or alleviating factors:  Trauma or overuse: YES- shoulder popped in January   Aggravating factors include: lifting, exercise and overuse    Therapies tried and outcome: rest/inactivity, PT, had injection from ortho, physical therapy         PCP   Alexandru Vera [phone]    Health Maintenance        Health Maintenance Due   Topic Date Due     ADVANCED DIRECTIVE PLANNING  1955     ZOSTER IMMUNIZATION (2 of 3) 04/12/2016     AORTIC ANEURYSM SCREENING (SYSTEM ASSIGNED)  12/05/2018     PHQ-2  01/01/2019     MEDICARE ANNUAL WELLNESS VISIT  02/07/2019       HPI        Patient Active Problem List   Diagnosis     Colon polyps     Schizophrenia (H)     VSD (ventricular septal defect)     Mitral valve prolapse     Acquired hypothyroidism     IgA nephropathy     Proteinuria     Hyperlipidemia LDL goal <130     BPH (benign prostatic hyperplasia)     Health Care Home     Bilateral impacted cerumen     Bunion of great toe of right foot     Eczema of external ear, bilateral     Tinea pedis, unspecified laterality     Incomplete right bundle branch block (RBBB)     Cigarette nicotine dependence with nicotine-induced disorder     Gastroesophageal reflux disease without esophagitis     Stage 1 mild COPD by GOLD classification (H)     CKD (chronic kidney disease) stage 3, GFR 30-59 ml/min (H)     Current Outpatient Medications   Medication     ABILIFY 10 MG OR TABS     albuterol (PROAIR HFA/PROVENTIL HFA/VENTOLIN HFA) 108 (90 Base) MCG/ACT inhaler     albuterol (PROAIR HFA/PROVENTIL  HFA/VENTOLIN HFA) 108 (90 Base) MCG/ACT inhaler     amoxicillin (AMOXIL) 250 MG capsule     aspirin (QC LO-DOSE ASPIRIN) 81 MG EC tablet     augmented betamethasone dipropionate (DIPROLENE-AF) 0.05 % external ointment     Disposable Gloves (NITRILE EXAM GLOVES MEDIUM) MISC     EAR DROPS EARWAX AID 6.5 % otic solution     finasteride (PROSCAR) 5 MG tablet     fluticasone-vilanterol (BREO ELLIPTA) 100-25 MCG/INH inhaler     gabapentin (NEURONTIN) 100 MG capsule     levothyroxine (SYNTHROID/LEVOTHROID) 125 MCG tablet     nicotine (NICODERM CQ) 14 MG/24HR 24 hr patch     Omega-3 Fatty Acids (FISH OIL) 1200 MG capsule     order for DME     order for DME     order for DME     oxybutynin (DITROPAN) 5 MG tablet     polyethylene glycol (MIRALAX/GLYCOLAX) powder     REGULOID 48.57 % POWD     simvastatin (ZOCOR) 40 MG tablet     Skin Protectants, Misc. (EUCERIN) cream     tamsulosin (FLOMAX) 0.4 MG capsule     traZODone (DESYREL) 50 MG tablet     vitamin D3 (CHOLECALCIFEROL) 2000 units tablet     ZOLOFT 100 MG OR TABS     No current facility-administered medications for this visit.      Facility-Administered Medications Ordered in Other Visits   Medication     bupivacaine (MARCAINE) injection 0.5%       Patient Active Problem List   Diagnosis     Colon polyps     Schizophrenia (H)     VSD (ventricular septal defect)     Mitral valve prolapse     Acquired hypothyroidism     IgA nephropathy     Proteinuria     Hyperlipidemia LDL goal <130     BPH (benign prostatic hyperplasia)     Health Care Home     Bilateral impacted cerumen     Bunion of great toe of right foot     Eczema of external ear, bilateral     Tinea pedis, unspecified laterality     Incomplete right bundle branch block (RBBB)     Cigarette nicotine dependence with nicotine-induced disorder     Gastroesophageal reflux disease without esophagitis     Stage 1 mild COPD by GOLD classification (H)     CKD (chronic kidney disease) stage 3, GFR 30-59 ml/min (H)     Past  Surgical History:   Procedure Laterality Date     ARTHRODESIS FOOT Right 1/19/2016    Procedure: ARTHRODESIS FOOT;  Surgeon: Holden Harrison DPM;  Location: [facility]     ARTHRODESIS FOOT Left 1/3/2017    Procedure: ARTHRODESIS FOOT;  Surgeon: Holden Harrison DPM;  Location: [facility] OR     SURGICAL HISTORY OF -       leg fracture       Social History     Tobacco Use     Smoking status: Current Every Day Smoker     Packs/day: 1.00     Years: 47.00     Pack years: 47.00     Types: Cigarettes     Smokeless tobacco: Never Used     Tobacco comment: cutting one cigarette down a month   Substance Use Topics     Alcohol use: No     Family History   Problem Relation Age of Onset     Emphysema Mother      Coronary Artery Disease Father          Current Outpatient Medications   Medication Sig Dispense Refill     ABILIFY 10 MG OR TABS 1 TABLET DAILY       albuterol (PROAIR HFA/PROVENTIL HFA/VENTOLIN HFA) 108 (90 Base) MCG/ACT inhaler Inhale 2 puffs into the lungs every 4 hours as needed for shortness of breath / dyspnea or wheezing 1 Inhaler 2     albuterol (PROAIR HFA/PROVENTIL HFA/VENTOLIN HFA) 108 (90 Base) MCG/ACT inhaler Inhale 2 puffs into the lungs every 6 hours as needed for shortness of breath / dyspnea or wheezing 1 Inhaler 1     amoxicillin (AMOXIL) 250 MG capsule Take 2,000 mg by mouth       aspirin (QC LO-DOSE ASPIRIN) 81 MG EC tablet Take 1 tablet (81 mg) by mouth daily 100 tablet 3     augmented betamethasone dipropionate (DIPROLENE-AF) 0.05 % external ointment Apply to AA twice daily 1-2 weeks then as needed only. Do not apply to face. 45 g 5     Disposable Gloves (NITRILE EXAM GLOVES MEDIUM) MISC 1 each as needed Use PRN daily with administration of otic drops, body lotion and powder to treat dry itchy skin. 4 each 11     EAR DROPS EARWAX AID 6.5 % otic solution Place 5 drops into both ears 2 times daily For 5 days August 1-5th and Feb 1-5 th then return for irrigation after the 5th day. 15 mL 3      finasteride (PROSCAR) 5 MG tablet TAKE ONE TABLET BY MOUTH EVERY DAY 30 tablet 11     fluticasone-vilanterol (BREO ELLIPTA) 100-25 MCG/INH inhaler Inhale 1 puff into the lungs daily 1 Inhaler 11     gabapentin (NEURONTIN) 100 MG capsule Take 100 mg by mouth 2 times daily       levothyroxine (SYNTHROID/LEVOTHROID) 125 MCG tablet TAKE ONE TABLET BY MOUTH EVERY DAY 30 tablet 9     nicotine (NICODERM CQ) 14 MG/24HR 24 hr patch Place 1 patch onto the skin every 24 hours 30 patch 1     Omega-3 Fatty Acids (FISH OIL) 1200 MG capsule Take 1 capsule (1.2 g) by mouth daily 90 capsule 2     order for DME Equipment being ordered: incontinent pads 300 Device 3     order for DME Equipment being ordered: gloves 4 Box 3     order for DME Equipment being ordered: Dynaflex insert 1 Units 0     oxybutynin (DITROPAN) 5 MG tablet TAKE ONE TABLET BY MOUTH THREE TIMES DAILY 90 tablet 5     polyethylene glycol (MIRALAX/GLYCOLAX) powder Take 17 g (1 capful) by mouth daily 527 g 11     REGULOID 48.57 % POWD Take 1 each by mouth See Admin Instructions Take 1 teaspoonful every day at 7pm and 9 pm. 369 g 11     simvastatin (ZOCOR) 40 MG tablet TAKE ONE TABLET BY MOUTH EVERY DAY AT 9 PM 90 tablet 3     Skin Protectants, Misc. (EUCERIN) cream Apply topically 2 times daily 454 g 5     tamsulosin (FLOMAX) 0.4 MG capsule Take 1 capsule (0.4 mg) by mouth daily 90 capsule 3     traZODone (DESYREL) 50 MG tablet Take 50 mg by mouth At Bedtime       vitamin D3 (CHOLECALCIFEROL) 2000 units tablet TAKE ONE TABLET BY MOUTH EVERY  tablet 0     ZOLOFT 100 MG OR TABS 2 TABLETS DAILY       Allergies   Allergen Reactions     Nitrogen Oxide      Sulfa Drugs      Recent Labs   Lab Test 03/22/19  1242 01/15/19  1046 12/05/18  0840 08/08/18  1405 03/21/18  0900  11/08/17  0840 10/11/17  1325  02/01/17  1457  02/01/13  0815  05/14/12  0830   A1C  --   --   --   --  5.6  --   --   --   --   --   --  5.5  --  5.6   LDL  --   --  118*  --   --   --  80  --   --   "Cannot estimate LDL when triglyceride exceeds 400 mg/dL  89   < > 117   < > 101   HDL  --   --  48  --   --   --  47  --   --  27*   < > 45   < > 43   TRIG  --   --  150*  --   --   --  119  --   --  444*   < > 123   < > 143   ALT  --   --  28  --   --   --  22  --   --  19   < >  --    < >  --    CR 1.68* 1.66* 1.60*  --  1.36*   < > 1.31*  --    < > 1.21   < >  --    < >  --    GFRESTIMATED 42* 43* 44*  --  53*   < > 55*  --    < > 61   < >  --    < >  --    GFRESTBLACK 49* 49* 53*  --  64   < > 67  --    < > 73   < >  --    < >  --    POTASSIUM 4.2 4.6 4.6  --  4.4   < > 4.6  --    < > 4.1   < >  --    < >  --    TSH  --   --   --  2.13  --   --   --  1.24  --   --    < >  --    < >  --     < > = values in this interval not displayed.      BP Readings from Last 3 Encounters:   06/06/19 102/70   05/09/19 125/81   05/09/19 112/72    Wt Readings from Last 3 Encounters:   06/06/19 79.8 kg (176 lb)   05/09/19 81.5 kg (179 lb 10.8 oz)   05/07/19 81.5 kg (179 lb 11.2 oz)                    Reviewed and updated:  Tobacco  Allergies  Meds  Med Hx  Surg Hx  Fam Hx  Soc Hx     ROS:  Constitutional, HEENT, cardiovascular, pulmonary, gi and gu systems are negative, except as otherwise noted.    PHYSICAL EXAM   /70 (Cuff Size: Adult Regular)   Pulse 68   Temp 97.7  F (36.5  C) (Tympanic)   Resp 18   Ht 1.727 m (5' 8\")   Wt 79.8 kg (176 lb)   SpO2 95%   BMI 26.76 kg/m    Body mass index is 26.76 kg/m .  GENERAL: alert and no distress  EYES: Eyes grossly normal to inspection, PERRL and conjunctivae and sclerae normal  NECK: no adenopathy, no asymmetry, masses, or scars and thyroid normal to palpation  RESP: lungs clear to auscultation - no rales, rhonchi or wheezes  CV: regular rates and rhythm, normal S1 S2, no S3 or S4 and no murmur, click or rub  MS: slightly painful right shoulder abduction, ROM fair, no joint swelling or skin discoloration noted, normal strength       IMPRESSION:   1. Supraspinatus " degenerative tendinopathy with a small superimposed  intrasubstance or articular-sided partial-thickness tear.  2. Subscapularis degenerative tendinopathy and/or intrasubstance  partial tearing.  3. Medial dislocation of the biceps tendon.  4. Ossification in the region of the coracoclavicular ligaments,  contiguous with the distal clavicle. Mild hypertrophic degenerative  change at the acromioclavicular joint.       Assessment & Plan     1. Tear of right supraspinatus tendon, sequela  -MRI in May 1, 2019 showed partial thickness supraspinatus tear with some degenerative tendinopathy along with medial dislocation of the biceps tendon and mild arthritic changes involving the coracoclavicular ligament and AC joint.  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  Suggested physical therapy for now and to continue with Tylenol, icing and topical analgesia.  - PHYSICAL THERAPY REFERRAL; Future      2. Shoulder arthritis  -As above  - PHYSICAL THERAPY REFERRAL; Future      Risks, benefits, side effects and rationale for treatment plan fully discussed with the patient and understanding expressed.      Dr. Alexandru Vera  [facility]

## 2019-06-06 NOTE — NURSING NOTE
"Chief Complaint   Patient presents with     Musculoskeletal Problem     /70 (Cuff Size: Adult Regular)   Pulse 68   Temp 97.7  F (36.5  C) (Tympanic)   Resp 18   Ht 1.727 m (5' 8\")   Wt 79.8 kg (176 lb)   SpO2 95%   BMI 26.76 kg/m   Estimated body mass index is 26.76 kg/m  as calculated from the following:    Height as of this encounter: 1.727 m (5' 8\").    Weight as of this encounter: 79.8 kg (176 lb).  bp completed using cuff size: regular      Health Maintenance that is potentially due pending provider review:    Health Maintenance Due   Topic Date Due     ADVANCED DIRECTIVE PLANNING  1955     ZOSTER IMMUNIZATION (2 of 3) 04/12/2016     AORTIC ANEURYSM SCREENING (SYSTEM ASSIGNED)  12/05/2018     PHQ-2  01/01/2019     MEDICARE ANNUAL WELLNESS VISIT  02/07/2019                "

## 2019-06-14 ENCOUNTER — HOSPITAL ENCOUNTER (OUTPATIENT)
Dept: PHYSICAL THERAPY | Facility: CLINIC | Age: 64
Setting detail: THERAPIES SERIES
End: 2019-06-14
Attending: FAMILY MEDICINE
Payer: COMMERCIAL

## 2019-06-14 DIAGNOSIS — M19.019 SHOULDER ARTHRITIS: ICD-10-CM

## 2019-06-14 PROCEDURE — 97110 THERAPEUTIC EXERCISES: CPT | Mod: GP | Performed by: PHYSICAL MEDICINE & REHABILITATION

## 2019-06-14 PROCEDURE — 97162 PT EVAL MOD COMPLEX 30 MIN: CPT | Mod: GP | Performed by: PHYSICAL MEDICINE & REHABILITATION

## 2019-06-14 NOTE — PROGRESS NOTES
"   06/14/19 1300   General Information   Type of Visit Initial OP Ortho PT Evaluation   Start of Care Date 06/14/19   Referring Physician Alexandru Vera MD   Patient/Family Goals Statement get stronger, improve lifting, reaching and carrying, decrease pain with basketball and softball special olympics   Orders Evaluate and Treat   Date of Order 06/06/19   Certification Required? No  (BLUE PLUS ADVANTAGE [employer])   Medical Diagnosis Shoulder arthritis; Tear of right supraspinatus tendon, sequela   Surgical/Medical history reviewed Yes   Precautions/Limitations no known precautions/limitations   General Information Comments PMHx per pt report: broken ankle, broken toe, broken R arm, depression, COPD/emphysema, borderline diabetes, heart murmur, bladder control, mental illness, arthritis, smoking, \"adeola in leg,\" bunion surgery, cataract surgery. PMHx per epic: VSD, schizophrenia, mitral valve prolapse, hyperlipidemia, chronic kidney disease, squamous cell carcinoma, basal cell carcinoma    Body Part(s)   Body Part(s) Shoulder   Presentation and Etiology   Pertinent history of current problem (include personal factors and/or comorbidities that impact the POC) Pt arrived to PT today accompanied by PCA with complaints of R shoulder pain. Pt was seen in PT in the past for similar R shoulder pain but pain improved with exercise and pt was discharged. Pt's pain has since returned. Pt notes no new ARANZA, states he feels pain is still from looking at coin book in January. Pt has saw PCP and ortho who suggested RCR (pt has torn supraspinatus--see imaging below). Pt wants to finish special olympics before surgery (a couple months left of special olympics). Pt reports he has noticed occasional numbness and tingling in R hand with increased sx.    Impairments A. Pain;E. Decreased flexibility;F. Decreased strength and endurance;M. Locking or catching   Functional Limitations perform activities of daily living   Symptom Location R " shoulder   How/Where did it occur Other  (while reading coin book)   Onset date of current episode/exacerbation 01/14/19   Chronicity Chronic   Pain rating (0-10 point scale) Best (/10);Worst (/10)   Best (/10) 8   Worst (/10) 8   Pain quality A. Sharp;C. Aching;D. Burning;E. Shooting;F. Stabbing;G. Cramping   Frequency of pain/symptoms A. Constant   Pain/symptoms are: Worse in the morning   Pain/symptoms exacerbated by C. Lifting;D. Carrying;G. Certain positions;H. Overhead reach;K. Home tasks;L. Work tasks   Pain/symptoms eased by C. Rest;D. Nothing   Progression of symptoms since onset: Worsened   Current / Previous Interventions   Diagnostic Tests: MRI   MRI Results Results   MRI results MRI: 1. Supraspinatus degenerative tendinopathy with a small superimposed intrasubstance or articular-sided partial-thickness tear. 2. Subscapularis degenerative tendinopathy and/or intrasubstance partial tearing. 3. Medial dislocation of the biceps tendon. 4. 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 Mild hypertrophic degenerative change at the acromioclavicular joint.   Prior Level of Function   Prior Level of Function-Mobility independent   Prior Level of Function-ADLs pt lives in group home with assistance from PCA   Current Level of Function   Current Community Support PCA   Patient role/employment history A. Employed   Employment Comments pt mows lawns and rakes   Living environment House[facility]   Home/community accessibility pt lives in group home, no stairs   Current equipment-Gait/Locomotion None   Fall Risk Screen   Fall screen completed by PT   Have you fallen 2 or more times in the past year? No   Have you fallen and had an injury in the past year? No   Is patient a fall risk? No   Abuse Screen (yes response referral indicated)   Feels Unsafe at Home or Work/School no   Feels Threatened by Someone no   Does Anyone Try to Keep You From Having Contact with  Others or Doing Things Outside Your Home? no   Physical Signs of Abuse Present no   Functional Scales   Functional Scales Other   Other Scales  SPADI: 79.23% disability   Shoulder Objective Findings   Side (if bilateral, select both right and left) Right   Cervical Screen (ROM, quadrant) all motions wnl, pt reports he felt cavitations in neck with SB B (no pain)   Thoracic Mobility Screen all motions wfl, pt notes cavitations felt in back with extension (no pain)   Thoracic Outlet Syndrom (Shashi, Adson, Svetlana, Mora) Shashi: pos   Right Shoulder Flexion AROM 100* (pain)   Right Shoulder Flexion PROM 80* (pain, muscle guarding)   Right Shoulder Abduction AROM 30* (pain)   Right Shoulder Abduction PROM 60* (pain)   Right Shoulder ER AROM at 0* abd: 60* (pain)   Right Shoulder ER PROM in scaption: 30* (pain)   Right Shoulder IR AROM at 0* abd: to stomach (pain)   Right Shoulder IR PROM in scaption: to stomach (min increase in sx)   Scapulothoracic Rhythm fair with scap set   Right Shoulder Flexion Strength 3+/5 (pain)   Right Shoulder Abduction Strength 3+/5 (pain)   Right Shoulder ER Strength 4-/5 (pain)   Right Shoulder IR Strength 4/5 (pain)   Right Shoulder Extension Strength 4+/5 (pain)   Pec Minor (supine) Flexibility limited B   Neer's Test unable to get into test position due to pain   Escalera-Aydin Test pos   Coracoid Test neg   Bursa Test neg   Weehawken's Test unable to get into test position due to pain   Load and Shift Test neg   Relocation Test pos   Sulcus Test neg   Crossover Test neg   Shoulder Special Tests Comments dull touch sensation normal along dermatomal pattern in B UE. Pt noted increased pain with light touch over C4 dermatome that was not reported with palpation of R shoulder soft tissues and bony prominances.   Palpation Pt noted min increase in sx with palpation of ACJ, biceps T, and supraspinatus muscle belly   Accessory Motion/Joint Mobility hypomobility of R GHJ in all directions.  hypomobility of R SCJ and ACJ in all   Observation Pt accompanied to eval by PCA   Posture forward head and rounded shoulders B   Planned Therapy Interventions   Planned Therapy Interventions ADL retraining;IADL retraining;bed mobility training;joint mobilization;manual therapy;neuromuscular re-education;motor coordination training;orthotic fitting/training;ROM;strengthening;stretching;transfer training   Planned Modality Interventions   Planned Modality Interventions Biofeedback;Contrast bath immersion;Cryotherapy;Electrical stimulation;Hot packs;Low level laser therapy;Shortwave diathermy;TENS;Traction;Ultrasound;Iontophoresis   Clinical Impression   Criteria for Skilled Therapeutic Interventions Met yes, treatment indicated   PT Diagnosis R shoulder pain secondary to RTC tear   Influenced by the following impairments decreased ROM, decreased strength, impaired posture, radiating sx, pain   Functional limitations due to impairments reaching, lifting, carrying, sports/recreational activites   Clinical Presentation Evolving/Changing   Clinical Presentation Rationale multiple comorbidies (depression, diabetes, mental illness, smoking), high motivation, PLOF, severity of sx, partial tear, unreproduceable radicular sx   Clinical Decision Making (Complexity) Moderate complexity   Therapy Frequency 2 times/Week   Predicted Duration of Therapy Intervention (days/wks) 10 weeks   Risk & Benefits of therapy have been explained Yes   Patient, Family & other staff in agreement with plan of care Yes   Clinical Impression Comments Pt is a 63 y.o. male who presented to the PT clinic today with a rehab diagnosis of R shoulder pain secondary to RTC tear as evidenced by decreased ROM, decreased strength, impaired posture, radiating sx, and pain. Pt is appropriate for skilled PT to address previously listed impairments in order to decrease difficulty with reaching, lifting, carrying and recreational activites.   Education Assessment    Preferred Learning Style Listening;Reading;Demonstration;Pictures/video   Barriers to Learning No barriers   ORTHO GOALS   PT Ortho Eval Goals 1;2;3;4   Ortho Goal 1   Goal Identifier 1   Goal Description Pt will be able to flex R shoulder to 140* in order to decrease difficulty with reaching overhead.    Target Date 07/26/19   Ortho Goal 2   Goal Identifier 2   Goal Description Pt will be able to lift and carry 10# without increase in sx in order to decrease difficulty with ADLs.    Target Date 08/09/19   Ortho Goal 3   Goal Identifier 3   Goal Description Pt will report no increase in sx while playing basketball or softball in order to decrease difficulty with special olympics.    Target Date 08/23/19   Ortho Goal 4   Goal Identifier 4   Goal Description Pt will be independent, with assistance from PCA, with HEP in order to self manage symptoms.    Target Date 08/23/19   Total Evaluation Time   PT Dayna, Moderate Complexity Minutes (89079) 27   Therapy Certification   Certification date from 06/14/19   Certification date to 08/23/19   Medical Diagnosis Shoulder arthritis; Tear of right supraspinatus tendon, sequela       Please contact me with any questions or concerns.    Thank you for your referral,     Sonia Avitia, PT, DPT  Physical Therapist  [facility] - [address] [address]  [phone]

## 2019-06-20 ENCOUNTER — HOSPITAL ENCOUNTER (OUTPATIENT)
Dept: PHYSICAL THERAPY | Facility: CLINIC | Age: 64
Setting detail: THERAPIES SERIES
End: 2019-06-20
Attending: SPECIALIST
Payer: COMMERCIAL

## 2019-06-20 PROCEDURE — 97110 THERAPEUTIC EXERCISES: CPT | Mod: GP | Performed by: PHYSICAL MEDICINE & REHABILITATION

## 2019-06-22 ENCOUNTER — HOSPITAL ENCOUNTER (EMERGENCY)
Facility: CLINIC | Age: 64
Discharge: HOME | End: 2019-06-22
Attending: EMERGENCY MEDICINE | Admitting: EMERGENCY MEDICINE
Payer: COMMERCIAL

## 2019-06-22 ENCOUNTER — APPOINTMENT (OUTPATIENT)
Dept: GENERAL RADIOLOGY | Facility: CLINIC | Age: 64
End: 2019-06-22
Attending: EMERGENCY MEDICINE
Payer: COMMERCIAL

## 2019-06-22 VITALS
SYSTOLIC BLOOD PRESSURE: 130 MMHG | RESPIRATION RATE: 16 BRPM | OXYGEN SATURATION: 96 % | TEMPERATURE: 97.5 F | HEART RATE: 67 BPM | WEIGHT: 164 LBS | BODY MASS INDEX: 24.94 KG/M2 | DIASTOLIC BLOOD PRESSURE: 91 MMHG

## 2019-06-22 DIAGNOSIS — S46.211A BICEPS MUSCLE TEAR, RIGHT, INITIAL ENCOUNTER: ICD-10-CM

## 2019-06-22 DIAGNOSIS — S46.001A INJURY OF RIGHT ROTATOR CUFF, INITIAL ENCOUNTER: ICD-10-CM

## 2019-06-22 PROCEDURE — 99283 EMERGENCY DEPT VISIT LOW MDM: CPT | Performed by: EMERGENCY MEDICINE

## 2019-06-22 PROCEDURE — 99284 EMERGENCY DEPT VISIT MOD MDM: CPT | Mod: Z6 | Performed by: EMERGENCY MEDICINE

## 2019-06-22 PROCEDURE — 25000132 ZZH RX MED GY IP 250 OP 250 PS 637: Performed by: EMERGENCY MEDICINE

## 2019-06-22 PROCEDURE — 73060 X-RAY EXAM OF HUMERUS: CPT | Mod: RT

## 2019-06-22 RX ORDER — OXYCODONE AND ACETAMINOPHEN 5; 325 MG/1; MG/1
1 TABLET ORAL EVERY 4 HOURS PRN
Qty: 4 TABLET | Refills: 0 | Status: SHIPPED | OUTPATIENT
Start: 2019-06-22 | End: 2019-08-08

## 2019-06-22 RX ORDER — OXYCODONE AND ACETAMINOPHEN 5; 325 MG/1; MG/1
1 TABLET ORAL ONCE
Status: COMPLETED | OUTPATIENT
Start: 2019-06-22 | End: 2019-06-22

## 2019-06-22 RX ADMIN — OXYCODONE HYDROCHLORIDE AND ACETAMINOPHEN 1 TABLET: 5; 325 TABLET ORAL at 18:08

## 2019-06-22 NOTE — ED NOTES
Pt here with right upper arm swelling. The patient has a known torn biceps muscle. And was playing basketball today, increased pain and swelling following playing the game. Ice applied for comfort.

## 2019-06-22 NOTE — ED AVS SNAPSHOT
[facility] Emergency Department  [address] [address]  Phone:  [phone]  Fax:  [phone]                                    Geovani Bustos   MRN: [id number]    Department:  [facility] Emergency Department   Date of Visit:  6/22/2019           After Visit Summary Signature Page    I have received my discharge instructions, and my questions have been answered. I have discussed any challenges I see with this plan with the nurse or doctor.    ..........................................................................................................................................  Patient/Patient Representative Signature      ..........................................................................................................................................  Patient Representative Print Name and Relationship to Patient    ..................................................               ................................................  Date                                   Time    ..........................................................................................................................................  Reviewed by Signature/Title    ...................................................              ..............................................  Date                                               Time          22EPIC Rev 08/18

## 2019-06-22 NOTE — ED PROVIDER NOTES
History     Chief Complaint   Patient presents with     Arm Injury     known left muscle tear in right arm, playing basketball today and worse today. is scheduled to have surgery      HPI  Geovani Bustos is a 63 year old male who presents to the emergency department today for evaluation of an arm injury. Patient has a known history of a R Rotator cuff injury and biceps tendon injury from recent MRI . Today he was playing basketball and threw the ball too hard and hurt hir right upper arm. Patient complains of pain and swelling in his shoulder and R bicep region. . He reports that he was going to physical therapy for his arm prior to this injury.He denies any other injury. He has pain with flexion and extension of the elbow. There is swelling noted in his proximal and mid bicep region. Patient denies any numbness in the arm . He denies any neck or back pain.     Patient Active Problem List   Diagnosis     Colon polyps     Schizophrenia (H)     VSD (ventricular septal defect)     Mitral valve prolapse     Acquired hypothyroidism     IgA nephropathy     Proteinuria     Hyperlipidemia LDL goal <130     BPH (benign prostatic hyperplasia)     Health Care Home     Bilateral impacted cerumen     Bunion of great toe of right foot     Eczema of external ear, bilateral     Tinea pedis, unspecified laterality     Incomplete right bundle branch block (RBBB)     Cigarette nicotine dependence with nicotine-induced disorder     Gastroesophageal reflux disease without esophagitis     Stage 1 mild COPD by GOLD classification (H)     CKD (chronic kidney disease) stage 3, GFR 30-59 ml/min (H)     Current Outpatient Medications   Medication Sig Dispense Refill     ABILIFY 10 MG OR TABS 1 TABLET DAILY       albuterol (PROAIR HFA/PROVENTIL HFA/VENTOLIN HFA) 108 (90 Base) MCG/ACT inhaler Inhale 2 puffs into the lungs every 4 hours as needed for shortness of breath / dyspnea or wheezing 1 Inhaler 2     albuterol (PROAIR HFA/PROVENTIL  HFA/VENTOLIN HFA) 108 (90 Base) MCG/ACT inhaler Inhale 2 puffs into the lungs every 6 hours as needed for shortness of breath / dyspnea or wheezing 1 Inhaler 1     amoxicillin (AMOXIL) 250 MG capsule Take 2,000 mg by mouth       aspirin (QC LO-DOSE ASPIRIN) 81 MG EC tablet Take 1 tablet (81 mg) by mouth daily 100 tablet 3     augmented betamethasone dipropionate (DIPROLENE-AF) 0.05 % external ointment Apply to AA twice daily 1-2 weeks then as needed only. Do not apply to face. 45 g 5     Disposable Gloves (NITRILE EXAM GLOVES MEDIUM) MISC 1 each as needed Use PRN daily with administration of otic drops, body lotion and powder to treat dry itchy skin. 4 each 11     EAR DROPS EARWAX AID 6.5 % otic solution Place 5 drops into both ears 2 times daily For 5 days August 1-5th and Feb 1-5 th then return for irrigation after the 5th day. 15 mL 3     finasteride (PROSCAR) 5 MG tablet TAKE ONE TABLET BY MOUTH EVERY DAY 30 tablet 11     fluticasone-vilanterol (BREO ELLIPTA) 100-25 MCG/INH inhaler Inhale 1 puff into the lungs daily 1 Inhaler 11     gabapentin (NEURONTIN) 100 MG capsule Take 100 mg by mouth 2 times daily       levothyroxine (SYNTHROID/LEVOTHROID) 125 MCG tablet TAKE ONE TABLET BY MOUTH EVERY DAY 30 tablet 9     nicotine (NICODERM CQ) 14 MG/24HR 24 hr patch Place 1 patch onto the skin every 24 hours 30 patch 1     Omega-3 Fatty Acids (FISH OIL) 1200 MG capsule Take 1 capsule (1.2 g) by mouth daily 90 capsule 2     order for DME Equipment being ordered: incontinent pads 300 Device 3     order for DME Equipment being ordered: gloves 4 Box 3     order for DME Equipment being ordered: Dynaflex insert 1 Units 0     oxybutynin (DITROPAN) 5 MG tablet TAKE ONE TABLET BY MOUTH THREE TIMES DAILY 90 tablet 5     polyethylene glycol (MIRALAX/GLYCOLAX) powder Take 17 g (1 capful) by mouth daily 527 g 11     REGULOID 48.57 % POWD Take 1 each by mouth See Admin Instructions Take 1 teaspoonful every day at 7pm and 9 pm. 369 g 11      simvastatin (ZOCOR) 40 MG tablet TAKE ONE TABLET BY MOUTH EVERY DAY AT 9 PM 90 tablet 3     Skin Protectants, Misc. (EUCERIN) cream Apply topically 2 times daily 454 g 5     tamsulosin (FLOMAX) 0.4 MG capsule Take 1 capsule (0.4 mg) by mouth daily 90 capsule 3     traZODone (DESYREL) 50 MG tablet Take 50 mg by mouth At Bedtime       vitamin D3 (CHOLECALCIFEROL) 2000 units tablet TAKE ONE TABLET BY MOUTH EVERY  tablet 0     ZOLOFT 100 MG OR TABS 2 TABLETS DAILY       Allergies   Allergen Reactions     Nitrogen Oxide      Sulfa Drugs          Allergies:  Allergies   Allergen Reactions     Nitrogen Oxide      Sulfa Drugs        Problem List:    Patient Active Problem List    Diagnosis Date Noted     CKD (chronic kidney disease) stage 3, GFR 30-59 ml/min (H) 08/08/2018     Priority: Medium     Stage 1 mild COPD by GOLD classification (H) 07/27/2018     Priority: Medium     Gastroesophageal reflux disease without esophagitis 12/21/2016     Priority: Medium     Cigarette nicotine dependence with nicotine-induced disorder 10/05/2016     Priority: Medium     Incomplete right bundle branch block (RBBB) 01/13/2016     Priority: Medium     Tinea pedis, unspecified laterality 12/01/2015     Priority: Medium     Bunion of great toe of right foot 11/12/2015     Priority: Medium     Eczema of external ear, bilateral 11/12/2015     Priority: Medium     Bilateral impacted cerumen 10/12/2015     Priority: Medium     Health Care Home 11/12/2014     Priority: Medium     Status:  Unable to reach  Care Coordinator:  Lavinia Samson    See Letters for [facility] Care Plan  Date:  November 12, 2014         BPH (benign prostatic hyperplasia) 12/07/2011     Priority: Medium     Improved with med       Hyperlipidemia LDL goal <130 10/20/2010     Priority: Medium              Proteinuria 03/22/2010     Priority: Medium     Refer to Dr. Harris       IgA nephropathy 03/11/2010     Priority: Medium     Consulted Dr. Harris, last seen 4/2010,  very modest renal dysfunction and microscopic hematuria, recent measurement of proteinuria was within normal range, ise ACE inhibitor if develops HTN. Prognosis with regard to   his IgA nephropathy is thought to be excellent        Acquired hypothyroidism 10/08/2009     Priority: Medium     Colon polyps      Priority: Medium     Due in 2017       Schizophrenia (H)      Priority: Medium     VSD (ventricular septal defect)      Priority: Medium     Mitral valve prolapse      Priority: Medium     3/2010 -Echo   The visual ejection fraction is estimated at 55-60%.                 Past Medical History:    Past Medical History:   Diagnosis Date     Cerumen impaction      Chronic kidney disease      Colon polyps      Hyperlipidemia      Mitral valve prolapse      Schizophrenia (H)      Ulcerated penile lesions      VSD (ventricular septal defect)        Past Surgical History:    Past Surgical History:   Procedure Laterality Date     ARTHRODESIS FOOT Right 1/19/2016    Procedure: ARTHRODESIS FOOT;  Surgeon: Holden Harrison DPM;  Location: [facility] OR     ARTHRODESIS FOOT Left 1/3/2017    Procedure: ARTHRODESIS FOOT;  Surgeon: Holden Harrison DPM;  Location: [facility] OR     SURGICAL HISTORY OF -       leg fracture       Family History:    Family History   Problem Relation Age of Onset     Emphysema Mother      Coronary Artery Disease Father        Social History:  Marital Status:  Single [1]  Social History     Tobacco Use     Smoking status: Current Every Day Smoker     Packs/day: 1.00     Years: 47.00     Pack years: 47.00     Types: Cigarettes     Smokeless tobacco: Never Used     Tobacco comment: cutting one cigarette down a month   Substance Use Topics     Alcohol use: No     Drug use: No        Medications:      ABILIFY 10 MG OR TABS   albuterol (PROAIR HFA/PROVENTIL HFA/VENTOLIN HFA) 108 (90 Base) MCG/ACT inhaler   albuterol (PROAIR HFA/PROVENTIL HFA/VENTOLIN HFA) 108 (90 Base) MCG/ACT inhaler   amoxicillin (AMOXIL)  250 MG capsule   aspirin (QC LO-DOSE ASPIRIN) 81 MG EC tablet   augmented betamethasone dipropionate (DIPROLENE-AF) 0.05 % external ointment   Disposable Gloves (NITRILE EXAM GLOVES MEDIUM) MISC   EAR DROPS EARWAX AID 6.5 % otic solution   finasteride (PROSCAR) 5 MG tablet   fluticasone-vilanterol (BREO ELLIPTA) 100-25 MCG/INH inhaler   gabapentin (NEURONTIN) 100 MG capsule   levothyroxine (SYNTHROID/LEVOTHROID) 125 MCG tablet   nicotine (NICODERM CQ) 14 MG/24HR 24 hr patch   Omega-3 Fatty Acids (FISH OIL) 1200 MG capsule   order for DME   order for DME   order for DME   oxybutynin (DITROPAN) 5 MG tablet   polyethylene glycol (MIRALAX/GLYCOLAX) powder   REGULOID 48.57 % POWD   simvastatin (ZOCOR) 40 MG tablet   Skin Protectants, Misc. (EUCERIN) cream   tamsulosin (FLOMAX) 0.4 MG capsule   traZODone (DESYREL) 50 MG tablet   vitamin D3 (CHOLECALCIFEROL) 2000 units tablet   ZOLOFT 100 MG OR TABS         ROS   As per HPI    Physical Exam   BP: 133/83  Heart Rate: 89  Temp: 97.5  F (36.4  C)  Weight: 74.4 kg (164 lb)  SpO2: 96 %      Physical Exam   Eyes: Conjunctivae are normal.   Psychiatric: He has a normal mood and affect.   Nursing note and vitals reviewed.    HENT: Oral mucosa moist. No lesions.  Neck: Supple  Pulmonary/Chest:   Cardiovascular: Heart is regular rate and rhythm. No murmu  Musculoskeletal: Right upper arm with swelling in  bicep region. Pain with any flection extension of elbow. Some humeral tenderness is present. Pulses sensations symmetrical. No peripheral edema. Pin in R shoulder with movement of the R arm  Neurological: Alert. No focal neurologic deficit.   Skin: No rash.    ED Course   5:14 PM Patient assessed.        Procedures               Critical Care time:  none               Results for orders placed or performed during the hospital encounter of 06/22/19   Humerus XR, G/E 2 views, right    Narrative    RIGHT HUMERUS TWO OR MORE VIEWS     6/22/2019 6:29 PM     HISTORY: Right shoulder/upper  arm pain.    COMPARISON: None.      Impression    IMPRESSION: Two views right humerus. No fracture or dislocation. No  significant soft tissue swelling. No evidence of fat pad elevation  distal humerus as seen on the lateral view. Mild degenerative  acromioclavicular joint changes are noted.     LUIZ DUGAN MD         Medications   oxyCODONE-acetaminophen (PERCOCET) 5-325 MG per tablet 1 tablet (1 tablet Oral Given 6/22/19 1808)       Assessments & Plan (with Medical Decision Making) records were reviewed.  X-ray of the right humerus was obtained and patient was given Percocet for pain.  X-ray revealed no obvious acute abnormality.  There is no significant soft tissue swelling.  Exam is difficult due to pain and I cannot rule out a significant biceps tear although one is suspected at this time.  I am going to put the patient in a sling and have him follow-up with orthopedics this week for further evaluation and care.  He will be given a few pain pills and will return if symptoms worsen or new symptoms develop.  Patient is agreement this plan.     I have reviewed the nursing notes.    I have reviewed the findings, diagnosis, plan and need for follow up with the patient.          Medication List      There are no discharge medications for this visit.         Final diagnoses:   Injury of right rotator cuff, initial encounter   Biceps muscle tear, right, initial encounter - possible     This document serves as a record of the services and decisions personally performed and made by Robbie Mckeon MD. It was created on HIS/HER behalf by Rebecca Sidhu, a trained medical scribe. The creation of this document is based on the provider's statements to the medical scribe.  Rebecca Sidhu 5:19 PM 6/22/2019    Provider:  The information in this document, created by the medical scribe for me, accurately reflects the services I personally performed and the decisions made by me. I have reviewed and approved this document for  accuracy prior to leaving the patient care area.  Robbie Mckeon MD 5:19 PM 6/22/2019 6/22/2019   [facility] EMERGENCY DEPARTMENT     Robbie Mckeon MD  06/24/19 0061

## 2019-06-23 NOTE — DISCHARGE INSTRUCTIONS
Return if symptoms worsen or new symptoms develop.  Follow up with primary care physician next available.  Follow-up with orthopedics next available.   Take pain medication as directed.  Take ibuprofen and Tylenol for lesser pain.  Use sling as needed.  If increased pain weakness numbness or other symptoms present please return for further evaluation and care.

## 2019-06-25 ENCOUNTER — TRANSFERRED RECORDS (OUTPATIENT)
Dept: HEALTH INFORMATION MANAGEMENT | Facility: CLINIC | Age: 64
End: 2019-06-25

## 2019-06-27 ENCOUNTER — HOSPITAL ENCOUNTER (OUTPATIENT)
Dept: PHYSICAL THERAPY | Facility: CLINIC | Age: 64
Setting detail: THERAPIES SERIES
End: 2019-06-27
Attending: SPECIALIST
Payer: COMMERCIAL

## 2019-06-27 PROCEDURE — 97110 THERAPEUTIC EXERCISES: CPT | Mod: GP | Performed by: PHYSICAL MEDICINE & REHABILITATION

## 2019-06-28 NOTE — PROGRESS NOTES
Outpatient Physical Therapy Progress Note     Patient: Geovani Bustos  : 1955    Beginning/End Dates of Reporting Period:  2019 to 2019    Referring Provider: Alexandru Vera MD    Therapy Diagnosis: R shoulder pain secondary to RTC tear     Client Self Report: Pt arrived to PT today with R UE bruised along entire arm. Notes he was playing basketball saturday and went up for shot and felt pop. Went to ER and tore biceps T. Pt and PCA shared pt no longer needs to have surgery anymore per MD as pt now has complete tear.     Objective Measurements:  Objective Measure: R shoulder ROM  Details: flexion: 165*, abd: 180*, IR at 0* abd: to stomach, ER at 0* abd: 65*    Outcome Measures (most recent score):  SPADI: 14.62% disability (79.23% at initial eval)    Goals:  Goal Identifier 1   Goal Description Pt will be able to flex R shoulder to 140* in order to decrease difficulty with reaching overhead.    Target Date 19   Date Met  19   Progress:     Goal Identifier 2   Goal Description Pt will be able to lift and carry 10# without increase in sx in order to decrease difficulty with ADLs.    Target Date 19   Date Met      Progress:  (progressing, pt able to lift 2# during last session without increase in sx)     Goal Identifier 3   Goal Description Pt will report no increase in sx while playing basketball or softball in order to decrease difficulty with special olympics.    Target Date 19   Date Met      Progress:     Goal Identifier 4   Goal Description Pt will be independent, with assistance from PCA, with HEP in order to self manage symptoms.    Target Date 19   Date Met      Progress: (long term goal, I with current HEP)     Progress Toward Goals:   Progress this reporting period: Despite recent injury, pt's ROM improved and pt noted decreased pain in R shoulder during therapy session today. PT discussed injury with PCP and continuation of physical therapy. Pt  demonstrated good form and no increase in sx with exercises performed during session. Pt has met 1/4 goals and cont to progress toward remaining 3 goals. Pt is appropriate for continued skilled PT to improve ROM and strength deficits in order to decrease difficulty with playing sports, lifting and carrying.    Plan:  Continue therapy per current plan of care.    Discharge:  No    Please contact me with any questions or concerns.    Thank you for your referral,     Sonia Avitia, PT, DPT  Physical Therapist  [facility] - [address] [address] [phone]

## 2019-07-02 ENCOUNTER — HOSPITAL ENCOUNTER (OUTPATIENT)
Dept: PHYSICAL THERAPY | Facility: CLINIC | Age: 64
Setting detail: THERAPIES SERIES
End: 2019-07-02
Attending: SPECIALIST
Payer: COMMERCIAL

## 2019-07-02 PROCEDURE — 97110 THERAPEUTIC EXERCISES: CPT | Mod: GP | Performed by: PHYSICAL MEDICINE & REHABILITATION

## 2019-07-09 ENCOUNTER — HOSPITAL ENCOUNTER (OUTPATIENT)
Dept: PHYSICAL THERAPY | Facility: CLINIC | Age: 64
Setting detail: THERAPIES SERIES
End: 2019-07-09
Attending: SPECIALIST
Payer: COMMERCIAL

## 2019-07-09 PROCEDURE — 97110 THERAPEUTIC EXERCISES: CPT | Mod: GP | Performed by: PHYSICAL MEDICINE & REHABILITATION

## 2019-07-11 ENCOUNTER — OFFICE VISIT (OUTPATIENT)
Dept: FAMILY MEDICINE | Facility: CLINIC | Age: 64
End: 2019-07-11
Payer: COMMERCIAL

## 2019-07-11 VITALS
DIASTOLIC BLOOD PRESSURE: 80 MMHG | HEIGHT: 68 IN | TEMPERATURE: 97.8 F | HEART RATE: 67 BPM | BODY MASS INDEX: 26.83 KG/M2 | RESPIRATION RATE: 18 BRPM | WEIGHT: 177 LBS | SYSTOLIC BLOOD PRESSURE: 125 MMHG

## 2019-07-11 DIAGNOSIS — K59.00 CONSTIPATION, UNSPECIFIED CONSTIPATION TYPE: ICD-10-CM

## 2019-07-11 DIAGNOSIS — R21 RASH AND NONSPECIFIC SKIN ERUPTION: Primary | ICD-10-CM

## 2019-07-11 PROCEDURE — 99213 OFFICE O/P EST LOW 20 MIN: CPT | Performed by: FAMILY MEDICINE

## 2019-07-11 RX ORDER — POLYETHYLENE GLYCOL 3350 17 G/17G
POWDER, FOR SOLUTION ORAL
Qty: 527 G | Refills: 0 | Status: SHIPPED | OUTPATIENT
Start: 2019-07-11 | End: 2019-09-06

## 2019-07-11 ASSESSMENT — MIFFLIN-ST. JEOR: SCORE: 1562.37

## 2019-07-11 NOTE — TELEPHONE ENCOUNTER
"Requested Prescriptions   Pending Prescriptions Disp Refills     GAVILAX powder [Pharmacy Med Name: GAVILAX 17G/DOSE POWDER] 527 g 11     Sig: Take 17 g (1 capful) by mouth daily       Laxatives Protocol Failed - 7/11/2019 12:39 PM        Failed - Recent (12 mo) or future (30 days) visit within the authorizing provider's specialty     Patient had office visit in the last 12 months or has a visit in the next 30 days with authorizing provider or within the authorizing provider's specialty.  See \"Patient Info\" tab in inbasket, or \"Choose Columns\" in Meds & Orders section of the refill encounter.      Last Written Prescription Date:  12/15/18  Last Fill Quantity: 396g,  # refills: 11   Last office visit: 6/6/2019 with prescribing provider:     Future Office Visit:   Next 5 appointments (look out 90 days)    Jul 11, 2019  2:40 PM CDT  [facility] with [facility]ru [facility] MD  [facility] [address] [place] [id number]  [phone]   Aug 08, 2019 10:20 AM CDT  SHORT with Alexandru Vera MD  [facility] [address] [facility] [id number]  [phone]                   Passed - Patient is age 6 or older        Passed - Medication is active on med list          "

## 2019-07-11 NOTE — NURSING NOTE
"Chief Complaint   Patient presents with     ER F/U     /80 (Cuff Size: Adult Regular)   Pulse 67   Temp 97.8  F (36.6  C) (Tympanic)   Resp 18   Ht 1.727 m (5' 8\")   Wt 80.3 kg (177 lb)   BMI 26.91 kg/m   Estimated body mass index is 26.91 kg/m  as calculated from the following:    Height as of this encounter: 1.727 m (5' 8\").    Weight as of this encounter: 80.3 kg (177 lb).  bp completed using cuff size: regular      Health Maintenance that is potentially due pending provider review:    Health Maintenance Due   Topic Date Due     ADVANCE CARE PLANNING  1955     ZOSTER IMMUNIZATION (2 of 3) 04/12/2016     AORTIC ANEURYSM SCREENING (SYSTEM ASSIGNED)  12/05/2018     PHQ-2  01/01/2019     MEDICARE ANNUAL WELLNESS VISIT  02/07/2019     TSH W/FREE T4 REFLEX  08/08/2019                "

## 2019-07-11 NOTE — PROGRESS NOTES
SUBJECTIVE   Geovani Bustos is a 63 year old male who presents with     ED/UC Followup:    Facility:  [facility]  Date of visit: 7/6/19  Reason for visit: Rash- stomach, legs   Current Status: rash is still present. Itchy,   Had chickenpox at young age             PCP   Alexandru Vera [phone]    Health Maintenance        Health Maintenance Due   Topic Date Due     ADVANCE CARE PLANNING  1955     ZOSTER IMMUNIZATION (2 of 3) 04/12/2016     AORTIC ANEURYSM SCREENING (SYSTEM ASSIGNED)  12/05/2018     MEDICARE ANNUAL WELLNESS VISIT  02/07/2019     TSH W/FREE T4 REFLEX  08/08/2019       HPI        Patient Active Problem List   Diagnosis     Colon polyps     Schizophrenia (H)     VSD (ventricular septal defect)     Mitral valve prolapse     Acquired hypothyroidism     IgA nephropathy     Proteinuria     Hyperlipidemia LDL goal <130     BPH (benign prostatic hyperplasia)     Health Care Home     Bilateral impacted cerumen     Bunion of great toe of right foot     Eczema of external ear, bilateral     Tinea pedis, unspecified laterality     Incomplete right bundle branch block (RBBB)     Cigarette nicotine dependence with nicotine-induced disorder     Gastroesophageal reflux disease without esophagitis     Stage 1 mild COPD by GOLD classification (H)     CKD (chronic kidney disease) stage 3, GFR 30-59 ml/min (H)     Current Outpatient Medications   Medication     ABILIFY 10 MG OR TABS     albuterol (PROAIR HFA/PROVENTIL HFA/VENTOLIN HFA) 108 (90 Base) MCG/ACT inhaler     albuterol (PROAIR HFA/PROVENTIL HFA/VENTOLIN HFA) 108 (90 Base) MCG/ACT inhaler     aspirin (QC LO-DOSE ASPIRIN) 81 MG EC tablet     augmented betamethasone dipropionate (DIPROLENE-AF) 0.05 % external ointment     Disposable Gloves (NITRILE EXAM GLOVES MEDIUM) MISC     EAR DROPS EARWAX AID 6.5 % otic solution     finasteride (PROSCAR) 5 MG tablet     fluticasone-vilanterol (BREO ELLIPTA) 100-25 MCG/INH inhaler     gabapentin  (NEURONTIN) 100 MG capsule     GAVILAX powder     levothyroxine (SYNTHROID/LEVOTHROID) 125 MCG tablet     nicotine (NICODERM CQ) 14 MG/24HR 24 hr patch     Omega-3 Fatty Acids (FISH OIL) 1200 MG capsule     order for DME     order for DME     order for DME     oxybutynin (DITROPAN) 5 MG tablet     oxyCODONE-acetaminophen (PERCOCET) 5-325 MG tablet     REGULOID 48.57 % POWD     simvastatin (ZOCOR) 40 MG tablet     Skin Protectants, Misc. (EUCERIN) cream     tamsulosin (FLOMAX) 0.4 MG capsule     traZODone (DESYREL) 50 MG tablet     vitamin D3 (CHOLECALCIFEROL) 2000 units tablet     ZOLOFT 100 MG OR TABS     amoxicillin (AMOXIL) 250 MG capsule     No current facility-administered medications for this visit.      Facility-Administered Medications Ordered in Other Visits   Medication     bupivacaine (MARCAINE) injection 0.5%       Patient Active Problem List   Diagnosis     Colon polyps     Schizophrenia (H)     VSD (ventricular septal defect)     Mitral valve prolapse     Acquired hypothyroidism     IgA nephropathy     Proteinuria     Hyperlipidemia LDL goal <130     BPH (benign prostatic hyperplasia)     Health Care Home     Bilateral impacted cerumen     Bunion of great toe of right foot     Eczema of external ear, bilateral     Tinea pedis, unspecified laterality     Incomplete right bundle branch block (RBBB)     Cigarette nicotine dependence with nicotine-induced disorder     Gastroesophageal reflux disease without esophagitis     Stage 1 mild COPD by GOLD classification (H)     CKD (chronic kidney disease) stage 3, GFR 30-59 ml/min (H)     Past Surgical History:   Procedure Laterality Date     ARTHRODESIS FOOT Right 1/19/2016    Procedure: ARTHRODESIS FOOT;  Surgeon: Holden Harrison DPM;  Location: [facility] OR     ARTHRODESIS FOOT Left 1/3/2017    Procedure: ARTHRODESIS FOOT;  Surgeon: Holden Harrison DPM;  Location: [facility] OR     SURGICAL HISTORY OF -       leg fracture       Social History     Tobacco Use      "Smoking status: Current Every Day Smoker     Packs/day: 1.00     Years: 47.00     Pack years: 47.00     Types: Cigarettes     Smokeless tobacco: Never Used     Tobacco comment: cutting one cigarette down a month   Substance Use Topics     Alcohol use: No     Family History   Problem Relation Age of Onset     Emphysema Mother      Coronary Artery Disease Father            Reviewed and updated:  Tobacco  Allergies  Meds  Med Hx  Surg Hx  Fam Hx  Soc Hx     ROS:  Constitutional, HEENT, cardiovascular, pulmonary, gi and gu systems are negative, except as otherwise noted.    PHYSICAL EXAM   /80 (Cuff Size: Adult Regular)   Pulse 67   Temp 97.8  F (36.6  C) (Tympanic)   Resp 18   Ht 1.727 m (5' 8\")   Wt 80.3 kg (177 lb)   BMI 26.91 kg/m    Body mass index is 26.91 kg/m .  GENERAL: alert and no distress  NECK: no adenopathy, no asymmetry, masses, or scars and thyroid normal to palpation  RESP: lungs clear to auscultation - no rales, rhonchi or wheezes  CV: regular rates and rhythm, normal S1 S2, no S3 or S4 and no murmur, click or rub  ABDOMEN: soft, nontender, no hepatosplenomegaly, no masses and bowel sounds normal  MS: no gross musculoskeletal defects noted, no edema  SKIN: erythematous rashes with central skin excoriations, involving chest wall, abdomen and upper thighs bilaterally, rashes not in different stages, no blistering or discharge noted              Assessment & Plan     (R21) Rash and nonspecific skin eruption  (primary encounter diagnosis)  Comment: Varicella PCR came back negative.  Patient denies any constitutional symptoms, no other resident in group home having similar rashes.  Reassurance provided.  Suggested to continue calamine lotion and Benadryl and avoiding heat.  Will consider biopsy if rashes persist or worsen.  Patient/group home staff member understood and in agreement with above plan.  All questions answered.        Alexandru Vera MD  [facility]        "

## 2019-07-16 ENCOUNTER — HOSPITAL ENCOUNTER (OUTPATIENT)
Dept: PHYSICAL THERAPY | Facility: CLINIC | Age: 64
Setting detail: THERAPIES SERIES
End: 2019-07-16
Attending: SPECIALIST
Payer: COMMERCIAL

## 2019-07-16 PROCEDURE — 97110 THERAPEUTIC EXERCISES: CPT | Mod: GP | Performed by: PHYSICAL MEDICINE & REHABILITATION

## 2019-07-17 ENCOUNTER — TELEPHONE (OUTPATIENT)
Dept: FAMILY MEDICINE | Facility: CLINIC | Age: 64
End: 2019-07-17

## 2019-07-17 DIAGNOSIS — K59.00 CONSTIPATION, UNSPECIFIED CONSTIPATION TYPE: Primary | ICD-10-CM

## 2019-07-17 DIAGNOSIS — N39.46 MIXED INCONTINENCE: ICD-10-CM

## 2019-07-17 RX ORDER — UNDERPADS 23" X 36"
EACH MISCELLANEOUS
Qty: 300 EACH | Refills: 1 | Status: SHIPPED | OUTPATIENT
Start: 2019-07-17 | End: 2023-06-06

## 2019-07-17 RX ORDER — MEDICAL SUPPLY, MISCELLANEOUS
EACH MISCELLANEOUS
Qty: 400 EACH | Refills: 1 | Status: SHIPPED | OUTPATIENT
Start: 2019-07-17 | End: 2020-02-21

## 2019-07-17 NOTE — TELEPHONE ENCOUNTER
Received a a request for incontinence supplies from [employer] stating the following:  We have been contacted by this patient requesting incontinence supplies. If you feel this patient is able to get these products, please send us an Rx for INCONTINENCE PRODUCTS (up to 300/month), GLOVES (4 BOXES), & Chux Pads. Please include REFILL AMOUNTS or PRN. Please include all associated diagnosis' and Dx/ICD-10 codes associated with the patient so we can bill product to their insurance. If you feel this patient DOES NOT qualify please state on this form and send it back to me ASAP. Please also note that with their insurance if there is NOT an expiration date, I can make the Rx good for 5 years for this patient.     Thank you for your time in this matter. If you have any questions please call us at [phone].    Please fax the completed copy back to [phone]..    Have  Great day!  Maddi Martinez

## 2019-07-23 ENCOUNTER — HOSPITAL ENCOUNTER (OUTPATIENT)
Dept: PHYSICAL THERAPY | Facility: CLINIC | Age: 64
Setting detail: THERAPIES SERIES
End: 2019-07-23
Attending: SPECIALIST
Payer: COMMERCIAL

## 2019-07-23 DIAGNOSIS — E03.9 ACQUIRED HYPOTHYROIDISM: Chronic | ICD-10-CM

## 2019-07-23 PROCEDURE — 97110 THERAPEUTIC EXERCISES: CPT | Mod: GP | Performed by: PHYSICAL MEDICINE & REHABILITATION

## 2019-07-23 RX ORDER — LEVOTHYROXINE SODIUM 125 UG/1
TABLET ORAL
Qty: 30 TABLET | Refills: 0 | Status: SHIPPED | OUTPATIENT
Start: 2019-07-23 | End: 2019-08-08

## 2019-07-23 NOTE — TELEPHONE ENCOUNTER
Medication is being filled for 1 time refill only due to:  has OV scheduled for 8/8/19; will need TSH as well.   Angeli MAAGNA RN

## 2019-07-23 NOTE — TELEPHONE ENCOUNTER
"Requested Prescriptions   Pending Prescriptions Disp Refills     levothyroxine (SYNTHROID/LEVOTHROID) 125 MCG tablet [Pharmacy Med Name: LEVOTHYROXINE SODIUM 125 MCG TABLET] 30 tablet 9     Sig: TAKE ONE TABLET BY MOUTH EVERY DAY       Thyroid Protocol Failed - 7/23/2019  1:56 PM        Failed - Recent (12 mo) or future (30 days) visit within the authorizing provider's specialty     Patient had office visit in the last 12 months or has a visit in the next 30 days with authorizing provider or within the authorizing provider's specialty.  See \"Patient Info\" tab in inbasket, or \"Choose Columns\" in Meds & Orders section of the refill encounter.      Last Written Prescription Date:  11/6/18  Last Fill Quantity: 30,  # refills: 9   Last office visit: 7/11/2019 with prescribing provider:     Future Office Visit:   Next 5 appointments (look out 90 days)    Aug 08, 2019 10:20 AM CDT  SHORT with Alexandru Vera MD  [facility] [address] [place] [id number]  [phone]                   Passed - Patient is 12 years or older        Passed - Medication is active on med list        Passed - Normal TSH on file in past 12 months     Recent Labs   Lab Test 08/08/18  1405   TSH 2.13                "

## 2019-07-23 NOTE — PROGRESS NOTES
"Outpatient Physical Therapy Discharge Note     Patient: Geovani Bustos  : 1955    Beginning/End Dates of Reporting Period:  2019 to 2019    Referring Provider: Dr. Chuy MD    Therapy Diagnosis: R shoulder pain secondary to RTC tear     Client Self Report: Pt reports R shoulder \"feels really good.\" Notes HEP going well at home and feels ready to cont exercises at home independently. 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     Objective Measurements:  Objective Measure: R shoulder ROM  Details: all motions wnl, no increase in pain  Objective Measure: R shoulder strength  Details: all motions 5/5, no increase in pain     Outcome Measures (most recent score):  SPADI: 0% disability (79.23% at initial eval)    Goals:  Goal Identifier 1   Goal Description Pt will be able to flex R shoulder to 140* in order to decrease difficulty with reaching overhead.    Target Date 19   Date Met  19   Progress:     Goal Identifier 2   Goal Description Pt will be able to lift and carry 10# without increase in sx in order to decrease difficulty with ADLs.    Target Date 19   Date Met  19   Progress:     Goal Identifier 3   Goal Description Pt will report no increase in sx while playing basketball or softball in order to decrease difficulty with special olympics.    Target Date 19   Date Met  19   Progress:     Goal Identifier 4   Goal Description Pt will be independent, with assistance from PCA, with HEP in order to self manage symptoms.    Target Date 19   Date Met  19   Progress:       Progress Toward Goals:   Progress this reporting period: Pt has attended 7 PT sessions, achieving 4/4 short term and long term goals. Pt now has full ROM and strength of R shoulder and has returned to PLOF. Pt is appropriate for D/C at this time as he has met all goals and is independent with HEP.     Plan:  Discharge from " therapy.    Discharge:    Reason for Discharge: Patient has met all goals.  Patient chooses to discontinue therapy.    Equipment Issued: therabands    Discharge Plan: Patient to continue home program.      Please contact me with any questions or concerns.    Thank you for your referral,     Sonia Avitia, PT, DPT  Physical Therapist  [facility] - [address] [address] [phone]

## 2019-07-29 ENCOUNTER — TRANSFERRED RECORDS (OUTPATIENT)
Dept: HEALTH INFORMATION MANAGEMENT | Facility: CLINIC | Age: 64
End: 2019-07-29

## 2019-08-08 ENCOUNTER — OFFICE VISIT (OUTPATIENT)
Dept: FAMILY MEDICINE | Facility: CLINIC | Age: 64
End: 2019-08-08
Payer: COMMERCIAL

## 2019-08-08 VITALS
RESPIRATION RATE: 18 BRPM | HEART RATE: 73 BPM | OXYGEN SATURATION: 97 % | BODY MASS INDEX: 27.13 KG/M2 | TEMPERATURE: 97.6 F | SYSTOLIC BLOOD PRESSURE: 127 MMHG | DIASTOLIC BLOOD PRESSURE: 83 MMHG | HEIGHT: 68 IN | WEIGHT: 179 LBS

## 2019-08-08 DIAGNOSIS — E03.9 ACQUIRED HYPOTHYROIDISM: Primary | Chronic | ICD-10-CM

## 2019-08-08 DIAGNOSIS — H61.22 IMPACTED CERUMEN OF LEFT EAR: ICD-10-CM

## 2019-08-08 DIAGNOSIS — J44.9 STAGE 1 MILD COPD BY GOLD CLASSIFICATION (H): ICD-10-CM

## 2019-08-08 LAB — TSH SERPL DL<=0.005 MIU/L-ACNC: 1.63 MU/L (ref 0.4–4)

## 2019-08-08 PROCEDURE — 36415 COLL VENOUS BLD VENIPUNCTURE: CPT | Performed by: FAMILY MEDICINE

## 2019-08-08 PROCEDURE — 84443 ASSAY THYROID STIM HORMONE: CPT | Performed by: FAMILY MEDICINE

## 2019-08-08 PROCEDURE — 99214 OFFICE O/P EST MOD 30 MIN: CPT | Performed by: FAMILY MEDICINE

## 2019-08-08 RX ORDER — LEVOTHYROXINE SODIUM 125 UG/1
125 TABLET ORAL DAILY
Qty: 90 TABLET | Refills: 3 | Status: SHIPPED | OUTPATIENT
Start: 2019-08-08 | End: 2020-06-15

## 2019-08-08 ASSESSMENT — MIFFLIN-ST. JEOR: SCORE: 1571.44

## 2019-08-08 NOTE — NURSING NOTE
"Chief Complaint   Patient presents with     Ear Problem     /83 (Cuff Size: Adult Regular)   Pulse 73   Temp 97.6  F (36.4  C) (Tympanic)   Resp 18   Ht 1.727 m (5' 8\")   Wt 81.2 kg (179 lb)   SpO2 97%   BMI 27.22 kg/m   Estimated body mass index is 27.22 kg/m  as calculated from the following:    Height as of this encounter: 1.727 m (5' 8\").    Weight as of this encounter: 81.2 kg (179 lb).  Patient presents to the clinic using No DME      Health Maintenance that is potentially due pending provider review:    Health Maintenance Due   Topic Date Due     ADVANCE CARE PLANNING  1955     ZOSTER IMMUNIZATION (2 of 3) 04/12/2016     AORTIC ANEURYSM SCREENING (SYSTEM ASSIGNED)  12/05/2018     MEDICARE ANNUAL WELLNESS VISIT  02/07/2019     TSH W/FREE T4 REFLEX  08/08/2019        Possibly completing today per provider review.        "

## 2019-08-08 NOTE — PROGRESS NOTES
SUBJECTIVE   Geovani Bustos is a 63 year old male who presents with     Ear check       Duration: 6 month follow up     Description (location/character/radiation): both ears     Intensity:  moderate    Accompanying signs and symptoms: no pain    History (similar episodes/previous evaluation): Gets ears checked every 6 months and cleaned as needed    Precipitating or alleviating factors: None    Therapies tried and outcome: Debrox- has been using this last month      Hypothyroidism Follow-up      Since last visit, patient describes the following symptoms: Weight stable, no hair loss, no skin changes, no constipation, no loose stools      COPD:     Known to have COPD, symptoms are well controlled, using inhalers regularly      PCP   Alexandru Vera [phone]    Health Maintenance        Health Maintenance Due   Topic Date Due     ADVANCE CARE PLANNING  1955     ZOSTER IMMUNIZATION (2 of 3) 04/12/2016     AORTIC ANEURYSM SCREENING (SYSTEM ASSIGNED)  12/05/2018     MEDICARE ANNUAL WELLNESS VISIT  02/07/2019     TSH W/FREE T4 REFLEX  08/08/2019       HPI        Patient Active Problem List   Diagnosis     Colon polyps     Schizophrenia (H)     VSD (ventricular septal defect)     Mitral valve prolapse     Acquired hypothyroidism     IgA nephropathy     Proteinuria     Hyperlipidemia LDL goal <130     BPH (benign prostatic hyperplasia)     Health Care Home     Bilateral impacted cerumen     Bunion of great toe of right foot     Eczema of external ear, bilateral     Tinea pedis, unspecified laterality     Incomplete right bundle branch block (RBBB)     Cigarette nicotine dependence with nicotine-induced disorder     Gastroesophageal reflux disease without esophagitis     Stage 1 mild COPD by GOLD classification (H)     CKD (chronic kidney disease) stage 3, GFR 30-59 ml/min (H)     Current Outpatient Medications   Medication     ABILIFY 10 MG OR TABS     albuterol (PROAIR HFA/PROVENTIL HFA/VENTOLIN HFA) 108  (90 Base) MCG/ACT inhaler     albuterol (PROAIR HFA/PROVENTIL HFA/VENTOLIN HFA) 108 (90 Base) MCG/ACT inhaler     amoxicillin (AMOXIL) 250 MG capsule     aspirin (QC LO-DOSE ASPIRIN) 81 MG EC tablet     augmented betamethasone dipropionate (DIPROLENE-AF) 0.05 % external ointment     Disposable Gloves (LATEX GLOVES MEDIUM) MISC     Disposable Gloves (NITRILE EXAM GLOVES MEDIUM) MISC     EAR DROPS EARWAX AID 6.5 % otic solution     finasteride (PROSCAR) 5 MG tablet     fluticasone-vilanterol (BREO ELLIPTA) 100-25 MCG/INH inhaler     gabapentin (NEURONTIN) 100 MG capsule     GAVILAX powder     Incontinence Supply Disposable (INCONTINENCE BRIEF LARGE) MISC     levothyroxine (SYNTHROID/LEVOTHROID) 125 MCG tablet     nicotine (NICODERM CQ) 14 MG/24HR 24 hr patch     Omega-3 Fatty Acids (FISH OIL) 1200 MG capsule     order for DME     order for DME     order for DME     order for DME     oxybutynin (DITROPAN) 5 MG tablet     oxyCODONE-acetaminophen (PERCOCET) 5-325 MG tablet     REGULOID 48.57 % POWD     simvastatin (ZOCOR) 40 MG tablet     Skin Protectants, Misc. (EUCERIN) cream     tamsulosin (FLOMAX) 0.4 MG capsule     traZODone (DESYREL) 50 MG tablet     vitamin D3 (CHOLECALCIFEROL) 2000 units tablet     ZOLOFT 100 MG OR TABS     No current facility-administered medications for this visit.      Facility-Administered Medications Ordered in Other Visits   Medication     bupivacaine (MARCAINE) injection 0.5%       Patient Active Problem List   Diagnosis     Colon polyps     Schizophrenia (H)     VSD (ventricular septal defect)     Mitral valve prolapse     Acquired hypothyroidism     IgA nephropathy     Proteinuria     Hyperlipidemia LDL goal <130     BPH (benign prostatic hyperplasia)     Health Care Home     Bilateral impacted cerumen     Bunion of great toe of right foot     Eczema of external ear, bilateral     Tinea pedis, unspecified laterality     Incomplete right bundle branch block (RBBB)     Cigarette nicotine  dependence with nicotine-induced disorder     Gastroesophageal reflux disease without esophagitis     Stage 1 mild COPD by GOLD classification (H)     CKD (chronic kidney disease) stage 3, GFR 30-59 ml/min (H)     Past Surgical History:   Procedure Laterality Date     ARTHRODESIS FOOT Right 1/19/2016    Procedure: ARTHRODESIS FOOT;  Surgeon: Holden Harrison DPM;  Location: [facility]     ARTHRODESIS FOOT Left 1/3/2017    Procedure: ARTHRODESIS FOOT;  Surgeon: Holden Harrison DPM;  Location: [facility] OR     SURGICAL HISTORY OF -       leg fracture       Social History     Tobacco Use     Smoking status: Current Every Day Smoker     Packs/day: 1.00     Years: 47.00     Pack years: 47.00     Types: Cigarettes     Smokeless tobacco: Never Used     Tobacco comment: cutting one cigarette down a month   Substance Use Topics     Alcohol use: No     Family History   Problem Relation Age of Onset     Emphysema Mother      Coronary Artery Disease Father          Current Outpatient Medications   Medication Sig Dispense Refill     ABILIFY 10 MG OR TABS 1 TABLET DAILY       albuterol (PROAIR HFA/PROVENTIL HFA/VENTOLIN HFA) 108 (90 Base) MCG/ACT inhaler Inhale 2 puffs into the lungs every 4 hours as needed for shortness of breath / dyspnea or wheezing 1 Inhaler 2     albuterol (PROAIR HFA/PROVENTIL HFA/VENTOLIN HFA) 108 (90 Base) MCG/ACT inhaler Inhale 2 puffs into the lungs every 6 hours as needed for shortness of breath / dyspnea or wheezing 1 Inhaler 1     amoxicillin (AMOXIL) 250 MG capsule Take 2,000 mg by mouth       aspirin (QC LO-DOSE ASPIRIN) 81 MG EC tablet Take 1 tablet (81 mg) by mouth daily 100 tablet 3     augmented betamethasone dipropionate (DIPROLENE-AF) 0.05 % external ointment Apply to AA twice daily 1-2 weeks then as needed only. Do not apply to face. 45 g 5     Disposable Gloves (LATEX GLOVES MEDIUM) MISC 4 boxes of gloves 400 each 1     Disposable Gloves (NITRILE EXAM GLOVES MEDIUM) MISC 1 each as needed Use  PRN daily with administration of otic drops, body lotion and powder to treat dry itchy skin. 4 each 11     EAR DROPS EARWAX AID 6.5 % otic solution Place 5 drops into both ears 2 times daily For 5 days August 1-5th and Feb 1-5 th then return for irrigation after the 5th day. 15 mL 3     finasteride (PROSCAR) 5 MG tablet TAKE ONE TABLET BY MOUTH EVERY DAY 30 tablet 11     fluticasone-vilanterol (BREO ELLIPTA) 100-25 MCG/INH inhaler Inhale 1 puff into the lungs daily 1 Inhaler 11     gabapentin (NEURONTIN) 100 MG capsule Take 100 mg by mouth 2 times daily       GAVILAX powder Take 17 g (1 capful) by mouth daily 527 g 0     Incontinence Supply Disposable (INCONTINENCE BRIEF LARGE) MISC Incontinence pads up to 300 per month 300 each 1     levothyroxine (SYNTHROID/LEVOTHROID) 125 MCG tablet Take 1 tablet (125 mcg) by mouth daily 90 tablet 3     nicotine (NICODERM CQ) 14 MG/24HR 24 hr patch Place 1 patch onto the skin every 24 hours 30 patch 1     Omega-3 Fatty Acids (FISH OIL) 1200 MG capsule Take 1 capsule (1.2 g) by mouth daily 90 capsule 2     order for DME chux pads 300 pad 1     order for DME Equipment being ordered: incontinent pads 300 Device 3     order for DME Equipment being ordered: gloves 4 Box 3     order for DME Equipment being ordered: Dynaflex insert 1 Units 0     oxybutynin (DITROPAN) 5 MG tablet TAKE ONE TABLET BY MOUTH THREE TIMES DAILY 90 tablet 5     REGULOID 48.57 % POWD Take 1 each by mouth See Admin Instructions Take 1 teaspoonful every day at 7pm and 9 pm. 369 g 11     simvastatin (ZOCOR) 40 MG tablet TAKE ONE TABLET BY MOUTH EVERY DAY AT 9 PM 90 tablet 3     Skin Protectants, Misc. (EUCERIN) cream Apply topically 2 times daily 454 g 5     tamsulosin (FLOMAX) 0.4 MG capsule Take 1 capsule (0.4 mg) by mouth daily 90 capsule 3     traZODone (DESYREL) 50 MG tablet Take 50 mg by mouth At Bedtime       vitamin D3 (CHOLECALCIFEROL) 2000 units tablet TAKE ONE TABLET BY MOUTH EVERY  tablet 0      "ZOLOFT 100 MG OR TABS 2 TABLETS DAILY       Allergies   Allergen Reactions     Nitrogen Oxide      Sulfa Drugs      Recent Labs   Lab Test 03/22/19  1242 01/15/19  1046 12/05/18  0840 08/08/18  1405 03/21/18  0900  11/08/17  0840 10/11/17  1325  02/01/17  1457  02/01/13  0815  05/14/12  0830   A1C  --   --   --   --  5.6  --   --   --   --   --   --  5.5  --  5.6   LDL  --   --  118*  --   --   --  80  --   --  Cannot estimate LDL when triglyceride exceeds 400 mg/dL  89   < > 117   < > 101   HDL  --   --  48  --   --   --  47  --   --  27*   < > 45   < > 43   TRIG  --   --  150*  --   --   --  119  --   --  444*   < > 123   < > 143   ALT  --   --  28  --   --   --  22  --   --  19   < >  --    < >  --    CR 1.68* 1.66* 1.60*  --  1.36*   < > 1.31*  --    < > 1.21   < >  --    < >  --    GFRESTIMATED 42* 43* 44*  --  53*   < > 55*  --    < > 61   < >  --    < >  --    GFRESTBLACK 49* 49* 53*  --  64   < > 67  --    < > 73   < >  --    < >  --    POTASSIUM 4.2 4.6 4.6  --  4.4   < > 4.6  --    < > 4.1   < >  --    < >  --    TSH  --   --   --  2.13  --   --   --  1.24  --   --    < >  --    < >  --     < > = values in this interval not displayed.      BP Readings from Last 3 Encounters:   08/08/19 127/83   07/11/19 125/80   06/22/19 (!) 130/91    Wt Readings from Last 3 Encounters:   08/08/19 81.2 kg (179 lb)   07/11/19 80.3 kg (177 lb)   06/22/19 74.4 kg (164 lb)                    Reviewed and updated:  Tobacco  Allergies  Meds  Med Hx  Surg Hx  Fam Hx  Soc Hx     ROS:  Constitutional, neuro, ENT, endocrine, pulmonary, cardiac, gastrointestinal, genitourinary, musculoskeletal, integument and psychiatric systems are negative, except as otherwise noted.    PHYSICAL EXAM   /83 (Cuff Size: Adult Regular)   Pulse 73   Temp 97.6  F (36.4  C) (Tympanic)   Resp 18   Ht 1.727 m (5' 8\")   Wt 81.2 kg (179 lb)   BMI 27.22 kg/m    Body mass index is 27.22 kg/m .  GENERAL: alert and no distress  EYES: Eyes " "grossly normal to inspection, PERRL and conjunctivae and sclerae normal  HENT: normal cephalic/atraumatic, right ear: normal: no effusions, no erythema, normal landmarks, left ear: occluded with wax, nose and mouth without ulcers or lesions, oropharynx clear and oral mucous membranes moist  NECK: no adenopathy, no asymmetry, masses, or scars and thyroid normal to palpation  RESP: lungs clear to auscultation - no rales, rhonchi or wheezes  CV: regular rate and rhythm, normal S1 S2, no S3 or S4, no murmur, click or rub, no peripheral edema and peripheral pulses strong  ABDOMEN: soft, nontender, no hepatosplenomegaly, no masses and bowel sounds normal  MS: no gross musculoskeletal defects noted, no edema  SKIN: no suspicious lesions or rashes  NEURO: Normal strength and tone, mentation intact and speech normal    Assessment & Plan     (H61.22) Impacted cerumen of left ear  (primary encounter diagnosis)  Comment: Left ear impacted cerumen cleaned with saline irrigation.  Home care explained      (E03.9) Acquired hypothyroidism  Comment: TSH ordered, continue levothyroxine 125 mcg  Plan: TSH with free T4 reflex, levothyroxine         (SYNTHROID/LEVOTHROID) 125 MCG tablet            (J44.9) Stage 1 mild COPD by GOLD classification (H)  Comment: Known to have COPD, symptoms are stable currently.  Continue albuterol, Breo inhaler.  Smoking cessation counseling provided.  Associated health hazards explained       Tobacco Cessation:   reports that he has been smoking cigarettes.  He has a 47.00 pack-year smoking history. He has never used smokeless tobacco.  Tobacco Cessation Action Plan: Information offered: Patient not interested at this time      BMI:   Estimated body mass index is 27.22 kg/m  as calculated from the following:    Height as of this encounter: 1.727 m (5' 8\").    Weight as of this encounter: 81.2 kg (179 lb).   Weight management plan: Discussed healthy diet and exercise guidelines          Alexandru Vera, " MD  [facility]

## 2019-08-22 ENCOUNTER — OFFICE VISIT (OUTPATIENT)
Dept: UROLOGY | Facility: CLINIC | Age: 64
End: 2019-08-22
Payer: COMMERCIAL

## 2019-08-22 VITALS — SYSTOLIC BLOOD PRESSURE: 118 MMHG | RESPIRATION RATE: 16 BRPM | HEART RATE: 64 BPM | DIASTOLIC BLOOD PRESSURE: 77 MMHG

## 2019-08-22 DIAGNOSIS — R39.15 URGENCY OF URINATION: ICD-10-CM

## 2019-08-22 DIAGNOSIS — N39.41 URGE INCONTINENCE: Primary | ICD-10-CM

## 2019-08-22 PROCEDURE — 52000 CYSTOURETHROSCOPY: CPT | Performed by: UROLOGY

## 2019-08-22 NOTE — PROGRESS NOTES
Visit Date:   08/22/2019      REASON FOR VISIT TODAY:  Lower urinary tract symptoms.      HISTORY OF PRESENT ILLNESS:  Mr. Bustos is a 63-year-old gentleman followed in my clinic for history of lower urinary tract symptoms.  He also has a history of schizophrenia and lives in a group home.  The patient is currently managing his urinary urgency and incontinence with Oxybutynin 5 mg t.i.d. as well as Flomax and finasteride.  He was drinking copious amounts of Mountain Dew and has cut back on this.  It has helped some, but he continues to have some episodes of incontinence both during the day and night.  The patient presents today for cystoscopy as part of his evaluation.      DESCRIPTION OF PROCEDURE:   After informed consent was obtained, the patient was prepped and draped in standard sterile fashion.  A flexible cystoscope was introduced into the patient's bladder.  Inspection of the bladder revealed orthotopic UOs.  There were moderate to severe trabeculations noted throughout the bladder with small cellules scattered throughout without davidson diverticula.  On retroflexion, there was no obvious prostate tissue protruding into the bladder.  Upon pulling the scope back into the prostatic fossa, there was little in the way of lateral lobe hypertrophy.  There were no strictures noted in the urethra.      ASSESSMENT AND PLAN:  I suggested to Mr. Bustos that as the next step, I would suggest physical therapy for pelvic floor relaxation exercises as without any obvious prostate tissue, it is unclear whether the prostate is the source of obstruction.  We will see the patient back in roughly 3 months to see if he is making progress with the pelvic floor relaxation exercises.  If there is no improvement, then urodynamics would be our next step.  The patient may benefit from a Rezum water vapor treatment at that point given its low risk qualities and still might be helpful for alleviating some of the patient's symptoms.          RAQUEL CHAVES MD             D: 2019   T: 2019   MT:       Name:     JAZZ MACE   MRN:      [id number]        Account:      [id number]   :      1955           Visit Date:   2019      Document: [id number]

## 2019-08-22 NOTE — NURSING NOTE
"Initial /77 (BP Location: Right arm, Patient Position: Chair, Cuff Size: Adult Regular)   Pulse 64   Resp 16  Estimated body mass index is 27.22 kg/m  as calculated from the following:    Height as of 8/8/19: 1.727 m (5' 8\").    Weight as of 8/8/19: 81.2 kg (179 lb). .    Patient is here for a cystoscopy.  jerad spaulding LPN'  "

## 2019-08-29 ENCOUNTER — TRANSFERRED RECORDS (OUTPATIENT)
Dept: HEALTH INFORMATION MANAGEMENT | Facility: CLINIC | Age: 64
End: 2019-08-29

## 2019-08-30 DIAGNOSIS — R39.9 LOWER URINARY TRACT SYMPTOMS (LUTS): ICD-10-CM

## 2019-09-03 RX ORDER — TAMSULOSIN HYDROCHLORIDE 0.4 MG/1
0.4 CAPSULE ORAL DAILY
Qty: 30 CAPSULE | Refills: 11 | Status: SHIPPED | OUTPATIENT
Start: 2019-09-03 | End: 2020-06-17

## 2019-09-06 DIAGNOSIS — K59.00 CONSTIPATION, UNSPECIFIED CONSTIPATION TYPE: ICD-10-CM

## 2019-09-06 RX ORDER — POLYETHYLENE GLYCOL 3350 17 G/17G
POWDER, FOR SOLUTION ORAL
Qty: 527 G | Refills: 0 | Status: SHIPPED | OUTPATIENT
Start: 2019-09-06 | End: 2019-11-05

## 2019-09-09 ENCOUNTER — HOSPITAL ENCOUNTER (OUTPATIENT)
Dept: PHYSICAL THERAPY | Facility: CLINIC | Age: 64
Setting detail: THERAPIES SERIES
End: 2019-09-09
Attending: UROLOGY
Payer: COMMERCIAL

## 2019-09-09 DIAGNOSIS — R39.15 URGENCY OF URINATION: ICD-10-CM

## 2019-09-09 DIAGNOSIS — N39.41 URGE INCONTINENCE: ICD-10-CM

## 2019-09-09 PROCEDURE — 97162 PT EVAL MOD COMPLEX 30 MIN: CPT | Mod: GP | Performed by: PHYSICAL THERAPIST

## 2019-09-09 PROCEDURE — 97535 SELF CARE MNGMENT TRAINING: CPT | Mod: GP | Performed by: PHYSICAL THERAPIST

## 2019-09-09 PROCEDURE — 97110 THERAPEUTIC EXERCISES: CPT | Mod: GP | Performed by: PHYSICAL THERAPIST

## 2019-09-09 NOTE — PROGRESS NOTES
"Physical Therapy Initial Pelvic Floor Muscle Evaluation     09/09/19 0900   General Information   Type of Visit Initial OP Ortho PT Evaluation   Start of Care Date 09/09/19   Referring Physician Luis A Zepeda MD   Patient/Family Goals Statement Get rid of leaking   Orders Evaluate and Treat   Orders Comment PFM Relaxation   Date of Order 08/22/19   Certification Required? Yes   Medical Diagnosis Urgency of urination; urge incontinence   Surgical/Medical history reviewed Yes   Precautions/Limitations no known precautions/limitations   General Information Comments PMHx: VSD, schizophrenia, mitral valve prolapse, hyperlipidemia, chronic kidney disease, squamous cell carcinoma, basal cell carcinoma; per pt:  broken ankle, broken toe, broken R arm, depression, heart murmur, bladder control, arthritis, smoking, \"adeola in leg,\" bunion surgery, cataract surgery.    Body Part(s)   Body Part(s) Pelvic Floor Dysfunction   Presentation and Etiology   Pertinent history of current problem (include personal factors and/or comorbidities that impact the POC) Primary c/o from pt is Frequency of Urination. MD Report of history: Pt has a history of schizophrenia and lives in a group home.  The patient is currently managing his urinary urgency and incontinence with Oxybutynin 5 mg t.i.d. as well as Flomax and finasteride.  He was drinking copious amounts of Mountain Dew and has cut back on this.  It has helped some, but he continues to have some episodes of incontinence both during the day and night.     Impairments P. Bowel or bladder problems   Functional Limitations perform activities of daily living;perform desired leisure / sports activities   Symptom Location pelvic floor muscle   How/Where did it occur From insidious onset   Onset date of current episode/exacerbation 08/22/19   Chronicity Chronic   Pain rating (0-10 point scale) Denies pain   Frequency of pain/symptoms B. Intermittent   Pain/symptoms are: Worse during the " "day   Pain/symptoms exacerbated by M. Other   Pain exacerbation comment Will get leaking with strong urges   Pain/symptoms eased by K. Other   Pain eased by comment More frequent voids = less leaking   Progression of symptoms since onset: Improved  (\"a little\")   Prior Level of Function   Functional Level Prior Comment Many years of urgency and frequency; leaking for last 1-2 yrs   Current Level of Function   Current Community Support PCA;Family/friend caregiver   Patient role/employment history A. Employed   Employment Comments Lawn care, outdoor work, raking, shoveling   Living environment Group home   Home/community accessibility pt lives in group home, no stairs   Fall Risk Screen   Fall screen completed by PT   Have you fallen 2 or more times in the past year? No   Have you fallen and had an injury in the past year? No   Is patient a fall risk? No   Pelvic Floor Dysfunction Questions   Regular exercise Yes   Fluid intake-glasses/day (one glass/cup = 8oz Water = 64oz/day, Crystal Lite: 72oz/day   Caffeinated beverages-glasses/day Caffeine Free Soda Pop: 48oz/day   Alcoholic beverages - glasses/day none   Recent diet change? No   How long can you delay the need to urinate?  10 mins at most, or the urge becomes painful   How many times do you wake to urinate at night?   3   How often do you urinate during the day?   Every 30 mins   Can you stop the flow of urine when on the toilet?  No   Is the volume of urine passed usually  Large   Do you have the sensation that you need to go to the toilet?  Yes   Do you empty your bladder frequently, before you experience the urge to pass urine?  No   Do you have \"triggers\" that make you feel you can't wait to go to the toilet?  Yes   Number of bladder infections last year?    (Kidney infections \"not sure how many\")   Frequency of bowel movements:  Daily   Consistency of stool?  Soft formed   Do you ignore the urge to defecate?  No   Pelvic Floor Dysfunction Objective Findings "   Pain-pelvic dysfunction Pelvic pain   Observation Pt did have to use the bathroom 2 times during session    Posture Sits slumped in chair for most of session; standing = rounded shoulders and fwd head, decreased lumbar lordosis   Areas of Tightness Hamstrings  (not formally assessed today, but demos in posture)   Type of Storage Problem frequency;urgency;urge incontinence;nocturia   Type of Emptying Problem incomplete emptying;urine retention  (possible, based on trabeculae in cystoscopy)   Protection needed None   Power (MMT at [facility]) PFM not formally assessed d/t time constraints of session.    Medications Meds for urge incontinence   Pelvic Only if has tried to hold his urine for a long time.   Meds for urge incontinence Oxybutinin   Planned Therapy Interventions   Planned Therapy Interventions joint mobilization;manual therapy;motor coordination training;neuromuscular re-education;strengthening;stretching   Planned Modality Interventions   Planned Modality Interventions Biofeedback;Electrical stimulation   Clinical Impression   Criteria for Skilled Therapeutic Interventions Met yes, treatment indicated   PT Diagnosis Impaired function at the pelvic floor muscle   Influenced by the following impairments weakness (per subjective report of symptoms), poor knowledge of functional use of the PFM   Functional limitations due to impairments leaking of urine with strong urges, possibly not fully emptying bladder at each void   Clinical Presentation Unstable/Unpredictable   Clinical Presentation Rationale longstanding nature of symptoms, unpredictable urgency and leaking pattern, multiple comorbidities   Clinical Decision Making (Complexity) Moderate complexity   Therapy Frequency 1 time/week   Predicted Duration of Therapy Intervention (days/wks) 8 weeks for up to 8 sessions as needed to gain continence   Risk & Benefits of therapy have been explained Yes   Patient, Family & other staff in agreement with plan of  care Yes   Clinical Impression Comments Pt presents with c/o urgency, frequency and leaking of urine.  Pt had been drinking a lot of Mtn Dew.  Staff at thr group home has limited his soda intake, and this has helped.  Pt could benefit from skilled PT to improve PFM awareness for relaxation, and strengthening for controlling urges and decreasing frequency.  Additionally, pt and staff could improve fluid intake and void patterns to gain continence.    Education Assessment   Preferred Learning Style Listening;Demonstration;Pictures/video   Barriers to Learning Emotional;Cognitive   Ortho Goal 1   Goal Description 1)Pt will improve water intake to allow dilution of urine to ease urgency, in 4 weeks.    Target Date 10/07/19   Goal Identifier STG   Ortho Goal 2   Goal Description 2)Pt will report void times of every 1 hour in 4 weeks.    Target Date 10/07/19   Goal Identifier STG   Ortho Goal 3   Goal Description 3)Pt will improve PFM strength/control to report no nighttime leaking in 6 weeks.    Target Date 10/21/19   Goal Identifier LTG   Ortho Goal 4   Goal Description 4)Pt will improve PFM control to fully empty baldder at each void/report 3/7 days dry in 8 weeks.    Target Date 11/04/19   Goal Identifier LTG   Ortho Goal 5   Goal Identifier LTG   Goal Description 5)Pt will be indep (guided by group home staff) in HEP to prevent return of symptoms in 8 weeks.    Target Date 11/04/19   Total Evaluation Time   PT Eval, Moderate Complexity Minutes (00833) 35   Therapy Certification   Certification date from 09/09/19   Certification date to 11/04/19   Medical Diagnosis Urgency of urination; urge incontinence   Thank you for the referral of this patient.  Chiara Manzo, PT, MA  #[id number]

## 2019-09-09 NOTE — PROGRESS NOTES
[facility]          OUTPATIENT PHYSICAL THERAPY ORTHOPEDIC EVALUATION  PLAN OF TREATMENT FOR OUTPATIENT REHABILITATION  (COMPLETE FOR INITIAL CLAIMS ONLY)  Patient's Last Name, First Name, M.I.  YOB: 1955  Geovani Bustos    Provider s Name:  [facility]   Medical Record No.  [id number]   Start of Care Date:  09/09/19   Onset Date:  08/22/19   Type:     _X__PT   ___OT   ___SLP Medical Diagnosis:  Urgency of urination; urge incontinence     PT Diagnosis:  Impaired function at the pelvic floor muscle   Visits from SOC:  1      _________________________________________________________________________________  Plan of Treatment/Functional Goals:  joint mobilization, manual therapy, motor coordination training, neuromuscular re-education, strengthening, stretching     Biofeedback, Electrical stimulation     Goals  Goal Identifier: STG  Goal Description: 1)Pt will improve water intake to allow dilution of urine to ease urgency, in 4 weeks.   Target Date: 10/07/19    Goal Identifier: STG  Goal Description: 2)Pt will report void times of every 1 hour in 4 weeks.   Target Date: 10/07/19    Goal Identifier: LTG  Goal Description: 3)Pt will improve PFM strength/control to report no nighttime leaking in 6 weeks.   Target Date: 10/21/19    Goal Identifier: LTG  Goal Description: 4)Pt will improve PFM control to fully empty baldder at each void/report 3/7 days dry in 8 weeks.   Target Date: 11/04/19    Goal Identifier: LTG  Goal Description: 5)Pt will be indep (guided by group home staff) in HEP to prevent return of symptoms in 8 weeks.   Target Date: 11/04/19       Therapy Frequency:  1 time/week  Predicted Duration of Therapy Intervention:  8 weeks for up to 8 sessions as needed to gain continence    Chiara Manzo, PT                 I CERTIFY THE NEED FOR THESE SERVICES FURNISHED UNDER        THIS PLAN OF TREATMENT AND WHILE UNDER MY CARE     (Physician  co-signature of this document indicates review and certification of the therapy plan).                       Certification Date From:  09/09/19   Certification Date To:  11/04/19    Referring Provider:  Luis A Zepeda MD    Initial Assessment        See Epic Evaluation Start of Care Date: 09/09/19

## 2019-09-19 DIAGNOSIS — E55.9 VITAMIN D DEFICIENCY: ICD-10-CM

## 2019-09-19 DIAGNOSIS — E78.5 HYPERLIPIDEMIA LDL GOAL <130: ICD-10-CM

## 2019-09-19 RX ORDER — AMOXICILLIN 500 MG
1 CAPSULE ORAL DAILY
Qty: 90 CAPSULE | Refills: 2 | Status: SHIPPED | OUTPATIENT
Start: 2019-09-19 | End: 2020-04-23

## 2019-09-19 RX ORDER — CHOLECALCIFEROL (VITAMIN D3) 50 MCG
1 TABLET ORAL DAILY
Qty: 100 TABLET | Refills: 1 | Status: SHIPPED | OUTPATIENT
Start: 2019-09-19 | End: 2020-01-07

## 2019-09-19 NOTE — TELEPHONE ENCOUNTER
"This is a group home patient. The Pharmacy need early refill for packing purpose. They closely monitored the Medication.    Requested Prescriptions   Pending Prescriptions Disp Refills     vitamin D3 (CHOLECALCIFEROL) 2000 units (50 mcg) tablet 100 tablet 0     Sig: Take 1 tablet (2,000 Units) by mouth daily       Vitamin Supplements (Adult) Protocol Passed - 9/19/2019 10:25 AM        Passed - High dose Vitamin D not ordered        Passed - Recent (12 mo) or future (30 days) visit within the authorizing provider's specialty     Patient had office visit in the last 12 months or has a visit in the next 30 days with authorizing provider or within the authorizing provider's specialty.  See \"Patient Info\" tab in inbasket, or \"Choose Columns\" in Meds & Orders section of the refill encounter.      Last Written Prescription Date:  5/3/18  Last Fill Quantity: 100,  # refills: 0   Last office visit: 8/8/2019 with prescribing provider:     Future Office Visit:                Passed - Medication is active on med list        Omega-3 Fatty Acids (FISH OIL) 1200 MG capsule 90 capsule 2     Sig: Take 1 capsule (1.2 g) by mouth daily       Vitamin Supplements (Adult) Protocol Passed - 9/19/2019 10:25 AM        Passed - High dose Vitamin D not ordered        Passed - Recent (12 mo) or future (30 days) visit within the authorizing provider's specialty     Patient had office visit in the last 12 months or has a visit in the next 30 days with authorizing provider or within the authorizing provider's specialty.  See \"Patient Info\" tab in inbasket, or \"Choose Columns\" in Meds & Orders section of the refill encounter.      Last Written Prescription Date:  2/14/19  Last Fill Quantity: 90,  # refills: 2   Last office visit: 8/8/2019 with prescribing provider:     Future Office Visit:                Passed - Medication is active on med list            "

## 2019-09-25 DIAGNOSIS — N40.0 BENIGN PROSTATIC HYPERPLASIA, PRESENCE OF LOWER URINARY TRACT SYMPTOMS UNSPECIFIED: Chronic | ICD-10-CM

## 2019-09-25 RX ORDER — OXYBUTYNIN CHLORIDE 5 MG/1
TABLET ORAL
Qty: 90 TABLET | Refills: 11 | Status: SHIPPED | OUTPATIENT
Start: 2019-09-25 | End: 2020-02-10

## 2019-09-25 NOTE — TELEPHONE ENCOUNTER
"Requested Prescriptions   Pending Prescriptions Disp Refills     oxybutynin (DITROPAN) 5 MG tablet [Pharmacy Med Name: OXYBUTYNIN CHLORIDE 5 MG TABLET] 90 tablet 5     Sig: TAKE ONE TABLET BY MOUTH THREE TIMES DAILY       Muscarinic Antagonists (Urinary Incontinence Agents) Failed - 9/25/2019  2:10 PM        Failed - Recent (12 mo) or future (30 days) visit within the authorizing provider's specialty     Patient had office visit in the last 12 months or has a visit in the next 30 days with authorizing provider or within the authorizing provider's specialty.  See \"Patient Info\" tab in inbasket, or \"Choose Columns\" in Meds & Orders section of the refill encounter.              Passed - Medication is Oxybutynin and patient is 5 years of age or older        Passed - Patient does not have a diagnosis of glaucoma on the problem list     If glaucoma diagnosis is new, refer refill to physician.          Passed - Medication is active on med list        Passed - Patient is 18 years of age or older        Last Written Prescription Date:  4/3/19  Last Fill Quantity: 90,  # refills: 5   Last office visit: 8/8/2019 with prescribing provider:     Future Office Visit:      "

## 2019-09-26 ENCOUNTER — HOSPITAL ENCOUNTER (OUTPATIENT)
Dept: PHYSICAL THERAPY | Facility: CLINIC | Age: 64
Setting detail: THERAPIES SERIES
End: 2019-09-26
Attending: UROLOGY
Payer: COMMERCIAL

## 2019-09-26 PROCEDURE — 90911 ZZHC PT BIOFEEDBACK (PFM): CPT | Mod: GP | Performed by: PHYSICAL THERAPIST

## 2019-09-26 PROCEDURE — 97535 SELF CARE MNGMENT TRAINING: CPT | Mod: GP | Performed by: PHYSICAL THERAPIST

## 2019-09-26 PROCEDURE — 97110 THERAPEUTIC EXERCISES: CPT | Mod: GP | Performed by: PHYSICAL THERAPIST

## 2019-10-15 DIAGNOSIS — N02.B9 IGA NEPHROPATHY: ICD-10-CM

## 2019-10-15 DIAGNOSIS — N18.30 CKD (CHRONIC KIDNEY DISEASE) STAGE 3, GFR 30-59 ML/MIN (H): Primary | ICD-10-CM

## 2019-10-17 ENCOUNTER — HOSPITAL ENCOUNTER (OUTPATIENT)
Dept: PHYSICAL THERAPY | Facility: CLINIC | Age: 64
Setting detail: THERAPIES SERIES
End: 2019-10-17
Attending: UROLOGY
Payer: COMMERCIAL

## 2019-10-17 PROCEDURE — 97535 SELF CARE MNGMENT TRAINING: CPT | Mod: GP | Performed by: PHYSICAL THERAPIST

## 2019-10-17 PROCEDURE — 97110 THERAPEUTIC EXERCISES: CPT | Mod: GP | Performed by: PHYSICAL THERAPIST

## 2019-10-21 ENCOUNTER — OFFICE VISIT (OUTPATIENT)
Dept: NEPHROLOGY | Facility: CLINIC | Age: 64
End: 2019-10-21
Attending: INTERNAL MEDICINE
Payer: COMMERCIAL

## 2019-10-21 VITALS
TEMPERATURE: 98.4 F | WEIGHT: 183.2 LBS | HEART RATE: 59 BPM | DIASTOLIC BLOOD PRESSURE: 82 MMHG | BODY MASS INDEX: 27.86 KG/M2 | OXYGEN SATURATION: 98 % | SYSTOLIC BLOOD PRESSURE: 134 MMHG

## 2019-10-21 DIAGNOSIS — F20.9 SCHIZOPHRENIA, UNSPECIFIED TYPE (H): Chronic | ICD-10-CM

## 2019-10-21 DIAGNOSIS — N18.30 CKD (CHRONIC KIDNEY DISEASE) STAGE 3, GFR 30-59 ML/MIN (H): ICD-10-CM

## 2019-10-21 DIAGNOSIS — N02.B9 IGA NEPHROPATHY: ICD-10-CM

## 2019-10-21 DIAGNOSIS — N02.B9 IGA NEPHROPATHY: Primary | ICD-10-CM

## 2019-10-21 LAB
ALBUMIN SERPL-MCNC: 3.9 G/DL (ref 3.4–5)
ALBUMIN UR-MCNC: NEGATIVE MG/DL
ANION GAP SERPL CALCULATED.3IONS-SCNC: 4 MMOL/L (ref 3–14)
APPEARANCE UR: CLEAR
BILIRUB UR QL STRIP: NEGATIVE
BUN SERPL-MCNC: 33 MG/DL (ref 7–30)
CALCIUM SERPL-MCNC: 9.2 MG/DL (ref 8.5–10.1)
CHLORIDE SERPL-SCNC: 106 MMOL/L (ref 94–109)
CO2 SERPL-SCNC: 31 MMOL/L (ref 20–32)
COLOR UR AUTO: ABNORMAL
CREAT SERPL-MCNC: 1.63 MG/DL (ref 0.66–1.25)
CREAT UR-MCNC: 56 MG/DL
DEPRECATED CALCIDIOL+CALCIFEROL SERPL-MC: 69 UG/L (ref 20–75)
ERYTHROCYTE [DISTWIDTH] IN BLOOD BY AUTOMATED COUNT: 13.1 % (ref 10–15)
GFR SERPL CREATININE-BSD FRML MDRD: 43 ML/MIN/{1.73_M2}
GLUCOSE SERPL-MCNC: 102 MG/DL (ref 70–99)
GLUCOSE UR STRIP-MCNC: NEGATIVE MG/DL
HCT VFR BLD AUTO: 48.5 % (ref 40–53)
HGB BLD-MCNC: 15.7 G/DL (ref 13.3–17.7)
HGB UR QL STRIP: ABNORMAL
KETONES UR STRIP-MCNC: NEGATIVE MG/DL
LEUKOCYTE ESTERASE UR QL STRIP: NEGATIVE
MCH RBC QN AUTO: 32.2 PG (ref 26.5–33)
MCHC RBC AUTO-ENTMCNC: 32.4 G/DL (ref 31.5–36.5)
MCV RBC AUTO: 100 FL (ref 78–100)
NITRATE UR QL: NEGATIVE
PH UR STRIP: 6 PH (ref 5–7)
PHOSPHATE SERPL-MCNC: 2.7 MG/DL (ref 2.5–4.5)
PLATELET # BLD AUTO: 163 10E9/L (ref 150–450)
POTASSIUM SERPL-SCNC: 4.6 MMOL/L (ref 3.4–5.3)
PROT UR-MCNC: 0.1 G/L
PROT/CREAT 24H UR: 0.17 G/G CR (ref 0–0.2)
PTH-INTACT SERPL-MCNC: 65 PG/ML (ref 18–80)
RBC # BLD AUTO: 4.87 10E12/L (ref 4.4–5.9)
RBC #/AREA URNS AUTO: 8 /HPF (ref 0–2)
SODIUM SERPL-SCNC: 140 MMOL/L (ref 133–144)
SOURCE: ABNORMAL
SP GR UR STRIP: 1.01 (ref 1–1.03)
UROBILINOGEN UR STRIP-MCNC: 0 MG/DL (ref 0–2)
WBC # BLD AUTO: 11.5 10E9/L (ref 4–11)
WBC #/AREA URNS AUTO: <1 /HPF (ref 0–5)

## 2019-10-21 PROCEDURE — 84156 ASSAY OF PROTEIN URINE: CPT | Performed by: INTERNAL MEDICINE

## 2019-10-21 PROCEDURE — 36415 COLL VENOUS BLD VENIPUNCTURE: CPT | Performed by: INTERNAL MEDICINE

## 2019-10-21 PROCEDURE — 80069 RENAL FUNCTION PANEL: CPT | Performed by: INTERNAL MEDICINE

## 2019-10-21 PROCEDURE — 81001 URINALYSIS AUTO W/SCOPE: CPT | Performed by: INTERNAL MEDICINE

## 2019-10-21 PROCEDURE — 82306 VITAMIN D 25 HYDROXY: CPT | Performed by: INTERNAL MEDICINE

## 2019-10-21 PROCEDURE — G0463 HOSPITAL OUTPT CLINIC VISIT: HCPCS | Mod: ZF

## 2019-10-21 PROCEDURE — 83970 ASSAY OF PARATHORMONE: CPT | Performed by: INTERNAL MEDICINE

## 2019-10-21 PROCEDURE — 85027 COMPLETE CBC AUTOMATED: CPT | Performed by: INTERNAL MEDICINE

## 2019-10-21 ASSESSMENT — PAIN SCALES - GENERAL: PAINLEVEL: NO PAIN (0)

## 2019-10-21 NOTE — PROGRESS NOTES
Nephrology Clinic    Ginger Weiss MD  10/21/2019     Name: Geovani Bustos  MRN: [id number]  Age: 63 year old  : 1955  Referring provider: Referred Self     Assessment and Plan:  IgA Nephropathy: biopsy proven IgA nephropathy in , will try to obtain bx records. Overall, no proteinuria over several years. Low level microscopic hematuria on and off. Stable disease. UA today with small amt of blood, no rbc's or protein.Blood pressure 134/82, close to goal.   -- Continue with conservative management including management of BP ( target < 130/80), avoiding nephrotoxins, may continue Fish oil.   -- Given lack of proteinuria and fairly low BP, no need to start RASi at this time but should have low threshold if blood pressure goes up or if there is detectable proteinuria.      CKD, stage 3: stable for ~10 yrs, today 1.63 with eGFR 42, decreased 10% over the past year.     BMD: PTH in target. Ca/Phos/K normal.    Follow-up: 8 months    Ginger Weiss MD     Reason For Visit:   CKD follow-up     HPI:   Geovani Bustos is a 63 year old male with a history of biopsy proven IgA nephropathy (2009, Dr Robbie Harris. As per note - kidney tissue looked altogether normal with exception of deposition of IgA in mesangium), treated conservatively. Other pertinent history includes hypothyroidism, schizophrenia, HPL. Patient was last evaluated by me on 2019.     Today the patient is doing well. He does complain of intermittent bilateral kidney pain, describing a tenderness which resolves on it's own. Does endorse difficulty pushing urine and he is currently following with urology for this. No lower extremity edema and no history of kidney stones. No dizziness, lightheadedness, nausea, vomiting, shortness of breath or any other complaints.      Review of Systems:   Pertinent items are noted in HPI or as below, remainder of complete ROS is negative.      Active Medications:     Current Outpatient Medications:       ABILIFY 10 MG OR TABS, Take 22 mg by mouth daily , Disp: , Rfl:      albuterol (PROAIR HFA/PROVENTIL HFA/VENTOLIN HFA) 108 (90 Base) MCG/ACT inhaler, Inhale 2 puffs into the lungs every 4 hours as needed for shortness of breath / dyspnea or wheezing, Disp: 1 Inhaler, Rfl: 2     albuterol (PROAIR HFA/PROVENTIL HFA/VENTOLIN HFA) 108 (90 Base) MCG/ACT inhaler, Inhale 2 puffs into the lungs every 6 hours as needed for shortness of breath / dyspnea or wheezing, Disp: 1 Inhaler, Rfl: 1     amoxicillin (AMOXIL) 250 MG capsule, Take 2,000 mg by mouth, Disp: , Rfl:      aspirin (QC LO-DOSE ASPIRIN) 81 MG EC tablet, Take 1 tablet (81 mg) by mouth daily, Disp: 100 tablet, Rfl: 3     augmented betamethasone dipropionate (DIPROLENE-AF) 0.05 % external ointment, Apply to AA twice daily 1-2 weeks then as needed only. Do not apply to face., Disp: 45 g, Rfl: 5     Disposable Gloves (LATEX GLOVES MEDIUM) MISC, 4 boxes of gloves, Disp: 400 each, Rfl: 1     Disposable Gloves (NITRILE EXAM GLOVES MEDIUM) MISC, 1 each as needed Use PRN daily with administration of otic drops, body lotion and powder to treat dry itchy skin., Disp: 4 each, Rfl: 11     finasteride (PROSCAR) 5 MG tablet, TAKE ONE TABLET BY MOUTH EVERY DAY, Disp: 30 tablet, Rfl: 11     fluticasone-vilanterol (BREO ELLIPTA) 100-25 MCG/INH inhaler, Inhale 1 puff into the lungs daily, Disp: 1 Inhaler, Rfl: 11     gabapentin (NEURONTIN) 100 MG capsule, Take 100 mg by mouth 2 times daily, Disp: , Rfl:      levothyroxine (SYNTHROID/LEVOTHROID) 125 MCG tablet, Take 1 tablet (125 mcg) by mouth daily, Disp: 90 tablet, Rfl: 3     Omega-3 Fatty Acids (FISH OIL) 1200 MG capsule, Take 1 capsule (1.2 g) by mouth daily, Disp: 90 capsule, Rfl: 2     order for DME, Equipment being ordered: incontinent pads, Disp: 300 Device, Rfl: 3     order for DME, Equipment being ordered: gloves, Disp: 4 Box, Rfl: 3     order for DME, Equipment being ordered: Dynaflex insert, Disp: 1 Units, Rfl: 0      oxybutynin (DITROPAN) 5 MG tablet, TAKE ONE TABLET BY MOUTH THREE TIMES DAILY, Disp: 90 tablet, Rfl: 11     simvastatin (ZOCOR) 40 MG tablet, TAKE ONE TABLET BY MOUTH EVERY DAY AT 9 PM, Disp: 90 tablet, Rfl: 3     Skin Protectants, Misc. (EUCERIN) cream, Apply topically 2 times daily, Disp: 454 g, Rfl: 5     tamsulosin (FLOMAX) 0.4 MG capsule, Take 1 capsule (0.4 mg) by mouth daily, Disp: 30 capsule, Rfl: 11     traZODone (DESYREL) 50 MG tablet, Take 25 mg by mouth At Bedtime , Disp: , Rfl:      vitamin D3 (CHOLECALCIFEROL) 2000 units (50 mcg) tablet, Take 1 tablet (2,000 Units) by mouth daily, Disp: 100 tablet, Rfl: 1     ZOLOFT 100 MG OR TABS, 2 TABLETS DAILY, Disp: , Rfl:      EAR DROPS EARWAX AID 6.5 % otic solution, Place 5 drops into both ears 2 times daily For 5 days August 1-5th and Feb 1-5 th then return for irrigation after the 5th day. (Patient not taking: Reported on 10/21/2019), Disp: 15 mL, Rfl: 3     GAVILAX powder, Take 17 g (1 capful) by mouth daily, Disp: 527 g, Rfl: 0     Incontinence Supply Disposable (INCONTINENCE BRIEF LARGE) MISC, Incontinence pads up to 300 per month, Disp: 300 each, Rfl: 1     nicotine (NICODERM CQ) 14 MG/24HR 24 hr patch, Place 1 patch onto the skin every 24 hours, Disp: 30 patch, Rfl: 1     order for DME, chux pads, Disp: 300 pad, Rfl: 1     REGULOID 48.57 % POWD, Take 1 each by mouth See Admin Instructions Take 1 teaspoonful every day at 7pm and 9 pm., Disp: 369 g, Rfl: 11  No current facility-administered medications for this visit.     Facility-Administered Medications Ordered in Other Visits:      bupivacaine (MARCAINE) injection 0.5%, , , PRN, Holden Harrison DPM, 10 mL at 01/03/17 0945     Allergies:   Nitrogen oxide and Sulfa drugs      Past Medical History:  Past Medical History:   Diagnosis Date     Cerumen impaction      Chronic kidney disease      Colon polyps      Hyperlipidemia      Mitral valve prolapse      Schizophrenia (H)      Ulcerated penile  lesions      VSD (ventricular septal defect)      Patient Active Problem List   Diagnosis     Colon polyps     Schizophrenia (H)     VSD (ventricular septal defect)     Mitral valve prolapse     Acquired hypothyroidism     IgA nephropathy     Proteinuria     Hyperlipidemia LDL goal <130     BPH (benign prostatic hyperplasia)     Health Care Home     Bilateral impacted cerumen     Bunion of great toe of right foot     Eczema of external ear, bilateral     Tinea pedis, unspecified laterality     Incomplete right bundle branch block (RBBB)     Cigarette nicotine dependence with nicotine-induced disorder     Gastroesophageal reflux disease without esophagitis     Stage 1 mild COPD by GOLD classification (H)     CKD (chronic kidney disease) stage 3, GFR 30-59 ml/min (H)        Past Surgical History:  Past Surgical History:   Procedure Laterality Date     ARTHRODESIS FOOT Right 1/19/2016    Procedure: ARTHRODESIS FOOT;  Surgeon: Holden Harrison DPM;  Location: [facility]     ARTHRODESIS FOOT Left 1/3/2017    Procedure: ARTHRODESIS FOOT;  Surgeon: Holden Harrison DPM;  Location: [facility]     SURGICAL HISTORY OF -       leg fracture       Family History:   Family History   Problem Relation Age of Onset     Emphysema Mother      Coronary Artery Disease Father          Social History:   Social History     Tobacco Use     Smoking status: Current Every Day Smoker     Packs/day: 1.00     Years: 47.00     Pack years: 47.00     Types: Cigarettes     Smokeless tobacco: Never Used     Tobacco comment: cutting one cigarette down a month   Substance Use Topics     Alcohol use: No     Drug use: No        Physical Exam:  /82 (BP Location: Right arm, Patient Position: Sitting, Cuff Size: Adult Regular)   Pulse 59   Temp 98.4  F (36.9  C) (Oral)   Wt 83.1 kg (183 lb 3.2 oz)   SpO2 98%   BMI 27.86 kg/m     GENERAL APPEARANCE: alert and no distress  EYES: nonicteric  RESP: lungs clear to auscultation   CV: regular rhythm,  normal rate, no rub  Extremities: no clubbing, cyanosis, or edema  MS: no evidence of inflammation in joints, no muscle tenderness  SKIN: no rash  NEURO: mentation intact and speech normal  PSYCH: affect normal/bright     Laboratory:  CMP  Recent Labs   Lab Test 10/21/19  1016 03/22/19  1242 01/15/19  1046 12/05/18  0840 03/21/18  0900 02/07/18  1404 11/08/17  0840  02/01/17  1457  01/04/16  1640    141 138 140 140 141 138  --  137  --  137   POTASSIUM 4.6 4.2 4.6 4.6 4.4 4.5 4.6   < > 4.1  --  4.1   CHLORIDE 106 106 103 103 106 107 104  --  101  --  105   CO2 31 28 32 33* 31 28 33*  --  30  --  27   ANIONGAP 4 7 3 4 3 6 1*  --  6  --  5   * 95 83 97 104*  102* 91 85   < > 85  --  93   BUN 33* 32* 25 26 21 34* 18  --  23  --  33*   CR 1.63* 1.68* 1.66* 1.60* 1.36* 1.48* 1.31*   < > 1.21  --  1.27*   GFRESTIMATED 43* 42* 43* 44* 53* 48* 55*   < > 61  --  57*   GFRESTBLACK 50* 49* 49* 53* 64 58* 67   < > 73  --  70   RAYRAY 9.2 8.9 9.1 9.1 8.8 8.3* 9.2  --  8.8  --  8.7   PHOS 2.7 4.0  --   --  3.0 3.7  --   --  3.3   < >  --    PROTTOTAL  --   --   --  8.0  --   --  7.2  --  7.3  --  7.6   ALBUMIN 3.9 4.1  --  4.5 3.7 4.0 3.8  --  3.8  --  4.1   BILITOTAL  --   --   --  0.6  --   --  0.3  --  0.3  --  0.3   ALKPHOS  --   --   --  118  --   --  188*  --  142  --  127   AST  --   --   --  19  --   --  15  --  13  --  21   ALT  --   --   --  28  --   --  22  --  19  --  33    < > = values in this interval not displayed.     CBC  Recent Labs   Lab Test 10/21/19  1016 03/22/19  1242 12/05/18  0840 03/21/18  0900   HGB 15.7 15.0 17.6 15.7   WBC 11.5* 11.0 10.6 9.4   RBC 4.87 4.63 5.35 4.95   HCT 48.5 45.9 52.7 47.8    99 99 97   MCH 32.2 32.4 32.9 31.7   MCHC 32.4 32.7 33.4 32.8   RDW 13.1 13.7 13.4 14.1    165 173 180     INR  No lab results found.  ABG  No lab results found.   URINE STUDIES  Recent Labs   Lab Test 10/21/19  1021 03/25/19  1105 07/11/18  1410 03/21/18  0900 03/16/18  1135   10/03/11  1606   COLOR Straw Straw Yellow Yellow Yellow   < > Yellow   APPEARANCE Clear Clear Clear Clear Clear   < > Clear   URINEGLC Negative Negative Negative Negative 250*   < > Negative   URINEBILI Negative Negative Negative Negative Negative   < > Negative   URINEKETONE Negative Negative Negative Negative Negative   < > Negative   SG 1.012 1.006 <=1.005 1.015 <=1.005   < > 1.025   UBLD Moderate* Small* Small* Moderate* Moderate*   < > Large*   URINEPH 6.0 5.0 6.0 6.0 5.5   < > 5.0   PROTEIN Negative Negative Negative Negative Negative   < > Negative   UROBILINOGEN  --   --  0.2 1.0 0.2  --  0.2   NITRITE Negative Negative Negative Negative Negative   < > Negative   LEUKEST Negative Negative Negative Negative Negative   < > Negative   RBCU 8* 1 2-5* 5-10* O - 2   < > 2-5*   WBCU <1 <1 0 - 5 0 - 5 0 - 5   < > O - 2    < > = values in this interval not displayed.     Recent Labs   Lab Test 10/21/19  1021 03/25/19  1105 03/21/18  0900   UTPG 0.17 Unable to calculate due to low value 0.12     PTH  Recent Labs   Lab Test 03/21/18  0900   PTHI 62     IRON STUDIES   Recent Labs   Lab Test 03/21/18  0900   IRON 108      IRONSAT 40   FREDI 60       Imaging:     Scribe Disclosure:   Efren LOPEZ, am serving as a scribe to document services personally performed by Ginger Weiss MD at this visit, based upon the provider's statements to me. All documentation has been reviewed by the aforementioned provider prior to being entered into the official medical record.     Efren LOPEZ, served as a scribe preparing the chart for the clinic encounter through chart review for the provider team.

## 2019-10-21 NOTE — LETTER
10/21/2019      RE: Geovani Bustos  [address] [facility] [id number]         Nephrology Clinic    Ginger Weiss MD  10/21/2019     Name: Geovani Bustos  MRN: [id number]  Age: 63 year old  : 1955  Referring provider: Referred Self     Assessment and Plan:  IgA Nephropathy: biopsy proven IgA nephropathy in , will try to obtain bx records. Overall, no proteinuria over several years. Low level microscopic hematuria on and off. Stable disease. UA today with small amt of blood, no rbc's or protein.Blood pressure 134/82, close to goal.   -- Continue with conservative management including management of BP ( target < 130/80), avoiding nephrotoxins, may continue Fish oil.   -- Given lack of proteinuria and fairly low BP, no need to start RASi at this time but should have low threshold if blood pressure goes up or if there is detectable proteinuria.      CKD, stage 3: stable for ~10 yrs, today 1.63 with eGFR 42, decreased 10% over the past year.     BMD: PTH in target. Ca/Phos/K normal.    Follow-up: 8 months    Ginger Weiss MD     Reason For Visit:   CKD follow-up     HPI:   Geovani Bustos is a 63 year old male with a history of biopsy proven IgA nephropathy (2009, Dr Robbie Harris. As per note - kidney tissue looked altogether normal with exception of deposition of IgA in mesangium), treated conservatively. Other pertinent history includes hypothyroidism, schizophrenia, HPL. Patient was last evaluated by me on 2019.     Today the patient is doing well. He does complain of intermittent bilateral kidney pain, describing a tenderness which resolves on it's own. Does endorse difficulty pushing urine and he is currently following with urology for this. No lower extremity edema and no history of kidney stones. No dizziness, lightheadedness, nausea, vomiting, shortness of breath or any other complaints.      Review of Systems:   Pertinent items are noted in HPI or as below, remainder of  complete ROS is negative.      Active Medications:     Current Outpatient Medications:      ABILIFY 10 MG OR TABS, Take 22 mg by mouth daily , Disp: , Rfl:      albuterol (PROAIR HFA/PROVENTIL HFA/VENTOLIN HFA) 108 (90 Base) MCG/ACT inhaler, Inhale 2 puffs into the lungs every 4 hours as needed for shortness of breath / dyspnea or wheezing, Disp: 1 Inhaler, Rfl: 2     albuterol (PROAIR HFA/PROVENTIL HFA/VENTOLIN HFA) 108 (90 Base) MCG/ACT inhaler, Inhale 2 puffs into the lungs every 6 hours as needed for shortness of breath / dyspnea or wheezing, Disp: 1 Inhaler, Rfl: 1     amoxicillin (AMOXIL) 250 MG capsule, Take 2,000 mg by mouth, Disp: , Rfl:      aspirin (QC LO-DOSE ASPIRIN) 81 MG EC tablet, Take 1 tablet (81 mg) by mouth daily, Disp: 100 tablet, Rfl: 3     augmented betamethasone dipropionate (DIPROLENE-AF) 0.05 % external ointment, Apply to AA twice daily 1-2 weeks then as needed only. Do not apply to face., Disp: 45 g, Rfl: 5     Disposable Gloves (LATEX GLOVES MEDIUM) MISC, 4 boxes of gloves, Disp: 400 each, Rfl: 1     Disposable Gloves (NITRILE EXAM GLOVES MEDIUM) MISC, 1 each as needed Use PRN daily with administration of otic drops, body lotion and powder to treat dry itchy skin., Disp: 4 each, Rfl: 11     finasteride (PROSCAR) 5 MG tablet, TAKE ONE TABLET BY MOUTH EVERY DAY, Disp: 30 tablet, Rfl: 11     fluticasone-vilanterol (BREO ELLIPTA) 100-25 MCG/INH inhaler, Inhale 1 puff into the lungs daily, Disp: 1 Inhaler, Rfl: 11     gabapentin (NEURONTIN) 100 MG capsule, Take 100 mg by mouth 2 times daily, Disp: , Rfl:      levothyroxine (SYNTHROID/LEVOTHROID) 125 MCG tablet, Take 1 tablet (125 mcg) by mouth daily, Disp: 90 tablet, Rfl: 3     Omega-3 Fatty Acids (FISH OIL) 1200 MG capsule, Take 1 capsule (1.2 g) by mouth daily, Disp: 90 capsule, Rfl: 2     order for DME, Equipment being ordered: incontinent pads, Disp: 300 Device, Rfl: 3     order for DME, Equipment being ordered: gloves, Disp: 4 Box, Rfl:  3     order for DME, Equipment being ordered: Dynaflex insert, Disp: 1 Units, Rfl: 0     oxybutynin (DITROPAN) 5 MG tablet, TAKE ONE TABLET BY MOUTH THREE TIMES DAILY, Disp: 90 tablet, Rfl: 11     simvastatin (ZOCOR) 40 MG tablet, TAKE ONE TABLET BY MOUTH EVERY DAY AT 9 PM, Disp: 90 tablet, Rfl: 3     Skin Protectants, Misc. (EUCERIN) cream, Apply topically 2 times daily, Disp: 454 g, Rfl: 5     tamsulosin (FLOMAX) 0.4 MG capsule, Take 1 capsule (0.4 mg) by mouth daily, Disp: 30 capsule, Rfl: 11     traZODone (DESYREL) 50 MG tablet, Take 25 mg by mouth At Bedtime , Disp: , Rfl:      vitamin D3 (CHOLECALCIFEROL) 2000 units (50 mcg) tablet, Take 1 tablet (2,000 Units) by mouth daily, Disp: 100 tablet, Rfl: 1     ZOLOFT 100 MG OR TABS, 2 TABLETS DAILY, Disp: , Rfl:      EAR DROPS EARWAX AID 6.5 % otic solution, Place 5 drops into both ears 2 times daily For 5 days August 1-5th and Feb 1-5 th then return for irrigation after the 5th day. (Patient not taking: Reported on 10/21/2019), Disp: 15 mL, Rfl: 3     GAVILAX powder, Take 17 g (1 capful) by mouth daily, Disp: 527 g, Rfl: 0     Incontinence Supply Disposable (INCONTINENCE BRIEF LARGE) MISC, Incontinence pads up to 300 per month, Disp: 300 each, Rfl: 1     nicotine (NICODERM CQ) 14 MG/24HR 24 hr patch, Place 1 patch onto the skin every 24 hours, Disp: 30 patch, Rfl: 1     order for DME, chux pads, Disp: 300 pad, Rfl: 1     REGULOID 48.57 % POWD, Take 1 each by mouth See Admin Instructions Take 1 teaspoonful every day at 7pm and 9 pm., Disp: 369 g, Rfl: 11  No current facility-administered medications for this visit.     Facility-Administered Medications Ordered in Other Visits:      bupivacaine (MARCAINE) injection 0.5%, , , PRN, Christy, Holden Vides, DPTYRESE, 10 mL at 01/03/17 0945     Allergies:   Nitrogen oxide and Sulfa drugs      Past Medical History:  Past Medical History:   Diagnosis Date     Cerumen impaction      Chronic kidney disease      Colon polyps       Hyperlipidemia      Mitral valve prolapse      Schizophrenia (H)      Ulcerated penile lesions      VSD (ventricular septal defect)      Patient Active Problem List   Diagnosis     Colon polyps     Schizophrenia (H)     VSD (ventricular septal defect)     Mitral valve prolapse     Acquired hypothyroidism     IgA nephropathy     Proteinuria     Hyperlipidemia LDL goal <130     BPH (benign prostatic hyperplasia)     Health Care Home     Bilateral impacted cerumen     Bunion of great toe of right foot     Eczema of external ear, bilateral     Tinea pedis, unspecified laterality     Incomplete right bundle branch block (RBBB)     Cigarette nicotine dependence with nicotine-induced disorder     Gastroesophageal reflux disease without esophagitis     Stage 1 mild COPD by GOLD classification (H)     CKD (chronic kidney disease) stage 3, GFR 30-59 ml/min (H)        Past Surgical History:  Past Surgical History:   Procedure Laterality Date     ARTHRODESIS FOOT Right 1/19/2016    Procedure: ARTHRODESIS FOOT;  Surgeon: Holden Harrison DPM;  Location: [facility]     ARTHRODESIS FOOT Left 1/3/2017    Procedure: ARTHRODESIS FOOT;  Surgeon: Holden Harrison DPM;  Location: [facility]     SURGICAL HISTORY OF -       leg fracture       Family History:   Family History   Problem Relation Age of Onset     Emphysema Mother      Coronary Artery Disease Father          Social History:   Social History     Tobacco Use     Smoking status: Current Every Day Smoker     Packs/day: 1.00     Years: 47.00     Pack years: 47.00     Types: Cigarettes     Smokeless tobacco: Never Used     Tobacco comment: cutting one cigarette down a month   Substance Use Topics     Alcohol use: No     Drug use: No        Physical Exam:  /82 (BP Location: Right arm, Patient Position: Sitting, Cuff Size: Adult Regular)   Pulse 59   Temp 98.4  F (36.9  C) (Oral)   Wt 83.1 kg (183 lb 3.2 oz)   SpO2 98%   BMI 27.86 kg/m      GENERAL APPEARANCE: alert and no  distress  EYES: nonicteric  RESP: lungs clear to auscultation   CV: regular rhythm, normal rate, no rub  Extremities: no clubbing, cyanosis, or edema  MS: no evidence of inflammation in joints, no muscle tenderness  SKIN: no rash  NEURO: mentation intact and speech normal  PSYCH: affect normal/bright     Laboratory:  CMP  Recent Labs   Lab Test 10/21/19  1016 03/22/19  1242 01/15/19  1046 12/05/18  0840 03/21/18  0900 02/07/18  1404 11/08/17  0840  02/01/17  1457  01/04/16  1640    141 138 140 140 141 138  --  137  --  137   POTASSIUM 4.6 4.2 4.6 4.6 4.4 4.5 4.6   < > 4.1  --  4.1   CHLORIDE 106 106 103 103 106 107 104  --  101  --  105   CO2 31 28 32 33* 31 28 33*  --  30  --  27   ANIONGAP 4 7 3 4 3 6 1*  --  6  --  5   * 95 83 97 104*  102* 91 85   < > 85  --  93   BUN 33* 32* 25 26 21 34* 18  --  23  --  33*   CR 1.63* 1.68* 1.66* 1.60* 1.36* 1.48* 1.31*   < > 1.21  --  1.27*   GFRESTIMATED 43* 42* 43* 44* 53* 48* 55*   < > 61  --  57*   GFRESTBLACK 50* 49* 49* 53* 64 58* 67   < > 73  --  70   RAYRAY 9.2 8.9 9.1 9.1 8.8 8.3* 9.2  --  8.8  --  8.7   PHOS 2.7 4.0  --   --  3.0 3.7  --   --  3.3   < >  --    PROTTOTAL  --   --   --  8.0  --   --  7.2  --  7.3  --  7.6   ALBUMIN 3.9 4.1  --  4.5 3.7 4.0 3.8  --  3.8  --  4.1   BILITOTAL  --   --   --  0.6  --   --  0.3  --  0.3  --  0.3   ALKPHOS  --   --   --  118  --   --  188*  --  142  --  127   AST  --   --   --  19  --   --  15  --  13  --  21   ALT  --   --   --  28  --   --  22  --  19  --  33    < > = values in this interval not displayed.     CBC  Recent Labs   Lab Test 10/21/19  1016 03/22/19  1242 12/05/18  0840 03/21/18  0900   HGB 15.7 15.0 17.6 15.7   WBC 11.5* 11.0 10.6 9.4   RBC 4.87 4.63 5.35 4.95   HCT 48.5 45.9 52.7 47.8    99 99 97   MCH 32.2 32.4 32.9 31.7   MCHC 32.4 32.7 33.4 32.8   RDW 13.1 13.7 13.4 14.1    165 173 180     INR  No lab results found.  ABG  No lab results found.   URINE STUDIES  Recent Labs   Lab Test  10/21/19  1021 03/25/19  1105 07/11/18  1410 03/21/18  0900 03/16/18  1135  10/03/11  1606   COLOR Straw Straw Yellow Yellow Yellow   < > Yellow   APPEARANCE Clear Clear Clear Clear Clear   < > Clear   URINEGLC Negative Negative Negative Negative 250*   < > Negative   URINEBILI Negative Negative Negative Negative Negative   < > Negative   URINEKETONE Negative Negative Negative Negative Negative   < > Negative   SG 1.012 1.006 <=1.005 1.015 <=1.005   < > 1.025   UBLD Moderate* Small* Small* Moderate* Moderate*   < > Large*   URINEPH 6.0 5.0 6.0 6.0 5.5   < > 5.0   PROTEIN Negative Negative Negative Negative Negative   < > Negative   UROBILINOGEN  --   --  0.2 1.0 0.2  --  0.2   NITRITE Negative Negative Negative Negative Negative   < > Negative   LEUKEST Negative Negative Negative Negative Negative   < > Negative   RBCU 8* 1 2-5* 5-10* O - 2   < > 2-5*   WBCU <1 <1 0 - 5 0 - 5 0 - 5   < > O - 2    < > = values in this interval not displayed.     Recent Labs   Lab Test 10/21/19  1021 03/25/19  1105 03/21/18  0900   UTPG 0.17 Unable to calculate due to low value 0.12     PTH  Recent Labs   Lab Test 03/21/18  0900   PTHI 62     IRON STUDIES   Recent Labs   Lab Test 03/21/18  0900   IRON 108      IRONSAT 40   FREDI 60       Imaging:     Scribe Disclosure:   Efren LOPEZ, am serving as a scribe to document services personally performed by Ginger Weiss MD at this visit, based upon the provider's statements to me. All documentation has been reviewed by the aforementioned provider prior to being entered into the official medical record.     Efren LOPEZ, served as a scribe preparing the chart for the clinic encounter through chart review for the provider team.       Ginger Weiss MD

## 2019-10-21 NOTE — NURSING NOTE
Chief Complaint   Patient presents with     RECHECK     CKD stage 3     /82 (BP Location: Right arm, Patient Position: Sitting, Cuff Size: Adult Regular)   Pulse 59   Temp 98.4  F (36.9  C) (Oral)   Wt 83.1 kg (183 lb 3.2 oz)   SpO2 98%   BMI 27.86 kg/m        Estela Ernandez CMA CMA    10/21/2019 11:22 AM

## 2019-10-22 ENCOUNTER — IMMUNIZATION (OUTPATIENT)
Dept: FAMILY MEDICINE | Facility: CLINIC | Age: 64
End: 2019-10-22
Payer: COMMERCIAL

## 2019-10-22 DIAGNOSIS — N40.0 BPH (BENIGN PROSTATIC HYPERPLASIA): Chronic | ICD-10-CM

## 2019-10-22 PROCEDURE — G0008 ADMIN INFLUENZA VIRUS VAC: HCPCS

## 2019-10-22 PROCEDURE — 90662 IIV NO PRSV INCREASED AG IM: CPT

## 2019-10-22 RX ORDER — FINASTERIDE 5 MG/1
TABLET, FILM COATED ORAL
Qty: 30 TABLET | Refills: 11 | Status: SHIPPED | OUTPATIENT
Start: 2019-10-22 | End: 2020-10-01

## 2019-11-05 DIAGNOSIS — K59.00 CONSTIPATION, UNSPECIFIED CONSTIPATION TYPE: ICD-10-CM

## 2019-11-05 RX ORDER — POLYETHYLENE GLYCOL 3350 17 G/17G
POWDER, FOR SOLUTION ORAL
Qty: 527 G | Refills: 1 | Status: SHIPPED | OUTPATIENT
Start: 2019-11-05 | End: 2019-12-23

## 2019-11-05 NOTE — TELEPHONE ENCOUNTER
Requested Prescriptions   Pending Prescriptions Disp Refills     polyethylene glycol (GAVILAX) powder 527 g      Sig: Take 17 g (1 capful) by mouth daily       There is no refill protocol information for this order        Last Written Prescription Date:  09/06/19  Last Fill Quantity: 527 g,  # refills: 0   Last office visit: 10/22/2019 with prescribing provider:     Future Office Visit:   Next 5 appointments (look out 90 days)    Jan 16, 2020  2:45 PM CST  Return Visit with Luis A Zepeda MD  [facility] ([facility]) [address] [facility] [id number] [phone]

## 2019-11-05 NOTE — TELEPHONE ENCOUNTER
Routing refill request to provider for review/approval because:  Drug not on the [facility] refill protocol   Angeli MAGANA RN

## 2019-11-07 ENCOUNTER — HOSPITAL ENCOUNTER (OUTPATIENT)
Dept: PHYSICAL THERAPY | Facility: CLINIC | Age: 64
Setting detail: THERAPIES SERIES
End: 2019-11-07
Attending: UROLOGY
Payer: COMMERCIAL

## 2019-11-07 PROCEDURE — 97535 SELF CARE MNGMENT TRAINING: CPT | Mod: GP | Performed by: PHYSICAL THERAPIST

## 2019-11-07 PROCEDURE — 97110 THERAPEUTIC EXERCISES: CPT | Mod: GP | Performed by: PHYSICAL THERAPIST

## 2019-11-11 DIAGNOSIS — E78.5 HYPERLIPIDEMIA LDL GOAL <130: ICD-10-CM

## 2019-11-11 RX ORDER — SIMVASTATIN 40 MG
TABLET ORAL
Qty: 30 TABLET | Refills: 0 | Status: SHIPPED | OUTPATIENT
Start: 2019-11-11 | End: 2020-01-07

## 2019-11-11 NOTE — TELEPHONE ENCOUNTER
"Requested Prescriptions   Pending Prescriptions Disp Refills     simvastatin (ZOCOR) 40 MG tablet [Pharmacy Med Name: SIMVASTATIN 40 MG TABLET] 30 tablet      Sig: TAKE ONE TABLET BY MOUTH EVERY DAY AT 9 PM       Statins Protocol Failed - 11/11/2019  1:56 PM        Failed - Recent (12 mo) or future (30 days) visit within the authorizing provider's specialty     Patient has had an office visit with the authorizing provider or a provider within the authorizing providers department within the previous 12 mos or has a future within next 30 days. See \"Patient Info\" tab in inbasket, or \"Choose Columns\" in Meds & Orders section of the refill encounter.              Passed - LDL on file in past 12 months     Recent Labs   Lab Test 12/05/18  0840   *             Passed - No abnormal creatine kinase in past 12 months     No lab results found.             Passed - Medication is active on med list        Passed - Patient is age 18 or older      simvastatin (ZOCOR) 40 MG tablet  Last Written Prescription Date:  12/11/2018  Last Fill Quantity: 90 tablet,  # refills: 3   Last office visit: 10/22/2019 with prescribing provider:  08/08/2019 STELLA Vera   Future Office Visit:   Next 5 appointments (look out 90 days)    Jan 16, 2020  2:45 PM CST  [facility] Visit with Luis A Zepeda MD  [facility] [address] [facility] [address]  [phone]         Yara Barrientos RT (R) (M)      "

## 2019-11-14 ENCOUNTER — TELEPHONE (OUTPATIENT)
Dept: FAMILY MEDICINE | Facility: CLINIC | Age: 64
End: 2019-11-14

## 2019-11-14 DIAGNOSIS — N39.46 MIXED INCONTINENCE: Primary | ICD-10-CM

## 2019-11-14 NOTE — TELEPHONE ENCOUNTER
[facility] stating the following:    We have been contacted by this patient requesting incontinence supplies. If you feel this patient is able to get these products please send us an Rx for INCONTINENCE PRODUCTS (up to 300/month), GLOVES (4 BOXES), & Chux Pads. Please include REFILL AMOUNTS  Or PRN. Please include all associated diagnosis' and Dx/ICD-10 codes associated with the patient so we can bill product to their insurance. If you feel this patient DOES NOT qualify please state on this form and send it back to me ASAP.    Thank you for your time in this matter. If you have any questions please call us at [phone].     Reena Lees  .

## 2019-11-15 DIAGNOSIS — F25.0 SCHIZOAFFECTIVE DISORDER, BIPOLAR TYPE (H): Primary | ICD-10-CM

## 2019-11-15 LAB
ALBUMIN SERPL-MCNC: 4 G/DL (ref 3.4–5)
ALP SERPL-CCNC: 116 U/L (ref 40–150)
ALT SERPL W P-5'-P-CCNC: 21 U/L (ref 0–70)
ANION GAP SERPL CALCULATED.3IONS-SCNC: 1 MMOL/L (ref 3–14)
AST SERPL W P-5'-P-CCNC: 16 U/L (ref 0–45)
BASOPHILS # BLD AUTO: 0 10E9/L (ref 0–0.2)
BASOPHILS NFR BLD AUTO: 0.3 %
BILIRUB SERPL-MCNC: 0.5 MG/DL (ref 0.2–1.3)
BUN SERPL-MCNC: 17 MG/DL (ref 7–30)
CALCIUM SERPL-MCNC: 8.8 MG/DL (ref 8.5–10.1)
CHLORIDE SERPL-SCNC: 103 MMOL/L (ref 94–109)
CHOLEST SERPL-MCNC: 151 MG/DL
CO2 SERPL-SCNC: 34 MMOL/L (ref 20–32)
CREAT SERPL-MCNC: 1.47 MG/DL (ref 0.66–1.25)
DIFFERENTIAL METHOD BLD: NORMAL
EOSINOPHIL # BLD AUTO: 0.4 10E9/L (ref 0–0.7)
EOSINOPHIL NFR BLD AUTO: 4 %
ERYTHROCYTE [DISTWIDTH] IN BLOOD BY AUTOMATED COUNT: 13.5 % (ref 10–15)
GFR SERPL CREATININE-BSD FRML MDRD: 49 ML/MIN/{1.73_M2}
GLUCOSE SERPL-MCNC: 96 MG/DL (ref 70–99)
HCT VFR BLD AUTO: 47.7 % (ref 40–53)
HDLC SERPL-MCNC: 45 MG/DL
HGB BLD-MCNC: 15.7 G/DL (ref 13.3–17.7)
LDLC SERPL CALC-MCNC: 75 MG/DL
LYMPHOCYTES # BLD AUTO: 1.5 10E9/L (ref 0.8–5.3)
LYMPHOCYTES NFR BLD AUTO: 15.7 %
MCH RBC QN AUTO: 32.5 PG (ref 26.5–33)
MCHC RBC AUTO-ENTMCNC: 32.9 G/DL (ref 31.5–36.5)
MCV RBC AUTO: 99 FL (ref 78–100)
MONOCYTES # BLD AUTO: 0.6 10E9/L (ref 0–1.3)
MONOCYTES NFR BLD AUTO: 5.9 %
NEUTROPHILS # BLD AUTO: 7.1 10E9/L (ref 1.6–8.3)
NEUTROPHILS NFR BLD AUTO: 74.1 %
NONHDLC SERPL-MCNC: 106 MG/DL
PLATELET # BLD AUTO: 171 10E9/L (ref 150–450)
POTASSIUM SERPL-SCNC: 4.3 MMOL/L (ref 3.4–5.3)
PROT SERPL-MCNC: 7.1 G/DL (ref 6.8–8.8)
RBC # BLD AUTO: 4.83 10E12/L (ref 4.4–5.9)
SODIUM SERPL-SCNC: 138 MMOL/L (ref 133–144)
TRIGL SERPL-MCNC: 155 MG/DL
WBC # BLD AUTO: 9.6 10E9/L (ref 4–11)

## 2019-11-15 PROCEDURE — 80053 COMPREHEN METABOLIC PANEL: CPT

## 2019-11-15 PROCEDURE — 85025 COMPLETE CBC W/AUTO DIFF WBC: CPT | Performed by: FAMILY MEDICINE

## 2019-11-15 PROCEDURE — 36415 COLL VENOUS BLD VENIPUNCTURE: CPT | Performed by: FAMILY MEDICINE

## 2019-11-15 PROCEDURE — 80061 LIPID PANEL: CPT

## 2019-11-21 ENCOUNTER — HOSPITAL ENCOUNTER (OUTPATIENT)
Dept: PHYSICAL THERAPY | Facility: CLINIC | Age: 64
Setting detail: THERAPIES SERIES
End: 2019-11-21
Attending: UROLOGY
Payer: COMMERCIAL

## 2019-11-21 PROCEDURE — 90911 ZZHC PT BIOFEEDBACK (PFM): CPT | Mod: GP | Performed by: PHYSICAL THERAPIST

## 2019-11-21 PROCEDURE — 97110 THERAPEUTIC EXERCISES: CPT | Mod: GP | Performed by: PHYSICAL THERAPIST

## 2019-11-21 PROCEDURE — 97535 SELF CARE MNGMENT TRAINING: CPT | Mod: GP,59 | Performed by: PHYSICAL THERAPIST

## 2019-11-22 NOTE — PROGRESS NOTES
Physical Therapy Progress Note and Medicare RECERTIFICATION    Geovani Bustos  1955    Session Number: 4/8 (, [employer]) since start of care.    Reasons for Continuing Treatment:   Pt continues to have urgency and frequency.  Pt has difficulty with a paradoxic contraction during elimination simulated activity.  Pt could benefit from further skilled training to engage and functionally use correct elimination patterns.  Additionally, pt could benefit from more strict guidance on timed voiding for bladder retraining.     Frequency/Duration  1 times per every 2 weeks for 8 weeks for a total of 4 visits.    Recertification Period  11/4/19 - 1/2/20    Physician Signature:    Date:    X_______________________________________________________    Physician Name: Luis A Zepeda MD    I certify the need for these services furnished under this plan of treatment and while under my care. Physician co-signature of this document indicates review and certification of the therapy plan.  This signature may be written on paper, or electronically signed within EPIC.          11/07/19 1100   Signing Clinician's Name / Credentials   Signing clinician's name / credentials Chiara Manzo, PT MA #[id number]   Session Number   Session Number 4/8 (, [employer])   Authorization status Cert Req'd   Progress Note/Recertification   Progress Note Due Date 01/02/20   Progress Note Completed Date 11/07/19   Recertification Due Date 01/02/20   Ortho Goal 1   Goal Identifier STG   Goal Description 1)Pt will improve water intake to allow dilution of urine to ease urgency, in 4 weeks.  Not Met: still getting strong urges to void.  (Cont for 4 more weeks. )   Target Date 12/05/19   Ortho Goal 2   Goal Identifier STG   Goal Description 2)Pt will report void times of every 1 hour in 4 weeks.  Met: most often can wait 1 hour to go to bathroom.    (Upgrade to 1.5 hours between voids)   Target Date 12/19/19   Ortho Goal 3   Goal  "Identifier LTG   Goal Description 3)Pt will improve PFM strength/control to report no nighttime leaking in 6 weeks. Met: pt has had only 1 leak that he reports since last session.   Target Date 10/21/19   Date Met 10/17/19   Ortho Goal 4   Goal Identifier LTG   Goal Description 4)Pt will improve PFM control to fully empty baldder at each void/report 3/7 days dry in 8 weeks.  Met: not leaking urine in last 3 weeks.    Target Date 11/04/19   Date Met 11/21/19   Ortho Goal 5   Goal Identifier LTG   Goal Description 5)Pt will be indep (guided by group home staff) in HEP to prevent return of symptoms in 8 weeks.   (Ongoing, and updated each session. )   Target Date 12/19/19   Subjective Report   Subjective Report No leaking of urine reported, day or nite, since last visit.  Pt states primary c/o urgency and frequency.    Objective Measure 1   Objective Measure Void Times   Details \"About every 45 mins.\"  States he feels he may not be emptying.  Staff states they filled out sheets for PT, but did not remember to bring in today.    Objective Measure 2   Objective Measure Fluid Intake   Details Pt is not drinking regular/plain water except at lunch (?).  Reports today (so far at 11am) that he has drankd 20oz of iced coffee and 8 oz of decaff coffee.  States favorite drinks are Coffee, Propel, and decaff sodas, but is allowed only 1-3x per week.    Therapeutic Procedure/exercise   Therapeutic Procedures: strength, endurance, ROM, flexibillity minutes (29624) 10   Skilled Intervention Exer: education, walking, stretching; progression of HEP   Patient Response Pt states doing all exers.  Staff member with pt states the \"charts help staff keep pt on task with his home program.\"    Treatment Detail Reviewed use of PFM functionally to try and control the urge to \"buy 5 mins more of holding time.\"  Briefly reviewed walking program as pt states it is getting colder and he doesn't like to walk out in the cold.  Suggested walking in " stores prior to actually shopping in the store.    Self Care/home Management   ADL/Home Mgmt Training (68588) 45   Skilled Intervention Self-care: fluid intake/bladder irritants, timed voids, letter for work   Patient Response Pt and staff member verbalize understanding new suggestions, and need for 'sticking to a routine.'  Pt used bathroom 2x during our session.    Treatment Detail Pt is still not drinking much plain water at home and does a large sudden amount (16oz) after work. Suggested sipping water during work vs gulping at the end of his 3 hour time period. Reviewed current attempt at trying to void every 1 hour vs 45 mins and pt/staff say this is not going well as pt c/o discomfort with waiting.  INsructed to keep track of void times to see if there is a pattern and explained to add 5 mins to his holding time about every 3-5 days.  Goal is eventually, after doing this for weeks to hold up to 2 hours comfortably.  Explained part of his isssue could be not completely emptying.  Told to try sitting at every void regardless of urinating vs defecating.  Try to use stool and demo'd how this puts pelvis in proper position for ease of elimination.  RE-taught the pursed lip blowing technique.  Issued bladder irritant list, I Did It! chart and bladder diary, again.    Plan   Home program Re-issued sheets as per self-care above.    Plan for next session See pt in 2 weeks. Cont with BF for seated simulated voiding task.  Cont to see pt for every 2 weeks for up to 4 more sessions.    Total Session Time   Timed Code Treatment Minutes 55 (3SCHM,TE)   Total Treatment Time (sum of timed and untimed services) 55 (3SCHM,TE)   Thank you for the referral of this patient.  Chiara Manzo, PT, MA  #[id number]

## 2019-12-03 DIAGNOSIS — J20.9 ACUTE BRONCHITIS WITH COEXISTING CONDITION REQUIRING PROPHYLACTIC TREATMENT: ICD-10-CM

## 2019-12-03 RX ORDER — ALBUTEROL SULFATE 90 UG/1
2 AEROSOL, METERED RESPIRATORY (INHALATION) EVERY 6 HOURS PRN
Qty: 1 INHALER | Refills: 1 | Status: SHIPPED | OUTPATIENT
Start: 2019-12-03 | End: 2020-04-30

## 2019-12-03 NOTE — TELEPHONE ENCOUNTER
"Requested Prescriptions   Pending Prescriptions Disp Refills     albuterol (PROAIR HFA/PROVENTIL HFA/VENTOLIN HFA) 108 (90 Base) MCG/ACT inhaler 1 Inhaler 1     Sig: Inhale 2 puffs into the lungs every 6 hours as needed for shortness of breath / dyspnea or wheezing       Asthma Maintenance Inhalers - Anticholinergics Passed - 12/3/2019 10:43 AM        Passed - Patient is age 12 years or older        Passed - Recent (12 mo) or future (30 days) visit within the authorizing provider's specialty     Patient has had an office visit with the authorizing provider or a provider within the authorizing providers department within the previous 12 mos or has a future within next 30 days. See \"Patient Info\" tab in inbasket, or \"Choose Columns\" in Meds & Orders section of the refill encounter.              Passed - Medication is active on med list        Last Written Prescription Date:  10/16/18  Last Fill Quantity: 1,  # refills: 2   Last office visit: 10/22/2019 with prescribing provider:     Future Office Visit:   Next 5 appointments (look out 90 days)    Jan 16, 2020  2:45 PM CST  Return Visit with Luis A Zepeda MD  [facility]) [address] [facility] [address] [phone]           "

## 2019-12-10 ENCOUNTER — HOSPITAL ENCOUNTER (OUTPATIENT)
Dept: PHYSICAL THERAPY | Facility: CLINIC | Age: 64
Setting detail: THERAPIES SERIES
End: 2019-12-10
Attending: UROLOGY
Payer: COMMERCIAL

## 2019-12-10 PROCEDURE — 97535 SELF CARE MNGMENT TRAINING: CPT | Mod: GP | Performed by: PHYSICAL THERAPIST

## 2019-12-10 PROCEDURE — 97110 THERAPEUTIC EXERCISES: CPT | Mod: GP | Performed by: PHYSICAL THERAPIST

## 2019-12-10 NOTE — Clinical Note
"Srinivas Nogueira Dr is choosing to end his PT.  He doesn't wish to do any stretching (Hamstrings, piriformis, adductors,etc) because doing so \"hurts.\" I attached his discharge note for review.  He is still doing the PFM exercises.  I didn't know if you wanted to see him for \"the next step\" or not.  He did not meet his goals - still strong urges and frequency, but less leaking.~Tia  "

## 2019-12-10 NOTE — PROGRESS NOTES
"Outpatient Physical Therapy Discharge Note     Patient: Geovani Bustos  : 1955    Beginning/End Dates of Reporting Period:  19 to 12/10/2019    Referring Provider: Luis A Zepeda MD    Therapy Diagnosis: pelvic floor dysfunction with urge, frequency and incontinence     Client Self Report: Pt announces that he does not wish to come to PT any longer.  He states \"this isn't helping me and your stretching exercises hurt.\"  Pt states he is okay continuing with the PFM exers, but will not do any other exers.     Objective Measurements:  Objective Measure: Flexibility   Details: HS, Piriformis and Adductors tight (B) - not formally assessed today.  Visible posterior pelvic rotation and knee flexion in static stand.   Objective Measure: Leaking  Details: 1x in last 2 weeks, \"while waiting to use the bathroom at home.\"   Objective Measure: Void Times  Details: States getting urges and going every 30 mins.  Waited 60 mins from arrival to end of session.              Goals:  Goal Identifier STG   Goal Description 1)Pt will improve water intake to allow dilution of urine to ease urgency, in 4 weeks.  Not Met: still getting strong urges to void, and bladder log shows 16-24 oz of water per day is max, usually less. (Cont for 2 more weeks. )   Target Date 19   Date Met      Progress:     Goal Identifier STG   Goal Description 2)Pt will report void times of every 1.5 hours in 4 weeks.  Not Met: max pt can hold is 1 hour, per verbal report today.    Target Date 19   Date Met      Progress:     Goal Identifier LTG   Goal Description 3)Pt will improve PFM strength/control to report no nighttime leaking in 6 weeks. Met: pt has had only 1 leak that he reports since last session.   Target Date 10/21/19   Date Met  10/17/19   Progress:     Goal Identifier LTG   Goal Description 4)Pt will improve PFM control to fully empty baldder at each void/report 3/7 days dry in 8 weeks.  Met: not leaking urine in last " 3 weeks.    Target Date 11/04/19   Date Met  11/21/19   Progress:     Goal Identifier LTG   Goal Description 5)Pt will be indep (guided by group home staff) in HEP to prevent return of symptoms in 8 weeks. (Ongoing, and updated each session. )   Target Date 12/19/19   Date Met          Progress Toward Goals:   Progress this reporting period: Pt has not met goals, but wishes to discharge PT.      Plan:  Discharge from therapy.    Discharge:    Reason for Discharge: Patient chooses to discontinue therapy.    Equipment Issued: None    Discharge Plan: Patient to continue home program.    Thank you for the referral of this patient.  Chiara Manzo, PT, MA  #[id number]

## 2019-12-11 ENCOUNTER — OFFICE VISIT (OUTPATIENT)
Dept: FAMILY MEDICINE | Facility: CLINIC | Age: 64
End: 2019-12-11
Payer: COMMERCIAL

## 2019-12-11 VITALS
DIASTOLIC BLOOD PRESSURE: 80 MMHG | RESPIRATION RATE: 12 BRPM | HEART RATE: 86 BPM | SYSTOLIC BLOOD PRESSURE: 122 MMHG | TEMPERATURE: 97.3 F | BODY MASS INDEX: 27.76 KG/M2 | OXYGEN SATURATION: 95 % | WEIGHT: 182.6 LBS

## 2019-12-11 DIAGNOSIS — L84 CALLUS OF FOOT: Primary | ICD-10-CM

## 2019-12-11 PROCEDURE — 99213 OFFICE O/P EST LOW 20 MIN: CPT | Performed by: FAMILY MEDICINE

## 2019-12-11 ASSESSMENT — PAIN SCALES - GENERAL: PAINLEVEL: NO PAIN (0)

## 2019-12-11 NOTE — PATIENT INSTRUCTIONS
Patient Education     Treating Corns and Calluses  If your corns or calluses are mild, reducing friction may help. Different shoes, moleskin patches, or soft pads may be all the treatment you need. In more severe cases, treating tissue buildup may require your doctor s care. Sometimes custom-made shoe inserts (orthotics) or special pads are prescribed to reduce friction and pressure.     Doctor examining senior man's foot.   Change shoes  If you have corns, your doctor may suggest wearing shoes that have more toe room. This way, buckled joints are less likely to be pinched against the top of the shoe. If you have calluses, wearing a cushioned insole, arch support, or heel counter can help reduce friction.  Visit your doctor  In some cases, your doctor may trim away the outer layers of skin that make up the corn or callus. For a painful corn, medicine may be injected beneath the built-up tissue.  Wear orthotics  Orthotics are specially made to meet the needs of your feet. They cushion calluses or divert pressure away from these problem areas. Worn as directed, orthotics help limit existing problems and prevent new ones from forming.  If you need surgery  If a bone or joint is out of place, certain parts of your foot may be under too much pressure. This can cause severe corns and calluses. In such cases, surgery may be the best way to correct the problem.  Outpatient procedures  In most cases, surgery to improve bone position is an outpatient procedure. Your doctor may cut away excess bone, reposition prominent bones, or even fuse joints. Sometimes tendons or ligaments are cut to reduce tension on a bone or joint. Your doctor will talk with you about the procedure that is best suited to your needs.  Date Last Reviewed: 7/1/2016 2000-2018 [employer]. [address] [place] [address], [place] [address]. All rights reserved. This information is not intended as a substitute for professional medical care. Always  follow your healthcare professional's instructions.

## 2019-12-11 NOTE — PROGRESS NOTES
SUBJECTIVE   Geovani Bustos is a 64 year old male who presents with     Callus on bottom of left foot   Becoming irritating, slightly painful when standing on foot  Has had callus on foot x maybe 1 year        PCP   Alexandru Vera [phone]    Health Maintenance        Health Maintenance Due   Topic Date Due     ADVANCE CARE PLANNING  1955     ZOSTER IMMUNIZATION (2 of 3) 04/12/2016     AORTIC ANEURYSM SCREENING (SYSTEM ASSIGNED)  12/05/2018     MEDICARE ANNUAL WELLNESS VISIT  02/07/2019     PNEUMOCOCCAL IMMUNIZATION 65+ HIGH/HIGHEST RISK (2 of 2 - PPSV23) 11/07/2019       HPI        Patient Active Problem List   Diagnosis     Colon polyps     Schizophrenia (H)     VSD (ventricular septal defect)     Mitral valve prolapse     Acquired hypothyroidism     IgA nephropathy     Proteinuria     Hyperlipidemia LDL goal <130     BPH (benign prostatic hyperplasia)     Health Care Home     Bilateral impacted cerumen     Bunion of great toe of right foot     Eczema of external ear, bilateral     Tinea pedis, unspecified laterality     Incomplete right bundle branch block (RBBB)     Cigarette nicotine dependence with nicotine-induced disorder     Gastroesophageal reflux disease without esophagitis     Stage 1 mild COPD by GOLD classification (H)     CKD (chronic kidney disease) stage 3, GFR 30-59 ml/min (H)     Current Outpatient Medications   Medication     ABILIFY 10 MG OR TABS     albuterol (PROAIR HFA/PROVENTIL HFA/VENTOLIN HFA) 108 (90 Base) MCG/ACT inhaler     albuterol (PROAIR HFA/PROVENTIL HFA/VENTOLIN HFA) 108 (90 Base) MCG/ACT inhaler     amoxicillin (AMOXIL) 250 MG capsule     aspirin (QC LO-DOSE ASPIRIN) 81 MG EC tablet     augmented betamethasone dipropionate (DIPROLENE-AF) 0.05 % external ointment     Disposable Gloves (LATEX GLOVES MEDIUM) MISC     Disposable Gloves (NITRILE EXAM GLOVES MEDIUM) MISC     EAR DROPS EARWAX AID 6.5 % otic solution     finasteride (PROSCAR) 5 MG tablet      fluticasone-vilanterol (BREO ELLIPTA) 100-25 MCG/INH inhaler     gabapentin (NEURONTIN) 100 MG capsule     Incontinence Supply Disposable (INCONTINENCE BRIEF LARGE) MISC     levothyroxine (SYNTHROID/LEVOTHROID) 125 MCG tablet     nicotine (NICODERM CQ) 14 MG/24HR 24 hr patch     Omega-3 Fatty Acids (FISH OIL) 1200 MG capsule     order for DME     order for DME     order for DME     order for DME     order for DME     order for DME     order for DME     oxybutynin (DITROPAN) 5 MG tablet     polyethylene glycol (GAVILAX) powder     REGULOID 48.57 % POWD     simvastatin (ZOCOR) 40 MG tablet     Skin Protectants, Misc. (EUCERIN) cream     tamsulosin (FLOMAX) 0.4 MG capsule     traZODone (DESYREL) 50 MG tablet     vitamin D3 (CHOLECALCIFEROL) 2000 units (50 mcg) tablet     ZOLOFT 100 MG OR TABS     No current facility-administered medications for this visit.      Facility-Administered Medications Ordered in Other Visits   Medication     bupivacaine (MARCAINE) injection 0.5%       Patient Active Problem List   Diagnosis     Colon polyps     Schizophrenia (H)     VSD (ventricular septal defect)     Mitral valve prolapse     Acquired hypothyroidism     IgA nephropathy     Proteinuria     Hyperlipidemia LDL goal <130     BPH (benign prostatic hyperplasia)     Health Care Home     Bilateral impacted cerumen     Bunion of great toe of right foot     Eczema of external ear, bilateral     Tinea pedis, unspecified laterality     Incomplete right bundle branch block (RBBB)     Cigarette nicotine dependence with nicotine-induced disorder     Gastroesophageal reflux disease without esophagitis     Stage 1 mild COPD by GOLD classification (H)     CKD (chronic kidney disease) stage 3, GFR 30-59 ml/min (H)     Past Surgical History:   Procedure Laterality Date     ARTHRODESIS FOOT Right 1/19/2016    Procedure: ARTHRODESIS FOOT;  Surgeon: Holden Harrison DPM;  Location: [facility]     ARTHRODESIS FOOT Left 1/3/2017    Procedure:  ARTHRODESIS FOOT;  Surgeon: Holden Harrison DPM;  Location: [facility]     SURGICAL HISTORY OF -       leg fracture       Social History     Tobacco Use     Smoking status: Current Every Day Smoker     Packs/day: 1.00     Years: 47.00     Pack years: 47.00     Types: Cigarettes     Smokeless tobacco: Never Used     Tobacco comment: cutting one cigarette down a month   Substance Use Topics     Alcohol use: No     Family History   Problem Relation Age of Onset     Emphysema Mother      Coronary Artery Disease Father          Current Outpatient Medications   Medication Sig Dispense Refill     ABILIFY 10 MG OR TABS Take 22 mg by mouth daily        albuterol (PROAIR HFA/PROVENTIL HFA/VENTOLIN HFA) 108 (90 Base) MCG/ACT inhaler Inhale 2 puffs into the lungs every 6 hours as needed for shortness of breath / dyspnea or wheezing 1 Inhaler 1     albuterol (PROAIR HFA/PROVENTIL HFA/VENTOLIN HFA) 108 (90 Base) MCG/ACT inhaler Inhale 2 puffs into the lungs every 4 hours as needed for shortness of breath / dyspnea or wheezing 1 Inhaler 2     amoxicillin (AMOXIL) 250 MG capsule Take 2,000 mg by mouth       aspirin (QC LO-DOSE ASPIRIN) 81 MG EC tablet Take 1 tablet (81 mg) by mouth daily 100 tablet 3     augmented betamethasone dipropionate (DIPROLENE-AF) 0.05 % external ointment Apply to AA twice daily 1-2 weeks then as needed only. Do not apply to face. 45 g 5     Disposable Gloves (LATEX GLOVES MEDIUM) MISC 4 boxes of gloves 400 each 1     Disposable Gloves (NITRILE EXAM GLOVES MEDIUM) MISC 1 each as needed Use PRN daily with administration of otic drops, body lotion and powder to treat dry itchy skin. 4 each 11     EAR DROPS EARWAX AID 6.5 % otic solution Place 5 drops into both ears 2 times daily For 5 days August 1-5th and Feb 1-5 th then return for irrigation after the 5th day. 15 mL 3     finasteride (PROSCAR) 5 MG tablet TAKE ONE TABLET BY MOUTH EVERY DAY 30 tablet 11     fluticasone-vilanterol (BREO ELLIPTA) 100-25  MCG/INH inhaler Inhale 1 puff into the lungs daily 1 Inhaler 11     gabapentin (NEURONTIN) 100 MG capsule Take 100 mg by mouth 2 times daily       Incontinence Supply Disposable (INCONTINENCE BRIEF LARGE) MISC Incontinence pads up to 300 per month 300 each 1     levothyroxine (SYNTHROID/LEVOTHROID) 125 MCG tablet Take 1 tablet (125 mcg) by mouth daily 90 tablet 3     nicotine (NICODERM CQ) 14 MG/24HR 24 hr patch Place 1 patch onto the skin every 24 hours 30 patch 1     Omega-3 Fatty Acids (FISH OIL) 1200 MG capsule Take 1 capsule (1.2 g) by mouth daily 90 capsule 2     order for DME Equipment being ordered: INCONTINENT PRODUCTS (UP /MONTH)  REFILL X1 YEAR 1 each 0     order for DME Equipment being ordered: GLOVES (4 BOXES)  REFILL X1 YEAR 1 each 0     order for DME Equipment being ordered: CHUX  REFILL X1 YEAR 1 each 3     order for DME chux pads 300 pad 1     order for DME Equipment being ordered: incontinent pads 300 Device 3     order for DME Equipment being ordered: gloves 4 Box 3     order for DME Equipment being ordered: Dynaflex insert 1 Units 0     oxybutynin (DITROPAN) 5 MG tablet TAKE ONE TABLET BY MOUTH THREE TIMES DAILY 90 tablet 11     polyethylene glycol (GAVILAX) powder Take 17 g (1 capful) by mouth daily 527 g 1     REGULOID 48.57 % POWD Take 1 each by mouth See Admin Instructions Take 1 teaspoonful every day at 7pm and 9 pm. 369 g 11     simvastatin (ZOCOR) 40 MG tablet TAKE ONE TABLET BY MOUTH EVERY DAY AT 9 PM 30 tablet 0     Skin Protectants, Misc. (EUCERIN) cream Apply topically 2 times daily 454 g 5     tamsulosin (FLOMAX) 0.4 MG capsule Take 1 capsule (0.4 mg) by mouth daily 30 capsule 11     traZODone (DESYREL) 50 MG tablet Take 25 mg by mouth At Bedtime        vitamin D3 (CHOLECALCIFEROL) 2000 units (50 mcg) tablet Take 1 tablet (2,000 Units) by mouth daily 100 tablet 1     ZOLOFT 100 MG OR TABS 2 TABLETS DAILY       Allergies   Allergen Reactions     Nitrogen Oxide      Sulfa Drugs       Recent Labs   Lab Test 11/15/19  0907 10/21/19  1016 08/08/19  1100  12/05/18  0840 08/08/18  1405 03/21/18  0900  11/08/17  0840  02/01/13  0815  05/14/12  0830   A1C  --   --   --   --   --   --  5.6  --   --   --  5.5  --  5.6   LDL 75  --   --   --  118*  --   --   --  80   < > 117   < > 101   HDL 45  --   --   --  48  --   --   --  47   < > 45   < > 43   TRIG 155*  --   --   --  150*  --   --   --  119   < > 123   < > 143   ALT 21  --   --   --  28  --   --   --  22   < >  --    < >  --    CR 1.47* 1.63*  --    < > 1.60*  --  1.36*   < > 1.31*   < >  --    < >  --    GFRESTIMATED 49* 43*  --    < > 44*  --  53*   < > 55*   < >  --    < >  --    GFRESTBLACK 57* 50*  --    < > 53*  --  64   < > 67   < >  --    < >  --    POTASSIUM 4.3 4.6  --    < > 4.6  --  4.4   < > 4.6   < >  --    < >  --    TSH  --   --  1.63  --   --  2.13  --   --   --    < >  --    < >  --     < > = values in this interval not displayed.      BP Readings from Last 3 Encounters:   12/11/19 122/80   10/21/19 134/82   08/22/19 118/77    Wt Readings from Last 3 Encounters:   12/11/19 82.8 kg (182 lb 9.6 oz)   10/21/19 83.1 kg (183 lb 3.2 oz)   08/08/19 81.2 kg (179 lb)                    Reviewed and updated:  Tobacco  Allergies  Meds  Med Hx  Surg Hx  Fam Hx  Soc Hx     ROS:  Constitutional, HEENT, cardiovascular, pulmonary, gi and gu systems are negative, except as otherwise noted.    PHYSICAL EXAM   Wt 82.8 kg (182 lb 9.6 oz)   BMI 27.76 kg/m    Body mass index is 27.76 kg/m .  GENERAL: alert and no distress  EYES: Eyes grossly normal to inspection, PERRL and conjunctivae and sclerae normal  MS: significant callus involving left foot first metatarsal head area, no blistering, erythema or discharge noted  NEURO: Normal strength and tone, mentation intact and speech normal    Assessment & Plan     (L84) Callus of foot  (primary encounter diagnosis)  Comment: Suggested regular moisturizer use, callus pad and better footwear.   Podiatry referral placed for further review recommendations.  Plan: PODIATRY/FOOT & ANKLE SURGERY REFERRAL, order         for DME                Patient Instructions       Patient Education     Treating Corns and Calluses  If your corns or calluses are mild, reducing friction may help. Different shoes, moleskin patches, or soft pads may be all the treatment you need. In more severe cases, treating tissue buildup may require your doctor s care. Sometimes custom-made shoe inserts (orthotics) or special pads are prescribed to reduce friction and pressure.     Doctor examining senior man's foot.   Change shoes  If you have corns, your doctor may suggest wearing shoes that have more toe room. This way, buckled joints are less likely to be pinched against the top of the shoe. If you have calluses, wearing a cushioned insole, arch support, or heel counter can help reduce friction.  Visit your doctor  In some cases, your doctor may trim away the outer layers of skin that make up the corn or callus. For a painful corn, medicine may be injected beneath the built-up tissue.  Wear orthotics  Orthotics are specially made to meet the needs of your feet. They cushion calluses or divert pressure away from these problem areas. Worn as directed, orthotics help limit existing problems and prevent new ones from forming.  If you need surgery  If a bone or joint is out of place, certain parts of your foot may be under too much pressure. This can cause severe corns and calluses. In such cases, surgery may be the best way to correct the problem.  Outpatient procedures  In most cases, surgery to improve bone position is an outpatient procedure. Your doctor may cut away excess bone, reposition prominent bones, or even fuse joints. Sometimes tendons or ligaments are cut to reduce tension on a bone or joint. Your doctor will talk with you about the procedure that is best suited to your needs.  Date Last Reviewed: 7/1/2016 2000-2018 The  [employer]. [address] [place] [address], [place] [address]. All rights reserved. This information is not intended as a substitute for professional medical care. Always follow your healthcare professional's instructions.                 Alexandru Vera MD  [facility]

## 2019-12-11 NOTE — NURSING NOTE
"Chief Complaint   Patient presents with     Musculoskeletal Problem     callus on bottom of foot - left     /80   Pulse 86   Temp 97.3  F (36.3  C) (Tympanic)   Resp 12   Wt 82.8 kg (182 lb 9.6 oz)   SpO2 95%   BMI 27.76 kg/m   Estimated body mass index is 27.76 kg/m  as calculated from the following:    Height as of 8/8/19: 1.727 m (5' 8\").    Weight as of this encounter: 82.8 kg (182 lb 9.6 oz).  Patient presents to the clinic using No DME      Health Maintenance that is potentially due pending provider review:    Health Maintenance Due   Topic Date Due     ADVANCE CARE PLANNING  1955     ZOSTER IMMUNIZATION (2 of 3) 04/12/2016     AORTIC ANEURYSM SCREENING (SYSTEM ASSIGNED)  12/05/2018     MEDICARE ANNUAL WELLNESS VISIT  02/07/2019     PNEUMOCOCCAL IMMUNIZATION 65+ HIGH/HIGHEST RISK (2 of 2 - PPSV23) 11/07/2019        n/a        "

## 2019-12-18 ENCOUNTER — OFFICE VISIT (OUTPATIENT)
Dept: PODIATRY | Facility: CLINIC | Age: 64
End: 2019-12-18
Payer: COMMERCIAL

## 2019-12-18 VITALS
HEART RATE: 73 BPM | SYSTOLIC BLOOD PRESSURE: 125 MMHG | DIASTOLIC BLOOD PRESSURE: 77 MMHG | WEIGHT: 182 LBS | HEIGHT: 70 IN | BODY MASS INDEX: 26.05 KG/M2

## 2019-12-18 DIAGNOSIS — L84 CALLUS OF FOOT: Primary | ICD-10-CM

## 2019-12-18 DIAGNOSIS — M21.6X2 PLANTAR FAT PAD ATROPHY OF LEFT FOOT: ICD-10-CM

## 2019-12-18 PROCEDURE — 11055 PARING/CUTG B9 HYPRKER LES 1: CPT | Performed by: PODIATRIST

## 2019-12-18 PROCEDURE — 99203 OFFICE O/P NEW LOW 30 MIN: CPT | Mod: 25 | Performed by: PODIATRIST

## 2019-12-18 ASSESSMENT — MIFFLIN-ST. JEOR: SCORE: 1603.86

## 2019-12-18 NOTE — LETTER
12/18/2019         RE: Geovani Bustos  [address] [facility] [id number]        Dear Colleague,    Thank you for referring your patient, Geovani Bustos, to the [facility]. Please see a copy of my visit note below.    PATIENT HISTORY:  Geovani Bustos is a 64 year old male who presents to clinic for a painful left foot .  The patient describes the pain as sharp stabbing.  The patient relates the pain level is moderate.  The patient relates pain is located on the bottom of the ball of the left foot.  The patient relates the pain has been present for the past several weeks.  The patient relates pain with ambulation.  The patient has tried different shoes with little relief.  The patient was sent by Dr. Vera for consultation on the left foot.       REVIEW OF SYSTEMS:  Constitutional, HEENT, cardiovascular, pulmonary, GI, , musculoskeletal, neuro, skin, endocrine and psych systems are negative, except as otherwise noted.     PAST MEDICAL HISTORY:   Past Medical History:   Diagnosis Date     Cerumen impaction      Chronic kidney disease      Colon polyps      Hyperlipidemia      Mitral valve prolapse      Schizophrenia (H)      Ulcerated penile lesions      VSD (ventricular septal defect)         PAST SURGICAL HISTORY:   Past Surgical History:   Procedure Laterality Date     ARTHRODESIS FOOT Right 1/19/2016    Procedure: ARTHRODESIS FOOT;  Surgeon: Holden Harrison DPM;  Location: [facility]     ARTHRODESIS FOOT Left 1/3/2017    Procedure: ARTHRODESIS FOOT;  Surgeon: Holden Harrison DPM;  Location: [facility]     SURGICAL HISTORY OF -       leg fracture        MEDICATIONS:   Current Outpatient Medications:      ABILIFY 10 MG OR TABS, Take 22 mg by mouth daily , Disp: , Rfl:      albuterol (PROAIR HFA/PROVENTIL HFA/VENTOLIN HFA) 108 (90 Base) MCG/ACT inhaler, Inhale 2 puffs into the lungs every 6 hours as needed for shortness of breath / dyspnea or wheezing, Disp: 1 Inhaler, Rfl:  1     albuterol (PROAIR HFA/PROVENTIL HFA/VENTOLIN HFA) 108 (90 Base) MCG/ACT inhaler, Inhale 2 puffs into the lungs every 4 hours as needed for shortness of breath / dyspnea or wheezing, Disp: 1 Inhaler, Rfl: 2     amoxicillin (AMOXIL) 250 MG capsule, Take 2,000 mg by mouth, Disp: , Rfl:      aspirin (QC LO-DOSE ASPIRIN) 81 MG EC tablet, Take 1 tablet (81 mg) by mouth daily, Disp: 100 tablet, Rfl: 3     augmented betamethasone dipropionate (DIPROLENE-AF) 0.05 % external ointment, Apply to AA twice daily 1-2 weeks then as needed only. Do not apply to face., Disp: 45 g, Rfl: 5     Disposable Gloves (LATEX GLOVES MEDIUM) MISC, 4 boxes of gloves, Disp: 400 each, Rfl: 1     Disposable Gloves (NITRILE EXAM GLOVES MEDIUM) MISC, 1 each as needed Use PRN daily with administration of otic drops, body lotion and powder to treat dry itchy skin., Disp: 4 each, Rfl: 11     EAR DROPS EARWAX AID 6.5 % otic solution, Place 5 drops into both ears 2 times daily For 5 days August 1-5th and Feb 1-5 th then return for irrigation after the 5th day., Disp: 15 mL, Rfl: 3     finasteride (PROSCAR) 5 MG tablet, TAKE ONE TABLET BY MOUTH EVERY DAY, Disp: 30 tablet, Rfl: 11     fluticasone-vilanterol (BREO ELLIPTA) 100-25 MCG/INH inhaler, Inhale 1 puff into the lungs daily, Disp: 1 Inhaler, Rfl: 11     gabapentin (NEURONTIN) 100 MG capsule, Take 100 mg by mouth 2 times daily, Disp: , Rfl:      Incontinence Supply Disposable (INCONTINENCE BRIEF LARGE) MISC, Incontinence pads up to 300 per month, Disp: 300 each, Rfl: 1     levothyroxine (SYNTHROID/LEVOTHROID) 125 MCG tablet, Take 1 tablet (125 mcg) by mouth daily, Disp: 90 tablet, Rfl: 3     nicotine (NICODERM CQ) 14 MG/24HR 24 hr patch, Place 1 patch onto the skin every 24 hours, Disp: 30 patch, Rfl: 1     Omega-3 Fatty Acids (FISH OIL) 1200 MG capsule, Take 1 capsule (1.2 g) by mouth daily, Disp: 90 capsule, Rfl: 2     order for DME, Equipment being ordered: callous pad, Disp: 1 each, Rfl: 1      order for DME, Equipment being ordered: INCONTINENT PRODUCTS (UP /MONTH) REFILL X1 YEAR, Disp: 1 each, Rfl: 0     order for DME, Equipment being ordered: GLOVES (4 BOXES) REFILL X1 YEAR, Disp: 1 each, Rfl: 0     order for DME, Equipment being ordered: CHUX REFILL X1 YEAR, Disp: 1 each, Rfl: 3     order for DME, chux pads, Disp: 300 pad, Rfl: 1     order for DME, Equipment being ordered: incontinent pads, Disp: 300 Device, Rfl: 3     order for DME, Equipment being ordered: gloves, Disp: 4 Box, Rfl: 3     order for DME, Equipment being ordered: Dynaflex insert, Disp: 1 Units, Rfl: 0     oxybutynin (DITROPAN) 5 MG tablet, TAKE ONE TABLET BY MOUTH THREE TIMES DAILY, Disp: 90 tablet, Rfl: 11     polyethylene glycol (GAVILAX) powder, Take 17 g (1 capful) by mouth daily, Disp: 527 g, Rfl: 1     REGULOID 48.57 % POWD, Take 1 each by mouth See Admin Instructions Take 1 teaspoonful every day at 7pm and 9 pm., Disp: 369 g, Rfl: 11     simvastatin (ZOCOR) 40 MG tablet, TAKE ONE TABLET BY MOUTH EVERY DAY AT 9 PM, Disp: 30 tablet, Rfl: 0     Skin Protectants, Misc. (EUCERIN) cream, Apply topically 2 times daily, Disp: 454 g, Rfl: 5     tamsulosin (FLOMAX) 0.4 MG capsule, Take 1 capsule (0.4 mg) by mouth daily, Disp: 30 capsule, Rfl: 11     traZODone (DESYREL) 50 MG tablet, Take 25 mg by mouth At Bedtime , Disp: , Rfl:      vitamin D3 (CHOLECALCIFEROL) 2000 units (50 mcg) tablet, Take 1 tablet (2,000 Units) by mouth daily, Disp: 100 tablet, Rfl: 1     ZOLOFT 100 MG OR TABS, 2 TABLETS DAILY, Disp: , Rfl:   No current facility-administered medications for this visit.     Facility-Administered Medications Ordered in Other Visits:      bupivacaine (MARCAINE) injection 0.5%, , , PRN, Buren, Holden Vides, DPM, 10 mL at 01/03/17 0945     ALLERGIES:    Allergies   Allergen Reactions     Nitrogen Oxide      Sulfa Drugs         SOCIAL HISTORY:   Social History     Socioeconomic History     Marital status: Single     Spouse name: Not  on file     Number of children: Not on file     Years of education: Not on file     Highest education level: Not on file   Occupational History     Not on file   Social Needs     Financial resource strain: Not on file     Food insecurity:     Worry: Not on file     Inability: Not on file     Transportation needs:     Medical: Not on file     Non-medical: Not on file   Tobacco Use     Smoking status: Current Every Day Smoker     Packs/day: 1.00     Years: 47.00     Pack years: 47.00     Types: Cigarettes     Smokeless tobacco: Never Used     Tobacco comment: cutting one cigarette down a month   Substance and Sexual Activity     Alcohol use: No     Drug use: No     Sexual activity: Never   Lifestyle     Physical activity:     Days per week: Not on file     Minutes per session: Not on file     Stress: Not on file   Relationships     Social connections:     Talks on phone: Not on file     Gets together: Not on file     Attends Hindu service: Not on file     Active member of club or organization: Not on file     Attends meetings of clubs or organizations: Not on file     Relationship status: Not on file     Intimate partner violence:     Fear of current or ex partner: Not on file     Emotionally abused: Not on file     Physically abused: Not on file     Forced sexual activity: Not on file   Other Topics Concern     Parent/sibling w/ CABG, MI or angioplasty before 65F 55M? Not Asked      Service Not Asked     Blood Transfusions No     Caffeine Concern Not Asked     Occupational Exposure Not Asked     Hobby Hazards Not Asked     Sleep Concern Not Asked     Stress Concern Not Asked     Weight Concern Not Asked     Special Diet Not Asked     Back Care Not Asked     Exercise Not Asked     Bike Helmet Not Asked     Seat Belt Not Asked     Self-Exams Not Asked   Social History Narrative    Patient has no interest in smoking cessation.     Her for [employer] - likes to Bowl    Lives in Group home. 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         FAMILY HISTORY:   Family History   Problem Relation Age of Onset     Emphysema Mother      Coronary Artery Disease Father         EXAM:Vitals: /77   Pulse 73   Ht 1.765 m (5' 9.5\")   Wt 82.6 kg (182 lb)   BMI 26.49 kg/m     BMI= Body mass index is 26.49 kg/m .        General appearance: Patient is alert and fully cooperative with history & exam.  No sign of distress is noted during the visit.     Psychiatric: Affect is pleasant & appropriate.  Patient appears motivated to improve health.     Respiratory: Breathing is regular & unlabored while sitting.     HEENT: Hearing is intact to spoken word.  Speech is clear.  No gross evidence of visual impairment that would impact ambulation.     Dermatologic: Skin is intact to left lower extremities without significant lesions, rash or abrasion.  No paronychia or evidence of soft tissue infection is noted.  One notes a hyperkeratotic skin lesion on the plantar aspect of the ball of the left foot with no surrounding erythema or subcutaneous hemorrhage.  One notes fat pad atrophy on the ball of the left foot.     Vascular: DP & PT pulses are intact & regular on the left.  No significant edema or varicosities noted.  CFT and skin temperature is normal to the left lower extremities.     Neurologic: Lower extremity sensation is intact to light touch.  No evidence of weakness or contracture in the left lower extremities.  No evidence of neuropathy.     Musculoskeletal: Patient is ambulatory without assistive device or brace.  No gross ankle deformity noted.  No foot or ankle joint effusion is noted.          ASSESSMENT / PLAN:     ICD-10-CM    1. Callus of foot L84    2. Plantar fat pad atrophy of left foot M21.6X2        I have explained to Geovani  about the conditions.  We discussed the nature of the condition as well as the treatment plan and expected length of recovery.  At this time, hyperkeratotic skin lesion was sharply debrided with " #10 blade down to healthy epidermis.  No bleeding noted.  The patient was advised to wear a silicone shoe insert to better offload the pressure causing the callus formation.    Geovani verbalized agreement with and understanding of the rational for the diagnosis and treatment plan.  All questions were answered to best of my ability and the patient's satisfaction. The patient was advised to contact the clinic with any questions that may arise after the clinic visit.      Disclaimer: This note consists of symbols derived from keyboarding, dictation and/or voice recognition software. As a result, there may be errors in the script that have gone undetected. Please consider this when interpreting information found in this chart.       BREONNA Harrison D.P.M., F.A.C.F.A.S.        Again, thank you for allowing me to participate in the care of your patient.        Sincerely,        Holden Harrison DPM

## 2019-12-18 NOTE — PROGRESS NOTES
PATIENT HISTORY:  Geovani Bustos is a 64 year old male who presents to clinic for a painful left foot .  The patient describes the pain as sharp stabbing.  The patient relates the pain level is moderate.  The patient relates pain is located on the bottom of the ball of the left foot.  The patient relates the pain has been present for the past several weeks.  The patient relates pain with ambulation.  The patient has tried different shoes with little relief.  The patient was sent by Dr. Vera for consultation on the left foot.       REVIEW OF SYSTEMS:  Constitutional, HEENT, cardiovascular, pulmonary, GI, , musculoskeletal, neuro, skin, endocrine and psych systems are negative, except as otherwise noted.     PAST MEDICAL HISTORY:   Past Medical History:   Diagnosis Date     Cerumen impaction      Chronic kidney disease      Colon polyps      Hyperlipidemia      Mitral valve prolapse      Schizophrenia (H)      Ulcerated penile lesions      VSD (ventricular septal defect)         PAST SURGICAL HISTORY:   Past Surgical History:   Procedure Laterality Date     ARTHRODESIS FOOT Right 1/19/2016    Procedure: ARTHRODESIS FOOT;  Surgeon: Holden Harrison DPM;  Location: [facility]     ARTHRODESIS FOOT Left 1/3/2017    Procedure: ARTHRODESIS FOOT;  Surgeon: Holden Harrison DPM;  Location: [facility]     SURGICAL HISTORY OF -       leg fracture        MEDICATIONS:   Current Outpatient Medications:      ABILIFY 10 MG OR TABS, Take 22 mg by mouth daily , Disp: , Rfl:      albuterol (PROAIR HFA/PROVENTIL HFA/VENTOLIN HFA) 108 (90 Base) MCG/ACT inhaler, Inhale 2 puffs into the lungs every 6 hours as needed for shortness of breath / dyspnea or wheezing, Disp: 1 Inhaler, Rfl: 1     albuterol (PROAIR HFA/PROVENTIL HFA/VENTOLIN HFA) 108 (90 Base) MCG/ACT inhaler, Inhale 2 puffs into the lungs every 4 hours as needed for shortness of breath / dyspnea or wheezing, Disp: 1 Inhaler, Rfl: 2     amoxicillin (AMOXIL) 250 MG capsule,  Take 2,000 mg by mouth, Disp: , Rfl:      aspirin (QC LO-DOSE ASPIRIN) 81 MG EC tablet, Take 1 tablet (81 mg) by mouth daily, Disp: 100 tablet, Rfl: 3     augmented betamethasone dipropionate (DIPROLENE-AF) 0.05 % external ointment, Apply to AA twice daily 1-2 weeks then as needed only. Do not apply to face., Disp: 45 g, Rfl: 5     Disposable Gloves (LATEX GLOVES MEDIUM) MISC, 4 boxes of gloves, Disp: 400 each, Rfl: 1     Disposable Gloves (NITRILE EXAM GLOVES MEDIUM) MISC, 1 each as needed Use PRN daily with administration of otic drops, body lotion and powder to treat dry itchy skin., Disp: 4 each, Rfl: 11     EAR DROPS EARWAX AID 6.5 % otic solution, Place 5 drops into both ears 2 times daily For 5 days August 1-5th and Feb 1-5 th then return for irrigation after the 5th day., Disp: 15 mL, Rfl: 3     finasteride (PROSCAR) 5 MG tablet, TAKE ONE TABLET BY MOUTH EVERY DAY, Disp: 30 tablet, Rfl: 11     fluticasone-vilanterol (BREO ELLIPTA) 100-25 MCG/INH inhaler, Inhale 1 puff into the lungs daily, Disp: 1 Inhaler, Rfl: 11     gabapentin (NEURONTIN) 100 MG capsule, Take 100 mg by mouth 2 times daily, Disp: , Rfl:      Incontinence Supply Disposable (INCONTINENCE BRIEF LARGE) MISC, Incontinence pads up to 300 per month, Disp: 300 each, Rfl: 1     levothyroxine (SYNTHROID/LEVOTHROID) 125 MCG tablet, Take 1 tablet (125 mcg) by mouth daily, Disp: 90 tablet, Rfl: 3     nicotine (NICODERM CQ) 14 MG/24HR 24 hr patch, Place 1 patch onto the skin every 24 hours, Disp: 30 patch, Rfl: 1     Omega-3 Fatty Acids (FISH OIL) 1200 MG capsule, Take 1 capsule (1.2 g) by mouth daily, Disp: 90 capsule, Rfl: 2     order for DME, Equipment being ordered: callous pad, Disp: 1 each, Rfl: 1     order for DME, Equipment being ordered: INCONTINENT PRODUCTS (UP /MONTH) REFILL X1 YEAR, Disp: 1 each, Rfl: 0     order for DME, Equipment being ordered: GLOVES (4 BOXES) REFILL X1 YEAR, Disp: 1 each, Rfl: 0     order for DME, Equipment being  ordered: CHUX REFILL X1 YEAR, Disp: 1 each, Rfl: 3     order for DME, chux pads, Disp: 300 pad, Rfl: 1     order for DME, Equipment being ordered: incontinent pads, Disp: 300 Device, Rfl: 3     order for DME, Equipment being ordered: gloves, Disp: 4 Box, Rfl: 3     order for DME, Equipment being ordered: Dynaflex insert, Disp: 1 Units, Rfl: 0     oxybutynin (DITROPAN) 5 MG tablet, TAKE ONE TABLET BY MOUTH THREE TIMES DAILY, Disp: 90 tablet, Rfl: 11     polyethylene glycol (GAVILAX) powder, Take 17 g (1 capful) by mouth daily, Disp: 527 g, Rfl: 1     REGULOID 48.57 % POWD, Take 1 each by mouth See Admin Instructions Take 1 teaspoonful every day at 7pm and 9 pm., Disp: 369 g, Rfl: 11     simvastatin (ZOCOR) 40 MG tablet, TAKE ONE TABLET BY MOUTH EVERY DAY AT 9 PM, Disp: 30 tablet, Rfl: 0     Skin Protectants, Misc. (EUCERIN) cream, Apply topically 2 times daily, Disp: 454 g, Rfl: 5     tamsulosin (FLOMAX) 0.4 MG capsule, Take 1 capsule (0.4 mg) by mouth daily, Disp: 30 capsule, Rfl: 11     traZODone (DESYREL) 50 MG tablet, Take 25 mg by mouth At Bedtime , Disp: , Rfl:      vitamin D3 (CHOLECALCIFEROL) 2000 units (50 mcg) tablet, Take 1 tablet (2,000 Units) by mouth daily, Disp: 100 tablet, Rfl: 1     ZOLOFT 100 MG OR TABS, 2 TABLETS DAILY, Disp: , Rfl:   No current facility-administered medications for this visit.     Facility-Administered Medications Ordered in Other Visits:      bupivacaine (MARCAINE) injection 0.5%, , , PRN, Christy, Holden Vides, ROSARIO, 10 mL at 01/03/17 0945     ALLERGIES:    Allergies   Allergen Reactions     Nitrogen Oxide      Sulfa Drugs         SOCIAL HISTORY:   Social History     Socioeconomic History     Marital status: Single     Spouse name: Not on file     Number of children: Not on file     Years of education: Not on file     Highest education level: Not on file   Occupational History     Not on file   Social Needs     Financial resource strain: Not on file     Food insecurity:     Worry:  "Not on file     Inability: Not on file     Transportation needs:     Medical: Not on file     Non-medical: Not on file   Tobacco Use     Smoking status: Current Every Day Smoker     Packs/day: 1.00     Years: 47.00     Pack years: 47.00     Types: Cigarettes     Smokeless tobacco: Never Used     Tobacco comment: cutting one cigarette down a month   Substance and Sexual Activity     Alcohol use: No     Drug use: No     Sexual activity: Never   Lifestyle     Physical activity:     Days per week: Not on file     Minutes per session: Not on file     Stress: Not on file   Relationships     Social connections:     Talks on phone: Not on file     Gets together: Not on file     Attends Yarsani service: Not on file     Active member of club or organization: Not on file     Attends meetings of clubs or organizations: Not on file     Relationship status: Not on file     Intimate partner violence:     Fear of current or ex partner: Not on file     Emotionally abused: Not on file     Physically abused: Not on file     Forced sexual activity: Not on file   Other Topics Concern     Parent/sibling w/ CABG, MI or angioplasty before 65F 55M? Not Asked      Service Not Asked     Blood Transfusions No     Caffeine Concern Not Asked     Occupational Exposure Not Asked     Hobby Hazards Not Asked     Sleep Concern Not Asked     Stress Concern Not Asked     Weight Concern Not Asked     Special Diet Not Asked     Back Care Not Asked     Exercise Not Asked     Bike Helmet Not Asked     Seat Belt Not Asked     Self-Exams Not Asked   Social History Narrative    Patient has no interest in smoking cessation.     Her for [employer] likes to Bowl    Lives in Group home. Grew up in [place].         FAMILY HISTORY:   Family History   Problem Relation Age of Onset     Emphysema Mother      Coronary Artery Disease Father         EXAM:Vitals: /77   Pulse 73   Ht 1.765 m (5' 9.5\")   Wt 82.6 kg (182 lb)   BMI " 26.49 kg/m    BMI= Body mass index is 26.49 kg/m .        General appearance: Patient is alert and fully cooperative with history & exam.  No sign of distress is noted during the visit.     Psychiatric: Affect is pleasant & appropriate.  Patient appears motivated to improve health.     Respiratory: Breathing is regular & unlabored while sitting.     HEENT: Hearing is intact to spoken word.  Speech is clear.  No gross evidence of visual impairment that would impact ambulation.     Dermatologic: Skin is intact to left lower extremities without significant lesions, rash or abrasion.  No paronychia or evidence of soft tissue infection is noted.  One notes a hyperkeratotic skin lesion on the plantar aspect of the ball of the left foot with no surrounding erythema or subcutaneous hemorrhage.  One notes fat pad atrophy on the ball of the left foot.     Vascular: DP & PT pulses are intact & regular on the left.  No significant edema or varicosities noted.  CFT and skin temperature is normal to the left lower extremities.     Neurologic: Lower extremity sensation is intact to light touch.  No evidence of weakness or contracture in the left lower extremities.  No evidence of neuropathy.     Musculoskeletal: Patient is ambulatory without assistive device or brace.  No gross ankle deformity noted.  No foot or ankle joint effusion is noted.          ASSESSMENT / PLAN:     ICD-10-CM    1. Callus of foot L84    2. Plantar fat pad atrophy of left foot M21.6X2        I have explained to Geovani  about the conditions.  We discussed the nature of the condition as well as the treatment plan and expected length of recovery.  At this time, hyperkeratotic skin lesion was sharply debrided with #10 blade down to healthy epidermis.  No bleeding noted.  The patient was advised to wear a silicone shoe insert to better offload the pressure causing the callus formation.    Geovani verbalized agreement with and understanding of the rational for  the diagnosis and treatment plan.  All questions were answered to best of my ability and the patient's satisfaction. The patient was advised to contact the clinic with any questions that may arise after the clinic visit.      Disclaimer: This note consists of symbols derived from keyboarding, dictation and/or voice recognition software. As a result, there may be errors in the script that have gone undetected. Please consider this when interpreting information found in this chart.       BREONNA Harrison D.P.M., [email or web address].

## 2019-12-18 NOTE — PATIENT INSTRUCTIONS
CALLUS / CORNS / IPKs  When there is excessive friction or pressure on the skin, the body responds by making the skin thicker to protect the deeper structures from becoming exposed. While this works well to protect the deeper structures, the thickened skin can increase pressure and pain.    CALLUS: Flat, diffuse thickening are simple calluses and they are usually caused by friction. Often these are the result of rubbing on a shoe or going barefoot.    CORNS: Calluses with a central core between the toes are called corns. These result from prominent joints on adjacent toes rubbing together. Theses are a symptom of bone malalignment and will always recur unless the underlying bones are addressed surgically.    IPKs: Calluses with a central core on the ball of the foot are usually IPKs (intractable plantar keratosis). These are caused by excessive pressure from the metatarsals, the bones that make up the ball of the foot. Often one of these bones is too long or too prominent.  Again, these will always recur unless the underlying bone issue is addressed. There is no cure for these. They will either go away by themselves, recur, or more could develop.    ROUTINE MAINTENANCE  1. File them down with a pumice stone or callus file a couple times a week.   2. An electric callus removing device. Amope Pedi Perfect Electronic Pedicure Foot File and Callus Remover can be a good option.   3. Lotion can be applied to soften the callus. A urea based cream such as Kersal or Vanicream or thicker cream with shea butter are good options.  4. Toe spacers or toe covers can be used for corns, gel pads can be used for other lesions on the bottom of the foot.   If there is a surgical pathology noted, such as a prominent bone, often this needs to be addressed surgically to minimize recurrence. However, sometimes the lesion simply migrates to another spot after surgery, so it is not a guaranteed cure.     There was also discussion of the cost  structure of callus care if they were to come back and have it treated in the clinic. Explained that if insurance does not cover it, they would be billed. This charge could range from $100 - $160.  They were also provided information on places to get the callus treatment.

## 2019-12-18 NOTE — NURSING NOTE
"Chief Complaint   Patient presents with     Consult     Callus on left foot       Initial /77   Pulse 73   Ht 1.765 m (5' 9.5\")   Wt 82.6 kg (182 lb)   BMI 26.49 kg/m   Estimated body mass index is 26.49 kg/m  as calculated from the following:    Height as of this encounter: 1.765 m (5' 9.5\").    Weight as of this encounter: 82.6 kg (182 lb).  Medications and allergies reviewed.      [facility] MA    "

## 2019-12-23 DIAGNOSIS — K59.00 CONSTIPATION, UNSPECIFIED CONSTIPATION TYPE: ICD-10-CM

## 2019-12-23 DIAGNOSIS — K59.00 CONSTIPATION: ICD-10-CM

## 2019-12-23 RX ORDER — POLYETHYLENE GLYCOL 3350 17 G/17G
POWDER, FOR SOLUTION ORAL
Qty: 527 G | Refills: 1 | Status: SHIPPED | OUTPATIENT
Start: 2019-12-23 | End: 2020-02-28

## 2019-12-23 NOTE — TELEPHONE ENCOUNTER
Requested Prescriptions   Pending Prescriptions Disp Refills     REGULOID 48.57 % POWD [Pharmacy Med Name: REGULOID 3.4 G/7 G POWDER] 369 g 11     Sig: Take 1 each by mouth See Admin Instructions Take 1 teaspoonful every day at 7pm and 9 pm.       There is no refill protocol information for this order        Last Written Prescription Date:  12/11/19  Last Fill Quantity: 369g,  # refills: 11   Last office visit: 12/11/2019 with prescribing provider:     Future Office Visit:   Next 5 appointments (look out 90 days)    Jan 16, 2020  2:45 PM CST  Return Visit with Luis A Zepeda MD  [facility] [address] [id number] [phone]

## 2019-12-23 NOTE — TELEPHONE ENCOUNTER
"Requested Prescriptions   Pending Prescriptions Disp Refills     polyethylene glycol (GAVILAX) powder 527 g 1     Sig: Take 17 g (1 capful) by mouth daily       Laxatives Protocol Passed - 12/23/2019  9:33 AM        Passed - Patient is age 6 or older        Passed - Recent (12 mo) or future (30 days) visit within the authorizing provider's specialty     Patient has had an office visit with the authorizing provider or a provider within the authorizing providers department within the previous 12 mos or has a future within next 30 days. See \"Patient Info\" tab in inbasket, or \"Choose Columns\" in Meds & Orders section of the refill encounter.              Passed - Medication is active on med list        Last Written Prescription Date:  11/5/19  Last Fill Quantity: 527g,  # refills: 1   Last office visit: 12/11/2019 with prescribing provider:     Future Office Visit:   Next 5 appointments (look out 90 days)    Jan 16, 2020  2:45 PM CST  Return Visit with Luis A Zepeda MD  [facility]) [address] [address]  [phone]           "

## 2020-01-07 DIAGNOSIS — E55.9 VITAMIN D DEFICIENCY: ICD-10-CM

## 2020-01-07 DIAGNOSIS — E78.5 HYPERLIPIDEMIA LDL GOAL <130: ICD-10-CM

## 2020-01-07 RX ORDER — SIMVASTATIN 40 MG
TABLET ORAL
Qty: 30 TABLET | Refills: 2 | Status: SHIPPED | OUTPATIENT
Start: 2020-01-07 | End: 2020-03-02

## 2020-01-07 RX ORDER — CHOLECALCIFEROL (VITAMIN D3) 50 MCG
1 TABLET ORAL DAILY
Qty: 100 TABLET | Refills: 0 | Status: SHIPPED | OUTPATIENT
Start: 2020-01-07 | End: 2020-06-15

## 2020-01-07 NOTE — TELEPHONE ENCOUNTER
Requested Prescriptions   Pending Prescriptions Disp Refills     aspirin (QC LO-DOSE ASPIRIN) 81 MG EC tablet 100 tablet      Sig: Take 1 tablet (81 mg) by mouth daily       There is no refill protocol information for this order        vitamin D3 (CHOLECALCIFEROL) 2000 units (50 mcg) tablet 100 tablet      Sig: Take 1 tablet (2,000 Units) by mouth daily       There is no refill protocol information for this order        simvastatin (ZOCOR) 40 MG tablet 30 tablet      Sig: TAKE ONE TABLET BY MOUTH EVERY DAY AT 9 PM       There is no refill protocol information for this order          VITAMIN D3  Last Written Prescription Date:  9/19/19  Last Fill Quantity: 100,  # refills: 1   Last office visit: 12/11/2019 with prescribing provider:     Future Office Visit:   Next 5 appointments (look out 90 days)    Jan 16, 2020  2:45 PM CST  Return Visit with Luis A Zepeda MD  [facility]) [address] [place] [address]  [phone]   Feb 10, 2020 10:20 AM CST  PHYSICAL with Alexandru Vera MD  [facility] [address] [address]  [phone]         SIMVASTATIN  Last Written Prescription Date:  11/11/19  Last Fill Quantity: 30,  # refills: 0   Last office visit: 12/11/2019 with prescribing provider:     Future Office Visit:   Next 5 appointments (look out 90 days)    Jan 16, 2020  2:45 PM CST  Return Visit with Luis A Zepeda MD  [facility] ([facility]) [address] [place] [address]  [phone]   Feb 10, 2020 10:20 AM CST  PHYSICAL with Alexandru Vera MD  [facility] [address] [address]  [phone]         ASPIRIN  Last Written Prescription Date:  3/5/19  Last Fill Quantity: 100,  # refills: 3   Last office visit: 12/11/2019 with prescribing provider:     Future  Office Visit:   Next 5 appointments (look out 90 days)    Jan 16, 2020  2:45 PM CST  Return Visit with Luis A Zepeda MD  [facility] [address] [address]  [phone]   Feb 10, 2020 10:20 AM CST  PHYSICAL with Alexandru Vera MD  [facility]) [address] [facility] [id number] [phone]

## 2020-01-08 ENCOUNTER — ALLIED HEALTH/NURSE VISIT (OUTPATIENT)
Dept: UROLOGY | Facility: CLINIC | Age: 65
End: 2020-01-08
Payer: COMMERCIAL

## 2020-01-08 DIAGNOSIS — N32.81 OAB (OVERACTIVE BLADDER): Primary | ICD-10-CM

## 2020-01-08 PROCEDURE — 51741 ELECTRO-UROFLOWMETRY FIRST: CPT

## 2020-01-08 PROCEDURE — 51728 CYSTOMETROGRAM W/VP: CPT

## 2020-01-08 PROCEDURE — 51797 INTRAABDOMINAL PRESSURE TEST: CPT

## 2020-01-08 PROCEDURE — 51784 ANAL/URINARY MUSCLE STUDY: CPT

## 2020-01-08 NOTE — NURSING NOTE
SUBJECTIVE:  Geovani Bustos is a 64 year old male referred to me for Urodynamic Study by Dr. Luis A Zepeda. Supervising physician today is Dr Johnson Mack  Current Outpatient Medications   Medication Sig Dispense Refill     ABILIFY 10 MG OR TABS Take 22 mg by mouth daily        albuterol (PROAIR HFA/PROVENTIL HFA/VENTOLIN HFA) 108 (90 Base) MCG/ACT inhaler Inhale 2 puffs into the lungs every 6 hours as needed for shortness of breath / dyspnea or wheezing 1 Inhaler 1     albuterol (PROAIR HFA/PROVENTIL HFA/VENTOLIN HFA) 108 (90 Base) MCG/ACT inhaler Inhale 2 puffs into the lungs every 4 hours as needed for shortness of breath / dyspnea or wheezing 1 Inhaler 2     amoxicillin (AMOXIL) 250 MG capsule Take 2,000 mg by mouth       aspirin (QC LO-DOSE ASPIRIN) 81 MG EC tablet Take 1 tablet (81 mg) by mouth daily 100 tablet 0     augmented betamethasone dipropionate (DIPROLENE-AF) 0.05 % external ointment Apply to AA twice daily 1-2 weeks then as needed only. Do not apply to face. 45 g 5     Disposable Gloves (LATEX GLOVES MEDIUM) MISC 4 boxes of gloves 400 each 1     Disposable Gloves (NITRILE EXAM GLOVES MEDIUM) MISC 1 each as needed Use PRN daily with administration of otic drops, body lotion and powder to treat dry itchy skin. 4 each 11     EAR DROPS EARWAX AID 6.5 % otic solution Place 5 drops into both ears 2 times daily For 5 days August 1-5th and Feb 1-5 th then return for irrigation after the 5th day. 15 mL 3     finasteride (PROSCAR) 5 MG tablet TAKE ONE TABLET BY MOUTH EVERY DAY 30 tablet 11     fluticasone-vilanterol (BREO ELLIPTA) 100-25 MCG/INH inhaler Inhale 1 puff into the lungs daily 1 Inhaler 11     gabapentin (NEURONTIN) 100 MG capsule Take 100 mg by mouth 2 times daily       Incontinence Supply Disposable (INCONTINENCE BRIEF LARGE) MISC Incontinence pads up to 300 per month 300 each 1     levothyroxine (SYNTHROID/LEVOTHROID) 125 MCG tablet Take 1 tablet (125 mcg) by mouth daily 90 tablet 3      nicotine (NICODERM CQ) 14 MG/24HR 24 hr patch Place 1 patch onto the skin every 24 hours 30 patch 1     Omega-3 Fatty Acids (FISH OIL) 1200 MG capsule Take 1 capsule (1.2 g) by mouth daily 90 capsule 2     order for DME Equipment being ordered: callous pad 1 each 1     order for DME Equipment being ordered: INCONTINENT PRODUCTS (UP /MONTH)  REFILL X1 YEAR 1 each 0     order for DME Equipment being ordered: GLOVES (4 BOXES)  REFILL X1 YEAR 1 each 0     order for DME Equipment being ordered: CHUX  REFILL X1 YEAR 1 each 3     order for DME chux pads 300 pad 1     order for DME Equipment being ordered: incontinent pads 300 Device 3     order for DME Equipment being ordered: gloves 4 Box 3     order for DME Equipment being ordered: Dynaflex insert 1 Units 0     oxybutynin (DITROPAN) 5 MG tablet TAKE ONE TABLET BY MOUTH THREE TIMES DAILY 90 tablet 11     polyethylene glycol (GAVILAX) powder Take 17 g (1 capful) by mouth daily 527 g 1     REGULOID 48.57 % POWD Take 1 each by mouth See Admin Instructions Take 1 teaspoonful every day at 7pm and 9 pm. 369 g 11     simvastatin (ZOCOR) 40 MG tablet TAKE ONE TABLET BY MOUTH EVERY DAY AT 9 PM 30 tablet 2     Skin Protectants, Misc. (EUCERIN) cream Apply topically 2 times daily 454 g 5     tamsulosin (FLOMAX) 0.4 MG capsule Take 1 capsule (0.4 mg) by mouth daily 30 capsule 11     traZODone (DESYREL) 50 MG tablet Take 25 mg by mouth At Bedtime        vitamin D3 (CHOLECALCIFEROL) 2000 units (50 mcg) tablet Take 1 tablet (2,000 Units) by mouth daily 100 tablet 0     ZOLOFT 100 MG OR TABS 2 TABLETS DAILY       Allergies:   Allergies   Allergen Reactions     Nitrogen Oxide      Sulfa Drugs      Chief Complaint:  Chief Complaint   Patient presents with     Urinary Problem     Srinivas is here for a Urodynamic study     History pertinent to this evaluation:   Patient is troubled by constipation  Schizophrenia   No history of spinal injury  Urinary incontinence  Nocturia 1-3 times per  night  Nocturnal Enuresis  Urgency and Frequency   Past Surgical History:   Procedure Laterality Date     ARTHRODESIS FOOT Right 1/19/2016    Procedure: ARTHRODESIS FOOT;  Surgeon: Holden Harrison DPM;  Location: [facility]     ARTHRODESIS FOOT Left 1/3/2017    Procedure: ARTHRODESIS FOOT;  Surgeon: Holden Harrison DPM;  Location: [facility]     SURGICAL HISTORY OF -       leg fracture       Informed consent given by the patient for this procedure.  Catheter is placed with some resistance.      Impression:  Free Flow: Voided volume is 209 mls. Qmax is good at 13.4. The PVR was 9 mls, however a small amount of the urine leaked onto the floor.    Bladder filling study: There is normal bladder compliance and normal end filling bladder pressures. 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 The first overactive bladder contraction begins at 207 mls infused. The second overactive bladder contraction occurs at approximately 250 mls infused and occurs with a large terminal urinary leak at 270 mls infused. The DLPP is 25 cmH20. The first and second bladder sensations are normal at 207 and 212 mls infused, however there coincide with rise in bladder pressure. There is another terminal urinary leak associated with the 3rd bladder sensation. It is impossible to be precise about the bladder volume at the 3rd bladder sensation due to the previous large urinary leak, however looking at the total infused volume there is approximately 200- 250 mls in the bladder each time the detrusor overactivity begins. This is a small bladder capacity due to the inability to store/ detrusor overactivity. Total leaked volume is approximately 400 mls.    Pressure flow study: I began to fill the bladder again knowing that there would likely not be a large bladder volume. The bladder pressure begins to rise and that patient signals that he can no longer delay urination.  The voided volume is 207 mls.  The Qmax is good at 11.7 ml/sec.  The detrusor at max is lower than what would be expected at 17 CH20 and there is abdominal staining, however the PVR is 26 mls and the EMG is considered normal.       PLAN:  Patient was given verbal information on bladder irritants, normal urinary patterns, controlling urgency and frequency, pelvic floor strengthening and healthy fluid consumption during the day.  Verbal and written information given on discharge instructions.  Patient will follow up in clinic with Dr. Zepeda next week. They will go over results of Urodynamic study and determine treatment plan. Chani Castellanos Rn Urodynamicist

## 2020-01-16 ENCOUNTER — OFFICE VISIT (OUTPATIENT)
Dept: UROLOGY | Facility: CLINIC | Age: 65
End: 2020-01-16
Payer: COMMERCIAL

## 2020-01-16 VITALS — DIASTOLIC BLOOD PRESSURE: 81 MMHG | HEART RATE: 64 BPM | RESPIRATION RATE: 16 BRPM | SYSTOLIC BLOOD PRESSURE: 130 MMHG

## 2020-01-16 DIAGNOSIS — N32.81 OAB (OVERACTIVE BLADDER): Primary | ICD-10-CM

## 2020-01-16 PROCEDURE — 99214 OFFICE O/P EST MOD 30 MIN: CPT | Performed by: UROLOGY

## 2020-01-16 RX ORDER — OXYBUTYNIN CHLORIDE 5 MG/1
10 TABLET ORAL 3 TIMES DAILY
Qty: 90 TABLET | Refills: 11 | Status: SHIPPED | OUTPATIENT
Start: 2020-01-16 | End: 2020-05-19

## 2020-01-16 RX ORDER — OXYBUTYNIN CHLORIDE 5 MG/1
10 TABLET, EXTENDED RELEASE ORAL DAILY
Qty: 90 TABLET | Refills: 11 | Status: SHIPPED | OUTPATIENT
Start: 2020-01-16 | End: 2020-01-16

## 2020-01-16 NOTE — NURSING NOTE
"Initial /81 (BP Location: Left arm, Patient Position: Chair, Cuff Size: Adult Regular)   Pulse 64   Resp 16  Estimated body mass index is 26.49 kg/m  as calculated from the following:    Height as of 12/18/19: 1.765 m (5' 9.5\").    Weight as of 12/18/19: 82.6 kg (182 lb). .    jerad spaulding LPN    "

## 2020-01-17 NOTE — PROGRESS NOTES
Visit Date:   01/16/2020      REASON FOR VISIT TODAY:  Overactive bladder.      HISTORY OF PRESENT ILLNESS: Mr. Bustos is a 64-year-old gentleman with a history of urinary urgency and urge incontinence that is currently being managed with a combination of Flomax, finasteride and oxybutynin 5 mg p.o. t.i.d.  The patient underwent cystoscopy demonstrating a paucity of prostate tissue.  He has tried physical therapy for pelvic floor relaxation exercises, but this did not seem to improve his symptoms either.  He has recently completed urodynamics evaluation and comes in today to go over the results.      PHYSICAL EXAMINATION:   VITAL:  On exam his blood pressure is 130/81, pulse 64.   GENERAL:  He is in no acute distress.      The patient's urodynamics report from 01/08/2020 demonstrates significant bladder overactivity with small bladder capacities of around 200-230 mL with terminal evacuation contractions elicited at about these volumes.  There was no evidence for obstruction during the voiding phase.  Storage pressures were otherwise normal.      ASSESSMENT AND PLAN:  Over half of today's 25-minute visit was spent counseling the patient regarding his bladder overactivity.  I suggested to Mr. Bustos that indeed he has severe bladder overactivity.  However, obstruction does not seem to be at the root of the cause of the bladder overactivity and therefore outlet procedure such as a Rezum water vapor treatment or TURP, etc. would likely not benefit the patient.  We therefore discussed options including increasing the dose of his oxybutynin versus switching to a different anticholinergic versus consideration of Botox injection versus placement of InterStim device.  After discussion of risks and benefits of the various approaches, the patient is content to simply try increasing the oxybutynin, which seems like a very reasonable first step.  We will plan on increasing the dose to 10 mg p.o. t.i.d. and seeing if that  improves the situation adequately.  If not, we may try a different anticholinergic and if that fails, then I believe it would be reasonable to consider surgical intervention such as Botox or InterStim and we will refer the patient on to one of my colleagues at that time should have become necessary.  The patient is in agreement with the plan.         RAQUEL CHAVES MD             D: 2020   T: 2020   MT: NTS      Name:     JAZZ MACE   MRN:      [id number]        Account:      [id number]   :      1955           Visit Date:   2020      Document: [id number]

## 2020-01-24 ENCOUNTER — OFFICE VISIT (OUTPATIENT)
Dept: URGENT CARE | Facility: URGENT CARE | Age: 65
End: 2020-01-24
Payer: COMMERCIAL

## 2020-01-24 VITALS
RESPIRATION RATE: 18 BRPM | DIASTOLIC BLOOD PRESSURE: 68 MMHG | TEMPERATURE: 98 F | HEART RATE: 68 BPM | SYSTOLIC BLOOD PRESSURE: 128 MMHG | WEIGHT: 181 LBS | BODY MASS INDEX: 26.35 KG/M2 | OXYGEN SATURATION: 93 %

## 2020-01-24 DIAGNOSIS — L08.9 LOCAL INFECTION OF SKIN AND SUBCUTANEOUS TISSUE: Primary | ICD-10-CM

## 2020-01-24 PROCEDURE — 99213 OFFICE O/P EST LOW 20 MIN: CPT | Performed by: NURSE PRACTITIONER

## 2020-01-24 RX ORDER — CEPHALEXIN 500 MG/1
500 CAPSULE ORAL 3 TIMES DAILY
Qty: 21 CAPSULE | Refills: 0 | Status: SHIPPED | OUTPATIENT
Start: 2020-01-24 | End: 2020-01-29

## 2020-01-24 NOTE — NURSING NOTE
"Chief Complaint   Patient presents with     Infection     Right ankle has a scab and red spot around it.  Has been there about a week. No treatments      Toe Pain     Calus on left toe        Initial /68 (BP Location: Right arm, Patient Position: Chair, Cuff Size: Adult Regular)   Pulse 68   Temp 98  F (36.7  C) (Tympanic)   Resp 18   Wt 82.1 kg (181 lb)   SpO2 93%   BMI 26.35 kg/m   Estimated body mass index is 26.35 kg/m  as calculated from the following:    Height as of 12/18/19: 1.765 m (5' 9.5\").    Weight as of this encounter: 82.1 kg (181 lb).    Patient presents to the clinic using No DME    Health Maintenance that is potentially due pending provider review:  NONE    n/a    Is there anyone who you would like to be able to receive your results? No  If yes have patient fill out [facility]  Ryan Vila M.A.      "

## 2020-01-24 NOTE — PATIENT INSTRUCTIONS
Take antibiotic as directed.    Set up appointment with foot doctor for your callouses.    Follow-up with your primary care provider next week and as needed.    Indications for emergent return to emergency department discussed with patient, who verbalized good understanding and agreement.  Patient understands the limitations of today's evaluation.         Patient Education     Cellulitis  Cellulitis is an infection of the deep layers of skin. A break in the skin, such as a cut or scratch, can let bacteria under the skin. If the bacteria get to deep layers of the skin, it can be serious. If not treated, cellulitis can get into the bloodstream and lymph nodes. The infection can then spread throughout the body. This causes serious illness.  Cellulitis causes the affected skin to become red, swollen, warm, and sore. The reddened areas have a visible border. An open sore may leak fluid (pus). You may have a fever, chills, and pain.  Cellulitis is treated with antibiotics taken for 7 to 10 days. An open sore may be cleaned and covered with cool wet gauze. Symptoms should get better 1 to 2 days after treatment is started. Make sure to take all the antibiotics for the full number of days until they are gone. Keep taking the medicine even if your symptoms go away.  Home care  Follow these tips:    Limit the use of the part of your body with cellulitis.     If the infection is on your leg, keep your leg raised while sitting. This will help to reduce swelling.    Take all of the antibiotic medicine exactly as directed until it is gone. Do not miss any doses, especially during the first 7 days. Don t stop taking the medicine when your symptoms get better.    Keep the affected area clean and dry.    Wash your hands with soap and warm water before and after touching your skin. Anyone else who touches your skin should also wash his or her hands. Don't share towels.  Follow-up care  Follow up with your healthcare provider, or as  advised. If your infection does not go away on the first antibiotic, your healthcare provider will prescribe a different one.  When to seek medical advice  Call your healthcare provider right away if any of these occur:    Red areas that spread    Swelling or pain that gets worse    Fluid leaking from the skin (pus)    Fever higher of 100.4  F (38.0  C) or higher after 2 days on antibiotics  Date Last Reviewed: 9/1/2016 2000-2019 [employer]. [address] [place] [address], [place] [address]. All rights reserved. This information is not intended as a substitute for professional medical care. Always follow your healthcare professional's instructions.

## 2020-01-24 NOTE — PROGRESS NOTES
Subjective     Geovani Bustos is a 64 year old male who presents to clinic today for the following health issues:    HPI     Chief Complaint   Patient presents with     Infection     Right ankle has a scab and red spot around it.  Has been there about a week. No treatments      Toe Pain     Calus on left toe          Patient Active Problem List   Diagnosis     Colon polyps     Schizophrenia (H)     VSD (ventricular septal defect)     Mitral valve prolapse     Acquired hypothyroidism     IgA nephropathy     Proteinuria     Hyperlipidemia LDL goal <130     BPH (benign prostatic hyperplasia)     Health Care Home     Bilateral impacted cerumen     Bunion of great toe of right foot     Eczema of external ear, bilateral     Tinea pedis, unspecified laterality     Incomplete right bundle branch block (RBBB)     Cigarette nicotine dependence with nicotine-induced disorder     Gastroesophageal reflux disease without esophagitis     Stage 1 mild COPD by GOLD classification (H)     CKD (chronic kidney disease) stage 3, GFR 30-59 ml/min (H)     Past Surgical History:   Procedure Laterality Date     ARTHRODESIS FOOT Right 1/19/2016    Procedure: ARTHRODESIS FOOT;  Surgeon: Holden Harrison DPM;  Location: [facility]     ARTHRODESIS FOOT Left 1/3/2017    Procedure: ARTHRODESIS FOOT;  Surgeon: Holden Harrison DPM;  Location: [facility]     SURGICAL HISTORY OF -       leg fracture       Social History     Tobacco Use     Smoking status: Current Every Day Smoker     Packs/day: 1.00     Years: 47.00     Pack years: 47.00     Types: Cigarettes     Smokeless tobacco: Never Used     Tobacco comment: cutting one cigarette down a month   Substance Use Topics     Alcohol use: No     Family History   Problem Relation Age of Onset     Emphysema Mother      Coronary Artery Disease Father          Current Outpatient Medications   Medication Sig Dispense Refill     albuterol (PROAIR HFA/PROVENTIL HFA/VENTOLIN HFA) 108 (90 Base) MCG/ACT  inhaler Inhale 2 puffs into the lungs every 6 hours as needed for shortness of breath / dyspnea or wheezing 1 Inhaler 1     albuterol (PROAIR HFA/PROVENTIL HFA/VENTOLIN HFA) 108 (90 Base) MCG/ACT inhaler Inhale 2 puffs into the lungs every 4 hours as needed for shortness of breath / dyspnea or wheezing 1 Inhaler 2     amoxicillin (AMOXIL) 250 MG capsule Take 2,000 mg by mouth       aspirin (QC LO-DOSE ASPIRIN) 81 MG EC tablet Take 1 tablet (81 mg) by mouth daily 100 tablet 0     augmented betamethasone dipropionate (DIPROLENE-AF) 0.05 % external ointment Apply to AA twice daily 1-2 weeks then as needed only. Do not apply to face. 45 g 5     cephALEXin (KEFLEX) 500 MG capsule Take 1 capsule (500 mg) by mouth 3 times daily for 7 days 21 capsule 0     Disposable Gloves (LATEX GLOVES MEDIUM) MISC 4 boxes of gloves 400 each 1     Disposable Gloves (NITRILE EXAM GLOVES MEDIUM) MISC 1 each as needed Use PRN daily with administration of otic drops, body lotion and powder to treat dry itchy skin. 4 each 11     EAR DROPS EARWAX AID 6.5 % otic solution Place 5 drops into both ears 2 times daily For 5 days August 1-5th and Feb 1-5 th then return for irrigation after the 5th day. 15 mL 3     finasteride (PROSCAR) 5 MG tablet TAKE ONE TABLET BY MOUTH EVERY DAY 30 tablet 11     fluticasone-vilanterol (BREO ELLIPTA) 100-25 MCG/INH inhaler Inhale 1 puff into the lungs daily 1 Inhaler 11     gabapentin (NEURONTIN) 100 MG capsule Take 100 mg by mouth 2 times daily       Incontinence Supply Disposable (INCONTINENCE BRIEF LARGE) MISC Incontinence pads up to 300 per month 300 each 1     levothyroxine (SYNTHROID/LEVOTHROID) 125 MCG tablet Take 1 tablet (125 mcg) by mouth daily 90 tablet 3     Omega-3 Fatty Acids (FISH OIL) 1200 MG capsule Take 1 capsule (1.2 g) by mouth daily 90 capsule 2     order for DME Equipment being ordered: callous pad 1 each 1     order for DME Equipment being ordered: INCONTINENT PRODUCTS (UP TO  300/MONTH)  REFILL X1 YEAR 1 each 0     order for DME Equipment being ordered: GLOVES (4 BOXES)  REFILL X1 YEAR 1 each 0     order for DME Equipment being ordered: CHUX  REFILL X1 YEAR 1 each 3     order for DME chux pads 300 pad 1     order for DME Equipment being ordered: Dynaflex insert 1 Units 0     oxybutynin (DITROPAN) 5 MG tablet Take 2 tablets (10 mg) by mouth 3 times daily 90 tablet 11     oxybutynin (DITROPAN) 5 MG tablet TAKE ONE TABLET BY MOUTH THREE TIMES DAILY 90 tablet 11     polyethylene glycol (GAVILAX) powder Take 17 g (1 capful) by mouth daily 527 g 1     REGULOID 48.57 % POWD Take 1 each by mouth See Admin Instructions Take 1 teaspoonful every day at 7pm and 9 pm. 369 g 11     simvastatin (ZOCOR) 40 MG tablet TAKE ONE TABLET BY MOUTH EVERY DAY AT 9 PM 30 tablet 2     Skin Protectants, Misc. (EUCERIN) cream Apply topically 2 times daily 454 g 5     tamsulosin (FLOMAX) 0.4 MG capsule Take 1 capsule (0.4 mg) by mouth daily 30 capsule 11     traZODone (DESYREL) 50 MG tablet Take 25 mg by mouth At Bedtime        vitamin D3 (CHOLECALCIFEROL) 2000 units (50 mcg) tablet Take 1 tablet (2,000 Units) by mouth daily 100 tablet 0     ZOLOFT 100 MG OR TABS 2 TABLETS DAILY       ABILIFY 10 MG OR TABS Take 22 mg by mouth daily        nicotine (NICODERM CQ) 14 MG/24HR 24 hr patch Place 1 patch onto the skin every 24 hours (Patient not taking: Reported on 1/16/2020) 30 patch 1     Allergies   Allergen Reactions     Nitrogen Oxide      Sulfa Drugs          Reviewed and updated as needed this visit by Provider  Tobacco  Allergies  Meds  Problems  Med Hx  Surg Hx  Fam Hx         Review of Systems         Objective    /68 (BP Location: Right arm, Patient Position: Chair, Cuff Size: Adult Regular)   Pulse 68   Temp 98  F (36.7  C) (Tympanic)   Resp 18   Wt 82.1 kg (181 lb)   SpO2 93%   BMI 26.35 kg/m    Body mass index is 26.35 kg/m .  Physical Exam   GENERAL: healthy, alert and no distress, nontoxic  in appearance  EYES: Eyes grossly normal to inspection, PERRL and conjunctivae and sclerae normal  HENT: normocephalic  NECK: supple with full ROM  ABDOMEN: soft, nontender  MS: no gross musculoskeletal defects noted, no edema  1 cm diameter dry dark red scab on right ankle just above lateral malleolus with 1 cm redness surrounding. No drainage.  Bottom of left foot has callous on pad of great toe, and a small one on medial great toe near bottom. He will need to go back to podiatry for these to be cared for.      Diagnostic Test Results:   Labs reviewed in Epic  No results found for this or any previous visit (from the past 24 hour(s)).        Assessment & Plan   Problem List Items Addressed This Visit     None      Visit Diagnoses     Local infection of skin and subcutaneous tissue    -  Primary    Relevant Medications    cephALEXin (KEFLEX) 500 MG capsule               Patient Instructions   Take antibiotic as directed.    Set up appointment with foot doctor for your callouses.    Follow-up with your primary care provider next week and as needed.    Indications for emergent return to emergency department discussed with patient, who verbalized good understanding and agreement.  Patient understands the limitations of today's evaluation.         Patient Education     Cellulitis  Cellulitis is an infection of the deep layers of skin. A break in the skin, such as a cut or scratch, can let bacteria under the skin. If the bacteria get to deep layers of the skin, it can be serious. If not treated, cellulitis can get into the bloodstream and lymph nodes. The infection can then spread throughout the body. This causes serious illness.  Cellulitis causes the affected skin to become red, swollen, warm, and sore. The reddened areas have a visible border. An open sore may leak fluid (pus). You may have a fever, chills, and pain.  Cellulitis is treated with antibiotics taken for 7 to 10 days. An open sore may be cleaned and  covered with cool wet gauze. Symptoms should get better 1 to 2 days after treatment is started. Make sure to take all the antibiotics for the full number of days until they are gone. Keep taking the medicine even if your symptoms go away.  Home care  Follow these tips:    Limit the use of the part of your body with cellulitis.     If the infection is on your leg, keep your leg raised while sitting. This will help to reduce swelling.    Take all of the antibiotic medicine exactly as directed until it is gone. Do not miss any doses, especially during the first 7 days. Don t stop taking the medicine when your symptoms get better.    Keep the affected area clean and dry.    Wash your hands with soap and warm water before and after touching your skin. Anyone else who touches your skin should also wash his or her hands. Don't share towels.  Follow-up care  Follow up with your healthcare provider, or as advised. If your infection does not go away on the first antibiotic, your healthcare provider will prescribe a different one.  When to seek medical advice  Call your healthcare provider right away if any of these occur:    Red areas that spread    Swelling or pain that gets worse    Fluid leaking from the skin (pus)    Fever higher of 100.4  F (38.0  C) or higher after 2 days on antibiotics  Date Last Reviewed: 9/1/2016 2000-2019 The "[employer]". [address] [place] [address], [place] [address]. All rights reserved. This information is not intended as a substitute for professional medical care. Always follow your healthcare professional's instructions.             Return in about 4 days (around 1/28/2020) for Follow up with your primary care provider.    [facility]

## 2020-01-29 ENCOUNTER — OFFICE VISIT (OUTPATIENT)
Dept: FAMILY MEDICINE | Facility: CLINIC | Age: 65
End: 2020-01-29
Payer: COMMERCIAL

## 2020-01-29 VITALS
BODY MASS INDEX: 26.35 KG/M2 | HEART RATE: 70 BPM | WEIGHT: 181 LBS | SYSTOLIC BLOOD PRESSURE: 120 MMHG | DIASTOLIC BLOOD PRESSURE: 74 MMHG | TEMPERATURE: 97.3 F | RESPIRATION RATE: 12 BRPM | OXYGEN SATURATION: 97 %

## 2020-01-29 DIAGNOSIS — L03.90 WOUND CELLULITIS: Primary | ICD-10-CM

## 2020-01-29 PROCEDURE — 99213 OFFICE O/P EST LOW 20 MIN: CPT | Performed by: FAMILY MEDICINE

## 2020-01-29 RX ORDER — CLINDAMYCIN HCL 300 MG
300 CAPSULE ORAL 3 TIMES DAILY
Qty: 30 CAPSULE | Refills: 0 | Status: SHIPPED | OUTPATIENT
Start: 2020-01-29 | End: 2020-02-10

## 2020-01-29 ASSESSMENT — PAIN SCALES - GENERAL: PAINLEVEL: MODERATE PAIN (4)

## 2020-01-29 NOTE — NURSING NOTE
"Chief Complaint   Patient presents with     ER F/U     urgent care f/u     /74   Pulse 70   Temp 97.3  F (36.3  C) (Tympanic)   Resp 12   Wt 82.1 kg (181 lb)   SpO2 97%   BMI 26.35 kg/m   Estimated body mass index is 26.35 kg/m  as calculated from the following:    Height as of 12/18/19: 1.765 m (5' 9.5\").    Weight as of this encounter: 82.1 kg (181 lb).  Patient presents to the clinic using No DME      Health Maintenance that is potentially due pending provider review:    Health Maintenance Due   Topic Date Due     ADVANCE CARE PLANNING  1955     ZOSTER IMMUNIZATION (2 of 3) 04/12/2016     AORTIC ANEURYSM SCREENING (SYSTEM ASSIGNED)  12/05/2018     MEDICARE ANNUAL WELLNESS VISIT  02/07/2019     PNEUMOCOCCAL IMMUNIZATION 65+ HIGH/HIGHEST RISK (2 of 2 - PPSV23) 11/07/2019     PHQ-2  01/01/2020     FALL RISK ASSESSMENT  02/07/2020        n/a        "

## 2020-01-29 NOTE — PATIENT INSTRUCTIONS
Patient Education     Wound Care  Taking proper care of your wound will help it heal. Your healthcare provider may show you how to clean and dress the wound. He or she will also explain how to tell if the wound is healing normally. If you are unsure of how to take care of the wound, be sure to clarify what dressing to use and how often you should change the bandages. Here are the basic steps.     A wound that's not healing normally may be dark in color or have white streaks.   Wash your hands  Tips for washing your hands include:    Use liquid soap and lather for 2 minutes. Scrub between your fingers and under your nails.    Rinse with warm water, keeping your fingers pointing down.    Use a paper towel to dry your hands and to turn off the faucet.  Remove the used dressing  Here are suggestions for removing the dressing:    If dressing changes cause you pain, be sure to take your pain medicine as prescribed by your healthcare provider 30 minutes before dressing changes.    Set up your supplies.    Put on disposable gloves if you re dressing a wound for someone else or your wound is infected.    Loosen the tape by pulling gently toward the wound.    Gently take off the old dressing. If the dressing is stuck to the wound, moisten it with saline (if available) or clean water.    If you have a drain or tube in the wound, be careful not to pull on it.    Remove the dressing 1 layer at a time and put it in a plastic bag. Seal the bag and put it in the trash.    Remove your gloves.  Inspect and dress the wound  Check the wound carefully:    Each time you change the dressing, check the wound carefully to be sure it s healing normally by making sure your wound appears to be pink and moist, and is free of infection.      Wash your hands again. Put on a new pair of gloves.    Clean and dress the wound as directed by your healthcare provider or nurse. Don't put anything in the wound that is not prescribed or directed by your  healthcare provider. If you have a drain or tube, be careful not to pull on it. Make sure to secure the drain or tube as well.    Put all unused supplies in a clean plastic bag. Seal the bag and store it in a clean, dry area between dressing changes.     Be sure to wash your hands again.  Call your healthcare provider  Call your healthcare provider if you see any of the following signs of a problem:    Bleeding that soaks the dressing    Pink fluid weeping from the wound    Increased drainage or drainage that is yellow, yellow-green, or foul-smelling    Increased swelling or pain, or redness or swelling in the skin around the wound    A change in the color of the wound, or if streaks develop in a direction away from the wound    The area between any stitches opens up    An increase in the size of the wound    A fever of 100.4 F (38 C) or higher, or as directed by your healthcare provider    Chills, increased fatigue, or a loss of appetite      Date Last Reviewed: 7/1/2017 2000-2019 [employer]. [address] [place] [address]. All rights reserved. This information is not intended as a substitute for professional medical care. Always follow your healthcare professional's instructions.

## 2020-01-29 NOTE — PROGRESS NOTES
SUBJECTIVE   Geovani Bustos is a 64 year old male who presents with     ED/UC Followup:    Facility:  [facility]  Date of visit: 1/24/20  Reason for visit: ankle infection  Current Status: Painful, staff and patient do not feel that the wound is getting better       PCP   Alexandru Vera [phone]    Health Maintenance        Health Maintenance Due   Topic Date Due     ADVANCE CARE PLANNING  1955     ZOSTER IMMUNIZATION (2 of 3) 04/12/2016     AORTIC ANEURYSM SCREENING (SYSTEM ASSIGNED)  12/05/2018     MEDICARE ANNUAL WELLNESS VISIT  02/07/2019     PNEUMOCOCCAL IMMUNIZATION 65+ HIGH/HIGHEST RISK (2 of 2 - PPSV23) 11/07/2019     PHQ-2  01/01/2020     FALL RISK ASSESSMENT  02/07/2020       HPI        Patient Active Problem List   Diagnosis     Colon polyps     Schizophrenia (H)     VSD (ventricular septal defect)     Mitral valve prolapse     Acquired hypothyroidism     IgA nephropathy     Proteinuria     Hyperlipidemia LDL goal <130     BPH (benign prostatic hyperplasia)     Health Care Home     Bilateral impacted cerumen     Bunion of great toe of right foot     Eczema of external ear, bilateral     Tinea pedis, unspecified laterality     Incomplete right bundle branch block (RBBB)     Cigarette nicotine dependence with nicotine-induced disorder     Gastroesophageal reflux disease without esophagitis     Stage 1 mild COPD by GOLD classification (H)     CKD (chronic kidney disease) stage 3, GFR 30-59 ml/min (H)     Current Outpatient Medications   Medication     ABILIFY 10 MG OR TABS     albuterol (PROAIR HFA/PROVENTIL HFA/VENTOLIN HFA) 108 (90 Base) MCG/ACT inhaler     albuterol (PROAIR HFA/PROVENTIL HFA/VENTOLIN HFA) 108 (90 Base) MCG/ACT inhaler     amoxicillin (AMOXIL) 250 MG capsule     aspirin (QC LO-DOSE ASPIRIN) 81 MG EC tablet     augmented betamethasone dipropionate (DIPROLENE-AF) 0.05 % external ointment     cephALEXin (KEFLEX) 500 MG capsule     Disposable Gloves (LATEX GLOVES MEDIUM) MISC      Disposable Gloves (NITRILE EXAM GLOVES MEDIUM) MISC     EAR DROPS EARWAX AID 6.5 % otic solution     finasteride (PROSCAR) 5 MG tablet     fluticasone-vilanterol (BREO ELLIPTA) 100-25 MCG/INH inhaler     gabapentin (NEURONTIN) 100 MG capsule     Incontinence Supply Disposable (INCONTINENCE BRIEF LARGE) MISC     levothyroxine (SYNTHROID/LEVOTHROID) 125 MCG tablet     nicotine (NICODERM CQ) 14 MG/24HR 24 hr patch     Omega-3 Fatty Acids (FISH OIL) 1200 MG capsule     order for DME     order for DME     order for DME     order for DME     order for DME     order for DME     oxybutynin (DITROPAN) 5 MG tablet     oxybutynin (DITROPAN) 5 MG tablet     polyethylene glycol (GAVILAX) powder     REGULOID 48.57 % POWD     simvastatin (ZOCOR) 40 MG tablet     Skin Protectants, Misc. (EUCERIN) cream     tamsulosin (FLOMAX) 0.4 MG capsule     traZODone (DESYREL) 50 MG tablet     vitamin D3 (CHOLECALCIFEROL) 2000 units (50 mcg) tablet     ZOLOFT 100 MG OR TABS     No current facility-administered medications for this visit.      Facility-Administered Medications Ordered in Other Visits   Medication     bupivacaine (MARCAINE) injection 0.5%       Patient Active Problem List   Diagnosis     Colon polyps     Schizophrenia (H)     VSD (ventricular septal defect)     Mitral valve prolapse     Acquired hypothyroidism     IgA nephropathy     Proteinuria     Hyperlipidemia LDL goal <130     BPH (benign prostatic hyperplasia)     Health Care Home     Bilateral impacted cerumen     Bunion of great toe of right foot     Eczema of external ear, bilateral     Tinea pedis, unspecified laterality     Incomplete right bundle branch block (RBBB)     Cigarette nicotine dependence with nicotine-induced disorder     Gastroesophageal reflux disease without esophagitis     Stage 1 mild COPD by GOLD classification (H)     CKD (chronic kidney disease) stage 3, GFR 30-59 ml/min (H)     Past Surgical History:   Procedure Laterality Date     ARTHRODESIS  FOOT Right 1/19/2016    Procedure: ARTHRODESIS FOOT;  Surgeon: Holden Harrison DPM;  Location: [facility]     ARTHRODESIS FOOT Left 1/3/2017    Procedure: ARTHRODESIS FOOT;  Surgeon: Holden Harrison DPM;  Location: [facility] OR     SURGICAL HISTORY OF -       leg fracture       Social History     Tobacco Use     Smoking status: Current Every Day Smoker     Packs/day: 1.00     Years: 47.00     Pack years: 47.00     Types: Cigarettes     Smokeless tobacco: Never Used     Tobacco comment: cutting one cigarette down a month   Substance Use Topics     Alcohol use: No     Family History   Problem Relation Age of Onset     Emphysema Mother      Coronary Artery Disease Father          Current Outpatient Medications   Medication Sig Dispense Refill     ABILIFY 10 MG OR TABS Take 22 mg by mouth daily        albuterol (PROAIR HFA/PROVENTIL HFA/VENTOLIN HFA) 108 (90 Base) MCG/ACT inhaler Inhale 2 puffs into the lungs every 6 hours as needed for shortness of breath / dyspnea or wheezing 1 Inhaler 1     albuterol (PROAIR HFA/PROVENTIL HFA/VENTOLIN HFA) 108 (90 Base) MCG/ACT inhaler Inhale 2 puffs into the lungs every 4 hours as needed for shortness of breath / dyspnea or wheezing 1 Inhaler 2     amoxicillin (AMOXIL) 250 MG capsule Take 2,000 mg by mouth       aspirin (QC LO-DOSE ASPIRIN) 81 MG EC tablet Take 1 tablet (81 mg) by mouth daily 100 tablet 0     augmented betamethasone dipropionate (DIPROLENE-AF) 0.05 % external ointment Apply to AA twice daily 1-2 weeks then as needed only. Do not apply to face. 45 g 5     clindamycin (CLEOCIN) 300 MG capsule Take 1 capsule (300 mg) by mouth 3 times daily for 10 days 30 capsule 0     Disposable Gloves (LATEX GLOVES MEDIUM) MISC 4 boxes of gloves 400 each 1     Disposable Gloves (NITRILE EXAM GLOVES MEDIUM) MISC 1 each as needed Use PRN daily with administration of otic drops, body lotion and powder to treat dry itchy skin. 4 each 11     EAR DROPS EARWAX AID 6.5 % otic solution Place 5  drops into both ears 2 times daily For 5 days August 1-5th and Feb 1-5 th then return for irrigation after the 5th day. 15 mL 3     finasteride (PROSCAR) 5 MG tablet TAKE ONE TABLET BY MOUTH EVERY DAY 30 tablet 11     fluticasone-vilanterol (BREO ELLIPTA) 100-25 MCG/INH inhaler Inhale 1 puff into the lungs daily 1 Inhaler 11     gabapentin (NEURONTIN) 100 MG capsule Take 100 mg by mouth 2 times daily       Incontinence Supply Disposable (INCONTINENCE BRIEF LARGE) MISC Incontinence pads up to 300 per month 300 each 1     levothyroxine (SYNTHROID/LEVOTHROID) 125 MCG tablet Take 1 tablet (125 mcg) by mouth daily 90 tablet 3     nicotine (NICODERM CQ) 14 MG/24HR 24 hr patch Place 1 patch onto the skin every 24 hours 30 patch 1     Omega-3 Fatty Acids (FISH OIL) 1200 MG capsule Take 1 capsule (1.2 g) by mouth daily 90 capsule 2     order for DME Equipment being ordered: callous pad 1 each 1     order for DME Equipment being ordered: INCONTINENT PRODUCTS (UP /MONTH)  REFILL X1 YEAR 1 each 0     order for DME Equipment being ordered: GLOVES (4 BOXES)  REFILL X1 YEAR 1 each 0     order for DME Equipment being ordered: CHUX  REFILL X1 YEAR 1 each 3     order for DME chux pads 300 pad 1     order for DME Equipment being ordered: Dynaflex insert 1 Units 0     oxybutynin (DITROPAN) 5 MG tablet Take 2 tablets (10 mg) by mouth 3 times daily 90 tablet 11     oxybutynin (DITROPAN) 5 MG tablet TAKE ONE TABLET BY MOUTH THREE TIMES DAILY 90 tablet 11     polyethylene glycol (GAVILAX) powder Take 17 g (1 capful) by mouth daily 527 g 1     REGULOID 48.57 % POWD Take 1 each by mouth See Admin Instructions Take 1 teaspoonful every day at 7pm and 9 pm. 369 g 11     simvastatin (ZOCOR) 40 MG tablet TAKE ONE TABLET BY MOUTH EVERY DAY AT 9 PM 30 tablet 2     Skin Protectants, Misc. (EUCERIN) cream Apply topically 2 times daily 454 g 5     tamsulosin (FLOMAX) 0.4 MG capsule Take 1 capsule (0.4 mg) by mouth daily 30 capsule 11      traZODone (DESYREL) 50 MG tablet Take 25 mg by mouth At Bedtime        vitamin D3 (CHOLECALCIFEROL) 2000 units (50 mcg) tablet Take 1 tablet (2,000 Units) by mouth daily 100 tablet 0     ZOLOFT 100 MG OR TABS 2 TABLETS DAILY       Allergies   Allergen Reactions     Nitrogen Oxide      Sulfa Drugs      Recent Labs   Lab Test 11/15/19  0907 10/21/19  1016 08/08/19  1100  12/05/18  0840 08/08/18  1405 03/21/18  0900  11/08/17  0840  02/01/13  0815  05/14/12  0830   A1C  --   --   --   --   --   --  5.6  --   --   --  5.5  --  5.6   LDL 75  --   --   --  118*  --   --   --  80   < > 117   < > 101   HDL 45  --   --   --  48  --   --   --  47   < > 45   < > 43   TRIG 155*  --   --   --  150*  --   --   --  119   < > 123   < > 143   ALT 21  --   --   --  28  --   --   --  22   < >  --    < >  --    CR 1.47* 1.63*  --    < > 1.60*  --  1.36*   < > 1.31*   < >  --    < >  --    GFRESTIMATED 49* 43*  --    < > 44*  --  53*   < > 55*   < >  --    < >  --    GFRESTBLACK 57* 50*  --    < > 53*  --  64   < > 67   < >  --    < >  --    POTASSIUM 4.3 4.6  --    < > 4.6  --  4.4   < > 4.6   < >  --    < >  --    TSH  --   --  1.63  --   --  2.13  --   --   --    < >  --    < >  --     < > = values in this interval not displayed.      BP Readings from Last 3 Encounters:   01/29/20 120/74   01/24/20 128/68   01/16/20 130/81    Wt Readings from Last 3 Encounters:   01/29/20 82.1 kg (181 lb)   01/24/20 82.1 kg (181 lb)   12/18/19 82.6 kg (182 lb)                    Reviewed and updated:  Tobacco  Allergies  Meds  Med Hx  Surg Hx  Fam Hx  Soc Hx     ROS:  Constitutional, HEENT, cardiovascular, pulmonary, gi and gu systems are negative, except as otherwise noted.    PHYSICAL EXAM   /74   Pulse 70   Temp 97.3  F (36.3  C) (Tympanic)   Resp 12   Wt 82.1 kg (181 lb)   SpO2 97%   BMI 26.35 kg/m    Body mass index is 26.35 kg/m .  GENERAL: alert and no distress  NECK: no adenopathy, no asymmetry, masses, or scars and thyroid  normal to palpation  RESP: lungs clear to auscultation - no rales, rhonchi or wheezes  CV: regular rates and rhythm, normal S1 S2, no S3 or S4 and no murmur, click or rub  ABDOMEN: soft, nontender  MS: no gross musculoskeletal defects noted, no edema  SKIN: erythematous involving right lower leg with central wound as shown below, slightly tender and warmth on palpation, pedal pulses 3+ bilaterally                Assessment & Plan     (L03.90) Wound cellulitis  (primary encounter diagnosis)  Comment: Suspect symptoms secondary to wound cellulitis.  Will switch cephalexin to clindamycin.  Suggested to apply Mepilex dressing regularly.  Follow-up early next week for reevaluation.  Patient/group home staff member understood and in agreement with above plan.  All questions answered.  Plan: clindamycin (CLEOCIN) 300 MG capsule              Patient Instructions       Patient Education     Wound Care  Taking proper care of your wound will help it heal. Your healthcare provider may show you how to clean and dress the wound. He or she will also explain how to tell if the wound is healing normally. If you are unsure of how to take care of the wound, be sure to clarify what dressing to use and how often you should change the bandages. Here are the basic steps.     A wound that's not healing normally may be dark in color or have white streaks.   Wash your hands  Tips for washing your hands include:    Use liquid soap and lather for 2 minutes. Scrub between your fingers and under your nails.    Rinse with warm water, keeping your fingers pointing down.    Use a paper towel to dry your hands and to turn off the faucet.  Remove the used dressing  Here are suggestions for removing the dressing:    If dressing changes cause you pain, be sure to take your pain medicine as prescribed by your healthcare provider 30 minutes before dressing changes.    Set up your supplies.    Put on disposable gloves if you re dressing a wound for  someone else or your wound is infected.    Loosen the tape by pulling gently toward the wound.    Gently take off the old dressing. If the dressing is stuck to the wound, moisten it with saline (if available) or clean water.    If you have a drain or tube in the wound, be careful not to pull on it.    Remove the dressing 1 layer at a time and put it in a plastic bag. Seal the bag and put it in the trash.    Remove your gloves.  Inspect and dress the wound  Check the wound carefully:    Each time you change the dressing, check the wound carefully to be sure it s healing normally by making sure your wound appears to be pink and moist, and is free of infection.      Wash your hands again. Put on a new pair of gloves.    Clean and dress the wound as directed by your healthcare provider or nurse. Don't put anything in the wound that is not prescribed or directed by your healthcare provider. If you have a drain or tube, be careful not to pull on it. Make sure to secure the drain or tube as well.    Put all unused supplies in a clean plastic bag. Seal the bag and store it in a clean, dry area between dressing changes.     Be sure to wash your hands again.  Call your healthcare provider  Call your healthcare provider if you see any of the following signs of a problem:    Bleeding that soaks the dressing    Pink fluid weeping from the wound    Increased drainage or drainage that is yellow, yellow-green, or foul-smelling    Increased swelling or pain, or redness or swelling in the skin around the wound    A change in the color of the wound, or if streaks develop in a direction away from the wound    The area between any stitches opens up    An increase in the size of the wound    A fever of 100.4 F (38 C) or higher, or as directed by your healthcare provider    Chills, increased fatigue, or a loss of appetite      Date Last Reviewed: 7/1/2017 2000-2019 [employer]. [address] [place] [address], [place] [address].  All rights reserved. This information is not intended as a substitute for professional medical care. Always follow your healthcare professional's instructions.               Return in about 5 days (around 2/3/2020).    Alexandru Vera MD  [facility]

## 2020-02-03 ENCOUNTER — OFFICE VISIT (OUTPATIENT)
Dept: FAMILY MEDICINE | Facility: CLINIC | Age: 65
End: 2020-02-03
Payer: COMMERCIAL

## 2020-02-03 VITALS
TEMPERATURE: 97.6 F | DIASTOLIC BLOOD PRESSURE: 70 MMHG | RESPIRATION RATE: 18 BRPM | HEART RATE: 68 BPM | WEIGHT: 181 LBS | HEIGHT: 70 IN | BODY MASS INDEX: 25.91 KG/M2 | SYSTOLIC BLOOD PRESSURE: 104 MMHG

## 2020-02-03 DIAGNOSIS — L03.90 WOUND CELLULITIS: Primary | ICD-10-CM

## 2020-02-03 PROCEDURE — 99212 OFFICE O/P EST SF 10 MIN: CPT | Performed by: FAMILY MEDICINE

## 2020-02-03 ASSESSMENT — MIFFLIN-ST. JEOR: SCORE: 1599.32

## 2020-02-03 NOTE — NURSING NOTE
"Chief Complaint   Patient presents with     Wound Check     /70 (Cuff Size: Adult Regular)   Pulse 68   Temp 97.6  F (36.4  C) (Tympanic)   Resp 18   Ht 1.765 m (5' 9.5\")   Wt 82.1 kg (181 lb)   BMI 26.35 kg/m   Estimated body mass index is 26.35 kg/m  as calculated from the following:    Height as of this encounter: 1.765 m (5' 9.5\").    Weight as of this encounter: 82.1 kg (181 lb).  Patient presents to the clinic using No DME      Health Maintenance that is potentially due pending provider review:    Health Maintenance Due   Topic Date Due     ADVANCE CARE PLANNING  1955     ZOSTER IMMUNIZATION (2 of 3) 04/12/2016     AORTIC ANEURYSM SCREENING (SYSTEM ASSIGNED)  12/05/2018     MEDICARE ANNUAL WELLNESS VISIT  02/07/2019     PNEUMOCOCCAL IMMUNIZATION 65+ HIGH/HIGHEST RISK (2 of 2 - PPSV23) 11/07/2019     FALL RISK ASSESSMENT  02/07/2020                "

## 2020-02-05 ENCOUNTER — OFFICE VISIT (OUTPATIENT)
Dept: PODIATRY | Facility: CLINIC | Age: 65
End: 2020-02-05
Payer: COMMERCIAL

## 2020-02-05 VITALS
SYSTOLIC BLOOD PRESSURE: 117 MMHG | DIASTOLIC BLOOD PRESSURE: 71 MMHG | WEIGHT: 181 LBS | HEIGHT: 70 IN | BODY MASS INDEX: 25.91 KG/M2 | HEART RATE: 70 BPM

## 2020-02-05 DIAGNOSIS — L84 CALLUS OF FOOT: Primary | ICD-10-CM

## 2020-02-05 DIAGNOSIS — M79.672 LEFT FOOT PAIN: ICD-10-CM

## 2020-02-05 PROCEDURE — 11055 PARING/CUTG B9 HYPRKER LES 1: CPT | Performed by: PODIATRIST

## 2020-02-05 ASSESSMENT — MIFFLIN-ST. JEOR: SCORE: 1599.32

## 2020-02-05 NOTE — PATIENT INSTRUCTIONS
SMOKING CESSATION  What's in cigarette smoke? - Cigarette smoke contains over 4,000 chemicals. Nicotine is one of the main ingredients which is an insecticide/herbicide. It is poisonous to our nervous system, increases blood clotting risk, and decreases the body's defenses to fight off infection. Another chemical is Carbon Monoxide is an asphyxiating gas that permanently binds to blood cells and blocks the transport of oxygen. This leads to tissue death and decreases your metabolism. Tar is a chemical that coats your lungs and trachea which impairs new oxygen coming in and carbon dioxide getting out of your body.   How does smoking impact surgery? - Smoking is particularly hazardous with regards to surgery. Surgery puts stress on the body and a smoker's body is already under strain from these chemicals. Putting the two together, especially for an elective surgery, could be a recipe for disaster. Smoking before and after surgery increases your risk of heart problems, slow wound healing, delayed bone healing, blood clots, wound infection and anesthesia complications.   What are the benefits of quitting? - In 20 minutes your blood pressure will drop back down to normal. In 8 hours the carbon monoxide (a toxic gas) levels in your blood stream will drop by half, and oxygen levels will return to normal. In 48 hours your chance of having a heart attack will have decreased. All nicotine will have left your body. Your sense of taste and smell will return to a normal level. In 72 hours your bronchial tubes will relax, and your energy levels will increase. In 2 weeks your circulation will increase, and it will continue to improve for the next 10 weeks.    Recommendations for elective surgery - Ideally, patients should quit smoking 8 weeks before and at least 2 weeks after elective surgery in order to avoid complications. Simply cutting back on the amount of cigarettes smoked per day does not offer any benefit or decrease the  risk of poor wound healing, heart problems, and infection. Smokers should also start taking Vitamin C and B for two weeks before surgery and two weeks after surgery.    Ways to Stop Smokin. Nicotine patches, lozenges, or gum  2. Support Groups  3. Medications (see below)    List of Medications:  1. Varenicline Tartrate (CHANTIX)   2. Bupropion HCL (WELLBUTRIN, ZYBAN) - note: make sure Wellbutrin is for smoking cessation and not other issues   3. Nicotine Patch (NICODERM)   4. Nicotine Inhaler (NICOTROL)   5. Nicotine Gum Nicotine Polacrilex   6. Nicotine Lozenge: Nicotine Polacrilex (COMMIT)   * [employer] offers a smoking support group as well!  Please visit: [email or web address]  If you are interested in these, ask about getting a prescription or talk to your primary care doctor about what may be the best way for you to quit.       CALLUS / CORNS / IPKs  When there is excessive friction or pressure on the skin, the body responds by making the skin thicker to protect the deeper structures from becoming exposed. While this works well to protect the deeper structures, the thickened skin can increase pressure and pain.    CALLUS: Flat, diffuse thickening are simple calluses and they are usually caused by friction. Often these are the result of rubbing on a shoe or going barefoot.    CORNS: Calluses with a central core between the toes are called corns. These result from prominent joints on adjacent toes rubbing together. Theses are a symptom of bone malalignment and will always recur unless the underlying bones are addressed surgically.    IPKs: Calluses with a central core on the ball of the foot are usually IPKs (intractable plantar keratosis). These are caused by excessive pressure from the metatarsals, the bones that make up the ball of the foot. Often one of these bones is too long or too prominent.  Again, these will always recur unless the underlying bone issue is addressed. There is no  cure for these. They will either go away by themselves, recur, or more could develop.    ROUTINE MAINTENANCE  1. File them down with a pumice stone or callus file a couple times a week.   2. An electric callus removing device. Amope Pedi Perfect Electronic Pedicure Foot File and Callus Remover can be a good option.   3. Lotion can be applied to soften the callus. A urea based cream such as Kersal or Vanicream or thicker cream with shea butter are good options.  4. Toe spacers or toe covers can be used for corns, gel pads can be used for other lesions on the bottom of the foot.   If there is a surgical pathology noted, such as a prominent bone, often this needs to be addressed surgically to minimize recurrence. However, sometimes the lesion simply migrates to another spot after surgery, so it is not a guaranteed cure.     There was also discussion of the cost structure of callus care if they were to come back and have it treated in the clinic. Explained that if insurance does not cover it, they would be billed. This charge could range from $100 - $160.  They were also provided information on places to get the callus treatment.

## 2020-02-05 NOTE — LETTER
2/5/2020         RE: Geovani Bustos  [address] [facility] [id number]        Dear Colleague,    Thank you for referring your patient, Geovani Bustos, to the [facility]. Please see a copy of my visit note below.    Geovani returns to the office for reevaluation of the left foot.  The patient relates following the instructions given at the last visit with noted more pain.  The patient relates overall less  improvement in pain and function of the left foot.  The patient relates no other problems.    PAST MEDICAL HISTORY:   Past Medical History:   Diagnosis Date     Cerumen impaction      Chronic kidney disease      Colon polyps      Hyperlipidemia      Mitral valve prolapse      Schizophrenia (H)      Ulcerated penile lesions      VSD (ventricular septal defect)        BMI= Body mass index is 26.35 kg/m .        Physical Exam:    General: The patient appears to have a pleasant mental affect.    Lower extremity physical exam:  Neurovascular status is intact with palpable pedal pulses and intact epicritic sensations.  Muscular exam is within normal limits to major muscle groups.  Integument is intact.      One notes decreased edema.  One notes pain on palpation under the first metatarsophalangeal joint on the left foot.  One notes a hyperkeratotic skin lesion in this area with subdermal hemorrhage noted.       Assessment:      ICD-10-CM    1. Callus of foot L84     left foot   2. Left foot pain M79.672        Plan:  I have explained to Geovani about the conditions.  At this time, the callus was sharply debrided with #10 blade down to healthy epidermis.  No bleeding noted.  Patient was instructed to wear cushioned air inserts to better offload the pressure causing callus formation.    Disclaimer: This note consists of symbols derived from keyboarding, dictation and/or voice recognition software. As a result, there may be errors in the script that have gone undetected. Please consider  this when interpreting information found in this chart.       BREONNA Harrison D.P.M., F.OWEN.C.[email or web address].S.      Again, thank you for allowing me to participate in the care of your patient.        Sincerely,        Holden Harrison DPM

## 2020-02-05 NOTE — NURSING NOTE
"Chief Complaint   Patient presents with     RECHECK     Callus left great toe       Initial /71   Pulse 70   Ht 1.765 m (5' 9.5\")   Wt 82.1 kg (181 lb)   BMI 26.35 kg/m   Estimated body mass index is 26.35 kg/m  as calculated from the following:    Height as of this encounter: 1.765 m (5' 9.5\").    Weight as of this encounter: 82.1 kg (181 lb).  Medications and allergies reviewed.      [facility]    "

## 2020-02-10 ENCOUNTER — MEDICAL CORRESPONDENCE (OUTPATIENT)
Dept: HEALTH INFORMATION MANAGEMENT | Facility: CLINIC | Age: 65
End: 2020-02-10

## 2020-02-10 ENCOUNTER — TELEPHONE (OUTPATIENT)
Dept: UROLOGY | Facility: CLINIC | Age: 65
End: 2020-02-10

## 2020-02-10 ENCOUNTER — OFFICE VISIT (OUTPATIENT)
Dept: FAMILY MEDICINE | Facility: CLINIC | Age: 65
End: 2020-02-10
Payer: COMMERCIAL

## 2020-02-10 VITALS
SYSTOLIC BLOOD PRESSURE: 110 MMHG | HEART RATE: 68 BPM | BODY MASS INDEX: 25.48 KG/M2 | HEIGHT: 70 IN | TEMPERATURE: 98.2 F | DIASTOLIC BLOOD PRESSURE: 80 MMHG | RESPIRATION RATE: 18 BRPM | WEIGHT: 178 LBS

## 2020-02-10 DIAGNOSIS — Z00.00 ROUTINE HISTORY AND PHYSICAL EXAMINATION OF ADULT: Primary | ICD-10-CM

## 2020-02-10 PROCEDURE — 99397 PER PM REEVAL EST PAT 65+ YR: CPT | Performed by: FAMILY MEDICINE

## 2020-02-10 ASSESSMENT — ACTIVITIES OF DAILY LIVING (ADL)
CURRENT_FUNCTION: PREPARING MEALS REQUIRES ASSISTANCE
CURRENT_FUNCTION: TELEPHONE REQUIRES ASSISTANCE
CURRENT_FUNCTION: LAUNDRY REQUIRES ASSISTANCE
CURRENT_FUNCTION: TRANSPORTATION REQUIRES ASSISTANCE
CURRENT_FUNCTION: SHOPPING REQUIRES ASSISTANCE
CURRENT_FUNCTION: HOUSEWORK REQUIRES ASSISTANCE
CURRENT_FUNCTION: MEDICATION ADMINISTRATION REQUIRES ASSISTANCE
CURRENT_FUNCTION: MONEY MANAGEMENT REQUIRES ASSISTANCE

## 2020-02-10 ASSESSMENT — MIFFLIN-ST. JEOR: SCORE: 1585.71

## 2020-02-10 NOTE — TELEPHONE ENCOUNTER
Prior Authorization Retail Medication Request    Medication/Dose: Oxybutynin Chloride 5 mg  ICD code (if different than what is on RX):    Previously Tried and Failed:    Rationale:      Insurance Name:  [employer] Plus  Insurance ID:  [id number]       Pharmacy Information (if different than what is on RX)  Name:  [facility]  Phone:  [phone]  CMM Engel: [id number]

## 2020-02-10 NOTE — PROGRESS NOTES
"SUBJECTIVE:   Geovani Bustos is a 64 year old male who presents for Preventive Visit.    Are you in the first 12 months of your Medicare coverage?  No    Healthy Habits:    In general, how would you rate your overall health?  Good    Frequency of exercise:  4-5 days/week    Duration of exercise:  30-45 minutes    Do you usually eat at least 4 servings of fruit and vegetables a day, include whole grains    & fiber and avoid regularly eating high fat or \"junk\" foods?  Yes    Taking medications regularly:  Yes    Barriers to taking medications:  None    Medication side effects:  None    Ability to successfully perform activities of daily living:  Telephone requires assistance, transportation requires assistance, shopping requires assistance, preparing meals requires assistance, housework requires assistance, laundry requires assistance, medication administration requires assistance and money management requires assistance    Home Safety:  No safety concerns identified    Hearing Impairment:  No hearing concerns    In the past 6 months, have you been bothered by leaking of urine?  No    In general, how would you rate your overall mental or emotional health?  Good      PHQ-2 Total Score:    Additional concerns today:  No    Do you feel safe in your environment? Yes    Have you ever done Advance Care Planning? (For example, a Health Directive, POLST, or a discussion with a medical provider or your loved ones about your wishes): Group home patient       Fall risk       Cognitive Screening Not appropriate due to mental handicap        Reviewed and updated as needed this visit by clinical staff  Tobacco  Allergies  Meds         Reviewed and updated as needed this visit by Provider        Social History     Tobacco Use     Smoking status: Current Every Day Smoker     Packs/day: 1.00     Years: 47.00     Pack years: 47.00     Types: Cigarettes     Smokeless tobacco: Never Used     Tobacco comment: cutting one cigarette " down a month   Substance Use Topics     Alcohol use: No         Alcohol Use 2/1/2017   Prescreen: >3 drinks/day or >7 drinks/week? The patient does not drink >3 drinks per day nor >7 drinks per week.         PROBLEMS TO ADD ON...  Possible ear flush today. Needs ears checked.   Also needs annual group home physical done.     Current providers sharing in care for this patient include:   Patient Care Team:  Alexandru Vera MD as PCP - General (Family Practice)  Alexandru Vera MD as Assigned PCP  Ginger Weiss MD as MD (Nephrology)    The following health maintenance items are reviewed in Epic and correct as of today:  Health Maintenance   Topic Date Due     ADVANCE CARE PLANNING  1955     ZOSTER IMMUNIZATION (2 of 3) 04/12/2016     AORTIC ANEURYSM SCREENING (SYSTEM ASSIGNED)  12/05/2018     MEDICARE ANNUAL WELLNESS VISIT  02/07/2019     PNEUMOCOCCAL IMMUNIZATION 65+ HIGH/HIGHEST RISK (2 of 2 - PPSV23) 11/07/2019     FALL RISK ASSESSMENT  02/07/2020     TSH W/FREE T4 REFLEX  08/08/2020     COLONOSCOPY  04/03/2022     DTAP/TDAP/TD IMMUNIZATION (3 - Td) 12/10/2022     LIPID  11/15/2024     SPIROMETRY  Completed     HEPATITIS C SCREENING  Completed     COPD ACTION PLAN  Completed     PHQ-2  Completed     INFLUENZA VACCINE  Completed     IPV IMMUNIZATION  Aged Out     MENINGITIS IMMUNIZATION  Aged Out     Lab work is in process  Labs reviewed in EPIC  BP Readings from Last 3 Encounters:   02/10/20 110/80   02/05/20 117/71   02/03/20 104/70    Wt Readings from Last 3 Encounters:   02/10/20 80.7 kg (178 lb)   02/05/20 82.1 kg (181 lb)   02/03/20 82.1 kg (181 lb)                  Patient Active Problem List   Diagnosis     Colon polyps     Schizophrenia (H)     VSD (ventricular septal defect)     Mitral valve prolapse     Acquired hypothyroidism     IgA nephropathy     Proteinuria     Hyperlipidemia LDL goal <130     BPH (benign prostatic hyperplasia)     Health Care Home     Bilateral impacted cerumen      Bunion of great toe of right foot     Eczema of external ear, bilateral     Tinea pedis, unspecified laterality     Incomplete right bundle branch block (RBBB)     Cigarette nicotine dependence with nicotine-induced disorder     Gastroesophageal reflux disease without esophagitis     Stage 1 mild COPD by GOLD classification (H)     CKD (chronic kidney disease) stage 3, GFR 30-59 ml/min (H)     Past Surgical History:   Procedure Laterality Date     ARTHRODESIS FOOT Right 1/19/2016    Procedure: ARTHRODESIS FOOT;  Surgeon: Holden Harrison DPM;  Location: [facility]     ARTHRODESIS FOOT Left 1/3/2017    Procedure: ARTHRODESIS FOOT;  Surgeon: Holden Harrison DPM;  Location: [facility]     SURGICAL HISTORY OF -       leg fracture       Social History     Tobacco Use     Smoking status: Current Every Day Smoker     Packs/day: 1.00     Years: 47.00     Pack years: 47.00     Types: Cigarettes     Smokeless tobacco: Never Used     Tobacco comment: cutting one cigarette down a month   Substance Use Topics     Alcohol use: No     Family History   Problem Relation Age of Onset     Emphysema Mother      Coronary Artery Disease Father          Current Outpatient Medications   Medication Sig Dispense Refill     ABILIFY 10 MG OR TABS Take 22 mg by mouth daily        albuterol (PROAIR HFA/PROVENTIL HFA/VENTOLIN HFA) 108 (90 Base) MCG/ACT inhaler Inhale 2 puffs into the lungs every 6 hours as needed for shortness of breath / dyspnea or wheezing 1 Inhaler 1     albuterol (PROAIR HFA/PROVENTIL HFA/VENTOLIN HFA) 108 (90 Base) MCG/ACT inhaler Inhale 2 puffs into the lungs every 4 hours as needed for shortness of breath / dyspnea or wheezing 1 Inhaler 2     aspirin (QC LO-DOSE ASPIRIN) 81 MG EC tablet Take 1 tablet (81 mg) by mouth daily 100 tablet 0     augmented betamethasone dipropionate (DIPROLENE-AF) 0.05 % external ointment Apply to AA twice daily 1-2 weeks then as needed only. Do not apply to face. 45 g 5     Disposable  Gloves (LATEX GLOVES MEDIUM) MISC 4 boxes of gloves 400 each 1     Disposable Gloves (NITRILE EXAM GLOVES MEDIUM) MISC 1 each as needed Use PRN daily with administration of otic drops, body lotion and powder to treat dry itchy skin. 4 each 11     EAR DROPS EARWAX AID 6.5 % otic solution Place 5 drops into both ears 2 times daily For 5 days August 1-5th and Feb 1-5 th then return for irrigation after the 5th day. 15 mL 3     finasteride (PROSCAR) 5 MG tablet TAKE ONE TABLET BY MOUTH EVERY DAY 30 tablet 11     fluticasone-vilanterol (BREO ELLIPTA) 100-25 MCG/INH inhaler Inhale 1 puff into the lungs daily 1 Inhaler 11     gabapentin (NEURONTIN) 100 MG capsule Take 100 mg by mouth 2 times daily       Incontinence Supply Disposable (INCONTINENCE BRIEF LARGE) MISC Incontinence pads up to 300 per month 300 each 1     levothyroxine (SYNTHROID/LEVOTHROID) 125 MCG tablet Take 1 tablet (125 mcg) by mouth daily 90 tablet 3     nicotine (NICODERM CQ) 14 MG/24HR 24 hr patch Place 1 patch onto the skin every 24 hours 30 patch 1     Omega-3 Fatty Acids (FISH OIL) 1200 MG capsule Take 1 capsule (1.2 g) by mouth daily 90 capsule 2     order for DME Equipment being ordered: callous pad 1 each 1     order for DME Equipment being ordered: Dynaflex insert 1 Units 0     oxybutynin (DITROPAN) 5 MG tablet Take 2 tablets (10 mg) by mouth 3 times daily 90 tablet 11     oxybutynin (DITROPAN) 5 MG tablet TAKE ONE TABLET BY MOUTH THREE TIMES DAILY 90 tablet 11     polyethylene glycol (GAVILAX) powder Take 17 g (1 capful) by mouth daily 527 g 1     REGULOID 48.57 % POWD Take 1 each by mouth See Admin Instructions Take 1 teaspoonful every day at 7pm and 9 pm. 369 g 11     simvastatin (ZOCOR) 40 MG tablet TAKE ONE TABLET BY MOUTH EVERY DAY AT 9 PM 30 tablet 2     Skin Protectants, Misc. (EUCERIN) cream Apply topically 2 times daily 454 g 5     tamsulosin (FLOMAX) 0.4 MG capsule Take 1 capsule (0.4 mg) by mouth daily 30 capsule 11     traZODone  "(DESYREL) 50 MG tablet Take 25 mg by mouth At Bedtime        vitamin D3 (CHOLECALCIFEROL) 2000 units (50 mcg) tablet Take 1 tablet (2,000 Units) by mouth daily 100 tablet 0     ZOLOFT 100 MG OR TABS 2 TABLETS DAILY       amoxicillin (AMOXIL) 250 MG capsule Take 2,000 mg by mouth       Allergies   Allergen Reactions     Nitrogen Oxide      Sulfa Drugs      Recent Labs   Lab Test 11/15/19  0907 10/21/19  1016 08/08/19  1100  12/05/18  0840 08/08/18  1405 03/21/18  0900  11/08/17  0840  02/01/13  0815  05/14/12  0830   A1C  --   --   --   --   --   --  5.6  --   --   --  5.5  --  5.6   LDL 75  --   --   --  118*  --   --   --  80   < > 117   < > 101   HDL 45  --   --   --  48  --   --   --  47   < > 45   < > 43   TRIG 155*  --   --   --  150*  --   --   --  119   < > 123   < > 143   ALT 21  --   --   --  28  --   --   --  22   < >  --    < >  --    CR 1.47* 1.63*  --    < > 1.60*  --  1.36*   < > 1.31*   < >  --    < >  --    GFRESTIMATED 49* 43*  --    < > 44*  --  53*   < > 55*   < >  --    < >  --    GFRESTBLACK 57* 50*  --    < > 53*  --  64   < > 67   < >  --    < >  --    POTASSIUM 4.3 4.6  --    < > 4.6  --  4.4   < > 4.6   < >  --    < >  --    TSH  --   --  1.63  --   --  2.13  --   --   --    < >  --    < >  --     < > = values in this interval not displayed.          Review of Systems  Constitutional, HEENT, cardiovascular, pulmonary, GI, , musculoskeletal, neuro, skin, endocrine and psych systems are negative, except as otherwise noted.    OBJECTIVE:   /80 (Cuff Size: Adult Regular)   Pulse 68   Temp 98.2  F (36.8  C) (Tympanic)   Resp 18   Ht 1.765 m (5' 9.5\")   Wt 80.7 kg (178 lb)   BMI 25.91 kg/m   Estimated body mass index is 25.91 kg/m  as calculated from the following:    Height as of this encounter: 1.765 m (5' 9.5\").    Weight as of this encounter: 80.7 kg (178 lb).  Physical Exam  GENERAL: alert and no distress  EYES: Eyes grossly normal to inspection, PERRL and conjunctivae and " "sclerae normal  HENT: ear canals and TM's normal, nose and mouth without ulcers or lesions  NECK: no adenopathy, no asymmetry, masses, or scars and thyroid normal to palpation  RESP: lungs clear to auscultation - no rales, rhonchi or wheezes  CV: regular rate and rhythm, normal S1 S2, no S3 or S4, no murmur, click or rub, no peripheral edema and peripheral pulses strong  ABDOMEN: soft, nontender, no hepatosplenomegaly, no masses and bowel sounds normal  MS: no gross musculoskeletal defects noted, no edema  SKIN: no suspicious lesions or rashes  NEURO: Normal strength and tone, mentation intact and speech normal  PSYCH: mentation appears normal, affect normal/bright      ASSESSMENT / PLAN:   (Z00.00) Routine history and physical examination of adult  (primary encounter diagnosis)  Comment: Medically stable.  Past medical history significant for multiple comorbidities including COPD, hypertension, hyperlipidemia, chronic kidney disease stage III, hypothyroidism and type 2 diabetes mellitus.  Taking medication regularly, denies any medication side effects.  Group home standing order signed.  Follow-up in 6 months or earlier if needed.  All questions were.  Plan: AUDIOLOGY ADULT REFERRAL          COUNSELING:  Reviewed preventive health counseling, as reflected in patient instructions    Estimated body mass index is 25.91 kg/m  as calculated from the following:    Height as of this encounter: 1.765 m (5' 9.5\").    Weight as of this encounter: 80.7 kg (178 lb).    Weight management plan: Discussed healthy diet and exercise guidelines     reports that he has been smoking cigarettes. He has a 47.00 pack-year smoking history. He has never used smokeless tobacco.  Tobacco Cessation Action Plan: Information offered: Patient not interested at this time    Appropriate preventive services were discussed with this patient, including applicable screening as appropriate for cardiovascular disease, diabetes, osteopenia/osteoporosis, " and glaucoma.  As appropriate for age/gender, discussed screening for colorectal cancer, prostate cancer, breast cancer, and cervical cancer. Checklist reviewing preventive services available has been given to the patient.    Reviewed patients plan of care and provided an AVS. The Basic Care Plan (routine screening as documented in Health Maintenance) for Geovani meets the Care Plan requirement. This Care Plan has been established and reviewed with patient and caregiver.    Counseling Resources:  ATP IV Guidelines  Pooled Cohorts Equation Calculator  Breast Cancer Risk Calculator  FRAX Risk Assessment  ICSI Preventive Guidelines  Dietary Guidelines for Americans, 2010  USDA's MyPlate  ASA Prophylaxis  Lung CA Screening        Alexandru Vera MD  [facility]

## 2020-02-12 NOTE — TELEPHONE ENCOUNTER
Central Prior Authorization Team   Phone: [phone]    PA Initiation    Medication: Oxybutynin Chloride 5 mg  Insurance Company: [facility] - Phone [phone] Fax [phone]  Pharmacy Filling the Rx: [facility] - [place] - [place], [place] - [facility] [address]  Filling Pharmacy Phone: [phone]  Filling Pharmacy Fax: [phone]  Start Date: 2/12/2020

## 2020-02-14 NOTE — TELEPHONE ENCOUNTER
Prior Authorization Approval    Authorization Effective Date: 2/12/2020  Authorization Expiration Date: 2/12/2021  Medication: Oxybutynin Chloride 5 mg-APPROVED  Approved Dose/Quantity:    Reference #:     Insurance Company: [employer] [place] - Phone [phone] Fax [phone]  Expected CoPay:       CoPay Card Available:      Foundation Assistance Needed:    Which Pharmacy is filling the prescription (Not needed for infusion/clinic administered): [facility] - [place]  [place] - [facility] [address]  Pharmacy Notified: Yes  Patient Notified: Yes **Instructed pharmacy to notify patient when script is ready to /ship.**

## 2020-02-21 ENCOUNTER — TELEPHONE (OUTPATIENT)
Dept: FAMILY MEDICINE | Facility: CLINIC | Age: 65
End: 2020-02-21

## 2020-02-21 DIAGNOSIS — L30.9 ECZEMA: Primary | ICD-10-CM

## 2020-02-21 DIAGNOSIS — N39.46 MIXED INCONTINENCE: ICD-10-CM

## 2020-02-21 DIAGNOSIS — K59.00 CONSTIPATION, UNSPECIFIED CONSTIPATION TYPE: ICD-10-CM

## 2020-02-21 DIAGNOSIS — L30.9 ECZEMA, UNSPECIFIED TYPE: ICD-10-CM

## 2020-02-21 RX ORDER — MEDICAL SUPPLY, MISCELLANEOUS
EACH MISCELLANEOUS
Qty: 400 EACH | Refills: 1 | Status: SHIPPED | OUTPATIENT
Start: 2020-02-21 | End: 2020-02-24

## 2020-02-21 NOTE — TELEPHONE ENCOUNTER
Per Amelia from [facility]   Requesting new scripts for 4 Boxes of gloves. These have to be coded for doing Topical medications   Pt has Ear Drops and Foot lotion they have to apply.  Please Fax script to Pt residence at [phone]  [facility]

## 2020-02-21 NOTE — TELEPHONE ENCOUNTER
Dr. Vera,    Please see request below, have the order pended, please provide diagnosis for providing topical medications, says patient has ear drops and foot lotion staff have to apply.    LINNETTE Pedro

## 2020-02-24 ENCOUNTER — TRANSFERRED RECORDS (OUTPATIENT)
Dept: HEALTH INFORMATION MANAGEMENT | Facility: CLINIC | Age: 65
End: 2020-02-24

## 2020-02-24 RX ORDER — MEDICAL SUPPLY, MISCELLANEOUS
EACH MISCELLANEOUS
Qty: 400 EACH | Refills: 1 | Status: SHIPPED | OUTPATIENT
Start: 2020-02-24 | End: 2020-03-06

## 2020-02-24 NOTE — TELEPHONE ENCOUNTER
Counts include 234 beds at the [facility] has faxed over a request for the disposable gloves order as it needs to be for application of topical medication not urinary incontinence as this is no longer covered by [place].    Fax back to: [phone]    Lavinia Mak  [facility]

## 2020-02-28 DIAGNOSIS — K59.00 CONSTIPATION, UNSPECIFIED CONSTIPATION TYPE: ICD-10-CM

## 2020-02-28 RX ORDER — POLYETHYLENE GLYCOL 3350 17 G/17G
POWDER, FOR SOLUTION ORAL
Qty: 527 G | Refills: 1 | Status: SHIPPED | OUTPATIENT
Start: 2020-02-28 | End: 2020-05-04

## 2020-02-28 NOTE — TELEPHONE ENCOUNTER
"Requested Prescriptions   Pending Prescriptions Disp Refills     polyethylene glycol (GAVILAX) powder 527 g 1     Sig: Take 17 g (1 capful) by mouth daily       Laxatives Protocol Passed - 2/28/2020  4:13 PM        Passed - Patient is age 6 or older        Passed - Recent (12 mo) or future (30 days) visit within the authorizing provider's specialty     Patient has had an office visit with the authorizing provider or a provider within the authorizing providers department within the previous 12 mos or has a future within next 30 days. See \"Patient Info\" tab in inbasket, or \"Choose Columns\" in Meds & Orders section of the refill encounter.              Passed - Medication is active on med list          "

## 2020-02-28 NOTE — TELEPHONE ENCOUNTER
polyethylene glycol (GAVILAX) powder  Last Written Prescription Date:  12/23/2019  Last Fill Quantity: 527g,  # refills: 1   Last office visit: 2/10/2020 with prescribing provider:  STELLA Vera   Future Office Visit:   Next 5 appointments (look out 90 days)    Apr 30, 2020 10:00 AM CDT  [facility] Visit with Luis A Zepeda MD  [facility] [address] [address]  [phone]

## 2020-03-02 DIAGNOSIS — E78.5 HYPERLIPIDEMIA LDL GOAL <130: ICD-10-CM

## 2020-03-02 RX ORDER — SIMVASTATIN 40 MG
TABLET ORAL
Qty: 90 TABLET | Refills: 2 | Status: SHIPPED | OUTPATIENT
Start: 2020-03-02 | End: 2020-11-03

## 2020-03-02 NOTE — TELEPHONE ENCOUNTER
Requested Prescriptions   Pending Prescriptions Disp Refills     simvastatin (ZOCOR) 40 MG tablet 30 tablet      Sig: TAKE ONE TABLET BY MOUTH EVERY DAY AT 9 PM       There is no refill protocol information for this order        Last Written Prescription Date:  1/7/20  Last Fill Quantity: 30,  # refills: 2   Last office visit: 2/10/2020 with prescribing provider:     Future Office Visit:   Next 5 appointments (look out 90 days)    Apr 30, 2020 10:00 AM CDT  Return Visit with Luis A Zepeda MD  [facility]) [address] [address]  [phone]

## 2020-03-06 ENCOUNTER — TELEPHONE (OUTPATIENT)
Dept: FAMILY MEDICINE | Facility: CLINIC | Age: 65
End: 2020-03-06

## 2020-03-06 DIAGNOSIS — L30.9 ECZEMA, UNSPECIFIED TYPE: ICD-10-CM

## 2020-03-06 RX ORDER — MEDICAL SUPPLY, MISCELLANEOUS
EACH MISCELLANEOUS
Qty: 400 EACH | Refills: 1 | Status: SHIPPED | OUTPATIENT
Start: 2020-03-06 | End: 2020-05-07

## 2020-03-06 NOTE — TELEPHONE ENCOUNTER
DME updated and given to CSS to fax to group home at number provided below.    Angeli MAGANA RN

## 2020-03-06 NOTE — TELEPHONE ENCOUNTER
Group Home calling asking to have Rx/DME summary changed to state Application of topical Medications    Need to fax to [phone]    Lala Terrell CSS

## 2020-03-23 DIAGNOSIS — J41.0 SIMPLE CHRONIC BRONCHITIS (H): ICD-10-CM

## 2020-03-23 NOTE — TELEPHONE ENCOUNTER
Requested Prescriptions   Pending Prescriptions Disp Refills     fluticasone-vilanterol (BREO ELLIPTA) 100-25 MCG/INH inhaler 1 Inhaler 11     Sig: Inhale 1 puff into the lungs daily       There is no refill protocol information for this order        Last Written Prescription Date:  4/11/19  Last Fill Quantity: 1,  # refills: 11   Last office visit: 2/10/2020 with prescribing provider:     Future Office Visit:   Next 5 appointments (look out 90 days)    Apr 30, 2020 10:00 AM CDT  Return Visit with Luis A Zepeda MD  [facility]) [address] [address]  [phone]   May 04, 2020 10:30 AM CDT  Return Visit with Julia Hernandez PA-C  [facility] ([facility]) [address] [address]  [phone]   May 05, 2020 10:00 AM CDT  SHORT with Alexandru Vera MD  [facility] ([facility]) [address] [place] [address]  [phone]         Routing refill request to provider for review/approval because:  Drug not on the [facility], [facility] or [facility] refill protocol or controlled substance

## 2020-04-17 DIAGNOSIS — L85.3 XEROSIS OF SKIN: ICD-10-CM

## 2020-04-17 NOTE — TELEPHONE ENCOUNTER
Medication is being filled for 1 time refill only due to:  Patient needs to be seen because as he is due for appt. Letter sent to make appt to group home..   Jenna MENEZES RN BSN PHN  [facility]ty Clinics

## 2020-04-17 NOTE — LETTER
[facility]  [address] [address]  Phone: [phone]       April 17, 2020       Staff at [facility]  RE:Geovani Bustos  [address]  \[facility]"" [address]            Dear Staff at [facility], re: Geovani:    We are concerned about your health care.  We recently provided you with medication refills.  Many medications require routine follow-up with your doctor.    Your prescription(s) have been refilled for 90 days so you may have time for the above noted follow-up. Please call to schedule soon so we can assure you have an appointment before your next refills are needed.    Thank you,      Roxy WATSON / tr

## 2020-04-17 NOTE — TELEPHONE ENCOUNTER
Requested Prescriptions   Pending Prescriptions Disp Refills     Skin Protectants, Misc. (EUCERIN) cream 454 g 5     Sig: Apply topically 2 times daily       There is no refill protocol information for this order        Last office visit: 5/9/2019 with prescribing provider:  Dr. Krishnamurthy   Future Office Visit:   Next 5 appointments (look out 90 days)    Apr 30, 2020 10:00 AM CDT  Telephone Visit with Luis A Zepeda MD  [facility]) [address] [address]  [phone]   May 05, 2020 10:00 AM CDT  SHORT with Alexandru Vera MD  [facility] ([facility]) [address] [address]  [phone]           Denise Behrendt  Specialty CSS

## 2020-04-23 DIAGNOSIS — E78.5 HYPERLIPIDEMIA LDL GOAL <130: ICD-10-CM

## 2020-04-23 RX ORDER — AMOXICILLIN 500 MG
1 CAPSULE ORAL DAILY
Qty: 90 CAPSULE | Refills: 1 | Status: SHIPPED | OUTPATIENT
Start: 2020-04-23 | End: 2020-11-25

## 2020-04-23 NOTE — TELEPHONE ENCOUNTER
Prescription approved per [facility] Refill Protocol.  MIROSLAVA Dhaliwal RN

## 2020-04-30 ENCOUNTER — VIRTUAL VISIT (OUTPATIENT)
Dept: UROLOGY | Facility: CLINIC | Age: 65
End: 2020-04-30
Payer: COMMERCIAL

## 2020-04-30 DIAGNOSIS — R39.9 LOWER URINARY TRACT SYMPTOMS (LUTS): Primary | ICD-10-CM

## 2020-04-30 PROCEDURE — 99212 OFFICE O/P EST SF 10 MIN: CPT | Mod: 95 | Performed by: UROLOGY

## 2020-04-30 NOTE — PROGRESS NOTES
"Geovani Bustos is a 64 year old male who is being evaluated via a billable telephone visit.      The patient has been notified of following:     \"This telephone visit will be conducted via a call between you and your physician/provider. We have found that certain health care needs can be provided without the need for a physical exam.  This service lets us provide the care you need with a short phone conversation.  If a prescription is necessary we can send it directly to your pharmacy.  If lab work is needed we can place an order for that and you can then stop by our lab to have the test done at a later time.    Telephone visits are billed at different rates depending on your insurance coverage. During this emergency period, for some insurers they may be billed the same as an in-person visit.  Please reach out to your insurance provider with any questions.    If during the course of the call the physician/provider feels a telephone visit is not appropriate, you will not be charged for this service.\"    Patient has given verbal consent for Telephone visit?  Yes Chani Castellanos Rn   [phone] Amelia    ADDITIONAL PROVIDER NOTES:    REASON FOR VISIT TODAY:  Overactive bladder.      HISTORY OF PRESENT ILLNESS: Mr. Bustos is a 64-year-old gentleman with a history of urinary urgency and urge incontinence that is currently being managed with a combination of Flomax, finasteride and oxybutynin 5 mg p.o. t.i.d.  The patient underwent cystoscopy demonstrating a paucity of prostate tissue.  He has tried physical therapy for pelvic floor relaxation exercises, but this did not seem to improve his symptoms either.  He completed urodynamics evaluation and that showed the patient's urodynamics report from 01/08/2020 demonstrates significant bladder overactivity with small bladder capacities of around 200-230 mL with terminal evacuation contractions elicited at about these volumes.  There was no evidence for obstruction during " the voiding phase.  Storage pressures were otherwise normal.  We increased the patient's oxybutynin to 10 mg tid.  Patient and caregivers note that since increasing the medication he has not had any incontinence at night.  He still has nocturia 2-3 times, but is able to go on his own and get back to sleep.  Denies problems with constipation or dry eyes or mouth.    ASSESSMENT AND PLAN: Over half of today's 10-minute visit was spent counseling the patient regarding his LUTS.  Things improved on 10 mg tid oxybutynin.  Will continue.  Patient will f/u in 1 year to check on symptoms.        Phone call durationl 10 minutes

## 2020-05-04 DIAGNOSIS — K59.00 CONSTIPATION, UNSPECIFIED CONSTIPATION TYPE: ICD-10-CM

## 2020-05-04 RX ORDER — POLYETHYLENE GLYCOL 3350 17 G/17G
POWDER, FOR SOLUTION ORAL
Qty: 527 G | Refills: 11 | Status: SHIPPED | OUTPATIENT
Start: 2020-05-04 | End: 2021-05-18

## 2020-05-07 ENCOUNTER — TELEPHONE (OUTPATIENT)
Dept: FAMILY MEDICINE | Facility: CLINIC | Age: 65
End: 2020-05-07

## 2020-05-07 ENCOUNTER — VIRTUAL VISIT (OUTPATIENT)
Dept: DERMATOLOGY | Facility: CLINIC | Age: 65
End: 2020-05-07
Payer: COMMERCIAL

## 2020-05-07 ENCOUNTER — TELEPHONE (OUTPATIENT)
Dept: DERMATOLOGY | Facility: CLINIC | Age: 65
End: 2020-05-07

## 2020-05-07 DIAGNOSIS — H60.543 ECZEMA OF BOTH EXTERNAL EARS: ICD-10-CM

## 2020-05-07 DIAGNOSIS — L30.9 ECZEMA, UNSPECIFIED TYPE: ICD-10-CM

## 2020-05-07 PROCEDURE — 99212 OFFICE O/P EST SF 10 MIN: CPT | Mod: 95 | Performed by: PHYSICIAN ASSISTANT

## 2020-05-07 RX ORDER — MEDICAL SUPPLY, MISCELLANEOUS
EACH MISCELLANEOUS
Qty: 400 EACH | Refills: 11 | Status: SHIPPED | OUTPATIENT
Start: 2020-05-07

## 2020-05-07 RX ORDER — BETAMETHASONE DIPROPIONATE 0.5 MG/G
OINTMENT, AUGMENTED TOPICAL
Qty: 45 G | Refills: 5 | Status: SHIPPED | OUTPATIENT
Start: 2020-05-07 | End: 2021-05-03

## 2020-05-07 NOTE — TELEPHONE ENCOUNTER
DME order pended, forwarded to MD.   Please fax order to [facility].   MIROSLAVA Dhaliwal RN

## 2020-05-07 NOTE — TELEPHONE ENCOUNTER
1. Atopic dermatitis - doing great with emollients and betamethasone PRN. Pleased with progress. Refills provided today.     Advised of above per today's dictation. Linnette Mitchell RN

## 2020-05-07 NOTE — TELEPHONE ENCOUNTER
calling to get information about today's visit and treatment/followup plans    Please contact Ethan @:  [phone] (ok to leave message)    Denise Behrendt  [facility] CSS

## 2020-05-07 NOTE — PROGRESS NOTES
"Geovani Bustos is a 64 year old male who is being evaluated via a billable telephone visit.      The patient has been notified of following:     \"This telephone visit will be conducted via a call between you and your physician/provider. We have found that certain health care needs can be provided without the need for an in-person physical exam.  This service lets us provide the care you need with a video or telephone conversation.  If a prescription is necessary we can send it directly to your pharmacy.  If lab work is needed we can place an order for that and you can then stop by our lab to have the test done at a later time.    Video visits and telephone visits are billed at different rates depending on your insurance coverage.  Please reach out to your insurance provider with any questions.    If during the course of the call the physician/provider feels a video or telephone visit is not appropriate, you will not be charged for this service.\"    Patient has given verbal consent for Video or telephone visit? Yes    Geovani Bustos is a 64 year old year old male patient here today for follow up eczema. Has been using emollients twice per day and betamethasone as needed and doing well.  Patient states this has been present for many years.   Patient has no other skin complaints today.  Remainder of the HPI, Meds, PMH, Allergies, FH, and SH was reviewed in chart.    Pertinent Hx:   eczema  Past Medical History:   Diagnosis Date     Cerumen impaction      Chronic kidney disease      Colon polyps      Hyperlipidemia      Mitral valve prolapse      Schizophrenia (H)      Ulcerated penile lesions      VSD (ventricular septal defect)        Past Surgical History:   Procedure Laterality Date     ARTHRODESIS FOOT Right 1/19/2016    Procedure: ARTHRODESIS FOOT;  Surgeon: Holden Harrison DPM;  Location: [facility]     ARTHRODESIS FOOT Left 1/3/2017    Procedure: ARTHRODESIS FOOT;  Surgeon: Holden Harrison DPM;  " Location: [facility]     SURGICAL HISTORY OF -       leg fracture        Family History   Problem Relation Age of Onset     Emphysema Mother      Coronary Artery Disease Father        Social History     Socioeconomic History     Marital status: Single     Spouse name: Not on file     Number of children: Not on file     Years of education: Not on file     Highest education level: Not on file   Occupational History     Not on file   Social Needs     Financial resource strain: Not on file     Food insecurity     Worry: Not on file     Inability: Not on file     Transportation needs     Medical: Not on file     Non-medical: Not on file   Tobacco Use     Smoking status: Current Every Day Smoker     Packs/day: 1.00     Years: 47.00     Pack years: 47.00     Types: Cigarettes     Smokeless tobacco: Never Used     Tobacco comment: cutting one cigarette down a month   Substance and Sexual Activity     Alcohol use: No     Drug use: No     Sexual activity: Never   Lifestyle     Physical activity     Days per week: Not on file     Minutes per session: Not on file     Stress: Not on file   Relationships     Social connections     Talks on phone: Not on file     Gets together: Not on file     Attends Tenriism service: Not on file     Active member of club or organization: Not on file     Attends meetings of clubs or organizations: Not on file     Relationship status: Not on file     Intimate partner violence     Fear of current or ex partner: Not on file     Emotionally abused: Not on file     Physically abused: Not on file     Forced sexual activity: Not on file   Other Topics Concern     Parent/sibling w/ CABG, MI or angioplasty before 65F 55M? Not Asked      Service Not Asked     Blood Transfusions No     Caffeine Concern Not Asked     Occupational Exposure Not Asked     Hobby Hazards Not Asked     Sleep Concern Not Asked     Stress Concern Not Asked     Weight Concern Not Asked     Special Diet Not Asked     Back Care Not  Asked     Exercise Not Asked     Bike Helmet Not Asked     Seat Belt Not Asked     Self-Exams Not Asked   Social History Narrative    Patient has no interest in smoking cessation.     Her for [employer] physical - likes to Bowl    Lives in Group home. Grew up in [place].        Outpatient Encounter Medications as of 5/7/2020   Medication Sig Dispense Refill     ABILIFY 10 MG OR TABS Take 22 mg by mouth daily        albuterol (PROAIR HFA/PROVENTIL HFA/VENTOLIN HFA) 108 (90 Base) MCG/ACT inhaler Inhale 2 puffs into the lungs every 4 hours as needed for shortness of breath / dyspnea or wheezing 1 Inhaler 2     amoxicillin (AMOXIL) 250 MG capsule Take 2,000 mg by mouth       aspirin (QC LO-DOSE ASPIRIN) 81 MG EC tablet Take 1 tablet (81 mg) by mouth daily 100 tablet 1     augmented betamethasone dipropionate (DIPROLENE-AF) 0.05 % external ointment Apply to AA twice daily 1-2 weeks then as needed only. Do not apply to face. 45 g 5     Disposable Gloves (NITRILE EXAM GLOVES MEDIUM) MISC 1 each as needed Use PRN daily with administration of otic drops, body lotion and powder to treat dry itchy skin. 4 each 11     EAR DROPS EARWAX AID 6.5 % otic solution Place 5 drops into both ears 2 times daily For 5 days August 1-5th and Feb 1-5 th then return for irrigation after the 5th day. 15 mL 3     finasteride (PROSCAR) 5 MG tablet TAKE ONE TABLET BY MOUTH EVERY DAY 30 tablet 11     fluticasone-vilanterol (BREO ELLIPTA) 100-25 MCG/INH inhaler Inhale 1 puff into the lungs daily 3 Inhaler 1     gabapentin (NEURONTIN) 100 MG capsule Take 100 mg by mouth 2 times daily       Incontinence Supply Disposable (INCONTINENCE BRIEF LARGE) MISC Incontinence pads up to 300 per month 300 each 1     levothyroxine (SYNTHROID/LEVOTHROID) 125 MCG tablet Take 1 tablet (125 mcg) by mouth daily 90 tablet 3     nicotine (NICODERM CQ) 14 MG/24HR 24 hr patch Place 1 patch onto the skin every 24 hours 30 patch 1     Omega-3 Fatty Acids (FISH OIL)  1200 MG capsule Take 1 capsule (1.2 g) by mouth daily 90 capsule 1     order for DME Equipment being ordered: callous pad 1 each 1     order for DME Equipment being ordered: Dynaflex insert 1 Units 0     oxybutynin (DITROPAN) 5 MG tablet Take 2 tablets (10 mg) by mouth 3 times daily 90 tablet 11     polyethylene glycol (GAVILAX) 17 GM/SCOOP powder Take 17 g (1 capful) by mouth daily 527 g 11     REGULOID 48.57 % POWD Take 1 each by mouth See Admin Instructions Take 1 teaspoonful every day at 7pm and 9 pm. 369 g 11     simvastatin (ZOCOR) 40 MG tablet TAKE ONE TABLET BY MOUTH EVERY DAY AT 9 PM 90 tablet 2     Skin Protectants, Misc. (EUCERIN) cream Apply topically 2 times daily 454 g 11     tamsulosin (FLOMAX) 0.4 MG capsule Take 1 capsule (0.4 mg) by mouth daily 30 capsule 11     traZODone (DESYREL) 50 MG tablet Take 25 mg by mouth At Bedtime        vitamin D3 (CHOLECALCIFEROL) 2000 units (50 mcg) tablet Take 1 tablet (2,000 Units) by mouth daily 100 tablet 0     ZOLOFT 100 MG OR TABS 2 TABLETS DAILY       [DISCONTINUED] Disposable Gloves (LATEX GLOVES MEDIUM) MISC 4 boxes of gloves  For the application of topical medications 400 each 1     Facility-Administered Encounter Medications as of 5/7/2020   Medication Dose Route Frequency Provider Last Rate Last Dose     bupivacaine (MARCAINE) injection 0.5%    PRN Holden Harrison DPM   10 mL at 01/03/17 0945             Review Of Systems  Skin: As above  Eyes: negative  Ears/Nose/Throat: negative  Respiratory: No shortness of breath, dyspnea on exertion, cough, or hemoptysis  Cardiovascular: negative  Gastrointestinal: negative  Genitourinary: negative  Musculoskeletal: negative  Neurologic: negative  Psychiatric: negative  Hematologic/Lymphatic/Immunologic: negative  Endocrine: negative      O:   Alert & Orientedx3, Mood & Affect wnl,    General appearance normal   Alert, oriented and in no acute distress     Photos: none      Pulm: Breathing Normal, talking in  normal sentences, no shortness of breath during conversation    Neuro/Psych: Orientation:Alert and Orientedx3 ; Mood/Affect:normal ; no anxiety or depression       A/P:  1. Atopic dermatitis - doing great with emollients and betamethasone PRN. Pleased with progress. Refills provided today.   BENIGN LESIONS DISCUSSED WITH PATIENT:  I discussed the specifics of tumor, prognosis, and genetics of benign lesions.  I explained that treatment of these lesions would be purely cosmetic and not medically neccessary.  I discussed with patient different removal options including excision, cautery and /or laser.      Nature and genetics of benign skin lesions dicussed with patient.  Signs and Symptoms of skin cancer discussed with patient.  ABCDEs of melanoma reviewed with patient.  Patient encouraged to perform monthly skin exams.  UV precautions reviewed with patient.  Skin care regimen reviewed with patient: Eliminate harsh soaps, i.e. Dial, zest, irsih spring; Mild soaps such as Cetaphil or Dove sensitive skin, avoid hot or cold showers, aggressive use of emollients including vanicream, cetaphil or cerave discussed with patient.    Risks of non-melanoma skin cancer discussed with patient   Return to clinic yearly/PRN sooner    Teledermatology information:  - Location of patient: home  - Location of teledermatologist: [place]   - Reason teledermatology is appropriate: of National Emergency Regarding Coronavirus disease (COVID 19) Outbreak  - The patient's condition can safely be assessed using telemedicine: yes  - Method of transmission: store and forward teledermatology  - Image quality and interpretability: n/a  - Physician has received verbal consent for a Video/Photos Visit from the patient? Yes  - In-person dermatology visit recommendation: no  - Date of images: n/a  - Length of call: 5 minutes 2 seconds  - Date of report: 05/07/20

## 2020-05-07 NOTE — NURSING NOTE
Follow up on dermatitis. Currently using Betamethasone currently as needed which is controlling symptoms very well. No flare ups currently. Will need refills. No questions or concerns.    Florecita Momin LPN.................5/7/2020

## 2020-05-07 NOTE — TELEPHONE ENCOUNTER
Received fax from [place] [facility] [place] stating the following:    We have been contacted by this patient requesting gloves. If you feel this patient is able to get these products please send us an Rx for Gloves. Please include REFILL AMOUNTS or PRN. Please include all associated diagnosis' and Dx/ICD-10 codes associated with the patient so we can bill product to their insurance. If you feel this patient DOES NOT qualify please state on this form and send it back to me ASAP.    Thank you for your time in this matter. If you have any questions please call us at [phone]. Please fax the completed copy back to [phone].

## 2020-05-13 ENCOUNTER — OFFICE VISIT (OUTPATIENT)
Dept: PODIATRY | Facility: CLINIC | Age: 65
End: 2020-05-13
Payer: COMMERCIAL

## 2020-05-13 VITALS
TEMPERATURE: 97.2 F | BODY MASS INDEX: 25.48 KG/M2 | HEART RATE: 66 BPM | SYSTOLIC BLOOD PRESSURE: 124 MMHG | HEIGHT: 70 IN | DIASTOLIC BLOOD PRESSURE: 76 MMHG | WEIGHT: 178 LBS

## 2020-05-13 DIAGNOSIS — L84 CALLUS OF FOOT: Primary | ICD-10-CM

## 2020-05-13 DIAGNOSIS — M79.672 LEFT FOOT PAIN: ICD-10-CM

## 2020-05-13 DIAGNOSIS — M21.6X2 PLANTAR FAT PAD ATROPHY OF LEFT FOOT: ICD-10-CM

## 2020-05-13 PROCEDURE — 11055 PARING/CUTG B9 HYPRKER LES 1: CPT | Performed by: PODIATRIST

## 2020-05-13 PROCEDURE — 99213 OFFICE O/P EST LOW 20 MIN: CPT | Mod: 25 | Performed by: PODIATRIST

## 2020-05-13 ASSESSMENT — MIFFLIN-ST. JEOR: SCORE: 1585.71

## 2020-05-13 NOTE — NURSING NOTE
"Chief Complaint   Patient presents with     RECHECK     Callus trim, left foot       Initial /76   Pulse 66   Temp 97.2  F (36.2  C) (Tympanic)   Ht 1.765 m (5' 9.5\")   Wt 80.7 kg (178 lb)   BMI 25.91 kg/m   Estimated body mass index is 25.91 kg/m  as calculated from the following:    Height as of this encounter: 1.765 m (5' 9.5\").    Weight as of this encounter: 80.7 kg (178 lb).  Medications and allergies reviewed.      [facility]    "

## 2020-05-13 NOTE — LETTER
5/13/2020         RE: Geovani Bustos  [id number] [facility] [id number]        Dear Colleague,    Thank you for referring your patient, Geovani Bustos, to the [facility]. Please see a copy of my visit note below.    Geovani returns to the office for reevaluation of the left foot.  The patient relates following the instructions given at the last visit with noted more pain.  The patient relates increased callus formation on the bottom of the left foot.  The patient relates no other problems.    PAST MEDICAL HISTORY:   Past Medical History:   Diagnosis Date     Cerumen impaction      Chronic kidney disease      Colon polyps      Hyperlipidemia      Mitral valve prolapse      Schizophrenia (H)      Ulcerated penile lesions      VSD (ventricular septal defect)        BMI= Body mass index is 25.91 kg/m .        Physical Exam:    General: The patient appears to have a pleasant mental affect.    Lower extremity physical exam:  Neurovascular status is intact with palpable pedal pulses and intact epicritic sensations.  Muscular exam is within normal limits to major muscle groups.  Integument is intact.      One notes decreased edema.  One notes pain on palpation under the first metatarsophalangeal joint on the left foot.  One notes a hyperkeratotic skin lesion in this area with subdermal hemorrhage noted.       Assessment:      ICD-10-CM    1. Callus of foot  L84    2. Left foot pain  M79.672    3. Plantar fat pad atrophy of left foot  M21.6X2        Plan:  I have explained to Geovani about the conditions.  At this time, the callus was sharply debrided with #10 blade down to healthy epidermis.  No bleeding noted.  Patient was instructed to wear cushioned air inserts to better offload the pressure causing callus formation.    Disclaimer: This note consists of symbols derived from keyboarding, dictation and/or voice recognition software. As a result, there may be errors in the script that have  gone undetected. Please consider this when interpreting information found in this chart.       BREONNA Harrison D.P.M., F.OWEN.C.F.A.S.      Again, thank you for allowing me to participate in the care of your patient.        Sincerely,        Holden Harrison DPM

## 2020-05-13 NOTE — PATIENT INSTRUCTIONS
SMOKING CESSATION  What's in cigarette smoke? - Cigarette smoke contains over 4,000 chemicals. Nicotine is one of the main ingredients which is an insecticide/herbicide. It is poisonous to our nervous system, increases blood clotting risk, and decreases the body's defenses to fight off infection. Another chemical is Carbon Monoxide is an asphyxiating gas that permanently binds to blood cells and blocks the transport of oxygen. This leads to tissue death and decreases your metabolism. Tar is a chemical that coats your lungs and trachea which impairs new oxygen coming in and carbon dioxide getting out of your body.   How does smoking impact surgery? - Smoking is particularly hazardous with regards to surgery. Surgery puts stress on the body and a smoker's body is already under strain from these chemicals. Putting the two together, especially for an elective surgery, could be a recipe for disaster. Smoking before and after surgery increases your risk of heart problems, slow wound healing, delayed bone healing, blood clots, wound infection and anesthesia complications.   What are the benefits of quitting? - In 20 minutes your blood pressure will drop back down to normal. In 8 hours the carbon monoxide (a toxic gas) levels in your blood stream will drop by half, and oxygen levels will return to normal. In 48 hours your chance of having a heart attack will have decreased. All nicotine will have left your body. Your sense of taste and smell will return to a normal level. In 72 hours your bronchial tubes will relax, and your energy levels will increase. In 2 weeks your circulation will increase, and it will continue to improve for the next 10 weeks.    Recommendations for elective surgery - Ideally, patients should quit smoking 8 weeks before and at least 2 weeks after elective surgery in order to avoid complications. Simply cutting back on the amount of cigarettes smoked per day does not offer any benefit or decrease the  risk of poor wound healing, heart problems, and infection. Smokers should also start taking Vitamin C and B for two weeks before surgery and two weeks after surgery.    Ways to Stop Smokin. Nicotine patches, lozenges, or gum  2. Support Groups  3. Medications (see below)    List of Medications:  1. Varenicline Tartrate (CHANTIX)   2. Bupropion HCL (WELLBUTRIN, ZYBAN) - note: make sure Wellbutrin is for smoking cessation and not other issues   3. Nicotine Patch (NICODERM)   4. Nicotine Inhaler (NICOTROL)   5. Nicotine Gum Nicotine Polacrilex   6. Nicotine Lozenge: Nicotine Polacrilex (COMMIT)   * [place] offers a smoking support group as well!  Please visit: [email or web address]  If you are interested in these, ask about getting a prescription or talk to your primary care doctor about what may be the best way for you to quit.

## 2020-05-13 NOTE — PROGRESS NOTES
Geovani returns to the office for reevaluation of the left foot.  The patient relates following the instructions given at the last visit with noted more pain.  The patient relates increased callus formation on the bottom of the left foot.  The patient relates no other problems.    PAST MEDICAL HISTORY:   Past Medical History:   Diagnosis Date     Cerumen impaction      Chronic kidney disease      Colon polyps      Hyperlipidemia      Mitral valve prolapse      Schizophrenia (H)      Ulcerated penile lesions      VSD (ventricular septal defect)        BMI= Body mass index is 25.91 kg/m .        Physical Exam:    General: The patient appears to have a pleasant mental affect.    Lower extremity physical exam:  Neurovascular status is intact with palpable pedal pulses and intact epicritic sensations.  Muscular exam is within normal limits to major muscle groups.  Integument is intact.      One notes decreased edema.  One notes pain on palpation under the first metatarsophalangeal joint on the left foot.  One notes a hyperkeratotic skin lesion in this area with subdermal hemorrhage noted.       Assessment:      ICD-10-CM    1. Callus of foot  L84    2. Left foot pain  M79.672    3. Plantar fat pad atrophy of left foot  M21.6X2        Plan:  I have explained to Geovani about the conditions.  At this time, the callus was sharply debrided with #10 blade down to healthy epidermis.  No bleeding noted.  Patient was instructed to wear cushioned air inserts to better offload the pressure causing callus formation.    Disclaimer: This note consists of symbols derived from keyboarding, dictation and/or voice recognition software. As a result, there may be errors in the script that have gone undetected. Please consider this when interpreting information found in this chart.       BREONNA Harrison D.P.M., FFABIÁN.F.A.S.

## 2020-05-19 DIAGNOSIS — N32.81 OAB (OVERACTIVE BLADDER): Primary | ICD-10-CM

## 2020-05-19 DIAGNOSIS — N32.81 OAB (OVERACTIVE BLADDER): ICD-10-CM

## 2020-05-19 RX ORDER — OXYBUTYNIN CHLORIDE 5 MG/1
TABLET ORAL
Qty: 180 TABLET | Refills: 11 | COMMUNITY
Start: 2020-05-19 | End: 2022-02-17

## 2020-05-19 RX ORDER — OXYBUTYNIN CHLORIDE 5 MG/1
10 TABLET ORAL 3 TIMES DAILY
Qty: 90 TABLET | Refills: 11 | Status: SHIPPED | OUTPATIENT
Start: 2020-05-19 | End: 2020-05-19

## 2020-05-19 RX ORDER — OXYBUTYNIN CHLORIDE 5 MG/1
TABLET ORAL
Qty: 180 TABLET | Refills: 11 | COMMUNITY
Start: 2020-05-19 | End: 2021-04-26

## 2020-05-19 RX ORDER — OXYBUTYNIN CHLORIDE 5 MG/1
TABLET ORAL
Qty: 180 TABLET | Refills: 11 | Status: SHIPPED | OUTPATIENT
Start: 2020-05-19 | End: 2020-05-19

## 2020-05-19 NOTE — TELEPHONE ENCOUNTER
Requested Prescriptions   Pending Prescriptions Disp Refills     oxybutynin (DITROPAN) 5 MG tablet 90 tablet 11     Sig: Take 2 tablets (10 mg) by mouth 3 times daily       There is no refill protocol information for this order        Last Written Prescription Date:    Last Fill Quantity: ,  # refills:    Last office visit: Visit date not found with prescribing provider:  Katelyn Rolle Office Visit:

## 2020-06-15 DIAGNOSIS — E55.9 VITAMIN D DEFICIENCY: ICD-10-CM

## 2020-06-15 DIAGNOSIS — E03.9 ACQUIRED HYPOTHYROIDISM: Chronic | ICD-10-CM

## 2020-06-15 DIAGNOSIS — E78.5 HYPERLIPIDEMIA LDL GOAL <130: ICD-10-CM

## 2020-06-15 DIAGNOSIS — R39.9 LOWER URINARY TRACT SYMPTOMS (LUTS): ICD-10-CM

## 2020-06-15 RX ORDER — LEVOTHYROXINE SODIUM 125 UG/1
125 TABLET ORAL DAILY
Qty: 90 TABLET | Refills: 3 | Status: SHIPPED | OUTPATIENT
Start: 2020-06-15 | End: 2021-05-21

## 2020-06-15 RX ORDER — CHOLECALCIFEROL (VITAMIN D3) 50 MCG
1 TABLET ORAL DAILY
Qty: 100 TABLET | Refills: 3 | Status: SHIPPED | OUTPATIENT
Start: 2020-06-15 | End: 2020-07-16

## 2020-06-15 NOTE — TELEPHONE ENCOUNTER
Requested Prescriptions   Pending Prescriptions Disp Refills     tamsulosin (FLOMAX) 0.4 MG capsule 30 capsule 11     Sig: Take 1 capsule (0.4 mg) by mouth daily       There is no refill protocol information for this order      Last Written Prescription Date:    Last Fill Quantity: ,  # refills:    Last office visit: Visit date not found with prescribing provider:  Katelyn Rolle Office Visit:

## 2020-06-17 RX ORDER — TAMSULOSIN HYDROCHLORIDE 0.4 MG/1
0.4 CAPSULE ORAL DAILY
Qty: 30 CAPSULE | Refills: 11 | Status: SHIPPED | OUTPATIENT
Start: 2020-06-17 | End: 2021-05-21

## 2020-07-03 DIAGNOSIS — N18.30 CKD (CHRONIC KIDNEY DISEASE) STAGE 3, GFR 30-59 ML/MIN (H): Primary | ICD-10-CM

## 2020-07-03 DIAGNOSIS — N02.B9 IGA NEPHROPATHY: ICD-10-CM

## 2020-07-12 DIAGNOSIS — N18.30 CKD (CHRONIC KIDNEY DISEASE) STAGE 3, GFR 30-59 ML/MIN (H): ICD-10-CM

## 2020-07-12 DIAGNOSIS — N02.B9 IGA NEPHROPATHY: ICD-10-CM

## 2020-07-12 LAB
ALBUMIN SERPL-MCNC: 4 G/DL (ref 3.4–5)
ALBUMIN UR-MCNC: NEGATIVE MG/DL
ANION GAP SERPL CALCULATED.3IONS-SCNC: 4 MMOL/L (ref 3–14)
APPEARANCE UR: CLEAR
BACTERIA #/AREA URNS HPF: ABNORMAL /HPF
BILIRUB UR QL STRIP: NEGATIVE
BUN SERPL-MCNC: 31 MG/DL (ref 7–30)
CALCIUM SERPL-MCNC: 9 MG/DL (ref 8.5–10.1)
CHLORIDE SERPL-SCNC: 106 MMOL/L (ref 94–109)
CO2 SERPL-SCNC: 29 MMOL/L (ref 20–32)
COLOR UR AUTO: YELLOW
CREAT SERPL-MCNC: 1.56 MG/DL (ref 0.66–1.25)
CREAT UR-MCNC: 48 MG/DL
ERYTHROCYTE [DISTWIDTH] IN BLOOD BY AUTOMATED COUNT: 13.2 % (ref 10–15)
GFR SERPL CREATININE-BSD FRML MDRD: 45 ML/MIN/{1.73_M2}
GLUCOSE SERPL-MCNC: 83 MG/DL (ref 70–99)
GLUCOSE UR STRIP-MCNC: NEGATIVE MG/DL
HCT VFR BLD AUTO: 43 % (ref 40–53)
HGB BLD-MCNC: 14.7 G/DL (ref 13.3–17.7)
HGB UR QL STRIP: ABNORMAL
KETONES UR STRIP-MCNC: NEGATIVE MG/DL
LEUKOCYTE ESTERASE UR QL STRIP: NEGATIVE
MCH RBC QN AUTO: 32.8 PG (ref 26.5–33)
MCHC RBC AUTO-ENTMCNC: 34.2 G/DL (ref 31.5–36.5)
MCV RBC AUTO: 96 FL (ref 78–100)
NITRATE UR QL: NEGATIVE
NON-SQ EPI CELLS #/AREA URNS LPF: ABNORMAL /LPF
PH UR STRIP: 5.5 PH (ref 5–7)
PHOSPHATE SERPL-MCNC: 4 MG/DL (ref 2.5–4.5)
PLATELET # BLD AUTO: 163 10E9/L (ref 150–450)
POTASSIUM SERPL-SCNC: 4.2 MMOL/L (ref 3.4–5.3)
PROT UR-MCNC: <0.05 G/L
PROT/CREAT 24H UR: NORMAL G/G CR (ref 0–0.2)
PTH-INTACT SERPL-MCNC: 59 PG/ML (ref 18–80)
RBC # BLD AUTO: 4.48 10E12/L (ref 4.4–5.9)
RBC #/AREA URNS AUTO: ABNORMAL /HPF
SODIUM SERPL-SCNC: 139 MMOL/L (ref 133–144)
SOURCE: ABNORMAL
SP GR UR STRIP: 1.01 (ref 1–1.03)
UROBILINOGEN UR STRIP-ACNC: 0.2 EU/DL (ref 0.2–1)
WBC # BLD AUTO: 10.2 10E9/L (ref 4–11)
WBC #/AREA URNS AUTO: ABNORMAL /HPF

## 2020-07-12 PROCEDURE — 80069 RENAL FUNCTION PANEL: CPT | Performed by: INTERNAL MEDICINE

## 2020-07-12 PROCEDURE — 84156 ASSAY OF PROTEIN URINE: CPT | Performed by: INTERNAL MEDICINE

## 2020-07-12 PROCEDURE — 83970 ASSAY OF PARATHORMONE: CPT | Performed by: INTERNAL MEDICINE

## 2020-07-12 PROCEDURE — 81001 URINALYSIS AUTO W/SCOPE: CPT | Performed by: INTERNAL MEDICINE

## 2020-07-12 PROCEDURE — 82306 VITAMIN D 25 HYDROXY: CPT | Performed by: INTERNAL MEDICINE

## 2020-07-12 PROCEDURE — 36415 COLL VENOUS BLD VENIPUNCTURE: CPT | Performed by: INTERNAL MEDICINE

## 2020-07-12 PROCEDURE — 85027 COMPLETE CBC AUTOMATED: CPT | Performed by: INTERNAL MEDICINE

## 2020-07-13 LAB — DEPRECATED CALCIDIOL+CALCIFEROL SERPL-MC: 76 UG/L (ref 20–75)

## 2020-07-16 ENCOUNTER — VIRTUAL VISIT (OUTPATIENT)
Dept: NEPHROLOGY | Facility: CLINIC | Age: 65
End: 2020-07-16
Attending: INTERNAL MEDICINE
Payer: COMMERCIAL

## 2020-07-16 VITALS — DIASTOLIC BLOOD PRESSURE: 81 MMHG | SYSTOLIC BLOOD PRESSURE: 129 MMHG | HEART RATE: 61 BPM

## 2020-07-16 DIAGNOSIS — N02.B9 IGA NEPHROPATHY: Primary | ICD-10-CM

## 2020-07-16 DIAGNOSIS — N18.30 CKD (CHRONIC KIDNEY DISEASE) STAGE 3, GFR 30-59 ML/MIN (H): ICD-10-CM

## 2020-07-16 RX ORDER — OLANZAPINE 2.5 MG/1
TABLET, FILM COATED ORAL
COMMUNITY
Start: 2020-04-09 | End: 2023-06-16

## 2020-07-16 ASSESSMENT — PAIN SCALES - GENERAL: PAINLEVEL: NO PAIN (0)

## 2020-07-16 NOTE — PROGRESS NOTES
"Nephrology Clinic    Telephone Visit    SUBJECTIVE:   Geovani Bustos is a 64 year old year old male with a history of biopsy proven IgA nephropathy (4/2009, Dr Robbie Harris. As per note - kidney tissue looked altogether normal with exception of deposition of IgA in mesangium), treated conservatively. Other pertinent history includes hypothyroidism, schizophrenia, HPL here for: CKD and IgA nephropathy follow-up    The patient has no specific complaints today. He is accompanied by an attendant at his group home.     Recent home /80s. No LE swelling. Rare SOB that the patient thinks is related to COPD. No CP.     He notes that use of oxybutynin and finasteride is \"sometimes\" helping with urination.       Video visit documentation in separate note.       Review of systems:  10 point ROS negative except as noted above.    Past Medical History:   Diagnosis Date     Cerumen impaction      Chronic kidney disease      Colon polyps      Hyperlipidemia      Mitral valve prolapse      Schizophrenia (H)      Ulcerated penile lesions      VSD (ventricular septal defect)         ABILIFY 10 MG OR TABS, Take 22 mg by mouth daily   albuterol (PROAIR HFA/PROVENTIL HFA/VENTOLIN HFA) 108 (90 Base) MCG/ACT inhaler, Inhale 2 puffs into the lungs every 4 hours as needed for shortness of breath / dyspnea or wheezing  amoxicillin (AMOXIL) 250 MG capsule, Take 2,000 mg by mouth  aspirin (QC LO-DOSE ASPIRIN) 81 MG EC tablet, Take 1 tablet (81 mg) by mouth daily  augmented betamethasone dipropionate (DIPROLENE-AF) 0.05 % external ointment, Apply to AA twice daily 1-2 weeks then as needed only. Do not apply to face.  Disposable Gloves (LATEX GLOVES MEDIUM) MISC, 4 boxes of gloves  For the application of topical medications  Disposable Gloves (NITRILE EXAM GLOVES MEDIUM) MISC, 1 each as needed Use PRN daily with administration of otic drops, body lotion and powder to treat dry itchy skin.  EAR DROPS 6.5 % otic solution, Place 5 drops into " both ears 2 times daily For 5 days August 1-5th and Feb 1-5 th then return for irrigation after the 5th day.  finasteride (PROSCAR) 5 MG tablet, TAKE ONE TABLET BY MOUTH EVERY DAY  fluticasone-vilanterol (BREO ELLIPTA) 100-25 MCG/INH inhaler, Inhale 1 puff into the lungs daily  gabapentin (NEURONTIN) 100 MG capsule, Take 100 mg by mouth 2 times daily  Incontinence Supply Disposable (INCONTINENCE BRIEF LARGE) MISC, Incontinence pads up to 300 per month  levothyroxine (SYNTHROID/LEVOTHROID) 125 MCG tablet, Take 1 tablet (125 mcg) by mouth daily  OLANZapine (ZYPREXA) 2.5 MG tablet,   Omega-3 Fatty Acids (FISH OIL) 1200 MG capsule, Take 1 capsule (1.2 g) by mouth daily  order for DME, Equipment being ordered: callous pad  order for DME, Equipment being ordered: Dynaflex insert  oxybutynin (DITROPAN) 5 MG tablet, Take 2 tablets (10mg) by mouth 3 times daily  polyethylene glycol (GAVILAX) 17 GM/SCOOP powder, Take 17 g (1 capful) by mouth daily  REGULOID 48.57 % POWD, Take 1 each by mouth See Admin Instructions Take 1 teaspoonful every day at 7pm and 9 pm.  simvastatin (ZOCOR) 40 MG tablet, TAKE ONE TABLET BY MOUTH EVERY DAY AT 9 PM  Skin Protectants, Misc. (EUCERIN) cream, Apply topically 2 times daily  tamsulosin (FLOMAX) 0.4 MG capsule, Take 1 capsule (0.4 mg) by mouth daily  traZODone (DESYREL) 50 MG tablet, Take 25 mg by mouth At Bedtime   ZOLOFT 100 MG OR TABS, 2 TABLETS DAILY  nicotine (NICODERM CQ) 14 MG/24HR 24 hr patch, Place 1 patch onto the skin every 24 hours (Patient not taking: Reported on 7/16/2020)  oxybutynin (DITROPAN) 5 MG tablet,     bupivacaine (MARCAINE) injection 0.5%         OBJECTIVE:  Vitals and exam deferred due to phone visit.    Recent Labs   Lab Test 07/12/20  1601 11/15/19  0907    138   POTASSIUM 4.2 4.3   CHLORIDE 106 103   CO2 29 34*   ANIONGAP 4 1*   GLC 83 96   BUN 31* 17   CR 1.56* 1.47*   RAYRAY 9.0 8.8       Lab Results   Component Value Date    WBC 10.2 07/12/2020     Lab Results    Component Value Date    RBC 4.48 07/12/2020     Lab Results   Component Value Date    HGB 14.7 07/12/2020     Lab Results   Component Value Date    HCT 43.0 07/12/2020     No components found for: MCT  Lab Results   Component Value Date    MCV 96 07/12/2020     Lab Results   Component Value Date    MCH 32.8 07/12/2020     Lab Results   Component Value Date    MCHC 34.2 07/12/2020     Lab Results   Component Value Date    RDW 13.2 07/12/2020     Lab Results   Component Value Date     07/12/2020       Color Urine (no units)   Date Value   07/12/2020 Yellow     Appearance Urine (no units)   Date Value   07/12/2020 Clear     Glucose Urine (mg/dL)   Date Value   07/12/2020 Negative     Bilirubin Urine (no units)   Date Value   07/12/2020 Negative     Ketones Urine (mg/dL)   Date Value   07/12/2020 Negative     Specific Gravity Urine (no units)   Date Value   07/12/2020 1.015     pH Urine (pH)   Date Value   07/12/2020 5.5     Protein Albumin Urine (mg/dL)   Date Value   07/12/2020 Negative     Urobilinogen Urine (EU/dL)   Date Value   07/12/2020 0.2     Nitrite Urine (no units)   Date Value   07/12/2020 Negative     Leukocyte Esterase Urine (no units)   Date Value   07/12/2020 Negative           ASSESSMENT and PLAN:   Geovani was seen today for recheck.    Diagnoses and all orders for this visit:    IgA nephropathy  CKD (chronic kidney disease) stage 3, GFR 30-59 ml/min (H)  Biopsy proven IgA nephropathy but without proteinuria (not on ACEi/ARB) and has not required immunosuppression/steroids. GFR was ~55 10 years ago and progressed to ~45 around two years ago but stable since. CTM with annual renal appointment but he is low-risk for significant progression, and thus conservative management will continue.   -should he have significant creatinine rise also consider obstruction given his LUTS followed by urology     Blood pressure  Normotensive not on BP meds. No proteinuria and not on ACEi/ARB.     Electrolytes,  acid-base  No issues at this time.     Hemoglobin  Hgb WNL.     MBD-CKD  Vitamin D level now high, up to 76. While calcium and phos and PTH WNL will stop cholecalciferol (was 2000 units daily) at this time.       Return to clinic in 1 year.     Patient seen and discussed with Dr. Weiss who agrees with this plan.    Stevie Caraballo MD  Renal Fellow   Pager: [phone]      Discussed with the fellow and agree with fellow's findings and plan as documented in the fellow's note.  I have reviewed today's vital signs, notes, medications, labs and imaging.   Ginger Weiss MD, MD

## 2020-07-16 NOTE — LETTER
2020       RE: Geovani Bustos  [address] [facility] [id number]     Dear Colleague,    Thank you for referring your patient, Geovani Bustos, to the [facility] at [facility]. Please see a copy of my visit note below.      Nephrology Clinic    Ginger Weiss MD  2020     Name: Geovani Bustos  MRN: [id number]  Age: 63 year old  : 1955  Referring provider: Referred Self       Today the patient is doing well. He does complain of intermittent bilateral kidney pain, describing a tenderness which resolves on it's own. Does endorse difficulty pushing urine and he is currently following with urology for this. No lower extremity edema and no history of kidney stones. No dizziness, lightheadedness, nausea, vomiting, shortness of breath or any other complaints.       Assessment:    # IgA Nephropathy  # CKD, stage 3  # LUTS   biopsy proven IgA nephropathy in , will try to obtain bx records. Has not had proteinuria over years. Low level microscopic hematuria on and off.He has maintained his blood pressures at a goal of < 140/90 mm of Hg most of the times without needing antihypertensives. His kidney function has been relatively stable and maintains a baseline creatinine of 1.5-1.7 mg/dl.     Plan:   Continue with conservative management including management of BP ( target < 130/80), avoiding nephrotoxins, may continue Fish oil.   Given lack of proteinuria and fairly low BP, no need to start RASi at this time but should have low threshold if blood pressure goes up or if there is detectable proteinuria.        Follow-up: 8 months    Ginger Weiss MD     Reason For Visit:   CKD follow-up     HPI:   Geovani Bustos is a 63 year old male with a history of biopsy proven IgA nephropathy (2009, Dr Robbie Harris. As per note - kidney tissue looked altogether normal with exception of deposition of IgA in mesangium), treated conservatively. Other  pertinent history includes hypothyroidism, schizophrenia, Hyperlipidemia       Review of Systems:   Pertinent items are noted in HPI or as below, remainder of complete ROS is negative.      Active Medications:     Current Outpatient Medications:      ABILIFY 10 MG OR TABS, Take 22 mg by mouth daily , Disp: , Rfl:      albuterol (PROAIR HFA/PROVENTIL HFA/VENTOLIN HFA) 108 (90 Base) MCG/ACT inhaler, Inhale 2 puffs into the lungs every 4 hours as needed for shortness of breath / dyspnea or wheezing, Disp: 1 Inhaler, Rfl: 2     amoxicillin (AMOXIL) 250 MG capsule, Take 2,000 mg by mouth, Disp: , Rfl:      aspirin (QC LO-DOSE ASPIRIN) 81 MG EC tablet, Take 1 tablet (81 mg) by mouth daily, Disp: 100 tablet, Rfl: 3     augmented betamethasone dipropionate (DIPROLENE-AF) 0.05 % external ointment, Apply to AA twice daily 1-2 weeks then as needed only. Do not apply to face., Disp: 45 g, Rfl: 5     Disposable Gloves (LATEX GLOVES MEDIUM) MISC, 4 boxes of gloves For the application of topical medications, Disp: 400 each, Rfl: 11     Disposable Gloves (NITRILE EXAM GLOVES MEDIUM) MISC, 1 each as needed Use PRN daily with administration of otic drops, body lotion and powder to treat dry itchy skin., Disp: 4 each, Rfl: 11     EAR DROPS 6.5 % otic solution, Place 5 drops into both ears 2 times daily For 5 days August 1-5th and Feb 1-5 th then return for irrigation after the 5th day., Disp: 15 mL, Rfl: 3     finasteride (PROSCAR) 5 MG tablet, TAKE ONE TABLET BY MOUTH EVERY DAY, Disp: 30 tablet, Rfl: 11     fluticasone-vilanterol (BREO ELLIPTA) 100-25 MCG/INH inhaler, Inhale 1 puff into the lungs daily, Disp: 3 Inhaler, Rfl: 1     gabapentin (NEURONTIN) 100 MG capsule, Take 100 mg by mouth 2 times daily, Disp: , Rfl:      Incontinence Supply Disposable (INCONTINENCE BRIEF LARGE) MISC, Incontinence pads up to 300 per month, Disp: 300 each, Rfl: 1     levothyroxine (SYNTHROID/LEVOTHROID) 125 MCG tablet, Take 1 tablet (125 mcg) by mouth  daily, Disp: 90 tablet, Rfl: 3     nicotine (NICODERM CQ) 14 MG/24HR 24 hr patch, Place 1 patch onto the skin every 24 hours, Disp: 30 patch, Rfl: 1     Omega-3 Fatty Acids (FISH OIL) 1200 MG capsule, Take 1 capsule (1.2 g) by mouth daily, Disp: 90 capsule, Rfl: 1     order for DME, Equipment being ordered: callous pad, Disp: 1 each, Rfl: 1     order for DME, Equipment being ordered: Dynaflex insert, Disp: 1 Units, Rfl: 0     oxybutynin (DITROPAN) 5 MG tablet, Take 2 tablets (10mg) by mouth 3 times daily, Disp: 180 tablet, Rfl: 11     oxybutynin (DITROPAN) 5 MG tablet, , Disp: 180 tablet, Rfl: 11     polyethylene glycol (GAVILAX) 17 GM/SCOOP powder, Take 17 g (1 capful) by mouth daily, Disp: 527 g, Rfl: 11     REGULOID 48.57 % POWD, Take 1 each by mouth See Admin Instructions Take 1 teaspoonful every day at 7pm and 9 pm., Disp: 369 g, Rfl: 11     simvastatin (ZOCOR) 40 MG tablet, TAKE ONE TABLET BY MOUTH EVERY DAY AT 9 PM, Disp: 90 tablet, Rfl: 2     Skin Protectants, Misc. (EUCERIN) cream, Apply topically 2 times daily, Disp: 454 g, Rfl: 11     tamsulosin (FLOMAX) 0.4 MG capsule, Take 1 capsule (0.4 mg) by mouth daily, Disp: 30 capsule, Rfl: 11     traZODone (DESYREL) 50 MG tablet, Take 25 mg by mouth At Bedtime , Disp: , Rfl:      vitamin D3 (CHOLECALCIFEROL) 2000 units (50 mcg) tablet, Take 1 tablet (2,000 Units) by mouth daily, Disp: 100 tablet, Rfl: 3     ZOLOFT 100 MG OR TABS, 2 TABLETS DAILY, Disp: , Rfl:   No current facility-administered medications for this visit.     Facility-Administered Medications Ordered in Other Visits:      bupivacaine (MARCAINE) injection 0.5%, , , PRN, Christy, Holden Vides, DPM, 10 mL at 01/03/17 0945     Allergies:   Nitrogen oxide and Sulfa drugs      Past Medical History:  Past Medical History:   Diagnosis Date     Cerumen impaction      Chronic kidney disease      Colon polyps      Hyperlipidemia      Mitral valve prolapse      Schizophrenia (H)      Ulcerated penile lesions       VSD (ventricular septal defect)      Patient Active Problem List   Diagnosis     Colon polyps     Schizophrenia (H)     VSD (ventricular septal defect)     Mitral valve prolapse     Acquired hypothyroidism     IgA nephropathy     Proteinuria     Hyperlipidemia LDL goal <130     BPH (benign prostatic hyperplasia)     Health Care Home     Bilateral impacted cerumen     Bunion of great toe of right foot     Eczema of external ear, bilateral     Tinea pedis, unspecified laterality     Incomplete right bundle branch block (RBBB)     Cigarette nicotine dependence with nicotine-induced disorder     Gastroesophageal reflux disease without esophagitis     Stage 1 mild COPD by GOLD classification (H)     CKD (chronic kidney disease) stage 3, GFR 30-59 ml/min (H)        Past Surgical History:  Past Surgical History:   Procedure Laterality Date     ARTHRODESIS FOOT Right 1/19/2016    Procedure: ARTHRODESIS FOOT;  Surgeon: Holden Harrison DPM;  Location: [facility] OR     ARTHRODESIS FOOT Left 1/3/2017    Procedure: ARTHRODESIS FOOT;  Surgeon: Holden Harrison DPM;  Location: [facility] OR     SURGICAL HISTORY OF -       leg fracture       Family History:   Family History   Problem Relation Age of Onset     Emphysema Mother      Coronary Artery Disease Father          Social History:   Social History     Tobacco Use     Smoking status: Current Every Day Smoker     Packs/day: 1.00     Years: 47.00     Pack years: 47.00     Types: Cigarettes     Smokeless tobacco: Never Used     Tobacco comment: cutting one cigarette down a month   Substance Use Topics     Alcohol use: No     Drug use: No        Physical Exam:  There were no vitals taken for this visit.   GENERAL APPEARANCE: alert and no distress  EYES: nonicteric  RESP: lungs clear to auscultation   CV: regular rhythm, normal rate, no rub  Extremities: no clubbing, cyanosis, or edema  MS: no evidence of inflammation in joints, no muscle tenderness  SKIN: no rash  NEURO: mentation  intact and speech normal  PSYCH: affect normal/bright     Laboratory:  CMP  Recent Labs   Lab Test 07/12/20  1601 11/15/19  0907 10/21/19  1016 03/22/19  1242  12/05/18  0840 03/21/18  0900  11/08/17  0840  02/01/17  1457    138 140 141   < > 140 140   < > 138  --  137   POTASSIUM 4.2 4.3 4.6 4.2   < > 4.6 4.4   < > 4.6   < > 4.1   CHLORIDE 106 103 106 106   < > 103 106   < > 104  --  101   CO2 29 34* 31 28   < > 33* 31   < > 33*  --  30   ANIONGAP 4 1* 4 7   < > 4 3   < > 1*  --  6   GLC 83 96 102* 95   < > 97 104*  102*   < > 85   < > 85   BUN 31* 17 33* 32*   < > 26 21   < > 18  --  23   CR 1.56* 1.47* 1.63* 1.68*   < > 1.60* 1.36*   < > 1.31*   < > 1.21   GFRESTIMATED 45* 49* 43* 42*   < > 44* 53*   < > 55*   < > 61   GFRESTBLACK 53* 57* 50* 49*   < > 53* 64   < > 67   < > 73   RAYRAY 9.0 8.8 9.2 8.9   < > 9.1 8.8   < > 9.2  --  8.8   PHOS 4.0  --  2.7 4.0  --   --  3.0   < >  --   --  3.3   PROTTOTAL  --  7.1  --   --   --  8.0  --   --  7.2  --  7.3   ALBUMIN 4.0 4.0 3.9 4.1  --  4.5 3.7   < > 3.8  --  3.8   BILITOTAL  --  0.5  --   --   --  0.6  --   --  0.3  --  0.3   ALKPHOS  --  116  --   --   --  118  --   --  188*  --  142   AST  --  16  --   --   --  19  --   --  15  --  13   ALT  --  21  --   --   --  28  --   --  22  --  19    < > = values in this interval not displayed.     CBC  Recent Labs   Lab Test 07/12/20  1601 11/15/19  0907 10/21/19  1016 03/22/19  1242   HGB 14.7 15.7 15.7 15.0   WBC 10.2 9.6 11.5* 11.0   RBC 4.48 4.83 4.87 4.63   HCT 43.0 47.7 48.5 45.9   MCV 96 99 100 99   MCH 32.8 32.5 32.2 32.4   MCHC 34.2 32.9 32.4 32.7   RDW 13.2 13.5 13.1 13.7    171 163 165     INR  No lab results found.  ABG  No lab results found.   URINE STUDIES  Recent Labs   Lab Test 07/12/20  1601 10/21/19  1021 03/25/19  1105 07/11/18  1410 03/21/18  0900 03/16/18  1135   COLOR Yellow Straw Straw Yellow Yellow Yellow   APPEARANCE Clear Clear Clear Clear Clear Clear   URINEGLC Negative Negative  "Negative Negative Negative 250*   URINEBILI Negative Negative Negative Negative Negative Negative   URINEKETONE Negative Negative Negative Negative Negative Negative   SG 1.015 1.012 1.006 <=1.005 1.015 <=1.005   UBLD Moderate* Moderate* Small* Small* Moderate* Moderate*   URINEPH 5.5 6.0 5.0 6.0 6.0 5.5   PROTEIN Negative Negative Negative Negative Negative Negative   UROBILINOGEN 0.2  --   --  0.2 1.0 0.2   NITRITE Negative Negative Negative Negative Negative Negative   LEUKEST Negative Negative Negative Negative Negative Negative   RBCU 5-10* 8* 1 2-5* 5-10* O - 2   WBCU 0 - 5 <1 <1 0 - 5 0 - 5 0 - 5     Recent Labs   Lab Test 07/12/20  1600 10/21/19  1021 03/25/19  1105 03/21/18  0900   UTPG Unable to calculate due to low value 0.17 Unable to calculate due to low value 0.12     PTH  Recent Labs   Lab Test 07/12/20  1601 10/21/19  1016 03/21/18  0900   PTHI 59 65 62     IRON STUDIES   Recent Labs   Lab Test 03/21/18  0900   IRON 108      IRONSAT 40   FREDI 60             Geovani Bustos is a 64 year old male who is being evaluated via a billable telephone visit.      The patient has been notified of following:     \"This telephone visit will be conducted via a call between you and your physician/provider. We have found that certain health care needs can be provided without the need for a physical exam.  This service lets us provide the care you need with a short phone conversation.  If a prescription is necessary we can send it directly to your pharmacy.  If lab work is needed we can place an order for that and you can then stop by our lab to have the test done at a later time.    Telephone visits are billed at different rates depending on your insurance coverage. During this emergency period, for some insurers they may be billed the same as an in-person visit.  Please reach out to your insurance provider with any questions.    If during the course of the call the physician/provider feels a telephone visit " "is not appropriate, you will not be charged for this service.\"    Patient has given verbal consent for Telephone visit?  Yes    What phone number would you like to be contacted at? [phone]    How would you like to obtain your AVS? Mail a copy    Phone call duration: 8 minutes with Dr Caraballo (1:01pm to 1:09pm) and then 3 minutes with Dr Weiss (1:11pm to 1:14pm)     Stevie Caraballo MD        Nephrology Clinic    Telephone Visit    SUBJECTIVE:   Geovani Bustos is a 64 year old year old male with a history of biopsy proven IgA nephropathy (4/2009, Dr Robbie Harris. As per note - kidney tissue looked altogether normal with exception of deposition of IgA in mesangium), treated conservatively. Other pertinent history includes hypothyroidism, schizophrenia, HPL here for: CKD and IgA nephropathy follow-up    The patient has no specific complaints today. He is accompanied by an attendant at his group home.     Recent home /80s. No LE swelling. Rare SOB that the patient thinks is related to COPD. No CP.     He notes that use of oxybutynin and finasteride is \"sometimes\" helping with urination.       Video visit documentation in separate note.       Review of systems:  10 point ROS negative except as noted above.    Past Medical History:   Diagnosis Date     Cerumen impaction      Chronic kidney disease      Colon polyps      Hyperlipidemia      Mitral valve prolapse      Schizophrenia (H)      Ulcerated penile lesions      VSD (ventricular septal defect)         ABILIFY 10 MG OR TABS, Take 22 mg by mouth daily   albuterol (PROAIR HFA/PROVENTIL HFA/VENTOLIN HFA) 108 (90 Base) MCG/ACT inhaler, Inhale 2 puffs into the lungs every 4 hours as needed for shortness of breath / dyspnea or wheezing  amoxicillin (AMOXIL) 250 MG capsule, Take 2,000 mg by mouth  aspirin (QC LO-DOSE ASPIRIN) 81 MG EC tablet, Take 1 tablet (81 mg) by mouth daily  augmented betamethasone dipropionate (DIPROLENE-AF) 0.05 % external " ointment, Apply to AA twice daily 1-2 weeks then as needed only. Do not apply to face.  Disposable Gloves (LATEX GLOVES MEDIUM) MISC, 4 boxes of gloves  For the application of topical medications  Disposable Gloves (NITRILE EXAM GLOVES MEDIUM) MISC, 1 each as needed Use PRN daily with administration of otic drops, body lotion and powder to treat dry itchy skin.  EAR DROPS 6.5 % otic solution, Place 5 drops into both ears 2 times daily For 5 days August 1-5th and Feb 1-5 th then return for irrigation after the 5th day.  finasteride (PROSCAR) 5 MG tablet, TAKE ONE TABLET BY MOUTH EVERY DAY  fluticasone-vilanterol (BREO ELLIPTA) 100-25 MCG/INH inhaler, Inhale 1 puff into the lungs daily  gabapentin (NEURONTIN) 100 MG capsule, Take 100 mg by mouth 2 times daily  Incontinence Supply Disposable (INCONTINENCE BRIEF LARGE) MISC, Incontinence pads up to 300 per month  levothyroxine (SYNTHROID/LEVOTHROID) 125 MCG tablet, Take 1 tablet (125 mcg) by mouth daily  OLANZapine (ZYPREXA) 2.5 MG tablet,   Omega-3 Fatty Acids (FISH OIL) 1200 MG capsule, Take 1 capsule (1.2 g) by mouth daily  order for DME, Equipment being ordered: callous pad  order for DME, Equipment being ordered: Dynaflex insert  oxybutynin (DITROPAN) 5 MG tablet, Take 2 tablets (10mg) by mouth 3 times daily  polyethylene glycol (GAVILAX) 17 GM/SCOOP powder, Take 17 g (1 capful) by mouth daily  REGULOID 48.57 % POWD, Take 1 each by mouth See Admin Instructions Take 1 teaspoonful every day at 7pm and 9 pm.  simvastatin (ZOCOR) 40 MG tablet, TAKE ONE TABLET BY MOUTH EVERY DAY AT 9 PM  Skin Protectants, Misc. (EUCERIN) cream, Apply topically 2 times daily  tamsulosin (FLOMAX) 0.4 MG capsule, Take 1 capsule (0.4 mg) by mouth daily  traZODone (DESYREL) 50 MG tablet, Take 25 mg by mouth At Bedtime   ZOLOFT 100 MG OR TABS, 2 TABLETS DAILY  nicotine (NICODERM CQ) 14 MG/24HR 24 hr patch, Place 1 patch onto the skin every 24 hours (Patient not taking: Reported on  7/16/2020)  oxybutynin (DITROPAN) 5 MG tablet,     bupivacaine (MARCAINE) injection 0.5%         OBJECTIVE:  Vitals and exam deferred due to phone visit.    Recent Labs   Lab Test 07/12/20  1601 11/15/19  0907    138   POTASSIUM 4.2 4.3   CHLORIDE 106 103   CO2 29 34*   ANIONGAP 4 1*   GLC 83 96   BUN 31* 17   CR 1.56* 1.47*   RAYRAY 9.0 8.8       Lab Results   Component Value Date    WBC 10.2 07/12/2020     Lab Results   Component Value Date    RBC 4.48 07/12/2020     Lab Results   Component Value Date    HGB 14.7 07/12/2020     Lab Results   Component Value Date    HCT 43.0 07/12/2020     No components found for: MCT  Lab Results   Component Value Date    MCV 96 07/12/2020     Lab Results   Component Value Date    MCH 32.8 07/12/2020     Lab Results   Component Value Date    MCHC 34.2 07/12/2020     Lab Results   Component Value Date    RDW 13.2 07/12/2020     Lab Results   Component Value Date     07/12/2020       Color Urine (no units)   Date Value   07/12/2020 Yellow     Appearance Urine (no units)   Date Value   07/12/2020 Clear     Glucose Urine (mg/dL)   Date Value   07/12/2020 Negative     Bilirubin Urine (no units)   Date Value   07/12/2020 Negative     Ketones Urine (mg/dL)   Date Value   07/12/2020 Negative     Specific Gravity Urine (no units)   Date Value   07/12/2020 1.015     pH Urine (pH)   Date Value   07/12/2020 5.5     Protein Albumin Urine (mg/dL)   Date Value   07/12/2020 Negative     Urobilinogen Urine (EU/dL)   Date Value   07/12/2020 0.2     Nitrite Urine (no units)   Date Value   07/12/2020 Negative     Leukocyte Esterase Urine (no units)   Date Value   07/12/2020 Negative           ASSESSMENT and PLAN:   Geovani was seen today for recheck.    Diagnoses and all orders for this visit:    IgA nephropathy  CKD (chronic kidney disease) stage 3, GFR 30-59 ml/min (H)  Biopsy proven IgA nephropathy but without proteinuria (not on ACEi/ARB) and has not required immunosuppression/steroids.  GFR was ~55 10 years ago and progressed to ~45 around two years ago but stable since. CTM with annual renal appointment but he is low-risk for significant progression, and thus conservative management will continue.   -should he have significant creatinine rise also consider obstruction given his LUTS followed by urology     Blood pressure  Normotensive not on BP meds. No proteinuria and not on ACEi/ARB.     Electrolytes, acid-base  No issues at this time.     Hemoglobin  Hgb WNL.     MBD-CKD  Vitamin D level now high, up to 76. While calcium and phos and PTH WNL will stop cholecalciferol (was 2000 units daily) at this time.       Return to clinic in 1 year.     Patient seen and discussed with Dr. Weiss who agrees with this plan.    Stevie Caraballo MD  Renal Fellow   Pager: [phone]      Discussed with the fellow and agree with fellow's findings and plan as documented in the fellow's note.  I have reviewed today's vital signs, notes, medications, labs and imaging.   Ginger Weiss MD, MD

## 2020-07-16 NOTE — PROGRESS NOTES
"Geovani Bustos is a 64 year old male who is being evaluated via a billable telephone visit.      The patient has been notified of following:     \"This telephone visit will be conducted via a call between you and your physician/provider. We have found that certain health care needs can be provided without the need for a physical exam.  This service lets us provide the care you need with a short phone conversation.  If a prescription is necessary we can send it directly to your pharmacy.  If lab work is needed we can place an order for that and you can then stop by our lab to have the test done at a later time.    Telephone visits are billed at different rates depending on your insurance coverage. During this emergency period, for some insurers they may be billed the same as an in-person visit.  Please reach out to your insurance provider with any questions.    If during the course of the call the physician/provider feels a telephone visit is not appropriate, you will not be charged for this service.\"    Patient has given verbal consent for Telephone visit?  Yes    What phone number would you like to be contacted at? [phone]    How would you like to obtain your AVS? Mail a copy    Phone call duration: 8 minutes with Dr Caraballo (1:01pm to 1:09pm) and then 3 minutes with Dr Weiss (1:11pm to 1:14pm)     Stevie Caraballo MD      "

## 2020-07-16 NOTE — PROGRESS NOTES
Nephrology Clinic    Ginger Weiss MD  2020     Name: Geovani Bustos  MRN: [id number]  Age: 63 year old  : 1955  Referring provider: Referred Self       Today the patient is doing well. He does complain of intermittent bilateral kidney pain, describing a tenderness which resolves on it's own. Does endorse difficulty pushing urine and he is currently following with urology for this. No lower extremity edema and no history of kidney stones. No dizziness, lightheadedness, nausea, vomiting, shortness of breath or any other complaints.       Assessment:    # IgA Nephropathy  # CKD, stage 3  # LUTS   biopsy proven IgA nephropathy in , will try to obtain bx records. Has not had proteinuria over years. Low level microscopic hematuria on and off.He has maintained his blood pressures at a goal of < 140/90 mm of Hg most of the times without needing antihypertensives. His kidney function has been relatively stable and maintains a baseline creatinine of 1.5-1.7 mg/dl.     Plan:   Continue with conservative management including management of BP ( target < 130/80), avoiding nephrotoxins, may continue Fish oil.   Given lack of proteinuria and fairly low BP, no need to start RASi at this time but should have low threshold if blood pressure goes up or if there is detectable proteinuria.        Follow-up: 8 months    Ginger Weiss MD     Reason For Visit:   CKD follow-up     HPI:   Geovani Bustos is a 63 year old male with a history of biopsy proven IgA nephropathy (2009, Dr Robbie Harris. As per note - kidney tissue looked altogether normal with exception of deposition of IgA in mesangium), treated conservatively. Other pertinent history includes hypothyroidism, schizophrenia, Hyperlipidemia       Review of Systems:   Pertinent items are noted in HPI or as below, remainder of complete ROS is negative.      Active Medications:     Current Outpatient Medications:      ABILIFY 10 MG OR TABS, Take 22 mg by  mouth daily , Disp: , Rfl:      albuterol (PROAIR HFA/PROVENTIL HFA/VENTOLIN HFA) 108 (90 Base) MCG/ACT inhaler, Inhale 2 puffs into the lungs every 4 hours as needed for shortness of breath / dyspnea or wheezing, Disp: 1 Inhaler, Rfl: 2     amoxicillin (AMOXIL) 250 MG capsule, Take 2,000 mg by mouth, Disp: , Rfl:      aspirin (QC LO-DOSE ASPIRIN) 81 MG EC tablet, Take 1 tablet (81 mg) by mouth daily, Disp: 100 tablet, Rfl: 3     augmented betamethasone dipropionate (DIPROLENE-AF) 0.05 % external ointment, Apply to AA twice daily 1-2 weeks then as needed only. Do not apply to face., Disp: 45 g, Rfl: 5     Disposable Gloves (LATEX GLOVES MEDIUM) MISC, 4 boxes of gloves For the application of topical medications, Disp: 400 each, Rfl: 11     Disposable Gloves (NITRILE EXAM GLOVES MEDIUM) MISC, 1 each as needed Use PRN daily with administration of otic drops, body lotion and powder to treat dry itchy skin., Disp: 4 each, Rfl: 11     EAR DROPS 6.5 % otic solution, Place 5 drops into both ears 2 times daily For 5 days August 1-5th and Feb 1-5 th then return for irrigation after the 5th day., Disp: 15 mL, Rfl: 3     finasteride (PROSCAR) 5 MG tablet, TAKE ONE TABLET BY MOUTH EVERY DAY, Disp: 30 tablet, Rfl: 11     fluticasone-vilanterol (BREO ELLIPTA) 100-25 MCG/INH inhaler, Inhale 1 puff into the lungs daily, Disp: 3 Inhaler, Rfl: 1     gabapentin (NEURONTIN) 100 MG capsule, Take 100 mg by mouth 2 times daily, Disp: , Rfl:      Incontinence Supply Disposable (INCONTINENCE BRIEF LARGE) MISC, Incontinence pads up to 300 per month, Disp: 300 each, Rfl: 1     levothyroxine (SYNTHROID/LEVOTHROID) 125 MCG tablet, Take 1 tablet (125 mcg) by mouth daily, Disp: 90 tablet, Rfl: 3     nicotine (NICODERM CQ) 14 MG/24HR 24 hr patch, Place 1 patch onto the skin every 24 hours, Disp: 30 patch, Rfl: 1     Omega-3 Fatty Acids (FISH OIL) 1200 MG capsule, Take 1 capsule (1.2 g) by mouth daily, Disp: 90 capsule, Rfl: 1     order for DME,  Equipment being ordered: callous pad, Disp: 1 each, Rfl: 1     order for DME, Equipment being ordered: Dynaflex insert, Disp: 1 Units, Rfl: 0     oxybutynin (DITROPAN) 5 MG tablet, Take 2 tablets (10mg) by mouth 3 times daily, Disp: 180 tablet, Rfl: 11     oxybutynin (DITROPAN) 5 MG tablet, , Disp: 180 tablet, Rfl: 11     polyethylene glycol (GAVILAX) 17 GM/SCOOP powder, Take 17 g (1 capful) by mouth daily, Disp: 527 g, Rfl: 11     REGULOID 48.57 % POWD, Take 1 each by mouth See Admin Instructions Take 1 teaspoonful every day at 7pm and 9 pm., Disp: 369 g, Rfl: 11     simvastatin (ZOCOR) 40 MG tablet, TAKE ONE TABLET BY MOUTH EVERY DAY AT 9 PM, Disp: 90 tablet, Rfl: 2     Skin Protectants, Misc. (EUCERIN) cream, Apply topically 2 times daily, Disp: 454 g, Rfl: 11     tamsulosin (FLOMAX) 0.4 MG capsule, Take 1 capsule (0.4 mg) by mouth daily, Disp: 30 capsule, Rfl: 11     traZODone (DESYREL) 50 MG tablet, Take 25 mg by mouth At Bedtime , Disp: , Rfl:      vitamin D3 (CHOLECALCIFEROL) 2000 units (50 mcg) tablet, Take 1 tablet (2,000 Units) by mouth daily, Disp: 100 tablet, Rfl: 3     ZOLOFT 100 MG OR TABS, 2 TABLETS DAILY, Disp: , Rfl:   No current facility-administered medications for this visit.     Facility-Administered Medications Ordered in Other Visits:      bupivacaine (MARCAINE) injection 0.5%, , , PRN, Holden Harrison DPM, 10 mL at 01/03/17 0945     Allergies:   Nitrogen oxide and Sulfa drugs      Past Medical History:  Past Medical History:   Diagnosis Date     Cerumen impaction      Chronic kidney disease      Colon polyps      Hyperlipidemia      Mitral valve prolapse      Schizophrenia (H)      Ulcerated penile lesions      VSD (ventricular septal defect)      Patient Active Problem List   Diagnosis     Colon polyps     Schizophrenia (H)     VSD (ventricular septal defect)     Mitral valve prolapse     Acquired hypothyroidism     IgA nephropathy     Proteinuria     Hyperlipidemia LDL goal <130      BPH (benign prostatic hyperplasia)     Health Care Home     Bilateral impacted cerumen     Bunion of great toe of right foot     Eczema of external ear, bilateral     Tinea pedis, unspecified laterality     Incomplete right bundle branch block (RBBB)     Cigarette nicotine dependence with nicotine-induced disorder     Gastroesophageal reflux disease without esophagitis     Stage 1 mild COPD by GOLD classification (H)     CKD (chronic kidney disease) stage 3, GFR 30-59 ml/min (H)        Past Surgical History:  Past Surgical History:   Procedure Laterality Date     ARTHRODESIS FOOT Right 1/19/2016    Procedure: ARTHRODESIS FOOT;  Surgeon: Holden Harrison DPM;  Location: [facility]     ARTHRODESIS FOOT Left 1/3/2017    Procedure: ARTHRODESIS FOOT;  Surgeon: Holden Harrison DPM;  Location: [facility]     SURGICAL HISTORY OF -       leg fracture       Family History:   Family History   Problem Relation Age of Onset     Emphysema Mother      Coronary Artery Disease Father          Social History:   Social History     Tobacco Use     Smoking status: Current Every Day Smoker     Packs/day: 1.00     Years: 47.00     Pack years: 47.00     Types: Cigarettes     Smokeless tobacco: Never Used     Tobacco comment: cutting one cigarette down a month   Substance Use Topics     Alcohol use: No     Drug use: No        Physical Exam:  There were no vitals taken for this visit.   GENERAL APPEARANCE: alert and no distress  EYES: nonicteric  RESP: lungs clear to auscultation   CV: regular rhythm, normal rate, no rub  Extremities: no clubbing, cyanosis, or edema  MS: no evidence of inflammation in joints, no muscle tenderness  SKIN: no rash  NEURO: mentation intact and speech normal  PSYCH: affect normal/bright     Laboratory:  CMP  Recent Labs   Lab Test 07/12/20  1601 11/15/19  0907 10/21/19  1016 03/22/19  1242  12/05/18  0840 03/21/18  0900  11/08/17  0840  02/01/17  1457    138 140 141   < > 140 140   < > 138  --  137    POTASSIUM 4.2 4.3 4.6 4.2   < > 4.6 4.4   < > 4.6   < > 4.1   CHLORIDE 106 103 106 106   < > 103 106   < > 104  --  101   CO2 29 34* 31 28   < > 33* 31   < > 33*  --  30   ANIONGAP 4 1* 4 7   < > 4 3   < > 1*  --  6   GLC 83 96 102* 95   < > 97 104*  102*   < > 85   < > 85   BUN 31* 17 33* 32*   < > 26 21   < > 18  --  23   CR 1.56* 1.47* 1.63* 1.68*   < > 1.60* 1.36*   < > 1.31*   < > 1.21   GFRESTIMATED 45* 49* 43* 42*   < > 44* 53*   < > 55*   < > 61   GFRESTBLACK 53* 57* 50* 49*   < > 53* 64   < > 67   < > 73   RAYRAY 9.0 8.8 9.2 8.9   < > 9.1 8.8   < > 9.2  --  8.8   PHOS 4.0  --  2.7 4.0  --   --  3.0   < >  --   --  3.3   PROTTOTAL  --  7.1  --   --   --  8.0  --   --  7.2  --  7.3   ALBUMIN 4.0 4.0 3.9 4.1  --  4.5 3.7   < > 3.8  --  3.8   BILITOTAL  --  0.5  --   --   --  0.6  --   --  0.3  --  0.3   ALKPHOS  --  116  --   --   --  118  --   --  188*  --  142   AST  --  16  --   --   --  19  --   --  15  --  13   ALT  --  21  --   --   --  28  --   --  22  --  19    < > = values in this interval not displayed.     CBC  Recent Labs   Lab Test 07/12/20  1601 11/15/19  0907 10/21/19  1016 03/22/19  1242   HGB 14.7 15.7 15.7 15.0   WBC 10.2 9.6 11.5* 11.0   RBC 4.48 4.83 4.87 4.63   HCT 43.0 47.7 48.5 45.9   MCV 96 99 100 99   MCH 32.8 32.5 32.2 32.4   MCHC 34.2 32.9 32.4 32.7   RDW 13.2 13.5 13.1 13.7    171 163 165     INR  No lab results found.  ABG  No lab results found.   URINE STUDIES  Recent Labs   Lab Test 07/12/20  1601 10/21/19  1021 03/25/19  1105 07/11/18  1410 03/21/18  0900 03/16/18  1135   COLOR Yellow Straw Straw Yellow Yellow Yellow   APPEARANCE Clear Clear Clear Clear Clear Clear   URINEGLC Negative Negative Negative Negative Negative 250*   URINEBILI Negative Negative Negative Negative Negative Negative   URINEKETONE Negative Negative Negative Negative Negative Negative   SG 1.015 1.012 1.006 <=1.005 1.015 <=1.005   UBLD Moderate* Moderate* Small* Small* Moderate* Moderate*   URINEPH  5.5 6.0 5.0 6.0 6.0 5.5   PROTEIN Negative Negative Negative Negative Negative Negative   UROBILINOGEN 0.2  --   --  0.2 1.0 0.2   NITRITE Negative Negative Negative Negative Negative Negative   LEUKEST Negative Negative Negative Negative Negative Negative   RBCU 5-10* 8* 1 2-5* 5-10* O - 2   WBCU 0 - 5 <1 <1 0 - 5 0 - 5 0 - 5     Recent Labs   Lab Test 07/12/20  1600 10/21/19  1021 03/25/19  1105 03/21/18  0900   UTPG Unable to calculate due to low value 0.17 Unable to calculate due to low value 0.12     PTH  Recent Labs   Lab Test 07/12/20  1601 10/21/19  1016 03/21/18  0900   PTHI 59 65 62     IRON STUDIES   Recent Labs   Lab Test 03/21/18  0900   IRON 108      IRONSAT 40   FREDI 60

## 2020-07-21 ENCOUNTER — VIRTUAL VISIT (OUTPATIENT)
Dept: FAMILY MEDICINE | Facility: CLINIC | Age: 65
End: 2020-07-21
Payer: COMMERCIAL

## 2020-07-21 DIAGNOSIS — N18.30 CKD (CHRONIC KIDNEY DISEASE) STAGE 3, GFR 30-59 ML/MIN (H): Primary | ICD-10-CM

## 2020-07-21 DIAGNOSIS — J44.9 STAGE 1 MILD COPD BY GOLD CLASSIFICATION (H): ICD-10-CM

## 2020-07-21 DIAGNOSIS — E03.9 ACQUIRED HYPOTHYROIDISM: ICD-10-CM

## 2020-07-21 PROCEDURE — 99214 OFFICE O/P EST MOD 30 MIN: CPT | Mod: 95 | Performed by: FAMILY MEDICINE

## 2020-07-21 NOTE — PATIENT INSTRUCTIONS
Patient Education     Chronic Kidney Disease (CKD)  The role of the kidneys is to remove waste products and extra water from the blood.  When the kidneys do not work as they should, waste products begin to build up in the blood. This is called chronic kidney disease (CKD). CKD means that you have kidney damage or a decrease in kidney function lasting at least 3 months. CKD allows extra water, waste, and toxins to build up in the body. This can eventually become life-threatening. You might need dialysis or a kidney transplant to stay alive. This most severe form is called end stage renal disease.  Diabetes is the leading causes of chronic renal failure. Other causes include high blood pressure, hardening of the arteries (atherosclerosis), lupus, inflammation of the blood vessels (vasculitis), and past viral or bacterial infections. Certain over-the-counter pain medicines can cause renal failure when taken often over a long period of time. These include aspirin, ibuprofen, and related anti-inflammatory medicines called NSAIDs (nonsteroidal anti-inflammatory drugs).  Home care  The following guidelines will help you care for yourself at home:    If you have diabetes, talk with your healthcare provider about keeping your blood sugar under control. Ask if you need to make and changes to your diet, lifestyle, or medicines.    If you have high blood pressure:  ? Take prescribed medicine to lower your blood pressure to the recommended goal of less than 130/80.  ? Start a regular exercise program that you enjoy. Check with your healthcare provider to be sure your planned exercise program is right for you.  ? Eat less salt (sodium). Your healthcare provider can tell you how much salt per day is safe for you.    If you are overweight, talk with your healthcare provider about a weight loss plan.    If you smoke, you must quit. Smoking makes kidney disease worse. Talk with your healthcare provider about ways to help you quit.   For more information, visit the following links:  ? www.smokefree.gov/sites/default/files/pdf/clearing-the-air-accessible.pdf  ? www.smokefree.gov  ? www.cancer.org/healthy/stayawayfromtobacco/guidetoquittingsmoking/    Most people with CKD need to follow a special diet.  Be sure you understand yours. In general, you will need to limit protein, salt, potassium, and phosphorus. You also need to limit how much fluid you drink.     CKD is a risk factor for heart disease. Talk with your healthcare provider about any other risk factors you might have and what you can do to lessen them.    Talk with your healthcare provider about any medicines you are taking to find out if they need to be reduced or stopped.    Don't use the following over-the-counter medicines, or consult your healthcare provider before using:  ? Aspirin and NSAIDs such as ibuprofen or naproxen. Using acetaminophen for fever or pain is OK.  ? Laxatives and antacids containing magnesium or aluminum  ? Fleet or phospho soda enemas containing phosphorus  ? Certain stomach acid-blocking medicine such as cimetidine or ranitidine   ? Decongestants containing pseudoephedrine   ? Herbal supplements  Follow-up care  Follow up with your healthcare provider, or as advised. Contact one of the following for more information:    American Association of Kidney Patients 310-277-1672 www.aakp.org    National Kidney Foundation 477-464-2757 www.kidney.org    American Kidney Fund 269-501-2969 www.kidneyfund.org    National Kidney Disease Education Program 866-4KIDNEY www.nkdep.nih.gov  If an X-ray, ECG (cardiogram), or other diagnostic test was taken, you will be told of any new findings that may affect your care.  Call 911  Call 911 if you have any of the following:    Severe weakness, dizziness, fainting, drowsiness, or confusion    Chest pain or shortness of breath    Heart beating fast, slow, or irregularly  When to seek medical advice  Call your healthcare provider  right away if any of these occur:    Nausea or vomiting    Fever of 100.4 F (38 C) or higher, or as directed by your healthcare provider    Unexpected weight gain or swelling in the legs, ankles, or around the eyes    Decrease or absent urine output  Date Last Reviewed: 9/1/2016 2000-2019 [employer]. [address] [place] [address]. All rights reserved. This information is not intended as a substitute for professional medical care. Always follow your healthcare professional's instructions.

## 2020-07-21 NOTE — LETTER
July 21, 2020      [facility]ust[place]  [address] [facility] [id number]        To Whom It May Concern:      Geovani Bustos was seen in our clinic. He can discontinue vit d supplement.       Sincerely,        Alexandru Vera MD

## 2020-07-21 NOTE — PROGRESS NOTES
"Geovani Bustos is a 64 year old male who is being evaluated via a billable video visit.      The patient has been notified of following:     \"This video visit will be conducted via a call between you and your physician/provider. We have found that certain health care needs can be provided without the need for an in-person physical exam.  This service lets us provide the care you need with a video conversation.  If a prescription is necessary we can send it directly to your pharmacy.  If lab work is needed we can place an order for that and you can then stop by our lab to have the test done at a later time.    Video visits are billed at different rates depending on your insurance coverage.  Please reach out to your insurance provider with any questions.    If during the course of the call the physician/provider feels a video visit is not appropriate, you will not be charged for this service.\"    Patient has given verbal consent for Video visit? Yes  How would you like to obtain your AVS? Mail a copy  If you are dropped from the video visit, the video invite should be resent to: Send to e-mail at: [email or web address]  Will anyone else be joining your video visit?  Staff will be with patient       Subjective     Geovani Bustos is a 64 year old male who presents today via video visit for the following health issues:    HPI    Chronic Kidney Disease Follow-up    Do you take any over the counter pain medicine?: Yes  What over the counter medicine are you taking for your pain?:  Tylenol     How often do you take this medicine?:  Once a month maybe- not often at all     Is due for his regular ear flush next month. Is wondering if he should come in still for that.  Is not complaining about his ears at this time.       How many servings of fruits and vegetables do you eat daily?  2-3    On average, how many sweetened beverages do you drink each day (Examples: soda, juice, sweet tea, etc.  Do NOT count diet or " artificially sweetened beverages)?   2- diet- caffeine free    How many days per week do you exercise enough to make your heart beat faster? Mows lawns for work     How many days per week do you miss taking your medication? 0       COPD: Symptoms are stable, taking albuterol and Breo Ellipta, denies any medication side effect    Video Start Time: 11:14 AM        Patient Active Problem List   Diagnosis     Colon polyps     Schizophrenia (H)     VSD (ventricular septal defect)     Mitral valve prolapse     Acquired hypothyroidism     IgA nephropathy     Proteinuria     Hyperlipidemia LDL goal <130     BPH (benign prostatic hyperplasia)     Health Care Home     Bilateral impacted cerumen     Bunion of great toe of right foot     Eczema of external ear, bilateral     Tinea pedis, unspecified laterality     Incomplete right bundle branch block (RBBB)     Cigarette nicotine dependence with nicotine-induced disorder     Gastroesophageal reflux disease without esophagitis     Stage 1 mild COPD by GOLD classification (H)     CKD (chronic kidney disease) stage 3, GFR 30-59 ml/min (H)     Past Surgical History:   Procedure Laterality Date     ARTHRODESIS FOOT Right 1/19/2016    Procedure: ARTHRODESIS FOOT;  Surgeon: Holden Harrison DPM;  Location: [facility]     ARTHRODESIS FOOT Left 1/3/2017    Procedure: ARTHRODESIS FOOT;  Surgeon: Holden Harrison DPM;  Location: [facility]     SURGICAL HISTORY OF -       leg fracture       Social History     Tobacco Use     Smoking status: Current Every Day Smoker     Packs/day: 1.00     Years: 47.00     Pack years: 47.00     Types: Cigarettes     Smokeless tobacco: Never Used     Tobacco comment: cutting one cigarette down a month   Substance Use Topics     Alcohol use: No     Family History   Problem Relation Age of Onset     Emphysema Mother      Coronary Artery Disease Father          Current Outpatient Medications   Medication Sig Dispense Refill     ABILIFY 10 MG OR TABS Take 22 mg  by mouth daily        albuterol (PROAIR HFA/PROVENTIL HFA/VENTOLIN HFA) 108 (90 Base) MCG/ACT inhaler Inhale 2 puffs into the lungs every 4 hours as needed for shortness of breath / dyspnea or wheezing 1 Inhaler 2     aspirin (QC LO-DOSE ASPIRIN) 81 MG EC tablet Take 1 tablet (81 mg) by mouth daily 100 tablet 3     augmented betamethasone dipropionate (DIPROLENE-AF) 0.05 % external ointment Apply to AA twice daily 1-2 weeks then as needed only. Do not apply to face. 45 g 5     Disposable Gloves (LATEX GLOVES MEDIUM) MISC 4 boxes of gloves  For the application of topical medications 400 each 11     Disposable Gloves (NITRILE EXAM GLOVES MEDIUM) MISC 1 each as needed Use PRN daily with administration of otic drops, body lotion and powder to treat dry itchy skin. 4 each 11     EAR DROPS 6.5 % otic solution Place 5 drops into both ears 2 times daily For 5 days August 1-5th and Feb 1-5 th then return for irrigation after the 5th day. 15 mL 3     finasteride (PROSCAR) 5 MG tablet TAKE ONE TABLET BY MOUTH EVERY DAY 30 tablet 11     fluticasone-vilanterol (BREO ELLIPTA) 100-25 MCG/INH inhaler Inhale 1 puff into the lungs daily 3 Inhaler 1     gabapentin (NEURONTIN) 100 MG capsule Take 100 mg by mouth 2 times daily       Incontinence Supply Disposable (INCONTINENCE BRIEF LARGE) MISC Incontinence pads up to 300 per month 300 each 1     levothyroxine (SYNTHROID/LEVOTHROID) 125 MCG tablet Take 1 tablet (125 mcg) by mouth daily 90 tablet 3     nicotine (NICODERM CQ) 14 MG/24HR 24 hr patch Place 1 patch onto the skin every 24 hours 30 patch 1     OLANZapine (ZYPREXA) 2.5 MG tablet        Omega-3 Fatty Acids (FISH OIL) 1200 MG capsule Take 1 capsule (1.2 g) by mouth daily 90 capsule 1     order for DME Equipment being ordered: callous pad 1 each 1     order for DME Equipment being ordered: Dynaflex insert 1 Units 0     oxybutynin (DITROPAN) 5 MG tablet Take 2 tablets (10mg) by mouth 3 times daily 180 tablet 11     oxybutynin  (DITROPAN) 5 MG tablet  180 tablet 11     polyethylene glycol (GAVILAX) 17 GM/SCOOP powder Take 17 g (1 capful) by mouth daily 527 g 11     REGULOID 48.57 % POWD Take 1 each by mouth See Admin Instructions Take 1 teaspoonful every day at 7pm and 9 pm. 369 g 11     simvastatin (ZOCOR) 40 MG tablet TAKE ONE TABLET BY MOUTH EVERY DAY AT 9 PM 90 tablet 2     Skin Protectants, Misc. (EUCERIN) cream Apply topically 2 times daily 454 g 11     tamsulosin (FLOMAX) 0.4 MG capsule Take 1 capsule (0.4 mg) by mouth daily 30 capsule 11     traZODone (DESYREL) 50 MG tablet Take 25 mg by mouth At Bedtime        ZOLOFT 100 MG OR TABS 2 TABLETS DAILY       Allergies   Allergen Reactions     Nitrogen Oxide      Sulfa Drugs      Recent Labs   Lab Test 07/12/20  1601 11/15/19  0907  08/08/19  1100  12/05/18  0840 08/08/18  1405 03/21/18  0900  11/08/17  0840  02/01/13  0815  05/14/12  0830   A1C  --   --   --   --   --   --   --  5.6  --   --   --  5.5  --  5.6   LDL  --  75  --   --   --  118*  --   --   --  80   < > 117   < > 101   HDL  --  45  --   --   --  48  --   --   --  47   < > 45   < > 43   TRIG  --  155*  --   --   --  150*  --   --   --  119   < > 123   < > 143   ALT  --  21  --   --   --  28  --   --   --  22   < >  --    < >  --    CR 1.56* 1.47*   < >  --    < > 1.60*  --  1.36*   < > 1.31*   < >  --    < >  --    GFRESTIMATED 45* 49*   < >  --    < > 44*  --  53*   < > 55*   < >  --    < >  --    GFRESTBLACK 53* 57*   < >  --    < > 53*  --  64   < > 67   < >  --    < >  --    POTASSIUM 4.2 4.3   < >  --    < > 4.6  --  4.4   < > 4.6   < >  --    < >  --    TSH  --   --   --  1.63  --   --  2.13  --   --   --    < >  --    < >  --     < > = values in this interval not displayed.      BP Readings from Last 3 Encounters:   07/16/20 129/81   05/13/20 124/76   02/10/20 110/80    Wt Readings from Last 3 Encounters:   05/13/20 80.7 kg (178 lb)   02/10/20 80.7 kg (178 lb)   02/05/20 82.1 kg (181 lb)                    Reviewed  and updated as needed this visit by Provider         Review of Systems   Constitutional, HEENT, cardiovascular, pulmonary, GI, , musculoskeletal, neuro, skin, endocrine and psych systems are negative, except as otherwise noted.      Objective             Physical Exam     GENERAL: Healthy, alert and no distress  EYES: Eyes grossly normal to inspection.  No discharge or erythema, or obvious scleral/conjunctival abnormalities.  RESP: No audible wheeze, cough, or visible cyanosis.  No visible retractions or increased work of breathing.    SKIN: Visible skin clear. No significant rash, abnormal pigmentation or lesions.  NEURO: Cranial nerves grossly intact.  Mentation and speech appropriate for age.  PSYCH: Mentation appears normal, affect normal/bright, judgement and insight intact, normal speech and appearance well-groomed.        Assessment & Plan     (N18.3) CKD (chronic kidney disease) stage 3, GFR 30-59 ml/min (H)  (primary encounter diagnosis)  Comment: Known to have CKD stage III, related to IgA nephropathy, following nephrology.  Medications reviewed and no changes made.  Suggested continue well hydration and avoiding NSAIDs      (J44.9) Stage 1 mild COPD by GOLD classification (H)  Comment: Symptoms are stable.  Continue albuterol and Breo Ellipta inhaler      (E03.9) Acquired hypothyroidism  Comment: TSH ordered, will titrate levothyroxine dose accordingly  Plan: TSH with free T4 reflex              Patient Instructions     Patient Education     Chronic Kidney Disease (CKD)  The role of the kidneys is to remove waste products and extra water from the blood.  When the kidneys do not work as they should, waste products begin to build up in the blood. This is called chronic kidney disease (CKD). CKD means that you have kidney damage or a decrease in kidney function lasting at least 3 months. CKD allows extra water, waste, and toxins to build up in the body. This can eventually become life-threatening. You  might need dialysis or a kidney transplant to stay alive. This most severe form is called end stage renal disease.  Diabetes is the leading causes of chronic renal failure. Other causes include high blood pressure, hardening of the arteries (atherosclerosis), lupus, inflammation of the blood vessels (vasculitis), and past viral or bacterial infections. Certain over-the-counter pain medicines can cause renal failure when taken often over a long period of time. These include aspirin, ibuprofen, and related anti-inflammatory medicines called NSAIDs (nonsteroidal anti-inflammatory drugs).  Home care  The following guidelines will help you care for yourself at home:    If you have diabetes, talk with your healthcare provider about keeping your blood sugar under control. Ask if you need to make and changes to your diet, lifestyle, or medicines.    If you have high blood pressure:  ? Take prescribed medicine to lower your blood pressure to the recommended goal of less than 130/80.  ? Start a regular exercise program that you enjoy. Check with your healthcare provider to be sure your planned exercise program is right for you.  ? Eat less salt (sodium). Your healthcare provider can tell you how much salt per day is safe for you.    If you are overweight, talk with your healthcare provider about a weight loss plan.    If you smoke, you must quit. Smoking makes kidney disease worse. Talk with your healthcare provider about ways to help you quit.  For more information, visit the following links:  ? www.smokefree.gov/sites/default/files/pdf/clearing-the-air-accessible.pdf  ? www.smokefree.gov  ? www.cancer.org/healthy/stayawayfromtobacco/guidetoquittingsmoking/    Most people with CKD need to follow a special diet.  Be sure you understand yours. In general, you will need to limit protein, salt, potassium, and phosphorus. You also need to limit how much fluid you drink.     CKD is a risk factor for heart disease. Talk with your  healthcare provider about any other risk factors you might have and what you can do to lessen them.    Talk with your healthcare provider about any medicines you are taking to find out if they need to be reduced or stopped.    Don't use the following over-the-counter medicines, or consult your healthcare provider before using:  ? Aspirin and NSAIDs such as ibuprofen or naproxen. Using acetaminophen for fever or pain is OK.  ? Laxatives and antacids containing magnesium or aluminum  ? Fleet or phospho soda enemas containing phosphorus  ? Certain stomach acid-blocking medicine such as cimetidine or ranitidine   ? Decongestants containing pseudoephedrine   ? Herbal supplements  Follow-up care  Follow up with your healthcare provider, or as advised. Contact one of the following for more information:    American Association of Kidney Patients 277-288-1211 www.aakp.org    National Kidney Foundation 848-588-4465 www.kidney.org    American Kidney Fund [phone] www.kidneyfund.org    National Kidney Disease Education Program 866-4KIDNEY www.nkdep.nih.gov  If an X-ray, ECG (cardiogram), or other diagnostic test was taken, you will be told of any new findings that may affect your care.  Call 911  Call 911 if you have any of the following:    Severe weakness, dizziness, fainting, drowsiness, or confusion    Chest pain or shortness of breath    Heart beating fast, slow, or irregularly  When to seek medical advice  Call your healthcare provider right away if any of these occur:    Nausea or vomiting    Fever of 100.4 F (38 C) or higher, or as directed by your healthcare provider    Unexpected weight gain or swelling in the legs, ankles, or around the eyes    Decrease or absent urine output  Date Last Reviewed: 9/1/2016 2000-2019 [employer]. [address] [place] [address], [place] [address]. All rights reserved. This information is not intended as a substitute for professional medical care. Always follow your  healthcare professional's instructions.               No follow-ups on file.    Alexandru Vera MD  [facility]      Video-Visit Details    Type of service:  Video Visit    Video End Time:11:26 AM    Originating Location (pt. Location): Home    Distant Location (provider location):  [facility]     Platform used for Video Visit: Doron Vera MD

## 2020-08-10 DIAGNOSIS — J41.0 SIMPLE CHRONIC BRONCHITIS (H): ICD-10-CM

## 2020-09-15 DIAGNOSIS — E03.9 ACQUIRED HYPOTHYROIDISM: ICD-10-CM

## 2020-09-15 LAB — TSH SERPL DL<=0.005 MIU/L-ACNC: 2.33 MU/L (ref 0.4–4)

## 2020-09-15 PROCEDURE — 84443 ASSAY THYROID STIM HORMONE: CPT | Performed by: FAMILY MEDICINE

## 2020-09-15 PROCEDURE — 36415 COLL VENOUS BLD VENIPUNCTURE: CPT | Performed by: FAMILY MEDICINE

## 2020-10-01 DIAGNOSIS — N40.0 BPH (BENIGN PROSTATIC HYPERPLASIA): Chronic | ICD-10-CM

## 2020-10-01 RX ORDER — FINASTERIDE 5 MG/1
TABLET, FILM COATED ORAL
Qty: 30 TABLET | Refills: 11 | Status: SHIPPED | OUTPATIENT
Start: 2020-10-01 | End: 2021-09-22

## 2020-10-07 ENCOUNTER — ALLIED HEALTH/NURSE VISIT (OUTPATIENT)
Dept: FAMILY MEDICINE | Facility: CLINIC | Age: 65
End: 2020-10-07
Payer: COMMERCIAL

## 2020-10-07 DIAGNOSIS — Z23 NEED FOR PROPHYLACTIC VACCINATION AND INOCULATION AGAINST INFLUENZA: Primary | ICD-10-CM

## 2020-10-07 PROCEDURE — G0008 ADMIN INFLUENZA VIRUS VAC: HCPCS

## 2020-10-07 PROCEDURE — 90662 IIV NO PRSV INCREASED AG IM: CPT

## 2020-10-07 PROCEDURE — 99207 PR NO CHARGE NURSE ONLY: CPT

## 2020-11-02 DIAGNOSIS — E78.5 HYPERLIPIDEMIA LDL GOAL <130: ICD-10-CM

## 2020-11-03 RX ORDER — SIMVASTATIN 40 MG
TABLET ORAL
Qty: 30 TABLET | Refills: 0 | Status: SHIPPED | OUTPATIENT
Start: 2020-11-03 | End: 2020-12-31

## 2020-11-25 DIAGNOSIS — E78.5 HYPERLIPIDEMIA LDL GOAL <130: ICD-10-CM

## 2020-11-25 RX ORDER — AMOXICILLIN 500 MG
1 CAPSULE ORAL DAILY
Qty: 90 CAPSULE | Refills: 1 | Status: SHIPPED | OUTPATIENT
Start: 2020-11-25 | End: 2021-05-03

## 2020-12-29 ENCOUNTER — TELEPHONE (OUTPATIENT)
Dept: URGENT CARE | Facility: URGENT CARE | Age: 65
End: 2020-12-29

## 2020-12-29 DIAGNOSIS — E78.5 HYPERLIPIDEMIA LDL GOAL <130: ICD-10-CM

## 2020-12-29 DIAGNOSIS — K59.00 CONSTIPATION: ICD-10-CM

## 2020-12-31 RX ORDER — SIMVASTATIN 40 MG
TABLET ORAL
Qty: 30 TABLET | Refills: 0 | Status: SHIPPED | OUTPATIENT
Start: 2020-12-31 | End: 2021-01-27

## 2021-01-12 ENCOUNTER — MEDICAL CORRESPONDENCE (OUTPATIENT)
Dept: HEALTH INFORMATION MANAGEMENT | Facility: CLINIC | Age: 66
End: 2021-01-12

## 2021-01-12 DIAGNOSIS — F25.0 SCHIZOAFFECTIVE DISORDER, BIPOLAR TYPE (H): Primary | ICD-10-CM

## 2021-01-25 DIAGNOSIS — E78.5 HYPERLIPIDEMIA LDL GOAL <130: ICD-10-CM

## 2021-01-27 RX ORDER — SIMVASTATIN 40 MG
TABLET ORAL
Qty: 30 TABLET | Refills: 0 | Status: SHIPPED | OUTPATIENT
Start: 2021-01-27 | End: 2021-02-25

## 2021-02-15 ENCOUNTER — OFFICE VISIT (OUTPATIENT)
Dept: FAMILY MEDICINE | Facility: CLINIC | Age: 66
End: 2021-02-15
Payer: COMMERCIAL

## 2021-02-15 VITALS
HEIGHT: 70 IN | SYSTOLIC BLOOD PRESSURE: 116 MMHG | HEART RATE: 64 BPM | TEMPERATURE: 97 F | BODY MASS INDEX: 25.48 KG/M2 | DIASTOLIC BLOOD PRESSURE: 68 MMHG | OXYGEN SATURATION: 96 % | WEIGHT: 178 LBS | RESPIRATION RATE: 16 BRPM

## 2021-02-15 DIAGNOSIS — Z72.0 TOBACCO ABUSE: ICD-10-CM

## 2021-02-15 DIAGNOSIS — Z13.6 SCREENING FOR ABDOMINAL AORTIC ANEURYSM: ICD-10-CM

## 2021-02-15 DIAGNOSIS — Z00.00 MEDICARE ANNUAL WELLNESS VISIT, SUBSEQUENT: Primary | ICD-10-CM

## 2021-02-15 DIAGNOSIS — Z00.00 ENCOUNTER FOR MEDICARE ANNUAL WELLNESS EXAM: ICD-10-CM

## 2021-02-15 DIAGNOSIS — F25.0 SCHIZOAFFECTIVE DISORDER, BIPOLAR TYPE (H): ICD-10-CM

## 2021-02-15 LAB
ALT SERPL W P-5'-P-CCNC: 30 U/L (ref 0–70)
ANION GAP SERPL CALCULATED.3IONS-SCNC: 3 MMOL/L (ref 3–14)
AST SERPL W P-5'-P-CCNC: 20 U/L (ref 0–45)
BASOPHILS # BLD AUTO: 0.1 10E9/L (ref 0–0.2)
BASOPHILS NFR BLD AUTO: 0.6 %
BUN SERPL-MCNC: 31 MG/DL (ref 7–30)
CALCIUM SERPL-MCNC: 9.6 MG/DL (ref 8.5–10.1)
CHLORIDE SERPL-SCNC: 104 MMOL/L (ref 94–109)
CHOLEST SERPL-MCNC: 167 MG/DL
CO2 SERPL-SCNC: 32 MMOL/L (ref 20–32)
CREAT SERPL-MCNC: 1.42 MG/DL (ref 0.66–1.25)
DIFFERENTIAL METHOD BLD: NORMAL
EOSINOPHIL # BLD AUTO: 0.3 10E9/L (ref 0–0.7)
EOSINOPHIL NFR BLD AUTO: 3.5 %
ERYTHROCYTE [DISTWIDTH] IN BLOOD BY AUTOMATED COUNT: 13.2 % (ref 10–15)
GFR SERPL CREATININE-BSD FRML MDRD: 51 ML/MIN/{1.73_M2}
GLUCOSE SERPL-MCNC: 107 MG/DL (ref 70–99)
HBA1C MFR BLD: 5.4 % (ref 0–5.6)
HCT VFR BLD AUTO: 43.9 % (ref 40–53)
HDLC SERPL-MCNC: 56 MG/DL
HGB BLD-MCNC: 14.6 G/DL (ref 13.3–17.7)
LDLC SERPL CALC-MCNC: 87 MG/DL
LYMPHOCYTES # BLD AUTO: 1.5 10E9/L (ref 0.8–5.3)
LYMPHOCYTES NFR BLD AUTO: 18 %
MCH RBC QN AUTO: 31.9 PG (ref 26.5–33)
MCHC RBC AUTO-ENTMCNC: 33.3 G/DL (ref 31.5–36.5)
MCV RBC AUTO: 96 FL (ref 78–100)
MONOCYTES # BLD AUTO: 0.5 10E9/L (ref 0–1.3)
MONOCYTES NFR BLD AUTO: 6.5 %
NEUTROPHILS # BLD AUTO: 6 10E9/L (ref 1.6–8.3)
NEUTROPHILS NFR BLD AUTO: 71.4 %
NONHDLC SERPL-MCNC: 111 MG/DL
PLATELET # BLD AUTO: 158 10E9/L (ref 150–450)
POTASSIUM SERPL-SCNC: 4.8 MMOL/L (ref 3.4–5.3)
RBC # BLD AUTO: 4.58 10E12/L (ref 4.4–5.9)
SODIUM SERPL-SCNC: 139 MMOL/L (ref 133–144)
TRIGL SERPL-MCNC: 122 MG/DL
WBC # BLD AUTO: 8.3 10E9/L (ref 4–11)

## 2021-02-15 PROCEDURE — 80048 BASIC METABOLIC PNL TOTAL CA: CPT | Performed by: REGISTERED NURSE

## 2021-02-15 PROCEDURE — 99397 PER PM REEVAL EST PAT 65+ YR: CPT | Performed by: FAMILY MEDICINE

## 2021-02-15 PROCEDURE — 84460 ALANINE AMINO (ALT) (SGPT): CPT | Performed by: REGISTERED NURSE

## 2021-02-15 PROCEDURE — 85025 COMPLETE CBC W/AUTO DIFF WBC: CPT | Performed by: REGISTERED NURSE

## 2021-02-15 PROCEDURE — 80061 LIPID PANEL: CPT | Performed by: REGISTERED NURSE

## 2021-02-15 PROCEDURE — 83036 HEMOGLOBIN GLYCOSYLATED A1C: CPT | Performed by: REGISTERED NURSE

## 2021-02-15 PROCEDURE — 36415 COLL VENOUS BLD VENIPUNCTURE: CPT | Performed by: REGISTERED NURSE

## 2021-02-15 PROCEDURE — 84450 TRANSFERASE (AST) (SGOT): CPT | Performed by: REGISTERED NURSE

## 2021-02-15 ASSESSMENT — ENCOUNTER SYMPTOMS
PARESTHESIAS: 0
JOINT SWELLING: 0
HEMATURIA: 0
JOINT SWELLING: 1
HEADACHES: 1
ARTHRALGIAS: 0
FEVER: 0
DIZZINESS: 0
WEAKNESS: 0
NAUSEA: 0
HEARTBURN: 0
COUGH: 0
MYALGIAS: 0
DYSURIA: 0
CHILLS: 0
DIARRHEA: 0
SHORTNESS OF BREATH: 0
EYE PAIN: 0
FREQUENCY: 0
HEMATOCHEZIA: 0
NERVOUS/ANXIOUS: 0
HEADACHES: 0
SORE THROAT: 0
ABDOMINAL PAIN: 0
COUGH: 1
PALPITATIONS: 0
CONSTIPATION: 0

## 2021-02-15 ASSESSMENT — ACTIVITIES OF DAILY LIVING (ADL)
CURRENT_FUNCTION: TRANSPORTATION REQUIRES ASSISTANCE
CURRENT_FUNCTION: MONEY MANAGEMENT REQUIRES ASSISTANCE
CURRENT_FUNCTION: MEDICATION ADMINISTRATION REQUIRES ASSISTANCE

## 2021-02-15 ASSESSMENT — MIFFLIN-ST. JEOR: SCORE: 1580.71

## 2021-02-15 NOTE — PATIENT INSTRUCTIONS
Please call [phone] to schedule U/S aorta.       Patient Education     Prevention Guidelines, Men Ages 65 and Older  Screening tests and vaccines are an important part of managing your health.A screening test is done to find possible disorders or diseases in people who don't have any symptoms. The goal is to find a disease early so lifestyle changes can be made and you can be watched more closely to reduce the risk of disease, or to detect it early enough to treat it most effectively. Screening tests are not considered diagnostic, but are used to determine if more testing is needed.  Health counseling is essential, too. Below are guidelines for these, for men ages 65 and older. Talk with your healthcare provider to make sure you re up-to-date on what you need.  Screening Who needs it How often   Abdominal aortic aneurysm Men ages 65 to 75 who have ever smoked. Men in this age group who have never smoked could still be offered screening, depending on their family history or other risk factors they may have. 1-time ultrasound   Unhealthy alcohol use All men in this age group At routine exams   Blood pressure All men in this age group Yearly checkup if your blood pressure is normal  Normal blood pressure is less than 120/80 mm Hg  If your blood pressure reading is higher than normal, follow the advice of your healthcare provider   Colorectal cancer All men at average risk in this age group through age 75 who are in good health. For men ages 76 to 85, talk with your healthcare provider to see if you should continue screening. For men 85 and older, screening is not needed. Several tests are available and are used at different times. Possible tests include:    Flexible sigmoidoscopy every 5 years, or    Colonoscopy every 10 years, or    CT colonography (virtual colonoscopy) every 5 years, or    Yearly fecal occult blood test, or    Stool DNA test every 3 years  If you choose a test other than a colonoscopy and have  an abnormal test result, you will need to have a colonoscopy. Screening recommendations vary among expert groups. Talk with your healthcare provider about which tests are best for you.  Some men should be screened using a different schedule because of their personal or family history. Talk with your healthcare provider about your health history.   Depression All men in this age group At routine exams   Type 2 diabetes or prediabetes All men beginning at age 45 and men without symptoms at any age who are overweight or obese and have one or more other risk factors for diabetes At least every 3 years (yearly if your blood sugar has already begun to rise)   Type 2 diabetes All men with prediabetes Every year   Hepatitis C Men at increased risk for infection; those born between 1945 and 1965 At routine exams   High cholesterol or triglycerides All men in this age group At least every 5 years   HIV Men at increased risk for infection At routine exams   Lung cancer Men ages 55 to 74 who are in fairly good health and are at higher risk for lung cancer    Currently smoke or who have quite within the past 15 years    30-pack year smoking history  Eligibility criteria and age limit (possibly up to age 80) may vary across major organizations  Talk with your healthcare provider for more information Yearly lung screening in smokers with low-dose CT scan (LDCT)   Obesity All men in this age group At yearly routine exams   Prostate cancer All men in this age group, talk with your healthcare provider about risks and benefits of testing with digital rectal exam (MICHELLE) and prostate-specific antigen (PSA) screening At routine exams if you decide to be tested   Syphilis Men at increased risk for infection At routine exams   Tuberculosis Men at increased risk for infection Talk with your healthcare provider   Vision All men in this age group Every 1 to 2 years; if you have a chronic health condition, ask your healthcare provider if you  need exams more often   Vaccine Who needs it How often   Chickenpox (varicella) All men in this age group who have no record of this infection or vaccine 2 doses; second dose should be given at least 4 weeks after the first dose   Hepatitis A Men at increased risk for infection 2 or 3 doses (depending on vaccine) given at least 6 months apart   Hepatitis B Men at increased risk for infection 2 or 3 doses (depending on the vaccine); second dose should be given 1 month after the first dose; if a third dose, it should be given at least 2 months after the second dose and at least 4 months after the first dose   Haemophilus influenzae type B (HIB) Men at increased risk for infection 1 to 3 doses   Influenza (flu) All men in this age group Once a year   Meningococcal Men at increased risk for infection 1, 2, or 3 doses (depending on vaccine); ask your healthcare provider if you need a booster dose   Pneumococcal conjugate vaccine (PCV13) and pneumococcal polysaccharide vaccine (PPSV23) PPSV 23: All men in this age group who have not been vaccinated or have not had infection  PCV 13: Men at risk for infection PPSV 23: 1 dose  PCV 13: 1 dose   Tetanus/diphtheria/pertussis (Td/Tdap) booster All men in this age group Td every 10 years, or a 1-time dose of Tdap instead of a Td booster, then Td every 10 years   Zoster (shingles) All men in this age group 2 vaccines are available:    Recombinant zoster vaccine (RZV; Shingrix) is the preferred shingles vaccine. It's given in a series of 2 doses. The second dose is given 2 to 6 months after the first. This is given even if you had shingles before or have had a zoster live vaccine in the past.    Zoster live vaccine (ZVL; Zostavax) may be given to healthy adults over age 60. It's given in one dose.   Counseling Who needs it How often   Diet and exercise Men who are overweight or obese When diagnosed, and then at routine exams   Fall prevention (exercise, vitamin D supplements) All  men in this age group At yearly routine exams   Sexually transmitted infection Men at increased risk for infection At yearly routine exams   Use of daily aspirin Men up to age 70 who are at high risk for cardiovascular problems and not at an increased risk of bleeding as identified by your healthcare provider When your risk is known   Use of tobacco and the health effects it can cause All men in this age group Every visit   [employer] last reviewed this educational content on 4/1/2020 2000-2020 [employer] [address] [place] [address], [place] [id number]. All rights reserved. This information is not intended as a substitute for professional medical care. Always follow your healthcare professional's instructions.           Patient Education   Personalized Prevention Plan  You are due for the preventive services outlined below.  Your care team is available to assist you in scheduling these services.  If you have already completed any of these items, please share that information with your care team to update in your medical record.  Health Maintenance Due   Topic Date Due     Zoster (Shingles) Vaccine (2 of 3) 04/12/2016     AORTIC ANEURYSM SCREENING (SYSTEM ASSIGNED)  12/05/2018     Pneumococcal Vaccine (2 of 2 - PPSV23) 12/05/2019     FALL RISK ASSESSMENT  02/07/2020       Exercise for a Healthier Heart     Exercise with a friend. When activity is fun, you're more likely to stick with it.   You may wonder how you can improve the health of your heart. If you re thinking about exercise, you re on the right track. You don t need to become an athlete. But you do need a certain amount of brisk exercise to help strengthen your heart. If you have been diagnosed with a heart condition, your healthcare provider may advise exercise to help stabilize your condition. To help make exercise a habit, choose safe, fun activities.   Before you start  Check with your healthcare provider before starting an exercise program.  This is especially important if you have not been active for a while. It's also important if you have a long-term (chronic) health problem such as heart disease, diabetes, or obesity. Or if you are at high risk for having these problems.   Why exercise?  Exercising regularly offers many healthy rewards. It can help you do all of the following:    Improve your blood cholesterol level to help prevent further heart trouble    Lower your blood pressure to help prevent a stroke or heart attack    Control diabetes, or reduce your risk of getting this disease    Improve your heart and lung function    Reach and stay at a healthy weight    Make your muscles stronger so you can stay active    Prevent falls and fractures by slowing the loss of bone mass (osteoporosis)    Manage stress better    Reduce your blood pressure    Improve your sense of self and your body image  Exercise tips      Ease into your routine. Set small goals. Then build on them. If you are not sure what your activity level should be, talk with your healthcare provider first before starting an exercise routine.    Exercise on most days. Aim for a total of 150 minutes (2 hours and 30 minutes) or more of moderate-intensity aerobic activity each week. Or 75 minutes (1 hour and 15 minutes) or more of vigorous-intensity aerobic activity each week. Or try for a combination of both. Moderate activity means that you breathe heavier and your heart rate increases but you can still talk. Think about doing 40 minutes of moderate exercise, 3 to 4 times a week. For best results, activity should last for about 40 minutes to lower blood pressure and cholesterol. It's OK to work up to the 40-minute period over time. Examples of moderate-intensity activity are walking 1 mile in 15 minutes. Or doing 30 to 45 minutes of yard work.    Step up your daily activity level.  Along with your exercise program, try being more active the whole day. Walk instead of drive. Or park  further away so that you take more steps each day. Do more household tasks or yard work. You may not be able to meet the advised mount of physical activity. But doing some moderate- or vigorous-intensity aerobic activity can help reduce your risk for heart disease. Your healthcare provider can help you figure out what is best for you.    Choose 1 or more activities you enjoy.  Walking is one of the easiest things you can do. You can also try swimming, riding a bike, dancing, or taking an exercise class.    When to call your healthcare provider  Call your healthcare provider if you have any of these:     Chest pain or feel dizzy or lightheaded    Burning, tightness, pressure, or heaviness in your chest, neck, shoulders, back, or arms    Abnormal shortness of breath    More joint or muscle pain    A very fast or irregular heartbeat (palpitations)  [employer] last reviewed this educational content on 7/1/2019 2000-2020 [employer]. [address] [place] [address]. All rights reserved. This information is not intended as a substitute for professional medical care. Always follow your healthcare professional's instructions.        Activities of Daily Living    Your Health Risk Assessment indicates you have difficulties with activities of daily living such as housework, bathing, preparing meals, taking medication, etc. Please make a follow up appointment for us to address this issue in more detail.    Urinary Incontinence (Male)    Urinary incontinence means not being able to control the release of urine from the bladder.   Causes  Common causes of urinary incontinence in men include:    Infection    Certain medicines    Aging    Poor pelvic muscle tone    Bladder spasms    Obesity    Trouble urinating and fully emptying the bladder (urinary retention)  Other things that can cause incontinence are:     Nervous system diseases    Diabetes    Sleep apnea    Urinary tract infections    Prostate  surgery    Pelvic injury  Constipation and smoking have also been identified as risk factors.   Symptoms    Urge incontinence (overactive bladder). This is a sudden urge to urinate. It occurs even though there may not be much urine in the bladder. The need to urinate often during the night is common. It's due to bladder spasms.    Stress incontinence. This is urine leakage that you can't control. It can occur with sneezing, coughing, and other actions that put stress on the bladder.    Treatment  Treatment depends on what is causing the condition. Bladder infections are treated with antibiotics. Urinary retention is treated with a bladder catheter.   Home care  Follow these guidelines when caring for yourself at home:    Don't have any foods and drinks that may irritate the bladder. This includes:  ? Chocolate  ? Alcohol  ? Caffeine  ? Carbonated drinks  ? Acidic fruits and juices    Limit fluids to 6 to 8 cups a day.    Lose weight if you are overweight. This will reduce your symptoms.    If advised, do regular pelvic muscle-strengthening exercises such as Kegel exercises.    If needed, wear absorbent pads to catch urine. Change the pads often. This is for good hygiene and to prevent skin and bladder infections.    Bathe daily for good hygiene.    If an antibiotic was prescribed to treat a bladder infection, take it until it's finished. Keep taking it even if you are feeling better. This is to make sure your infection has cleared.    If a catheter was left in place, keep bacteria from getting into the collection bag. Don't disconnect the catheter from the collection bag.    Use a leg band to secure the catheter drainage tube, so it does not pull on the catheter. Drain the collection bag when it becomes full. To do this, use the drain spout at the bottom of the bag. Don't disconnect the bag from the catheter.    Don't pull on or try to remove a catheter. The catheter must be removed by a healthcare provider.    If  you smoke, stop. Ask your provider for help if you can't do this on your own.  Follow-up care  Follow up with your healthcare provider, or as advised.  When to get medical advice  Call your healthcare provider right away if any of these occur:    Fever over 100.4 F (38 C), or as directed by your provider    Bladder pain or fullness    Belly swelling, nausea, or vomiting    Back pain    Weakness, dizziness, or fainting    If a catheter was left in place, return if:  ? The catheter falls out  ? The catheter stops draining for 6 hours  ? Your urine gets cloudy or smells bad  [employer] last reviewed this educational content on 1/1/2020 2000-2020 [employer]. [address] [place] [address], [place] [address]. All rights reserved. This information is not intended as a substitute for professional medical care. Always follow your healthcare professional's instructions.

## 2021-02-15 NOTE — NURSING NOTE
"Chief Complaint   Patient presents with     Physical       Initial /68 (BP Location: Right arm, Patient Position: Chair, Cuff Size: Adult Large)   Pulse 64   Temp 97  F (36.1  C) (Tympanic)   Resp 16   Ht 1.765 m (5' 9.5\")   Wt 80.7 kg (178 lb)   SpO2 96%   BMI 25.91 kg/m   Estimated body mass index is 25.91 kg/m  as calculated from the following:    Height as of this encounter: 1.765 m (5' 9.5\").    Weight as of this encounter: 80.7 kg (178 lb).    Patient presents to the clinic using No DME    Health Maintenance that is potentially due pending provider review:  NONE        Is there anyone who you would like to be able to receive your results? No  If yes have patient fill out SIDNEY      "

## 2021-02-15 NOTE — PROGRESS NOTES
"SUBJECTIVE:   Geovani Bustos is a 65 year old male who presents for Preventive Visit.    Patient has been advised of split billing requirements and indicates understanding: Yes   Are you in the first 12 months of your Medicare coverage?  No    Healthy Habits:     In general, how would you rate your overall health?  Excellent    Frequency of exercise:  None    Do you usually eat at least 4 servings of fruit and vegetables a day, include whole grains    & fiber and avoid regularly eating high fat or \"junk\" foods?  Yes    Taking medications regularly:  Yes    Medication side effects:  None    Ability to successfully perform activities of daily living:  Transportation requires assistance, medication administration requires assistance and money management requires assistance    Home Safety:  No safety concerns identified    Hearing Impairment:  No hearing concerns    In the past 6 months, have you been bothered by leaking of urine? Yes    In general, how would you rate your overall mental or emotional health?  Good      PHQ-2 Total Score: 0    Additional concerns today:  Yes (Would like ears checked)    Do you feel safe in your environment? Yes    Have you ever done Advance Care Planning? (For example, a Health Directive, POLST, or a discussion with a medical provider or your loved ones about your wishes): No, advance care planning information given to patient to review.  Patient declined advance care planning discussion at this time.       Fall risk  Fallen 2 or more times in the past year?: No  Any fall with injury in the past year?: No    Cognitive Screening Not appropriate due to mental handicap    Do you have sleep apnea, excessive snoring or daytime drowsiness?: no    Reviewed and updated as needed this visit by clinical staff  Tobacco  Allergies  Meds   Med Hx  Surg Hx  Fam Hx  Soc Hx        Reviewed and updated as needed this visit by Provider                Social History     Tobacco Use     Smoking " status: Current Every Day Smoker     Packs/day: 1.00     Years: 47.00     Pack years: 47.00     Types: Cigarettes     Smokeless tobacco: Never Used     Tobacco comment: cutting one cigarette down a month   Substance Use Topics     Alcohol use: No         Alcohol Use 2/15/2021   Prescreen: >3 drinks/day or >7 drinks/week? Not Applicable   Prescreen: >3 drinks/day or >7 drinks/week? -               Current providers sharing in care for this patient include:   Patient Care Team:  Alexandru Vera MD as PCP - General (Family Practice)  Alexandru Vera MD as Assigned PCP  Ginger Weiss MD as MD (Nephrology)  Ginger Weiss MD as Assigned Nephrology Provider  Holden Harrison DPM as Assigned Musculoskeletal Provider  Luis A Zepeda MD as Assigned Surgical Provider    The following health maintenance items are reviewed in Epic and correct as of today:  Health Maintenance   Topic Date Due     ZOSTER IMMUNIZATION (2 of 3) 04/12/2016     AORTIC ANEURYSM SCREENING (SYSTEM ASSIGNED)  12/05/2018     Pneumococcal Vaccine: 65+ Years (2 of 2 - PPSV23) 12/05/2019     FALL RISK ASSESSMENT  02/07/2020     TSH W/FREE T4 REFLEX  09/15/2021     MEDICARE ANNUAL WELLNESS VISIT  02/15/2022     COLORECTAL CANCER SCREENING  04/03/2022     DTAP/TDAP/TD IMMUNIZATION (3 - Td) 12/10/2022     LIPID  11/15/2024     ADVANCE CARE PLANNING  02/15/2026     SPIROMETRY  Completed     HEPATITIS C SCREENING  Completed     COPD ACTION PLAN  Completed     PHQ-2  Completed     INFLUENZA VACCINE  Completed     Pneumococcal Vaccine: Pediatrics (0 to 5 Years) and At-Risk Patients (6 to 64 Years)  Aged Out     IPV IMMUNIZATION  Aged Out     MENINGITIS IMMUNIZATION  Aged Out     HEPATITIS B IMMUNIZATION  Aged Out     Lab work is in process  Labs reviewed in EPIC  BP Readings from Last 3 Encounters:   02/15/21 116/68   07/16/20 129/81   05/13/20 124/76    Wt Readings from Last 3 Encounters:   02/15/21 80.7 kg (178 lb)    05/13/20 80.7 kg (178 lb)   02/10/20 80.7 kg (178 lb)                  Patient Active Problem List   Diagnosis     Colon polyps     Schizophrenia (H)     VSD (ventricular septal defect)     Mitral valve prolapse     Acquired hypothyroidism     IgA nephropathy     Proteinuria     Hyperlipidemia LDL goal <130     BPH (benign prostatic hyperplasia)     Health Care Home     Bilateral impacted cerumen     Bunion of great toe of right foot     Eczema of external ear, bilateral     Tinea pedis, unspecified laterality     Incomplete right bundle branch block (RBBB)     Cigarette nicotine dependence with nicotine-induced disorder     Gastroesophageal reflux disease without esophagitis     Stage 1 mild COPD by GOLD classification (H)     CKD (chronic kidney disease) stage 3, GFR 30-59 ml/min     Past Surgical History:   Procedure Laterality Date     ARTHRODESIS FOOT Right 1/19/2016    Procedure: ARTHRODESIS FOOT;  Surgeon: Holden Harrison DPM;  Location: [facility]     ARTHRODESIS FOOT Left 1/3/2017    Procedure: ARTHRODESIS FOOT;  Surgeon: Holden Harrison DPM;  Location: [facility]     SURGICAL HISTORY OF -       leg fracture       Social History     Tobacco Use     Smoking status: Current Every Day Smoker     Packs/day: 1.00     Years: 47.00     Pack years: 47.00     Types: Cigarettes     Smokeless tobacco: Never Used     Tobacco comment: cutting one cigarette down a month   Substance Use Topics     Alcohol use: No     Family History   Problem Relation Age of Onset     Emphysema Mother      Coronary Artery Disease Father          Current Outpatient Medications   Medication Sig Dispense Refill     ABILIFY 10 MG OR TABS Take 22 mg by mouth daily        albuterol (PROAIR HFA/PROVENTIL HFA/VENTOLIN HFA) 108 (90 Base) MCG/ACT inhaler Inhale 2 puffs into the lungs every 4 hours as needed for shortness of breath / dyspnea or wheezing 1 Inhaler 2     aspirin (QC LO-DOSE ASPIRIN) 81 MG EC tablet Take 1 tablet (81 mg) by mouth  daily 100 tablet 3     augmented betamethasone dipropionate (DIPROLENE-AF) 0.05 % external ointment Apply to AA twice daily 1-2 weeks then as needed only. Do not apply to face. 45 g 5     finasteride (PROSCAR) 5 MG tablet TAKE 1 TABLET BY MOUTH EVERY DAY 30 tablet 11     fluticasone-vilanterol (BREO ELLIPTA) 100-25 MCG/INH inhaler Inhale 1 puff into the lungs daily 3 Inhaler 3     gabapentin (NEURONTIN) 100 MG capsule Take 100 mg by mouth 2 times daily       Incontinence Supply Disposable (INCONTINENCE BRIEF LARGE) MISC Incontinence pads up to 300 per month 300 each 1     levothyroxine (SYNTHROID/LEVOTHROID) 125 MCG tablet Take 1 tablet (125 mcg) by mouth daily 90 tablet 3     OLANZapine (ZYPREXA) 2.5 MG tablet        Omega-3 Fatty Acids (FISH OIL) 1200 MG capsule Take 1 capsule (1.2 g) by mouth daily 90 capsule 1     oxybutynin (DITROPAN) 5 MG tablet Take 2 tablets (10mg) by mouth 3 times daily 180 tablet 11     polyethylene glycol (GAVILAX) 17 GM/SCOOP powder Take 17 g (1 capful) by mouth daily 527 g 11     simvastatin (ZOCOR) 40 MG tablet TAKE ONE TABLET BY MOUTH EVERY DAY AT 9 PM 30 tablet 0     Skin Protectants, Misc. (EUCERIN) cream Apply topically 2 times daily 454 g 11     tamsulosin (FLOMAX) 0.4 MG capsule Take 1 capsule (0.4 mg) by mouth daily 30 capsule 11     traZODone (DESYREL) 50 MG tablet Take 25 mg by mouth At Bedtime        ZOLOFT 100 MG OR TABS 2 TABLETS DAILY       Disposable Gloves (LATEX GLOVES MEDIUM) MISC 4 boxes of gloves  For the application of topical medications 400 each 11     Disposable Gloves (NITRILE EXAM GLOVES MEDIUM) MISC 1 each as needed Use PRN daily with administration of otic drops, body lotion and powder to treat dry itchy skin. 4 each 11     EAR DROPS 6.5 % otic solution Place 5 drops into both ears 2 times daily For 5 days August 1-5th and Feb 1-5 th then return for irrigation after the 5th day. 15 mL 3     nicotine (NICODERM CQ) 14 MG/24HR 24 hr patch Place 1 patch onto the  skin every 24 hours 30 patch 1     order for DME Equipment being ordered: callous pad 1 each 1     order for DME Equipment being ordered: Dynaflex insert 1 Units 0     oxybutynin (DITROPAN) 5 MG tablet  180 tablet 11     Psyllium (REGULOID) 48.57 % POWD Take 1 each by mouth See Admin Instructions Take 1 teaspoonful every day at 7pm and 9 pm. 369 g 11     Allergies   Allergen Reactions     Nitrogen Oxide      Sulfa Drugs      Recent Labs   Lab Test 09/15/20  0924 07/12/20  1601 11/15/19  0907 08/08/19  1100 08/08/19  1100 12/05/18  0840 12/05/18  0840 03/21/18  0900 03/21/18  0900 11/08/17  0840 11/08/17  0840 02/01/13  0815 02/01/13  0815   A1C  --   --   --   --   --   --   --   --  5.6  --   --   --  5.5   LDL  --   --  75  --   --   --  118*  --   --   --  80   < > 117   HDL  --   --  45  --   --   --  48  --   --   --  47   < > 45   TRIG  --   --  155*  --   --   --  150*  --   --   --  119   < > 123   ALT  --   --  21  --   --   --  28  --   --   --  22   < >  --    CR  --  1.56* 1.47*   < >  --    < > 1.60*  --  1.36*   < > 1.31*   < >  --    GFRESTIMATED  --  45* 49*   < >  --    < > 44*  --  53*   < > 55*   < >  --    GFRESTBLACK  --  53* 57*   < >  --    < > 53*  --  64   < > 67   < >  --    POTASSIUM  --  4.2 4.3   < >  --    < > 4.6  --  4.4   < > 4.6   < >  --    TSH 2.33  --   --   --  1.63  --   --    < >  --   --   --    < >  --     < > = values in this interval not displayed.        Review of Systems   Constitutional: Negative for chills and fever.   HENT: Positive for hearing loss. Negative for congestion, ear pain and sore throat.    Eyes: Negative for pain and visual disturbance.   Respiratory: Negative for cough and shortness of breath.    Cardiovascular: Negative for chest pain, palpitations and peripheral edema.   Gastrointestinal: Negative for abdominal pain, constipation, diarrhea, heartburn, hematochezia and nausea.   Endocrine: Negative for cold intolerance.   Genitourinary: Negative for  "dysuria, frequency, genital sores, hematuria and urgency.   Musculoskeletal: Negative for arthralgias, joint swelling and myalgias.   Skin: Negative for rash.   Allergic/Immunologic: Negative for environmental allergies.   Neurological: Negative for dizziness, weakness, headaches and paresthesias.   Psychiatric/Behavioral: Negative for mood changes. The patient is not nervous/anxious.        OBJECTIVE:   /68 (BP Location: Right arm, Patient Position: Chair, Cuff Size: Adult Large)   Pulse 64   Temp 97  F (36.1  C) (Tympanic)   Resp 16   Ht 1.765 m (5' 9.5\")   Wt 80.7 kg (178 lb)   SpO2 96%   BMI 25.91 kg/m   Estimated body mass index is 25.91 kg/m  as calculated from the following:    Height as of this encounter: 1.765 m (5' 9.5\").    Weight as of this encounter: 80.7 kg (178 lb).  Physical Exam  GENERAL: alert and no distress  EYES: Eyes grossly normal to inspection, PERRL and conjunctivae and sclerae normal  HENT: normal cephalic/atraumatic, right ear: excessive cerumen, normal tympanic membrane, left ear: excessive cerumen, normal tympanic membrane, nose and mouth without ulcers or lesions, oropharynx clear and oral mucous membranes moist  NECK: no adenopathy, no asymmetry, masses, or scars and thyroid normal to palpation  RESP: lungs clear to auscultation - no rales, rhonchi or wheezes  CV: regular rate and rhythm, normal S1 S2, no S3 or S4, no murmur, click or rub, no peripheral edema and peripheral pulses strong  ABDOMEN: soft, nontender, no hepatosplenomegaly, no masses and bowel sounds normal  MS: no gross musculoskeletal defects noted, no edema  SKIN: no suspicious lesions or rashes  NEURO: Normal strength and tone, mentation intact and speech normal  BACK: no CVA tenderness, no paralumbar tenderness      ASSESSMENT / PLAN:   (Z00.00) Medicare annual wellness visit, subsequent  (primary encounter diagnosis)  Comment: Medically stable.  Excessive cerumen cleaned with saline irrigation by MA.  " "Medications reviewed and no changes made.  Group home physical form signed. Healthy lifestyle modifications stressed including regular exercise, balanced diet, weight loss and limiting salt/caffeine/pop intake      (Z72.0) Tobacco abuse  Comment: Smoking cessation counseling provided, associated health hazards explained in detail, not ready to quit currently      (Z13.6) Screening for abdominal aortic aneurysm  Comment: Ultrasound aorta ordered as per current guidelines  Plan: US Abdominal Aorta Imaging      COUNSELING:  Reviewed preventive health counseling, as reflected in patient instructions    Estimated body mass index is 25.91 kg/m  as calculated from the following:    Height as of this encounter: 1.765 m (5' 9.5\").    Weight as of this encounter: 80.7 kg (178 lb).    Weight management plan: Discussed healthy diet and exercise guidelines    He reports that he has been smoking cigarettes. He has a 47.00 pack-year smoking history. He has never used smokeless tobacco.  Tobacco Cessation Action Plan:   Information offered: Patient not interested at this time      Appropriate preventive services were discussed with this patient, including applicable screening as appropriate for cardiovascular disease, diabetes, osteopenia/osteoporosis, and glaucoma.  As appropriate for age/gender, discussed screening for colorectal cancer, prostate cancer, breast cancer, and cervical cancer. Checklist reviewing preventive services available has been given to the patient.    Reviewed patients plan of care and provided an AVS. The Basic Care Plan (routine screening as documented in Health Maintenance) for Geovani meets the Care Plan requirement. This Care Plan has been established and reviewed with the Patient.    Counseling Resources:  ATP IV Guidelines  Pooled Cohorts Equation Calculator  Breast Cancer Risk Calculator  Breast Cancer: Medication to Reduce Risk  FRAX Risk Assessment  ICSI Preventive Guidelines  Dietary Guidelines for " Americans, 2010  USDA's MyPlate  ASA Prophylaxis  Lung CA Screening    Alexandru Vera MD  [facility]

## 2021-02-25 DIAGNOSIS — E78.5 HYPERLIPIDEMIA LDL GOAL <130: ICD-10-CM

## 2021-02-25 RX ORDER — SIMVASTATIN 40 MG
TABLET ORAL
Qty: 90 TABLET | Refills: 3 | Status: SHIPPED | OUTPATIENT
Start: 2021-02-25 | End: 2021-09-22

## 2021-03-01 ENCOUNTER — HOSPITAL ENCOUNTER (OUTPATIENT)
Dept: ULTRASOUND IMAGING | Facility: CLINIC | Age: 66
Discharge: HOME | End: 2021-03-01
Attending: FAMILY MEDICINE | Admitting: FAMILY MEDICINE
Payer: COMMERCIAL

## 2021-03-01 DIAGNOSIS — Z13.6 SCREENING FOR ABDOMINAL AORTIC ANEURYSM: ICD-10-CM

## 2021-03-01 PROCEDURE — 76775 US EXAM ABDO BACK WALL LIM: CPT

## 2021-04-06 NOTE — PROGRESS NOTES
Srinivas is a 65 year old who is being evaluated via a billable telephone visit.        Platform used for Video Visit: StackSearch    Additional provider notes:  65-year-old male seen for VSD and mitral valve prolapse.  He has CKD secondary to IgA nephropathy, dyslipidemia, hypothyroid, schizophrenia, history of tobacco use.    Echo 2010 showed EF 60%, membranous VSD.    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    Overall he has been doing well.  He continues to smoke 16 cigarettes/day.  He has some chronic dyspnea with exertion which is unchanged.  He denies any chest pain, palpitations, or edema.  Weight is steady at 170 pounds.  Blood pressure is not checked outside of clinic.    Abdominal ultrasound March 2021 showed normal-sized abdominal aorta, mild atherosclerotic changes.    Agree with ROS as above.    Data:    Recent Labs   Lab Test 02/15/21  1209 11/15/19  0907 01/12/15  0841 01/12/15  0841 01/24/14  0840   CHOL 167 151   < > 172 160   HDL 56 45   < > 36* 35*   LDL 87 75   < > 81 89   TRIG 122 155*   < > 274* 176*   CHOLHDLRATIO  --   --   --  4.8 5.0    < > = values in this interval not displayed.     Assessment:  65-year-old male seen for membranous VSD and mitral valve prolapse.  He has no new concerning cardiac symptoms.  His dyspnea is likely secondary to COPD and ongoing tobacco use.  He is due for an echo to follow-up on the mitral valve prolapse.  Had some mild atheromatous changes of the abdominal aorta, this is expected at his age and with his smoking history.  He already is on statin therapy.    Recommendations:  1.  Mitral valve prolapse  -Echocardiogram    2.  Membranous VSD  -Echocardiogram    Follow-up in 2 years if echo is unchanged.    Phone call duration: 10 minutes    A total of 20 minutes was spent with clinic visit, including chart review and coordinating care, >50% of time was spent talking with patient.    Peewee  MD Oren  Cardiology - [facility]  Pager: [phone]  Text Page  April 9, 2021

## 2021-04-09 ENCOUNTER — VIRTUAL VISIT (OUTPATIENT)
Dept: CARDIOLOGY | Facility: CLINIC | Age: 66
End: 2021-04-09
Payer: COMMERCIAL

## 2021-04-09 DIAGNOSIS — I34.1 MITRAL VALVULAR PROLAPSE: Primary | ICD-10-CM

## 2021-04-09 DIAGNOSIS — Q21.0 VENTRICULAR SEPTAL DEFECT (VSD), MEMBRANOUS: ICD-10-CM

## 2021-04-09 PROCEDURE — 99213 OFFICE O/P EST LOW 20 MIN: CPT | Mod: 95 | Performed by: INTERNAL MEDICINE

## 2021-04-09 NOTE — LETTER
4/9/2021    Alexandru Vera MD  [address] [place] [id number]    RE: Geovani Bustos       Dear Colleague,    I had the pleasure of seeing Geovani AKANKSHA Bustos in the [facility].    Srinivas is a 65 year old who is being evaluated via a billable telephone visit.        Platform used for Video Visit: ACE*COMM    Additional provider notes:  65-year-old male seen for VSD and mitral valve prolapse.  He has CKD secondary to IgA nephropathy, dyslipidemia, hypothyroid, schizophrenia, history of tobacco use.    Echo 2010 showed EF 60%, membranous VSD.    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    Overall he has been doing well.  He continues to smoke 16 cigarettes/day.  He has some chronic dyspnea with exertion which is unchanged.  He denies any chest pain, palpitations, or edema.  Weight is steady at 170 pounds.  Blood pressure is not checked outside of clinic.    Abdominal ultrasound March 2021 showed normal-sized abdominal aorta, mild atherosclerotic changes.    Agree with ROS as above.    Data:    Recent Labs   Lab Test 02/15/21  1209 11/15/19  0907 01/12/15  0841 01/12/15  0841 01/24/14  0840   CHOL 167 151   < > 172 160   HDL 56 45   < > 36* 35*   LDL 87 75   < > 81 89   TRIG 122 155*   < > 274* 176*   CHOLHDLRATIO  --   --   --  4.8 5.0    < > = values in this interval not displayed.     Assessment:  65-year-old male seen for membranous VSD and mitral valve prolapse.  He has no new concerning cardiac symptoms.  His dyspnea is likely secondary to COPD and ongoing tobacco use.  He is due for an echo to follow-up on the mitral valve prolapse.  Had some mild atheromatous changes of the abdominal aorta, this is expected at his age and with his smoking history.  He already is on statin therapy.    Recommendations:  1.  Mitral valve prolapse  -Echocardiogram    2.  Membranous  VSD  -Echocardiogram    Follow-up in 2 years if echo is unchanged.    Phone call duration: 10 minutes    A total of 20 minutes was spent with clinic visit, including chart review and coordinating care, >50% of time was spent talking with patient.    Peewee Lott MD  Cardiology - [facility]  Pager: [phone]  Text Page  April 9, 2021    cc:   Referred Self, MD  No address on file

## 2021-04-09 NOTE — PATIENT INSTRUCTIONS
It was a pleasure speaking with you during our recent phone visit.  As we discussed, it does not sound like you have any new heart related symptoms.  Your shortness of breath is probably from your smoking and COPD.    Your previous ultrasounds of the heart show that you have a mitral valve prolapse.  This means that the mitral valve in your heart is a little floppier than normal.  I would like you to have another echocardiogram, or ultrasound of the heart, to follow-up on this.  Sometimes this valve can start to leak more as people get older.    We will call you with the echo result when it is complete.  If things look okay, we would like to see you back in about 2 years time.  Our schedulers will contact you then to arrange the follow-up appointment.

## 2021-04-26 ENCOUNTER — TELEPHONE (OUTPATIENT)
Dept: UROLOGY | Facility: CLINIC | Age: 66
End: 2021-04-26

## 2021-04-26 DIAGNOSIS — N32.81 OAB (OVERACTIVE BLADDER): ICD-10-CM

## 2021-04-26 RX ORDER — OXYBUTYNIN CHLORIDE 5 MG/1
TABLET ORAL
Qty: 180 TABLET | Refills: 3 | Status: SHIPPED | OUTPATIENT
Start: 2021-04-26 | End: 2021-09-20

## 2021-04-26 NOTE — LETTER
[facility] [address]  [address] [facility] [address]  Phone: [phone]  Fax: [phone]       April 26, 2021       Caregiver of   Geovani Bustos  [address]  [id number]            Dear caregivers of Geovani:    We are concerned about his health care.  We recently provided him with medication refills.  Many medications require routine follow-up with your doctor in urology.    His prescription(s) have been refilled for 90 days so you may have time for the above noted follow-up. Please call to schedule soon so we can assure he has an appointment before his next refills are needed.    Thank you,      Luis A Zepeda MD/ tr

## 2021-04-26 NOTE — TELEPHONE ENCOUNTER
Reason for Call:  Other prescription    Detailed comments: Pharmacy faxed following note (04/22/2021) regarding oxybutynin (DITROPAN) 5 MG tablet  :  This is a group home patient.  We need early fills for packing purposes. Medications are closely monitored and the patient does not get any medications early, but we need additional time to fill and package them. Thank you for your assistance! Need additional time to fill and package them.  Thank you for your assistance!  This prescription was filled today (4/22/2012). Any refills authorized will be placed on file.  Phone Number Patient can be reached at: [facility] [phone]    Best Time: Any    Can we leave a detailed message on this number? Not Applicable    Call taken on 4/26/2021 at 11:43 AM by Ahsan Rodgers

## 2021-04-26 NOTE — TELEPHONE ENCOUNTER
Medication is being filled for 1 time refill only due to:  Patient needs to be seen because it has been more than one year since last visit. Letter sent to group home to make appt.  Jenna MENEZES RN BSN PHN  Specialty Clinics

## 2021-04-30 DIAGNOSIS — E78.5 HYPERLIPIDEMIA LDL GOAL <130: ICD-10-CM

## 2021-05-03 ENCOUNTER — OFFICE VISIT (OUTPATIENT)
Dept: DERMATOLOGY | Facility: CLINIC | Age: 66
End: 2021-05-03
Payer: COMMERCIAL

## 2021-05-03 ENCOUNTER — HOSPITAL ENCOUNTER (OUTPATIENT)
Dept: CARDIOLOGY | Facility: CLINIC | Age: 66
Discharge: HOME | End: 2021-05-03
Attending: INTERNAL MEDICINE | Admitting: INTERNAL MEDICINE
Payer: COMMERCIAL

## 2021-05-03 VITALS — OXYGEN SATURATION: 95 % | HEART RATE: 63 BPM | DIASTOLIC BLOOD PRESSURE: 80 MMHG | SYSTOLIC BLOOD PRESSURE: 117 MMHG

## 2021-05-03 DIAGNOSIS — I34.1 MITRAL VALVULAR PROLAPSE: ICD-10-CM

## 2021-05-03 DIAGNOSIS — L20.9 ATOPIC DERMATITIS, UNSPECIFIED TYPE: Primary | ICD-10-CM

## 2021-05-03 DIAGNOSIS — Q21.0 VENTRICULAR SEPTAL DEFECT (VSD), MEMBRANOUS: ICD-10-CM

## 2021-05-03 DIAGNOSIS — H60.543 ECZEMA OF BOTH EXTERNAL EARS: ICD-10-CM

## 2021-05-03 PROCEDURE — 99212 OFFICE O/P EST SF 10 MIN: CPT | Performed by: PHYSICIAN ASSISTANT

## 2021-05-03 PROCEDURE — 93306 TTE W/DOPPLER COMPLETE: CPT | Mod: 26 | Performed by: INTERNAL MEDICINE

## 2021-05-03 PROCEDURE — 93306 TTE W/DOPPLER COMPLETE: CPT

## 2021-05-03 RX ORDER — AMOXICILLIN 500 MG
1 CAPSULE ORAL DAILY
Qty: 90 CAPSULE | Refills: 1 | Status: SHIPPED | OUTPATIENT
Start: 2021-05-03 | End: 2022-01-06

## 2021-05-03 RX ORDER — BETAMETHASONE DIPROPIONATE 0.5 MG/G
OINTMENT, AUGMENTED TOPICAL
Qty: 45 G | Refills: 5 | Status: SHIPPED | OUTPATIENT
Start: 2021-05-03 | End: 2022-11-16

## 2021-05-03 NOTE — PROGRESS NOTES
HPI:   Chief complaints: Geovani Bustos is a 65 year old male who presents for recheck of atopic dermatitis. Using emollients daily and the betamethasone cream PRN. Overall feels well controlled and he would like to continue his current regimen.       PHYSICAL EXAM:    /80   Pulse 63   SpO2 95%   Skin exam performed as follows: Type 2 skin. Mood appropriate  Alert and Oriented X 3. Well developed, well nourished in no distress.  General appearance: Normal  Head including face: Normal  Eyes: conjunctiva and lids: Normal  Mouth: Lips, teeth, gums: Normal  Neck: Normal  Chest-breast/axillae: Normal  Back: Normal  Spleen and liver: Normal  Cardiovascular: Exam of peripheral vascular system by observation for swelling, varicosities, edema: Normal  Genitalia: groin, buttocks: Normal  Extremities: digits/nails (clubbing): Normal  Eccrine and Apocrine glands: Normal  Right upper extremity: Normal  Left upper extremity: Normal  Right lower extremity: Normal  Left lower extremity: Normal  Skin: Scalp and body hair: See below    Skin clear    ASSESSMENT/PLAN:     1. Atopic dermatitis - doing great with gentle soaps, emollients and PRN betamethasone. Continue this indefinitely          Follow-up: 1 year/PRN sooner  CC:   Scribed By: Julia Hernandez, MS, PA-C

## 2021-05-03 NOTE — RESULT ENCOUNTER NOTE
EF 55-60%; LV filling pressures increased; small L to R ventricular shunt; mod MR; mild dilatation of aortic root at 3.8 cm and ascending aorta at 3.9 cm. Will discuss with Dr Lott if ok for 2 year follow up

## 2021-05-03 NOTE — LETTER
5/3/2021         RE: Geovani Bustos  [address]  [facility] [address]        Dear Colleague,    Thank you for referring your patient, Geovani Bustos, to the [facility]. Please see a copy of my visit note below.    HPI:   Chief complaints: Geovani Bustos is a 65 year old male who presents for recheck of atopic dermatitis. Using emollients daily and the betamethasone cream PRN. Overall feels well controlled and he would like to continue his current regimen.       PHYSICAL EXAM:    /80   Pulse 63   SpO2 95%   Skin exam performed as follows: Type 2 skin. Mood appropriate  Alert and Oriented X 3. Well developed, well nourished in no distress.  General appearance: Normal  Head including face: Normal  Eyes: conjunctiva and lids: Normal  Mouth: Lips, teeth, gums: Normal  Neck: Normal  Chest-breast/axillae: Normal  Back: Normal  Spleen and liver: Normal  Cardiovascular: Exam of peripheral vascular system by observation for swelling, varicosities, edema: Normal  Genitalia: groin, buttocks: Normal  Extremities: digits/nails (clubbing): Normal  Eccrine and Apocrine glands: Normal  Right upper extremity: Normal  Left upper extremity: Normal  Right lower extremity: Normal  Left lower extremity: Normal  Skin: Scalp and body hair: See below    Skin clear    ASSESSMENT/PLAN:     1. Atopic dermatitis - doing great with gentle soaps, emollients and PRN betamethasone. Continue this indefinitely          Follow-up: 1 year/PRN sooner  CC:   Scribed By: Julia Hernandez MS, PAKIRSTY          Again, thank you for allowing me to participate in the care of your patient.        Sincerely,        Julia Hernandez PA-C

## 2021-05-18 DIAGNOSIS — K59.00 CONSTIPATION, UNSPECIFIED CONSTIPATION TYPE: ICD-10-CM

## 2021-05-18 DIAGNOSIS — L85.3 XEROSIS OF SKIN: ICD-10-CM

## 2021-05-18 DIAGNOSIS — J44.9 STAGE 1 MILD COPD BY GOLD CLASSIFICATION (H): ICD-10-CM

## 2021-05-18 RX ORDER — ALBUTEROL SULFATE 90 UG/1
AEROSOL, METERED RESPIRATORY (INHALATION)
Qty: 18 G | Refills: 1 | Status: SHIPPED | OUTPATIENT
Start: 2021-05-18 | End: 2022-11-16

## 2021-05-18 RX ORDER — POLYETHYLENE GLYCOL 3350 17 G/17G
POWDER, FOR SOLUTION ORAL
Qty: 510 G | Refills: 11 | Status: SHIPPED | OUTPATIENT
Start: 2021-05-18 | End: 2022-04-18

## 2021-05-18 NOTE — TELEPHONE ENCOUNTER
Requested Prescriptions   Pending Prescriptions Disp Refills     Skin Protectants, Misc. (EUCERIN) cream 454 g 11     Sig: Apply topically 2 times daily       There is no refill protocol information for this order        Last office visit: 5/3/2021 with prescribing provider:  Dr. Krishnamurthy  Future Office Visit:   Next 5 appointments (look out 90 days)    May 20, 2021  8:20 AM  Office Visit with Alexandru Vera MD  [facility] ([facility] ) [address] [place] [id number] [phone]               [facility]  Specialty [facility] CSS

## 2021-05-20 ENCOUNTER — OFFICE VISIT (OUTPATIENT)
Dept: FAMILY MEDICINE | Facility: CLINIC | Age: 66
End: 2021-05-20
Payer: COMMERCIAL

## 2021-05-20 VITALS
TEMPERATURE: 97 F | WEIGHT: 176 LBS | HEART RATE: 64 BPM | DIASTOLIC BLOOD PRESSURE: 62 MMHG | BODY MASS INDEX: 25.2 KG/M2 | HEIGHT: 70 IN | RESPIRATION RATE: 18 BRPM | SYSTOLIC BLOOD PRESSURE: 112 MMHG

## 2021-05-20 DIAGNOSIS — R60.0 PEDAL EDEMA: Primary | ICD-10-CM

## 2021-05-20 DIAGNOSIS — L30.9 DERMATITIS: ICD-10-CM

## 2021-05-20 PROCEDURE — 99213 OFFICE O/P EST LOW 20 MIN: CPT | Performed by: FAMILY MEDICINE

## 2021-05-20 RX ORDER — TRIAMCINOLONE ACETONIDE 1 MG/G
CREAM TOPICAL 2 TIMES DAILY
Qty: 30 G | Refills: 0 | Status: SHIPPED | OUTPATIENT
Start: 2021-05-20 | End: 2021-05-27

## 2021-05-20 ASSESSMENT — MIFFLIN-ST. JEOR: SCORE: 1571.64

## 2021-05-20 NOTE — NURSING NOTE
"Chief Complaint   Patient presents with     Leg Swelling     /62 (Cuff Size: Adult Regular)   Pulse 64   Temp 97  F (36.1  C) (Tympanic)   Resp 18   Ht 1.765 m (5' 9.5\")   Wt 79.8 kg (176 lb)   BMI 25.62 kg/m   Estimated body mass index is 25.62 kg/m  as calculated from the following:    Height as of this encounter: 1.765 m (5' 9.5\").    Weight as of this encounter: 79.8 kg (176 lb).  Patient presents to the clinic using No DME      Health Maintenance that is potentially due pending provider review:    Health Maintenance Due   Topic Date Due     ANNUAL REVIEW OF HM ORDERS  Never done     COVID-19 Vaccine (1) Never done     ZOSTER IMMUNIZATION (2 of 3) 04/12/2016                "

## 2021-05-20 NOTE — PATIENT INSTRUCTIONS
Patient Education     Leg Swelling in Both Legs    Swelling of the feet, ankles, and legs is called edema. It is caused by excess fluid that has collected in the tissues. Extra fluid in the body settles in the lowest part because of gravity. This is why the legs and feet are most affected.  Some of the causes for edema include:    Disease of the heart such as congestive heart failure    Standing or sitting for long periods of time    Infection of the feet or legs    Blood pooling in the veins of your legs (venous insufficiency) when the veins have less elasticity    Dilated veins in your lower leg (varicose veins)    Stockings or other clothing that is tight on your legs. This will cause blood to pool in your legs because the clothing limits blood flow.    Some medicines. These include some hormones such as birth control pills, some blood pressure medicines such as calcium channel blockers, steroids, and some antidepressants such as MAO inhibitors and tricyclics.    Menstrual periods that cause you to retain fluids    Many types of kidney disease    Liver failure or cirrhosis    Pregnancy. Some swelling is normal, but a sudden increase in leg swelling or weight gain can be a sign of a dangerous complication of pregnancy called eclampsia.    Poor nutrition    Thyroid disease  Treatment will depend on what is causing the swelling in your legs. Your healthcare provider may prescribe water pills (diuretics) to get rid of the extra fluid.  Home care  Follow these guidelines when caring for yourself at home:    Don't wear clothing such as stockings that are tight on your legs.    Keep your legs up while lying or sitting.    If infection, injury, or recent surgery is causing the swelling, stay off your legs as much as possible until symptoms get better.    If your healthcare provider says that your leg swelling is caused by venous insufficiency or varicose veins, don't sit or  one place for long periods of time.  Take breaks and walk about every few hours. Brisk walking is a good exercise. It helps circulate the blood that has collected in your leg. Talk with your provider about using support stockings to stop daytime leg swelling.    If your provider says that heart disease is causing your leg swelling, follow a low-salt diet to stop extra fluid from staying in your body. You may also need medicine.  Follow-up care  Follow up with your healthcare provider, or as advised.  When to seek medical advice  Call your healthcare provider right away if any of these occur:    Swelling in both legs or ankles that gets worse    Swelling of the abdomen    Redness, warmth, or swelling in one leg    Fever of 100.4 F (38 C) or higher , or as directed by your healthcare provider    Yellow color to your skin or eyes    Rapid, unexplained weight gain    Having to sleep upright or use an increased number of pillow     Call 911  This is the fastest and safest way to get to the emergency department. The paramedics can also start treatment on the way to the hospital, if needed.  Call 911, or seekmedical attention right away if    You have new shortness of breath or chest pain    Worsening shortness of breath or chest pain?  [employer] last reviewed this educational content on 11/1/2019 2000-2021 [employer]. All rights reserved. This information is not intended as a substitute for professional medical care. Always follow your healthcare professional's instructions.

## 2021-05-20 NOTE — PROGRESS NOTES
"  Assessment & Plan     Pedal edema  Differentials discussed in detail including venous insufficiency.  Recent echocardiogram results reviewed.  Suggested leg elevation when possible, limiting salt, compression stocking.  Follow-up in 1 week or earlier if needed  - Compression Sleeve/Stocking Order for DME - ONLY FOR DME    Dermatitis  Suspect left lower leg rashes secondary to dermatitis.  Kenalog prescribed.  Small blood blister involving left great toe noted, will continue monitoring it closely.  - triamcinolone (KENALOG) 0.1 % external cream; Apply topically 2 times daily for 7 days        Alexandru Vera MD  [facility]    Markell Espino is a 65 year old who presents for the following health issues  accompanied by his Group home staff:    HPI     Concern - Leg swelling   Onset: Staff noticed on Saturday   Description: swelling in both lower legs. Little red on the left leg   Intensity: mild  Progression of Symptoms:  same  Therapies tried and outcome:  none         Review of Systems   Constitutional, HEENT, cardiovascular, pulmonary, gi and gu systems are negative, except as otherwise noted.      Objective    /62 (Cuff Size: Adult Regular)   Pulse 64   Temp 97  F (36.1  C) (Tympanic)   Resp 18   Ht 1.765 m (5' 9.5\")   Wt 79.8 kg (176 lb)   BMI 25.62 kg/m    Body mass index is 25.62 kg/m .  Physical Exam   GENERAL: alert and no distress  NECK: no adenopathy, no asymmetry, masses, or scars and thyroid normal to palpation  RESP: lungs clear to auscultation - no rales, rhonchi or wheezes  CV: regular rates and rhythm, normal S1 S2, no S3 or S4 and no murmur, click or rub, JVP nondistended  ABDOMEN: soft, nontender  MS: no gross musculoskeletal defects noted, pedal edema 1+ bilaterally, Homans' sign negative  SKIN: Erythematous rashes involving left lower leg, small black discolored lesion involving left great toe as shown below  NEURO: Normal strength and tone, mentation " intact and speech normal

## 2021-05-21 DIAGNOSIS — R39.9 LOWER URINARY TRACT SYMPTOMS (LUTS): ICD-10-CM

## 2021-05-21 DIAGNOSIS — E03.9 ACQUIRED HYPOTHYROIDISM: Chronic | ICD-10-CM

## 2021-05-21 DIAGNOSIS — E78.5 HYPERLIPIDEMIA LDL GOAL <130: ICD-10-CM

## 2021-05-21 RX ORDER — TAMSULOSIN HYDROCHLORIDE 0.4 MG/1
0.4 CAPSULE ORAL DAILY
Qty: 30 CAPSULE | Refills: 0 | Status: SHIPPED | OUTPATIENT
Start: 2021-05-21 | End: 2021-07-21

## 2021-05-21 RX ORDER — LEVOTHYROXINE SODIUM 125 UG/1
125 TABLET ORAL DAILY
Qty: 30 TABLET | Refills: 2 | Status: SHIPPED | OUTPATIENT
Start: 2021-05-21 | End: 2021-09-22

## 2021-05-21 RX ORDER — ASPIRIN 81 MG/1
TABLET, COATED ORAL
Qty: 30 TABLET | Refills: 11 | Status: SHIPPED | OUTPATIENT
Start: 2021-05-21 | End: 2022-04-18

## 2021-05-21 NOTE — TELEPHONE ENCOUNTER
Pt has future appt scheduled with Dr. Zepeda on 05-27-21.  Will authorize 1 refill at this time.    Kalie Lawton  [facility] RN

## 2021-05-21 NOTE — TELEPHONE ENCOUNTER
Requested Prescriptions   Pending Prescriptions Disp Refills     tamsulosin (FLOMAX) 0.4 MG capsule 30 capsule 11     Sig: Take 1 capsule (0.4 mg) by mouth daily       There is no refill protocol information for this order        Last office visit: 1/16/2020 with prescribing provider:  Dr. Zepeda   Future Office Visit:   Next 5 appointments (look out 90 days)    May 26, 2021 12:40 PM  Office Visit with Alexandru Vera MD  [facility] ([facility] ) [address] [place] [id number] [phone]               Denise Behrendt  Specialty CSS

## 2021-05-21 NOTE — TELEPHONE ENCOUNTER
"Prescriptions approved per [facility] Refill Protocol.    Requested Prescriptions   Pending Prescriptions Disp Refills     ASPIRIN LOW DOSE 81 MG EC tablet [Pharmacy Med Name: aspirin 81 mg tablet,delayed release] 30 tablet 12     Sig: Take 1 tablet (81 mg) by mouth daily       Analgesics (Non-Narcotic Tylenol and ASA Only) Passed - 5/21/2021 11:08 AM        Passed - Recent (12 mo) or future (30 days) visit within the authorizing provider's specialty     Patient has had an office visit with the authorizing provider or a provider within the authorizing providers department within the previous 12 mos or has a future within next 30 days. See \"Patient Info\" tab in inbasket, or \"Choose Columns\" in Meds & Orders section of the refill encounter.              Passed - Patient is age 20 years or older     If ASA is flagged for ages under 20 years old. Forward to provider for confirmation Ryes Syndrome is not a concern.              Passed - Medication is active on med list           levothyroxine (SYNTHROID/LEVOTHROID) 125 MCG tablet [Pharmacy Med Name: levothyroxine 125 mcg tablet] 30 tablet 11     Sig: Take 1 tablet (125 mcg) by mouth daily       Thyroid Protocol Passed - 5/21/2021 11:08 AM        Passed - Patient is 12 years or older        Passed - Recent (12 mo) or future (30 days) visit within the authorizing provider's specialty     Patient has had an office visit with the authorizing provider or a provider within the authorizing providers department within the previous 12 mos or has a future within next 30 days. See \"Patient Info\" tab in inbasket, or \"Choose Columns\" in Meds & Orders section of the refill encounter.              Passed - Medication is active on med list        Passed - Normal TSH on file in past 12 months     Recent Labs   Lab Test 09/15/20  0924   TSH 2.33                   "

## 2021-05-26 ENCOUNTER — OFFICE VISIT (OUTPATIENT)
Dept: FAMILY MEDICINE | Facility: CLINIC | Age: 66
End: 2021-05-26
Payer: COMMERCIAL

## 2021-05-26 VITALS
BODY MASS INDEX: 25.15 KG/M2 | RESPIRATION RATE: 18 BRPM | OXYGEN SATURATION: 97 % | DIASTOLIC BLOOD PRESSURE: 68 MMHG | WEIGHT: 172.8 LBS | TEMPERATURE: 97 F | HEART RATE: 62 BPM | SYSTOLIC BLOOD PRESSURE: 114 MMHG

## 2021-05-26 DIAGNOSIS — L81.9 HYPERPIGMENTED SKIN LESION: ICD-10-CM

## 2021-05-26 DIAGNOSIS — R60.0 PEDAL EDEMA: Primary | ICD-10-CM

## 2021-05-26 DIAGNOSIS — L84 CALLUS OF FOOT: ICD-10-CM

## 2021-05-26 PROCEDURE — 99213 OFFICE O/P EST LOW 20 MIN: CPT | Performed by: FAMILY MEDICINE

## 2021-05-26 RX ORDER — SALICYLIC ACID 60 MG/G
GEL TOPICAL
Qty: 240 G | Refills: 1 | Status: SHIPPED | OUTPATIENT
Start: 2021-05-26 | End: 2022-11-16

## 2021-05-26 NOTE — PROGRESS NOTES
Assessment & Plan     Pedal edema  Pedal edema improved significantly with compression stockings and rest.  Suggested to continue limiting salt, compression stockings and leg elevation when possible.    Hyperpigmented skin lesion  Physical examination remarkable for persistent hyperpigmented skin lesion involving left great toe plantar surface.  Differentials discussed in detail.  Dermatology consult placed  - DERMATOLOGY ADULT REFERRAL; Future    Callus of foot  Suggested to use salicylic acid and continue regular moisturizers.  - salicylic acid 6 % external gel; apply twice daily until callus is removed for up to 14 days.         Alexandru Vera MD  [facility]    Markell Espino is a 65 year old who presents for the following health issues     HPI     Concern - Edema   Onset: 1-1.5 weeks   Description: Recheck swelling in both legs. Also says that he has a rash   Intensity: mild  Progression of Symptoms:  improving  Accompanying Signs & Symptoms: rash   Previous history of similar problem: NO  Precipitating factors:        Worsened by: na  Alleviating factors:        Improved by: na   Therapies tried and outcome: compression stockings       Review of Systems   Constitutional, HEENT, cardiovascular, pulmonary, gi and gu systems are negative, except as otherwise noted.      Objective    /68 (BP Location: Right arm, Patient Position: Sitting, Cuff Size: Adult Large)   Pulse 62   Temp 97  F (36.1  C) (Tympanic)   Resp 18   Wt 78.4 kg (172 lb 12.8 oz)   SpO2 97%   BMI 25.15 kg/m    Body mass index is 25.15 kg/m .  Physical Exam   GENERAL: alert and no distress  NECK: no adenopathy, no asymmetry, masses, or scars and thyroid normal to palpation  RESP: lungs clear to auscultation - no rales, rhonchi or wheezes  CV: regular rate and rhythm, normal S1 S2, no S3 or S4, no murmur, click or rub, minimal pedal edema bilaterally  ABDOMEN: soft, nontender, no hepatosplenomegaly, no  masses and bowel sounds normal  SKIN: Callus involving left foot plantar surface and hyperpigmented lesion under left great toe as shown below,    Left great toe      Left lower leg

## 2021-05-27 ENCOUNTER — VIRTUAL VISIT (OUTPATIENT)
Dept: UROLOGY | Facility: CLINIC | Age: 66
End: 2021-05-27
Payer: COMMERCIAL

## 2021-05-27 DIAGNOSIS — R39.9 LOWER URINARY TRACT SYMPTOMS (LUTS): Primary | ICD-10-CM

## 2021-05-27 PROCEDURE — 99213 OFFICE O/P EST LOW 20 MIN: CPT | Mod: 95 | Performed by: UROLOGY

## 2021-05-27 NOTE — PROGRESS NOTES
Srinivas is a 65 year old who is being evaluated via a billable telephone visit.      What phone number would you like to be contacted at? [phone]  How would you like to obtain your AVS? Mail a copy        Subjective   Srinivas is a 65 year old who presents for the following health issues: LUTS    HPI     ADDITIONAL PROVIDER NOTES:     REASON FOR VISIT TODAY:  Overactive bladder.      HISTORY OF PRESENT ILLNESS: Mr. Bustos is a 65-year-old gentleman with a history of urinary urgency and urge incontinence that is currently being managed with a combination of Flomax, finasteride and oxybutynin 5 mg p.o. t.i.d.  The patient underwent cystoscopy demonstrating a paucity of prostate tissue.  He has tried physical therapy for pelvic floor relaxation exercises, but this did not seem to improve his symptoms either.  He completed urodynamics evaluation and that showed the patient's urodynamics report from 01/08/2020 demonstrates significant bladder overactivity with small bladder capacities of around 200-230 mL with terminal evacuation contractions elicited at about these volumes.  There was no evidence for obstruction during the voiding phase.  Storage pressures were otherwise normal.  We increased the patient's oxybutynin to 10 mg tid.  Patient and caregivers note that since increasing the medication he has not had any incontinence at night.  He still has nocturia 2-3 times, but is able to go on his own and get back to sleep.  Denies problems with constipation or dry eyes or mouth.     ASSESSMENT AND PLAN: Over half of today's 8-minute visit was spent reviewing the chart, results, and counseling the patient regarding his LUTS.  Will continu on 10 mg tid oxybutynin in addition to the flomax and finateride.  Patient will f/u in 1 year to check on symptoms.                     Phone call duration: 3 minutes

## 2021-06-09 ENCOUNTER — OFFICE VISIT (OUTPATIENT)
Dept: DERMATOLOGY | Facility: CLINIC | Age: 66
End: 2021-06-09
Payer: COMMERCIAL

## 2021-06-09 VITALS — SYSTOLIC BLOOD PRESSURE: 116 MMHG | HEART RATE: 68 BPM | OXYGEN SATURATION: 94 % | DIASTOLIC BLOOD PRESSURE: 73 MMHG

## 2021-06-09 DIAGNOSIS — L81.9 HYPERPIGMENTED SKIN LESION: ICD-10-CM

## 2021-06-09 PROCEDURE — 99213 OFFICE O/P EST LOW 20 MIN: CPT | Performed by: PHYSICIAN ASSISTANT

## 2021-06-09 NOTE — NURSING NOTE
Chief Complaint   Patient presents with     Derm Problem     blister on toe??       Vitals:    06/09/21 1305   BP: 116/73   Pulse: 68   SpO2: 94%     Wt Readings from Last 1 Encounters:   05/26/21 78.4 kg (172 lb 12.8 oz)       Florecita Momin LPN.................6/9/2021

## 2021-06-09 NOTE — LETTER
6/9/2021         RE: Geovani Bustos  [address]  [facility] [address]        Dear Colleague,    Thank you for referring your patient, Geovani Bustos, to the [facility]. Please see a copy of my visit note below.    Geovani Bustos is an extremely pleasant 65 year old year old male patient here today spot on foot. His aide reports that she thinks it is just a callus, which he is prone to. He walks a lot.   Patient has no other skin complaints today.  Remainder of the HPI, Meds, PMH, Allergies, FH, and SH was reviewed in chart.    Past Medical History:   Diagnosis Date     Cerumen impaction      Chronic kidney disease      Colon polyps      Hyperlipidemia      Mitral valve prolapse      Schizophrenia (H)      Ulcerated penile lesions      VSD (ventricular septal defect)        Past Surgical History:   Procedure Laterality Date     ARTHRODESIS FOOT Right 1/19/2016    Procedure: ARTHRODESIS FOOT;  Surgeon: Holden Harrison DPM;  Location: [facility] OR     ARTHRODESIS FOOT Left 1/3/2017    Procedure: ARTHRODESIS FOOT;  Surgeon: Holden Harrison DPM;  Location: [facility] OR     SURGICAL HISTORY OF -       leg fracture        Family History   Problem Relation Age of Onset     Emphysema Mother      Coronary Artery Disease Father        Social History     Socioeconomic History     Marital status: Single     Spouse name: Not on file     Number of children: Not on file     Years of education: Not on file     Highest education level: Not on file   Occupational History     Not on file   Social Needs     Financial resource strain: Not on file     Food insecurity     Worry: Not on file     Inability: Not on file     Transportation needs     Medical: Not on file     Non-medical: Not on file   Tobacco Use     Smoking status: Current Every Day Smoker     Packs/day: 1.00     Years: 47.00     Pack years: 47.00     Types: Cigarettes     Smokeless tobacco: Never Used     Tobacco comment: cutting one  cigarette down a month   Substance and Sexual Activity     Alcohol use: No     Drug use: No     Sexual activity: Never   Lifestyle     Physical activity     Days per week: Not on file     Minutes per session: Not on file     Stress: Not on file   Relationships     Social connections     Talks on phone: Not on file     Gets together: Not on file     Attends Scientologist service: Not on file     Active member of club or organization: Not on file     Attends meetings of clubs or organizations: Not on file     Relationship status: Not on file     Intimate partner violence     Fear of current or ex partner: Not on file     Emotionally abused: Not on file     Physically abused: Not on file     Forced sexual activity: Not on file   Other Topics Concern     Parent/sibling w/ CABG, MI or angioplasty before 65F 55M? Not Asked      Service Not Asked     Blood Transfusions No     Caffeine Concern Not Asked     Occupational Exposure Not Asked     Hobby Hazards Not Asked     Sleep Concern Not Asked     Stress Concern Not Asked     Weight Concern Not Asked     Special Diet Not Asked     Back Care Not Asked     Exercise Not Asked     Bike Helmet Not Asked     Seat Belt Not Asked     Self-Exams Not Asked   Social History Narrative    Patient has no interest in smoking cessation.     Her for [employer] physical - likes to Bowl    Lives in Group home. Grew up in [place].        Outpatient Encounter Medications as of 6/9/2021   Medication Sig Dispense Refill     ABILIFY 10 MG OR TABS Take 22 mg by mouth daily        ASPIRIN LOW DOSE 81 MG EC tablet Take 1 tablet (81 mg) by mouth daily 30 tablet 11     augmented betamethasone dipropionate (DIPROLENE-AF) 0.05 % external ointment Apply to AA twice daily 1-2 weeks then as needed only. Do not apply to face. 45 g 5     Disposable Gloves (LATEX GLOVES MEDIUM) MISC 4 boxes of gloves  For the application of topical medications 400 each 11     Disposable Gloves (NITRILE EXAM  GLOVES MEDIUM) MISC 1 each as needed Use PRN daily with administration of otic drops, body lotion and powder to treat dry itchy skin. 4 each 11     EAR DROPS 6.5 % otic solution Place 5 drops into both ears 2 times daily For 5 days August 1-5th and Feb 1-5 th then return for irrigation after the 5th day. 15 mL 3     finasteride (PROSCAR) 5 MG tablet TAKE 1 TABLET BY MOUTH EVERY DAY 30 tablet 11     fluticasone-vilanterol (BREO ELLIPTA) 100-25 MCG/INH inhaler Inhale 1 puff into the lungs daily 3 Inhaler 3     gabapentin (NEURONTIN) 100 MG capsule Take 100 mg by mouth 2 times daily       Incontinence Supply Disposable (INCONTINENCE BRIEF LARGE) MISC Incontinence pads up to 300 per month 300 each 1     levothyroxine (SYNTHROID/LEVOTHROID) 125 MCG tablet Take 1 tablet (125 mcg) by mouth daily 30 tablet 2     nicotine (NICODERM CQ) 14 MG/24HR 24 hr patch Place 1 patch onto the skin every 24 hours 30 patch 1     OLANZapine (ZYPREXA) 2.5 MG tablet        Omega-3 Fatty Acids (FISH OIL) 1200 MG capsule Take 1 capsule (1.2 g) by mouth daily 90 capsule 1     order for DME Equipment being ordered: callous pad 1 each 1     order for DME Equipment being ordered: Dynaflex insert 1 Units 0     oxybutynin (DITROPAN) 5 MG tablet Take 2 tablets (10mg) by mouth 3 times daily 180 tablet 3     oxybutynin (DITROPAN) 5 MG tablet  180 tablet 11     polyethylene glycol (GAVILAX) 17 GM/Dose powder Take 17 g (1 capful) by mouth daily 510 g 11     Psyllium (REGULOID) 48.57 % POWD Take 1 each by mouth See Admin Instructions Take 1 teaspoonful every day at 7pm and 9 pm. 369 g 11     salicylic acid 6 % external gel apply twice daily until callus is removed for up to 14 days. 240 g 1     simvastatin (ZOCOR) 40 MG tablet TAKE ONE TABLET BY MOUTH EVERY DAY AT 9 PM 90 tablet 3     Skin Protectants, Misc. (EUCERIN) cream Apply topically 2 times daily 454 g 11     tamsulosin (FLOMAX) 0.4 MG capsule Take 1 capsule (0.4 mg) by mouth daily 30 capsule 0      traZODone (DESYREL) 50 MG tablet Take 25 mg by mouth At Bedtime        VENTOLIN  (90 Base) MCG/ACT inhaler Inhale 2 puffs into the lungs every 6 hours as needed for shortness of breath / dyspnea or wheezing 18 g 1     ZOLOFT 100 MG OR TABS 2 TABLETS DAILY       Facility-Administered Encounter Medications as of 6/9/2021   Medication Dose Route Frequency Provider Last Rate Last Admin     bupivacaine (MARCAINE) injection 0.5%    PRN Holden Harrison DPM   10 mL at 01/03/17 0945             O:   NAD, WDWN, Alert & Oriented, Mood & Affect wnl, Vitals stable   Here today alone   /73   Pulse 68   SpO2 94%    General appearance normal   Vitals stable   Alert, oriented and in no acute distress     Callus on left plantar great toe and ball of foot     Eyes: Conjunctivae/lids:Normal     ENT: Lips: normal    MSK:Normal    Pulm: Breathing Normal    Neuro/Psych: Orientation:Alert and Orientedx3 ; Mood/Affect:normal   A/P:  1. Callus on left foot   No blister seen.   Pared down.   Return as needed       Again, thank you for allowing me to participate in the care of your patient.        Sincerely,        Roxy Berry PA-C

## 2021-06-09 NOTE — PROGRESS NOTES
Geovani Bustos is an extremely pleasant 65 year old year old male patient here today spot on foot. His aide reports that she thinks it is just a callus, which he is prone to. He walks a lot.   Patient has no other skin complaints today.  Remainder of the HPI, Meds, PMH, Allergies, FH, and SH was reviewed in chart.    Past Medical History:   Diagnosis Date     Cerumen impaction      Chronic kidney disease      Colon polyps      Hyperlipidemia      Mitral valve prolapse      Schizophrenia (H)      Ulcerated penile lesions      VSD (ventricular septal defect)        Past Surgical History:   Procedure Laterality Date     ARTHRODESIS FOOT Right 1/19/2016    Procedure: ARTHRODESIS FOOT;  Surgeon: Holden Harrison DPM;  Location: [facility]     ARTHRODESIS FOOT Left 1/3/2017    Procedure: ARTHRODESIS FOOT;  Surgeon: Holden Harrison DPM;  Location: [facility]     SURGICAL HISTORY OF -       leg fracture        Family History   Problem Relation Age of Onset     Emphysema Mother      Coronary Artery Disease Father        Social History     Socioeconomic History     Marital status: Single     Spouse name: Not on file     Number of children: Not on file     Years of education: Not on file     Highest education level: Not on file   Occupational History     Not on file   Social Needs     Financial resource strain: Not on file     Food insecurity     Worry: Not on file     Inability: Not on file     Transportation needs     Medical: Not on file     Non-medical: Not on file   Tobacco Use     Smoking status: Current Every Day Smoker     Packs/day: 1.00     Years: 47.00     Pack years: 47.00     Types: Cigarettes     Smokeless tobacco: Never Used     Tobacco comment: cutting one cigarette down a month   Substance and Sexual Activity     Alcohol use: No     Drug use: No     Sexual activity: Never   Lifestyle     Physical activity     Days per week: Not on file     Minutes per session: Not on file     Stress: Not on file    Relationships     Social connections     Talks on phone: Not on file     Gets together: Not on file     Attends [employer] service: Not on file     Active member of club or organization: Not on file     Attends meetings of clubs or organizations: Not on file     Relationship status: Not on file     Intimate partner violence     Fear of current or ex partner: Not on file     Emotionally abused: Not on file     Physically abused: Not on file     Forced sexual activity: Not on file   Other Topics Concern     Parent/sibling w/ CABG, MI or angioplasty before 65F 55M? Not Asked      Service Not Asked     Blood Transfusions No     Caffeine Concern Not Asked     Occupational Exposure Not Asked     Hobby Hazards Not Asked     Sleep Concern Not Asked     Stress Concern Not Asked     Weight Concern Not Asked     Special Diet Not Asked     Back Care Not Asked     Exercise Not Asked     Bike Helmet Not Asked     Seat Belt Not Asked     Self-Exams Not Asked   Social History Narrative    Patient has no interest in smoking cessation.     Her for [employer] physical - likes to Bowl    Lives in Group home. Grew up in [place].        Outpatient Encounter Medications as of 6/9/2021   Medication Sig Dispense Refill     ABILIFY 10 MG OR TABS Take 22 mg by mouth daily        ASPIRIN LOW DOSE 81 MG EC tablet Take 1 tablet (81 mg) by mouth daily 30 tablet 11     augmented betamethasone dipropionate (DIPROLENE-AF) 0.05 % external ointment Apply to AA twice daily 1-2 weeks then as needed only. Do not apply to face. 45 g 5     Disposable Gloves (LATEX GLOVES MEDIUM) MISC 4 boxes of gloves  For the application of topical medications 400 each 11     Disposable Gloves (NITRILE EXAM GLOVES MEDIUM) MISC 1 each as needed Use PRN daily with administration of otic drops, body lotion and powder to treat dry itchy skin. 4 each 11     EAR DROPS 6.5 % otic solution Place 5 drops into both ears 2 times daily For 5 days August 1-5th and  Feb 1-5 th then return for irrigation after the 5th day. 15 mL 3     finasteride (PROSCAR) 5 MG tablet TAKE 1 TABLET BY MOUTH EVERY DAY 30 tablet 11     fluticasone-vilanterol (BREO ELLIPTA) 100-25 MCG/INH inhaler Inhale 1 puff into the lungs daily 3 Inhaler 3     gabapentin (NEURONTIN) 100 MG capsule Take 100 mg by mouth 2 times daily       Incontinence Supply Disposable (INCONTINENCE BRIEF LARGE) MISC Incontinence pads up to 300 per month 300 each 1     levothyroxine (SYNTHROID/LEVOTHROID) 125 MCG tablet Take 1 tablet (125 mcg) by mouth daily 30 tablet 2     nicotine (NICODERM CQ) 14 MG/24HR 24 hr patch Place 1 patch onto the skin every 24 hours 30 patch 1     OLANZapine (ZYPREXA) 2.5 MG tablet        Omega-3 Fatty Acids (FISH OIL) 1200 MG capsule Take 1 capsule (1.2 g) by mouth daily 90 capsule 1     order for DME Equipment being ordered: callous pad 1 each 1     order for DME Equipment being ordered: Dynaflex insert 1 Units 0     oxybutynin (DITROPAN) 5 MG tablet Take 2 tablets (10mg) by mouth 3 times daily 180 tablet 3     oxybutynin (DITROPAN) 5 MG tablet  180 tablet 11     polyethylene glycol (GAVILAX) 17 GM/Dose powder Take 17 g (1 capful) by mouth daily 510 g 11     Psyllium (REGULOID) 48.57 % POWD Take 1 each by mouth See Admin Instructions Take 1 teaspoonful every day at 7pm and 9 pm. 369 g 11     salicylic acid 6 % external gel apply twice daily until callus is removed for up to 14 days. 240 g 1     simvastatin (ZOCOR) 40 MG tablet TAKE ONE TABLET BY MOUTH EVERY DAY AT 9 PM 90 tablet 3     Skin Protectants, Misc. (EUCERIN) cream Apply topically 2 times daily 454 g 11     tamsulosin (FLOMAX) 0.4 MG capsule Take 1 capsule (0.4 mg) by mouth daily 30 capsule 0     traZODone (DESYREL) 50 MG tablet Take 25 mg by mouth At Bedtime        VENTOLIN  (90 Base) MCG/ACT inhaler Inhale 2 puffs into the lungs every 6 hours as needed for shortness of breath / dyspnea or wheezing 18 g 1     ZOLOFT 100 MG OR TABS 2  TABLETS DAILY       Facility-Administered Encounter Medications as of 6/9/2021   Medication Dose Route Frequency Provider Last Rate Last Admin     bupivacaine (MARCAINE) injection 0.5%    PRN Holden Harrison, ROSARIO   10 mL at 01/03/17 0945             O:   NAD, WDWN, Alert & Oriented, Mood & Affect wnl, Vitals stable   Here today alone   /73   Pulse 68   SpO2 94%    General appearance normal   Vitals stable   Alert, oriented and in no acute distress     Callus on left plantar great toe and ball of foot     Eyes: Conjunctivae/lids:Normal     ENT: Lips: normal    MSK:Normal    Pulm: Breathing Normal    Neuro/Psych: Orientation:Alert and Orientedx3 ; Mood/Affect:normal   A/P:  1. Callus on left foot   No blister seen.   Pared down.   Return as needed

## 2021-06-14 ENCOUNTER — TELEPHONE (OUTPATIENT)
Dept: FAMILY MEDICINE | Facility: CLINIC | Age: 66
End: 2021-06-14

## 2021-06-14 DIAGNOSIS — L84 CALLUS OF FOOT: Primary | ICD-10-CM

## 2021-06-14 DIAGNOSIS — L30.9 DERMATITIS: ICD-10-CM

## 2021-06-14 NOTE — TELEPHONE ENCOUNTER
Reason for Call: Request for an order or referral:    Order or referral being requested: Gloves ( 2 x daily for Lotions/ Creams ) received fax from [place]/ [employer] please fax order to [phone]    Date needed: at your convenience    Has the patient been seen by the PCP for this problem? YES      Can we leave a detailed message on this number?  YES    Call taken on 6/14/2021 at 1:39 PM by Sonja Robertson

## 2021-07-21 DIAGNOSIS — R39.9 LOWER URINARY TRACT SYMPTOMS (LUTS): ICD-10-CM

## 2021-07-21 RX ORDER — TAMSULOSIN HYDROCHLORIDE 0.4 MG/1
0.4 CAPSULE ORAL DAILY
Qty: 90 CAPSULE | Refills: 3 | Status: SHIPPED | OUTPATIENT
Start: 2021-07-21 | End: 2022-05-18

## 2021-07-21 NOTE — TELEPHONE ENCOUNTER
Requested Prescriptions   Pending Prescriptions Disp Refills     tamsulosin (FLOMAX) 0.4 MG capsule 30 capsule 0     Sig: Take 1 capsule (0.4 mg) by mouth daily       There is no refill protocol information for this order        Last office visit: Visit date not found with prescribing provider:  Dr. Zepeda   Future Office Visit:   Next 5 appointments (look out 90 days)    Aug 02, 2021 10:20 AM  SHORT with Alexandru Vera MD  [facility] ([facility] ) [address] [place] [id number] [phone]               [facility]  Specialty Clinic CSS

## 2021-08-02 ENCOUNTER — OFFICE VISIT (OUTPATIENT)
Dept: FAMILY MEDICINE | Facility: CLINIC | Age: 66
End: 2021-08-02
Payer: COMMERCIAL

## 2021-08-02 VITALS
DIASTOLIC BLOOD PRESSURE: 76 MMHG | HEART RATE: 70 BPM | SYSTOLIC BLOOD PRESSURE: 118 MMHG | TEMPERATURE: 97.3 F | WEIGHT: 178 LBS | RESPIRATION RATE: 18 BRPM | HEIGHT: 70 IN | BODY MASS INDEX: 25.48 KG/M2

## 2021-08-02 DIAGNOSIS — J44.9 STAGE 1 MILD COPD BY GOLD CLASSIFICATION (H): ICD-10-CM

## 2021-08-02 DIAGNOSIS — R60.0 PEDAL EDEMA: Primary | ICD-10-CM

## 2021-08-02 DIAGNOSIS — H61.23 BILATERAL IMPACTED CERUMEN: ICD-10-CM

## 2021-08-02 DIAGNOSIS — F17.210 CIGARETTE NICOTINE DEPENDENCE WITHOUT COMPLICATION: ICD-10-CM

## 2021-08-02 DIAGNOSIS — N18.31 STAGE 3A CHRONIC KIDNEY DISEASE (H): ICD-10-CM

## 2021-08-02 PROCEDURE — 69209 REMOVE IMPACTED EAR WAX UNI: CPT | Mod: 50 | Performed by: FAMILY MEDICINE

## 2021-08-02 PROCEDURE — 99214 OFFICE O/P EST MOD 30 MIN: CPT | Mod: 25 | Performed by: FAMILY MEDICINE

## 2021-08-02 ASSESSMENT — MIFFLIN-ST. JEOR: SCORE: 1580.71

## 2021-08-02 NOTE — PROGRESS NOTES
"  Assessment & Plan     Pedal edema  Likely related to venous insufficiency.  Suggested to continue compression stockings, leg elevation when possible and limiting salt    Stage 1 mild COPD by GOLD classification (H)  Symptoms stable.  Continue Breo and albuterol inhaler.  Stressed on smoking cessation, associated health hazards explained in detail    Stage 3a chronic kidney disease  Known to have CKD stage III, recent blood work-up reviewed, stable    Cigarette nicotine dependence without complication  Smoking cessation counseling provided, associated health hazards explained in detail, not ready to quit.  Low-dose CT chest ordered for lung cancer screening, risks and benefits explained  - CT Chest Lung Cancer Scrn Low Dose wo; Future    Bilateral impacted cerumen  Bilateral impacted cerumen cleaned with saline irrigation by MA.  Suggested to avoid using Q-tips        Alexandru Vera MD  [facility]    Markell Espino is a 65 year old who presents for the following health issues accompanied by his group home staff:    HPI     Concern - Leg swelling   Onset: recheck   Description: Bilateral leg swelling   Intensity: mild  Progression of Symptoms:  improving  Accompanying Signs & Symptoms: no pain   Previous history of similar problem: yes   Therapies tried and outcome: Compression socks- wears them he works.     Known to have COPD, CKD stage III, taking medications regularly, denies any medication side effects.    Would like his ears checked to see if they need to be flushed today.         Review of Systems   Constitutional, HEENT, cardiovascular, pulmonary, GI, , musculoskeletal, neuro, skin, endocrine and psych systems are negative, except as otherwise noted.      Objective    /76 (Cuff Size: Adult Regular)   Pulse 70   Temp 97.3  F (36.3  C) (Tympanic)   Resp 18   Ht 1.765 m (5' 9.5\")   Wt 80.7 kg (178 lb)   BMI 25.91 kg/m    Body mass index is 25.91 kg/m .  Physical " Exam   GENERAL: alert and no distress  EYES: Eyes grossly normal to inspection, PERRL and conjunctivae and sclerae normal  HENT: normal cephalic/atraumatic, right ear: occluded with wax, left ear: occluded with wax, nose and mouth without ulcers or lesions, oropharynx clear and oral mucous membranes moist  NECK: no adenopathy, no asymmetry, masses, or scars and thyroid normal to palpation  RESP: lungs clear to auscultation - no rales, rhonchi or wheezes  CV: regular rate and rhythm, normal S1 S2, no S3 or S4, no murmur, click or rub  ABDOMEN: soft, nontender, no hepatosplenomegaly, no masses and bowel sounds normal  MS: no gross musculoskeletal defects noted, 1+ pedal edema bilaterally  SKIN: no suspicious lesions or rashes  NEURO: Normal strength and tone, mentation intact and speech normal  PSYCH: mentation appears normal, affect normal/bright

## 2021-08-02 NOTE — NURSING NOTE
"Chief Complaint   Patient presents with     Ear Problem     /76 (Cuff Size: Adult Regular)   Pulse 70   Temp 97.3  F (36.3  C) (Tympanic)   Resp 18   Ht 1.765 m (5' 9.5\")   Wt 80.7 kg (178 lb)   BMI 25.91 kg/m   Estimated body mass index is 25.91 kg/m  as calculated from the following:    Height as of this encounter: 1.765 m (5' 9.5\").    Weight as of this encounter: 80.7 kg (178 lb).  Patient presents to the clinic using No DME      Health Maintenance that is potentially due pending provider review:    Health Maintenance Due   Topic Date Due     ANNUAL REVIEW OF HM ORDERS  Never done     COVID-19 Vaccine (1) Never done     ZOSTER IMMUNIZATION (2 of 3) 04/12/2016                "

## 2021-08-02 NOTE — PATIENT INSTRUCTIONS
Please call [phone] to schedule low dose CT chest.      Patient Education     Lung Cancer Screening  Frequently Asked Questions  If you are at high risk for lung cancer, getting screened with low-dose computed tomography (LDCT) every year can help save your life. This handout offers answers to the most common questions about lung cancer screening. If you have other questions, please call [phone] ([phone]).  What is it?  Lung cancer screening uses special X-ray technology to create an image of the lung tissue. The exam is quick and easy and takes less than 10 seconds. We don't give you any medicine or use any needles. You can eat before and after the exam. You do not need to change your clothes as long as the clothing on your chest does not contain metal. But you do need to be able to hold your breath for at least 6 seconds during the exam.  What is the goal of lung cancer screening?  The goal of lung cancer screening is to save lives. Many times, lung cancer is not found until a person starts having physical symptoms. Lung cancer screening can help detect lung cancer in the earliest stages when it may be easier to treat.  Who should be screened for lung cancer?  We suggest lung cancer screening for anyone who is at high-risk for lung cancer. You are in the high-risk group if you:    are between the ages of 55 and 79 and    have smoked at least 1 pack of cigarettes a day for 30 or more years and    still smoke or have quit within the past 15 years.  However, if you have a new cough or shortness of breath, you should talk to your doctor before being screened.  Why does it matter if I have symptoms?  Certain symptoms can be a sign that you have a condition in your lungs that should be checked and treated by your doctor. These symptoms include fever, chest pain, a new or changing cough, shortness of breath that you have never felt before, coughing up blood or unexplained weight loss. Having any of  these symptoms can greatly affect the results of lung screening.  Should all smokers get an LDCT lung cancer screening exam?  It depends. Lung cancer screening is for a very specific group of men and women who have a history of heavy smoking over a long period of time (see above).  I am in one of the high-risk groups, but have been diagnosed with cancer in the past. Is LDCT lung cancer screening right for me?  In some cases, you should not have LDCT lung screening, such as when your doctor is already following your cancer with CT scan studies. Your doctor will help you decide if LDCT lung screening is right for you.  Do I need to have a screening exam every year?  Yes. If you are in the high-risk group described earlier, you should get an LDCT lung cancer screening exam every year until you are 79. Or you may stop screening if you are no longer willing or able to undergo screening and treatment.  How effective is LDCT at preventing death from lung cancer?  Studies have shown that LDCT lung cancer screening can lower the risk of death from lung cancer by 20 percent in people who are at high risk.  What are the risks?  There are some risks and limitations of LDCT lung screening. We want to make sure that we have done a good job explaining these to you, so please let us know if you have any questions. Your doctor may want to talk with you more about these risks.    Radiation exposure: As with any exam that uses radiation, there is a very small increased risk of cancer. The amount of radiation in LDCT is small--about the same amount a person would get from a mammogram. Your doctor orders the exam when he or she feels the potential benefits outweigh the risks.    False negatives: No test is perfect, including LDCT. It is possible that you may have a medical condition, including lung cancer, that is not found during your exam. This is called a false negative.    False positives and more testing: LDCT very often finds  something in the lung that could be cancer, but in fact is not. This is called a false positive result. False positive tests often cause anxiety. To make sure these findings are not cancer, you may need to have more tests. These tests will be done only if you give us permission. Sometimes patients need a treatment that can have side effects, such as a biopsy. For more information on false positives, see What can I expect from the results?    Findings not related to lung cancer: Your LDCT exam also takes pictures of areas of your body next to your lungs. In a very small number of cases, the CT scan will show an abnormal finding in one of these areas, such as your kidneys, adrenal glands, liver or thyroid. This finding may not be serious, but you may need more tests. Your doctor can help you decide what other tests you may need, if any.  What can I expect from the results?  About 1 out of 4 LDCT exams will find something that may need more tests. Most of the time, these findings are lung nodules. Lung nodules are very small collections of tissue in the lung. These nodules are very common, and the vast majority--more than 97 percent--are not cancer (benign). Most are normal lymph nodes or small areas of scarring from past infections.  But if a small lung nodule is found to be cancer, the cancer can be cured more than 90 percent of the time. To know if the nodule is cancer, we may need to get more images before your next yearly screening exam. If the nodule has suspicious features (for example, it is large, has an odd shape or grows over time), we will refer you to a specialist for further testing.  Will my doctor also get the results?  Yes. Your doctor will get a copy of your results.  Is it okay to keep smoking now that there's a cancer screening exam?  No. Tobacco is one of the strongest cancer-causing agents. It causes not only lung cancer, but other cancers and cardiovascular (heart) diseases as well. The damage  caused by smoking builds over time. This means that the longer you smoke, the higher your risk of disease. While it is never too late to quit, the sooner you quit, the better.  Where can I find help to quit smoking?  The best way to prevent lung cancer is to stop smoking. For help on quitting smoking, please call [employer] at 5-230-418-MPFM (4142) or the American Cancer Society at 1-715.202.6958 to find local resources near you. If you have already quit smoking, congratulations and keep it up!  For informational purposes only. Not to replace the advice of your health care provider.  Copyright   2013 [facility]. All rights reserved. [employer] 759826 - REV 03/16.

## 2021-08-11 ENCOUNTER — TELEPHONE (OUTPATIENT)
Dept: FAMILY MEDICINE | Facility: CLINIC | Age: 66
End: 2021-08-11

## 2021-08-11 DIAGNOSIS — J41.0 SIMPLE CHRONIC BRONCHITIS (H): ICD-10-CM

## 2021-08-11 DIAGNOSIS — N39.46 MIXED INCONTINENCE: ICD-10-CM

## 2021-08-11 DIAGNOSIS — L30.9 DERMATITIS: Primary | ICD-10-CM

## 2021-08-11 NOTE — TELEPHONE ENCOUNTER
[place] requesting order for One Year.    Gloves    Include all associated diagnosis codes for the patient so we can bill product to their insurance.    Note: Basic diagnosis code R32 does not qualify for incontinence products    Please include refills or PRN  PRN=Good for one year    All Rx's must have a signature or electronic signature    Please don't add quantity's as insurance allowances differ.    Fax back to [phone]

## 2021-08-13 ENCOUNTER — DOCUMENTATION ONLY (OUTPATIENT)
Dept: LAB | Facility: CLINIC | Age: 66
End: 2021-08-13

## 2021-08-16 ENCOUNTER — DOCUMENTATION ONLY (OUTPATIENT)
Dept: LAB | Facility: CLINIC | Age: 66
End: 2021-08-16

## 2021-08-17 ENCOUNTER — APPOINTMENT (OUTPATIENT)
Dept: LAB | Facility: CLINIC | Age: 66
End: 2021-08-17
Payer: COMMERCIAL

## 2021-08-17 ENCOUNTER — TELEPHONE (OUTPATIENT)
Dept: NEPHROLOGY | Facility: CLINIC | Age: 66
End: 2021-08-17

## 2021-08-17 DIAGNOSIS — N18.31 STAGE 3A CHRONIC KIDNEY DISEASE (H): Primary | ICD-10-CM

## 2021-08-17 NOTE — TELEPHONE ENCOUNTER
[facility] Call Center    Phone Message    May a detailed message be left on voicemail: yes     Reason for Call: Order(s): Other:   Reason for requested: Lab orders for patient who is going to [facility] today at 8:20.  Please put orders into the system  Date needed: ASAP  Provider name: Dr. Weiss       Action Taken: Message routed to:  [facility] Neph    Travel Screening: Not Applicable

## 2021-08-25 ENCOUNTER — LAB (OUTPATIENT)
Dept: LAB | Facility: CLINIC | Age: 66
End: 2021-08-25
Payer: COMMERCIAL

## 2021-08-25 DIAGNOSIS — N18.31 STAGE 3A CHRONIC KIDNEY DISEASE (H): ICD-10-CM

## 2021-08-25 LAB
ALBUMIN SERPL-MCNC: 3.8 G/DL (ref 3.4–5)
ALBUMIN UR-MCNC: NEGATIVE MG/DL
ANION GAP SERPL CALCULATED.3IONS-SCNC: 2 MMOL/L (ref 3–14)
APPEARANCE UR: CLEAR
BACTERIA #/AREA URNS HPF: ABNORMAL /HPF
BILIRUB UR QL STRIP: NEGATIVE
BUN SERPL-MCNC: 27 MG/DL (ref 7–30)
CALCIUM SERPL-MCNC: 9 MG/DL (ref 8.5–10.1)
CHLORIDE BLD-SCNC: 105 MMOL/L (ref 94–109)
CO2 SERPL-SCNC: 31 MMOL/L (ref 20–32)
COLOR UR AUTO: YELLOW
CREAT SERPL-MCNC: 1.53 MG/DL (ref 0.66–1.25)
CREAT UR-MCNC: 105 MG/DL
ERYTHROCYTE [DISTWIDTH] IN BLOOD BY AUTOMATED COUNT: 13.4 % (ref 10–15)
FERRITIN SERPL-MCNC: 77 NG/ML (ref 26–388)
GFR SERPL CREATININE-BSD FRML MDRD: 46 ML/MIN/1.73M2
GLUCOSE BLD-MCNC: 137 MG/DL (ref 70–99)
GLUCOSE UR STRIP-MCNC: NEGATIVE MG/DL
HCT VFR BLD AUTO: 44.7 % (ref 40–53)
HGB BLD-MCNC: 15.1 G/DL (ref 13.3–17.7)
HGB UR QL STRIP: ABNORMAL
IRON SATN MFR SERPL: 27 % (ref 15–46)
IRON SERPL-MCNC: 68 UG/DL (ref 35–180)
KETONES UR STRIP-MCNC: NEGATIVE MG/DL
LEUKOCYTE ESTERASE UR QL STRIP: NEGATIVE
MCH RBC QN AUTO: 32.1 PG (ref 26.5–33)
MCHC RBC AUTO-ENTMCNC: 33.8 G/DL (ref 31.5–36.5)
MCV RBC AUTO: 95 FL (ref 78–100)
NITRATE UR QL: NEGATIVE
PH UR STRIP: 6 [PH] (ref 5–7)
PHOSPHATE SERPL-MCNC: 3.6 MG/DL (ref 2.5–4.5)
PLATELET # BLD AUTO: 170 10E3/UL (ref 150–450)
POTASSIUM BLD-SCNC: 4.3 MMOL/L (ref 3.4–5.3)
PROT UR-MCNC: 0.18 G/L
PROT/CREAT 24H UR: 0.17 G/G CR (ref 0–0.2)
PTH-INTACT SERPL-MCNC: 59 PG/ML (ref 18–80)
RBC # BLD AUTO: 4.7 10E6/UL (ref 4.4–5.9)
RBC #/AREA URNS AUTO: ABNORMAL /HPF
SODIUM SERPL-SCNC: 138 MMOL/L (ref 133–144)
SP GR UR STRIP: 1.02 (ref 1–1.03)
SQUAMOUS #/AREA URNS AUTO: ABNORMAL /LPF
TIBC SERPL-MCNC: 254 UG/DL (ref 240–430)
UROBILINOGEN UR STRIP-ACNC: 0.2 E.U./DL
WBC # BLD AUTO: 9.8 10E3/UL (ref 4–11)
WBC #/AREA URNS AUTO: ABNORMAL /HPF

## 2021-08-25 PROCEDURE — 84156 ASSAY OF PROTEIN URINE: CPT

## 2021-08-25 PROCEDURE — 82306 VITAMIN D 25 HYDROXY: CPT

## 2021-08-25 PROCEDURE — 82728 ASSAY OF FERRITIN: CPT

## 2021-08-25 PROCEDURE — 81001 URINALYSIS AUTO W/SCOPE: CPT

## 2021-08-25 PROCEDURE — 36415 COLL VENOUS BLD VENIPUNCTURE: CPT

## 2021-08-25 PROCEDURE — 83970 ASSAY OF PARATHORMONE: CPT

## 2021-08-25 PROCEDURE — 83550 IRON BINDING TEST: CPT

## 2021-08-25 PROCEDURE — 80069 RENAL FUNCTION PANEL: CPT

## 2021-08-25 PROCEDURE — 85027 COMPLETE CBC AUTOMATED: CPT

## 2021-08-26 LAB — DEPRECATED CALCIDIOL+CALCIFEROL SERPL-MC: 54 UG/L (ref 20–75)

## 2021-08-30 ENCOUNTER — HOSPITAL ENCOUNTER (OUTPATIENT)
Dept: CT IMAGING | Facility: CLINIC | Age: 66
Discharge: HOME | End: 2021-08-30
Attending: FAMILY MEDICINE | Admitting: FAMILY MEDICINE
Payer: COMMERCIAL

## 2021-08-30 ENCOUNTER — TELEPHONE (OUTPATIENT)
Dept: NEPHROLOGY | Facility: CLINIC | Age: 66
End: 2021-08-30

## 2021-08-30 ENCOUNTER — VIRTUAL VISIT (OUTPATIENT)
Dept: NEPHROLOGY | Facility: CLINIC | Age: 66
End: 2021-08-30
Attending: INTERNAL MEDICINE
Payer: COMMERCIAL

## 2021-08-30 DIAGNOSIS — N02.B9 IGA NEPHROPATHY: ICD-10-CM

## 2021-08-30 DIAGNOSIS — N18.31 STAGE 3A CHRONIC KIDNEY DISEASE (H): Primary | ICD-10-CM

## 2021-08-30 DIAGNOSIS — F17.210 CIGARETTE NICOTINE DEPENDENCE WITHOUT COMPLICATION: ICD-10-CM

## 2021-08-30 DIAGNOSIS — N18.30 STAGE 3 CHRONIC KIDNEY DISEASE, UNSPECIFIED WHETHER STAGE 3A OR 3B CKD (H): Primary | ICD-10-CM

## 2021-08-30 PROCEDURE — 99213 OFFICE O/P EST LOW 20 MIN: CPT | Mod: GT | Performed by: INTERNAL MEDICINE

## 2021-08-30 PROCEDURE — 71271 CT THORAX LUNG CANCER SCR C-: CPT

## 2021-08-30 NOTE — TELEPHONE ENCOUNTER
[facility] Call Center    Phone Message    May a detailed message be left on voicemail: yes     Reason for Call: Order(s): Other:   Reason for requested: Labs   Date needed: prior to appointment on 3/7/22  Provider name: Dr. Abhishek Cisneros calling to schedule a follow up with Dr. Weiss. Patient is scheduled for follow up on 3/7/22 and will get labs done prior at [facility]    Please advise     Action Taken: Other: [facility]     Travel Screening: Not Applicable

## 2021-08-30 NOTE — LETTER
2021       RE: Geovani Bustos  [address]  [facility] [address]     Dear Colleague,    Thank you for referring your patient, Geovani Bustos, to the [facility] NEPHROLOGY [facility]INIC [place] at [facility]. Please see a copy of my visit note below.    Srinivas is a 65 year old who is being evaluated via a billable video visit.      How would you like to obtain your AVS? MyChart  If the video visit is dropped, the invitation should be resent by: Text to cell phone: [phone] ([employer])  Will anyone else be joining your video visit? No        Video-Visit Details    Type of service:  Video Visit    Total time:10 min     Originating Location (pt. Location): Car     Distant Location (provider location):  [facility] NEPHROLOGY [facility]IC [place]     Platform used for Video Visit: [facility]       Nephrology Clinic    Ginger Weiss MD  2021     Name: Geovani Bustos  MRN: [id number]  Age: 63 year old  : 1955  Referring provider: Referred Self       Interval History:  Mr Bustos is doing well. He denies any new complaints. He has trace Le edema for which he wears compression stockings. He does not check his blood pressures at home.      Assessment:    # IgA Nephropathy  # CKD, stage 3  # LUTS  He has biopsy proven IgA nephropathy in , will try to obtain bx records. Has not had proteinuria over years. Low level microscopic hematuria on and off.He has maintained his blood pressures at a goal of < 140/90 mm of Hg most of the times without needing antihypertensives. His kidney function has been relatively stable and maintains a baseline creatinine of 1.5-1.7 mg/dl.     Plan:   Continue with conservative management including management of BP ( target < 130/80), avoiding nephrotoxins, may continue Fish oil.   Given lack of proteinuria and fairly low BP, no need to start RASi at this time but should have low threshold if blood pressure  goes up or if there is detectable proteinuria.        Follow-up: 6 months    Ginger Weiss MD     Reason For Visit:   CKD follow-up     HPI:   Geovani Bustos is a 63 year old male with a history of biopsy proven IgA nephropathy (4/2009, Dr Robbie Harris. As per note - kidney tissue looked altogether normal with exception of deposition of IgA in mesangium), treated conservatively. Other pertinent history includes hypothyroidism, schizophrenia, Hyperlipidemia       Review of Systems:   Pertinent items are noted in HPI or as below, remainder of complete ROS is negative.      Active Medications:     Current Outpatient Medications:      ABILIFY 10 MG OR TABS, Take 22 mg by mouth daily , Disp: , Rfl:      ASPIRIN LOW DOSE 81 MG EC tablet, Take 1 tablet (81 mg) by mouth daily, Disp: 30 tablet, Rfl: 11     augmented betamethasone dipropionate (DIPROLENE-AF) 0.05 % external ointment, Apply to AA twice daily 1-2 weeks then as needed only. Do not apply to face., Disp: 45 g, Rfl: 5     BREO ELLIPTA 100-25 MCG/INH inhaler, Inhale 1 puff into the lungs daily, Disp: 1 each, Rfl: 0     Disposable Gloves (LATEX GLOVES MEDIUM) MISC, 4 boxes of gloves For the application of topical medications, Disp: 400 each, Rfl: 11     Disposable Gloves (NITRILE EXAM GLOVES MEDIUM) MISC, 1 each as needed Use PRN daily with administration of otic drops, body lotion and powder to treat dry itchy skin., Disp: 4 each, Rfl: 11     EAR DROPS 6.5 % otic solution, Place 5 drops into both ears 2 times daily For 5 days August 1-5th and Feb 1-5 th then return for irrigation after the 5th day., Disp: 15 mL, Rfl: 3     finasteride (PROSCAR) 5 MG tablet, TAKE 1 TABLET BY MOUTH EVERY DAY, Disp: 30 tablet, Rfl: 11     gabapentin (NEURONTIN) 100 MG capsule, Take 100 mg by mouth 2 times daily, Disp: , Rfl:      Incontinence Supply Disposable (INCONTINENCE BRIEF LARGE) MISC, Incontinence pads up to 300 per month, Disp: 300 each, Rfl: 1     levothyroxine  (SYNTHROID/LEVOTHROID) 125 MCG tablet, Take 1 tablet (125 mcg) by mouth daily, Disp: 30 tablet, Rfl: 2     nicotine (NICODERM CQ) 14 MG/24HR 24 hr patch, Place 1 patch onto the skin every 24 hours, Disp: 30 patch, Rfl: 1     OLANZapine (ZYPREXA) 2.5 MG tablet, , Disp: , Rfl:      Omega-3 Fatty Acids (FISH OIL) 1200 MG capsule, Take 1 capsule (1.2 g) by mouth daily, Disp: 90 capsule, Rfl: 1     order for DME, Equipment being ordered: callous pad, Disp: 1 each, Rfl: 1     order for DME, Equipment being ordered: Dynaflex insert, Disp: 1 Units, Rfl: 0     oxybutynin (DITROPAN) 5 MG tablet, Take 2 tablets (10mg) by mouth 3 times daily, Disp: 180 tablet, Rfl: 3     oxybutynin (DITROPAN) 5 MG tablet, , Disp: 180 tablet, Rfl: 11     polyethylene glycol (GAVILAX) 17 GM/Dose powder, Take 17 g (1 capful) by mouth daily, Disp: 510 g, Rfl: 11     Psyllium (REGULOID) 48.57 % POWD, Take 1 each by mouth See Admin Instructions Take 1 teaspoonful every day at 7pm and 9 pm., Disp: 369 g, Rfl: 11     salicylic acid 6 % external gel, apply twice daily until callus is removed for up to 14 days., Disp: 240 g, Rfl: 1     simvastatin (ZOCOR) 40 MG tablet, TAKE ONE TABLET BY MOUTH EVERY DAY AT 9 PM, Disp: 90 tablet, Rfl: 3     Skin Protectants, Misc. (EUCERIN) cream, Apply topically 2 times daily, Disp: 454 g, Rfl: 11     tamsulosin (FLOMAX) 0.4 MG capsule, Take 1 capsule (0.4 mg) by mouth daily, Disp: 90 capsule, Rfl: 3     traZODone (DESYREL) 50 MG tablet, Take 25 mg by mouth At Bedtime , Disp: , Rfl:      VENTOLIN  (90 Base) MCG/ACT inhaler, Inhale 2 puffs into the lungs every 6 hours as needed for shortness of breath / dyspnea or wheezing, Disp: 18 g, Rfl: 1     ZOLOFT 100 MG OR TABS, 2 TABLETS DAILY, Disp: , Rfl:   No current facility-administered medications for this visit.    Facility-Administered Medications Ordered in Other Visits:      bupivacaine (MARCAINE) injection 0.5%, , , PRN, Holden Harrison DPM, 10 mL at  01/03/17 0945     Allergies:   Nitrogen oxide and Sulfa drugs      Past Medical History:  Past Medical History:   Diagnosis Date     Cerumen impaction      Chronic kidney disease      Colon polyps      Hyperlipidemia      Mitral valve prolapse      Schizophrenia (H)      Ulcerated penile lesions      VSD (ventricular septal defect)      Patient Active Problem List   Diagnosis     Colon polyps     Schizophrenia (H)     VSD (ventricular septal defect)     Mitral valve prolapse     Acquired hypothyroidism     IgA nephropathy     Proteinuria     Hyperlipidemia LDL goal <130     BPH (benign prostatic hyperplasia)     Health Care Home     Bilateral impacted cerumen     Bunion of great toe of right foot     Eczema of external ear, bilateral     Tinea pedis, unspecified laterality     Incomplete right bundle branch block (RBBB)     Cigarette nicotine dependence with nicotine-induced disorder     Gastroesophageal reflux disease without esophagitis     Stage 1 mild COPD by GOLD classification (H)     CKD (chronic kidney disease) stage 3, GFR 30-59 ml/min        Past Surgical History:  Past Surgical History:   Procedure Laterality Date     ARTHRODESIS FOOT Right 1/19/2016    Procedure: ARTHRODESIS FOOT;  Surgeon: Holden Harrison DPM;  Location: [facility]     ARTHRODESIS FOOT Left 1/3/2017    Procedure: ARTHRODESIS FOOT;  Surgeon: Holden Harrison DPM;  Location: [facility] OR     SURGICAL HISTORY OF -       leg fracture       Family History:   Family History   Problem Relation Age of Onset     Emphysema Mother      Coronary Artery Disease Father          Social History:   Social History     Tobacco Use     Smoking status: Current Every Day Smoker     Packs/day: 1.00     Years: 47.00     Pack years: 47.00     Types: Cigarettes     Smokeless tobacco: Never Used     Tobacco comment: cutting one cigarette down a month   Substance Use Topics     Alcohol use: No     Drug use: No        Physical Exam:  There were no vitals taken for  this visit.   GENERAL APPEARANCE: alert and no distress  EYES: nonicteric  RESP: lungs clear to auscultation   CV: regular rhythm, normal rate, no rub  Extremities: no clubbing, cyanosis, or edema  MS: no evidence of inflammation in joints, no muscle tenderness  SKIN: no rash  NEURO: mentation intact and speech normal  PSYCH: affect normal/bright     Laboratory:  CMP  Recent Labs   Lab Test 08/25/21  1145 02/15/21  1209 07/12/20  1601 11/15/19  0907 10/21/19  1016 03/22/19  1242 12/05/18  0840 11/08/17  0840 02/20/17  0000 02/01/17  1457    139 139 138 140 141 140 138  --  137   POTASSIUM 4.3 4.8 4.2 4.3 4.6 4.2 4.6 4.6  --  4.1   CHLORIDE 105 104 106 103 106 106 103 104  --  101   CO2 31 32 29 34* 31 28 33* 33*  --  30   ANIONGAP 2* 3 4 1* 4 7 4 1*  --  6   * 107* 83 96 102* 95 97 85  --  85   BUN 27 31* 31* 17 33* 32* 26 18  --  23   CR 1.53* 1.42* 1.56* 1.47* 1.63* 1.68* 1.60* 1.31*  --  1.21   GFRESTIMATED 46* 51* 45* 49* 43* 42* 44* 55*  --  61   GFRESTBLACK  --  59* 53* 57* 50* 49* 53* 67  --  73   RAYRAY 9.0 9.6 9.0 8.8 9.2 8.9 9.1 9.2  --  8.8   PHOS 3.6  --  4.0  --  2.7 4.0  --   --    < > 3.3   PROTTOTAL  --   --   --  7.1  --   --  8.0 7.2  --  7.3   ALBUMIN 3.8  --  4.0 4.0 3.9 4.1 4.5 3.8  --  3.8   BILITOTAL  --   --   --  0.5  --   --  0.6 0.3  --  0.3   ALKPHOS  --   --   --  116  --   --  118 188*  --  142   AST  --  20  --  16  --   --  19 15  --  13   ALT  --  30  --  21  --   --  28 22  --  19    < > = values in this interval not displayed.     CBC  Recent Labs   Lab Test 08/25/21  1145 02/15/21  1209 07/12/20  1601 11/15/19  0907   HGB 15.1 14.6 14.7 15.7   WBC 9.8 8.3 10.2 9.6   RBC 4.70 4.58 4.48 4.83   HCT 44.7 43.9 43.0 47.7   MCV 95 96 96 99   MCH 32.1 31.9 32.8 32.5   MCHC 33.8 33.3 34.2 32.9   RDW 13.4 13.2 13.2 13.5    158 163 171     INR  No lab results found.  ABG  No lab results found.   URINE STUDIES  Recent Labs   Lab Test 08/25/21  1145 07/12/20  1601  10/21/19  1021 03/25/19  1105 07/11/18  1410 03/21/18  0900   COLOR Yellow Yellow Straw Straw Yellow Yellow   APPEARANCE Clear Clear Clear Clear Clear Clear   URINEGLC Negative Negative Negative Negative Negative Negative   URINEBILI Negative Negative Negative Negative Negative Negative   URINEKETONE Negative Negative Negative Negative Negative Negative   SG 1.020 1.015 1.012 1.006 <=1.005 1.015   UBLD Moderate* Moderate* Moderate* Small* Small* Moderate*   URINEPH 6.0 5.5 6.0 5.0 6.0 6.0   PROTEIN Negative Negative Negative Negative Negative Negative   UROBILINOGEN 0.2 0.2  --   --  0.2 1.0   NITRITE Negative Negative Negative Negative Negative Negative   LEUKEST Negative Negative Negative Negative Negative Negative   RBCU 5-10* 5-10* 8* 1 2-5* 5-10*   WBCU 0-5 0 - 5 <1 <1 0 - 5 0 - 5     Recent Labs   Lab Test 08/25/21  1146 07/12/20  1600 10/21/19  1021 03/25/19  1105 03/21/18  0900   UTPG 0.17 Unable to calculate due to low value 0.17 Unable to calculate due to low value 0.12     PTH  Recent Labs   Lab Test 08/25/21  1145 07/12/20  1601 10/21/19  1016 03/21/18  0900   PTHI 59 59 65 62     IRON STUDIES   Recent Labs   Lab Test 08/25/21  1145 03/21/18  0900   IRON 68 108    267   IRONSAT 27 40   FREDI 77 60                 Again, thank you for allowing me to participate in the care of your patient.      Sincerely,    Ginegr Weiss MD

## 2021-08-30 NOTE — PROGRESS NOTES
Srinivas is a 65 year old who is being evaluated via a billable video visit.      How would you like to obtain your AVS? MyChart  If the video visit is dropped, the invitation should be resent by: Text to cell phone: [phone] ([facility])  Will anyone else be joining your video visit? No        Video-Visit Details    Type of service:  Video Visit    Total time:10 min     Originating Location (pt. Location): Car     Distant Location (provider location):  [facility] NEPHROLOGY [facility]INIC [place]     Platform used for Video Visit: [facility]       Nephrology [facility]    Ginger Weiss MD  2021     Name: Geovani Bustos  MRN: [id number]  Age: 63 year old  : 1955  Referring provider: Referred Self       Interval History:  Mr Bustos is doing well. He denies any new complaints. He has trace Le edema for which he wears compression stockings. He does not check his blood pressures at home.      Assessment:    # IgA Nephropathy  # CKD, stage 3  # LUTS  He has biopsy proven IgA nephropathy in , will try to obtain bx records. Has not had proteinuria over years. Low level microscopic hematuria on and off.He has maintained his blood pressures at a goal of < 140/90 mm of Hg most of the times without needing antihypertensives. His kidney function has been relatively stable and maintains a baseline creatinine of 1.5-1.7 mg/dl.     Plan:   Continue with conservative management including management of BP ( target < 130/80), avoiding nephrotoxins, may continue Fish oil.   Given lack of proteinuria and fairly low BP, no need to start RASi at this time but should have low threshold if blood pressure goes up or if there is detectable proteinuria.        Follow-up: 6 months    Ginger Weiss MD     Reason For Visit:   CKD follow-up     HPI:   Geovani Bustos is a 63 year old male with a history of biopsy proven IgA nephropathy (2009, Dr Robbie Harris. As per note - kidney tissue looked altogether normal with  exception of deposition of IgA in mesangium), treated conservatively. Other pertinent history includes hypothyroidism, schizophrenia, Hyperlipidemia       Review of Systems:   Pertinent items are noted in HPI or as below, remainder of complete ROS is negative.      Active Medications:     Current Outpatient Medications:      ABILIFY 10 MG OR TABS, Take 22 mg by mouth daily , Disp: , Rfl:      ASPIRIN LOW DOSE 81 MG EC tablet, Take 1 tablet (81 mg) by mouth daily, Disp: 30 tablet, Rfl: 11     augmented betamethasone dipropionate (DIPROLENE-AF) 0.05 % external ointment, Apply to AA twice daily 1-2 weeks then as needed only. Do not apply to face., Disp: 45 g, Rfl: 5     BREO ELLIPTA 100-25 MCG/INH inhaler, Inhale 1 puff into the lungs daily, Disp: 1 each, Rfl: 0     Disposable Gloves (LATEX GLOVES MEDIUM) MISC, 4 boxes of gloves For the application of topical medications, Disp: 400 each, Rfl: 11     Disposable Gloves (NITRILE EXAM GLOVES MEDIUM) MISC, 1 each as needed Use PRN daily with administration of otic drops, body lotion and powder to treat dry itchy skin., Disp: 4 each, Rfl: 11     EAR DROPS 6.5 % otic solution, Place 5 drops into both ears 2 times daily For 5 days August 1-5th and Feb 1-5 th then return for irrigation after the 5th day., Disp: 15 mL, Rfl: 3     finasteride (PROSCAR) 5 MG tablet, TAKE 1 TABLET BY MOUTH EVERY DAY, Disp: 30 tablet, Rfl: 11     gabapentin (NEURONTIN) 100 MG capsule, Take 100 mg by mouth 2 times daily, Disp: , Rfl:      Incontinence Supply Disposable (INCONTINENCE BRIEF LARGE) MISC, Incontinence pads up to 300 per month, Disp: 300 each, Rfl: 1     levothyroxine (SYNTHROID/LEVOTHROID) 125 MCG tablet, Take 1 tablet (125 mcg) by mouth daily, Disp: 30 tablet, Rfl: 2     nicotine (NICODERM CQ) 14 MG/24HR 24 hr patch, Place 1 patch onto the skin every 24 hours, Disp: 30 patch, Rfl: 1     OLANZapine (ZYPREXA) 2.5 MG tablet, , Disp: , Rfl:      Omega-3 Fatty Acids (FISH OIL) 1200 MG  capsule, Take 1 capsule (1.2 g) by mouth daily, Disp: 90 capsule, Rfl: 1     order for DME, Equipment being ordered: callous pad, Disp: 1 each, Rfl: 1     order for DME, Equipment being ordered: Dynaflex insert, Disp: 1 Units, Rfl: 0     oxybutynin (DITROPAN) 5 MG tablet, Take 2 tablets (10mg) by mouth 3 times daily, Disp: 180 tablet, Rfl: 3     oxybutynin (DITROPAN) 5 MG tablet, , Disp: 180 tablet, Rfl: 11     polyethylene glycol (GAVILAX) 17 GM/Dose powder, Take 17 g (1 capful) by mouth daily, Disp: 510 g, Rfl: 11     Psyllium (REGULOID) 48.57 % POWD, Take 1 each by mouth See Admin Instructions Take 1 teaspoonful every day at 7pm and 9 pm., Disp: 369 g, Rfl: 11     salicylic acid 6 % external gel, apply twice daily until callus is removed for up to 14 days., Disp: 240 g, Rfl: 1     simvastatin (ZOCOR) 40 MG tablet, TAKE ONE TABLET BY MOUTH EVERY DAY AT 9 PM, Disp: 90 tablet, Rfl: 3     Skin Protectants, Misc. (EUCERIN) cream, Apply topically 2 times daily, Disp: 454 g, Rfl: 11     tamsulosin (FLOMAX) 0.4 MG capsule, Take 1 capsule (0.4 mg) by mouth daily, Disp: 90 capsule, Rfl: 3     traZODone (DESYREL) 50 MG tablet, Take 25 mg by mouth At Bedtime , Disp: , Rfl:      VENTOLIN  (90 Base) MCG/ACT inhaler, Inhale 2 puffs into the lungs every 6 hours as needed for shortness of breath / dyspnea or wheezing, Disp: 18 g, Rfl: 1     ZOLOFT 100 MG OR TABS, 2 TABLETS DAILY, Disp: , Rfl:   No current facility-administered medications for this visit.    Facility-Administered Medications Ordered in Other Visits:      bupivacaine (MARCAINE) injection 0.5%, , , PRN, Holden Harrison DPM, 10 mL at 01/03/17 0945     Allergies:   Nitrogen oxide and Sulfa drugs      Past Medical History:  Past Medical History:   Diagnosis Date     Cerumen impaction      Chronic kidney disease      Colon polyps      Hyperlipidemia      Mitral valve prolapse      Schizophrenia (H)      Ulcerated penile lesions      VSD (ventricular septal  defect)      Patient Active Problem List   Diagnosis     Colon polyps     Schizophrenia (H)     VSD (ventricular septal defect)     Mitral valve prolapse     Acquired hypothyroidism     IgA nephropathy     Proteinuria     Hyperlipidemia LDL goal <130     BPH (benign prostatic hyperplasia)     Health Care Home     Bilateral impacted cerumen     Bunion of great toe of right foot     Eczema of external ear, bilateral     Tinea pedis, unspecified laterality     Incomplete right bundle branch block (RBBB)     Cigarette nicotine dependence with nicotine-induced disorder     Gastroesophageal reflux disease without esophagitis     Stage 1 mild COPD by GOLD classification (H)     CKD (chronic kidney disease) stage 3, GFR 30-59 ml/min        Past Surgical History:  Past Surgical History:   Procedure Laterality Date     ARTHRODESIS FOOT Right 1/19/2016    Procedure: ARTHRODESIS FOOT;  Surgeon: Holden Harrison DPM;  Location: [facility]     ARTHRODESIS FOOT Left 1/3/2017    Procedure: ARTHRODESIS FOOT;  Surgeon: Holden Harrison DPM;  Location: [facility] OR     SURGICAL HISTORY OF -       leg fracture       Family History:   Family History   Problem Relation Age of Onset     Emphysema Mother      Coronary Artery Disease Father          Social History:   Social History     Tobacco Use     Smoking status: Current Every Day Smoker     Packs/day: 1.00     Years: 47.00     Pack years: 47.00     Types: Cigarettes     Smokeless tobacco: Never Used     Tobacco comment: cutting one cigarette down a month   Substance Use Topics     Alcohol use: No     Drug use: No        Physical Exam:  There were no vitals taken for this visit.   GENERAL APPEARANCE: alert and no distress  EYES: nonicteric  RESP: lungs clear to auscultation   CV: regular rhythm, normal rate, no rub  Extremities: no clubbing, cyanosis, or edema  MS: no evidence of inflammation in joints, no muscle tenderness  SKIN: no rash  NEURO: mentation intact and speech normal  PSYCH:  affect normal/bright     Laboratory:  CMP  Recent Labs   Lab Test 08/25/21  1145 02/15/21  1209 07/12/20  1601 11/15/19  0907 10/21/19  1016 03/22/19  1242 12/05/18  0840 11/08/17  0840 02/20/17  0000 02/01/17  1457    139 139 138 140 141 140 138  --  137   POTASSIUM 4.3 4.8 4.2 4.3 4.6 4.2 4.6 4.6  --  4.1   CHLORIDE 105 104 106 103 106 106 103 104  --  101   CO2 31 32 29 34* 31 28 33* 33*  --  30   ANIONGAP 2* 3 4 1* 4 7 4 1*  --  6   * 107* 83 96 102* 95 97 85  --  85   BUN 27 31* 31* 17 33* 32* 26 18  --  23   CR 1.53* 1.42* 1.56* 1.47* 1.63* 1.68* 1.60* 1.31*  --  1.21   GFRESTIMATED 46* 51* 45* 49* 43* 42* 44* 55*  --  61   GFRESTBLACK  --  59* 53* 57* 50* 49* 53* 67  --  73   RAYRAY 9.0 9.6 9.0 8.8 9.2 8.9 9.1 9.2  --  8.8   PHOS 3.6  --  4.0  --  2.7 4.0  --   --    < > 3.3   PROTTOTAL  --   --   --  7.1  --   --  8.0 7.2  --  7.3   ALBUMIN 3.8  --  4.0 4.0 3.9 4.1 4.5 3.8  --  3.8   BILITOTAL  --   --   --  0.5  --   --  0.6 0.3  --  0.3   ALKPHOS  --   --   --  116  --   --  118 188*  --  142   AST  --  20  --  16  --   --  19 15  --  13   ALT  --  30  --  21  --   --  28 22  --  19    < > = values in this interval not displayed.     CBC  Recent Labs   Lab Test 08/25/21  1145 02/15/21  1209 07/12/20  1601 11/15/19  0907   HGB 15.1 14.6 14.7 15.7   WBC 9.8 8.3 10.2 9.6   RBC 4.70 4.58 4.48 4.83   HCT 44.7 43.9 43.0 47.7   MCV 95 96 96 99   MCH 32.1 31.9 32.8 32.5   MCHC 33.8 33.3 34.2 32.9   RDW 13.4 13.2 13.2 13.5    158 163 171     INR  No lab results found.  ABG  No lab results found.   URINE STUDIES  Recent Labs   Lab Test 08/25/21  1145 07/12/20  1601 10/21/19  1021 03/25/19  1105 07/11/18  1410 03/21/18  0900   COLOR Yellow Yellow Straw Straw Yellow Yellow   APPEARANCE Clear Clear Clear Clear Clear Clear   URINEGLC Negative Negative Negative Negative Negative Negative   URINEBILI Negative Negative Negative Negative Negative Negative   URINEKETONE Negative Negative Negative Negative  Negative Negative   SG 1.020 1.015 1.012 1.006 <=1.005 1.015   UBLD Moderate* Moderate* Moderate* Small* Small* Moderate*   URINEPH 6.0 5.5 6.0 5.0 6.0 6.0   PROTEIN Negative Negative Negative Negative Negative Negative   UROBILINOGEN 0.2 0.2  --   --  0.2 1.0   NITRITE Negative Negative Negative Negative Negative Negative   LEUKEST Negative Negative Negative Negative Negative Negative   RBCU 5-10* 5-10* 8* 1 2-5* 5-10*   WBCU 0-5 0 - 5 <1 <1 0 - 5 0 - 5     Recent Labs   Lab Test 08/25/21  1146 07/12/20  1600 10/21/19  1021 03/25/19  1105 03/21/18  0900   UTPG 0.17 Unable to calculate due to low value 0.17 Unable to calculate due to low value 0.12     PTH  Recent Labs   Lab Test 08/25/21  1145 07/12/20  1601 10/21/19  1016 03/21/18  0900   PTHI 59 59 65 62     IRON STUDIES   Recent Labs   Lab Test 08/25/21  1145 03/21/18  0900   IRON 68 108    267   IRONSAT 27 40   FREDI 77 60

## 2021-09-07 DIAGNOSIS — J41.0 SIMPLE CHRONIC BRONCHITIS (H): ICD-10-CM

## 2021-09-20 DIAGNOSIS — N32.81 OAB (OVERACTIVE BLADDER): ICD-10-CM

## 2021-09-20 RX ORDER — OXYBUTYNIN CHLORIDE 5 MG/1
TABLET ORAL
Qty: 180 TABLET | Refills: 8 | Status: SHIPPED | OUTPATIENT
Start: 2021-09-20 | End: 2022-04-19

## 2021-09-20 RX ORDER — OXYBUTYNIN CHLORIDE 5 MG/1
TABLET ORAL
Qty: 180 TABLET | Refills: 11 | Status: CANCELLED | OUTPATIENT
Start: 2021-09-20

## 2021-09-20 NOTE — TELEPHONE ENCOUNTER
Requested Prescriptions   Pending Prescriptions Disp Refills     oxybutynin (DITROPAN) 5 MG tablet 180 tablet 11       There is no refill protocol information for this order        Last office visit: Visit date not found with prescribing provider:  Dr. Zepeda   Future Office Visit:          Ahsan Rodgers  [facility]

## 2021-11-09 DIAGNOSIS — N40.0 BPH (BENIGN PROSTATIC HYPERPLASIA): Chronic | ICD-10-CM

## 2021-11-10 ENCOUNTER — OFFICE VISIT (OUTPATIENT)
Dept: FAMILY MEDICINE | Facility: CLINIC | Age: 66
End: 2021-11-10
Payer: COMMERCIAL

## 2021-11-10 VITALS
OXYGEN SATURATION: 94 % | HEIGHT: 68 IN | BODY MASS INDEX: 26.49 KG/M2 | HEART RATE: 67 BPM | DIASTOLIC BLOOD PRESSURE: 68 MMHG | WEIGHT: 174.8 LBS | TEMPERATURE: 97.8 F | RESPIRATION RATE: 30 BRPM | SYSTOLIC BLOOD PRESSURE: 138 MMHG

## 2021-11-10 DIAGNOSIS — Z23 NEED FOR PROPHYLACTIC VACCINATION AND INOCULATION AGAINST INFLUENZA: ICD-10-CM

## 2021-11-10 DIAGNOSIS — I87.8 CHRONIC VENOUS STASIS: Primary | ICD-10-CM

## 2021-11-10 DIAGNOSIS — E03.9 ACQUIRED HYPOTHYROIDISM: ICD-10-CM

## 2021-11-10 LAB — TSH SERPL DL<=0.005 MIU/L-ACNC: 2.39 MU/L (ref 0.4–4)

## 2021-11-10 PROCEDURE — 84443 ASSAY THYROID STIM HORMONE: CPT | Performed by: FAMILY MEDICINE

## 2021-11-10 PROCEDURE — G0008 ADMIN INFLUENZA VIRUS VAC: HCPCS | Performed by: FAMILY MEDICINE

## 2021-11-10 PROCEDURE — 36415 COLL VENOUS BLD VENIPUNCTURE: CPT | Performed by: FAMILY MEDICINE

## 2021-11-10 PROCEDURE — 90662 IIV NO PRSV INCREASED AG IM: CPT | Performed by: FAMILY MEDICINE

## 2021-11-10 PROCEDURE — 99213 OFFICE O/P EST LOW 20 MIN: CPT | Mod: 25 | Performed by: FAMILY MEDICINE

## 2021-11-10 RX ORDER — FINASTERIDE 5 MG/1
TABLET, FILM COATED ORAL
Qty: 30 TABLET | Refills: 2 | Status: SHIPPED | OUTPATIENT
Start: 2021-11-10 | End: 2022-02-03

## 2021-11-10 ASSESSMENT — MIFFLIN-ST. JEOR: SCORE: 1542.39

## 2021-11-10 NOTE — LETTER
November 11, 2021      Geovani VALE Juli  [address] [facility] [id number]        Dear ,    We are writing to inform you of your test results.    TSH level 2.39, within reference range.  Will recommend to continue levothyroxine current dose.       Resulted Orders   TSH with free T4 reflex   Result Value Ref Range    TSH 2.39 0.40 - 4.00 mU/L       If you have any questions or concerns, please call the clinic at the number listed above.       Sincerely,      Alexandru Vera MD/toño

## 2021-11-10 NOTE — PROGRESS NOTES
"  Assessment & Plan     Chronic venous stasis  Differentials discussed in detail.  Suggested compression stockings, leg elevation when possible, regular walks and regular moisturizer use.,  Staff, understood and in agreement with above plan.  All questions answered.    Acquired hypothyroidism  TSH ordered, will titrate levothyroxine dose accordingly  - TSH with free T4 reflex; Future    Influenza vaccine administered in office today.      Alexandru Vera MD  [facility]    Markell Espino is a 65 year old who presents for the following health issues  accompanied by his [facility] staff Amelia.    HPI     Concern - bruising  Onset: 2 month  Description: both feet and ankle    bruising  Intensity: mild  Progression of Symptoms:  waxing and waning  Accompanying Signs & Symptoms: 0  Previous history of similar problem: 0  Precipitating factors:        Worsened by: 0  Alleviating factors:        Improved by: 0  Therapies tried and outcome:  none     Known to have hypothyroidism, due for TSH level check      Review of Systems   Constitutional, HEENT, cardiovascular, pulmonary, GI, , musculoskeletal, neuro, skin, endocrine and psych systems are negative, except as otherwise noted.      Objective    /68   Pulse 67   Temp 97.8  F (36.6  C)   Resp 30   Ht 1.727 m (5' 8\")   Wt 79.3 kg (174 lb 12.8 oz)   SpO2 94%   BMI 26.58 kg/m    Body mass index is 26.58 kg/m .  Physical Exam   GENERAL: alert and no distress  NECK: no adenopathy, no asymmetry, masses, or scars and thyroid normal to palpation  RESP: lungs clear to auscultation - no rales, rhonchi or wheezes  CV: regular rate and rhythm, normal S1 S2, no S3 or S4, no murmur, click or rub  ABDOMEN: soft, nontender, no hepatosplenomegaly  MS: Chronic OA changes involving multiple joints, pedal pulses 2+ bilaterally  SKIN: chronic venous stasis changes involving lower legs bilaterally along with varicose veins, pedal edema 1+, Homans' sign " negative  NEURO: Normal strength and tone, mentation intact and speech normal  PSYCH: mentation appears normal, affect normal/bright

## 2021-12-28 DIAGNOSIS — J41.0 SIMPLE CHRONIC BRONCHITIS (H): ICD-10-CM

## 2022-01-06 DIAGNOSIS — E78.5 HYPERLIPIDEMIA LDL GOAL <130: ICD-10-CM

## 2022-01-06 RX ORDER — AMOXICILLIN 500 MG
1 CAPSULE ORAL DAILY
Qty: 90 CAPSULE | Refills: 2 | Status: SHIPPED | OUTPATIENT
Start: 2022-01-06 | End: 2022-07-21

## 2022-01-18 ENCOUNTER — MEDICAL CORRESPONDENCE (OUTPATIENT)
Dept: HEALTH INFORMATION MANAGEMENT | Facility: CLINIC | Age: 67
End: 2022-01-18

## 2022-01-18 ENCOUNTER — OFFICE VISIT (OUTPATIENT)
Dept: FAMILY MEDICINE | Facility: CLINIC | Age: 67
End: 2022-01-18
Payer: COMMERCIAL

## 2022-01-18 VITALS
DIASTOLIC BLOOD PRESSURE: 60 MMHG | SYSTOLIC BLOOD PRESSURE: 110 MMHG | WEIGHT: 176 LBS | OXYGEN SATURATION: 93 % | HEART RATE: 70 BPM | TEMPERATURE: 97.3 F | BODY MASS INDEX: 26.76 KG/M2

## 2022-01-18 DIAGNOSIS — F29 PSYCHOSES (H): Primary | ICD-10-CM

## 2022-01-18 DIAGNOSIS — L02.419 CELLULITIS AND ABSCESS OF LEG: Primary | ICD-10-CM

## 2022-01-18 DIAGNOSIS — L03.119 CELLULITIS AND ABSCESS OF LEG: Primary | ICD-10-CM

## 2022-01-18 PROCEDURE — 99214 OFFICE O/P EST MOD 30 MIN: CPT | Performed by: NURSE PRACTITIONER

## 2022-01-18 RX ORDER — CEPHALEXIN 500 MG/1
500 CAPSULE ORAL 2 TIMES DAILY
Qty: 20 CAPSULE | Refills: 0 | Status: SHIPPED | OUTPATIENT
Start: 2022-01-18 | End: 2022-02-17

## 2022-01-18 RX ORDER — MUPIROCIN 20 MG/G
OINTMENT TOPICAL 3 TIMES DAILY
Qty: 30 G | Refills: 1 | Status: SHIPPED | OUTPATIENT
Start: 2022-01-18 | End: 2023-06-06

## 2022-01-18 ASSESSMENT — PAIN SCALES - GENERAL: PAINLEVEL: MODERATE PAIN (4)

## 2022-02-02 ENCOUNTER — NURSE TRIAGE (OUTPATIENT)
Dept: NURSING | Facility: CLINIC | Age: 67
End: 2022-02-02

## 2022-02-02 NOTE — TELEPHONE ENCOUNTER
"Caller is Amelia,  at pt's group home.  Requests cancelling today's f/u appt for pt's leg cellulitis.  Reports pt complied with antibiotic regimen and topical dressings.  States \"It's completely healed.\"  No residual swelling or redness.  No tenderness whatsoever.    Pt already has another upcoming appt for physical exam within two weeks.  Therefore caller views today's add'l appt as redundant, unnecessary.  Cancelled now.    [facility] Nurse Advisor     Additional Information    [1] Follow-up call to recent contact AND [2] information only call, no triage required    Protocols used: INFORMATION ONLY CALL-A-AH      "

## 2022-02-03 DIAGNOSIS — N40.0 BPH (BENIGN PROSTATIC HYPERPLASIA): Chronic | ICD-10-CM

## 2022-02-03 DIAGNOSIS — E78.5 HYPERLIPIDEMIA LDL GOAL <130: ICD-10-CM

## 2022-02-03 DIAGNOSIS — E03.9 ACQUIRED HYPOTHYROIDISM: Chronic | ICD-10-CM

## 2022-02-03 RX ORDER — LEVOTHYROXINE SODIUM 125 UG/1
125 TABLET ORAL DAILY
Qty: 30 TABLET | Refills: 0 | Status: SHIPPED | OUTPATIENT
Start: 2022-02-03 | End: 2022-03-23

## 2022-02-03 RX ORDER — SIMVASTATIN 40 MG
TABLET ORAL
Qty: 30 TABLET | Refills: 0 | Status: SHIPPED | OUTPATIENT
Start: 2022-02-03 | End: 2022-03-23

## 2022-02-03 RX ORDER — FINASTERIDE 5 MG/1
TABLET, FILM COATED ORAL
Qty: 30 TABLET | Refills: 0 | Status: SHIPPED | OUTPATIENT
Start: 2022-02-03 | End: 2022-03-23

## 2022-02-17 ENCOUNTER — OFFICE VISIT (OUTPATIENT)
Dept: FAMILY MEDICINE | Facility: CLINIC | Age: 67
End: 2022-02-17
Payer: COMMERCIAL

## 2022-02-17 VITALS
TEMPERATURE: 97.4 F | RESPIRATION RATE: 18 BRPM | OXYGEN SATURATION: 96 % | SYSTOLIC BLOOD PRESSURE: 108 MMHG | WEIGHT: 177 LBS | DIASTOLIC BLOOD PRESSURE: 70 MMHG | HEART RATE: 69 BPM | HEIGHT: 68 IN | BODY MASS INDEX: 26.83 KG/M2

## 2022-02-17 DIAGNOSIS — Z12.11 COLON CANCER SCREENING: ICD-10-CM

## 2022-02-17 DIAGNOSIS — Z00.00 ENCOUNTER FOR MEDICARE ANNUAL WELLNESS EXAM: Primary | ICD-10-CM

## 2022-02-17 DIAGNOSIS — Z72.0 TOBACCO ABUSE: ICD-10-CM

## 2022-02-17 DIAGNOSIS — H61.22 IMPACTED CERUMEN OF LEFT EAR: ICD-10-CM

## 2022-02-17 PROCEDURE — 90732 PPSV23 VACC 2 YRS+ SUBQ/IM: CPT | Performed by: FAMILY MEDICINE

## 2022-02-17 PROCEDURE — 69209 REMOVE IMPACTED EAR WAX UNI: CPT | Mod: LT | Performed by: FAMILY MEDICINE

## 2022-02-17 PROCEDURE — 99397 PER PM REEVAL EST PAT 65+ YR: CPT | Mod: 25 | Performed by: FAMILY MEDICINE

## 2022-02-17 PROCEDURE — G0009 ADMIN PNEUMOCOCCAL VACCINE: HCPCS | Performed by: FAMILY MEDICINE

## 2022-02-17 ASSESSMENT — ACTIVITIES OF DAILY LIVING (ADL)
CURRENT_FUNCTION: HOUSEWORK REQUIRES ASSISTANCE
CURRENT_FUNCTION: SHOPPING REQUIRES ASSISTANCE
CURRENT_FUNCTION: TELEPHONE REQUIRES ASSISTANCE
CURRENT_FUNCTION: PREPARING MEALS REQUIRES ASSISTANCE
CURRENT_FUNCTION: MEDICATION ADMINISTRATION REQUIRES ASSISTANCE
CURRENT_FUNCTION: MONEY MANAGEMENT REQUIRES ASSISTANCE
CURRENT_FUNCTION: TRANSPORTATION REQUIRES ASSISTANCE

## 2022-02-17 ASSESSMENT — ENCOUNTER SYMPTOMS
CHILLS: 0
PARESTHESIAS: 0
ARTHRALGIAS: 0
NERVOUS/ANXIOUS: 0
SHORTNESS OF BREATH: 0
PALPITATIONS: 0
WEAKNESS: 0
NAUSEA: 0
HEMATOCHEZIA: 0
DIARRHEA: 0
DIZZINESS: 0
SORE THROAT: 0
CONSTIPATION: 0
HEMATURIA: 0
COUGH: 0
EYE PAIN: 0
MYALGIAS: 0
HEARTBURN: 0
ABDOMINAL PAIN: 0
BRUISES/BLEEDS EASILY: 0
HEADACHES: 0
ADENOPATHY: 0
JOINT SWELLING: 0
FEVER: 0
FREQUENCY: 0
DYSURIA: 0

## 2022-02-17 ASSESSMENT — PAIN SCALES - GENERAL: PAINLEVEL: NO PAIN (0)

## 2022-02-17 NOTE — PATIENT INSTRUCTIONS
Patient Education   Personalized Prevention Plan  You are due for the preventive services outlined below.  Your care team is available to assist you in scheduling these services.  If you have already completed any of these items, please share that information with your care team to update in your medical record.  Health Maintenance Due   Topic Date Due     ANNUAL REVIEW OF HM ORDERS  Never done     Zoster (Shingles) Vaccine (2 of 3) 04/12/2016     Pneumococcal Vaccine (2 of 2 - PPSV23) 12/05/2019     COVID-19 Vaccine (2 - Pfizer 3-dose series) 11/08/2021     Cholesterol Lab  02/15/2022     FALL RISK ASSESSMENT  02/15/2022       Exercise for a Healthier Heart  You may wonder how you can improve the health of your heart. If you re thinking about exercise, you re on the right track. You don t need to become an athlete. But you do need a certain amount of brisk exercise to help strengthen your heart. If you have been diagnosed with a heart condition, your healthcare provider may advise exercise to help stabilize your condition. To help make exercise a habit, choose safe, fun activities.      Exercise with a friend. When activity is fun, you're more likely to stick with it.   Before you start  Check with your healthcare provider before starting an exercise program. This is especially important if you have not been active for a while. It's also important if you have a long-term (chronic) health problem such as heart disease, diabetes, or obesity. Or if you are at high risk for having these problems.   Why exercise?  Exercising regularly offers many healthy rewards. It can help you do all of the following:     Improve your blood cholesterol level to help prevent further heart trouble    Lower your blood pressure to help prevent a stroke or heart attack    Control diabetes, or reduce your risk of getting this disease    Improve your heart and lung function    Reach and stay at a healthy weight    Make your muscles  stronger so you can stay active    Prevent falls and fractures by slowing the loss of bone mass (osteoporosis)    Manage stress better    Reduce your blood pressure    Improve your sense of self and your body image  Exercise tips      Ease into your routine. Set small goals. Then build on them. If you are not sure what your activity level should be, talk with your healthcare provider first before starting an exercise routine.    Exercise on most days. Aim for a total of 150 minutes (2 hours and 30 minutes) or more of moderate-intensity aerobic activity each week. Or 75 minutes (1 hour and 15 minutes) or more of vigorous-intensity aerobic activity each week. Or try for a combination of both. Moderate activity means that you breathe heavier and your heart rate increases but you can still talk. Think about doing 40 minutes of moderate exercise, 3 to 4 times a week. For best results, activity should last for about 40 minutes to lower blood pressure and cholesterol. It's OK to work up to the 40-minute period over time. Examples of moderate-intensity activity are walking 1 mile in 15 minutes. Or doing 30 to 45 minutes of yard work.    Step up your daily activity level.  Along with your exercise program, try being more active the whole day. Walk instead of drive. Or park further away so that you take more steps each day. Do more household tasks or yard work. You may not be able to meet the advised mount of physical activity. But doing some moderate- or vigorous-intensity aerobic activity can help reduce your risk for heart disease. Your healthcare provider can help you figure out what is best for you.    Choose 1 or more activities you enjoy.  Walking is one of the easiest things you can do. You can also try swimming, riding a bike, dancing, or taking an exercise class.    When to call your healthcare provider  Call your healthcare provider if you have any of these:     Chest pain or feel dizzy or lightheaded    Burning,  tightness, pressure, or heaviness in your chest, neck, shoulders, back, or arms    Abnormal shortness of breath    More joint or muscle pain    A very fast or irregular heartbeat (palpitations)  [employer] last reviewed this educational content on 7/1/2019 2000-2021 [employer]. All rights reserved. This information is not intended as a substitute for professional medical care. Always follow your healthcare professional's instructions.        Activities of Daily Living    Your Health Risk Assessment indicates you have difficulties with activities of daily living such as housework, bathing, preparing meals, taking medication, etc. Please make a follow up appointment for us to address this issue in more detail.

## 2022-02-17 NOTE — PROGRESS NOTES
"SUBJECTIVE:   Geovani Bustos is a 66 year old male who presents for Preventive Visit.    Patient has been advised of split billing requirements and indicates understanding: Yes  Are you in the first 12 months of your Medicare coverage?  No    Healthy Habits:     In general, how would you rate your overall health?  Good    Frequency of exercise:  None    Do you usually eat at least 4 servings of fruit and vegetables a day, include whole grains    & fiber and avoid regularly eating high fat or \"junk\" foods?  Yes    Taking medications regularly:  Yes    Medication side effects:  None    Ability to successfully perform activities of daily living:  Telephone requires assistance, transportation requires assistance, shopping requires assistance, preparing meals requires assistance, housework requires assistance, medication administration requires assistance and money management requires assistance    Home Safety:  No safety concerns identified    Hearing Impairment:  No hearing concerns    In the past 6 months, have you been bothered by leaking of urine?  No    In general, how would you rate your overall mental or emotional health?  Good      PHQ-2 Total Score: 0    Additional concerns today:  No    Do you feel safe in your environment? Yes    Have you ever done Advance Care Planning? (For example, a Health Directive, POLST, or a discussion with a medical provider or your loved ones about your wishes): Yes, advance care planning is on file.       Fall risk  Fallen 2 or more times in the past year?: No  Any fall with injury in the past year?: No    Cognitive Screening Not appropriate due to mental handicap        Reviewed and updated as needed this visit by clinical staff   Tobacco  Allergies               Reviewed and updated as needed this visit by Provider                 Social History     Tobacco Use     Smoking status: Current Every Day Smoker     Packs/day: 1.00     Years: 47.00     Pack years: 47.00     Types: " Cigarettes     Smokeless tobacco: Never Used     Tobacco comment: cutting one cigarette down a month   Substance Use Topics     Alcohol use: No         Alcohol Use 2/17/2022   Prescreen: >3 drinks/day or >7 drinks/week? No   Prescreen: >3 drinks/day or >7 drinks/week? -         PROBLEMS TO ADD ON...      Current providers sharing in care for this patient include:   Patient Care Team:  Alexandru Vera MD as PCP - General (Family Practice)  Ginger Weiss MD as MD (Nephrology)  Ginger Weiss MD as Assigned Nephrology Provider  Alexandru Vera MD as Assigned PCP  Peewee Lott MD as Assigned Heart and Vascular Provider  Luis A Zepeda MD as Assigned Surgical Provider    The following health maintenance items are reviewed in Epic and correct as of today:  Health Maintenance Due   Topic Date Due     ANNUAL REVIEW OF HM ORDERS  Never done     ZOSTER IMMUNIZATION (2 of 3) 04/12/2016     Pneumococcal Vaccine: 65+ Years (2 of 2 - PPSV23) 12/05/2019     COVID-19 Vaccine (2 - Pfizer 3-dose series) 11/08/2021     LIPID  02/15/2022     FALL RISK ASSESSMENT  02/15/2022     Lab work is in process  Labs reviewed in EPIC  BP Readings from Last 3 Encounters:   02/17/22 108/70   01/18/22 110/60   11/10/21 138/68    Wt Readings from Last 3 Encounters:   02/17/22 80.3 kg (177 lb)   01/18/22 79.8 kg (176 lb)   11/10/21 79.3 kg (174 lb 12.8 oz)                  Patient Active Problem List   Diagnosis     Colon polyps     Schizophrenia (H)     VSD (ventricular septal defect)     Mitral valve prolapse     Acquired hypothyroidism     IgA nephropathy     Proteinuria     Hyperlipidemia LDL goal <130     BPH (benign prostatic hyperplasia)     Health Care Home     Bilateral impacted cerumen     Bunion of great toe of right foot     Eczema of external ear, bilateral     Tinea pedis, unspecified laterality     Incomplete right bundle branch block (RBBB)     Cigarette nicotine dependence with  nicotine-induced disorder     Gastroesophageal reflux disease without esophagitis     Stage 1 mild COPD by GOLD classification (H)     CKD (chronic kidney disease) stage 3, GFR 30-59 ml/min (H)     Past Surgical History:   Procedure Laterality Date     ARTHRODESIS FOOT Right 1/19/2016    Procedure: ARTHRODESIS FOOT;  Surgeon: Holden Harrison DPM;  Location: [facility]     ARTHRODESIS FOOT Left 1/3/2017    Procedure: ARTHRODESIS FOOT;  Surgeon: Holden Harrison DPM;  Location: [facility] OR     SURGICAL HISTORY OF -       leg fracture       Social History     Tobacco Use     Smoking status: Current Every Day Smoker     Packs/day: 1.00     Years: 47.00     Pack years: 47.00     Types: Cigarettes     Smokeless tobacco: Never Used     Tobacco comment: cutting one cigarette down a month   Substance Use Topics     Alcohol use: No     Family History   Problem Relation Age of Onset     Emphysema Mother      Coronary Artery Disease Father          Current Outpatient Medications   Medication Sig Dispense Refill     ABILIFY 10 MG OR TABS Take 22 mg by mouth daily        ASPIRIN LOW DOSE 81 MG EC tablet Take 1 tablet (81 mg) by mouth daily 30 tablet 11     augmented betamethasone dipropionate (DIPROLENE-AF) 0.05 % external ointment Apply to AA twice daily 1-2 weeks then as needed only. Do not apply to face. 45 g 5     BREO ELLIPTA 100-25 MCG/INH inhaler Inhale 1 puff into the lungs daily 1 each 4     cephALEXin (KEFLEX) 500 MG capsule Take 1 capsule (500 mg) by mouth 2 times daily 20 capsule 0     Disposable Gloves (LATEX GLOVES MEDIUM) MISC 4 boxes of gloves  For the application of topical medications 400 each 11     Disposable Gloves (NITRILE EXAM GLOVES MEDIUM) MISC 1 each as needed Use PRN daily with administration of otic drops, body lotion and powder to treat dry itchy skin. 4 each 11     EAR DROPS 6.5 % otic solution Place 5 drops into both ears 2 times daily For 5 days August 1-5th and Feb 1-5 th then return for  irrigation after the 5th day. 15 mL 3     finasteride (PROSCAR) 5 MG tablet TAKE 1 TABLET BY MOUTH EVERY DAY 30 tablet 0     gabapentin (NEURONTIN) 100 MG capsule Take 100 mg by mouth 2 times daily       Incontinence Supply Disposable (INCONTINENCE BRIEF LARGE) MISC Incontinence pads up to 300 per month 300 each 1     levothyroxine (SYNTHROID/LEVOTHROID) 125 MCG tablet Take 1 tablet (125 mcg) by mouth daily 30 tablet 0     mupirocin (BACTROBAN) 2 % external ointment Apply topically 3 times daily 30 g 1     nicotine (NICODERM CQ) 14 MG/24HR 24 hr patch Place 1 patch onto the skin every 24 hours 30 patch 1     OLANZapine (ZYPREXA) 2.5 MG tablet        Omega-3 Fatty Acids (FISH OIL) 1200 MG capsule Take 1 capsule (1.2 g) by mouth daily 90 capsule 2     order for DME Equipment being ordered: callous pad 1 each 1     order for DME Equipment being ordered: Dynaflex insert 1 Units 0     oxybutynin (DITROPAN) 5 MG tablet Take 2 tablets (10mg) by mouth 3 times daily 180 tablet 8     oxybutynin (DITROPAN) 5 MG tablet  180 tablet 11     polyethylene glycol (GAVILAX) 17 GM/Dose powder Take 17 g (1 capful) by mouth daily 510 g 11     Psyllium (REGULOID) 48.57 % POWD Take 1 each by mouth See Admin Instructions Take 1 teaspoonful every day at 7pm and 9 pm. 369 g 11     salicylic acid 6 % external gel apply twice daily until callus is removed for up to 14 days. 240 g 1     simvastatin (ZOCOR) 40 MG tablet TAKE ONE TABLET BY MOUTH EVERY DAY AT 9 PM 30 tablet 0     Skin Protectants, Misc. (EUCERIN) cream Apply topically 2 times daily 454 g 11     tamsulosin (FLOMAX) 0.4 MG capsule Take 1 capsule (0.4 mg) by mouth daily 90 capsule 3     traZODone (DESYREL) 50 MG tablet Take 25 mg by mouth At Bedtime        VENTOLIN  (90 Base) MCG/ACT inhaler Inhale 2 puffs into the lungs every 6 hours as needed for shortness of breath / dyspnea or wheezing 18 g 1     ZOLOFT 100 MG OR TABS 2 TABLETS DAILY       Allergies   Allergen Reactions      Nitrogen Oxide      Sulfa Drugs      Recent Labs   Lab Test 11/10/21  1321 08/25/21  1145 02/15/21  1209 09/15/20  0924 07/12/20  1601 11/15/19  0907 01/15/19  1046 12/05/18  0840 08/08/18  1405 03/21/18  0900   A1C  --   --  5.4  --   --   --   --   --   --  5.6   LDL  --   --  87  --   --  75  --  118*  --   --    HDL  --   --  56  --   --  45  --  48  --   --    TRIG  --   --  122  --   --  155*  --  150*  --   --    ALT  --   --  30  --   --  21  --  28  --   --    CR  --  1.53* 1.42*  --  1.56* 1.47*   < > 1.60*  --  1.36*   GFRESTIMATED  --  46* 51*  --  45* 49*   < > 44*  --  53*   GFRESTBLACK  --   --  59*  --  53* 57*   < > 53*  --  64   POTASSIUM  --  4.3 4.8  --  4.2 4.3   < > 4.6  --  4.4   TSH 2.39  --   --  2.33  --   --    < >  --    < >  --     < > = values in this interval not displayed.       Review of Systems   Constitutional: Negative for chills and fever.   HENT: Negative for congestion, ear pain, hearing loss and sore throat.    Eyes: Negative for pain and visual disturbance.   Respiratory: Negative for cough and shortness of breath.    Cardiovascular: Negative for chest pain, palpitations and peripheral edema.   Gastrointestinal: Negative for abdominal pain, constipation, diarrhea, heartburn, hematochezia and nausea.   Endocrine: Negative for cold intolerance and heat intolerance.   Genitourinary: Negative for dysuria, frequency, genital sores, hematuria, impotence, penile discharge and urgency.   Musculoskeletal: Negative for arthralgias, joint swelling and myalgias.   Skin: Negative for rash.   Allergic/Immunologic: Negative for environmental allergies and food allergies.   Neurological: Negative for dizziness, weakness, headaches and paresthesias.   Hematological: Negative for adenopathy. Does not bruise/bleed easily.   Psychiatric/Behavioral: Negative for mood changes. The patient is not nervous/anxious.      Constitutional, HEENT, cardiovascular, pulmonary, GI, , musculoskeletal, neuro,  "skin, endocrine and psych systems are negative, except as otherwise noted.    OBJECTIVE:   /70 (Cuff Size: Adult Regular)   Pulse 69   Temp 97.4  F (36.3  C) (Tympanic)   Resp 18   Ht 1.727 m (5' 8\")   Wt 80.3 kg (177 lb)   SpO2 96%   BMI 26.91 kg/m   Estimated body mass index is 26.91 kg/m  as calculated from the following:    Height as of this encounter: 1.727 m (5' 8\").    Weight as of this encounter: 80.3 kg (177 lb).  Physical Exam  GENERAL: alert, no distress and over weight  EYES: Eyes grossly normal to inspection, PERRL and conjunctivae and sclerae normal  HENT: normal cephalic/atraumatic, right ear: normal: no effusions, no erythema, normal landmarks, left ear: occluded with wax, nose and mouth without ulcers or lesions, oropharynx clear and oral mucous membranes moist  NECK: no adenopathy, no asymmetry, masses, or scars and thyroid normal to palpation  RESP: lungs clear to auscultation - no rales, rhonchi or wheezes  CV: regular rate and rhythm, normal S1 S2, no S3 or S4, no murmur, click or rub, no peripheral edema and peripheral pulses strong  ABDOMEN: soft, nontender, no hepatosplenomegaly, no masses and bowel sounds normal  MS: no gross musculoskeletal defects noted, no edema  SKIN: no suspicious lesions or rashes  NEURO: Grossly intact      ASSESSMENT / PLAN:   (Z00.00) Encounter for Medicare annual wellness exam  (primary encounter diagnosis)  Comment: Physical examination unremarkable apart from left ear impacted cerumen, cleaned with saline irrigation by MA.  Medications reviewed and no changes made.  Pneumococcal vaccine administered in office today.  Stressed on smoking cessation.  Group home standing orders signed.  Follow-up in 6 months or earlier if needed.  Plan: CANCELED: Lipid panel reflex to direct LDL         Fasting         (Z12.11) Colon cancer screening  Comment: Due for screening colonoscopy in April, referral placed  Plan: Adult Gastro Ref - Procedure Only      " "    (H61.22) Impacted cerumen of left ear  Comment: Left ear impacted cerumen cleaned with saline irrigation by MA      (Z72.0) Tobacco abuse  Comment: Smoking cessation counseling provided, associated health hazards explained in detail        COUNSELING:  Reviewed preventive health counseling, as reflected in patient instructions    Estimated body mass index is 26.91 kg/m  as calculated from the following:    Height as of this encounter: 1.727 m (5' 8\").    Weight as of this encounter: 80.3 kg (177 lb).    Weight management plan: Discussed healthy diet and exercise guidelines    He reports that he has been smoking cigarettes. He has a 47.00 pack-year smoking history. He has never used smokeless tobacco.  Tobacco Cessation Action Plan:   Information offered: Patient not interested at this time      Appropriate preventive services were discussed with this patient, including applicable screening as appropriate for cardiovascular disease, diabetes, osteopenia/osteoporosis, and glaucoma.  As appropriate for age/gender, discussed screening for colorectal cancer, prostate cancer, breast cancer, and cervical cancer. Checklist reviewing preventive services available has been given to the patient.    Reviewed patients plan of care and provided an AVS. The Basic Care Plan (routine screening as documented in Health Maintenance) for Geovani meets the Care Plan requirement. This Care Plan has been established and reviewed with the Patient.    Counseling Resources:  ATP IV Guidelines  Pooled Cohorts Equation Calculator  Breast Cancer Risk Calculator  Breast Cancer: Medication to Reduce Risk  FRAX Risk Assessment  ICSI Preventive Guidelines  Dietary Guidelines for Americans, 2010  USDA's MyPlate  ASA Prophylaxis  Lung CA Screening    Alexandru Vera MD  [facility]      "

## 2022-02-17 NOTE — NURSING NOTE
"Chief Complaint   Patient presents with     Physical     /70 (Cuff Size: Adult Regular)   Pulse 69   Temp 97.4  F (36.3  C) (Tympanic)   Resp 18   Ht 1.727 m (5' 8\")   Wt 80.3 kg (177 lb)   SpO2 96%   BMI 26.91 kg/m   Estimated body mass index is 26.91 kg/m  as calculated from the following:    Height as of this encounter: 1.727 m (5' 8\").    Weight as of this encounter: 80.3 kg (177 lb).  Patient presents to the clinic using No DME      Health Maintenance that is potentially due pending provider review:    Health Maintenance Due   Topic Date Due     ANNUAL REVIEW OF HM ORDERS  Never done     ZOSTER IMMUNIZATION (2 of 3) 04/12/2016     Pneumococcal Vaccine: 65+ Years (2 of 2 - PPSV23) 12/05/2019     COVID-19 Vaccine (2 - Pfizer 3-dose series) 11/08/2021     LIPID  02/15/2022     FALL RISK ASSESSMENT  02/15/2022                "

## 2022-02-18 NOTE — MR AVS SNAPSHOT
"              After Visit Summary   9/6/2017    Geovani Bustos    MRN: [id number]           Patient Information     Date Of Birth          1955        Visit Information        Provider Department      9/6/2017 2:30 PM Luis A Zepeda MD [facility]        Today's Diagnoses     Benign prostatic hyperplasia, presence of lower urinary tract symptoms unspecified    -  1      Care Instructions    Per Physician's instructions            Follow-ups after your visit        Future tests that were ordered for you today     Open Future Orders        Priority Expected Expires Ordered    PSA tumor marker Routine  9/6/2018 9/6/2017            Who to contact     If you have questions or need follow up information about today's clinic visit or your schedule please contact [facility] directly at [phone].  Normal or non-critical lab and imaging results will be communicated to you by MyChart, letter or phone within 4 business days after the clinic has received the results. If you do not hear from us within 7 days, please contact the clinic through MyChart or phone. If you have a critical or abnormal lab result, we will notify you by phone as soon as possible.  Submit refill requests through [employer] or call your pharmacy and they will forward the refill request to us. Please allow 3 business days for your refill to be completed.          Additional Information About Your Visit        [employer] [place] Information     [employer] lets you send messages to your doctor, view your test results, renew your prescriptions, schedule appointments and more. To sign up, go to [email or web address] . Click on \"Log in\" on the left side of the screen, which will take you to the Welcome page. Then click on \"Sign up Now\" on the right side of the page.     You will be asked to enter the access code listed below, as well as some personal information. Please follow the directions to create your username and " FLOATERS (and flashes in side vision)    Floaters are little specks, strands, or cobweb-like spots that float around in your field of vision.  They may move as your eyes move, and then dart away when you try to look at them.  In most cases, floaters are simply an annoyance and are part of the natural aging process of the eyes.  While floaters may be noticed at any age, they are more common as we get older because the vitreous (the clear liquid gel inside the eye) becomes more watery.  The membranes that are embedded in the vitreous may also break loose and start to float around.    Sometimes a section of the vitreous pulls its own fine fibers away from the retina (the delicate lining of the eye) very quickly rather than gradually.  Flashes in the side vision sometimes result when this process is occurring.  When this happens without tearing the retina, it is called a VITREOUS DETACHMENT.  This is a frequent occurrence and is  part of the normal aging process of the eye.  In most cases, it is not sight threatening and does not require treatment.    However a SUDDEN increase in floaters, possibly accompanied by light flashes or side vision loss could indicate a retinal tear or detachment.  A retinal detachment occurs when any part of the retina (the eye's light sensitive tissue/lining) is pulled away from the wall of the eye.  A retinal detachment is a serious condition and should always be considered an emergency.  If untreated, it can lead to permanent vision impairment.    If you have have an increase in floaters(compared to now), peripheral light flashes, or peripheral vision loss should be checked as soon as possible.  For those who have floaters that are simply annoying, no treatment is recommended.   "password.     Your access code is: [email or web address]  Expires: 10/31/2017  1:43 PM     Your access code will  in 90 days. If you need help or a new code, please call your [facility] clinic or [phone].        Care EveryWhere ID     This is your Care EveryWhere ID. This could be used by other organizations to access your [facility] medical records  [id number]        Your Vitals Were     Pulse Respirations Height BMI (Body Mass Index)          69 16 1.778 m (5' 10\") 25.97 kg/m2         Blood Pressure from Last 3 Encounters:   17 126/79   17 122/62   17 144/72    Weight from Last 3 Encounters:   17 82.1 kg (181 lb)   17 81.2 kg (179 lb)   17 79.8 kg (176 lb)               Primary Care Provider Office Phone # Fax #    Chastity Mckinleychelsea Greer, APRN [facility] [phone]       [address] [place] [address]        Equal Access to Services     [facility]: Hadii aad ku hadasho Soomaali, waaxda luqadaha, qaybta kaalmada adeegyaeliceo, maday mckenzie . So [facility] [phone].    ATENCIÓN: Si habla español, tiene a topete disposición servicios gratuitos de asistencia lingüística. [facility] [phone].    We comply with applicable federal civil rights laws and [place] laws. We do not discriminate on the basis of race, color, national origin, age, disability sex, sexual orientation or gender identity.            Thank you!     Thank you for choosing [facility]  for your care. Our goal is always to provide you with excellent care. Hearing back from our patients is one way we can continue to improve our services. Please take a few minutes to complete the written survey that you may receive in the mail after your visit with us. Thank you!             Your Updated Medication List - Protect others around you: Learn how to safely use, store and throw away your medicines at www.disposemymeds.org.          This list is accurate as of: 17  6:16 PM.  " Always use your most recent med list.                   Brand Name Dispense Instructions for use Diagnosis    ABILIFY 10 MG tablet   Generic drug:  ARIPiprazole      1 TABLET DAILY        acetaminophen 325 MG tablet    TYLENOL    100 tablet    Take 2 tablets (650 mg) by mouth every 4 hours as needed for mild pain    Cigarette nicotine dependence with nicotine-induced disorder       aspirin 81 MG EC tablet     90 tablet    Take 1 tablet (81 mg) by mouth daily    Hyperlipidemia LDL goal <130       augmented betamethasone dipropionate 0.05 % ointment    DIPROLENE-AF    45 g    Apply to AA twice daily 1-2 weeks then as needed only. Do not apply to face. (Due for Derm recheck b4 next refill: [phone] to schedule)    Eczema of both external ears       carbamide peroxide 6.5 % otic solution    DEBROX    15 mL    Place 5 drops into both ears 2 times daily For 5 days August 1-5th and Feb 1-5 th  then return for irrigation after the 5th day.    Bilateral impacted cerumen       finasteride 5 MG tablet    PROSCAR    30 tablet    Take 1 tablet (5 mg) by mouth daily    BPH (benign prostatic hyperplasia)       Fish Oil 1200 MG Caps     90 capsule    Take 1 capsule by mouth daily    Hyperlipidemia LDL goal <130       levothyroxine 125 MCG tablet    SYNTHROID/LEVOTHROID    90 tablet    TAKE ONE TABLET BY MOUTH EVERY DAY    Acquired hypothyroidism       order for DME     1 Units    Equipment being ordered: Dynaflex insert    Left foot pain, S/P foot surgery, left       oxybutynin 5 MG tablet    DITROPAN     Take by mouth 3 times daily        psyllium 30.9 % Powd    NATURAL FIBER THERAPY    1 Bottle    Take 1 each by mouth See Admin Instructions Take 1 teaspoonful every day at 7pm and 9 pm.    Constipation       simvastatin 40 MG tablet    ZOCOR    90 tablet    TAKE ONE TABLET BY MOUTH EVERY DAY AT 9 PM    Hyperlipidemia LDL goal <130       ZOLOFT 100 MG tablet   Generic drug:  sertraline      2 TABLETS DAILY

## 2022-03-01 ENCOUNTER — LAB (OUTPATIENT)
Dept: LAB | Facility: CLINIC | Age: 67
End: 2022-03-01
Payer: COMMERCIAL

## 2022-03-01 DIAGNOSIS — F29 PSYCHOSES (H): ICD-10-CM

## 2022-03-01 DIAGNOSIS — N02.B9 IGA NEPHROPATHY: ICD-10-CM

## 2022-03-01 DIAGNOSIS — N18.31 STAGE 3A CHRONIC KIDNEY DISEASE (H): ICD-10-CM

## 2022-03-01 LAB
ALBUMIN SERPL-MCNC: 3.5 G/DL (ref 3.4–5)
ALBUMIN UR-MCNC: NEGATIVE MG/DL
ALT SERPL W P-5'-P-CCNC: 36 U/L (ref 0–70)
ANION GAP SERPL CALCULATED.3IONS-SCNC: 4 MMOL/L (ref 3–14)
APPEARANCE UR: CLEAR
AST SERPL W P-5'-P-CCNC: 25 U/L (ref 0–45)
BASOPHILS # BLD AUTO: 0.1 10E3/UL (ref 0–0.2)
BASOPHILS NFR BLD AUTO: 1 %
BILIRUB UR QL STRIP: NEGATIVE
BUN SERPL-MCNC: 32 MG/DL (ref 7–30)
CALCIUM SERPL-MCNC: 8.6 MG/DL (ref 8.5–10.1)
CHLORIDE BLD-SCNC: 108 MMOL/L (ref 94–109)
CHOLEST SERPL-MCNC: 141 MG/DL
CO2 SERPL-SCNC: 28 MMOL/L (ref 20–32)
COLOR UR AUTO: ABNORMAL
CREAT SERPL-MCNC: 1.42 MG/DL (ref 0.66–1.25)
CREAT UR-MCNC: 87 MG/DL
EOSINOPHIL # BLD AUTO: 0.4 10E3/UL (ref 0–0.7)
EOSINOPHIL NFR BLD AUTO: 4 %
ERYTHROCYTE [DISTWIDTH] IN BLOOD BY AUTOMATED COUNT: 13.3 % (ref 10–15)
FASTING STATUS PATIENT QL REPORTED: NO
GFR SERPL CREATININE-BSD FRML MDRD: 54 ML/MIN/1.73M2
GLUCOSE BLD-MCNC: 98 MG/DL (ref 70–99)
GLUCOSE UR STRIP-MCNC: NEGATIVE MG/DL
HBA1C MFR BLD: 5.5 % (ref 0–5.6)
HCT VFR BLD AUTO: 43.9 % (ref 40–53)
HDLC SERPL-MCNC: 51 MG/DL
HGB BLD-MCNC: 14.3 G/DL (ref 13.3–17.7)
HGB UR QL STRIP: ABNORMAL
IMM GRANULOCYTES # BLD: 0 10E3/UL
IMM GRANULOCYTES NFR BLD: 0 %
KETONES UR STRIP-MCNC: NEGATIVE MG/DL
LDLC SERPL CALC-MCNC: 65 MG/DL
LEUKOCYTE ESTERASE UR QL STRIP: NEGATIVE
LYMPHOCYTES # BLD AUTO: 1.3 10E3/UL (ref 0.8–5.3)
LYMPHOCYTES NFR BLD AUTO: 15 %
MCH RBC QN AUTO: 31.8 PG (ref 26.5–33)
MCHC RBC AUTO-ENTMCNC: 32.6 G/DL (ref 31.5–36.5)
MCV RBC AUTO: 98 FL (ref 78–100)
MONOCYTES # BLD AUTO: 0.5 10E3/UL (ref 0–1.3)
MONOCYTES NFR BLD AUTO: 6 %
MUCOUS THREADS #/AREA URNS LPF: PRESENT /LPF
NEUTROPHILS # BLD AUTO: 6.4 10E3/UL (ref 1.6–8.3)
NEUTROPHILS NFR BLD AUTO: 74 %
NITRATE UR QL: NEGATIVE
NONHDLC SERPL-MCNC: 90 MG/DL
NRBC # BLD AUTO: 0 10E3/UL
NRBC BLD AUTO-RTO: 0 /100
PH UR STRIP: 6 [PH] (ref 5–7)
PHOSPHATE SERPL-MCNC: 3.2 MG/DL (ref 2.5–4.5)
PLATELET # BLD AUTO: 157 10E3/UL (ref 150–450)
POTASSIUM BLD-SCNC: 4.5 MMOL/L (ref 3.4–5.3)
PROT UR-MCNC: 0.15 G/L
PROT/CREAT 24H UR: 0.17 G/G CR (ref 0–0.2)
RBC # BLD AUTO: 4.49 10E6/UL (ref 4.4–5.9)
RBC URINE: 1 /HPF
SODIUM SERPL-SCNC: 140 MMOL/L (ref 133–144)
SP GR UR STRIP: 1.01 (ref 1–1.03)
TRIGL SERPL-MCNC: 124 MG/DL
UROBILINOGEN UR STRIP-MCNC: NORMAL MG/DL
WBC # BLD AUTO: 8.6 10E3/UL (ref 4–11)
WBC URINE: 0 /HPF

## 2022-03-01 PROCEDURE — 81001 URINALYSIS AUTO W/SCOPE: CPT

## 2022-03-01 PROCEDURE — 84156 ASSAY OF PROTEIN URINE: CPT

## 2022-03-01 PROCEDURE — 84450 TRANSFERASE (AST) (SGOT): CPT

## 2022-03-01 PROCEDURE — 85025 COMPLETE CBC W/AUTO DIFF WBC: CPT

## 2022-03-01 PROCEDURE — 80061 LIPID PANEL: CPT

## 2022-03-01 PROCEDURE — 36415 COLL VENOUS BLD VENIPUNCTURE: CPT

## 2022-03-01 PROCEDURE — 84460 ALANINE AMINO (ALT) (SGPT): CPT

## 2022-03-01 PROCEDURE — 80069 RENAL FUNCTION PANEL: CPT

## 2022-03-01 PROCEDURE — 83036 HEMOGLOBIN GLYCOSYLATED A1C: CPT

## 2022-03-04 DIAGNOSIS — K59.00 CONSTIPATION: ICD-10-CM

## 2022-03-21 DIAGNOSIS — N40.0 BPH (BENIGN PROSTATIC HYPERPLASIA): Chronic | ICD-10-CM

## 2022-03-21 DIAGNOSIS — E78.5 HYPERLIPIDEMIA LDL GOAL <130: ICD-10-CM

## 2022-03-21 DIAGNOSIS — E03.9 ACQUIRED HYPOTHYROIDISM: Chronic | ICD-10-CM

## 2022-03-23 RX ORDER — FINASTERIDE 5 MG/1
TABLET, FILM COATED ORAL
Qty: 90 TABLET | Refills: 3 | Status: SHIPPED | OUTPATIENT
Start: 2022-03-23 | End: 2023-02-21

## 2022-03-23 RX ORDER — SIMVASTATIN 40 MG
TABLET ORAL
Qty: 90 TABLET | Refills: 3 | Status: SHIPPED | OUTPATIENT
Start: 2022-03-23 | End: 2023-02-21

## 2022-03-23 RX ORDER — LEVOTHYROXINE SODIUM 125 UG/1
125 TABLET ORAL DAILY
Qty: 90 TABLET | Refills: 3 | Status: SHIPPED | OUTPATIENT
Start: 2022-03-23 | End: 2023-02-21

## 2022-03-28 ENCOUNTER — VIRTUAL VISIT (OUTPATIENT)
Dept: NEPHROLOGY | Facility: CLINIC | Age: 67
End: 2022-03-28
Attending: INTERNAL MEDICINE
Payer: COMMERCIAL

## 2022-03-28 DIAGNOSIS — N02.B9 IGA NEPHROPATHY: Primary | ICD-10-CM

## 2022-03-28 PROCEDURE — 99214 OFFICE O/P EST MOD 30 MIN: CPT | Mod: 95 | Performed by: INTERNAL MEDICINE

## 2022-03-28 NOTE — LETTER
3/28/2022     RE: Geovani Bustos  [address]  [facility] [address]     Dear Colleague,    Thank you for referring your patient, Geovani Bustos, to the [facility] [place] at [facility]. Please see a copy of my visit note below.    Srinivas is a 66 year old who is being evaluated via a billable video visit.      How would you like to obtain your AVS? Mail a copy  If the video visit is dropped, the invitation should be resent by: Send to e-mail at: [email or web address]  Will anyone else be joining your video visit? No      Video Start Time: 9.06 am   Video-Visit Details    Type of service:  Video Visit    Video End Time: 9.12 am     Originating Location (pt. Location): Home    Distant Location (provider location):  [facility] [place]     Platform used for Video Visit: [facility]           Nephrology Clinic    Ginger Weiss MD  2022     Name: Geovani Bustos  MRN: [id number]  Age: 63 year old  : 1955  Referring provider: Referred Self                   Nephrology Initial Consult  2022      Geovani Bustos MRN:[id number] YOB: 1955  Date of Admission:(Not on file)  Primary care provider: Alexandru Vera  Requesting physician: Ginger Weiss, *      REASON FOR CONSULT: CKD stage 3, IgA nephropathy       HISTORY OF PRESENT ILLNESS:  Please refer to previous notes      PAST MEDICAL HISTORY:  Reviewed with patient on 2022   As per HPI    MEDICATIONS:  Reviewed with the patient in detail    ALLERGIES:    Reviewed with the patient in detail    REVIEW OF SYSTEMS:  A comprehensive of systems was negative except as noted above.    SOCIAL HISTORY:   Reviewed with patient, no smoking and no alcohol use     FAMILY MEDICAL HISTORY:   Reviewed, no family history of need for dialysis, transplant or CKD    PHYSICAL EXAM:   Vital signs:There were no vitals taken for  this visit.    Physical Exam    Interval History: He sometimes has LE edema and uses compression stockings. He did received COVID vaccines and has done well with that. He has been seeing his PCP regularly.       ASSESSMENT AND RECOMMENDATIONS:     # IgA Nephropathy  # CKD, stage 3  # LUTS - on finasteride  # Hypothyroidism        history of biopsy proven IgA nephropathy (4/2009, Dr Robbie Harris. As per note - kidney tissue looked altogether normal with exception of deposition of IgA in mesangium), treated conservatively. Has not had proteinuria over years. Low level microscopic hematuria on and off.He has maintained his blood pressures at a goal of < 140/90 mm of Hg most of the times without needing antihypertensives. His kidney function has been relatively stable and maintains a baseline creatinine of 1.5-1.7 mg/dl.     Plan:   Continue with conservative management including management of BP ( target < 130/80), avoiding nephrotoxins, may continue Fish oil.   Given lack of proteinuria and fairly low BP, no need to start RASi at this time but should have low threshold if blood pressure goes up or if there is detectable proteinuria.      Follow-up:  1 year       Ginger Weiss MD

## 2022-03-28 NOTE — PROGRESS NOTES
Srinivas is a 66 year old who is being evaluated via a billable video visit.      How would you like to obtain your AVS? Mail a copy  If the video visit is dropped, the invitation should be resent by: Send to e-mail at: [email or web address]  Will anyone else be joining your video visit? No      Video Start Time: 9.06 am   Video-Visit Details    Type of service:  Video Visit    Video End Time: 9.12 am     Originating Location (pt. Location): Home    Distant Location (provider location):  [facility] [place]     Platform used for Video Visit: [facility]           Nephrology Clinic    Ginger Weiss MD  2022     Name: Geovani Bustos  MRN: [id number]  Age: 63 year old  : 1955  Referring provider: Referred Self                   Nephrology Initial Consult  2022      Geovani Bustso MRN:[id number] YOB: 1955  Date of Admission:(Not on file)  Primary care provider: Alexandru Vera  Requesting physician: Ginger Weiss, *      REASON FOR CONSULT: CKD stage 3, IgA nephropathy       HISTORY OF PRESENT ILLNESS:  Please refer to previous notes      PAST MEDICAL HISTORY:  Reviewed with patient on 2022   As per HPI    MEDICATIONS:  Reviewed with the patient in detail    ALLERGIES:    Reviewed with the patient in detail    REVIEW OF SYSTEMS:  A comprehensive of systems was negative except as noted above.    SOCIAL HISTORY:   Reviewed with patient, no smoking and no alcohol use     FAMILY MEDICAL HISTORY:   Reviewed, no family history of need for dialysis, transplant or CKD    PHYSICAL EXAM:   Vital signs:There were no vitals taken for this visit.    Physical Exam    Interval History: He sometimes has LE edema and uses compression stockings. He did received COVID vaccines and has done well with that. He has been seeing his PCP regularly.       ASSESSMENT AND RECOMMENDATIONS:     # IgA Nephropathy  # CKD, stage 3  # LUTS - on finasteride  #  Hypothyroidism        history of biopsy proven IgA nephropathy (4/2009, Dr Robbie Harris. As per note - kidney tissue looked altogether normal with exception of deposition of IgA in mesangium), treated conservatively. Has not had proteinuria over years. Low level microscopic hematuria on and off.He has maintained his blood pressures at a goal of < 140/90 mm of Hg most of the times without needing antihypertensives. His kidney function has been relatively stable and maintains a baseline creatinine of 1.5-1.7 mg/dl.     Plan:   Continue with conservative management including management of BP ( target < 130/80), avoiding nephrotoxins, may continue Fish oil.   Given lack of proteinuria and fairly low BP, no need to start RASi at this time but should have low threshold if blood pressure goes up or if there is detectable proteinuria.        Follow-up:  1 year       Ginger Weiss MD

## 2022-04-09 DIAGNOSIS — Z11.59 ENCOUNTER FOR SCREENING FOR OTHER VIRAL DISEASES: Primary | ICD-10-CM

## 2022-04-18 DIAGNOSIS — K59.00 CONSTIPATION, UNSPECIFIED CONSTIPATION TYPE: ICD-10-CM

## 2022-04-18 DIAGNOSIS — E78.5 HYPERLIPIDEMIA LDL GOAL <130: ICD-10-CM

## 2022-04-18 RX ORDER — ASPIRIN 81 MG/1
TABLET, FILM COATED ORAL
Qty: 30 TABLET | Refills: 11 | Status: SHIPPED | OUTPATIENT
Start: 2022-04-18 | End: 2023-03-16

## 2022-04-18 RX ORDER — POLYETHYLENE GLYCOL 3350 17 G/17G
POWDER, FOR SOLUTION ORAL
Qty: 510 G | Refills: 11 | Status: SHIPPED | OUTPATIENT
Start: 2022-04-18 | End: 2023-05-05

## 2022-04-18 NOTE — TELEPHONE ENCOUNTER
-Prescription approved per [facility] Refill Protocol.    -Called and talked to the pharmacy and they stated this will be filled for next month.

## 2022-04-19 DIAGNOSIS — N32.81 OAB (OVERACTIVE BLADDER): ICD-10-CM

## 2022-04-19 RX ORDER — OXYBUTYNIN CHLORIDE 5 MG/1
TABLET ORAL
Qty: 180 TABLET | Refills: 0 | Status: SHIPPED | OUTPATIENT
Start: 2022-04-19 | End: 2022-06-27

## 2022-04-19 NOTE — TELEPHONE ENCOUNTER
Requested Prescriptions   Pending Prescriptions Disp Refills     oxybutynin (DITROPAN) 5 MG tablet 180 tablet 8     Sig: Take 2 tablets (10mg) by mouth 3 times daily       There is no refill protocol information for this order        Last office visit: Visit date not found with prescribing provider:  Dr. Zepeda    Future Office Visit:   Next 5 appointments (look out 90 days)    May 12, 2022 10:00 AM  (Arrive by 9:45 AM)  Return Visit with Roxy Borges PA-C  [facility] ([facility] ) [address] [place] [id number]  [phone]   May 16, 2022 11:00 AM  Pre-procedure Covid with [facility] COVID LAB  [facility] ([facility] ) [address] [place] [address]  [phone]   Jul 05, 2022 11:30 AM  Return Visit with BRAXTON Mack MD  [facility] ([facility] ) [address] [place] [id number]  [phone]               [facility]  [facility]

## 2022-04-19 NOTE — TELEPHONE ENCOUNTER
Patient to have follow-up visit 5/2022- has appointment scheduled for 7/5/2022    Refilled    Kerri GORMAN    Specialty Clinics - Flex RN

## 2022-05-12 ENCOUNTER — OFFICE VISIT (OUTPATIENT)
Dept: DERMATOLOGY | Facility: CLINIC | Age: 67
End: 2022-05-12
Payer: COMMERCIAL

## 2022-05-12 VITALS — SYSTOLIC BLOOD PRESSURE: 116 MMHG | DIASTOLIC BLOOD PRESSURE: 80 MMHG | HEART RATE: 73 BPM | OXYGEN SATURATION: 98 %

## 2022-05-12 DIAGNOSIS — H60.543 ECZEMA OF BOTH EXTERNAL EARS: Primary | ICD-10-CM

## 2022-05-12 PROCEDURE — 99213 OFFICE O/P EST LOW 20 MIN: CPT | Performed by: PHYSICIAN ASSISTANT

## 2022-05-12 RX ORDER — CLOBETASOL PROPIONATE 0.5 MG/G
OINTMENT TOPICAL
Qty: 30 G | Refills: 3 | Status: SHIPPED | OUTPATIENT
Start: 2022-05-12 | End: 2022-09-02

## 2022-05-12 RX ORDER — BETAMETHASONE DIPROPIONATE 0.5 MG/G
OINTMENT, AUGMENTED TOPICAL
Qty: 45 G | Refills: 5 | Status: CANCELLED | OUTPATIENT
Start: 2022-05-12

## 2022-05-12 NOTE — NURSING NOTE
"Initial /80   Pulse 73   SpO2 98%  Estimated body mass index is 26.91 kg/m  as calculated from the following:    Height as of 2/17/22: 1.727 m (5' 8\").    Weight as of 2/17/22: 80.3 kg (177 lb). .      "

## 2022-05-12 NOTE — LETTER
5/12/2022         RE: Geovani Bustos  [address]  [facility] [address]        Dear Colleague,    Thank you for referring your patient, Geovani Bustos, to the [facility]. Please see a copy of my visit note below.    Geovani Bustos is an extremely pleasant 66 year old year old male patient here today for recheck recheck eczema. He notes betamethasone doesn't seem to be as effective. He notes itchy and scaly.  Patient has no other skin complaints today.  Remainder of the HPI, Meds, PMH, Allergies, FH, and SH was reviewed in chart.    Past Medical History:   Diagnosis Date     Cerumen impaction      Chronic kidney disease      Colon polyps      Hyperlipidemia      Mitral valve prolapse      Schizophrenia (H)      Ulcerated penile lesions      VSD (ventricular septal defect)        Past Surgical History:   Procedure Laterality Date     ARTHRODESIS FOOT Right 1/19/2016    Procedure: ARTHRODESIS FOOT;  Surgeon: Holden Harrison DPM;  Location: [facility] OR     ARTHRODESIS FOOT Left 1/3/2017    Procedure: ARTHRODESIS FOOT;  Surgeon: Holden Harrison DPM;  Location: [facility] OR     SURGICAL HISTORY OF -       leg fracture        Family History   Problem Relation Age of Onset     Emphysema Mother      Coronary Artery Disease Father        Social History     Socioeconomic History     Marital status: Single     Spouse name: Not on file     Number of children: Not on file     Years of education: Not on file     Highest education level: Not on file   Occupational History     Not on file   Tobacco Use     Smoking status: Current Every Day Smoker     Packs/day: 1.00     Years: 47.00     Pack years: 47.00     Types: Cigarettes     Smokeless tobacco: Never Used     Tobacco comment: cutting one cigarette down a month   Substance and Sexual Activity     Alcohol use: No     Drug use: No     Sexual activity: Never   Other Topics Concern     Parent/sibling w/ CABG, MI or angioplasty before 65F 55M? Not  Asked      Service Not Asked     Blood Transfusions No     Caffeine Concern Not Asked     Occupational Exposure Not Asked     Hobby Hazards Not Asked     Sleep Concern Not Asked     Stress Concern Not Asked     Weight Concern Not Asked     Special Diet Not Asked     Back Care Not Asked     Exercise Not Asked     Bike Helmet Not Asked     Seat Belt Not Asked     Self-Exams Not Asked   Social History Narrative    Patient has no interest in smoking cessation.     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 Grew up in [place].      Social Determinants of Health     Financial Resource Strain: Not on file   Food Insecurity: Not on file   Transportation Needs: Not on file   Physical Activity: Not on file   Stress: Not on file   Social Connections: Not on file   Intimate Partner Violence: Not on file   Housing Stability: Not on file       Outpatient Encounter Medications as of 5/12/2022   Medication Sig Dispense Refill     ABILIFY 10 MG OR TABS Take 22 mg by mouth daily        ADULT ASPIRIN REGIMEN 81 MG EC tablet Take 1 tablet (81 mg) by mouth daily 30 tablet 11     augmented betamethasone dipropionate (DIPROLENE-AF) 0.05 % external ointment Apply to AA twice daily 1-2 weeks then as needed only. Do not apply to face. 45 g 5     BREO ELLIPTA 100-25 MCG/INH inhaler Inhale 1 puff into the lungs daily 1 each 4     clobetasol (TEMOVATE) 0.05 % external ointment Apply twice daily to ears for two weeks then 3 times weekly thereafter. 30 g 3     Disposable Gloves (LATEX GLOVES MEDIUM) MISC 4 boxes of gloves  For the application of topical medications 400 each 11     Disposable Gloves (NITRILE EXAM GLOVES MEDIUM) MISC 1 each as needed Use PRN daily with administration of otic drops, body lotion and powder to treat dry itchy skin. 4 each 11     EAR DROPS 6.5 % otic solution Place 5 drops into both ears 2 times daily For 5 days August 1-5th and Feb 1-5 th then return for irrigation after the  5th day. 15 mL 3     finasteride (PROSCAR) 5 MG tablet TAKE 1 TABLET BY MOUTH EVERY DAY 90 tablet 3     gabapentin (NEURONTIN) 100 MG capsule Take 100 mg by mouth 2 times daily       Incontinence Supply Disposable (INCONTINENCE BRIEF LARGE) MISC Incontinence pads up to 300 per month 300 each 1     levothyroxine (SYNTHROID/LEVOTHROID) 125 MCG tablet Take 1 tablet (125 mcg) by mouth daily 90 tablet 3     mupirocin (BACTROBAN) 2 % external ointment Apply topically 3 times daily 30 g 1     nicotine (NICODERM CQ) 14 MG/24HR 24 hr patch Place 1 patch onto the skin every 24 hours 30 patch 1     OLANZapine (ZYPREXA) 2.5 MG tablet        Omega-3 Fatty Acids (FISH OIL) 1200 MG capsule Take 1 capsule (1.2 g) by mouth daily 90 capsule 2     order for DME Equipment being ordered: callous pad 1 each 1     order for DME Equipment being ordered: Dynaflex insert 1 Units 0     oxybutynin (DITROPAN) 5 MG tablet Take 2 tablets (10mg) by mouth 3 times daily 180 tablet 0     polyethylene glycol (CLEARLAX) 17 GM/Dose powder Take 17 g (1 capful) by mouth daily 510 g 11     Psyllium (REGULOID) 48.57 % POWD Take 1 each by mouth See Admin Instructions Take 1 teaspoonful every day at 7pm and 9 pm. 369 g 11     salicylic acid 6 % external gel apply twice daily until callus is removed for up to 14 days. 240 g 1     simvastatin (ZOCOR) 40 MG tablet TAKE ONE TABLET BY MOUTH EVERY DAY AT 9 PM 90 tablet 3     Skin Protectants, Misc. (EUCERIN) cream Apply topically 2 times daily 454 g 11     tamsulosin (FLOMAX) 0.4 MG capsule Take 1 capsule (0.4 mg) by mouth daily 90 capsule 3     traZODone (DESYREL) 50 MG tablet Take 25 mg by mouth At Bedtime        VENTOLIN  (90 Base) MCG/ACT inhaler Inhale 2 puffs into the lungs every 6 hours as needed for shortness of breath / dyspnea or wheezing 18 g 1     ZOLOFT 100 MG OR TABS 2 TABLETS DAILY       Facility-Administered Encounter Medications as of 5/12/2022   Medication Dose Route Frequency Provider Last  Rate Last Admin     bupivacaine (MARCAINE) injection 0.5%    PRN Holden Harrison, DPM   10 mL at 01/03/17 0945             O:   NAD, WDWN, Alert & Oriented, Mood & Affect wnl, Vitals stable   Here today alone   /80   Pulse 73   SpO2 98%    General appearance normal   Vitals stable   Alert, oriented and in no acute distress      eczematous plaques on bandar bowl and entrance of canal     Eyes: Conjunctivae/lids:Normal     ENT: Lips,: normal    MSK:Normal    Pulm: Breathing Normal    Neuro/Psych: Orientation:Alert and Orientedx3 ; Mood/Affect:normal   A/P:  1. Ear eczema   Stop betamethasone.   Start clobetasol twice daily for two weeks then three times weekly.      Again, thank you for allowing me to participate in the care of your patient.        Sincerely,        Roxy Berry PA-C

## 2022-05-13 NOTE — H&P (VIEW-ONLY)
Geovani Bustos is an extremely pleasant 66 year old year old male patient here today for recheck recheck eczema. He notes betamethasone doesn't seem to be as effective. He notes itchy and scaly.  Patient has no other skin complaints today.  Remainder of the HPI, Meds, PMH, Allergies, FH, and SH was reviewed in chart.    Past Medical History:   Diagnosis Date     Cerumen impaction      Chronic kidney disease      Colon polyps      Hyperlipidemia      Mitral valve prolapse      Schizophrenia (H)      Ulcerated penile lesions      VSD (ventricular septal defect)        Past Surgical History:   Procedure Laterality Date     ARTHRODESIS FOOT Right 1/19/2016    Procedure: ARTHRODESIS FOOT;  Surgeon: Holden Harrison DPM;  Location: [facility]     ARTHRODESIS FOOT Left 1/3/2017    Procedure: ARTHRODESIS FOOT;  Surgeon: Holden Harrison DPM;  Location: [facility]     SURGICAL HISTORY OF -       leg fracture        Family History   Problem Relation Age of Onset     Emphysema Mother      Coronary Artery Disease Father        Social History     Socioeconomic History     Marital status: Single     Spouse name: Not on file     Number of children: Not on file     Years of education: Not on file     Highest education level: Not on file   Occupational History     Not on file   Tobacco Use     Smoking status: Current Every Day Smoker     Packs/day: 1.00     Years: 47.00     Pack years: 47.00     Types: Cigarettes     Smokeless tobacco: Never Used     Tobacco comment: cutting one cigarette down a month   Substance and Sexual Activity     Alcohol use: No     Drug use: No     Sexual activity: Never   Other Topics Concern     Parent/sibling w/ CABG, MI or angioplasty before 65F 55M? Not Asked      Service Not Asked     Blood Transfusions No     Caffeine Concern Not Asked     Occupational Exposure Not Asked     Hobby Hazards Not Asked     Sleep Concern Not Asked     Stress Concern Not Asked     Weight Concern Not Asked      Special Diet Not Asked     Back Care Not Asked     Exercise Not Asked     Bike Helmet Not Asked     Seat Belt Not Asked     Self-Exams Not Asked   Social History Narrative    Patient has no interest in smoking cessation.     Her for Special [employer] physical - likes to Bowl    Lives in Group home. Grew up in [place].      Social Determinants of Health     Financial Resource Strain: Not on file   Food Insecurity: Not on file   Transportation Needs: Not on file   Physical Activity: Not on file   Stress: Not on file   Social Connections: Not on file   Intimate Partner Violence: Not on file   Housing Stability: Not on file       Outpatient Encounter Medications as of 5/12/2022   Medication Sig Dispense Refill     ABILIFY 10 MG OR TABS Take 22 mg by mouth daily        ADULT ASPIRIN REGIMEN 81 MG EC tablet Take 1 tablet (81 mg) by mouth daily 30 tablet 11     augmented betamethasone dipropionate (DIPROLENE-AF) 0.05 % external ointment Apply to AA twice daily 1-2 weeks then as needed only. Do not apply to face. 45 g 5     BREO ELLIPTA 100-25 MCG/INH inhaler Inhale 1 puff into the lungs daily 1 each 4     clobetasol (TEMOVATE) 0.05 % external ointment Apply twice daily to ears for two weeks then 3 times weekly thereafter. 30 g 3     Disposable Gloves (LATEX GLOVES MEDIUM) MISC 4 boxes of gloves  For the application of topical medications 400 each 11     Disposable Gloves (NITRILE EXAM GLOVES MEDIUM) MISC 1 each as needed Use PRN daily with administration of otic drops, body lotion and powder to treat dry itchy skin. 4 each 11     EAR DROPS 6.5 % otic solution Place 5 drops into both ears 2 times daily For 5 days August 1-5th and Feb 1-5 th then return for irrigation after the 5th day. 15 mL 3     finasteride (PROSCAR) 5 MG tablet TAKE 1 TABLET BY MOUTH EVERY DAY 90 tablet 3     gabapentin (NEURONTIN) 100 MG capsule Take 100 mg by mouth 2 times daily       Incontinence Supply Disposable (INCONTINENCE BRIEF LARGE) MISC  Incontinence pads up to 300 per month 300 each 1     levothyroxine (SYNTHROID/LEVOTHROID) 125 MCG tablet Take 1 tablet (125 mcg) by mouth daily 90 tablet 3     mupirocin (BACTROBAN) 2 % external ointment Apply topically 3 times daily 30 g 1     nicotine (NICODERM CQ) 14 MG/24HR 24 hr patch Place 1 patch onto the skin every 24 hours 30 patch 1     OLANZapine (ZYPREXA) 2.5 MG tablet        Omega-3 Fatty Acids (FISH OIL) 1200 MG capsule Take 1 capsule (1.2 g) by mouth daily 90 capsule 2     order for DME Equipment being ordered: callous pad 1 each 1     order for DME Equipment being ordered: Dynaflex insert 1 Units 0     oxybutynin (DITROPAN) 5 MG tablet Take 2 tablets (10mg) by mouth 3 times daily 180 tablet 0     polyethylene glycol (CLEARLAX) 17 GM/Dose powder Take 17 g (1 capful) by mouth daily 510 g 11     Psyllium (REGULOID) 48.57 % POWD Take 1 each by mouth See Admin Instructions Take 1 teaspoonful every day at 7pm and 9 pm. 369 g 11     salicylic acid 6 % external gel apply twice daily until callus is removed for up to 14 days. 240 g 1     simvastatin (ZOCOR) 40 MG tablet TAKE ONE TABLET BY MOUTH EVERY DAY AT 9 PM 90 tablet 3     Skin Protectants, Misc. (EUCERIN) cream Apply topically 2 times daily 454 g 11     tamsulosin (FLOMAX) 0.4 MG capsule Take 1 capsule (0.4 mg) by mouth daily 90 capsule 3     traZODone (DESYREL) 50 MG tablet Take 25 mg by mouth At Bedtime        VENTOLIN  (90 Base) MCG/ACT inhaler Inhale 2 puffs into the lungs every 6 hours as needed for shortness of breath / dyspnea or wheezing 18 g 1     ZOLOFT 100 MG OR TABS 2 TABLETS DAILY       Facility-Administered Encounter Medications as of 5/12/2022   Medication Dose Route Frequency Provider Last Rate Last Admin     bupivacaine (MARCAINE) injection 0.5%    PRN Holden Harrison DPM   10 mL at 01/03/17 0945             O:   NAD, WDWN, Alert & Oriented, Mood & Affect wnl, Vitals stable   Here today alone   /80   Pulse 73   SpO2  98%    General appearance normal   Vitals stable   Alert, oriented and in no acute distress      eczematous plaques on bandar bowl and entrance of canal     Eyes: Conjunctivae/lids:Normal     ENT: Lips,: normal    MSK:Normal    Pulm: Breathing Normal    Neuro/Psych: Orientation:Alert and Orientedx3 ; Mood/Affect:normal   A/P:  1. Ear eczema   Stop betamethasone.   Start clobetasol twice daily for two weeks then three times weekly.

## 2022-05-13 NOTE — PROGRESS NOTES
Geovani Bustos is an extremely pleasant 66 year old year old male patient here today for recheck recheck eczema. He notes betamethasone doesn't seem to be as effective. He notes itchy and scaly.  Patient has no other skin complaints today.  Remainder of the HPI, Meds, PMH, Allergies, FH, and SH was reviewed in chart.    Past Medical History:   Diagnosis Date     Cerumen impaction      Chronic kidney disease      Colon polyps      Hyperlipidemia      Mitral valve prolapse      Schizophrenia (H)      Ulcerated penile lesions      VSD (ventricular septal defect)        Past Surgical History:   Procedure Laterality Date     ARTHRODESIS FOOT Right 1/19/2016    Procedure: ARTHRODESIS FOOT;  Surgeon: Holden Harrison DPM;  Location: [facility]     ARTHRODESIS FOOT Left 1/3/2017    Procedure: ARTHRODESIS FOOT;  Surgeon: Holden Harrison DPM;  Location: [facility]     SURGICAL HISTORY OF -       leg fracture        Family History   Problem Relation Age of Onset     Emphysema Mother      Coronary Artery Disease Father        Social History     Socioeconomic History     Marital status: Single     Spouse name: Not on file     Number of children: Not on file     Years of education: Not on file     Highest education level: Not on file   Occupational History     Not on file   Tobacco Use     Smoking status: Current Every Day Smoker     Packs/day: 1.00     Years: 47.00     Pack years: 47.00     Types: Cigarettes     Smokeless tobacco: Never Used     Tobacco comment: cutting one cigarette down a month   Substance and Sexual Activity     Alcohol use: No     Drug use: No     Sexual activity: Never   Other Topics Concern     Parent/sibling w/ CABG, MI or angioplasty before 65F 55M? Not Asked      Service Not Asked     Blood Transfusions No     Caffeine Concern Not Asked     Occupational Exposure Not Asked     Hobby Hazards Not Asked     Sleep Concern Not Asked     Stress Concern Not Asked     Weight Concern Not Asked      Special Diet Not Asked     Back Care Not Asked     Exercise Not Asked     Bike Helmet Not Asked     Seat Belt Not Asked     Self-Exams Not Asked   Social History Narrative    Patient has no interest in smoking cessation.     Her for Special [employer] physical - likes to Bowl    Lives in Group home. Grew up in [place].      Social Determinants of Health     Financial Resource Strain: Not on file   Food Insecurity: Not on file   Transportation Needs: Not on file   Physical Activity: Not on file   Stress: Not on file   Social Connections: Not on file   Intimate Partner Violence: Not on file   Housing Stability: Not on file       Outpatient Encounter Medications as of 5/12/2022   Medication Sig Dispense Refill     ABILIFY 10 MG OR TABS Take 22 mg by mouth daily        ADULT ASPIRIN REGIMEN 81 MG EC tablet Take 1 tablet (81 mg) by mouth daily 30 tablet 11     augmented betamethasone dipropionate (DIPROLENE-AF) 0.05 % external ointment Apply to AA twice daily 1-2 weeks then as needed only. Do not apply to face. 45 g 5     BREO ELLIPTA 100-25 MCG/INH inhaler Inhale 1 puff into the lungs daily 1 each 4     clobetasol (TEMOVATE) 0.05 % external ointment Apply twice daily to ears for two weeks then 3 times weekly thereafter. 30 g 3     Disposable Gloves (LATEX GLOVES MEDIUM) MISC 4 boxes of gloves  For the application of topical medications 400 each 11     Disposable Gloves (NITRILE EXAM GLOVES MEDIUM) MISC 1 each as needed Use PRN daily with administration of otic drops, body lotion and powder to treat dry itchy skin. 4 each 11     EAR DROPS 6.5 % otic solution Place 5 drops into both ears 2 times daily For 5 days August 1-5th and Feb 1-5 th then return for irrigation after the 5th day. 15 mL 3     finasteride (PROSCAR) 5 MG tablet TAKE 1 TABLET BY MOUTH EVERY DAY 90 tablet 3     gabapentin (NEURONTIN) 100 MG capsule Take 100 mg by mouth 2 times daily       Incontinence Supply Disposable (INCONTINENCE BRIEF LARGE) MISC  Incontinence pads up to 300 per month 300 each 1     levothyroxine (SYNTHROID/LEVOTHROID) 125 MCG tablet Take 1 tablet (125 mcg) by mouth daily 90 tablet 3     mupirocin (BACTROBAN) 2 % external ointment Apply topically 3 times daily 30 g 1     nicotine (NICODERM CQ) 14 MG/24HR 24 hr patch Place 1 patch onto the skin every 24 hours 30 patch 1     OLANZapine (ZYPREXA) 2.5 MG tablet        Omega-3 Fatty Acids (FISH OIL) 1200 MG capsule Take 1 capsule (1.2 g) by mouth daily 90 capsule 2     order for DME Equipment being ordered: callous pad 1 each 1     order for DME Equipment being ordered: Dynaflex insert 1 Units 0     oxybutynin (DITROPAN) 5 MG tablet Take 2 tablets (10mg) by mouth 3 times daily 180 tablet 0     polyethylene glycol (CLEARLAX) 17 GM/Dose powder Take 17 g (1 capful) by mouth daily 510 g 11     Psyllium (REGULOID) 48.57 % POWD Take 1 each by mouth See Admin Instructions Take 1 teaspoonful every day at 7pm and 9 pm. 369 g 11     salicylic acid 6 % external gel apply twice daily until callus is removed for up to 14 days. 240 g 1     simvastatin (ZOCOR) 40 MG tablet TAKE ONE TABLET BY MOUTH EVERY DAY AT 9 PM 90 tablet 3     Skin Protectants, Misc. (EUCERIN) cream Apply topically 2 times daily 454 g 11     tamsulosin (FLOMAX) 0.4 MG capsule Take 1 capsule (0.4 mg) by mouth daily 90 capsule 3     traZODone (DESYREL) 50 MG tablet Take 25 mg by mouth At Bedtime        VENTOLIN  (90 Base) MCG/ACT inhaler Inhale 2 puffs into the lungs every 6 hours as needed for shortness of breath / dyspnea or wheezing 18 g 1     ZOLOFT 100 MG OR TABS 2 TABLETS DAILY       Facility-Administered Encounter Medications as of 5/12/2022   Medication Dose Route Frequency Provider Last Rate Last Admin     bupivacaine (MARCAINE) injection 0.5%    PRN Holden Harrison DPM   10 mL at 01/03/17 0945             O:   NAD, WDWN, Alert & Oriented, Mood & Affect wnl, Vitals stable   Here today alone   /80   Pulse 73   SpO2  98%    General appearance normal   Vitals stable   Alert, oriented and in no acute distress      eczematous plaques on bandar bowl and entrance of canal     Eyes: Conjunctivae/lids:Normal     ENT: Lips,: normal    MSK:Normal    Pulm: Breathing Normal    Neuro/Psych: Orientation:Alert and Orientedx3 ; Mood/Affect:normal   A/P:  1. Ear eczema   Stop betamethasone.   Start clobetasol twice daily for two weeks then three times weekly.

## 2022-05-16 ENCOUNTER — LAB (OUTPATIENT)
Dept: LAB | Facility: CLINIC | Age: 67
End: 2022-05-16
Payer: COMMERCIAL

## 2022-05-16 DIAGNOSIS — Z11.59 ENCOUNTER FOR SCREENING FOR OTHER VIRAL DISEASES: ICD-10-CM

## 2022-05-16 PROCEDURE — U0003 INFECTIOUS AGENT DETECTION BY NUCLEIC ACID (DNA OR RNA); SEVERE ACUTE RESPIRATORY SYNDROME CORONAVIRUS 2 (SARS-COV-2) (CORONAVIRUS DISEASE [COVID-19]), AMPLIFIED PROBE TECHNIQUE, MAKING USE OF HIGH THROUGHPUT TECHNOLOGIES AS DESCRIBED BY CMS-2020-01-R: HCPCS

## 2022-05-16 PROCEDURE — U0005 INFEC AGEN DETEC AMPLI PROBE: HCPCS

## 2022-05-17 DIAGNOSIS — R39.9 LOWER URINARY TRACT SYMPTOMS (LUTS): ICD-10-CM

## 2022-05-17 LAB — SARS-COV-2 RNA RESP QL NAA+PROBE: NEGATIVE

## 2022-05-18 ENCOUNTER — ANESTHESIA EVENT (OUTPATIENT)
Dept: GASTROENTEROLOGY | Facility: CLINIC | Age: 67
End: 2022-05-18
Payer: COMMERCIAL

## 2022-05-18 RX ORDER — TAMSULOSIN HYDROCHLORIDE 0.4 MG/1
0.4 CAPSULE ORAL DAILY
Qty: 30 CAPSULE | Refills: 0 | Status: SHIPPED | OUTPATIENT
Start: 2022-05-18 | End: 2022-07-21

## 2022-05-18 ASSESSMENT — ENCOUNTER SYMPTOMS: DYSRHYTHMIAS: 1

## 2022-05-18 ASSESSMENT — COPD QUESTIONNAIRES: COPD: 1

## 2022-05-18 NOTE — TELEPHONE ENCOUNTER
Last Clinic Visit: 5-  NO APPOINTMENT SCHEDULED  Scheduling has been notified to contact the pt for appointment.

## 2022-05-18 NOTE — ANESTHESIA PREPROCEDURE EVALUATION
Anesthesia Pre-Procedure Evaluation    Patient: Geovani Bustos   MRN: [id number] : 1955        Procedure : Procedure(s):  COLONOSCOPY          Past Medical History:   Diagnosis Date     Cerumen impaction      Chronic kidney disease      Colon polyps      Hyperlipidemia      Mitral valve prolapse      Schizophrenia (H)      Ulcerated penile lesions      VSD (ventricular septal defect)       Past Surgical History:   Procedure Laterality Date     ARTHRODESIS FOOT Right 2016    Procedure: ARTHRODESIS FOOT;  Surgeon: Holden Harrison DPM;  Location: [facility]     ARTHRODESIS FOOT Left 1/3/2017    Procedure: ARTHRODESIS FOOT;  Surgeon: Holden Harrison DPM;  Location: [facility]     SURGICAL HISTORY OF -       leg fracture      Allergies   Allergen Reactions     Nitrogen Oxide      Sulfa Drugs       Social History     Tobacco Use     Smoking status: Current Every Day Smoker     Packs/day: 1.00     Years: 47.00     Pack years: 47.00     Types: Cigarettes     Smokeless tobacco: Never Used     Tobacco comment: cutting one cigarette down a month   Substance Use Topics     Alcohol use: No      Wt Readings from Last 1 Encounters:   22 80.3 kg (177 lb)        Anesthesia Evaluation   Pt has had prior anesthetic. Type: General and MAC.    No history of anesthetic complications       ROS/MED HX  ENT/Pulmonary:     (+) tobacco use, Current use, mild,  COPD,     Neurologic:  - neg neurologic ROS     Cardiovascular: Comment: VSD (ventricular septal defect)  Mitral valve prolapse        (+) -----dysrhythmias, Other, valvular problems/murmurs type: MVP congenital heart disease vental septal defect.     METS/Exercise Tolerance: >4 METS    Hematologic:  - neg hematologic  ROS     Musculoskeletal:  - neg musculoskeletal ROS     GI/Hepatic:     (+) GERD,     Renal/Genitourinary: Comment: IgA nephropathy    (+) renal disease, type: CRI,     Endo:     (+) thyroid problem, hypothyroidism,     Psychiatric/Substance Use:      (+) psychiatric history schizophrenia     Infectious Disease:  - neg infectious disease ROS     Malignancy:  - neg malignancy ROS     Other:            Physical Exam    Airway        Mallampati: II   TM distance: > 3 FB   Neck ROM: full   Mouth opening: > 3 cm    Respiratory Devices and Support         Dental       (+) upper dentures and lower dentures      Cardiovascular   cardiovascular exam normal       Rhythm and rate: regular and normal     Pulmonary   pulmonary exam normal        (+) decreased breath sounds           OUTSIDE LABS:  CBC:   Lab Results   Component Value Date    WBC 8.6 03/01/2022    WBC 9.8 08/25/2021    HGB 14.3 03/01/2022    HGB 15.1 08/25/2021    HCT 43.9 03/01/2022    HCT 44.7 08/25/2021     03/01/2022     08/25/2021     BMP:   Lab Results   Component Value Date     03/01/2022     08/25/2021    POTASSIUM 4.5 03/01/2022    POTASSIUM 4.3 08/25/2021    CHLORIDE 108 03/01/2022    CHLORIDE 105 08/25/2021    CO2 28 03/01/2022    CO2 31 08/25/2021    BUN 32 (H) 03/01/2022    BUN 27 08/25/2021    CR 1.42 (H) 03/01/2022    CR 1.53 (H) 08/25/2021    GLC 98 03/01/2022     (H) 08/25/2021     COAGS:   Lab Results   Component Value Date    INR 0.99 04/13/2009     POC: No results found for: BGM, HCG, HCGS  HEPATIC:   Lab Results   Component Value Date    ALBUMIN 3.5 03/01/2022    PROTTOTAL 7.1 11/15/2019    ALT 36 03/01/2022    AST 25 03/01/2022    ALKPHOS 116 11/15/2019    BILITOTAL 0.5 11/15/2019     OTHER:   Lab Results   Component Value Date    LACT 1.2 11/05/2014    A1C 5.5 03/01/2022    RAYRAY 8.6 03/01/2022    PHOS 3.2 03/01/2022    TSH 2.39 11/10/2021       Anesthesia Plan    ASA Status:  3   NPO Status:  NPO Appropriate    Anesthesia Type: General.     - Airway: Native airway   Induction: Propofol.   Maintenance: TIVA.        Consents    Anesthesia Plan(s) and associated risks, benefits, and realistic alternatives discussed. Questions answered and  patient/representative(s) expressed understanding.     - Discussed: Risks, Benefits and Alternatives for BOTH SEDATION and the PROCEDURE were discussed     - Discussed with:  Patient      - Extended Intubation/Ventilatory Support Discussed: No.      - Patient is DNR/DNI Status: No    Use of blood products discussed: No .     Postoperative Care       PONV prophylaxis: Ondansetron (or other 5HT-3)     Comments:                SAUL Cummings CRNA

## 2022-05-19 ENCOUNTER — HOSPITAL ENCOUNTER (OUTPATIENT)
Facility: CLINIC | Age: 67
Discharge: NURSING, REHAB OR LONG-TERM CARE | End: 2022-05-19
Attending: SURGERY | Admitting: SURGERY
Payer: COMMERCIAL

## 2022-05-19 ENCOUNTER — ANESTHESIA (OUTPATIENT)
Dept: GASTROENTEROLOGY | Facility: CLINIC | Age: 67
End: 2022-05-19
Payer: COMMERCIAL

## 2022-05-19 VITALS
HEIGHT: 67 IN | WEIGHT: 177 LBS | SYSTOLIC BLOOD PRESSURE: 144 MMHG | OXYGEN SATURATION: 94 % | RESPIRATION RATE: 16 BRPM | TEMPERATURE: 97.7 F | BODY MASS INDEX: 27.78 KG/M2 | DIASTOLIC BLOOD PRESSURE: 90 MMHG | HEART RATE: 67 BPM

## 2022-05-19 LAB — COLONOSCOPY: NORMAL

## 2022-05-19 PROCEDURE — 45385 COLONOSCOPY W/LESION REMOVAL: CPT | Mod: PT | Performed by: SURGERY

## 2022-05-19 PROCEDURE — 45380 COLONOSCOPY AND BIOPSY: CPT | Mod: PT | Performed by: SURGERY

## 2022-05-19 PROCEDURE — 250N000009 HC RX 250: Performed by: SURGERY

## 2022-05-19 PROCEDURE — 45380 COLONOSCOPY AND BIOPSY: CPT | Mod: PT,XU

## 2022-05-19 PROCEDURE — 88305 TISSUE EXAM BY PATHOLOGIST: CPT | Mod: TC | Performed by: SURGERY

## 2022-05-19 PROCEDURE — 250N000011 HC RX IP 250 OP 636: Performed by: NURSE ANESTHETIST, CERTIFIED REGISTERED

## 2022-05-19 PROCEDURE — 370N000017 HC ANESTHESIA TECHNICAL FEE, PER MIN: Performed by: SURGERY

## 2022-05-19 PROCEDURE — 258N000003 HC RX IP 258 OP 636: Performed by: SURGERY

## 2022-05-19 RX ORDER — SODIUM CHLORIDE, SODIUM LACTATE, POTASSIUM CHLORIDE, CALCIUM CHLORIDE 600; 310; 30; 20 MG/100ML; MG/100ML; MG/100ML; MG/100ML
INJECTION, SOLUTION INTRAVENOUS CONTINUOUS
Status: DISCONTINUED | OUTPATIENT
Start: 2022-05-19 | End: 2022-05-19 | Stop reason: HOSPADM

## 2022-05-19 RX ORDER — LIDOCAINE 40 MG/G
CREAM TOPICAL
Status: DISCONTINUED | OUTPATIENT
Start: 2022-05-19 | End: 2022-05-19 | Stop reason: HOSPADM

## 2022-05-19 RX ORDER — PROPOFOL 10 MG/ML
INJECTION, EMULSION INTRAVENOUS CONTINUOUS PRN
Status: DISCONTINUED | OUTPATIENT
Start: 2022-05-19 | End: 2022-05-19

## 2022-05-19 RX ORDER — PROPOFOL 10 MG/ML
INJECTION, EMULSION INTRAVENOUS PRN
Status: DISCONTINUED | OUTPATIENT
Start: 2022-05-19 | End: 2022-05-19

## 2022-05-19 RX ADMIN — LIDOCAINE HYDROCHLORIDE 50 MG: 10 INJECTION, SOLUTION EPIDURAL; INFILTRATION; INTRACAUDAL; PERINEURAL at 10:14

## 2022-05-19 RX ADMIN — LIDOCAINE HYDROCHLORIDE 0.1 ML: 10 INJECTION, SOLUTION EPIDURAL; INFILTRATION; INTRACAUDAL; PERINEURAL at 09:23

## 2022-05-19 RX ADMIN — SODIUM CHLORIDE, POTASSIUM CHLORIDE, SODIUM LACTATE AND CALCIUM CHLORIDE: 600; 310; 30; 20 INJECTION, SOLUTION INTRAVENOUS at 09:23

## 2022-05-19 RX ADMIN — PROPOFOL 200 MCG/KG/MIN: 10 INJECTION, EMULSION INTRAVENOUS at 10:14

## 2022-05-19 RX ADMIN — PROPOFOL 100 MG: 10 INJECTION, EMULSION INTRAVENOUS at 10:14

## 2022-05-19 ASSESSMENT — COPD QUESTIONNAIRES: CAT_SEVERITY: MILD

## 2022-05-19 ASSESSMENT — LIFESTYLE VARIABLES: TOBACCO_USE: 1

## 2022-05-19 NOTE — LETTER
Geovani Bustos  [id number] [facility] [address]    May 25, 2022    Dear Geovani,  This letter is written to inform you of the results of your recent colonoscopy.  Your examination showed polyp(s) in your descending colon, sigmoid colon and rectum. All polyps were removed in their entirety and sent for review by a pathologist. As you will see on the pathology report below, the tissue(s) were tubular adenomatous polyps, hyperplastic polyps and actually lymphoid aggregates. Your examination was otherwise without abnormality.    Final Diagnosis   A.  Colon, descending, polypectomy-  Nonneoplastic colonic mucosa with microscopic lymphoid aggregates    B.  Colon, sigmoid, polypectomy-  Tubular adenoma, no evidence of high grade dysplasia or invasive malignancy    C.  Rectum, polypectomy-  Hyperplastic polyp     Diverticulosis can be described as small outpouchings (pockets) in your colon wall. This is an entirely benign (non-cancerous) finding.    Lymphoid aggregates are collections of normal immune cells. This is a benign (non-cancerous) finding.    Adenomatous polyps are entirely benign (non-cancerous); however, patients who have developed these polyps are at an increased risk for developing additional polyps in the future. If these are not eventually removed, there is a risk of developing colon cancer. We will advise more frequent examinations with you because of the risk associated with this type of polyp.    Hyperplastic polyps are entirely benign (non-cancerous) and rarely associated with the development of additional polyps or colorectal cancer.    Given these findings, your personal history of colon polyps , I recommend that you undergo a repeat colonoscopy in 5 year(s) for surveillance. We will enter you into a recall system so you receive a reminder closer to the time that you are due for repeat examination.     Please remember that this recommendation is made with the understanding that you are not  experiencing persistent changes in bowel function, bleeding per rectum, and/or significant abdominal pain. If you experience these symptoms, please contact your primary care provider for a further evaluation.     If you have any questions or concerns about the results of your colonoscopy or the appropriate follow-up, please contact my assistant at [phone].    Sincerely,        [facility] Nelson,   [facility]  ___

## 2022-05-19 NOTE — ANESTHESIA POSTPROCEDURE EVALUATION
Patient: Geovani Bustos    Procedure: Procedure(s):  COLONOSCOPY, FLEXIBLE, WITH LESION REMOVAL USING SNARE       Anesthesia Type:  General    Note:  Disposition: Outpatient   Postop Pain Control: Uneventful            Sign Out: Well controlled pain   PONV: No   Neuro/Psych: Uneventful            Sign Out: Acceptable/Baseline neuro status   Airway/Respiratory: Uneventful            Sign Out: Acceptable/Baseline resp. status   CV/Hemodynamics: Uneventful            Sign Out: Acceptable CV status; No obvious hypovolemia; No obvious fluid overload   Other NRE: NONE   DID A NON-ROUTINE EVENT OCCUR? No           Last vitals:  Vitals Value Taken Time   /90 05/19/22 1116   Temp     Pulse 78 05/19/22 1116   Resp 16 05/19/22 1100   SpO2 93 % 05/19/22 1119   Vitals shown include unvalidated device data.    Electronically Signed By: Subhash Lakhani CRNA, APRN CRNA  May 19, 2022  11:19 AM

## 2022-05-19 NOTE — ANESTHESIA CARE TRANSFER NOTE
Patient: Geovani Bustos    Procedure: Procedure(s):  COLONOSCOPY, FLEXIBLE, WITH LESION REMOVAL USING SNARE       Diagnosis: Colon cancer screening [Z12.11]  Diagnosis Additional Information: No value filed.    Anesthesia Type:   General     Note:    Oropharynx: oropharynx clear of all foreign objects and spontaneously breathing  Level of Consciousness: awake  Oxygen Supplementation: nasal cannula  Level of Supplemental Oxygen (L/min / FiO2): 3  Independent Airway: airway patency satisfactory and stable  Dentition: dentition unchanged  Vital Signs Stable: post-procedure vital signs reviewed and stable  Report to RN Given: handoff report given  Patient transferred to: PACU    Handoff Report: Identifed the Patient, Identified the Reponsible Provider, Reviewed the pertinent medical history, Discussed the surgical course, Reviewed Intra-OP anesthesia mangement and issues during anesthesia, Set expectations for post-procedure period and Allowed opportunity for questions and acknowledgement of understanding      Vitals:  Vitals Value Taken Time   BP     Temp     Pulse     Resp     SpO2         Electronically Signed By: Subhash Lakhani CRNA, APRN CRNA  May 19, 2022  10:49 AM

## 2022-05-19 NOTE — INTERVAL H&P NOTE
"I have reviewed the surgical (or preoperative) H&P that is linked to this encounter, and examined the patient. There are no significant changes    last colonoscopy 2017 - hx of colon polyps; no blood thinner; no famhx of colon cancer    Clinical Conditions Present on Arrival:  Clinically Significant Risk Factors Present on Admission                # Platelet Defect: home medication list includes an antiplatelet medication  # Overweight: Estimated body mass index is 27.72 kg/m  as calculated from the following:    Height as of this encounter: 1.702 m (5' 7\").    Weight as of this encounter: 80.3 kg (177 lb).     [facility] Nelson, DO    "

## 2022-05-22 ENCOUNTER — NURSE TRIAGE (OUTPATIENT)
Dept: NURSING | Facility: CLINIC | Age: 67
End: 2022-05-22
Payer: COMMERCIAL

## 2022-05-22 ENCOUNTER — HOSPITAL ENCOUNTER (EMERGENCY)
Facility: CLINIC | Age: 67
Discharge: HOME | End: 2022-05-22
Attending: PHYSICIAN ASSISTANT | Admitting: PHYSICIAN ASSISTANT
Payer: COMMERCIAL

## 2022-05-22 VITALS
OXYGEN SATURATION: 96 % | BODY MASS INDEX: 27.72 KG/M2 | TEMPERATURE: 97.2 F | DIASTOLIC BLOOD PRESSURE: 81 MMHG | SYSTOLIC BLOOD PRESSURE: 139 MMHG | WEIGHT: 177 LBS | RESPIRATION RATE: 18 BRPM | HEART RATE: 67 BPM
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
## 2022-05-22 DIAGNOSIS — S30.861A TICK BITE OF ABDOMEN, INITIAL ENCOUNTER: ICD-10-CM

## 2022-05-22 PROCEDURE — G0463 HOSPITAL OUTPT CLINIC VISIT: HCPCS | Performed by: PHYSICIAN ASSISTANT

## 2022-05-22 PROCEDURE — 99213 OFFICE O/P EST LOW 20 MIN: CPT | Performed by: PHYSICIAN ASSISTANT

## 2022-05-22 PROCEDURE — 250N000013 HC RX MED GY IP 250 OP 250 PS 637: Performed by: PHYSICIAN ASSISTANT

## 2022-05-22 RX ORDER — DOXYCYCLINE 100 MG/1
100 CAPSULE ORAL ONCE
Status: COMPLETED | OUTPATIENT
Start: 2022-05-22 | End: 2022-05-22

## 2022-05-22 RX ORDER — DOXYCYCLINE 100 MG/1
100 CAPSULE ORAL 2 TIMES DAILY
Qty: 28 CAPSULE | Refills: 0 | Status: SHIPPED | OUTPATIENT
Start: 2022-05-22 | End: 2022-06-05

## 2022-05-22 RX ADMIN — DOXYCYCLINE HYCLATE 100 MG: 100 CAPSULE ORAL at 17:21

## 2022-05-22 ASSESSMENT — ENCOUNTER SYMPTOMS: CONSTITUTIONAL NEGATIVE: 1

## 2022-05-22 NOTE — ED TRIAGE NOTES
'tick on abdomen, thinks head still in     Triage Assessment     Row Name 05/22/22 9177       Triage Assessment (Adult)    Airway WDL WDL       Respiratory WDL    Respiratory WDL WDL       Skin Circulation/Temperature WDL    Skin Circulation/Temperature WDL WDL

## 2022-05-22 NOTE — ED PROVIDER NOTES
History     Chief Complaint   Patient presents with     Tick Removal     Head still in     HPI  Geovani Bustos is a 66 year old male with history of schizophrenia, CKD stage III, mild COPD who presents with complaints of tick bite to the abdomen.  Patient states he removed a deer tick from his upper abdomen yesterday.  He is unsure how long it was attached.  He has since developed a red circular bull's-eye appearing rash around the bite site.  Patient denies any systemic symptoms.  Denies fevers, chills, headache, or body aches.      Allergies:  Allergies   Allergen Reactions     Nitrogen Oxide      Sulfa Drugs        Problem List:    Patient Active Problem List    Diagnosis Date Noted     CKD (chronic kidney disease) stage 3, GFR 30-59 ml/min (H) 08/08/2018     Priority: Medium     Stage 1 mild COPD by GOLD classification (H) 07/27/2018     Priority: Medium     Gastroesophageal reflux disease without esophagitis 12/21/2016     Priority: Medium     Cigarette nicotine dependence with nicotine-induced disorder 10/05/2016     Priority: Medium     Incomplete right bundle branch block (RBBB) 01/13/2016     Priority: Medium     Tinea pedis, unspecified laterality 12/01/2015     Priority: Medium     Bunion of great toe of right foot 11/12/2015     Priority: Medium     Eczema of external ear, bilateral 11/12/2015     Priority: Medium     Bilateral impacted cerumen 10/12/2015     Priority: Medium     Health Care Home 11/12/2014     Priority: Medium     Status:  Unable to reach  Care Coordinator:  Lavinia Samson    See Letters for [facility] Care Plan  Date:  November 12, 2014         BPH (benign prostatic hyperplasia) 12/07/2011     Priority: Medium     Improved with med       Hyperlipidemia LDL goal <130 10/20/2010     Priority: Medium              Proteinuria 03/22/2010     Priority: Medium     Refer to Dr. Harris       IgA nephropathy 03/11/2010     Priority: Medium     Consulted Dr. Harris, last seen 4/2010, very modest renal  dysfunction and microscopic hematuria, recent measurement of proteinuria was within normal range, ise ACE inhibitor if develops HTN. Prognosis with regard to   his IgA nephropathy is thought to be excellent        Acquired hypothyroidism 10/08/2009     Priority: Medium     Colon polyps      Priority: Medium     Due in 2017       Schizophrenia (H)      Priority: Medium     VSD (ventricular septal defect)      Priority: Medium     Mitral valve prolapse      Priority: Medium     3/2010 -Echo   The visual ejection fraction is estimated at 55-60%.                 Past Medical History:    Past Medical History:   Diagnosis Date     Cerumen impaction      Chronic kidney disease      Colon polyps      Hyperlipidemia      Mitral valve prolapse      Schizophrenia (H)      Ulcerated penile lesions      VSD (ventricular septal defect)        Past Surgical History:    Past Surgical History:   Procedure Laterality Date     ARTHRODESIS FOOT Right 1/19/2016    Procedure: ARTHRODESIS FOOT;  Surgeon: Holden Harrison DPM;  Location: [facility]     ARTHRODESIS FOOT Left 1/3/2017    Procedure: ARTHRODESIS FOOT;  Surgeon: Holden Harrison DPM;  Location: [facility]     COLONOSCOPY N/A 5/19/2022    Procedure: COLONOSCOPY, FLEXIBLE, WITH LESION REMOVAL USING SNARE;  Surgeon: Ame Nelson MD;  Location: [facility]     SURGICAL HISTORY OF -       leg fracture       Family History:    Family History   Problem Relation Age of Onset     Emphysema Mother      Coronary Artery Disease Father        Social History:  Marital Status:  Single [1]  Social History     Tobacco Use     Smoking status: Current Every Day Smoker     Packs/day: 1.00     Years: 47.00     Pack years: 47.00     Types: Cigarettes     Smokeless tobacco: Never Used     Tobacco comment: cutting one cigarette down a month   Substance Use Topics     Alcohol use: No     Drug use: No        Medications:    doxycycline hyclate (VIBRAMYCIN) 100 MG capsule  ABILIFY 10 MG OR TABS  ADULT  ASPIRIN REGIMEN 81 MG EC tablet  augmented betamethasone dipropionate (DIPROLENE-AF) 0.05 % external ointment  BREO ELLIPTA 100-25 MCG/INH inhaler  clobetasol (TEMOVATE) 0.05 % external ointment  Disposable Gloves (LATEX GLOVES MEDIUM) MISC  Disposable Gloves (NITRILE EXAM GLOVES MEDIUM) MISC  EAR DROPS 6.5 % otic solution  finasteride (PROSCAR) 5 MG tablet  gabapentin (NEURONTIN) 100 MG capsule  Incontinence Supply Disposable (INCONTINENCE BRIEF LARGE) MISC  levothyroxine (SYNTHROID/LEVOTHROID) 125 MCG tablet  mupirocin (BACTROBAN) 2 % external ointment  OLANZapine (ZYPREXA) 2.5 MG tablet  Omega-3 Fatty Acids (FISH OIL) 1200 MG capsule  order for DME  order for DME  oxybutynin (DITROPAN) 5 MG tablet  polyethylene glycol (CLEARLAX) 17 GM/Dose powder  Psyllium (REGULOID) 48.57 % POWD  salicylic acid 6 % external gel  simvastatin (ZOCOR) 40 MG tablet  Skin Protectants, Misc. (EUCERIN) cream  tamsulosin (FLOMAX) 0.4 MG capsule  traZODone (DESYREL) 50 MG tablet  VENTOLIN  (90 Base) MCG/ACT inhaler  ZOLOFT 100 MG OR TABS          Review of Systems   Constitutional: Negative.    Skin:        Tick bite to abdomen with bull's-eye rash   All other systems reviewed and are negative.      Physical Exam   BP: 139/81  Pulse: 67  Temp: 97.2  F (36.2  C)  Resp: 18  Weight: 80.3 kg (177 lb)  SpO2: 96 %      Physical Exam  Constitutional:       General: He is not in acute distress.     Appearance: Normal appearance. He is well-developed. He is not ill-appearing, toxic-appearing or diaphoretic.   HENT:      Head: Normocephalic and atraumatic.   Eyes:      Conjunctiva/sclera: Conjunctivae normal.   Cardiovascular:      Pulses: Normal pulses.   Pulmonary:      Effort: Pulmonary effort is normal.   Musculoskeletal:         General: Normal range of motion.      Cervical back: Neck supple.   Skin:     General: Skin is warm and dry.      Capillary Refill: Capillary refill takes less than 2 seconds.      Comments: Tick bite site to  central upper abdomen with surrounding erythema with central clearing.  No fluctuance or drainage.   Neurological:      Mental Status: He is alert.      Sensory: Sensation is intact.      Motor: Motor function is intact.   Psychiatric:         Behavior: Behavior is cooperative.         ED Course                 Procedures    No results found for this or any previous visit (from the past 24 hour(s)).    Medications   doxycycline hyclate (VIBRAMYCIN) capsule 100 mg (has no administration in time range)       Assessments & Plan (with Medical Decision Making)     Pt is a 66 year old male with history of schizophrenia, CKD stage III, mild COPD who presents with complaints of tick bite to the abdomen.  Patient states he removed a deer tick from his upper abdomen yesterday.  He is unsure how long it was attached.  He has since developed a red circular bull's-eye appearing rash around the bite site.  No associated infectious symptoms.    Pt is afebrile on arrival.  Exam as above.  Erythema migrans rash on exam following tick bite.  Return precautions were reviewed.  Hand-outs were provided.    Patient was sent with doxycycline and was instructed to follow-up with PCP in 3-5 days for continued care and management or sooner if new or worsening symptoms.  He is to return to the ED for persistent and/or worsening symptoms.  Patient expressed understanding of the diagnosis and plan and was discharged home in good condition.    I have reviewed the nursing notes.    I have reviewed the findings, diagnosis, plan and need for follow up with the patient.    New Prescriptions    DOXYCYCLINE HYCLATE (VIBRAMYCIN) 100 MG CAPSULE    Take 1 capsule (100 mg) by mouth 2 times daily for 14 days       Final diagnoses:   Tick bite of abdomen, initial encounter       5/22/2022   [facility] EMERGENCY DEPT      Disclaimer:  This note consists of symbols derived from keyboarding, dictation and/or voice recognition software.  As a  result, there may be errors in the script that have gone undetected.  Please consider this when interpreting information found in this chart.     Cinthia Samson PA-C  05/22/22 [id number]

## 2022-05-22 NOTE — TELEPHONE ENCOUNTER
"Group home staff calling with a spot that patient showed them. There is a red spot with a bright red [place] around it. Patient says he pulled a wood tick off of the spot yesterday.     No fever, headache, widespread rash. Patient states is hurts, 5/10. Greyish in the center, pink around it about half an inch.    Per protocol, patient should be seen within next 24 hours. Gave care advice and disposition to staff. Transferred to scheduling line.    Glendy Tong RN  [place] Nurse Advisor  2:44 PM  5/22/2022    COVID 19 Nurse Triage Plan/Patient Instructions    Please be aware that novel coronavirus (COVID-19) may be circulating in the community. If you develop symptoms such as fever, cough, or SOB or if you have concerns about the presence of another infection including coronavirus (COVID-19), please contact your health care provider or visit [email or web address].     Disposition/Instructions    Additional COVID19 information to add for patients.   How can I protect others?  If you have symptoms (fever, cough, body aches or trouble breathing): Stay home and away from others (self-isolate) until:    At least 10 days have passed since your symptoms started, And     You ve had no fever--and no medicine that reduces fever--for 1 full day (24 hours), And      Your other symptoms have resolved (gotten better).     If you don t have symptoms, but a test showed that you have COVID-19 (you tested positive):    Stay home and away from others (self-isolate). Follow the tips under \"How do I self-isolate?\" below for 10 days (20 days if you have a weak immune system).    You don't need to be retested for COVID-19 before going back to school or work. As long as you're fever-free and feeling better, you can go back to school, work and other activities after waiting the 10 or 20 days.     How do I self-isolate?    Stay in your own room, even for meals. Use your own bathroom if you can.     Stay away from others in your home. " No hugging, kissing or shaking hands. No visitors.    Don t go to work, school or anywhere else.     Clean  high touch  surfaces often (doorknobs, counters, handles, etc.). Use a household cleaning spray or wipes. You ll find a full list on the EPA website:  www.epa.gov/pesticide-registration/list-n-disinfectants-use-against-sars-cov-2.    Cover your mouth and nose with a mask, tissue or washcloth to avoid spreading germs.    Wash your hands and face often. Use soap and water.    Caregivers in these groups are at risk for severe illness due to COVID-19:  o People 65 years and older  o People who live in a nursing home or long-term care facility  o People with chronic disease (lung, heart, cancer, diabetes, kidney, liver, immunologic)  o People who have a weakened immune system, including those who:  - Are in cancer treatment  - Take medicine that weakens the immune system, such as corticosteroids  - Had a bone marrow or organ transplant  - Have an immune deficiency  - Have poorly controlled HIV or AIDS  - Are obese (body mass index of 40 or higher)  - Smoke regularly    Caregivers should wear gloves while washing dishes, handling laundry and cleaning bedrooms and bathrooms.    Use caution when washing and drying laundry: Don t shake dirty laundry, and use the warmest water setting that you can.    For more tips, go to www.cdc.gov/coronavirus/2019-ncov/downloads/10Things.pdf.    How can I take care of myself?  1. Get lots of rest. Drink extra fluids (unless a doctor has told you not to).     2. Take Tylenol (acetaminophen) for fever or pain. If you have liver or kidney problems, ask your family doctor if it s okay to take Tylenol.     Adults can take either:     650 mg (two 325 mg pills) every 4 to 6 hours, or     1,000 mg (two 500 mg pills) every 8 hours as needed.     Note: Don t take more than 3,000 mg in one day.   Acetaminophen is found in many medicines (both prescribed and over-the-counter medicines). Read all  labels to be sure you don t take too much.     For children, check the Tylenol bottle for the right dose. The dose is based on the child s age or weight.    3. If you have other health problems (like cancer, heart failure, an organ transplant or severe kidney disease): Call your specialty clinic if you don t feel better in the next 2 days.    4. Know when to call 911: Emergency warning signs include:    Trouble breathing or shortness of breath    Pain or pressure in the chest that doesn t go away    Feeling confused like you haven t felt before, or not being able to wake up    Bluish-colored lips or face    What are the symptoms of COVID-19?     The most common symptoms are cough, fever and trouble breathing.     Less common symptoms include body aches, chills, diarrhea (loose, watery poops), fatigue (feeling very tired), headache, runny nose, sore throat and loss of smell.    COVID-19 can cause severe coughing (bronchitis) and lung infection (pneumonia).    How does it spread?     The virus may spread when a person coughs or sneezes into the air. The virus can travel about 6 feet this way, and it can live on surfaces.      Common  (household disinfectants) will kill the virus.    Who is at risk?  Anyone can catch COVID-19 if they re around someone who has the virus.    How can others protect themselves?     Stay away from people who have COVID-19 (or symptoms of COVID-19).    Wash hands often with soap and water. Or, use hand  with at least 60% alcohol.    Avoid touching the eyes, nose or mouth.     Wear a face mask when you go out in public, when sick or when caring for a sick person.    Where can I get more information?     [facility]: About COVID-19: [email or web address]    CDC: What to Do If You re Sick: www.cdc.gov/coronavirus/2019-ncov/about/steps-when-sick.html    CDC: Ending Home Isolation: www.cdc.gov/coronavirus/2019-ncov/hcp/disposition-in-home-patients.html     CDC:  Caring for Someone: www.cdc.gov/coronavirus/2019-ncov/if-you-are-sick/care-for-someone.html     [facility]: Interim Guidance for Hospital Discharge to Home: www.[facility]/diseases/coronavirus/hcp/hospdischarge.pdf    [facility] clinical trials (COVID-19 research studies): clinicalaffairs.[facility]-clinical-trials     Below are the COVID-19 hotlines at the [place] Department [facility] ([facility]). Interpreters are available.   o For health questions: Call 987-016-9481 or 1-402.236.8871 (7 a.m. to 7 p.m.)  o For questions about schools and childcare: Call 684-392-2102 or 1-858.438.3258 (7 a.m. to 7 p.m.)          Thank you for taking steps to prevent the spread of this virus.  o Limit your contact with others.  o Wear a simple mask to cover your cough.  o Wash your hands well and often.    Resources    [facility]: About COVID-19: [email or web address]    CDC: What to Do If You're Sick: www.cdc.gov/coronavirus/2019-ncov/about/steps-when-sick.html    CDC: Ending Home Isolation: www.cdc.gov/coronavirus/2019-ncov/hcp/disposition-in-home-patients.html     CDC: Caring for Someone: www.cdc.gov/coronavirus/2019-ncov/if-you-are-sick/care-for-someone.html     [facility]: Interim Guidance for Hospital Discharge to Home: www.[facility]./diseases/coronavirus/hcp/hospdischarge.pdf    [facility] clinical trials (COVID-19 research studies): clinicalaffairs.[facility]-clinical-trials     Below are the COVID-19 hotlines at the [place] [facility] ([facility]). Interpreters are available.   o For health questions: Call 007-547-1403 or 1-229.892.3057 (7 a.m. to 7 p.m.)  o For questions about schools and childcare: Call 848-708-9912 or 1-819.248.1302 (7 a.m. to 7 p.m.)                         Reason for Disposition    Can't remove tick's head that was broken off in the skin (after trying Care Advice)    Additional Information    Negative: Sounds like a life-threatening emergency to the  triager    Negative: Not a tick bite    Negative: [1] 2 to 14 days following tick bite AND [2] severe headache with fever occurs    Negative: [1] 2 to 14 days following tick bite AND [2] widespread rash with fever occurs    Negative: Patient sounds very sick or weak to the triager    Negative: [1] Fever AND [2] red area    Negative: [1] Fever AND [2] area is very tender to touch    Negative: [1] Red streak or red line AND [2] length > 2 inches (5 cm)    Negative: Can't remove live tick (after trying Care Advice)    Protocols used: TICK BITE-A-

## 2022-05-24 LAB
PATH REPORT.COMMENTS IMP SPEC: NORMAL
PATH REPORT.COMMENTS IMP SPEC: NORMAL
PATH REPORT.FINAL DX SPEC: NORMAL
PATH REPORT.GROSS SPEC: NORMAL
PATH REPORT.MICROSCOPIC SPEC OTHER STN: NORMAL
PATH REPORT.RELEVANT HX SPEC: NORMAL
PHOTO IMAGE: NORMAL

## 2022-05-24 PROCEDURE — 88305 TISSUE EXAM BY PATHOLOGIST: CPT | Mod: 26 | Performed by: PATHOLOGY

## 2022-05-31 NOTE — PROGRESS NOTES
CARDIOLOGY VISIT    REASON FOR VISIT: VSD, mitral valve prolapse    SUBJECTIVE:  66-year-old male seen for VSD and mitral valve prolapse.  He has CKD secondary to IgA nephropathy, dyslipidemia, hypothyroid, schizophrenia, history of tobacco use.     Echo 2010 showed EF 60%, membranous VSD.     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     Abdominal ultrasound March 2021 showed normal-sized abdominal aorta, mild atherosclerotic changes.    Echo May 2021 showed EF 55%, normal RV, mild bileaflet mitral valve prolapse with 2+ MR, aorta 3.9 cm.    He has been feeling well recently.  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  He has some mild exertional dyspnea, but no chest pain.  He has some mild lower extremity edema which is chronic and unchanged.  He denies any palpitations.    MEDICATIONS:  Current Outpatient Medications   Medication     ABILIFY 10 MG OR TABS     ADULT ASPIRIN REGIMEN 81 MG EC tablet     augmented betamethasone dipropionate (DIPROLENE-AF) 0.05 % external ointment     BREO ELLIPTA 100-25 MCG/INH inhaler     clobetasol (TEMOVATE) 0.05 % external ointment     Disposable Gloves (LATEX GLOVES MEDIUM) MISC     Disposable Gloves (NITRILE EXAM GLOVES MEDIUM) MISC     doxycycline hyclate (VIBRAMYCIN) 100 MG capsule     EAR DROPS 6.5 % otic solution     finasteride (PROSCAR) 5 MG tablet     gabapentin (NEURONTIN) 100 MG capsule     Incontinence Supply Disposable (INCONTINENCE BRIEF LARGE) MISC     levothyroxine (SYNTHROID/LEVOTHROID) 125 MCG tablet     mupirocin (BACTROBAN) 2 % external ointment     OLANZapine (ZYPREXA) 2.5 MG tablet     Omega-3 Fatty Acids (FISH OIL) 1200 MG capsule     order for DME     order for DME     oxybutynin (DITROPAN) 5 MG tablet     polyethylene glycol (CLEARLAX) 17 GM/Dose powder     Psyllium (REGULOID) 48.57 % POWD     salicylic acid 6 % external gel     simvastatin (ZOCOR) 40 MG  tablet     Skin Protectants, Misc. (EUCERIN) cream     tamsulosin (FLOMAX) 0.4 MG capsule     traZODone (DESYREL) 50 MG tablet     VENTOLIN  (90 Base) MCG/ACT inhaler     ZOLOFT 100 MG OR TABS     No current facility-administered medications for this visit.     Facility-Administered Medications Ordered in Other Visits   Medication     bupivacaine (MARCAINE) injection 0.5%       ALLERGIES:  Allergies   Allergen Reactions     Nitrogen Oxide      Sulfa Drugs        REVIEW OF SYSTEMS:  Constitutional:  No weight loss, fever, chills  HEENT:  Eyes:  No visual loss, blurred vision, double vision or yellow sclerae. No hearing loss, sneezing, congestion, runny nose or sore throat.  Skin:  No rash or itching.  Cardiovascular: per HPI  Respiratory: per HPI  GI:  No anorexia, nausea, vomiting or diarrhea. No abdominal pain or blood.  :  No dysurea, hematuria  Neurologic:  No headache, paralysis, ataxia, numbness or tingling in the extremities. No change in bowel or bladder control.  Musculoskeletal:  No muscle pain  Hematologic:  No bleeding or bruising.  Lymphatics:  No enlarged nodes. No history of splenectomy.  Endocrine:  No reports of sweating, cold or heat intolerance. No polyuria or polydipsia.  Allergies:  No history of asthma, hives, eczema or rhinitis.    PHYSICAL EXAM:  /75   Pulse 76   Temp 97.9  F (36.6  C) (Tympanic)   Wt 81.4 kg (179 lb 6.4 oz)   SpO2 91%   BMI 28.10 kg/m      Constitutional: awake, alert, no distress  Eyes: PERRL, sclera nonicteric  ENT: trachea midline  Respiratory: Lungs clear  Cardiovascular: Regular rate and rhythm, 2/6 systolic murmur at the apex  GI: nondistended, nontender, bowel sounds present  Lymph/Hematologic: no lymphadenopathy  Skin: dry, no rash  Musculoskeletal: good muscle tone, strength 5/5 in upper and lower extremities  Neurologic: no focal deficits  Neuropsychiatric: appropriate affact    DATA:  Lab: March 2022: Potassium 4.5, creatinine 1.4  Recent Labs    Lab Test 03/01/22  0843 02/15/21  1209 01/04/16  0850 01/12/15  0841   CHOL 141 167   < > 172   HDL 51 56   < > 36*   LDL 65 87   < > 81   TRIG 124 122   < > 274*   CHOLHDLRATIO  --   --   --  4.8    < > = values in this interval not displayed.     ASSESSMENT:  66-year-old male seen for mitral valve prolapse and VSD.  He had 2+ MR last year on echo.  He has no new cardiac symptoms.  Echo will be repeated next year.    RECOMMENDATIONS:  1.  Mitral valve prolapse with moderate mitral regurgitation and membranous VSD  -Repeat echo in 1 year    Follow-up with [facility] in 1 year after echo.    Peewee Lott MD  Cardiology - [facility]  Pager:  [phone]  Text Page  June 2, 2022

## 2022-06-02 ENCOUNTER — OFFICE VISIT (OUTPATIENT)
Dept: CARDIOLOGY | Facility: CLINIC | Age: 67
End: 2022-06-02
Payer: COMMERCIAL

## 2022-06-02 VITALS
BODY MASS INDEX: 28.1 KG/M2 | OXYGEN SATURATION: 91 % | SYSTOLIC BLOOD PRESSURE: 116 MMHG | WEIGHT: 179.4 LBS | HEART RATE: 76 BPM | TEMPERATURE: 97.9 F | DIASTOLIC BLOOD PRESSURE: 75 MMHG

## 2022-06-02 DIAGNOSIS — I34.0 NONRHEUMATIC MITRAL VALVE REGURGITATION: Primary | ICD-10-CM

## 2022-06-02 PROCEDURE — 99214 OFFICE O/P EST MOD 30 MIN: CPT | Performed by: INTERNAL MEDICINE

## 2022-06-02 NOTE — LETTER
6/2/2022    Alexandru Vera MD  [address] [address]    RE: Geovani Bustos       Dear Colleague,     I had the pleasure of seeing Geovani Bustos in the [facility].  CARDIOLOGY VISIT    REASON FOR VISIT: VSD, mitral valve prolapse    SUBJECTIVE:  66-year-old male seen for VSD and mitral valve prolapse.  He has CKD secondary to IgA nephropathy, dyslipidemia, hypothyroid, schizophrenia, history of tobacco use.     Echo 2010 showed EF 60%, membranous VSD.     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     Abdominal ultrasound March 2021 showed normal-sized abdominal aorta, mild atherosclerotic changes.    Echo May 2021 showed EF 55%, normal RV, mild bileaflet mitral valve prolapse with 2+ MR, aorta 3.9 cm.    He has been feeling well recently.  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  He has some mild exertional dyspnea, but no chest pain.  He has some mild lower extremity edema which is chronic and unchanged.  He denies any palpitations.    MEDICATIONS:  Current Outpatient Medications   Medication     ABILIFY 10 MG OR TABS     ADULT ASPIRIN REGIMEN 81 MG EC tablet     augmented betamethasone dipropionate (DIPROLENE-AF) 0.05 % external ointment     BREO ELLIPTA 100-25 MCG/INH inhaler     clobetasol (TEMOVATE) 0.05 % external ointment     Disposable Gloves (LATEX GLOVES MEDIUM) MISC     Disposable Gloves (NITRILE EXAM GLOVES MEDIUM) MISC     doxycycline hyclate (VIBRAMYCIN) 100 MG capsule     EAR DROPS 6.5 % otic solution     finasteride (PROSCAR) 5 MG tablet     gabapentin (NEURONTIN) 100 MG capsule     Incontinence Supply Disposable (INCONTINENCE BRIEF LARGE) MISC     levothyroxine (SYNTHROID/LEVOTHROID) 125 MCG tablet     mupirocin (BACTROBAN) 2 % external ointment     OLANZapine (ZYPREXA) 2.5 MG tablet     Omega-3 Fatty Acids (FISH OIL) 1200 MG capsule     order for DME      order for DME     oxybutynin (DITROPAN) 5 MG tablet     polyethylene glycol (CLEARLAX) 17 GM/Dose powder     Psyllium (REGULOID) 48.57 % POWD     salicylic acid 6 % external gel     simvastatin (ZOCOR) 40 MG tablet     Skin Protectants, Misc. (EUCERIN) cream     tamsulosin (FLOMAX) 0.4 MG capsule     traZODone (DESYREL) 50 MG tablet     VENTOLIN  (90 Base) MCG/ACT inhaler     ZOLOFT 100 MG OR TABS     No current facility-administered medications for this visit.     Facility-Administered Medications Ordered in Other Visits   Medication     bupivacaine (MARCAINE) injection 0.5%       ALLERGIES:  Allergies   Allergen Reactions     Nitrogen Oxide      Sulfa Drugs        REVIEW OF SYSTEMS:  Constitutional:  No weight loss, fever, chills  HEENT:  Eyes:  No visual loss, blurred vision, double vision or yellow sclerae. No hearing loss, sneezing, congestion, runny nose or sore throat.  Skin:  No rash or itching.  Cardiovascular: per HPI  Respiratory: per HPI  GI:  No anorexia, nausea, vomiting or diarrhea. No abdominal pain or blood.  :  No dysurea, hematuria  Neurologic:  No headache, paralysis, ataxia, numbness or tingling in the extremities. No change in bowel or bladder control.  Musculoskeletal:  No muscle pain  Hematologic:  No bleeding or bruising.  Lymphatics:  No enlarged nodes. No history of splenectomy.  Endocrine:  No reports of sweating, cold or heat intolerance. No polyuria or polydipsia.  Allergies:  No history of asthma, hives, eczema or rhinitis.    PHYSICAL EXAM:  /75   Pulse 76   Temp 97.9  F (36.6  C) (Tympanic)   Wt 81.4 kg (179 lb 6.4 oz)   SpO2 91%   BMI 28.10 kg/m      Constitutional: awake, alert, no distress  Eyes: PERRL, sclera nonicteric  ENT: trachea midline  Respiratory: Lungs clear  Cardiovascular: Regular rate and rhythm, 2/6 systolic murmur at the apex  GI: nondistended, nontender, bowel sounds present  Lymph/Hematologic: no lymphadenopathy  Skin: dry, no  rash  Musculoskeletal: good muscle tone, strength 5/5 in upper and lower extremities  Neurologic: no focal deficits  Neuropsychiatric: appropriate affact    DATA:  Lab: March 2022: Potassium 4.5, creatinine 1.4  Recent Labs   Lab Test 03/01/22  0843 02/15/21  1209 01/04/16  0850 01/12/15  0841   CHOL 141 167   < > 172   HDL 51 56   < > 36*   LDL 65 87   < > 81   TRIG 124 122   < > 274*   CHOLHDLRATIO  --   --   --  4.8    < > = values in this interval not displayed.     ASSESSMENT:  66-year-old male seen for mitral valve prolapse and VSD.  He had 2+ MR last year on echo.  He has no new cardiac symptoms.  Echo will be repeated next year.    RECOMMENDATIONS:  1.  Mitral valve prolapse with moderate mitral regurgitation and membranous VSD  -Repeat echo in 1 year    Follow-up with HARVINDER in 1 year after echo.    Peewee Lott MD  Cardiology - [facility]  Pager:  [phone]  Text Page  June 2, 2022  Thank you for allowing me to participate in the care of your patient.      Sincerely,     Peewee Lott MD     [facility]  cc:   Peewee Lott MD  [facility]  [address] [place] [address]

## 2022-06-06 ENCOUNTER — TRANSFERRED RECORDS (OUTPATIENT)
Dept: CARDIOLOGY | Facility: CLINIC | Age: 67
End: 2022-06-06

## 2022-06-09 ENCOUNTER — TELEPHONE (OUTPATIENT)
Dept: FAMILY MEDICINE | Facility: CLINIC | Age: 67
End: 2022-06-09
Payer: COMMERCIAL

## 2022-06-09 NOTE — TELEPHONE ENCOUNTER
Reason for Call:  Other    Detailed comments: Pt's Emergency Contact [facility] calling because pt has a dentist appointment in December, and the dentist office has record that when pt got his teeth pulled a few years ago he was prescribed an antibiotic 1000mg. Dentist office stated to pt that this prescription needs to be either removed from pt's records or added to his record before they can have his appointment in December.    Phone Number Patient can be reached at: Home number on file [phone] (home)    Best Time: anytime    Can we leave a detailed message on this number? YES    Call taken on 6/9/2022 at 10:56 AM by Vicenta Marrero

## 2022-06-09 NOTE — LETTER
June 9, 2022      Geovani Bustos  [address] [facility] [address]        To Whom It May Concern:      Considering Geovani's medical history, antimicrobial prophylaxis before dental procedure not indicated as per current guidelines.      Sincerely,        Alexandru Vera MD

## 2022-06-09 NOTE — TELEPHONE ENCOUNTER
Has mitral valve prolapse on his problem list. Does he need premedication prior to dental work? No surgical history.

## 2022-06-09 NOTE — TELEPHONE ENCOUNTER
darlene Long mgdenise for pt, calls back & states she will need a letter from provider that states ok to discontinue the abx amoxicillin. Needs documentation for pt file.  Can fax letter to [phone]    Routing to provider for review.    Tasha Santiago RN

## 2022-06-09 NOTE — LETTER
June 9, 2022      [place] [facility]ustos  [address] [facility] [address]        To Whom It May Concern:    Geovani Bustos does not need to take antibiotics prior to dental work.      Sincerely,        Alexandru Vera MD

## 2022-06-09 NOTE — TELEPHONE ENCOUNTER
Considering patient's medical history, antimicrobial prophylaxis before dental procedure not indicated as per current guidelines.    Dr Vera

## 2022-06-13 DIAGNOSIS — H61.23 BILATERAL IMPACTED CERUMEN: ICD-10-CM

## 2022-06-13 DIAGNOSIS — J41.0 SIMPLE CHRONIC BRONCHITIS (H): ICD-10-CM

## 2022-06-13 RX ORDER — ADHESIVE BANDAGE 3/4"
BANDAGE TOPICAL
Qty: 15 ML | Refills: 3 | Status: SHIPPED | OUTPATIENT
Start: 2022-06-13 | End: 2023-11-14

## 2022-06-21 DIAGNOSIS — N32.81 OAB (OVERACTIVE BLADDER): ICD-10-CM

## 2022-06-27 RX ORDER — OXYBUTYNIN CHLORIDE 5 MG/1
TABLET ORAL
Qty: 180 TABLET | Refills: 0 | Status: SHIPPED | OUTPATIENT
Start: 2022-06-27 | End: 2022-07-21

## 2022-07-05 ENCOUNTER — OFFICE VISIT (OUTPATIENT)
Dept: FAMILY MEDICINE | Facility: CLINIC | Age: 67
End: 2022-07-05
Payer: COMMERCIAL

## 2022-07-05 ENCOUNTER — VIRTUAL VISIT (OUTPATIENT)
Dept: UROLOGY | Facility: CLINIC | Age: 67
End: 2022-07-05

## 2022-07-05 VITALS
DIASTOLIC BLOOD PRESSURE: 80 MMHG | TEMPERATURE: 97.6 F | WEIGHT: 178 LBS | HEART RATE: 75 BPM | RESPIRATION RATE: 18 BRPM | HEIGHT: 67 IN | SYSTOLIC BLOOD PRESSURE: 118 MMHG | BODY MASS INDEX: 27.94 KG/M2 | OXYGEN SATURATION: 96 %

## 2022-07-05 DIAGNOSIS — Z87.891 PERSONAL HISTORY OF TOBACCO USE: Primary | ICD-10-CM

## 2022-07-05 DIAGNOSIS — R21 RASH OF BOTH HANDS: ICD-10-CM

## 2022-07-05 DIAGNOSIS — R39.15 URINARY URGENCY: Primary | ICD-10-CM

## 2022-07-05 PROCEDURE — 99213 OFFICE O/P EST LOW 20 MIN: CPT | Performed by: FAMILY MEDICINE

## 2022-07-05 PROCEDURE — G0296 VISIT TO DETERM LDCT ELIG: HCPCS | Performed by: FAMILY MEDICINE

## 2022-07-05 ASSESSMENT — PAIN SCALES - GENERAL: PAINLEVEL: NO PAIN (0)

## 2022-07-05 NOTE — NURSING NOTE
"Chief Complaint   Patient presents with     Hand Problem     /80 (Cuff Size: Adult Regular)   Pulse 75   Temp 97.6  F (36.4  C) (Tympanic)   Resp 18   Ht 1.702 m (5' 7\")   Wt 80.7 kg (178 lb)   SpO2 96%   BMI 27.88 kg/m   Estimated body mass index is 27.88 kg/m  as calculated from the following:    Height as of this encounter: 1.702 m (5' 7\").    Weight as of this encounter: 80.7 kg (178 lb).  Patient presents to the clinic using No DME      Health Maintenance that is potentially due pending provider review:    Health Maintenance Due   Topic Date Due     ZOSTER IMMUNIZATION (2 of 3) 04/12/2016     COVID-19 Vaccine (2 - Pfizer series) 11/08/2021     LUNG CANCER SCREENING  08/30/2022                "

## 2022-07-05 NOTE — PATIENT INSTRUCTIONS
Lung Cancer Screening   Frequently Asked Questions  If you are at high-risk for lung cancer, getting screened with low-dose computed tomography (LDCT) every year can help save your life. This handout offers answers to some of the most common questions about lung cancer screening. If you have other questions, please call [phone] ([phone]).     What is it?  Lung cancer screening uses special X-ray technology to create an image of your lung tissue. The exam is quick and easy and takes less than 10 seconds. We don t give you any medicine or use any needles. You can eat before and after the exam. You don t need to change your clothes as long as the clothing on your chest doesn t contain metal. But, you do need to be able to hold your breath for at least 6 seconds during the exam.    What is the goal of lung cancer screening?  The goal of lung cancer screening is to save lives. Many times, lung cancer is not found until a person starts having physical symptoms. Lung cancer screening can help detect lung cancer in the earliest stages when it may be easier to treat.    Who should be screened for lung cancer?  We suggest lung cancer screening for anyone who is at high-risk for lung cancer. You are in the high-risk group if you:      are between the ages of 55 and 79, and    have smoked at least 1 pack of cigarettes a day for 20 or more years, and    still smoke or have quit within the past 15 years.    However, if you have a new cough or shortness of breath, you should talk to your doctor before being screened.    Why does it matter if I have symptoms?  Certain symptoms can be a sign that you have a condition in your lungs that should be checked and treated by your doctor. These symptoms include fever, chest pain, a new or changing cough, shortness of breath that you have never felt before, coughing up blood or unexplained weight loss. Having any of these symptoms can greatly affect the results of lung  cancer screening.       Should all smokers get an LDCT lung cancer screening exam?  It depends. Lung cancer screening is for a very specific group of men and women who have a history of heavy smoking over a long period of time (see  Who should be screened for lung cancer  above).  I am in the high-risk group, but have been diagnosed with cancer in the past. Is LDCT lung cancer screening right for me?  In some cases, you should not have LDCT lung screening, such as when your doctor is already following your cancer with CT scan studies. Your doctor will help you decide if LDCT lung screening is right for you.  Do I need to have a screening exam every year?  Yes. If you are in the high-risk group described earlier, you should get an LDCT lung cancer screening exam every year until you are 79, or are no longer willing or able to undergo screening and possible procedures to diagnose and treat lung cancer.  How effective is LDCT at preventing death from lung cancer?  Studies have shown that LDCT lung cancer screening can lower the risk of death from lung cancer by 20 percent in people who are at high-risk.  What are the risks?  There are some risks and limitations of LDCT lung cancer screening. We want to make sure you understand the risks and benefits, so please let us know if you have any questions. Your doctor may want to talk with you more about these risks.    Radiation exposure: As with any exam that uses radiation, there is a very small increased risk of cancer. The amount of radiation in LDCT is small--about the same amount a person would get from a mammogram. Your doctor orders the exam when he or she feels the potential benefits outweigh the risks.    False negatives: No test is perfect, including LDCT. It is possible that you may have a medical condition, including lung cancer, that is not found during your exam. This is called a false negative result.    False positives and more testing: LDCT very often finds  something in the lung that could be cancer, but in fact is not. This is called a false positive result. False positive tests often cause anxiety. To make sure these findings are not cancer, you may need to have more tests. These tests will be done only if you give us permission. Sometimes patients need a treatment that can have side effects, such as a biopsy. For more information on false positives, see  What can I expect from the results?     Findings not related to lung cancer: Your LDCT exam also takes pictures of areas of your body next to your lungs. In a very small number of cases, the CT scan will show an abnormal finding in one of these areas, such as your kidneys, adrenal glands, liver or thyroid. This finding may not be serious, but you may need more tests. Your doctor can help you decide what other tests you may need, if any.  What can I expect from the results?  About 1 out of 4 LDCT exams will find something that may need more tests. Most of the time, these findings are lung nodules. Lung nodules are very small collections of tissue in the lung. These nodules are very common, and the vast majority--more than 97 percent--are not cancer (benign). Most are normal lymph nodes or small areas of scarring from past infections.  But, if a small lung nodule is found to be cancer, the cancer can be cured more than 90 percent of the time. To know if the nodule is cancer, we may need to get more images before your next yearly screening exam. If the nodule has suspicious features (for example, it is large, has an odd shape or grows over time), we will refer you to a specialist for further testing.  Will my doctor also get the results?  Yes. Your doctor will get a copy of your results.  Is it okay to keep smoking now that there s a cancer screening exam?  No. Tobacco is one of the strongest cancer-causing agents. It causes not only lung cancer, but other cancers and cardiovascular (heart) diseases as well. The damage  caused by smoking builds over time. This means that the longer you smoke, the higher your risk of disease. While it is never too late to quit, the sooner you quit, the better.  Where can I find help to quit smoking?  The best way to prevent lung cancer is to stop smoking. If you have already quit smoking, congratulations and keep it up! For help on quitting smoking, please call [employer] at 1-656-QUITNOW (1-738.732.8819) or the American Cancer Society at 1-297.539.9534 to find local resources near you.  One-on-one health coaching:  If you d prefer to work individually with a health care provider on tobacco cessation, we offer:      Medication Therapy Management:  Our specially trained pharmacists work closely with you and your doctor to help you quit smoking.  Call [phone] or [phone] (toll free).

## 2022-07-05 NOTE — PROGRESS NOTES
"  Assessment & Plan     Personal history of tobacco use  Smoking cessation counseling provided, associated health hazards explained in detail, not ready to quit currently.  Low-dose CT chest ordered for lung cancer screening  - Prof fee: Shared Decision Making for Lung Cancer Screening  - CT Chest Lung Cancer Scrn Low Dose wo; Future    Rash of both hands  Was likely due to doxycycline related photosensitivity, resolved completely.  Reassurance provided and recommended to use sunscreen as well        Alexandru Vera MD  [facility]    Markell Espino is a 66 year old accompanied by his group home staff., presenting for the following health issues:  Hand Problem      HPI     Concern - Hands   Onset: 2-3 weeks ago  Description: both hands were swollen and red, now much better, was taking doxycycline last month  Intensity: mild  Progression of Symptoms:  improving  Accompanying Signs & Symptoms: no fever, chills or other relevant systemic symptoms  Previous history of similar problem: no  Therapies tried and outcome: ice water        Review of Systems   Constitutional, HEENT, cardiovascular, pulmonary, gi and gu systems are negative, except as otherwise noted.      Objective    /80 (Cuff Size: Adult Regular)   Pulse 75   Temp 97.6  F (36.4  C) (Tympanic)   Resp 18   Ht 1.702 m (5' 7\")   Wt 80.7 kg (178 lb)   SpO2 96%   BMI 27.88 kg/m    Body mass index is 27.88 kg/m .  Physical Exam   GENERAL: alert and no distress  EYES: Eyes grossly normal to inspection, PERRL and conjunctivae and sclerae normal  HENT: normal cephalic/atraumatic, nose and mouth without ulcers or lesions, oropharynx clear and oral mucous membranes moist  NECK: no adenopathy, no asymmetry, masses, or scars and thyroid normal to palpation  RESP: lungs clear to auscultation - no rales, rhonchi or wheezes  CV: regular rates and rhythm, normal S1 S2, no S3 or S4 and no murmur, click or rub  MS: no gross " musculoskeletal defects noted, no edema  SKIN: no suspicious lesions or rashes  NEURO: Normal strength and tone, mentation intact and speech normal  PSYCH: mentation appears normal, affect normal/bright        Lung Cancer Screening Shared Decision Making Visit     Geovani Bustos, a 66 year old male, is eligible for lung cancer screening    History   Smoking Status     Current Every Day Smoker     Packs/day: 1.00     Years: 47.00     Types: Cigarettes   Smokeless Tobacco     Never Used     Comment: cutting one cigarette down a month       I have discussed with patient the risks and benefits of screening for lung cancer with low-dose CT.     The risks include:    radiation exposure: one low dose chest CT has as much ionizing radiation as about 15 chest x-rays, or 6 months of background radiation living in [place]      false positives: most findings/nodules are NOT cancer, but some might still require additional diagnostic evaluation, including biopsy    over-diagnosis: some slow growing cancers that might never have been clinically significant will be detected and treated unnecessarily     The benefit of early detection of lung cancer is contingent upon adherence to annual screening or more frequent follow up if indicated.     Furthermore, to benefit from screening, Geovani must be willing and able to undergo diagnostic procedures, if indicated. Although no specific guide is available for determining severity of comorbidities, it is reasonable to withhold screening in patients who have greater mortality risk from other diseases.     We did discuss that the best way to prevent lung cancer is to not smoke.    Some patients may value a numeric estimation of lung cancer risk when evaluating if lung cancer screening is right for them, here is one calculator:    ShouldIScreen

## 2022-07-18 DIAGNOSIS — N32.81 OAB (OVERACTIVE BLADDER): ICD-10-CM

## 2022-07-18 DIAGNOSIS — R39.9 LOWER URINARY TRACT SYMPTOMS (LUTS): ICD-10-CM

## 2022-07-21 DIAGNOSIS — E78.5 HYPERLIPIDEMIA LDL GOAL <130: ICD-10-CM

## 2022-07-21 RX ORDER — AMOXICILLIN 500 MG
1 CAPSULE ORAL DAILY
Qty: 90 CAPSULE | Refills: 2 | Status: SHIPPED | OUTPATIENT
Start: 2022-07-21 | End: 2023-04-20

## 2022-07-21 RX ORDER — OXYBUTYNIN CHLORIDE 5 MG/1
10 TABLET ORAL 3 TIMES DAILY
Qty: 180 TABLET | Refills: 0 | Status: SHIPPED | OUTPATIENT
Start: 2022-07-21 | End: 2022-07-26

## 2022-07-21 RX ORDER — TAMSULOSIN HYDROCHLORIDE 0.4 MG/1
0.4 CAPSULE ORAL DAILY
Qty: 30 CAPSULE | Refills: 0 | Status: SHIPPED | OUTPATIENT
Start: 2022-07-21 | End: 2022-07-26

## 2022-07-26 ENCOUNTER — OFFICE VISIT (OUTPATIENT)
Dept: UROLOGY | Facility: CLINIC | Age: 67
End: 2022-07-26
Payer: COMMERCIAL

## 2022-07-26 VITALS
SYSTOLIC BLOOD PRESSURE: 116 MMHG | OXYGEN SATURATION: 95 % | RESPIRATION RATE: 20 BRPM | DIASTOLIC BLOOD PRESSURE: 79 MMHG | HEART RATE: 66 BPM

## 2022-07-26 DIAGNOSIS — N32.81 OAB (OVERACTIVE BLADDER): ICD-10-CM

## 2022-07-26 DIAGNOSIS — R39.9 LOWER URINARY TRACT SYMPTOMS (LUTS): ICD-10-CM

## 2022-07-26 LAB
ALBUMIN UR-MCNC: NEGATIVE MG/DL
APPEARANCE UR: CLEAR
BILIRUB UR QL STRIP: NEGATIVE
COLOR UR AUTO: YELLOW
GLUCOSE UR STRIP-MCNC: NEGATIVE MG/DL
HGB UR QL STRIP: ABNORMAL
KETONES UR STRIP-MCNC: NEGATIVE MG/DL
LEUKOCYTE ESTERASE UR QL STRIP: NEGATIVE
NITRATE UR QL: NEGATIVE
PH UR STRIP: 6 [PH] (ref 5–7)
RBC #/AREA URNS AUTO: NORMAL /HPF
SP GR UR STRIP: 1.01 (ref 1–1.03)
UROBILINOGEN UR STRIP-ACNC: 0.2 E.U./DL
WBC #/AREA URNS AUTO: NORMAL /HPF

## 2022-07-26 PROCEDURE — 81001 URINALYSIS AUTO W/SCOPE: CPT | Performed by: UROLOGY

## 2022-07-26 PROCEDURE — 51798 US URINE CAPACITY MEASURE: CPT | Performed by: UROLOGY

## 2022-07-26 PROCEDURE — 87086 URINE CULTURE/COLONY COUNT: CPT | Performed by: UROLOGY

## 2022-07-26 PROCEDURE — 99213 OFFICE O/P EST LOW 20 MIN: CPT | Mod: 25 | Performed by: UROLOGY

## 2022-07-26 RX ORDER — OXYBUTYNIN CHLORIDE 5 MG/1
10 TABLET ORAL 3 TIMES DAILY
Qty: 180 TABLET | Refills: 0 | Status: SHIPPED | OUTPATIENT
Start: 2022-07-26 | End: 2022-07-26

## 2022-07-26 RX ORDER — TAMSULOSIN HYDROCHLORIDE 0.4 MG/1
0.4 CAPSULE ORAL DAILY
Qty: 30 CAPSULE | Refills: 0 | Status: SHIPPED | OUTPATIENT
Start: 2022-07-26 | End: 2022-07-26

## 2022-07-26 RX ORDER — OXYBUTYNIN CHLORIDE 5 MG/1
10 TABLET ORAL 3 TIMES DAILY
Qty: 180 TABLET | Refills: 11 | Status: SHIPPED | OUTPATIENT
Start: 2022-07-26 | End: 2022-11-16

## 2022-07-26 RX ORDER — MIRABEGRON 50 MG/1
50 TABLET, EXTENDED RELEASE ORAL DAILY
Qty: 90 TABLET | Refills: 3 | Status: SHIPPED | OUTPATIENT
Start: 2022-07-26 | End: 2023-05-12

## 2022-07-26 RX ORDER — TAMSULOSIN HYDROCHLORIDE 0.4 MG/1
0.4 CAPSULE ORAL DAILY
Qty: 90 CAPSULE | Refills: 0 | Status: SHIPPED | OUTPATIENT
Start: 2022-07-26 | End: 2022-10-18

## 2022-07-26 NOTE — PROGRESS NOTES
Appointment source: Established Patient  Patient name: Geovani Bustos  Urology Staff: Johnson Mack MD    Subjective: This is a 66 year old year old male returning for follow up of urinary frequency and urgency.    He is currently taking a combination of oxybutynin Flomax and finasteride.    Does not appear that the oxybutynin is successfully controlling his urinary urgency and frequency.    Objective: Postvoid residual is 59 mL.    Assessment: Chronic urinary frequency and urgency without specific etiology.    Plan: Continue symptomatic approach and will now substitute mirabegron (beta 3 agonist) for his anticholinergic oxybutynin in an effort to more effectively control his urinary frequency and urgency.  He may eventually benefit from a combination of both of these antispasmodics.    Will call his assisted living facility in about 1 month to see how this is progressing.    Total time 15 minutes

## 2022-07-26 NOTE — PROGRESS NOTES
Active order to obtain bladder scan? Yes   Name of ordering provider: Dr. Mack  Bladder scan preformed post void Yes:     Bladder scan reveled 59 ML  Provider notified?  Yes    Chief Complaint   Patient presents with     Follow Up     Urinary Frequency     No significant change from LOV       Vitals:    07/26/22 1021   BP: 116/79   BP Location: Right arm   Patient Position: Sitting   Cuff Size: Adult Regular   Pulse: 66   Resp: 20   SpO2: 95%     Wt Readings from Last 1 Encounters:   07/05/22 80.7 kg (178 lb)       7/26/2022    Kerri GORMAN RN   Specialty Clinics

## 2022-07-27 LAB — BACTERIA UR CULT: NO GROWTH

## 2022-08-08 ENCOUNTER — OFFICE VISIT (OUTPATIENT)
Dept: FAMILY MEDICINE | Facility: CLINIC | Age: 67
End: 2022-08-08
Payer: COMMERCIAL

## 2022-08-08 VITALS
HEART RATE: 70 BPM | WEIGHT: 179 LBS | TEMPERATURE: 97.9 F | DIASTOLIC BLOOD PRESSURE: 78 MMHG | OXYGEN SATURATION: 98 % | BODY MASS INDEX: 28.09 KG/M2 | SYSTOLIC BLOOD PRESSURE: 110 MMHG | RESPIRATION RATE: 18 BRPM | HEIGHT: 67 IN

## 2022-08-08 DIAGNOSIS — H61.22 IMPACTED CERUMEN OF LEFT EAR: ICD-10-CM

## 2022-08-08 DIAGNOSIS — L98.9 LESION OF THUMB: Primary | ICD-10-CM

## 2022-08-08 PROCEDURE — 69209 REMOVE IMPACTED EAR WAX UNI: CPT | Mod: LT | Performed by: FAMILY MEDICINE

## 2022-08-08 PROCEDURE — 99213 OFFICE O/P EST LOW 20 MIN: CPT | Mod: 25 | Performed by: FAMILY MEDICINE

## 2022-08-08 ASSESSMENT — PAIN SCALES - GENERAL: PAINLEVEL: NO PAIN (0)

## 2022-08-08 NOTE — NURSING NOTE
"Chief Complaint   Patient presents with     Ear Problem     Thumb Discomfort     /78 (Cuff Size: Adult Regular)   Pulse 70   Temp 97.9  F (36.6  C) (Tympanic)   Resp 18   Ht 1.702 m (5' 7\")   Wt 81.2 kg (179 lb)   SpO2 98%   BMI 28.04 kg/m   Estimated body mass index is 28.04 kg/m  as calculated from the following:    Height as of this encounter: 1.702 m (5' 7\").    Weight as of this encounter: 81.2 kg (179 lb).  Patient presents to the clinic using No DME      Health Maintenance that is potentially due pending provider review:    Health Maintenance Due   Topic Date Due     ZOSTER IMMUNIZATION (2 of 3) 04/12/2016     COVID-19 Vaccine (2 - Pfizer series) 11/08/2021     LUNG CANCER SCREENING  08/30/2022                "

## 2022-08-08 NOTE — PROGRESS NOTES
"  Assessment & Plan     Lesion of thumb  Physical examination consistent with small subcutaneous lesion involving left thumb, likely benign etiology.  Patient would like it to be removed, hand surgery referral placed  - Orthopedic  Referral; Future    Impacted cerumen of left ear  Left ear impacted cerumen cleaned with saline irrigation by MA, home care discussed and recommended to avoid using Q-tips      Alexandru Vera MD  [facility]    Markell Espino is a 66 year old accompanied by his Group home staff, presenting for the following health issues:  Ear Problem and Thumb Discomfort      History of Present Illness       Reason for visit:  Ear flush bump on my left thumb  Symptoms include:  Bump on left thumb- been about a couple months  Symptom intensity:  Mild  Symptom progression:  Staying the same    He eats 2-3 servings of fruits and vegetables daily.He consumes 1 sweetened beverage(s) daily.He exercises with enough effort to increase his heart rate 9 or less minutes per day.  He exercises with enough effort to increase his heart rate 3 or less days per week.   He is taking medications regularly.       Review of Systems   Constitutional, HEENT, cardiovascular, pulmonary, gi and gu systems are negative, except as otherwise noted.      Objective    /78 (Cuff Size: Adult Regular)   Pulse 70   Temp 97.9  F (36.6  C) (Tympanic)   Resp 18   Ht 1.702 m (5' 7\")   Wt 81.2 kg (179 lb)   SpO2 98%   BMI 28.04 kg/m    Body mass index is 28.04 kg/m .  Physical Exam   GENERAL: alert and no distress  EYES: Eyes grossly normal to inspection, PERRL and conjunctivae and sclerae normal  HENT: normal cephalic/atraumatic, right ear: normal: no effusions, no erythema, normal landmarks, left ear: occluded with wax, nose and mouth without ulcers or lesions, oropharynx clear and oral mucous membranes moist  MS: No joint swelling or skin discoloration noted, small subcutaneous skin " lesion involving left thumb, nontender  SKIN: As above  NEURO: Normal strength and tone, mentation intact and speech normal  PSYCH: mentation appears normal, affect normal/bright

## 2022-08-09 ENCOUNTER — TELEPHONE (OUTPATIENT)
Dept: FAMILY MEDICINE | Facility: CLINIC | Age: 67
End: 2022-08-09

## 2022-08-09 NOTE — TELEPHONE ENCOUNTER
Per OV yesterday-  Lesion of thumb  Physical examination consistent with small subcutaneous lesion involving left thumb, likely benign etiology.  Patient would like it to be removed, hand surgery referral placed  - Orthopedic  Referral; Future    Spoke with [facility], advised to proceed with ortho consult first. If surg recommended, to then submit to insurance for approval.  MIROSLAVA Dhaliwal RN

## 2022-08-09 NOTE — TELEPHONE ENCOUNTER
General Call      Reason for Call:  Group home ([facility])  says Srinivas has a cyst on his thumb. Srinivas wants to have it removed. She called his insurance to see if it would be covered and they said they would need to know the CPT code for this before they can determine if it will be covered.       Date of last appointment with provider: 8/8/22    Okay to leave a detailed message?: Yes at Home number on file [phone]  [facility]  ([facility])

## 2022-08-16 ENCOUNTER — OFFICE VISIT (OUTPATIENT)
Dept: URGENT CARE | Facility: URGENT CARE | Age: 67
End: 2022-08-16
Payer: COMMERCIAL

## 2022-08-16 ENCOUNTER — TELEPHONE (OUTPATIENT)
Dept: FAMILY MEDICINE | Facility: CLINIC | Age: 67
End: 2022-08-16

## 2022-08-16 VITALS
TEMPERATURE: 97.7 F | HEART RATE: 70 BPM | BODY MASS INDEX: 28.35 KG/M2 | WEIGHT: 181 LBS | SYSTOLIC BLOOD PRESSURE: 123 MMHG | DIASTOLIC BLOOD PRESSURE: 76 MMHG | OXYGEN SATURATION: 94 %

## 2022-08-16 DIAGNOSIS — R30.0 DYSURIA: Primary | ICD-10-CM

## 2022-08-16 LAB
ALBUMIN UR-MCNC: NEGATIVE MG/DL
APPEARANCE UR: CLEAR
BILIRUB UR QL STRIP: NEGATIVE
COLOR UR AUTO: YELLOW
GLUCOSE UR STRIP-MCNC: NEGATIVE MG/DL
HGB UR QL STRIP: ABNORMAL
KETONES UR STRIP-MCNC: NEGATIVE MG/DL
LEUKOCYTE ESTERASE UR QL STRIP: NEGATIVE
NITRATE UR QL: NEGATIVE
PH UR STRIP: 6 [PH] (ref 5–7)
RBC #/AREA URNS AUTO: ABNORMAL /HPF
SP GR UR STRIP: 1.01 (ref 1–1.03)
UROBILINOGEN UR STRIP-ACNC: 0.2 E.U./DL
WBC #/AREA URNS AUTO: ABNORMAL /HPF

## 2022-08-16 PROCEDURE — 81001 URINALYSIS AUTO W/SCOPE: CPT

## 2022-08-16 PROCEDURE — 87086 URINE CULTURE/COLONY COUNT: CPT

## 2022-08-16 RX ORDER — DOXYCYCLINE HYCLATE 100 MG
TABLET ORAL
COMMUNITY
Start: 2022-05-23 | End: 2022-11-16

## 2022-08-16 RX ORDER — CIPROFLOXACIN 500 MG/1
500 TABLET, FILM COATED ORAL 2 TIMES DAILY
Qty: 14 TABLET | Refills: 0 | Status: SHIPPED | OUTPATIENT
Start: 2022-08-16 | End: 2022-08-23

## 2022-08-16 RX ORDER — PSYLLIUM HUSK 3 G/5.4 G
POWDER (GRAM) ORAL
COMMUNITY
Start: 2022-07-08 | End: 2023-01-12

## 2022-08-16 NOTE — TELEPHONE ENCOUNTER
Group home staff called to say pt  is reporting pain with urination, no clinic appointments available until Friday so she was advised to bring pt to Urgent Care.   Lavinia Alves RN

## 2022-08-16 NOTE — PROGRESS NOTES
{PROVIDER CHARTING PREFERENCE:162901}    Subjective   Chief Complaint   Patient presents with     Dysuria     Burning with urination x 2 days         HPI     UTI    Onset of symptoms was 2day(s).  Course of illness is same  Severity moderate  Current and associated symptoms dysuria  Treatment and measures tried None  Predisposing factors include none  Patient denies rigors, flank pain and temperature > 101 degrees F.              {SUPERLIST (Optional):017627}  {additonal problems for provider to add (Optional):770643}    Review of Systems   Constitutional, HEENT, cardiovascular, pulmonary, gi and gu systems are negative, except as otherwise noted.      Objective    /76   Pulse 70   Temp 97.7  F (36.5  C) (Tympanic)   Wt 82.1 kg (181 lb)   SpO2 94%   BMI 28.35 kg/m    Body mass index is 28.35 kg/m .  Physical Exam   {Exam List (Optional):810533}    {Diagnostic Test Results (Optional):566315}    {AMBULATORY ATTESTATION (Optional):461553}            .  ..

## 2022-08-18 LAB — BACTERIA UR CULT: NO GROWTH

## 2022-08-18 NOTE — RESULT ENCOUNTER NOTE
"Final urine culture report shows \"NO GROWTH\" and is NEGATIVE.  [facility] discharge antibiotic: Ciprofloxacin 500 mg PO BID for 7 days  [facility] discharge Rx antibiotic for UTI only (Yes/No): Yes  RN to confirm if Patient took antibiotic within 3 days prior to urine culture collection.  Recommendations in treatment per [facility] ED Lab result Urine culture protocol.    "

## 2022-08-31 ENCOUNTER — OFFICE VISIT (OUTPATIENT)
Dept: ORTHOPEDICS | Facility: CLINIC | Age: 67
End: 2022-08-31
Attending: FAMILY MEDICINE
Payer: COMMERCIAL

## 2022-08-31 ENCOUNTER — TELEPHONE (OUTPATIENT)
Dept: ORTHOPEDICS | Facility: CLINIC | Age: 67
End: 2022-08-31

## 2022-08-31 VITALS
BODY MASS INDEX: 28.41 KG/M2 | HEART RATE: 73 BPM | DIASTOLIC BLOOD PRESSURE: 74 MMHG | WEIGHT: 181 LBS | HEIGHT: 67 IN | SYSTOLIC BLOOD PRESSURE: 131 MMHG

## 2022-08-31 DIAGNOSIS — M67.40 GANGLION CYST: ICD-10-CM

## 2022-08-31 DIAGNOSIS — M19.049 HAND ARTHRITIS: Primary | ICD-10-CM

## 2022-08-31 PROCEDURE — 99203 OFFICE O/P NEW LOW 30 MIN: CPT | Performed by: PEDIATRICS

## 2022-08-31 NOTE — LETTER
8/31/2022         RE: Geovani Bustos  [id number]  [place] [address]        Dear Colleague,    Thank you for referring your patient, Geovani Bustos, to the [facility] [place]. Please see a copy of my visit note below.    ASSESSMENT & PLAN    Geovani was seen today for pain.    Diagnoses and all orders for this visit:    Hand arthritis    Ganglion cyst  -     Orthopedic  Referral  -     XR Finger LT G/E 2 vw; Future      This issue is acute and Unchanged.    Ganglion cyst, likely from underlying inflammation and arthritis - Discussed the diagnosis and possible treatment options including continued monitoring, rest, ice, compression. Discussed the role of occupational hand therapy. Discussed US guided drainage procedure and some possibility of recurrence.  Discussed hand surgery referral for consideration of removal or possible MRI to better visualize.    Plan:  - Today's Plan of Care:  Patient will monitor Symptoms  Home Handouts Given    -We also discussed other future treatment options:  US guided drainage  Referral to Hand Surgery    Follow Up: as needed    Concerning signs and symptoms were reviewed.  The patient expressed understanding of this management plan and all questions were answered at this time.    Thanks for the opportunity to participate in the care of this patient, I will keep you updated on their progress.    CC: Alexandru Schmitt MD [facility]  Sports Medicine Physician  [facility]      -----  Chief Complaint   Patient presents with     Left Thumb - Pain       SUBJECTIVE  Geovani Bustos is a/an 66 year old male who is seen in consultation at the request of  Alexandru Vera M.D. for evaluation of left thumb, PIP.     The patient is seen Amelia from the group home.  The patient is Right handed    Onset: 2 month(s) ago. Reports insidious onset without acute precipitating event.  Location of Pain: left  "thumb; PIP, cyst present on the dorsum of the finger   Worsened by: states it is achy while at rest  Better with: nothing   Treatments tried: no treatment tried to date  Associated symptoms: achyness and cyst present     Orthopedic/Surgical history: NO  Social History/Occupation: lives in a group home     No family history pertinent to patient's problem today.    REVIEW OF SYSTEMS:  Review of Systems  Skin: no bruising, no swelling  Musculoskeletal: as above  Neurologic: no numbness, paresthesias  Remainder of review of systems is negative including constitutional, CV, pulmonary, GI, except as noted in HPI or medical history.    OBJECTIVE:  /74   Pulse 73   Ht 1.702 m (5' 7\")   Wt 82.1 kg (181 lb)   BMI 28.35 kg/m     General: healthy, alert and in no distress  HEENT: no scleral icterus or conjunctival erythema  Skin: no suspicious lesions or rash. No jaundice.  CV: distal perfusion intact  Resp: normal respiratory effort without conversational dyspnea   Psych: normal mood and affect  Gait: normal steady gait with appropriate coordination and balance  Neuro: Normal light sensory exam of upper extremity    Bilateral Wrist and Hand exam  Inspection:       Ganglion cyst left thumb    Tender:       none    Non Tender:       Remainder of the Wrist and Hand bilateral    ROM:       Full and symmetric active and passive range of motion of the forearm, wrist and digits bilateral    Strength:       Grossly normal strength    Neurovascular:       2+ radial pulses bilaterally with brisk capillary refill and      normal sensation to light touch in the radial, median and ulnar nerve distributions    RADIOLOGY:  I independently ordered, visualized and reviewed these images with the patient  3 XR views of left thumb reviewed: no acute bony abnormality, IP joint degenerative change  - will follow official read        Review of the result(s) of each unique test - XR             Again, thank you for allowing me to " participate in the care of your patient.        Sincerely,        Sangeetha Schmitt MD

## 2022-08-31 NOTE — TELEPHONE ENCOUNTER
Attempting to call Srinivas regarding his OV today 8/31/22, I have called also a couple weeks ago, unsuccessfully.  Note for Dr. Vera discusses seeing hand surgery. I am attempting to get srinivas scheduled correctly with Hand surgery. If they call back, please inform them of this and place ortho  referral for hand surgery.    Korin Morel ATC, CSCS  Dr. Schmitt's Clinical Coordinator  [facility] Team

## 2022-08-31 NOTE — PATIENT INSTRUCTIONS
Ganglion cyst, likely from underlying inflammation and arthritis - Discussed the diagnosis and possible treatment options including continued monitoring, rest, ice, compression. Discussed the role of occupational hand therapy. Discussed US guided drainage procedure and some possibility of recurrence.  Discussed hand surgery referral for consideration of removal or possible MRI to better visualize.    Plan:  - Today's Plan of Care:  Patient will monitor Symptoms  Home Handouts Given    -We also discussed other future treatment options:  US guided drainage  Referral to Hand Surgery    Follow Up: as needed    If you have any further questions for your physician or physician s care team you can call [phone] and use option 3 to leave a voice message.

## 2022-08-31 NOTE — PROGRESS NOTES
ASSESSMENT & PLAN    Geovani was seen today for pain.    Diagnoses and all orders for this visit:    Hand arthritis    Ganglion cyst  -     Orthopedic  Referral  -     XR Finger LT G/E 2 vw; Future      This issue is acute and Unchanged.    Ganglion cyst, likely from underlying inflammation and arthritis - Discussed the diagnosis and possible treatment options including continued monitoring, rest, ice, compression. Discussed the role of occupational hand therapy. Discussed US guided drainage procedure and some possibility of recurrence.  Discussed hand surgery referral for consideration of removal or possible MRI to better visualize.    Plan:  - Today's Plan of Care:  Patient will monitor Symptoms  Home Handouts Given    -We also discussed other future treatment options:  US guided drainage  Referral to Hand Surgery    Follow Up: as needed    Concerning signs and symptoms were reviewed.  The patient expressed understanding of this management plan and all questions were answered at this time.    Thanks for the opportunity to participate in the care of this patient, I will keep you updated on their progress.    CC: Alexandru Schmitt MD [facility]  Sports Medicine Physician  [facility]      -----  Chief Complaint   Patient presents with     Left Thumb - Pain       SUBJECTIVE  Geovani Bustos is a/an 66 year old male who is seen in consultation at the request of  Alexandru Vera M.D. for evaluation of left thumb, PIP.     The patient is seen Amelia from the group home.  The patient is Right handed    Onset: 2 month(s) ago. Reports insidious onset without acute precipitating event.  Location of Pain: left thumb; PIP, cyst present on the dorsum of the finger   Worsened by: states it is achy while at rest  Better with: nothing   Treatments tried: no treatment tried to date  Associated symptoms: achyness and cyst present     Orthopedic/Surgical history: NO  Social  "History/Occupation: lives in a group home     No family history pertinent to patient's problem today.    REVIEW OF SYSTEMS:  Review of Systems  Skin: no bruising, no swelling  Musculoskeletal: as above  Neurologic: no numbness, paresthesias  Remainder of review of systems is negative including constitutional, CV, pulmonary, GI, except as noted in HPI or medical history.    OBJECTIVE:  /74   Pulse 73   Ht 1.702 m (5' 7\")   Wt 82.1 kg (181 lb)   BMI 28.35 kg/m     General: healthy, alert and in no distress  HEENT: no scleral icterus or conjunctival erythema  Skin: no suspicious lesions or rash. No jaundice.  CV: distal perfusion intact  Resp: normal respiratory effort without conversational dyspnea   Psych: normal mood and affect  Gait: normal steady gait with appropriate coordination and balance  Neuro: Normal light sensory exam of upper extremity    Bilateral Wrist and Hand exam  Inspection:       Ganglion cyst left thumb    Tender:       none    Non Tender:       Remainder of the Wrist and Hand bilateral    ROM:       Full and symmetric active and passive range of motion of the forearm, wrist and digits bilateral    Strength:       Grossly normal strength    Neurovascular:       2+ radial pulses bilaterally with brisk capillary refill and      normal sensation to light touch in the radial, median and ulnar nerve distributions    RADIOLOGY:  I independently ordered, visualized and reviewed these images with the patient  3 XR views of left thumb reviewed: no acute bony abnormality, IP joint degenerative change  - will follow official read        Review of the result(s) of each unique test - XR         "

## 2022-09-02 DIAGNOSIS — H60.543 ECZEMA OF BOTH EXTERNAL EARS: ICD-10-CM

## 2022-09-02 RX ORDER — CLOBETASOL PROPIONATE 0.5 MG/G
OINTMENT TOPICAL
Qty: 30 G | Refills: 3 | Status: SHIPPED | OUTPATIENT
Start: 2022-09-02 | End: 2023-12-14

## 2022-09-02 NOTE — TELEPHONE ENCOUNTER
Requested Prescriptions   Pending Prescriptions Disp Refills     clobetasol (TEMOVATE) 0.05 % external ointment 30 g 3     Sig: Apply twice daily to ears for two weeks then 3 times weekly thereafter.       There is no refill protocol information for this order          Last office visit: 5/12/2022 with prescribing provider:  Roxy Borges PA-C   Future Office Visit:    [facility]

## 2022-09-06 ENCOUNTER — HOSPITAL ENCOUNTER (OUTPATIENT)
Dept: CT IMAGING | Facility: CLINIC | Age: 67
Discharge: HOME | End: 2022-09-06
Attending: FAMILY MEDICINE | Admitting: FAMILY MEDICINE
Payer: COMMERCIAL

## 2022-09-06 DIAGNOSIS — Z87.891 PERSONAL HISTORY OF TOBACCO USE: ICD-10-CM

## 2022-09-06 DIAGNOSIS — J84.9 ILD (INTERSTITIAL LUNG DISEASE) (H): Primary | ICD-10-CM

## 2022-09-06 PROCEDURE — 71271 CT THORAX LUNG CANCER SCR C-: CPT

## 2022-10-03 ENCOUNTER — VIRTUAL VISIT (OUTPATIENT)
Dept: UROLOGY | Facility: CLINIC | Age: 67
End: 2022-10-03
Payer: COMMERCIAL

## 2022-10-03 DIAGNOSIS — R35.0 URINARY FREQUENCY: Primary | ICD-10-CM

## 2022-10-03 PROCEDURE — 99212 OFFICE O/P EST SF 10 MIN: CPT | Mod: 95 | Performed by: UROLOGY

## 2022-10-03 RX ORDER — OXYBUTYNIN CHLORIDE 5 MG/1
TABLET, EXTENDED RELEASE ORAL
Qty: 90 TABLET | Refills: 3 | Status: SHIPPED | OUTPATIENT
Start: 2022-10-03 | End: 2023-06-06

## 2022-10-03 NOTE — PROGRESS NOTES
Telephone visit    Discontinued oxybutynin and began mirabegron.    Control of nocturia seems to be improved but daytime urinary frequency is still a problem.    Will add back oxybutynin 5 mg extended release in the morning and continue the mirabegron at night.    I will call him back in a month and see how things are doing.    Total time 5 minutes

## 2022-10-14 DIAGNOSIS — R39.9 LOWER URINARY TRACT SYMPTOMS (LUTS): ICD-10-CM

## 2022-10-14 NOTE — TELEPHONE ENCOUNTER
Requested Prescriptions   Pending Prescriptions Disp Refills     tamsulosin (FLOMAX) 0.4 MG capsule 90 capsule 0     Sig: Take 1 capsule (0.4 mg) by mouth daily       There is no refill protocol information for this order        Last office visit: 7/26/2022 with prescribing provider:  Dr. Mack    Future Office Visit:   Next 5 appointments (look out 90 days)    Nov 16, 2022 11:30 AM  Telephone Visit with Dakota Pinzon MD  [facility] ([facility] ) [address] [place] [id number] [phone]               [facility]

## 2022-10-17 NOTE — PROGRESS NOTES
Assessment & Plan     Cellulitis and abscess of leg  Wound care done today. Cleansed well with hibicleans, tegaderm dressing applied with gauze wrap.   Leave in place until Friday. Then wash daily with soap and water and apply bactroban daily until resolved.   Begin   - cephALEXin (KEFLEX) 500 MG capsule  Dispense: 20 capsule; Refill: 0  - mupirocin (BACTROBAN) 2 % external ointment  Dispense: 30 g; Refill: 1    [employer] forms completed.   Follow  Up wound check in 2 week with Dr Vera Primary Care Provider        Call or return to the clinic with any worsening of symptoms or no resolution. Patient/Parent verbalized understanding and is in agreement. Medication side effects reviewed.   Current Outpatient Medications   Medication Sig Dispense Refill     ABILIFY 10 MG OR TABS Take 22 mg by mouth daily        ASPIRIN LOW DOSE 81 MG EC tablet Take 1 tablet (81 mg) by mouth daily 30 tablet 11     augmented betamethasone dipropionate (DIPROLENE-AF) 0.05 % external ointment Apply to AA twice daily 1-2 weeks then as needed only. Do not apply to face. 45 g 5     BREO ELLIPTA 100-25 MCG/INH inhaler Inhale 1 puff into the lungs daily 1 each 4     cephALEXin (KEFLEX) 500 MG capsule Take 1 capsule (500 mg) by mouth 2 times daily 20 capsule 0     Disposable Gloves (LATEX GLOVES MEDIUM) MISC 4 boxes of gloves  For the application of topical medications 400 each 11     Disposable Gloves (NITRILE EXAM GLOVES MEDIUM) MISC 1 each as needed Use PRN daily with administration of otic drops, body lotion and powder to treat dry itchy skin. 4 each 11     EAR DROPS 6.5 % otic solution Place 5 drops into both ears 2 times daily For 5 days August 1-5th and Feb 1-5 th then return for irrigation after the 5th day. 15 mL 3     finasteride (PROSCAR) 5 MG tablet TAKE 1 TABLET BY MOUTH EVERY DAY 30 tablet 2     gabapentin (NEURONTIN) 100 MG capsule Take 100 mg by mouth 2 times daily       Incontinence Supply Disposable (INCONTINENCE BRIEF LARGE) MISC  [facility] order# [id number] faxed to [phone], confirmation received. Incontinence pads up to 300 per month 300 each 1     levothyroxine (SYNTHROID/LEVOTHROID) 125 MCG tablet Take 1 tablet (125 mcg) by mouth daily 90 tablet 1     mupirocin (BACTROBAN) 2 % external ointment Apply topically 3 times daily 30 g 1     nicotine (NICODERM CQ) 14 MG/24HR 24 hr patch Place 1 patch onto the skin every 24 hours 30 patch 1     OLANZapine (ZYPREXA) 2.5 MG tablet        Omega-3 Fatty Acids (FISH OIL) 1200 MG capsule Take 1 capsule (1.2 g) by mouth daily 90 capsule 2     order for DME Equipment being ordered: callous pad 1 each 1     order for DME Equipment being ordered: Dynaflex insert 1 Units 0     oxybutynin (DITROPAN) 5 MG tablet Take 2 tablets (10mg) by mouth 3 times daily 180 tablet 8     oxybutynin (DITROPAN) 5 MG tablet  180 tablet 11     polyethylene glycol (GAVILAX) 17 GM/Dose powder Take 17 g (1 capful) by mouth daily 510 g 11     Psyllium (REGULOID) 48.57 % POWD Take 1 each by mouth See Admin Instructions Take 1 teaspoonful every day at 7pm and 9 pm. 369 g 11     salicylic acid 6 % external gel apply twice daily until callus is removed for up to 14 days. 240 g 1     simvastatin (ZOCOR) 40 MG tablet TAKE ONE TABLET BY MOUTH EVERY DAY AT 9 PM 90 tablet 1     Skin Protectants, Misc. (EUCERIN) cream Apply topically 2 times daily 454 g 11     tamsulosin (FLOMAX) 0.4 MG capsule Take 1 capsule (0.4 mg) by mouth daily 90 capsule 3     traZODone (DESYREL) 50 MG tablet Take 25 mg by mouth At Bedtime        VENTOLIN  (90 Base) MCG/ACT inhaler Inhale 2 puffs into the lungs every 6 hours as needed for shortness of breath / dyspnea or wheezing 18 g 1     ZOLOFT 100 MG OR TABS 2 TABLETS DAILY       Chart documentation with Dragon Voice recognition Software. Although reviewed after completion, some words and grammatical errors may remain.    SAUL [facility]    Markell Espino is a 66 year old who presents for the following health issues   accompanied by his  staff and house mate.    HPI     Concern - Leg Infection   Onset: 1 week   Description: left lower leg   Feel off his chair when sleeping and skinned his left lower leg on a 3 ring binder  Intensity: 5/10  Progression of Symptoms:  worsening  Accompanying Signs & Symptoms: redness and drainage   Previous history of similar problem: none  Therapies tried and outcome: None    COVID vaccine done and booster done.  Influenza vaccine done.        Review of Systems   Constitutional, HEENT, cardiovascular, pulmonary, GI, , musculoskeletal, neuro, skin, endocrine and psych systems are negative, except as otherwise noted.      Objective    /60   Pulse 70   Temp 97.3  F (36.3  C) (Tympanic)   Wt 79.8 kg (176 lb)   SpO2 93%   BMI 26.76 kg/m    Body mass index is 26.76 kg/m .  Physical Exam   GENERAL: alert and engaging today. Here with  staff  EYES: Eyes grossly normal to inspection, PERRL and conjunctivae and sclerae normal  RESP: lungs clear to auscultation - no rales, rhonchi or wheezes  CV: regular rate and rhythm, normal S1 S2, no S3 or S4, no murmur, click or rub, no peripheral edema and peripheral pulses strong  ABDOMEN: soft, nontender, no hepatosplenomegaly, no masses and bowel sounds normal  MS: no gross musculoskeletal defects noted, no edema  SKIN:3 inch open wound left shin with yellowish drainage and cellulitis surrounding open wound  NEURO: Normal strength and tone, mentation intact and speech normal  PSYCH: mentation appears normal, affect normal/bright

## 2022-10-18 RX ORDER — TAMSULOSIN HYDROCHLORIDE 0.4 MG/1
0.4 CAPSULE ORAL DAILY
Qty: 90 CAPSULE | Refills: 0 | Status: SHIPPED | OUTPATIENT
Start: 2022-10-18 | End: 2022-12-27

## 2022-10-18 NOTE — TELEPHONE ENCOUNTER
Follow-up with Dr. Mack on 12/5/22  LOV 10/3/22  Refilled to get patient through to appointment     Kerri GORMAN RN  Specialty Clinics

## 2022-11-09 ENCOUNTER — LAB (OUTPATIENT)
Dept: LAB | Facility: CLINIC | Age: 67
End: 2022-11-09
Payer: COMMERCIAL

## 2022-11-09 DIAGNOSIS — E03.9 ACQUIRED HYPOTHYROIDISM: Primary | ICD-10-CM

## 2022-11-09 LAB — TSH SERPL DL<=0.005 MIU/L-ACNC: 2.98 UIU/ML (ref 0.3–4.2)

## 2022-11-09 PROCEDURE — 36415 COLL VENOUS BLD VENIPUNCTURE: CPT

## 2022-11-09 PROCEDURE — 84443 ASSAY THYROID STIM HORMONE: CPT

## 2022-11-12 ENCOUNTER — OFFICE VISIT (OUTPATIENT)
Dept: URGENT CARE | Facility: URGENT CARE | Age: 67
End: 2022-11-12
Payer: COMMERCIAL

## 2022-11-12 VITALS
BODY MASS INDEX: 28.19 KG/M2 | RESPIRATION RATE: 20 BRPM | WEIGHT: 180 LBS | HEART RATE: 72 BPM | DIASTOLIC BLOOD PRESSURE: 77 MMHG | OXYGEN SATURATION: 95 % | SYSTOLIC BLOOD PRESSURE: 126 MMHG | TEMPERATURE: 97.9 F

## 2022-11-12 DIAGNOSIS — K40.90 DIRECT INGUINAL HERNIA OF RIGHT SIDE: Primary | ICD-10-CM

## 2022-11-12 PROCEDURE — 99213 OFFICE O/P EST LOW 20 MIN: CPT | Performed by: EMERGENCY MEDICINE

## 2022-11-12 NOTE — PATIENT INSTRUCTIONS
Recheck immediately for any worsening pain or enlargement of the area  Put your hand on the area if coughing or sneezing  Contact surgical clinic tomorrow to arrange follow-up over the next week

## 2022-11-12 NOTE — PROGRESS NOTES
CHIEF COMPLAINT: Follow-up for right groin region.      HPI: Patient is a 66-year-old male in group home who has noticed bulging and slight discomfort in the right inguinal region the last 2 weeks.  He does feel it started after doing heavy lifting and coughing.  No worsening pain, only slight discomfort.      ROS: See HPI otherwise normal.    Allergies   Allergen Reactions     Nitrogen Oxide      Sulfa Drugs       Current Outpatient Medications   Medication Sig Dispense Refill     ABILIFY 10 MG OR TABS Take 22 mg by mouth daily        ADULT ASPIRIN REGIMEN 81 MG EC tablet Take 1 tablet (81 mg) by mouth daily 30 tablet 11     augmented betamethasone dipropionate (DIPROLENE-AF) 0.05 % external ointment Apply to AA twice daily 1-2 weeks then as needed only. Do not apply to face. 45 g 5     BREO ELLIPTA 100-25 MCG/INH inhaler Inhale 1 puff into the lungs daily 60 each 4     clobetasol (TEMOVATE) 0.05 % external ointment Apply twice daily to ears for two weeks then 3 times weekly thereafter. 30 g 3     Disposable Gloves (LATEX GLOVES MEDIUM) MISC 4 boxes of gloves  For the application of topical medications 400 each 11     Disposable Gloves (NITRILE EXAM GLOVES MEDIUM) MISC 1 each as needed Use PRN daily with administration of otic drops, body lotion and powder to treat dry itchy skin. 4 each 11     doxycycline hyclate (VIBRA-TABS) 100 MG tablet        EAR DROPS 6.5 % otic solution Place 5 drops into both ears 2 times daily For 5 days August 1-5th and Feb 1-5 th then return for irrigation after the 5th day. 15 mL 3     finasteride (PROSCAR) 5 MG tablet TAKE 1 TABLET BY MOUTH EVERY DAY 90 tablet 3     gabapentin (NEURONTIN) 100 MG capsule Take 100 mg by mouth 2 times daily       Incontinence Supply Disposable (INCONTINENCE BRIEF LARGE) MISC Incontinence pads up to 300 per month 300 each 1     levothyroxine (SYNTHROID/LEVOTHROID) 125 MCG tablet Take 1 tablet (125 mcg) by mouth daily 90 tablet 3     mirabegron (MYRBETRIQ)  50 MG 24 hr tablet Take 1 tablet (50 mg) by mouth daily 90 tablet 3     mupirocin (BACTROBAN) 2 % external ointment Apply topically 3 times daily 30 g 1     OLANZapine (ZYPREXA) 2.5 MG tablet        Omega-3 Fatty Acids (FISH OIL) 1200 MG capsule Take 1 capsule (1.2 g) by mouth daily 90 capsule 2     order for DME Equipment being ordered: callous pad 1 each 1     order for DME Equipment being ordered: Dynaflex insert 1 Units 0     oxybutynin (DITROPAN) 5 MG tablet Take 2 tablets (10 mg) by mouth 3 times daily 180 tablet 11     oxybutynin ER (DITROPAN XL) 5 MG 24 hr tablet Take 1 tablet by mouth in the morning 90 tablet 3     polyethylene glycol (CLEARLAX) 17 GM/Dose powder Take 17 g (1 capful) by mouth daily 510 g 11     Psyllium (REGULOID) 48.57 % POWD Take 1 each by mouth See Admin Instructions Take 1 teaspoonful every day at 7pm and 9 pm. 369 g 11     REGULOID 51.7 % POWD        salicylic acid 6 % external gel apply twice daily until callus is removed for up to 14 days. 240 g 1     simvastatin (ZOCOR) 40 MG tablet TAKE ONE TABLET BY MOUTH EVERY DAY AT 9 PM 90 tablet 3     Skin Protectants, Misc. (EUCERIN) cream Apply topically 2 times daily 454 g 11     tamsulosin (FLOMAX) 0.4 MG capsule Take 1 capsule (0.4 mg) by mouth daily 90 capsule 0     traZODone (DESYREL) 50 MG tablet Take 25 mg by mouth At Bedtime        VENTOLIN  (90 Base) MCG/ACT inhaler Inhale 2 puffs into the lungs every 6 hours as needed for shortness of breath / dyspnea or wheezing 18 g 1     ZOLOFT 100 MG OR TABS 2 TABLETS DAILY           PE: No acute distress on physical exam.  Patient is afebrile.  Examination of the abdomen with patient in the upright position reveals a bulge in the right inguinal region consistent with a direct inguinal hernia.  It is easily reproducible.  Is not tender.  No mass.        TREATMENT: None.      ASSESSMENT: New onset right inguinal hernia, direct.  No evidence of incarceration      DIAGNOSIS: Right inguinal  hernia      PLAN: Surgical referral.  Patient and staff instructed to monitor carefully and have patient seen immediately if worsening pain.  No heavy lifting.  Support the area for coughing.

## 2022-11-16 ENCOUNTER — VIRTUAL VISIT (OUTPATIENT)
Dept: PULMONOLOGY | Facility: CLINIC | Age: 67
End: 2022-11-16
Payer: COMMERCIAL

## 2022-11-16 DIAGNOSIS — Z87.891 PERSONAL HISTORY OF NICOTINE DEPENDENCE: ICD-10-CM

## 2022-11-16 DIAGNOSIS — J44.9 STAGE 1 MILD COPD BY GOLD CLASSIFICATION (H): ICD-10-CM

## 2022-11-16 DIAGNOSIS — J84.10 COMBINED PULMONARY FIBROSIS AND EMPHYSEMA (CPFE) (H): Primary | ICD-10-CM

## 2022-11-16 DIAGNOSIS — F17.200 SMOKING: ICD-10-CM

## 2022-11-16 DIAGNOSIS — J43.9 COMBINED PULMONARY FIBROSIS AND EMPHYSEMA (CPFE) (H): Primary | ICD-10-CM

## 2022-11-16 PROCEDURE — 99213 OFFICE O/P EST LOW 20 MIN: CPT | Mod: 95

## 2022-11-16 RX ORDER — FLUTICASONE FUROATE AND VILANTEROL 100; 25 UG/1; UG/1
1 POWDER RESPIRATORY (INHALATION) DAILY
Qty: 90 EACH | Refills: 3 | Status: SHIPPED | OUTPATIENT
Start: 2022-11-16 | End: 2023-06-30

## 2022-11-16 RX ORDER — ALBUTEROL SULFATE 90 UG/1
2 AEROSOL, METERED RESPIRATORY (INHALATION) EVERY 6 HOURS PRN
Qty: 18 G | Refills: 11 | Status: SHIPPED | OUTPATIENT
Start: 2022-11-16 | End: 2024-02-26

## 2022-11-16 NOTE — PROGRESS NOTES
Srinivas is a 66 year old who is being evaluated via a billable telephone visit.      What phone number would you like to be contacted at? [phone]  How would you like to obtain your AVS? Mail a copy     Telephone-Visit Details    Type of service: telephone Visit    telephone Start Time (time video started): 11:15    telephone End Time (time video stopped): 11:25        Distant Location (provider location):  On-site    Mode of Communication:  telephone Conference    Physician has received verbal consent for a telephone Visit from the patient? Yes      Hope Berry    [facility]  Pulmonary Medicine  Visit Clinic Note  November 16, 2022         ASSESSMENT & PLAN       GOLD 1 COPD: Symptoms are well controlled on breo.  Will continue this and albuterol PRN. He has combined pulmonary fibrosis and emphysema    History of smoking: He has no interest in quitting smoking.  Continues to smoke.  He does qualify for lung cancer screening.  He has received 2 chest CTs so far. We discussed rationale for chest CT scans.  He is open to hearing about diagnostic and treatment options, but he is not certain that he would actually pursue chemo/XRT/surgery. I placed an order for another chest CT to be in September 2023.      Telephone visit after chest CT in 2023.    Silver Pinzon MD     Duration of phone call 10 minutes           Today's visit note:       Referred by: Chastity Greer    Chief Complaint: Geovani Bustos is a 62 year old year old male who is being seen for No chief complaint on file.      HISTORY OF PRESENT ILLNESS:  Mr. Bustos is a 62-year-old male with schizoaffective disorder who currently lives in a group home, is presenting to the pulmonary clinic today for evaluation of abnormal pulmonary function testing.  He was recently seen by his primary care physician.  The patient expressed that he has a long smoking history, and given his age pulmonary function testing was ordered.  Airflow obstruction  was noted on the pulmonary function testing and is now been referred to me.    He has a 47 year history of smoking cigarettes.  Probably on average about 1 pack per day.  Symptomatically, he does not have any limitations.  He coughs a lot in the morning mostly.  He does cough up some phlegm.  No blood.  Occasional have such vigorous coughing that he has posttussive emesis.  He does have some dyspnea with exertion on occasion, but it is mild.  He only notes it when he is walking fast.  He never exerts himself more than that.    He denies hemoptysis, fevers, unintentional weight loss.           Past Medical and Surgical History:     Past Medical History:   Diagnosis Date     Cerumen impaction      Chronic kidney disease      Colon polyps      Hyperlipidemia      Mitral valve prolapse      Schizophrenia (H)      Ulcerated penile lesions      VSD (ventricular septal defect)      Schizoaffective  OCD  Mixed personality disorder    Past Surgical History:   Procedure Laterality Date     ARTHRODESIS FOOT Right 1/19/2016    Procedure: ARTHRODESIS FOOT;  Surgeon: Holden Harrison DPM;  Location: [facility]     ARTHRODESIS FOOT Left 1/3/2017    Procedure: ARTHRODESIS FOOT;  Surgeon: Holden Harriosn DPM;  Location: [facility]     COLONOSCOPY N/A 5/19/2022    Procedure: COLONOSCOPY, FLEXIBLE, WITH LESION REMOVAL USING SNARE;  Surgeon: Ame Nelson MD;  Location: [facility]     SURGICAL HISTORY OF -       leg fracture           Family History:     Family History   Problem Relation Age of Onset     Emphysema Mother      Coronary Artery Disease Father               Social History:     Social History     Socioeconomic History     Marital status: Single     Spouse name: Not on file     Number of children: Not on file     Years of education: Not on file     Highest education level: Not on file   Occupational History     Not on file   Tobacco Use     Smoking status: Every Day     Packs/day: 1.00     Years: 47.00     Pack years: 47.00      Types: Cigarettes     Smokeless tobacco: Never     Tobacco comments:     cutting one cigarette down a month   Vaping Use     Vaping Use: Never used   Substance and Sexual Activity     Alcohol use: No     Drug use: No     Sexual activity: Never   Other Topics Concern     Parent/sibling w/ CABG, MI or angioplasty before 65F 55M? Not Asked      Service Not Asked     Blood Transfusions No     Caffeine Concern Not Asked     Occupational Exposure Not Asked     Hobby Hazards Not Asked     Sleep Concern Not Asked     Stress Concern Not Asked     Weight Concern Not Asked     Special Diet Not Asked     Back Care Not Asked     Exercise Not Asked     Bike Helmet Not Asked     Seat Belt Not Asked     Self-Exams Not Asked   Social History Narrative    Patient has no interest in smoking cessation.     Her for Special [employer] physical - likes to [employer]    Lives in Group home. Grew up in [place].      Social Determinants of Health     Financial Resource Strain: Not on file   Food Insecurity: Not on file   Transportation Needs: Not on file   Physical Activity: Not on file   Stress: Not on file   Social Connections: Not on file   Intimate Partner Violence: Not on file   Housing Stability: Not on file            Medications:     Current Outpatient Medications   Medication     ABILIFY 10 MG OR TABS     ADULT ASPIRIN REGIMEN 81 MG EC tablet     albuterol (VENTOLIN HFA) 108 (90 Base) MCG/ACT inhaler     clobetasol (TEMOVATE) 0.05 % external ointment     Disposable Gloves (LATEX GLOVES MEDIUM) MISC     Disposable Gloves (NITRILE EXAM GLOVES MEDIUM) MISC     EAR DROPS 6.5 % otic solution     finasteride (PROSCAR) 5 MG tablet     fluticasone-vilanterol (BREO ELLIPTA) 100-25 MCG/ACT inhaler     Incontinence Supply Disposable (INCONTINENCE BRIEF LARGE) MISC     levothyroxine (SYNTHROID/LEVOTHROID) 125 MCG tablet     mirabegron (MYRBETRIQ) 50 MG 24 hr tablet     mupirocin (BACTROBAN) 2 % external ointment     Omega-3 Fatty Acids  (FISH OIL) 1200 MG capsule     order for DME     order for DME     oxybutynin ER (DITROPAN XL) 5 MG 24 hr tablet     polyethylene glycol (CLEARLAX) 17 GM/Dose powder     Psyllium (REGULOID) 48.57 % POWD     REGULOID 51.7 % POWD     simvastatin (ZOCOR) 40 MG tablet     Skin Protectants, Misc. (EUCERIN) cream     tamsulosin (FLOMAX) 0.4 MG capsule     traZODone (DESYREL) 50 MG tablet     ZOLOFT 100 MG OR TABS     OLANZapine (ZYPREXA) 2.5 MG tablet     No current facility-administered medications for this visit.     Facility-Administered Medications Ordered in Other Visits   Medication     bupivacaine (MARCAINE) injection 0.5%            Review of Systems:       A complete review of systems was otherwise negative except as noted in the HPI.      PHYSICAL EXAM:  There were no vitals taken for this visit.     General: Well developed, well nourished, No apparent distress  Eyes: Anicteric  Nose: Nasal mucosa with no edema or hyperemia.  No polyps  Ears: Hearing grossly normal  Mouth: Poor oral dentition, no oral ulcerations.  Neck: supple, no thyromegaly  Lymphatics: No cervical or supraclavicular nodes  Respiratory: Good air movement. No crackles. No rhonchi. No wheezes  Cardiac: RRR, normal S1, S2. No murmurs. No JVD  Abdomen: Soft, NT/ND  Musculoskeletal: Extremities normal. No clubbing. No cyanosis. No edema.  Skin: No rash on limited exam  Neuro: Gross motor activity appears normal.  Psych: Makes good eye contact, answers questions appropriately.  A bit slow in his responses.           Data:   All laboratory and imaging data reviewed.      PFT:    FEV1/FVC is 67%.  His FEV1 is 1.89 L which is 58% predicted.  This improved to 2.31 which is 71% predicted after administration of albuterol.  This is a 22% increase.  His FVC is 2.8 L which is 67% predicted, and increases to 3.49 L which is 83% predicted.  Albuterol made a 24% increase in his FVC.  His residual volume is 154% predicted, and his total lung capacity is 105%  predicted.  His diffusion capacity is uninterpretable as the test was not performed appropriately.    Diffusion capacity measured 1 month later showed that it is 71% predicted.    PFT Interpretation:  It appears that the testing is valid.  I do have concerns that he may have improved in his spirometry just based on a learning effect.  That aside it appears that he has airflow obstruction, with a significant bronchodilator response.  There is gas trapping without hyperinflation.  His diffusion capacity is normal.

## 2022-11-21 ENCOUNTER — TELEPHONE (OUTPATIENT)
Dept: DERMATOLOGY | Facility: CLINIC | Age: 67
End: 2022-11-21

## 2022-11-21 DIAGNOSIS — L85.3 XEROSIS OF SKIN: ICD-10-CM

## 2022-11-21 NOTE — LETTER
[facility] [place]  [address]  [facility] [address]  Phone: [phone] Dermatology scheduling- all locations    November 21, 2022    Geovani Bustos                                                                                                                 [address] [facility] [address]            Dear Mr. Bustos,    We recently provided you with a medication refill. Per [facility] medication refill protocol, you do need to be seen at least annually to renew a prescription while on prescribed medication(s). A prescription is valid for only one year before it expires.     At this time we ask that: You schedule a routine Dermatology follow up office visit appointment for Mid May 2023 with your physician to follow your Xerosis of the skin.     Your prescription: Has been refilled as requested.     This letter is sent as a courtesy so you do not end up having to go without medication. We are currently booking Dermatology appointments into Mid March 2023, please schedule soon so you can obtain a follow up appointment date and time for Mid May 2023 that works best for your schedule.       Thank you,      Roxy WATSON / roosevelt

## 2022-11-21 NOTE — TELEPHONE ENCOUNTER
Prescription request: Minerin cream 16 oz jar    Last seen Dr. Geovani Krishnamurthy: 5/2/18    [facility]  Phone: [phone]  Fax: [phone]

## 2022-11-23 ENCOUNTER — OFFICE VISIT (OUTPATIENT)
Dept: SURGERY | Facility: CLINIC | Age: 67
End: 2022-11-23
Payer: COMMERCIAL

## 2022-11-23 VITALS
WEIGHT: 183 LBS | BODY MASS INDEX: 27.74 KG/M2 | SYSTOLIC BLOOD PRESSURE: 133 MMHG | HEART RATE: 78 BPM | DIASTOLIC BLOOD PRESSURE: 78 MMHG | HEIGHT: 68 IN | TEMPERATURE: 98.3 F

## 2022-11-23 DIAGNOSIS — I34.1 MITRAL VALVE PROLAPSE: Chronic | ICD-10-CM

## 2022-11-23 DIAGNOSIS — I45.10 INCOMPLETE RIGHT BUNDLE BRANCH BLOCK (RBBB): ICD-10-CM

## 2022-11-23 DIAGNOSIS — J44.9 STAGE 1 MILD COPD BY GOLD CLASSIFICATION (H): ICD-10-CM

## 2022-11-23 DIAGNOSIS — Z72.0 TOBACCO ABUSE: ICD-10-CM

## 2022-11-23 DIAGNOSIS — K40.90 DIRECT INGUINAL HERNIA OF RIGHT SIDE: Primary | ICD-10-CM

## 2022-11-23 DIAGNOSIS — N18.30 STAGE 3 CHRONIC KIDNEY DISEASE, UNSPECIFIED WHETHER STAGE 3A OR 3B CKD (H): ICD-10-CM

## 2022-11-23 PROCEDURE — 99204 OFFICE O/P NEW MOD 45 MIN: CPT | Performed by: SURGERY

## 2022-11-23 NOTE — PROGRESS NOTES
"  Assessment & Plan   Problem List Items Addressed This Visit        Respiratory    Stage 1 mild COPD by GOLD classification (H)       Circulatory    Mitral valve prolapse (Chronic)    Incomplete right bundle branch block (RBBB)       Urinary    CKD (chronic kidney disease) stage 3, GFR 30-59 ml/min (H)   Other Visit Diagnoses     Direct inguinal hernia of right side    -  Primary    Tobacco abuse             65 yo M with right inguinal hernia possible left inguinal hernia; the right inguinal hernia is reducible.  -Patient is a smoker.  He is not ready to quit smoking.  He is currently smoking 16 cigarettes daily.  -He has multiple comorbidities that would increase his risks of recurrence.  -Patient is not ready to change his lifestyle at this time.  -Thus I do not recommend a elective hernia repair until patient is able to quit smoking for good 4 to 6.  -Otherwise, we will manage this expectantly.  We will do a hernia repair urgently or emergently if a loop of bowel were to become strangulated or cause a obstruction in the meantime.  Patient and his PCA understands to come to the ED for follow-up if any of the discussed symptoms occurs.  -He is to follow-up with me once he is ready to quit smoking.  -Of his questions were answered to satisfaction.  Appropriate forms were filled out for his community assisted living.      50 minutes spent on the date of the encounter doing chart review, patient visit and documentation        BMI:   Estimated body mass index is 27.83 kg/m  as calculated from the following:    Height as of this encounter: 1.727 m (5' 8\").    Weight as of this encounter: 83 kg (183 lb).       No follow-ups on file.    [facility] MD [place]  [facility]    Markell Espino is a 66 year old accompanied by his PCA, presenting for the following health issues:  Consult (Right inguinal hernia)      Right inguinal hernia  Size of baseball; noted 1.5 month ago  Pt does cough a lot; still smoke " "- 1ppd; 50pack year.    Does not reduce  Does cause achiness  No problem with eating or drinking with no issues  No signs of obstruction  Never had a hernia before  Never had MI; never CVA  No on blood thinner  Never had abdominal surgery       Review of Systems   Constitutional, HEENT, cardiovascular, pulmonary, GI, , musculoskeletal, neuro, skin, endocrine and psych systems are negative, except as otherwise noted.      Objective    /78 (BP Location: Right arm, Patient Position: Sitting, Cuff Size: Adult Regular)   Pulse 78   Temp 98.3  F (36.8  C) (Tympanic)   Ht 1.727 m (5' 8\")   Wt 83 kg (183 lb)   BMI 27.83 kg/m    Body mass index is 27.83 kg/m .  Physical Exam  HENT:      Head: Normocephalic.   Eyes:      Conjunctiva/sclera: Conjunctivae normal.   Cardiovascular:      Pulses: Normal pulses.   Pulmonary:      Effort: Pulmonary effort is normal.      Breath sounds: Wheezing present.   Abdominal:      Palpations: Abdomen is soft.      Hernia: A hernia is present. Hernia is present in the right inguinal area.       Musculoskeletal:      Cervical back: Normal range of motion.   Neurological:      Mental Status: He is alert.                    "

## 2022-11-23 NOTE — LETTER
"    11/23/2022         RE: Geovani Bustos  [address]  [facility] [address]        Dear Colleague,    Thank you for referring your patient, Geovani Bustos, to the [facility]. Please see a copy of my visit note below.      Assessment & Plan   Problem List Items Addressed This Visit        Respiratory    Stage 1 mild COPD by GOLD classification (H)       Circulatory    Mitral valve prolapse (Chronic)    Incomplete right bundle branch block (RBBB)       Urinary    CKD (chronic kidney disease) stage 3, GFR 30-59 ml/min (H)   Other Visit Diagnoses     Direct inguinal hernia of right side    -  Primary    Tobacco abuse             67 yo M with right inguinal hernia possible left inguinal hernia; the right inguinal hernia is reducible.  -Patient is a smoker.  He is not ready to quit smoking.  He is currently smoking 16 cigarettes daily.  -He has multiple comorbidities that would increase his risks of recurrence.  -Patient is not ready to change his lifestyle at this time.  -Thus I do not recommend a elective hernia repair until patient is able to quit smoking for good 4 to 6.  -Otherwise, we will manage this expectantly.  We will do a hernia repair urgently or emergently if a loop of bowel were to become strangulated or cause a obstruction in the meantime.  Patient and his PCA understands to come to the ED for follow-up if any of the discussed symptoms occurs.  -He is to follow-up with me once he is ready to quit smoking.  -Of his questions were answered to satisfaction.  Appropriate forms were filled out for his community assisted living.      50 minutes spent on the date of the encounter doing chart review, patient visit and documentation        BMI:   Estimated body mass index is 27.83 kg/m  as calculated from the following:    Height as of this encounter: 1.727 m (5' 8\").    Weight as of this encounter: 83 kg (183 lb).       No follow-ups on file.    GiancarloPanda Nelson MD  [facility] " "[facility]    Markell Espino is a 66 year old accompanied by his PCA, presenting for the following health issues:  Consult (Right inguinal hernia)      Right inguinal hernia  Size of baseball; noted 1.5 month ago  Pt does cough a lot; still smoke - 1ppd; 50pack year.    Does not reduce  Does cause achiness  No problem with eating or drinking with no issues  No signs of obstruction  Never had a hernia before  Never had MI; never CVA  No on blood thinner  Never had abdominal surgery       Review of Systems   Constitutional, HEENT, cardiovascular, pulmonary, GI, , musculoskeletal, neuro, skin, endocrine and psych systems are negative, except as otherwise noted.     Objective    /78 (BP Location: Right arm, Patient Position: Sitting, Cuff Size: Adult Regular)   Pulse 78   Temp 98.3  F (36.8  C) (Tympanic)   Ht 1.727 m (5' 8\")   Wt 83 kg (183 lb)   BMI 27.83 kg/m    Body mass index is 27.83 kg/m .  Physical Exam  HENT:      Head: Normocephalic.   Eyes:      Conjunctiva/sclera: Conjunctivae normal.   Cardiovascular:      Pulses: Normal pulses.   Pulmonary:      Effort: Pulmonary effort is normal.      Breath sounds: Wheezing present.   Abdominal:      Palpations: Abdomen is soft.      Hernia: A hernia is present. Hernia is present in the right inguinal area.       Musculoskeletal:      Cervical back: Normal range of motion.   Neurological:      Mental Status: He is alert.                       Again, thank you for allowing me to participate in the care of your patient.        Sincerely,        Ame Nelson MD    "

## 2022-11-23 NOTE — NURSING NOTE
"Initial /78 (BP Location: Right arm, Patient Position: Sitting, Cuff Size: Adult Regular)   Pulse 78   Temp 98.3  F (36.8  C) (Tympanic)   Ht 1.727 m (5' 8\")   Wt 83 kg (183 lb)   BMI 27.83 kg/m   Estimated body mass index is 27.83 kg/m  as calculated from the following:    Height as of this encounter: 1.727 m (5' 8\").    Weight as of this encounter: 83 kg (183 lb). .    Jaki Godinez MA    "

## 2022-12-05 ENCOUNTER — VIRTUAL VISIT (OUTPATIENT)
Dept: UROLOGY | Facility: CLINIC | Age: 67
End: 2022-12-05
Payer: COMMERCIAL

## 2022-12-05 DIAGNOSIS — R35.0 URINARY FREQUENCY: ICD-10-CM

## 2022-12-05 DIAGNOSIS — R39.15 URINARY URGENCY: Primary | ICD-10-CM

## 2022-12-05 PROCEDURE — 99212 OFFICE O/P EST SF 10 MIN: CPT | Mod: 95 | Performed by: STUDENT IN AN ORGANIZED HEALTH CARE EDUCATION/TRAINING PROGRAM

## 2022-12-05 NOTE — PROGRESS NOTES
UROLOGY FOLLOW-UP NOTE          Chief Complaint:   Today I had the pleasure of seeing . Geovani Bustos in follow-up for a chief complaint of urinary frequency.          Interval Update   Geovani Bustos is a very pleasant 67 year old male with a history of CKD stage 3, IgA nephropathy, CPFE, and schizophrenia.     Brief History: Mr. Geovani Bustos has followed with Dr. Mack for urinary frequency and nocturia. He takes tamsulosin 0.4 mg, oxybutynin XR 5 mg, finasteride 5 mg, and Myrbetriq 50 mg.     Today's notes: He is doing well. He reports nocturia x 1-2 and improved frequency and incontinence with his medications. He denies dysuria and gross hematuria.          Physical Exam:   Patient is a 67 year old male evaluated via telephone visit.     Visit completed with patient located in their home and provider in urology clinic.       Labs and Pathology:    I personally reviewed all applicable laboratory data and went over findings with patient  Significant for:    BMP RESULTS:  Recent Labs   Lab Test 03/01/22  0843 08/25/21  1145 02/15/21  1209 07/12/20  1601 11/15/19  0907 10/21/19  1016    138 139 139 138 140   POTASSIUM 4.5 4.3 4.8 4.2 4.3 4.6   CHLORIDE 108 105 104 106 103 106   CO2 28 31 32 29 34* 31   ANIONGAP 4 2* 3 4 1* 4   GLC 98 137* 107* 83 96 102*   BUN 32* 27 31* 31* 17 33*   CR 1.42* 1.53* 1.42* 1.56* 1.47* 1.63*   GFRESTIMATED 54* 46* 51* 45* 49* 43*   GFRESTBLACK  --   --  59* 53* 57* 50*   RAYRAY 8.6 9.0 9.6 9.0 8.8 9.2       UA RESULTS:   Recent Labs   Lab Test 08/16/22  1705 07/26/22  1027 03/01/22  0851   SG 1.010 1.010 1.015   URINEPH 6.0 6.0 6.0   NITRITE Negative Negative Negative   RBCU 2-5* None Seen 1   WBCU 0-5 None Seen 0       PSA RESULTS  PSA   Date Value Ref Range Status   09/13/2017 0.78 0 - 4 ug/L Final     Comment:     Assay Method:  Chemiluminescence using Siemens Vista analyzer   12/17/2012 0.67 0 - 4 ug/L Final   09/08/2011 1.44 0 - 4 ug/L Final   03/18/2010 1.24 0  - 4 ug/L Final   10/15/2008 1.40 0.00 - 4.00 ng/mL             Assessment/Plan   67 year old male seen in follow up for urinary frequency and nocturia. His urinary symptoms are well controlled with oxybutynin, Myrbetriq, tamsulosin, and finasteride.     Plan:  1. Continue tamsulosin 0.4 mg, oxybutynin XR 5 mg, finasteride 5 mg, and Myrbetriq 50 mg.   2. Follow up in 6 months.          Past Medical History:     Past Medical History:   Diagnosis Date     Cerumen impaction      Chronic kidney disease      Colon polyps      Hyperlipidemia      Mitral valve prolapse      Schizophrenia (H)      Ulcerated penile lesions      VSD (ventricular septal defect)             Past Surgical History:     Past Surgical History:   Procedure Laterality Date     ARTHRODESIS FOOT Right 1/19/2016    Procedure: ARTHRODESIS FOOT;  Surgeon: Holden Harrison DPM;  Location: [facility]     ARTHRODESIS FOOT Left 1/3/2017    Procedure: ARTHRODESIS FOOT;  Surgeon: Holden Harrison DPM;  Location: [facility]     COLONOSCOPY N/A 5/19/2022    Procedure: COLONOSCOPY, FLEXIBLE, WITH LESION REMOVAL USING SNARE;  Surgeon: Ame Nelson MD;  Location: [facility]     SURGICAL HISTORY OF -       leg fracture            Medications     Current Outpatient Medications   Medication     finasteride (PROSCAR) 5 MG tablet     mirabegron (MYRBETRIQ) 50 MG 24 hr tablet     oxybutynin ER (DITROPAN XL) 5 MG 24 hr tablet     tamsulosin (FLOMAX) 0.4 MG capsule     ABILIFY 10 MG OR TABS     ADULT ASPIRIN REGIMEN 81 MG EC tablet     albuterol (VENTOLIN HFA) 108 (90 Base) MCG/ACT inhaler     clobetasol (TEMOVATE) 0.05 % external ointment     Disposable Gloves (LATEX GLOVES MEDIUM) MISC     Disposable Gloves (NITRILE EXAM GLOVES MEDIUM) MISC     EAR DROPS 6.5 % otic solution     fluticasone-vilanterol (BREO ELLIPTA) 100-25 MCG/ACT inhaler     Incontinence Supply Disposable (INCONTINENCE BRIEF LARGE) MISC     levothyroxine (SYNTHROID/LEVOTHROID) 125 MCG tablet     mupirocin  (BACTROBAN) 2 % external ointment     OLANZapine (ZYPREXA) 2.5 MG tablet     Omega-3 Fatty Acids (FISH OIL) 1200 MG capsule     order for DME     order for DME     polyethylene glycol (CLEARLAX) 17 GM/Dose powder     Psyllium (REGULOID) 48.57 % POWD     REGULOID 51.7 % POWD     simvastatin (ZOCOR) 40 MG tablet     Skin Protectants, Misc. (EUCERIN) cream     traZODone (DESYREL) 50 MG tablet     ZOLOFT 100 MG OR TABS     No current facility-administered medications for this visit.     Facility-Administered Medications Ordered in Other Visits   Medication     bupivacaine (MARCAINE) injection 0.5%            Family History:     Family History   Problem Relation Age of Onset     Emphysema Mother      Coronary Artery Disease Father             Social History:     Social History     Socioeconomic History     Marital status: Single     Spouse name: Not on file     Number of children: Not on file     Years of education: Not on file     Highest education level: Not on file   Occupational History     Not on file   Tobacco Use     Smoking status: Every Day     Packs/day: 1.00     Years: 50.00     Pack years: 50.00     Types: Cigarettes     Smokeless tobacco: Never     Tobacco comments:     cutting one cigarette down a month   Vaping Use     Vaping Use: Never used   Substance and Sexual Activity     Alcohol use: No     Drug use: No     Sexual activity: Never   Other Topics Concern     Parent/sibling w/ CABG, MI or angioplasty before 65F 55M? Not Asked      Service Not Asked     Blood Transfusions No     Caffeine Concern Not Asked     Occupational Exposure Not Asked     Hobby Hazards Not Asked     Sleep Concern Not Asked     Stress Concern Not Asked     Weight Concern Not Asked     Special Diet Not Asked     Back Care Not Asked     Exercise Not Asked     Bike Helmet Not Asked     Seat Belt Not Asked     Self-Exams Not Asked   Social History Narrative    Patient has no interest in smoking cessation.     Her for Special  [employer] - likes to [employer]    Lives in Group home. Grew up in [place].      Social Determinants of Health     Financial Resource Strain: Not on file   Food Insecurity: Not on file   Transportation Needs: Not on file   Physical Activity: Not on file   Stress: Not on file   Social Connections: Not on file   Intimate Partner Violence: Not on file   Housing Stability: Not on file            Allergies:   Nitrogen oxide and Sulfa drugs         Review of Systems:  From intake questionnaire   Negative 14 system review except as noted on HPI, nurse's note.        CHEYANNE BARAJAS PA-C  Department of Urology

## 2022-12-05 NOTE — NURSING NOTE
AVS can be mailed to address in chart. Amelia will be attending phone visit as well.  Diana Magaña MA on 12/5/2022 at 11:51 AM

## 2022-12-07 ENCOUNTER — TELEPHONE (OUTPATIENT)
Dept: CARDIOLOGY | Facility: CLINIC | Age: 67
End: 2022-12-07

## 2022-12-07 NOTE — TELEPHONE ENCOUNTER
[facility] Call Center    Phone Message    May a detailed message be left on voicemail: yes     Reason for Call: Other: The patient is going to the dentist on 12/15 and they would like to know if he needs to continue to take the ABX? If yes then please send to [place], [place] - [facility] [address]    If not please write note for Dentist please fax to [phone] (which is a direct fax for Ethan)      Action Taken: Other: Cardiology    Travel Screening: Not Applicable     Thank you!  [facility]

## 2022-12-07 NOTE — TELEPHONE ENCOUNTER
Routing to Dr. Lott-     Upon review of chart it looks like pt is being followed for Mitral valve prolapse with moderate mitral regurgitation and membranous VSD.   From a cardiac standpoint I do not see that he needs prophylactic antibiotics prior to dental work.   If this is true would you mind writing a letter stating this and I will fax it.    Thanks     Lilliam Lemus RN

## 2022-12-19 ENCOUNTER — NURSE TRIAGE (OUTPATIENT)
Dept: NURSING | Facility: CLINIC | Age: 67
End: 2022-12-19

## 2022-12-19 NOTE — TELEPHONE ENCOUNTER
Pt Call: SOB    Background: Patient with SOB after smoking in the cold weather. States he feels better now with SOB. Still has SOB with walking and some exertion, like bending over, but this is chronic per patient with stable COPD symptoms. He did take his scheduled breo elipta as well as PRN albuterol this morning which did seem to help, but he typically dose not use inhaler.     Symptoms: Right now, no SOB at rest. Only when he starts walking he states, but he did walk with Amelia who was with the call with him and she states there was no SOB noted. Denies chest pain. Vitals 72, 132/84, and 98.1 F. Patient and caller denies having pulse ox to take oxygen saturation. Patient also having 3-4/10 abdominal hernia pain with tenderness, which is unchanged since he saw his provider in November. Area still looks the same. No vomiting noted.    Disposition. See in office within 2 weeks. States that they can get a provider appointment with Dr. Vera on 1/3, but need to confirm with . Will transfer to  to get that done. Gave care advice in regards for SOB and trying to limit smoking. Also advice regarding hernia and symptoms to watch out for for strangulation. Patient and caller agreed to plan. Gave call back instructions for worsening symptoms.     ORVILLE HARRIS RN on 12/19/2022 at 2:13 PM'    Reason for Disposition    MILD longstanding difficulty breathing (e.g., speaks in phrases, SOB even at rest, pulse 100-120) and SAME as normal    Previously diagnosed hernia    Additional Information    Negative: SEVERE difficulty breathing (e.g., struggling for each breath, speaks in single words, pulse > 120)    Negative: Breathing stopped and hasn't returned    Negative: Choking on something    Negative: Bluish (or gray) lips or face    Negative: Difficult to awaken or acting confused (e.g., disoriented, slurred speech)    Negative: Passed out (i.e., fainted, collapsed and was not responding)    Negative:  "Wheezing started suddenly after medicine, an allergic food, or bee sting    Negative: Stridor    Negative: Slow, shallow and weak breathing    Negative: Sounds like a life-threatening emergency to the triager    Negative: Chest pain    Negative: Wheezing (high pitched whistling sound) and previous asthma attacks or use of asthma medicines    Negative: Difficulty breathing and within 14 days of COVID-19 Exposure    Negative: Difficulty breathing and only present when coughing    Negative: Difficulty breathing and only from stuffy nose    Negative: Difficulty breathing and only from stuffy nose or runny nose from common cold    Negative: MODERATE difficulty breathing (e.g., speaks in phrases, SOB even at rest, pulse 100-120) of new-onset or worse than normal    Negative: Oxygen level (e.g., pulse oximetry) 90 percent or lower    Negative: Wheezing can be heard across the room    Negative: Drooling or spitting out saliva (because can't swallow)    Negative: Any history of prior \"blood clot\" in leg or lungs    Negative: Major surgery in the past month    Negative: Hip or leg fracture (broken bone) in past month (or had cast on leg or ankle in past month)    Negative: Illness requiring prolonged bedrest in past month (e.g., immobilization, long hospital stay)    Negative: Long-distance travel in past month (e.g., car, bus, train, plane; with trip lasting 6 or more hours)    Negative: Cancer treatment in past six months (or has cancer now)    Negative: Extra heart beats OR irregular heart beating (i.e., \"palpitations\")    Negative: Fever > 103 F (39.4 C)    Negative: Fever > 101 F (38.3 C) and over 60 years of age    Negative: Fever > 100.0 F (37.8 C) and bedridden (e.g., nursing home patient, stroke, chronic illness, recovering from surgery)    Negative: Fever > 100.0 F (37.8 C) and diabetes mellitus or weak immune system (e.g., HIV positive, cancer chemo, splenectomy, organ transplant, chronic steroids)    Negative: " Periods where breathing stops and then resumes normally and bedridden (e.g., nursing home patient, CVA)    Negative: Pregnant or postpartum (from 0 to 6 weeks after delivery)    Negative: Patient sounds very sick or weak to the triager    Negative: MILD difficulty breathing (e.g., minimal/no SOB at rest, SOB with walking, pulse < 100) of new-onset or worse than normal    Negative: Longstanding difficulty breathing (e.g., CHF, COPD, emphysema) and worse than normal    Negative: Longstanding difficulty breathing and not responding to usual therapy    Negative: Continuous (nonstop) coughing    Negative: Oxygen level (e.g., pulse oximetry) 91 to 94 percent    Negative: MODERATE longstanding difficulty breathing (e.g., speaks in phrases, SOB even at rest, pulse 100-120) and SAME as normal    Negative: SEVERE abdominal pain    Negative: Hernia is painful or tender to touch    Negative: Vomiting and can't reduce the hernia    Negative: Vomiting and abdomen looks much more swollen than usual    Negative: Vomiting and hernia is more painful or swollen than usual    Negative: Swollen lump in groin and pulsating (like heartbeat)    Negative: Patient sounds very sick or weak to the triager    Negative: Constant abdominal pain and present > 2 hours (NO pain or tenderness of hernia)    Negative: Can't reduce the hernia (NO pain, local tenderness, or vomiting)    Negative: Patient wants to be seen    Negative: New-onset hernia suspected (reducible bulge in groin or abdomen; non-tender) and NO pain or vomiting    Protocols used: BREATHING DIFFICULTY-A-OH, HERNIA-A-OH

## 2022-12-27 DIAGNOSIS — R39.9 LOWER URINARY TRACT SYMPTOMS (LUTS): ICD-10-CM

## 2022-12-27 RX ORDER — TAMSULOSIN HYDROCHLORIDE 0.4 MG/1
0.4 CAPSULE ORAL DAILY
Qty: 90 CAPSULE | Refills: 1 | Status: SHIPPED | OUTPATIENT
Start: 2022-12-27 | End: 2023-06-06

## 2022-12-27 NOTE — TELEPHONE ENCOUNTER
Last Written Prescription Date:    Last Fill Quantity: ,  # refills:    Last office visit: 7/26/2022 with prescribing provider:     Future Office Visit:   Next 5 appointments (look out 90 days)    Jan 03, 2023  5:00 PM  (Arrive by 4:40 PM)  Provider Visit with Alexandru Vera MD  [facility] ([facility] ) [address] [place] [id number]  [phone]   Feb 20, 2023  9:40 AM  (Arrive by 9:20 AM)  Annual Wellness Visit with Alexandru Vera MD  [facility] ([facility] ) [address] [place] [phone]  [phone]             Requested Prescriptions   Pending Prescriptions Disp Refills     tamsulosin (FLOMAX) 0.4 MG capsule 90 capsule 0     Sig: Take 1 capsule (0.4 mg) by mouth daily       There is no refill protocol information for this order

## 2023-01-03 ENCOUNTER — IMMUNIZATION (OUTPATIENT)
Dept: FAMILY MEDICINE | Facility: CLINIC | Age: 68
End: 2023-01-03
Payer: COMMERCIAL

## 2023-01-03 ENCOUNTER — OFFICE VISIT (OUTPATIENT)
Dept: FAMILY MEDICINE | Facility: CLINIC | Age: 68
End: 2023-01-03
Payer: COMMERCIAL

## 2023-01-03 VITALS
WEIGHT: 183 LBS | DIASTOLIC BLOOD PRESSURE: 70 MMHG | BODY MASS INDEX: 27.74 KG/M2 | HEIGHT: 68 IN | SYSTOLIC BLOOD PRESSURE: 120 MMHG | HEART RATE: 80 BPM | OXYGEN SATURATION: 90 % | RESPIRATION RATE: 18 BRPM | TEMPERATURE: 97.2 F

## 2023-01-03 DIAGNOSIS — Z23 HIGH PRIORITY FOR 2019-NCOV VACCINE: Primary | ICD-10-CM

## 2023-01-03 DIAGNOSIS — K40.90 UNILATERAL INGUINAL HERNIA WITHOUT OBSTRUCTION OR GANGRENE, RECURRENCE NOT SPECIFIED: ICD-10-CM

## 2023-01-03 DIAGNOSIS — N18.30 STAGE 3 CHRONIC KIDNEY DISEASE, UNSPECIFIED WHETHER STAGE 3A OR 3B CKD (H): ICD-10-CM

## 2023-01-03 DIAGNOSIS — F25.0 SCHIZOAFFECTIVE DISORDER, BIPOLAR TYPE (H): ICD-10-CM

## 2023-01-03 DIAGNOSIS — J44.9 CHRONIC OBSTRUCTIVE PULMONARY DISEASE, UNSPECIFIED COPD TYPE (H): Primary | ICD-10-CM

## 2023-01-03 DIAGNOSIS — Z72.0 TOBACCO ABUSE: ICD-10-CM

## 2023-01-03 PROCEDURE — G0008 ADMIN INFLUENZA VIRUS VAC: HCPCS

## 2023-01-03 PROCEDURE — 90662 IIV NO PRSV INCREASED AG IM: CPT

## 2023-01-03 PROCEDURE — 99214 OFFICE O/P EST MOD 30 MIN: CPT | Performed by: FAMILY MEDICINE

## 2023-01-03 PROCEDURE — 0124A COVID-19 VACCINE BIVALENT BOOSTER 12+ (PFIZER): CPT

## 2023-01-03 PROCEDURE — 99207 PR NO CHARGE LOS: CPT

## 2023-01-03 PROCEDURE — 91312 COVID-19 VACCINE BIVALENT BOOSTER 12+ (PFIZER): CPT

## 2023-01-03 RX ORDER — NICOTINE 21 MG/24HR
1 PATCH, TRANSDERMAL 24 HOURS TRANSDERMAL EVERY 24 HOURS
Qty: 30 PATCH | Refills: 1 | Status: SHIPPED | OUTPATIENT
Start: 2023-01-03 | End: 2023-01-30

## 2023-01-03 ASSESSMENT — PAIN SCALES - GENERAL: PAINLEVEL: NO PAIN (0)

## 2023-01-03 NOTE — PROGRESS NOTES
Assessment & Plan     Chronic obstructive pulmonary disease, unspecified COPD type (H)  COPD symptoms stable.  Recommended to continue Breo and albuterol inhaler.  Stressed on smoking cessation    Unilateral inguinal hernia without obstruction or gangrene, recurrence not specified  Ultrasound ordered and general surgery referral placed  - US Abdomen Limited; Future  - Adult General Surg Referral; Future    Tobacco abuse  Smoking cessation counseling provided, associated health hazards explained in detail, recommended try nicotine patches  - nicotine (NICODERM CQ) 14 MG/24HR 24 hr patch; Place 1 patch onto the skin every 24 hours    Schizoaffective disorder, bipolar type (H)  Group home resident, will continue Abilify and trazodone and Zoloft    Stage 3 chronic kidney disease, unspecified whether stage 3a or 3b CKD (H)  Will continue to monitor electrolytes, renal function periodically.      Alexandru Vera MD  [facility]    Markell Espino is a 67 year old accompanied by his group home staff member, presenting for the following health issues:  COPD and Hernia (Rt groin. Group Home is requesting a x-ray or scan of area.)      HPI     COPD Follow-Up    Overall, how are your COPD symptoms since your last clinic visit?  No change    How much fatigue or shortness of breath do you have when you are walking?  Same as usual    How much shortness of breath do you have when you are resting?  None    How often do you cough? Often    Have you noticed any change in your sputum/phlegm?  No    Have you experienced a recent fever? No    Please describe how far you can walk without stopping to rest:  Less than 1 block    How many flights of stairs are you able to walk up without stopping?  1    Have you had any Emergency Room Visits, Urgent Care Visits, or Hospital Admissions because of your COPD since your last office visit?  No    History   Smoking Status     Every Day     Packs/day: 1.00     Years:  "50.00     Types: Cigarettes   Smokeless Tobacco     Never     No results found for: FEV1, CCA9TPB    Concern - Rt inguinal mass  Onset: months  Description: mass  Intensity: mild, moderate  Progression of Symptoms:  same  Accompanying Signs & Symptoms: pain  Previous history of similar problem: yes  Precipitating factors:        Worsened by: lifting   Alleviating factors:        Improved by: rest  Therapies tried and outcome:  none         Review of Systems   Constitutional, HEENT, cardiovascular, pulmonary, GI, , musculoskeletal, neuro, skin, endocrine and psych systems are negative, except as otherwise noted.      Objective    /70   Pulse 80   Temp 97.2  F (36.2  C) (Tympanic)   Resp 18   Ht 1.727 m (5' 8\")   Wt 83 kg (183 lb)   SpO2 90%   BMI 27.83 kg/m    Body mass index is 27.83 kg/m .  Physical Exam   GENERAL: alert and no distress  EYES: Eyes grossly normal to inspection, PERRL and conjunctivae and sclerae normal  HENT: normal cephalic/atraumatic, nose and mouth without ulcers or lesions, oropharynx clear and oral mucous membranes moist  NECK: no adenopathy, no asymmetry, masses, or scars and thyroid normal to palpation  RESP: lungs clear to auscultation - no rales, rhonchi or wheezes  CV: regular rates and rhythm, normal S1 S2, no S3 or S4 and no murmur, click or rub  ABDOMEN: soft, nontender and swelling involving right groin without any skin discoloration, tenderness  MS: no gross musculoskeletal defects noted, no edema  SKIN: no suspicious lesions or rashes  NEURO: Grossly intact            "

## 2023-01-03 NOTE — ADDENDUM NOTE
Addended by: BECCA RODRIGUEZ on: 1/3/2023 09:37 AM     Modules accepted: Orders, Level of Service, SmartSet

## 2023-01-03 NOTE — NURSING NOTE
"Chief Complaint   Patient presents with     COPD     Hernia     Rt groin. Group Home is requesting a x-ray or scan of area.       Initial /70   Pulse 80   Temp 97.2  F (36.2  C) (Tympanic)   Resp 18   Ht 1.727 m (5' 8\")   Wt 83 kg (183 lb)   SpO2 90%   BMI 27.83 kg/m   Estimated body mass index is 27.83 kg/m  as calculated from the following:    Height as of this encounter: 1.727 m (5' 8\").    Weight as of this encounter: 83 kg (183 lb).    Patient presents to the clinic using     Is there anyone who you would like to be able to receive your results? If yes have patient fill out [facility]    "

## 2023-01-12 ENCOUNTER — HOSPITAL ENCOUNTER (OUTPATIENT)
Dept: ULTRASOUND IMAGING | Facility: CLINIC | Age: 68
Discharge: HOME | End: 2023-01-12
Attending: FAMILY MEDICINE | Admitting: FAMILY MEDICINE
Payer: COMMERCIAL

## 2023-01-12 ENCOUNTER — OFFICE VISIT (OUTPATIENT)
Dept: SURGERY | Facility: CLINIC | Age: 68
End: 2023-01-12
Attending: FAMILY MEDICINE
Payer: COMMERCIAL

## 2023-01-12 VITALS
OXYGEN SATURATION: 92 % | DIASTOLIC BLOOD PRESSURE: 82 MMHG | HEART RATE: 70 BPM | SYSTOLIC BLOOD PRESSURE: 137 MMHG | TEMPERATURE: 97.3 F

## 2023-01-12 DIAGNOSIS — K40.90 UNILATERAL INGUINAL HERNIA WITHOUT OBSTRUCTION OR GANGRENE, RECURRENCE NOT SPECIFIED: ICD-10-CM

## 2023-01-12 PROCEDURE — 76705 ECHO EXAM OF ABDOMEN: CPT

## 2023-01-12 PROCEDURE — 99213 OFFICE O/P EST LOW 20 MIN: CPT | Performed by: SURGERY

## 2023-01-12 ASSESSMENT — PAIN SCALES - GENERAL: PAINLEVEL: SEVERE PAIN (6)

## 2023-01-12 NOTE — PROGRESS NOTES
Surgical Consultation/History and Physical  [facility] General Surgery    Geovani is seen in consultation for Right groin hernia, at the request of Alexandru Vera MD.    Chief Complaint:  Right Groin Hernia    History of Present Illness: Geovani Bustos is a 67 year old male presents with right groin hernia.  Patient has known hernia.  He was advised to quit smoking prior to surgery.  He is still able to reduce the hernia.  He is cutting down on smoking.      Patient Active Problem List   Diagnosis     Colon polyps     Schizophrenia (H)     VSD (ventricular septal defect)     Mitral valve prolapse     Acquired hypothyroidism     IgA nephropathy     Proteinuria     Hyperlipidemia LDL goal <130     BPH (benign prostatic hyperplasia)     Health Care Home     Bilateral impacted cerumen     Bunion of great toe of right foot     Eczema of external ear, bilateral     Tinea pedis, unspecified laterality     Incomplete right bundle branch block (RBBB)     Cigarette nicotine dependence with nicotine-induced disorder     Gastroesophageal reflux disease without esophagitis     Stage 1 mild COPD by GOLD classification (H)     CKD (chronic kidney disease) stage 3, GFR 30-59 ml/min (H)     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)     Smoking     Past Medical History:   Diagnosis Date     Cerumen impaction      Chronic kidney disease      Colon polyps      Hyperlipidemia      Mitral valve prolapse      Schizophrenia (H)      Ulcerated penile lesions      VSD (ventricular septal defect)      Past Surgical History:   Procedure Laterality Date     ARTHRODESIS FOOT Right 1/19/2016    Procedure: ARTHRODESIS FOOT;  Surgeon: Holden Harrison DPM;  Location: [facility]     ARTHRODESIS FOOT Left 1/3/2017    Procedure: ARTHRODESIS FOOT;  Surgeon: Holden Harrison DPM;  Location: [facility]     COLONOSCOPY N/A 5/19/2022    Procedure: COLONOSCOPY, FLEXIBLE, WITH LESION REMOVAL USING SNARE;  Surgeon: Ame Nelson MD;   Location: [facility]     SURGICAL HISTORY OF -       leg fracture     Family History   Problem Relation Age of Onset     Emphysema Mother      Coronary Artery Disease Father      Social History     Tobacco Use     Smoking status: Every Day     Packs/day: 1.00     Years: 50.00     Pack years: 50.00     Types: Cigarettes     Smokeless tobacco: Never     Tobacco comments:     cutting one cigarette down a month   Substance Use Topics     Alcohol use: No      History   Drug Use No     Current Outpatient Medications   Medication Sig Dispense Refill     ABILIFY 10 MG OR TABS Take 22 mg by mouth daily        ADULT ASPIRIN REGIMEN 81 MG EC tablet Take 1 tablet (81 mg) by mouth daily 30 tablet 11     clobetasol (TEMOVATE) 0.05 % external ointment Apply twice daily to ears for two weeks then 3 times weekly thereafter. 30 g 3     finasteride (PROSCAR) 5 MG tablet TAKE 1 TABLET BY MOUTH EVERY DAY 90 tablet 3     Omega-3 Fatty Acids (FISH OIL) 1200 MG capsule Take 1 capsule (1.2 g) by mouth daily 90 capsule 2     oxybutynin ER (DITROPAN XL) 5 MG 24 hr tablet Take 1 tablet by mouth in the morning 90 tablet 3     Skin Protectants, Misc. (EUCERIN) cream Apply topically 2 times daily Please ask Group home to schedule a Mid may 2023 follow up Derm appt: [phone] to schedule. 454 g 6     tamsulosin (FLOMAX) 0.4 MG capsule Take 1 capsule (0.4 mg) by mouth daily 90 capsule 1     albuterol (VENTOLIN HFA) 108 (90 Base) MCG/ACT inhaler Inhale 2 puffs into the lungs every 6 hours as needed for shortness of breath / dyspnea or wheezing 18 g 11     Disposable Gloves (LATEX GLOVES MEDIUM) MISC 4 boxes of gloves  For the application of topical medications 400 each 11     Disposable Gloves (NITRILE EXAM GLOVES MEDIUM) MISC 1 each as needed Use PRN daily with administration of otic drops, body lotion and powder to treat dry itchy skin. 4 each 11     EAR DROPS 6.5 % otic solution Place 5 drops into both ears 2 times daily For 5 days August 1-5th and  Feb 1-5 th then return for irrigation after the 5th day. (Patient not taking: Reported on 1/12/2023) 15 mL 3     fluticasone-vilanterol (BREO ELLIPTA) 100-25 MCG/ACT inhaler Inhale 1 puff into the lungs daily 90 each 3     Incontinence Supply Disposable (INCONTINENCE BRIEF LARGE) MISC Incontinence pads up to 300 per month 300 each 1     levothyroxine (SYNTHROID/LEVOTHROID) 125 MCG tablet Take 1 tablet (125 mcg) by mouth daily 90 tablet 3     mirabegron (MYRBETRIQ) 50 MG 24 hr tablet Take 1 tablet (50 mg) by mouth daily 90 tablet 3     mupirocin (BACTROBAN) 2 % external ointment Apply topically 3 times daily 30 g 1     nicotine (NICODERM CQ) 14 MG/24HR 24 hr patch Place 1 patch onto the skin every 24 hours 30 patch 1     OLANZapine (ZYPREXA) 2.5 MG tablet  (Patient not taking: Reported on 11/16/2022)       order for DME Equipment being ordered: callous pad (Patient not taking: Reported on 1/12/2023) 1 each 1     order for DME Equipment being ordered: Dynaflex insert (Patient not taking: Reported on 1/12/2023) 1 Units 0     polyethylene glycol (CLEARLAX) 17 GM/Dose powder Take 17 g (1 capful) by mouth daily 510 g 11     Psyllium (REGULOID) 48.57 % POWD Take 1 each by mouth See Admin Instructions Take 1 teaspoonful every day at 7pm and 9 pm. 369 g 11     REGULOID 51.7 % POWD        simvastatin (ZOCOR) 40 MG tablet TAKE ONE TABLET BY MOUTH EVERY DAY AT 9 PM 90 tablet 3     traZODone (DESYREL) 50 MG tablet Take 25 mg by mouth At Bedtime        ZOLOFT 100 MG OR TABS 2 TABLETS DAILY       Allergies   Allergen Reactions     Nitrogen Oxide      Sulfa Drugs      Physical Exam:  /82   Pulse 70   Temp 97.3  F (36.3  C) (Tympanic)   SpO2 92%     Constitutional- No acute distress, well nourished, non-toxic  Eyes: Anicteric, no injection.  PERRL  ENT:  Normocephalic, atraumatic, Nose midline, moist mucus membranes  Neck - supple, no LAD  Respiratory- Clear to auscultation bilaterally, good inspiratory  effort  Cardiovascular - Heart RRR, no lift's, thrills, murmurs, rubs, or gallop.  No peripheral edema.  No clubbing.  Abdomen - Soft, non-tender, +BS, no hepatosplenomegaly, no palpable masses  Groins - 2+ pulses bilaterally and no LAD, reducible right inguinal hernia  Neuro - No focal neuro deficits, Alert and oriented x 3  Psych: Appropriate mood and affect  Musculoskeletal: Normal gait, symmetric strength.  FROM upper and lower extremities.  Skin: Warm, Dry    Assessment:  1. Unilateral inguinal hernia without obstruction or gangrene, recurrence not specified      Plan:   Geovani Bustos presents with symptomatic direct right inguinal hernia. Symptoms are progressively worsening recently. The patient may benefit from hernia repair.  I advised smoking cessation (heavy smoker with history of COPD) prior to proceeding with surgery.  Hernia is wide mouth, easily reducible.  I reviewed risks of incarceration/strangulation.  Staff and patient expressed understanding.  Once he has quit smoking for at least 6 weeks, would proceed with open right inguinal hernia repair with mesh.       Shiv Reynolds,  on 1/12/2023 at 9:45 AM

## 2023-01-12 NOTE — NURSING NOTE
"Initial /82   Pulse 70   Temp 97.3  F (36.3  C) (Tympanic)   SpO2 92%  Estimated body mass index is 27.83 kg/m  as calculated from the following:    Height as of 1/3/23: 1.727 m (5' 8\").    Weight as of 1/3/23: 83 kg (183 lb). .    Angeli Oates LPN on 1/12/2023 at 9:43 AM    "

## 2023-01-12 NOTE — LETTER
1/12/2023         RE: Geovani Bustos  [address]  [facility] [address]        Dear Colleague,    Thank you for referring your patient, Geovani Bustos, to the [facility]. Please see a copy of my visit note below.    Surgical Consultation/History and Physical  [facility]    eGovani is seen in consultation for Right groin hernia, at the request of Alexandru Vera MD.    Chief Complaint:  Right Groin Hernia    History of Present Illness: Geovani Bustos is a 67 year old male presents with right groin hernia.  Patient has known hernia.  He was advised to quit smoking prior to surgery.  He is still able to reduce the hernia.  He is cutting down on smoking.      Patient Active Problem List   Diagnosis     Colon polyps     Schizophrenia (H)     VSD (ventricular septal defect)     Mitral valve prolapse     Acquired hypothyroidism     IgA nephropathy     Proteinuria     Hyperlipidemia LDL goal <130     BPH (benign prostatic hyperplasia)     Health Care Home     Bilateral impacted cerumen     Bunion of great toe of right foot     Eczema of external ear, bilateral     Tinea pedis, unspecified laterality     Incomplete right bundle branch block (RBBB)     Cigarette nicotine dependence with nicotine-induced disorder     Gastroesophageal reflux disease without esophagitis     Stage 1 mild COPD by GOLD classification (H)     CKD (chronic kidney disease) stage 3, GFR 30-59 ml/min (H)     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)     Smoking     Past Medical History:   Diagnosis Date     Cerumen impaction      Chronic kidney disease      Colon polyps      Hyperlipidemia      Mitral valve prolapse      Schizophrenia (H)      Ulcerated penile lesions      VSD (ventricular septal defect)      Past Surgical History:   Procedure Laterality Date     ARTHRODESIS FOOT Right 1/19/2016    Procedure: ARTHRODESIS FOOT;  Surgeon: Holden Harrsion DPM;  Location: [facility]      ARTHRODESIS FOOT Left 1/3/2017    Procedure: ARTHRODESIS FOOT;  Surgeon: Holden Harrison DPM;  Location: [facility]     COLONOSCOPY N/A 5/19/2022    Procedure: COLONOSCOPY, FLEXIBLE, WITH LESION REMOVAL USING SNARE;  Surgeon: Ame Nelson MD;  Location: [facility]     SURGICAL HISTORY OF -       leg fracture     Family History   Problem Relation Age of Onset     Emphysema Mother      Coronary Artery Disease Father      Social History     Tobacco Use     Smoking status: Every Day     Packs/day: 1.00     Years: 50.00     Pack years: 50.00     Types: Cigarettes     Smokeless tobacco: Never     Tobacco comments:     cutting one cigarette down a month   Substance Use Topics     Alcohol use: No      History   Drug Use No     Current Outpatient Medications   Medication Sig Dispense Refill     ABILIFY 10 MG OR TABS Take 22 mg by mouth daily        ADULT ASPIRIN REGIMEN 81 MG EC tablet Take 1 tablet (81 mg) by mouth daily 30 tablet 11     clobetasol (TEMOVATE) 0.05 % external ointment Apply twice daily to ears for two weeks then 3 times weekly thereafter. 30 g 3     finasteride (PROSCAR) 5 MG tablet TAKE 1 TABLET BY MOUTH EVERY DAY 90 tablet 3     Omega-3 Fatty Acids (FISH OIL) 1200 MG capsule Take 1 capsule (1.2 g) by mouth daily 90 capsule 2     oxybutynin ER (DITROPAN XL) 5 MG 24 hr tablet Take 1 tablet by mouth in the morning 90 tablet 3     Skin Protectants, Misc. (EUCERIN) cream Apply topically 2 times daily Please ask Group home to schedule a Mid may 2023 follow up Derm appt: [phone] to schedule. 454 g 6     tamsulosin (FLOMAX) 0.4 MG capsule Take 1 capsule (0.4 mg) by mouth daily 90 capsule 1     albuterol (VENTOLIN HFA) 108 (90 Base) MCG/ACT inhaler Inhale 2 puffs into the lungs every 6 hours as needed for shortness of breath / dyspnea or wheezing 18 g 11     Disposable Gloves (LATEX GLOVES MEDIUM) MISC 4 boxes of gloves  For the application of topical medications 400 each 11     Disposable Gloves (NITRILE EXAM  GLOVES MEDIUM) MISC 1 each as needed Use PRN daily with administration of otic drops, body lotion and powder to treat dry itchy skin. 4 each 11     EAR DROPS 6.5 % otic solution Place 5 drops into both ears 2 times daily For 5 days August 1-5th and Feb 1-5 th then return for irrigation after the 5th day. (Patient not taking: Reported on 1/12/2023) 15 mL 3     fluticasone-vilanterol (BREO ELLIPTA) 100-25 MCG/ACT inhaler Inhale 1 puff into the lungs daily 90 each 3     Incontinence Supply Disposable (INCONTINENCE BRIEF LARGE) MISC Incontinence pads up to 300 per month 300 each 1     levothyroxine (SYNTHROID/LEVOTHROID) 125 MCG tablet Take 1 tablet (125 mcg) by mouth daily 90 tablet 3     mirabegron (MYRBETRIQ) 50 MG 24 hr tablet Take 1 tablet (50 mg) by mouth daily 90 tablet 3     mupirocin (BACTROBAN) 2 % external ointment Apply topically 3 times daily 30 g 1     nicotine (NICODERM CQ) 14 MG/24HR 24 hr patch Place 1 patch onto the skin every 24 hours 30 patch 1     OLANZapine (ZYPREXA) 2.5 MG tablet  (Patient not taking: Reported on 11/16/2022)       order for DME Equipment being ordered: callous pad (Patient not taking: Reported on 1/12/2023) 1 each 1     order for DME Equipment being ordered: Dynaflex insert (Patient not taking: Reported on 1/12/2023) 1 Units 0     polyethylene glycol (CLEARLAX) 17 GM/Dose powder Take 17 g (1 capful) by mouth daily 510 g 11     Psyllium (REGULOID) 48.57 % POWD Take 1 each by mouth See Admin Instructions Take 1 teaspoonful every day at 7pm and 9 pm. 369 g 11     REGULOID 51.7 % POWD        simvastatin (ZOCOR) 40 MG tablet TAKE ONE TABLET BY MOUTH EVERY DAY AT 9 PM 90 tablet 3     traZODone (DESYREL) 50 MG tablet Take 25 mg by mouth At Bedtime        ZOLOFT 100 MG OR TABS 2 TABLETS DAILY       Allergies   Allergen Reactions     Nitrogen Oxide      Sulfa Drugs      Physical Exam:  /82   Pulse 70   Temp 97.3  F (36.3  C) (Tympanic)   SpO2 92%     Constitutional- No acute  distress, well nourished, non-toxic  Eyes: Anicteric, no injection.  PERRL  ENT:  Normocephalic, atraumatic, Nose midline, moist mucus membranes  Neck - supple, no LAD  Respiratory- Clear to auscultation bilaterally, good inspiratory effort  Cardiovascular - Heart RRR, no lift's, thrills, murmurs, rubs, or gallop.  No peripheral edema.  No clubbing.  Abdomen - Soft, non-tender, +BS, no hepatosplenomegaly, no palpable masses  Groins - 2+ pulses bilaterally and no LAD, reducible right inguinal hernia  Neuro - No focal neuro deficits, Alert and oriented x 3  Psych: Appropriate mood and affect  Musculoskeletal: Normal gait, symmetric strength.  FROM upper and lower extremities.  Skin: Warm, Dry    Assessment:  1. Unilateral inguinal hernia without obstruction or gangrene, recurrence not specified      Plan:   Geovani Bustos presents with symptomatic direct right inguinal hernia. Symptoms are progressively worsening recently. The patient may benefit from hernia repair.  I advised smoking cessation (heavy smoker with history of COPD) prior to proceeding with surgery.  Hernia is wide mouth, easily reducible.  I reviewed risks of incarceration/strangulation.  Staff and patient expressed understanding.  Once he has quit smoking for at least 6 weeks, would proceed with open right inguinal hernia repair with mesh.       Shiv Reynolds DO on 1/12/2023 at 9:45 AM        Again, thank you for allowing me to participate in the care of your patient.        Sincerely,        Shiv Reynolds DO

## 2023-01-30 DIAGNOSIS — Z72.0 TOBACCO ABUSE: ICD-10-CM

## 2023-01-30 RX ORDER — NICOTINE 21 MG/24HR
1 PATCH, TRANSDERMAL 24 HOURS TRANSDERMAL EVERY 24 HOURS
Qty: 30 PATCH | Refills: 3 | Status: SHIPPED | OUTPATIENT
Start: 2023-01-30 | End: 2023-06-16

## 2023-01-30 NOTE — TELEPHONE ENCOUNTER
Message from Pharmacy    Group home patient Requesting refills for future use. This prescription was filled on 1/30/23. Any refills authorized will be placed on file.     [facility]erred Pharmacy:[facility] [address] [address]  Phone: [phone] Fax: [phone]

## 2023-02-20 ENCOUNTER — OFFICE VISIT (OUTPATIENT)
Dept: FAMILY MEDICINE | Facility: CLINIC | Age: 68
End: 2023-02-20
Payer: COMMERCIAL

## 2023-02-20 VITALS
DIASTOLIC BLOOD PRESSURE: 74 MMHG | HEART RATE: 65 BPM | HEIGHT: 68 IN | BODY MASS INDEX: 28.34 KG/M2 | OXYGEN SATURATION: 93 % | SYSTOLIC BLOOD PRESSURE: 122 MMHG | TEMPERATURE: 97.4 F | RESPIRATION RATE: 18 BRPM | WEIGHT: 187 LBS

## 2023-02-20 DIAGNOSIS — Z79.899 DRUG THERAPY: ICD-10-CM

## 2023-02-20 DIAGNOSIS — E03.9 ACQUIRED HYPOTHYROIDISM: Chronic | ICD-10-CM

## 2023-02-20 DIAGNOSIS — F20.5 RESIDUAL SCHIZOPHRENIA (H): ICD-10-CM

## 2023-02-20 DIAGNOSIS — Z00.00 ENCOUNTER FOR MEDICARE ANNUAL WELLNESS EXAM: Primary | ICD-10-CM

## 2023-02-20 DIAGNOSIS — K40.20 BILATERAL INGUINAL HERNIA WITHOUT OBSTRUCTION OR GANGRENE, RECURRENCE NOT SPECIFIED: ICD-10-CM

## 2023-02-20 DIAGNOSIS — F20.5 RESIDUAL SCHIZOPHRENIA (H): Primary | ICD-10-CM

## 2023-02-20 DIAGNOSIS — E78.5 HYPERLIPIDEMIA LDL GOAL <130: ICD-10-CM

## 2023-02-20 DIAGNOSIS — N40.0 BPH (BENIGN PROSTATIC HYPERPLASIA): Chronic | ICD-10-CM

## 2023-02-20 DIAGNOSIS — H91.93 BILATERAL HEARING LOSS, UNSPECIFIED HEARING LOSS TYPE: ICD-10-CM

## 2023-02-20 LAB
ALBUMIN SERPL BCG-MCNC: 4.3 G/DL (ref 3.5–5.2)
ALP SERPL-CCNC: 124 U/L (ref 40–129)
ALT SERPL W P-5'-P-CCNC: 17 U/L (ref 10–50)
ANION GAP SERPL CALCULATED.3IONS-SCNC: 13 MMOL/L (ref 7–15)
AST SERPL W P-5'-P-CCNC: 20 U/L (ref 10–50)
BASOPHILS # BLD AUTO: 0 10E3/UL (ref 0–0.2)
BASOPHILS NFR BLD AUTO: 0 %
BILIRUB SERPL-MCNC: 0.3 MG/DL
BUN SERPL-MCNC: 28.9 MG/DL (ref 8–23)
CALCIUM SERPL-MCNC: 9.7 MG/DL (ref 8.8–10.2)
CHLORIDE SERPL-SCNC: 104 MMOL/L (ref 98–107)
CHOLEST SERPL-MCNC: 169 MG/DL
CREAT SERPL-MCNC: 1.59 MG/DL (ref 0.67–1.17)
DEPRECATED HCO3 PLAS-SCNC: 27 MMOL/L (ref 22–29)
EOSINOPHIL # BLD AUTO: 0.3 10E3/UL (ref 0–0.7)
EOSINOPHIL NFR BLD AUTO: 4 %
ERYTHROCYTE [DISTWIDTH] IN BLOOD BY AUTOMATED COUNT: 13.1 % (ref 10–15)
GFR SERPL CREATININE-BSD FRML MDRD: 47 ML/MIN/1.73M2
GLUCOSE SERPL-MCNC: 100 MG/DL (ref 70–99)
HBA1C MFR BLD: 5.6 % (ref 0–5.6)
HCT VFR BLD AUTO: 45.2 % (ref 40–53)
HDLC SERPL-MCNC: 55 MG/DL
HGB BLD-MCNC: 15.1 G/DL (ref 13.3–17.7)
IMM GRANULOCYTES # BLD: 0 10E3/UL
IMM GRANULOCYTES NFR BLD: 0 %
LDLC SERPL CALC-MCNC: 93 MG/DL
LYMPHOCYTES # BLD AUTO: 1.4 10E3/UL (ref 0.8–5.3)
LYMPHOCYTES NFR BLD AUTO: 15 %
MCH RBC QN AUTO: 31.7 PG (ref 26.5–33)
MCHC RBC AUTO-ENTMCNC: 33.4 G/DL (ref 31.5–36.5)
MCV RBC AUTO: 95 FL (ref 78–100)
MONOCYTES # BLD AUTO: 0.6 10E3/UL (ref 0–1.3)
MONOCYTES NFR BLD AUTO: 6 %
NEUTROPHILS # BLD AUTO: 7.1 10E3/UL (ref 1.6–8.3)
NEUTROPHILS NFR BLD AUTO: 75 %
NONHDLC SERPL-MCNC: 114 MG/DL
PLATELET # BLD AUTO: 165 10E3/UL (ref 150–450)
POTASSIUM SERPL-SCNC: 4.9 MMOL/L (ref 3.4–5.3)
PROT SERPL-MCNC: 7.3 G/DL (ref 6.4–8.3)
RBC # BLD AUTO: 4.77 10E6/UL (ref 4.4–5.9)
SODIUM SERPL-SCNC: 144 MMOL/L (ref 136–145)
TRIGL SERPL-MCNC: 106 MG/DL
WBC # BLD AUTO: 9.5 10E3/UL (ref 4–11)

## 2023-02-20 PROCEDURE — 80061 LIPID PANEL: CPT | Performed by: FAMILY MEDICINE

## 2023-02-20 PROCEDURE — 83036 HEMOGLOBIN GLYCOSYLATED A1C: CPT | Performed by: FAMILY MEDICINE

## 2023-02-20 PROCEDURE — 99214 OFFICE O/P EST MOD 30 MIN: CPT | Mod: 25 | Performed by: FAMILY MEDICINE

## 2023-02-20 PROCEDURE — 36415 COLL VENOUS BLD VENIPUNCTURE: CPT | Performed by: FAMILY MEDICINE

## 2023-02-20 PROCEDURE — 80053 COMPREHEN METABOLIC PANEL: CPT | Performed by: FAMILY MEDICINE

## 2023-02-20 PROCEDURE — G0439 PPPS, SUBSEQ VISIT: HCPCS | Performed by: FAMILY MEDICINE

## 2023-02-20 PROCEDURE — 85025 COMPLETE CBC W/AUTO DIFF WBC: CPT | Performed by: FAMILY MEDICINE

## 2023-02-20 ASSESSMENT — ACTIVITIES OF DAILY LIVING (ADL)
CURRENT_FUNCTION: TRANSPORTATION REQUIRES ASSISTANCE
CURRENT_FUNCTION: TELEPHONE REQUIRES ASSISTANCE
CURRENT_FUNCTION: PREPARING MEALS REQUIRES ASSISTANCE
CURRENT_FUNCTION: MEDICATION ADMINISTRATION REQUIRES ASSISTANCE
CURRENT_FUNCTION: MONEY MANAGEMENT REQUIRES ASSISTANCE

## 2023-02-20 ASSESSMENT — PAIN SCALES - GENERAL: PAINLEVEL: NO PAIN (0)

## 2023-02-20 ASSESSMENT — ENCOUNTER SYMPTOMS
FREQUENCY: 1
PARESTHESIAS: 0
CONSTIPATION: 0
ABDOMINAL PAIN: 0
HEARTBURN: 0
NERVOUS/ANXIOUS: 0
HEMATOCHEZIA: 0
DIZZINESS: 0
HEMATURIA: 0
WEAKNESS: 0
ARTHRALGIAS: 0
MYALGIAS: 0
HEADACHES: 0
COUGH: 0
FEVER: 0
DYSURIA: 0
JOINT SWELLING: 0
DIARRHEA: 0
SORE THROAT: 0
SHORTNESS OF BREATH: 0
PALPITATIONS: 0
NAUSEA: 0
CHILLS: 0
EYE PAIN: 0

## 2023-02-20 NOTE — PATIENT INSTRUCTIONS
Patient Education   Personalized Prevention Plan  You are due for the preventive services outlined below.  Your care team is available to assist you in scheduling these services.  If you have already completed any of these items, please share that information with your care team to update in your medical record.  Health Maintenance Due   Topic Date Due     Zoster (Shingles) Vaccine (2 of 3) 04/12/2016     Diptheria Tetanus Pertussis (DTAP/TDAP/TD) Vaccine (3 - Td or Tdap) 12/10/2022     ANNUAL REVIEW OF HM ORDERS  02/17/2023     Basic Metabolic Panel  03/01/2023     Cholesterol Lab  03/01/2023     Kidney Microalbumin Urine Test  03/01/2023     Hemoglobin  03/01/2023       Exercise for a Healthier Heart  You may wonder how you can improve the health of your heart. If you re thinking about exercise, you re on the right track. You don t need to become an athlete. But you do need a certain amount of brisk exercise to help strengthen your heart. If you have been diagnosed with a heart condition, your healthcare provider may advise exercise to help stabilize your condition. To help make exercise a habit, choose safe, fun activities.      Exercise with a friend. When activity is fun, you're more likely to stick with it.   Before you start  Check with your healthcare provider before starting an exercise program. This is especially important if you have not been active for a while. It's also important if you have a long-term (chronic) health problem such as heart disease, diabetes, or obesity. Or if you are at high risk for having these problems.   Why exercise?  Exercising regularly offers many healthy rewards. It can help you do all of the following:     Improve your blood cholesterol level to help prevent further heart trouble    Lower your blood pressure to help prevent a stroke or heart attack    Control diabetes, or reduce your risk of getting this disease    Improve your heart and lung function    Reach and stay at  a healthy weight    Make your muscles stronger so you can stay active    Prevent falls and fractures by slowing the loss of bone mass (osteoporosis)    Manage stress better    Reduce your blood pressure    Improve your sense of self and your body image  Exercise tips      Ease into your routine. Set small goals. Then build on them. If you are not sure what your activity level should be, talk with your healthcare provider first before starting an exercise routine.    Exercise on most days. Aim for a total of 150 minutes (2 hours and 30 minutes) or more of moderate-intensity aerobic activity each week. Or 75 minutes (1 hour and 15 minutes) or more of vigorous-intensity aerobic activity each week. Or try for a combination of both. Moderate activity means that you breathe heavier and your heart rate increases but you can still talk. Think about doing 40 minutes of moderate exercise, 3 to 4 times a week. For best results, activity should last for about 40 minutes to lower blood pressure and cholesterol. It's OK to work up to the 40-minute period over time. Examples of moderate-intensity activity are walking 1 mile in 15 minutes. Or doing 30 to 45 minutes of yard work.    Step up your daily activity level.  Along with your exercise program, try being more active the whole day. Walk instead of drive. Or park further away so that you take more steps each day. Do more household tasks or yard work. You may not be able to meet the advised mount of physical activity. But doing some moderate- or vigorous-intensity aerobic activity can help reduce your risk for heart disease. Your healthcare provider can help you figure out what is best for you.    Choose 1 or more activities you enjoy.  Walking is one of the easiest things you can do. You can also try swimming, riding a bike, dancing, or taking an exercise class.    When to call your healthcare provider  Call your healthcare provider if you have any of these:     Chest pain or  feel dizzy or lightheaded    Burning, tightness, pressure, or heaviness in your chest, neck, shoulders, back, or arms    Abnormal shortness of breath    More joint or muscle pain    A very fast or irregular heartbeat (palpitations)  [employer] last reviewed this educational content on 7/1/2019 2000-2021 [employer]. All rights reserved. This information is not intended as a substitute for professional medical care. Always follow your healthcare professional's instructions.        Activities of Daily Living    Your Health Risk Assessment indicates you have difficulties with activities of daily living such as housework, bathing, preparing meals, taking medication, etc. Please make a follow up appointment for us to address this issue in more detail.    Signs of Hearing Loss      Hearing much better with one ear can be a sign of hearing loss.   Hearing loss is a problem shared by many people. In fact, it is one of the most common health problems, particularly as people age. Most people age 65 and older have some hearing loss. By age 80, almost everyone does. Hearing loss often occurs slowly over the years. So you may not realize your hearing has gotten worse.  Have your hearing checked  Call your healthcare provider if you:    Have to strain to hear normal conversation    Have to watch other people s faces very carefully to follow what they re saying    Need to ask people to repeat what they ve said    Often misunderstand what people are saying    Turn the volume of the television or radio up so high that others complain    Feel that people are mumbling when they re talking to you    Find that the effort to hear leaves you feeling tired and irritated    Notice, when using the phone, that you hear better with one ear than the other  [employer] last reviewed this educational content on 1/1/2020 2000-2021 [employer]. All rights reserved. This information is not intended as a substitute for  professional medical care. Always follow your healthcare professional's instructions.          Urinary Incontinence (Male)    Urinary incontinence means not being able to control the release of urine from the bladder.   Causes  Common causes of urinary incontinence in men include:    Infection    Certain medicines    Aging    Poor pelvic muscle tone    Bladder spasms    Obesity    Trouble urinating and fully emptying the bladder (urinary retention)  Other things that can cause incontinence are:     Nervous system diseases    Diabetes    Sleep apnea    Urinary tract infections    Prostate surgery    Pelvic injury  Constipation and smoking have also been identified as risk factors.   Symptoms    Urge incontinence (overactive bladder). This is a sudden urge to urinate. It occurs even though there may not be much urine in the bladder. The need to urinate often during the night is common. It's due to bladder spasms.    Stress incontinence. This is urine leakage that you can't control. It can occur with sneezing, coughing, and other actions that put stress on the bladder.    Treatment  Treatment depends on what is causing the condition. Bladder infections are treated with antibiotics. Urinary retention is treated with a bladder catheter.   Home care  Follow these guidelines when caring for yourself at home:    Don't have any foods and drinks that may irritate the bladder. This includes:  ? Chocolate  ? Alcohol  ? Caffeine  ? Carbonated drinks  ? Acidic fruits and juices    Limit fluids to 6 to 8 cups a day.    Lose weight if you are overweight. This will reduce your symptoms.    If advised, do regular pelvic muscle-strengthening exercises such as Kegel exercises.    If needed, wear absorbent pads to catch urine. Change the pads often. This is for good hygiene and to prevent skin and bladder infections.    Bathe daily for good hygiene.    If an antibiotic was prescribed to treat a bladder infection, take it until it's  finished. Keep taking it even if you are feeling better. This is to make sure your infection has cleared.    If a catheter was left in place, keep bacteria from getting into the collection bag. Don't disconnect the catheter from the collection bag.    Use a leg band to secure the catheter drainage tube, so it does not pull on the catheter. Drain the collection bag when it becomes full. To do this, use the drain spout at the bottom of the bag. Don't disconnect the bag from the catheter.    Don't pull on or try to remove a catheter. The catheter must be removed by a healthcare provider.    If you smoke, stop. Ask your provider for help if you can't do this on your own.  Follow-up care  Follow up with your healthcare provider, or as advised.  When to get medical advice  Call your healthcare provider right away if any of these occur:    Fever over 100.4 F (38 C), or as directed by your provider    Bladder pain or fullness    Belly swelling, nausea, or vomiting    Back pain    Weakness, dizziness, or fainting    If a catheter was left in place, return if:  ? The catheter falls out  ? The catheter stops draining for 6 hours  ? Your urine gets cloudy or smells bad  [employer] last reviewed this educational content on 1/1/2020 2000-2021 [employer]. All rights reserved. This information is not intended as a substitute for professional medical care. Always follow your healthcare professional's instructions.        Your Health Risk Assessment indicates you feel you are not in good emotional health.    Recreation   Recreation is not limited to sports and team events. It includes any activity that provides relaxation, interest, enjoyment, and exercise. Recreation provides an outlet for physical, mental, and social energy. It can give a sense of worth and achievement. It can help you stay healthy.    Mental Exercise and Social Involvement  Mental and emotional health is as important as physical health. Keep in  touch with friends and family. Stay as active as possible. Continue to learn and challenge yourself.   Things you can do to stay mentally active are:    Learn something new, like a foreign language or musical instrument.     Play SCRABBLE or do crossword puzzles. If you cannot find people to play these games with you at home, you can play them with others on your computer through the Internet.     Join a games club--anything from card games to chess or checkers or lawn bowling.     Start a new hobby.     Go back to school.     Volunteer.     Read.   Keep up with world events.

## 2023-02-20 NOTE — NURSING NOTE
"Chief Complaint   Patient presents with     Physical     /74 (Cuff Size: Adult Regular)   Pulse 65   Temp 97.4  F (36.3  C) (Tympanic)   Resp 18   Ht 1.727 m (5' 8\")   Wt 84.8 kg (187 lb)   SpO2 93%   BMI 28.43 kg/m   Estimated body mass index is 28.43 kg/m  as calculated from the following:    Height as of this encounter: 1.727 m (5' 8\").    Weight as of this encounter: 84.8 kg (187 lb).  Patient presents to the clinic using No DME      Health Maintenance that is potentially due pending provider review:    Health Maintenance Due   Topic Date Due     ZOSTER IMMUNIZATION (2 of 3) 04/12/2016     DTAP/TDAP/TD IMMUNIZATION (3 - Td or Tdap) 12/10/2022     ANNUAL REVIEW OF HM ORDERS  02/17/2023     MEDICARE ANNUAL WELLNESS VISIT  02/17/2023     BMP  03/01/2023     LIPID  03/01/2023     MICROALBUMIN  03/01/2023     HEMOGLOBIN  03/01/2023                "

## 2023-02-20 NOTE — PROGRESS NOTES
"SUBJECTIVE:   Srinivas is a 67 year old who presents for Preventive Visit.  Patient has been advised of split billing requirements and indicates understanding: Yes  Are you in the first 12 months of your Medicare coverage?  No    Healthy Habits:     In general, how would you rate your overall health?  Good    Frequency of exercise:  None    Do you usually eat at least 4 servings of fruit and vegetables a day, include whole grains    & fiber and avoid regularly eating high fat or \"junk\" foods?  Yes    Taking medications regularly:  Yes    Medication side effects:  None    Ability to successfully perform activities of daily living:  Telephone requires assistance, transportation requires assistance, preparing meals requires assistance, medication administration requires assistance and money management requires assistance    Home Safety:  No safety concerns identified    Hearing Impairment:  Difficulty following a conversation in a noisy restaurant or crowded room, feel that people are mumbling or not speaking clearly and need to ask people to speak up or repeat themselves    In the past 6 months, have you been bothered by leaking of urine? Yes    In general, how would you rate your overall mental or emotional health?  Fair      PHQ-2 Total Score: 1    Additional concerns today:  No      Have you ever done Advance Care Planning? (For example, a Health Directive, POLST, or a discussion with a medical provider or your loved ones about your wishes): No, advance care planning information given to patient to review.  Patient plans to discuss their wishes with loved ones or provider.         Fall risk  Fallen 2 or more times in the past year?: No  Any fall with injury in the past year?: No    Cognitive Screening Not appropriate due to mental handicap        Reviewed and updated as needed this visit by clinical staff   Tobacco  Allergies  Meds              Reviewed and updated as needed this visit by Provider               "   Social History     Tobacco Use     Smoking status: Every Day     Packs/day: 1.00     Years: 50.00     Pack years: 50.00     Types: Cigarettes     Smokeless tobacco: Never     Tobacco comments:     cutting one cigarette down a month   Substance Use Topics     Alcohol use: No         Alcohol Use 2/20/2023   Prescreen: >3 drinks/day or >7 drinks/week? No   Prescreen: >3 drinks/day or >7 drinks/week? -       PROBLEMS TO ADD ON...  -Ears- do they need to be flushed?  - would like to have his hernia checked again. Tried to quit smoking but it is affecting his mental health.   -     Current providers sharing in care for this patient include:   Patient Care Team:  Alexandru Vera MD as PCP - General (Family Practice)  Ginger Weiss MD as MD (Nephrology)  Ginger Weiss MD as Assigned Nephrology Provider  Alexandru Vera MD as Assigned PCP  Peewee Lott MD as Assigned Heart and Vascular Provider  BRAXTON Mack MD as Assigned Surgical Provider  Sangeetha Schmitt MD as Assigned Musculoskeletal Provider  Dakota Pinzon MD as Assigned Pulmonology Provider    The following health maintenance items are reviewed in Epic and correct as of today:  Health Maintenance   Topic Date Due     ZOSTER IMMUNIZATION (2 of 3) 04/12/2016     DTAP/TDAP/TD IMMUNIZATION (3 - Td or Tdap) 12/10/2022     ANNUAL REVIEW OF HM ORDERS  02/17/2023     BMP  03/01/2023     LIPID  03/01/2023     MICROALBUMIN  03/01/2023     HEMOGLOBIN  03/01/2023     LUNG CANCER SCREENING  09/06/2023     TSH W/FREE T4 REFLEX  11/09/2023     NICOTINE/TOBACCO CESSATION COUNSELING Q 1 YR  02/20/2024     MEDICARE ANNUAL WELLNESS VISIT  02/20/2024     FALL RISK ASSESSMENT  02/20/2024     COLORECTAL CANCER SCREENING  05/19/2027     ADVANCE CARE PLANNING  02/20/2028     SPIROMETRY  Completed     HEPATITIS C SCREENING  Completed     COPD ACTION PLAN  Completed     PHQ-2 (once per calendar year)  Completed     INFLUENZA VACCINE   Completed     Pneumococcal Vaccine: 65+ Years  Completed     URINALYSIS  Completed     AORTIC ANEURYSM SCREENING (SYSTEM ASSIGNED)  Completed     COVID-19 Vaccine  Completed     IPV IMMUNIZATION  Aged Out     MENINGITIS IMMUNIZATION  Aged Out     Lab work is in process  Labs reviewed in EPIC  BP Readings from Last 3 Encounters:   02/20/23 122/74   01/12/23 137/82   01/03/23 120/70    Wt Readings from Last 3 Encounters:   02/20/23 84.8 kg (187 lb)   01/03/23 83 kg (183 lb)   11/23/22 83 kg (183 lb)                  Patient Active Problem List   Diagnosis     Colon polyps     Schizophrenia (H)     VSD (ventricular septal defect)     Mitral valve prolapse     Acquired hypothyroidism     IgA nephropathy     Proteinuria     Hyperlipidemia LDL goal <130     BPH (benign prostatic hyperplasia)     Health Care Home     Bilateral impacted cerumen     Bunion of great toe of right foot     Eczema of external ear, bilateral     Tinea pedis, unspecified laterality     Incomplete right bundle branch block (RBBB)     Cigarette nicotine dependence with nicotine-induced disorder     Gastroesophageal reflux disease without esophagitis     Stage 1 mild COPD by GOLD classification (H)     CKD (chronic kidney disease) stage 3, GFR 30-59 ml/min (H)     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)     Smoking     Past Surgical History:   Procedure Laterality Date     ARTHRODESIS FOOT Right 1/19/2016    Procedure: ARTHRODESIS FOOT;  Surgeon: Holden Harrison DPM;  Location: [facility]     ARTHRODESIS FOOT Left 1/3/2017    Procedure: ARTHRODESIS FOOT;  Surgeon: Holden Harrison DPM;  Location: [facility]     COLONOSCOPY N/A 5/19/2022    Procedure: COLONOSCOPY, FLEXIBLE, WITH LESION REMOVAL USING SNARE;  Surgeon: Ame Nelson MD;  Location: [facility]     SURGICAL HISTORY OF -       leg fracture       Social History     Tobacco Use     Smoking status: Every Day     Packs/day: 1.00     Years: 50.00     Pack years: 50.00     Types:  Cigarettes     Smokeless tobacco: Never     Tobacco comments:     cutting one cigarette down a month   Substance Use Topics     Alcohol use: No     Family History   Problem Relation Age of Onset     Emphysema Mother      Coronary Artery Disease Father          Current Outpatient Medications   Medication Sig Dispense Refill     ABILIFY 10 MG OR TABS Take 22 mg by mouth daily        ADULT ASPIRIN REGIMEN 81 MG EC tablet Take 1 tablet (81 mg) by mouth daily 30 tablet 11     albuterol (VENTOLIN HFA) 108 (90 Base) MCG/ACT inhaler Inhale 2 puffs into the lungs every 6 hours as needed for shortness of breath / dyspnea or wheezing 18 g 11     clobetasol (TEMOVATE) 0.05 % external ointment Apply twice daily to ears for two weeks then 3 times weekly thereafter. 30 g 3     Disposable Gloves (LATEX GLOVES MEDIUM) MISC 4 boxes of gloves  For the application of topical medications 400 each 11     Disposable Gloves (NITRILE EXAM GLOVES MEDIUM) MISC 1 each as needed Use PRN daily with administration of otic drops, body lotion and powder to treat dry itchy skin. 4 each 11     EAR DROPS 6.5 % otic solution Place 5 drops into both ears 2 times daily For 5 days August 1-5th and Feb 1-5 th then return for irrigation after the 5th day. 15 mL 3     finasteride (PROSCAR) 5 MG tablet TAKE 1 TABLET BY MOUTH EVERY DAY 90 tablet 3     fluticasone-vilanterol (BREO ELLIPTA) 100-25 MCG/ACT inhaler Inhale 1 puff into the lungs daily 90 each 3     Incontinence Supply Disposable (INCONTINENCE BRIEF LARGE) MISC Incontinence pads up to 300 per month 300 each 1     levothyroxine (SYNTHROID/LEVOTHROID) 125 MCG tablet Take 1 tablet (125 mcg) by mouth daily 90 tablet 3     mirabegron (MYRBETRIQ) 50 MG 24 hr tablet Take 1 tablet (50 mg) by mouth daily 90 tablet 3     mupirocin (BACTROBAN) 2 % external ointment Apply topically 3 times daily 30 g 1     nicotine (NICODERM CQ) 14 MG/24HR 24 hr patch Place 1 patch onto the skin every 24 hours 30 patch 3      OLANZapine (ZYPREXA) 2.5 MG tablet        Omega-3 Fatty Acids (FISH OIL) 1200 MG capsule Take 1 capsule (1.2 g) by mouth daily 90 capsule 2     order for DME Equipment being ordered: callous pad 1 each 1     order for DME Equipment being ordered: Dynaflex insert 1 Units 0     oxybutynin ER (DITROPAN XL) 5 MG 24 hr tablet Take 1 tablet by mouth in the morning 90 tablet 3     polyethylene glycol (CLEARLAX) 17 GM/Dose powder Take 17 g (1 capful) by mouth daily 510 g 11     Psyllium (REGULOID) 48.57 % POWD Take 1 each by mouth See Admin Instructions Take 1 teaspoonful every day at 7pm and 9 pm. 369 g 11     simvastatin (ZOCOR) 40 MG tablet TAKE ONE TABLET BY MOUTH EVERY DAY AT 9 PM 90 tablet 3     Skin Protectants, Misc. (EUCERIN) cream Apply topically 2 times daily Please ask Group home to schedule a Mid may 2023 follow up Derm appt: [phone] to schedule. 454 g 6     tamsulosin (FLOMAX) 0.4 MG capsule Take 1 capsule (0.4 mg) by mouth daily 90 capsule 1     traZODone (DESYREL) 50 MG tablet Take 25 mg by mouth At Bedtime        ZOLOFT 100 MG OR TABS 2 TABLETS DAILY       Allergies   Allergen Reactions     Nitrogen Oxide      Sulfa Drugs      Recent Labs   Lab Test 11/09/22  0937 03/01/22  0843 11/10/21  1321 08/25/21  1145 02/15/21  1209 09/15/20  0924 07/12/20  1601 11/15/19  0907 08/08/18  1405 03/21/18  0900   A1C  --  5.5  --   --  5.4  --   --   --   --  5.6   LDL  --  65  --   --  87  --   --  75   < >  --    HDL  --  51  --   --  56  --   --  45   < >  --    TRIG  --  124  --   --  122  --   --  155*   < >  --    ALT  --  36  --   --  30  --   --  21   < >  --    CR  --  1.42*  --  1.53* 1.42*  --  1.56* 1.47*   < > 1.36*   GFRESTIMATED  --  54*  --  46* 51*  --  45* 49*   < > 53*   GFRESTBLACK  --   --   --   --  59*  --  53* 57*   < > 64   POTASSIUM  --  4.5  --  4.3 4.8  --  4.2 4.3   < > 4.4   TSH 2.98  --  2.39  --   --    < >  --   --    < >  --     < > = values in this interval not displayed.     "      Review of Systems   Constitutional: Negative for chills and fever.   HENT: Negative for congestion, ear pain, hearing loss and sore throat.    Eyes: Negative for pain and visual disturbance.   Respiratory: Negative for cough and shortness of breath.    Cardiovascular: Negative for chest pain, palpitations and peripheral edema.   Gastrointestinal: Negative for abdominal pain, constipation, diarrhea, heartburn, hematochezia and nausea.   Genitourinary: Positive for frequency. Negative for dysuria, genital sores, hematuria, impotence, penile discharge and urgency.   Musculoskeletal: Negative for arthralgias, joint swelling and myalgias.   Skin: Negative for rash.   Neurological: Negative for dizziness, weakness, headaches and paresthesias.   Psychiatric/Behavioral: Positive for mood changes. The patient is not nervous/anxious.      Constitutional, HEENT, cardiovascular, pulmonary, GI, , musculoskeletal, neuro, skin, endocrine and psych systems are negative, except as otherwise noted.    OBJECTIVE:   /74 (Cuff Size: Adult Regular)   Pulse 65   Temp 97.4  F (36.3  C) (Tympanic)   Resp 18   Ht 1.727 m (5' 8\")   Wt 84.8 kg (187 lb)   SpO2 93%   BMI 28.43 kg/m   Estimated body mass index is 28.43 kg/m  as calculated from the following:    Height as of this encounter: 1.727 m (5' 8\").    Weight as of this encounter: 84.8 kg (187 lb).  Physical Exam  GENERAL: alert and no distress  EYES: Eyes grossly normal to inspection, PERRL and conjunctivae and sclerae normal  HENT: normal cephalic/atraumatic, ear canals and TM's normal with slight wax build up, oropharynx clear and oral mucous membranes moist  NECK: no adenopathy, no asymmetry, masses, or scars and thyroid normal to palpation  RESP: lungs clear to auscultation - no rales, rhonchi or wheezes  CV: regular rates and rhythm, normal S1 S2, no S3 or S4 and no murmur, click or rub  ABDOMEN: soft, nontender and bowel sounds normal, hernia in right groin   " (male): hernia in right groin, no tenderness to palpation  MS: no gross musculoskeletal defects noted, no edema  SKIN: no suspicious lesions or rashes  NEURO: Normal strength and tone, mentation intact and speech normal  PSYCH: mentation appears normal, affect normal/bright    Diagnostic Test Results:  Labs reviewed in Epic    ASSESSMENT / PLAN:   (Z00.00) Encounter for Medicare annual wellness exam  (primary encounter diagnosis)  Comment: Pt came in for annual wellness. No complaints other than discussion about smoking and hernia.  Plan: Follow up in 6mo    (K40.20) Bilateral inguinal hernia without obstruction or gangrene, recurrence not specified  Comment: Pt referred to general surgery for hernia, history of on right. Caregiver reports that surgeon states one is developing on left side as well. Will not do surgery until patient stops smoking for 6wks. Pt has tried to quit smoking but is struggling, is down to 6 cigarettes a day. Educated on looking for signs of strangulation or incarceration such as pain, abdominal distension and nausea and vomiting.  Plan: Encouraged to continue to decrease smoking.    (H91.93) Bilateral hearing loss, unspecified hearing loss type  Comment: Minimal wax buildup on exam, some removed today. Audiology referral required.  Plan: Adult Audiology  Referral            (F20.5) Residual schizophrenia (H)  Comment: No changes today, following psychiatry  Plan: Continue on current medications, Abilify and olanzapine.    (Z79.479) Drug therapy  Comment: Pt currently trying to stop smoking, down to 6 cigarettes a day. Necessary to stop for 6 weeks before hernia surgery can be done.  Plan: Encouraged to continue decreasing smoking.    Follow up in 6mo      COUNSELING:  Reviewed preventive health counseling, as reflected in patient instructions      He reports that he has been smoking cigarettes. He has a 50.00 pack-year smoking history. He has never used smokeless  tobacco.  Nicotine/Tobacco Cessation Plan:   Information offered: Patient not interested at this time, attempting to decrease      Appropriate preventive services were discussed with this patient, including applicable screening as appropriate for cardiovascular disease, diabetes, osteopenia/osteoporosis, and glaucoma.  As appropriate for age/gender, discussed screening for colorectal cancer, prostate cancer, breast cancer, and cervical cancer. Checklist reviewing preventive services available has been given to the patient.    Reviewed patients plan of care and provided an AVS. The Basic Care Plan (routine screening as documented in Health Maintenance) for Geovani meets the Care Plan requirement. This Care Plan has been established and reviewed with the Patient and caregiver.    SHIRLEY Ruelas-S2      I have reviewed today's vital signs, notes, medications, labs and imaging. I have also seen and examined the patient and agree with the findings and plan as outlined above.    Alexandru Vera MD  [facility]    Identified Health Risks:    He is at risk for lack of exercise and has been provided with information to increase physical activity for the benefit of his well-being.  The patient reports that he has difficulty with activities of daily living. I have asked that the patient make a follow up appointment in 6mo where this issue will be further evaluated and addressed.  The patient was provided with written information regarding signs of hearing loss.  Information on urinary incontinence and treatment options given to patient.  The patient was provided with suggestions to help him develop a healthy emotional lifestyle.

## 2023-02-21 ENCOUNTER — TELEPHONE (OUTPATIENT)
Dept: MULTI SPECIALTY CLINIC | Facility: CLINIC | Age: 68
End: 2023-02-21
Payer: COMMERCIAL

## 2023-02-21 RX ORDER — FINASTERIDE 5 MG/1
TABLET, FILM COATED ORAL
Qty: 90 TABLET | Refills: 2 | Status: SHIPPED | OUTPATIENT
Start: 2023-02-21 | End: 2023-06-06

## 2023-02-21 RX ORDER — LEVOTHYROXINE SODIUM 125 UG/1
TABLET ORAL
Qty: 90 TABLET | Refills: 2 | Status: SHIPPED | OUTPATIENT
Start: 2023-02-21 | End: 2023-09-29

## 2023-02-21 RX ORDER — SIMVASTATIN 40 MG
TABLET ORAL
Qty: 90 TABLET | Refills: 2 | Status: SHIPPED | OUTPATIENT
Start: 2023-02-21 | End: 2023-09-29

## 2023-02-21 NOTE — TELEPHONE ENCOUNTER
Left Voicemail (2nd Attempt) for the patient to call back and schedule the following:    Appointment type: Reschedule 3.20.23  Provider: Abhishek  Return date: next available  Specialty phone number: [phone]  Additional appointment(s) needed: lab  Additonal Notes: n/a

## 2023-02-24 ENCOUNTER — TELEPHONE (OUTPATIENT)
Dept: MULTI SPECIALTY CLINIC | Facility: CLINIC | Age: 68
End: 2023-02-24
Payer: COMMERCIAL

## 2023-02-24 NOTE — TELEPHONE ENCOUNTER
Left Voicemail (2nd Attempt) for the patient to call back and schedule the following:    Appointment type: Reschedule for 3.20.23  Provider: Abhishek  Return date: next available   Specialty phone number: [phone]  Additional appointment(s) needed: lab  Additonal Notes: n/a

## 2023-03-09 ENCOUNTER — DOCUMENTATION ONLY (OUTPATIENT)
Dept: LAB | Facility: CLINIC | Age: 68
End: 2023-03-09
Payer: COMMERCIAL

## 2023-03-15 ENCOUNTER — PATIENT OUTREACH (OUTPATIENT)
Dept: NEPHROLOGY | Facility: CLINIC | Age: 68
End: 2023-03-15

## 2023-03-15 NOTE — PROGRESS NOTES
Cancel lab appointment     Beatris Eldridge RN, BSN   Nephrology RN Care Coordinator   [facility]

## 2023-03-16 DIAGNOSIS — E78.5 HYPERLIPIDEMIA LDL GOAL <130: ICD-10-CM

## 2023-03-16 RX ORDER — ASPIRIN 81 MG/1
TABLET, FILM COATED ORAL
Qty: 30 TABLET | Refills: 11 | Status: SHIPPED | OUTPATIENT
Start: 2023-03-16 | End: 2024-03-18

## 2023-04-20 DIAGNOSIS — K59.00 CONSTIPATION: ICD-10-CM

## 2023-04-20 DIAGNOSIS — E78.5 HYPERLIPIDEMIA LDL GOAL <130: ICD-10-CM

## 2023-04-20 RX ORDER — AMOXICILLIN 500 MG
CAPSULE ORAL
Qty: 90 CAPSULE | Refills: 1 | Status: SHIPPED | OUTPATIENT
Start: 2023-04-20 | End: 2023-11-06

## 2023-04-28 ENCOUNTER — TELEPHONE (OUTPATIENT)
Dept: NEPHROLOGY | Facility: CLINIC | Age: 68
End: 2023-04-28
Payer: COMMERCIAL

## 2023-04-28 DIAGNOSIS — N02.B9 IGA NEPHROPATHY: Primary | ICD-10-CM

## 2023-04-28 NOTE — TELEPHONE ENCOUNTER
[facility] Call Center    Phone Message    May a detailed message be left on voicemail: yes     Reason for Call: Order(s): Other:   Reason for requested: labs  Date needed: 07/01/2023  Provider name:       Action Taken: Message routed to:  [facility] neph    Travel Screening: Not Applicable

## 2023-05-02 ENCOUNTER — TELEPHONE (OUTPATIENT)
Dept: SURGERY | Facility: CLINIC | Age: 68
End: 2023-05-02

## 2023-05-02 ENCOUNTER — OFFICE VISIT (OUTPATIENT)
Dept: FAMILY MEDICINE | Facility: CLINIC | Age: 68
End: 2023-05-02
Payer: COMMERCIAL

## 2023-05-02 VITALS
SYSTOLIC BLOOD PRESSURE: 120 MMHG | BODY MASS INDEX: 28.34 KG/M2 | HEART RATE: 63 BPM | OXYGEN SATURATION: 93 % | HEIGHT: 68 IN | DIASTOLIC BLOOD PRESSURE: 62 MMHG | TEMPERATURE: 97.9 F | WEIGHT: 187 LBS | RESPIRATION RATE: 20 BRPM

## 2023-05-02 DIAGNOSIS — K40.90 UNILATERAL INGUINAL HERNIA WITHOUT OBSTRUCTION OR GANGRENE, RECURRENCE NOT SPECIFIED: Primary | ICD-10-CM

## 2023-05-02 PROCEDURE — 99213 OFFICE O/P EST LOW 20 MIN: CPT | Performed by: FAMILY MEDICINE

## 2023-05-02 ASSESSMENT — PAIN SCALES - GENERAL: PAINLEVEL: NO PAIN (0)

## 2023-05-02 NOTE — NURSING NOTE
"Chief Complaint   Patient presents with     Hernia     /62 (Cuff Size: Adult Regular)   Pulse 63   Temp 97.9  F (36.6  C) (Tympanic)   Resp 20   Ht 1.727 m (5' 8\")   Wt 84.8 kg (187 lb)   SpO2 93%   BMI 28.43 kg/m   Estimated body mass index is 28.43 kg/m  as calculated from the following:    Height as of this encounter: 1.727 m (5' 8\").    Weight as of this encounter: 84.8 kg (187 lb).  Patient presents to the clinic using No DME      Health Maintenance that is potentially due pending provider review:    Health Maintenance Due   Topic Date Due     ZOSTER IMMUNIZATION (1 of 2) 04/12/2016     MICROALBUMIN  03/21/2019     DTAP/TDAP/TD IMMUNIZATION (2 - Td or Tdap) 12/10/2022     ANNUAL REVIEW OF HM ORDERS  02/17/2023                "

## 2023-05-02 NOTE — PROGRESS NOTES
"  Assessment & Plan     Unilateral inguinal hernia without obstruction or gangrene, recurrence not specified  Physical examination consistent with right inguinal hernia, reducible.  Suggested Tylenol, activity as tolerated and ultrasound ordered.  General surgery referral placed for further review and recommendations considering pain.  Stressed on smoking cessation, unfortunately patient not interested currently, associated health hazards explained in detail.  Group home staff member understood and in agreement with above plan.  All questions answered.  - US Hernia Evaluation; Future  - Adult General Surg Referral; Future        Alexandru Vera MD  [facility]    Markell Espino is a 67 year old, presenting for the following health issues:  Hernia      History of Present Illness       Reason for visit:  Check hernia to see if it ruptured    He eats 0-1 servings of fruits and vegetables daily.He consumes 2 sweetened beverage(s) daily.He exercises with enough effort to increase his heart rate 9 or less minutes per day.  He exercises with enough effort to increase his heart rate 3 or less days per week.   He is taking medications regularly.       Review of Systems   Constitutional, HEENT, cardiovascular, pulmonary, gi and gu systems are negative, except as otherwise noted.      Objective    /62 (Cuff Size: Adult Regular)   Pulse 63   Temp 97.9  F (36.6  C) (Tympanic)   Resp 20   Ht 1.727 m (5' 8\")   Wt 84.8 kg (187 lb)   SpO2 93%   BMI 28.43 kg/m    Body mass index is 28.43 kg/m .  Physical Exam   GENERAL: alert and no distress  EYES: Eyes grossly normal to inspection, PERRL and conjunctivae and sclerae normal  NECK: no adenopathy, no asymmetry, masses, or scars and thyroid normal to palpation  RESP: lungs clear to auscultation - no rales, rhonchi or wheezes  CV: regular rates and rhythm, normal S1 S2, no S3 or S4 and no murmur, click or rub  ABDOMEN: soft, nontender and right " inguinal hernia, reducible, no tenderness, warmth noted  MS: no gross musculoskeletal defects noted, no edema  SKIN: no suspicious lesions or rashes

## 2023-05-02 NOTE — TELEPHONE ENCOUNTER
Received call inquiring if a Surgeon would do surgery if patient and/or others as co- guardians signed a statement that understanding the risks if does not successfully quits smoking.    Patient last attempt at quitting caused significant mood and behavior issues.  Patient gave up.  However;  The bulging remains longer before reducing and the pain reaches periods of 9 out of 10 even though the bulge is reduced most times.    Has another US this Friday and a follow up visit with surgeon again but believe he may be able to reduce but not stop smoking for the time period stated to him and cannot stand the disabling pain episodes and does not want to reach a Emergent Repair situation.  So asking if any way surgery can still be done.    RN Discussed if maybe additional persons would come to the appt and everyone hear the reasons why non smoking is important with hernia surgery and a full discussion with provider: then it may help support him in additional to everyone on the same page.    Sonja JAY   [facility]

## 2023-05-05 ENCOUNTER — HOSPITAL ENCOUNTER (OUTPATIENT)
Dept: ULTRASOUND IMAGING | Facility: CLINIC | Age: 68
Discharge: HOME | End: 2023-05-05
Attending: FAMILY MEDICINE | Admitting: FAMILY MEDICINE
Payer: COMMERCIAL

## 2023-05-05 DIAGNOSIS — K40.90 UNILATERAL INGUINAL HERNIA WITHOUT OBSTRUCTION OR GANGRENE, RECURRENCE NOT SPECIFIED: ICD-10-CM

## 2023-05-05 DIAGNOSIS — K59.00 CONSTIPATION, UNSPECIFIED CONSTIPATION TYPE: ICD-10-CM

## 2023-05-05 PROCEDURE — 76705 ECHO EXAM OF ABDOMEN: CPT

## 2023-05-05 RX ORDER — POLYETHYLENE GLYCOL 3350 17 G/17G
POWDER, FOR SOLUTION ORAL
Qty: 510 G | Refills: 11 | Status: SHIPPED | OUTPATIENT
Start: 2023-05-05 | End: 2024-05-15

## 2023-05-12 DIAGNOSIS — N32.81 OAB (OVERACTIVE BLADDER): ICD-10-CM

## 2023-05-12 RX ORDER — MIRABEGRON 50 MG/1
50 TABLET, EXTENDED RELEASE ORAL DAILY
Qty: 90 TABLET | Refills: 0 | Status: SHIPPED | OUTPATIENT
Start: 2023-05-12 | End: 2023-06-06

## 2023-05-12 NOTE — TELEPHONE ENCOUNTER
Requested Prescriptions   Pending Prescriptions Disp Refills     mirabegron (MYRBETRIQ) 50 MG 24 hr tablet 90 tablet 3     Sig: Take 1 tablet (50 mg) by mouth daily       There is no refill protocol information for this order        Last office visit: 7/26/2022 ; last virtual visit: 12/5/2022 with prescribing provider:  Dr. Mack    Future Office Visit:          Ahsan sammi  [facility]

## 2023-05-12 NOTE — TELEPHONE ENCOUNTER
Last OV 12/5/22. RTC 6 months advised. Refilled 90 day supply. Mailed letter as a reminder to schedule f/u appointment.    Jolynn Haas RN on 5/12/2023 at 2:56 PM

## 2023-05-12 NOTE — LETTER
Geovani Bustos  [id number] [facility] [address]        May 12, 2023      Dear Geovani Bustos,      We received a request to refill mirabegron.  This medication request has been filled for a 3 month supply.  Our records indicate that you are due for a Urology follow up appointment with Vanesa Montanez in June 2023. Please contact our office at [phone], to schedule this appointment at your earliest convenience.        Sincerely,      Your [facility] Urology care team

## 2023-05-17 ENCOUNTER — OFFICE VISIT (OUTPATIENT)
Dept: SURGERY | Facility: CLINIC | Age: 68
End: 2023-05-17
Attending: FAMILY MEDICINE
Payer: COMMERCIAL

## 2023-05-17 ENCOUNTER — TELEPHONE (OUTPATIENT)
Dept: SURGERY | Facility: CLINIC | Age: 68
End: 2023-05-17

## 2023-05-17 VITALS
SYSTOLIC BLOOD PRESSURE: 111 MMHG | HEART RATE: 69 BPM | BODY MASS INDEX: 28.5 KG/M2 | HEIGHT: 69 IN | WEIGHT: 192.4 LBS | DIASTOLIC BLOOD PRESSURE: 71 MMHG | TEMPERATURE: 97 F

## 2023-05-17 DIAGNOSIS — F17.219 CIGARETTE NICOTINE DEPENDENCE WITH NICOTINE-INDUCED DISORDER: Chronic | ICD-10-CM

## 2023-05-17 DIAGNOSIS — J44.9 STAGE 1 MILD COPD BY GOLD CLASSIFICATION (H): ICD-10-CM

## 2023-05-17 DIAGNOSIS — N18.30 STAGE 3 CHRONIC KIDNEY DISEASE, UNSPECIFIED WHETHER STAGE 3A OR 3B CKD (H): ICD-10-CM

## 2023-05-17 DIAGNOSIS — I34.1 MITRAL VALVE PROLAPSE: Chronic | ICD-10-CM

## 2023-05-17 DIAGNOSIS — K40.90 UNILATERAL INGUINAL HERNIA WITHOUT OBSTRUCTION OR GANGRENE, RECURRENCE NOT SPECIFIED: Primary | ICD-10-CM

## 2023-05-17 PROCEDURE — 99214 OFFICE O/P EST MOD 30 MIN: CPT | Performed by: SURGERY

## 2023-05-17 NOTE — LETTER
5/17/2023         RE: Geovani Bustos  [id number]  [facility] [address]        Dear Colleague,    Thank you for referring your patient, Geovani Bustos, to the [facility]. Please see a copy of my visit note below.      Assessment & Plan   Problem List Items Addressed This Visit        Nervous and Auditory    Cigarette nicotine dependence with nicotine-induced disorder (Chronic)       Respiratory    Stage 1 mild COPD by GOLD classification (H)       Circulatory    Mitral valve prolapse (Chronic)       Urinary    CKD (chronic kidney disease) stage 3, GFR 30-59 ml/min (H)   Other Visit Diagnoses     Unilateral inguinal hernia without obstruction or gangrene, recurrence not specified    -  Primary    Relevant Orders    Case Request: HERNIORRHAPHY, INGUINAL, OPEN, right (Completed)         67-year-old male with ongoing pain from his right inguinal hernia  -He has not been able to quit smoking.  Still have the same comorbidities.  -He would like to have his hernia repair due to increasing pain.  -We discussed extensively about his increased risk of recurrence due to his tobacco use and also COPD cough.  -I also discussed the fact that this hernia will be repaired as an open repair.  His pain will not go away immediately.  He has to get through the postoperative pain and that can take up to 6 months.  People who undergo open inguinal hernia repair are more often bothered by the mesh that is placed.  This can be permanent and is very unsettling and cause discomfort.  -Patient is understanding of all of this.  -Thus we will schedule his surgery as an open inguinal hernia under MAC.  -All of his questions were answered to satisfaction.      25 minutes spent by me on the date of the encounter doing chart review, interpretation of tests, patient visit and documentation        BMI:   Estimated body mass index is 28.83 kg/m  as calculated from the following:    Height as of this encounter: 1.74  "m (5' 8.5\").    Weight as of this encounter: 87.3 kg (192 lb 6.4 oz).       No follow-ups on file.    Ame Nelson MD  [facility]    Markell Espino is a 67 year old, presenting for the following health issues:  RECHECK (Right inguinal hernia, need to discuss surgery without stopping smoking)    Right inguinal hernia still present  More uncomfortable  Stinging and aching  7/10 pain; two weeks ago the pain was 9/10  Trying to get his mental health in order by keeping himself busy with physical activity.  But then he cannot do physical activity due to the increased pain of the hernia.  Was trying to quit smoking.  Per his helper or guardian, he got down to 6 cigarettes/day.  His goal is to quit smoking by August.  However he started smoking again due to frustrations.  Feels like his hernia is increasing in size and a lot more discomfort these days.          Review of Systems   Constitutional, HEENT, cardiovascular, pulmonary, GI, , musculoskeletal, neuro, skin, endocrine and psych systems are negative, except as otherwise noted.     Objective    /71 (BP Location: Right arm, Patient Position: Sitting, Cuff Size: Adult Regular)   Pulse 69   Temp 97  F (36.1  C) (Tympanic)   Ht 1.74 m (5' 8.5\")   Wt 87.3 kg (192 lb 6.4 oz)   BMI 28.83 kg/m    Body mass index is 28.83 kg/m .    Physical Exam  Vitals reviewed. Exam conducted with a chaperone present.   Eyes:      Conjunctiva/sclera: Conjunctivae normal.   Cardiovascular:      Pulses: Normal pulses.   Abdominal:      Hernia: A hernia is present.   Genitourinary:      Skin:     General: Skin is warm.   Neurological:      General: No focal deficit present.      Mental Status: He is alert.   Psychiatric:         Mood and Affect: Mood normal.                 Again, thank you for allowing me to participate in the care of your patient.        Sincerely,        Ame Nelson MD    "

## 2023-05-17 NOTE — PROGRESS NOTES
"  Assessment & Plan   Problem List Items Addressed This Visit        Nervous and Auditory    Cigarette nicotine dependence with nicotine-induced disorder (Chronic)       Respiratory    Stage 1 mild COPD by GOLD classification (H)       Circulatory    Mitral valve prolapse (Chronic)       Urinary    CKD (chronic kidney disease) stage 3, GFR 30-59 ml/min (H)   Other Visit Diagnoses     Unilateral inguinal hernia without obstruction or gangrene, recurrence not specified    -  Primary    Relevant Orders    Case Request: HERNIORRHAPHY, INGUINAL, OPEN, right (Completed)         67-year-old male with ongoing pain from his right inguinal hernia  -He has not been able to quit smoking.  Still have the same comorbidities.  -He would like to have his hernia repair due to increasing pain.  -We discussed extensively about his increased risk of recurrence due to his tobacco use and also COPD cough.  -I also discussed the fact that this hernia will be repaired as an open repair.  His pain will not go away immediately.  He has to get through the postoperative pain and that can take up to 6 months.  People who undergo open inguinal hernia repair are more often bothered by the mesh that is placed.  This can be permanent and is very unsettling and cause discomfort.  -Patient is understanding of all of this.  -Thus we will schedule his surgery as an open inguinal hernia under MAC.  -All of his questions were answered to satisfaction.      25 minutes spent by me on the date of the encounter doing chart review, interpretation of tests, patient visit and documentation        BMI:   Estimated body mass index is 28.83 kg/m  as calculated from the following:    Height as of this encounter: 1.74 m (5' 8.5\").    Weight as of this encounter: 87.3 kg (192 lb 6.4 oz).       No follow-ups on file.    [facility] Nelson MD  [facility]    Markell Espino is a 67 year old, presenting for the following health issues:  RECHECK (Right " "inguinal hernia, need to discuss surgery without stopping smoking)    Right inguinal hernia still present  More uncomfortable  Stinging and aching  7/10 pain; two weeks ago the pain was 9/10  Trying to get his mental health in order by keeping himself busy with physical activity.  But then he cannot do physical activity due to the increased pain of the hernia.  Was trying to quit smoking.  Per his helper or guardian, he got down to 6 cigarettes/day.  His goal is to quit smoking by August.  However he started smoking again due to frustrations.  Feels like his hernia is increasing in size and a lot more discomfort these days.          Review of Systems   Constitutional, HEENT, cardiovascular, pulmonary, GI, , musculoskeletal, neuro, skin, endocrine and psych systems are negative, except as otherwise noted.      Objective    /71 (BP Location: Right arm, Patient Position: Sitting, Cuff Size: Adult Regular)   Pulse 69   Temp 97  F (36.1  C) (Tympanic)   Ht 1.74 m (5' 8.5\")   Wt 87.3 kg (192 lb 6.4 oz)   BMI 28.83 kg/m    Body mass index is 28.83 kg/m .    Physical Exam  Vitals reviewed. Exam conducted with a chaperone present.   Eyes:      Conjunctiva/sclera: Conjunctivae normal.   Cardiovascular:      Pulses: Normal pulses.   Abdominal:      Hernia: A hernia is present.   Genitourinary:      Skin:     General: Skin is warm.   Neurological:      General: No focal deficit present.      Mental Status: He is alert.   Psychiatric:         Mood and Affect: Mood normal.             "

## 2023-05-17 NOTE — TELEPHONE ENCOUNTER
Type of surgery: HERNIORRHAPHY, INGUINAL, OPEN, right  Location of surgery: [facility]  Date and time of surgery: 6/8  Surgeon: Leslie  Pre-Op Appt Date: 5/31   Post-Op Appt Date: 6/16   Packet sent out: Yes mailed   Pre-cert/Authorization completed:  Not Applicable  Date: na

## 2023-05-17 NOTE — NURSING NOTE
"Initial /71 (BP Location: Right arm, Patient Position: Sitting, Cuff Size: Adult Regular)   Pulse 69   Temp 97  F (36.1  C) (Tympanic)   Ht 1.74 m (5' 8.5\")   Wt 87.3 kg (192 lb 6.4 oz)   BMI 28.83 kg/m   Estimated body mass index is 28.83 kg/m  as calculated from the following:    Height as of this encounter: 1.74 m (5' 8.5\").    Weight as of this encounter: 87.3 kg (192 lb 6.4 oz). .    Jaki Godinez MA    "

## 2023-05-18 ENCOUNTER — TELEPHONE (OUTPATIENT)
Dept: DERMATOLOGY | Facility: CLINIC | Age: 68
End: 2023-05-18

## 2023-05-18 NOTE — TELEPHONE ENCOUNTER
[facility] Call Center    Phone Message    May a detailed message be left on voicemail: yes     Reason for Call: Other: Pt's health manager Ethan states she didn't know the 5/18/23 visit had been canceled because no one called to let them know. Ethan would like to know if it's okay to wait for the annual visit to reschedule because she states that Pt Srinivas's ears are fine.      Please call Ethan back at [phone] and leave a detailed message. Thank you.     Action Taken: Other: [facility]    Travel Screening: Not Applicable

## 2023-05-18 NOTE — TELEPHONE ENCOUNTER
Message left for Amelia,  to to return call.     Looks like today 10:30 am appt was canceled on 4-14-23, but it does not state why it was canceled and no notes in chart either.     He does need to be seen to renew prescriptions annually and is due to be seen now.     Linnette Mitchell RN

## 2023-05-23 NOTE — TELEPHONE ENCOUNTER
Message left again for Amelia,  to return call. Can work patient in with Dr. Krishnamurthy, not sure why 5-18-23 Derm appt was canceled, but it was canceled on 4-14-23 per appt data review.  Linnette Mitchell RN

## 2023-05-24 NOTE — TELEPHONE ENCOUNTER
Message left for Group home , Ethan, to return call.     Does patient need meds refilled? Looks like he is on Clobetasol and a skin protectant, due to be seen now and can be worked into schedule with Dr. Krishnamurthy.     Pt was rescheduled to October. 1st available appt.    Linnette Mitchell RN

## 2023-05-31 ENCOUNTER — OFFICE VISIT (OUTPATIENT)
Dept: FAMILY MEDICINE | Facility: CLINIC | Age: 68
End: 2023-05-31
Payer: COMMERCIAL

## 2023-05-31 VITALS
DIASTOLIC BLOOD PRESSURE: 73 MMHG | OXYGEN SATURATION: 94 % | BODY MASS INDEX: 29.06 KG/M2 | HEIGHT: 69 IN | WEIGHT: 196.2 LBS | TEMPERATURE: 97.2 F | HEART RATE: 63 BPM | SYSTOLIC BLOOD PRESSURE: 120 MMHG | RESPIRATION RATE: 22 BRPM

## 2023-05-31 DIAGNOSIS — F25.0 SCHIZOAFFECTIVE DISORDER, BIPOLAR TYPE (H): ICD-10-CM

## 2023-05-31 DIAGNOSIS — Z72.0 TOBACCO ABUSE: ICD-10-CM

## 2023-05-31 DIAGNOSIS — E78.5 HYPERLIPIDEMIA LDL GOAL <130: ICD-10-CM

## 2023-05-31 DIAGNOSIS — N40.0 BENIGN PROSTATIC HYPERPLASIA, UNSPECIFIED WHETHER LOWER URINARY TRACT SYMPTOMS PRESENT: ICD-10-CM

## 2023-05-31 DIAGNOSIS — J84.9 ILD (INTERSTITIAL LUNG DISEASE) (H): ICD-10-CM

## 2023-05-31 DIAGNOSIS — N18.30 STAGE 3 CHRONIC KIDNEY DISEASE, UNSPECIFIED WHETHER STAGE 3A OR 3B CKD (H): ICD-10-CM

## 2023-05-31 DIAGNOSIS — E03.9 ACQUIRED HYPOTHYROIDISM: ICD-10-CM

## 2023-05-31 DIAGNOSIS — J44.9 CHRONIC OBSTRUCTIVE PULMONARY DISEASE, UNSPECIFIED COPD TYPE (H): ICD-10-CM

## 2023-05-31 DIAGNOSIS — Z01.818 PREOP GENERAL PHYSICAL EXAM: Primary | ICD-10-CM

## 2023-05-31 DIAGNOSIS — K40.90 UNILATERAL INGUINAL HERNIA WITHOUT OBSTRUCTION OR GANGRENE, RECURRENCE NOT SPECIFIED: ICD-10-CM

## 2023-05-31 PROCEDURE — 99214 OFFICE O/P EST MOD 30 MIN: CPT | Performed by: FAMILY MEDICINE

## 2023-05-31 PROCEDURE — 93000 ELECTROCARDIOGRAM COMPLETE: CPT | Performed by: FAMILY MEDICINE

## 2023-05-31 ASSESSMENT — PAIN SCALES - GENERAL: PAINLEVEL: SEVERE PAIN (7)

## 2023-05-31 NOTE — H&P (VIEW-ONLY)
[facility]  [address] [place] [id number]  Phone: [phone]  Fax: [phone]  Primary Provider: Alexandru Vera  Pre-op Performing Provider: ALEXANDRU VERA      PREOPERATIVE EVALUATION:  Today's date: 5/31/2023    Geovani Bustos is a 67 year old male who presents for a preoperative evaluation.      5/31/2023     9:32 AM   Additional Questions   Roomed by Elena BREWER CMA   Accompanied by staff Gene     Surgical Information:  Surgery/Procedure: Herniorrhaphy inguinal open, right  Surgery Location: [facility]  Surgeon: Dr. Mccloud Nelson  Surgery Date: 06/08/2023  Time of Surgery: 12:00 PM  Where patient plans to recover: At home with family  Fax number for surgical facility: Note does not need to be faxed, will be available electronically in Epic.    Assessment & Plan     The proposed surgical procedure is considered INTERMEDIATE risk.      ICD-10-CM    1. Preop general physical exam  Z01.818 EKG 12-lead complete w/read - Clinics      2. Unilateral inguinal hernia without obstruction or gangrene, recurrence not specified  K40.90       3. Hyperlipidemia LDL goal <130  E78.5       4. Acquired hypothyroidism  E03.9       5. Benign prostatic hyperplasia, unspecified whether lower urinary tract symptoms present  N40.0       6. Chronic obstructive pulmonary disease, unspecified COPD type (H)  J44.9       7. Tobacco abuse  Z72.0       8. Schizoaffective disorder, bipolar type (H)  F25.0       9. ILD (interstitial lung disease) (H)  J84.9       10. Stage 3 chronic kidney disease, unspecified whether stage 3a or 3b CKD (H)  N18.30            - No identified additional risk factors other than previously addressed    Antiplatelet or Anticoagulation Medication Instructions:   - aspirin: Discontinue aspirin 7-10 days prior to procedure to reduce bleeding risk. It should be resumed postoperatively.     Additional Medication Instructions:  Patient is to take all scheduled  medications on the day of surgery    RECOMMENDATION:  APPROVAL GIVEN to proceed with proposed procedure, without further diagnostic evaluation.  Stressed on smoking cessation      Subjective     HPI related to upcoming procedure:   67-year-old male presents for a preop physical exam.  Patient is scheduled to have open right inguinal hernia repair on June 8, 2023. He requires evaluation and anesthesia risk assessment prior to undergoing surgery/procedure.  Patient denies any fever, chills, sore throat, worsening cough/shortness of breath, chest pain, palpitation, diarrhea, constipation, abdominal pain, headache or other relevant systemic symptoms.  Able to perform >4 METS of activity without any difficulty.          5/31/2023     9:15 AM   Preop Questions   1. Have you ever had a heart attack or stroke? No   2. Have you ever had surgery on your heart or blood vessels, such as a stent placement, a coronary artery bypass, or surgery on an artery in your head, neck, heart, or legs? No   3. Do you have chest pain with activity? No   4. Do you have a history of  heart failure? No   5. Do you currently have a cold, bronchitis or symptoms of other infection? No   6. Do you have a cough, shortness of breath, or wheezing? YES - COPD related    7. Do you or anyone in your family have previous history of blood clots? No   8. Do you or does anyone in your family have a serious bleeding problem such as prolonged bleeding following surgeries or cuts? No   9. Have you ever had problems with anemia or been told to take iron pills? No   10. Have you had any abnormal blood loss such as black, tarry or bloody stools? No   11. Have you ever had a blood transfusion? No   12. Are you willing to have a blood transfusion if it is medically needed before, during, or after your surgery? Yes   13. Have you or any of your relatives ever had problems with anesthesia? No   14. Do you have sleep apnea, excessive snoring or daytime drowsiness? No    15. Do you have any artifical heart valves or other implanted medical devices like a pacemaker, defibrillator, or continuous glucose monitor? No   16. Do you have artificial joints? No   17. Are you allergic to latex? No       Preoperative Review of :   reviewed - no record of controlled substances prescribed.      Status of Chronic Conditions:  See problem list for active medical problems.  Problems all longstanding and stable, except as noted/documented.  See ROS for pertinent symptoms related to these conditions.      Review of Systems  CONSTITUTIONAL: NEGATIVE for fever, chills, change in weight  INTEGUMENTARY/SKIN: NEGATIVE for worrisome rashes, moles or lesions  EYES: NEGATIVE for vision changes or irritation  ENT/MOUTH: NEGATIVE for ear, mouth and throat problems  RESP: NEGATIVE for significant cough or SOB  CV: NEGATIVE for chest pain, palpitations or peripheral edema  GI: NEGATIVE for nausea, abdominal pain, heartburn, or change in bowel habits  : NEGATIVE for frequency, dysuria, or hematuria  MUSCULOSKELETAL: NEGATIVE for significant arthralgias or myalgia  NEURO: NEGATIVE for weakness, dizziness or paresthesias  ENDOCRINE: NEGATIVE for temperature intolerance, skin/hair changes  HEME: NEGATIVE for bleeding problems  PSYCHIATRIC: NEGATIVE for changes in mood or affect    Patient Active Problem List    Diagnosis Date Noted     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) 11/16/2022     Priority: Medium     Smoking 11/16/2022     Priority: Medium     CKD (chronic kidney disease) stage 3, GFR 30-59 ml/min (H) 08/08/2018     Priority: Medium     Stage 1 mild COPD by GOLD classification (H) 07/27/2018     Priority: Medium     Gastroesophageal reflux disease without esophagitis 12/21/2016     Priority: Medium     Cigarette nicotine dependence with nicotine-induced disorder 10/05/2016     Priority: Medium     Incomplete right bundle branch block (RBBB) 01/13/2016     Priority: Medium     Tinea  pedis, unspecified laterality 12/01/2015     Priority: Medium     Bunion of great toe of right foot 11/12/2015     Priority: Medium     Eczema of external ear, bilateral 11/12/2015     Priority: Medium     Bilateral impacted cerumen 10/12/2015     Priority: Medium     Health Care Home 11/12/2014     Priority: Medium     Status:  Unable to reach  Care Coordinator:  Lavinia Samson    See Letters for [facility] Care Plan  Date:  November 12, 2014         BPH (benign prostatic hyperplasia) 12/07/2011     Priority: Medium     Improved with med       Hyperlipidemia LDL goal <130 10/20/2010     Priority: Medium              Proteinuria 03/22/2010     Priority: Medium     Refer to Dr. Harris       IgA nephropathy 03/11/2010     Priority: Medium     Consulted Dr. Harris, last seen 4/2010, very modest renal dysfunction and microscopic hematuria, recent measurement of proteinuria was within normal range, ise ACE inhibitor if develops HTN. Prognosis with regard to   his IgA nephropathy is thought to be excellent        Acquired hypothyroidism 10/08/2009     Priority: Medium     Colon polyps      Priority: Medium     Due in 2017       Schizophrenia (H)      Priority: Medium     VSD (ventricular septal defect)      Priority: Medium     Mitral valve prolapse      Priority: Medium     3/2010 -Echo   The visual ejection fraction is estimated at 55-60%.               Past Medical History:   Diagnosis Date     Cerumen impaction      Chronic kidney disease      Colon polyps      Hyperlipidemia      Mitral valve prolapse      Schizophrenia (H)      Ulcerated penile lesions      VSD (ventricular septal defect)      Past Surgical History:   Procedure Laterality Date     ARTHRODESIS FOOT Right 1/19/2016    Procedure: ARTHRODESIS FOOT;  Surgeon: Holden Harrison DPM;  Location: [facility]     ARTHRODESIS FOOT Left 1/3/2017    Procedure: ARTHRODESIS FOOT;  Surgeon: Holden Harrison DPM;  Location: [facility]     COLONOSCOPY N/A 5/19/2022     Procedure: COLONOSCOPY, FLEXIBLE, WITH LESION REMOVAL USING SNARE;  Surgeon: Ame Nelson MD;  Location: [facility]     SURGICAL HISTORY OF -       leg fracture     Current Outpatient Medications   Medication Sig Dispense Refill     ABILIFY 10 MG OR TABS Take 22 mg by mouth daily        ADULT ASPIRIN REGIMEN 81 MG EC tablet TAKE 1 TABLET BY MOUTH EVERY MORNING 30 tablet 11     albuterol (VENTOLIN HFA) 108 (90 Base) MCG/ACT inhaler Inhale 2 puffs into the lungs every 6 hours as needed for shortness of breath / dyspnea or wheezing 18 g 11     clobetasol (TEMOVATE) 0.05 % external ointment Apply twice daily to ears for two weeks then 3 times weekly thereafter. 30 g 3     EAR DROPS 6.5 % otic solution Place 5 drops into both ears 2 times daily For 5 days August 1-5th and Feb 1-5 th then return for irrigation after the 5th day. 15 mL 3     finasteride (PROSCAR) 5 MG tablet TAKE 1 TABLET BY MOUTH EVERY MORNING 90 tablet 2     fluticasone-vilanterol (BREO ELLIPTA) 100-25 MCG/ACT inhaler Inhale 1 puff into the lungs daily 90 each 3     levothyroxine (SYNTHROID/LEVOTHROID) 125 MCG tablet TAKE 1 TABLET BY MOUTH EVERY MORNING 90 tablet 2     mirabegron (MYRBETRIQ) 50 MG 24 hr tablet Take 1 tablet (50 mg) by mouth daily 90 tablet 0     OLANZapine (ZYPREXA) 2.5 MG tablet        Omega-3 Fatty Acids (FISH OIL) 1200 MG capsule 1 capsule every evening 90 capsule 1     oxybutynin ER (DITROPAN XL) 5 MG 24 hr tablet Take 1 tablet by mouth in the morning 90 tablet 3     polyethylene glycol (CLEARLAX) 17 GM/Dose powder Take 17 grams (1 capful) by mouth daily 510 g 11     simvastatin (ZOCOR) 40 MG tablet TAKE 1 TABLET BY MOUTH AT BEDTIME 90 tablet 2     tamsulosin (FLOMAX) 0.4 MG capsule Take 1 capsule (0.4 mg) by mouth daily 90 capsule 1     traZODone (DESYREL) 50 MG tablet Take 25 mg by mouth At Bedtime        ZOLOFT 100 MG OR TABS 2 TABLETS DAILY       Disposable Gloves (LATEX GLOVES MEDIUM) MISC 4 boxes of gloves  For the application of  "topical medications 400 each 11     Disposable Gloves (NITRILE EXAM GLOVES MEDIUM) MISC 1 each as needed Use PRN daily with administration of otic drops, body lotion and powder to treat dry itchy skin. 4 each 11     Incontinence Supply Disposable (INCONTINENCE BRIEF LARGE) MISC Incontinence pads up to 300 per month 300 each 1     mupirocin (BACTROBAN) 2 % external ointment Apply topically 3 times daily (Patient not taking: Reported on 5/31/2023) 30 g 1     nicotine (NICODERM CQ) 14 MG/24HR 24 hr patch Place 1 patch onto the skin every 24 hours (Patient not taking: Reported on 5/31/2023) 30 patch 3     order for DME Equipment being ordered: callous pad 1 each 1     order for DME Equipment being ordered: Dynaflex insert 1 Units 0     Psyllium (REGULOID) 48.57 % POWD 1 teaspoonful 2 times per day (Patient not taking: Reported on 5/31/2023) 369 g 11     Skin Protectants, Misc. (EUCERIN) cream Apply topically 2 times daily Please ask Group home to schedule a Mid may 2023 follow up Derm appt: [phone] to schedule. 454 g 6       Allergies   Allergen Reactions     Nitrogen Oxide      Sulfa Antibiotics         Social History     Tobacco Use     Smoking status: Every Day     Packs/day: 0.75     Years: 50.00     Pack years: 37.50     Types: Cigarettes     Passive exposure: Current     Smokeless tobacco: Never     Tobacco comments:     cutting one cigarette down a month   Vaping Use     Vaping status: Never Used   Substance Use Topics     Alcohol use: No     Family History   Problem Relation Age of Onset     Emphysema Mother      Coronary Artery Disease Father      History   Drug Use No         Objective     /73 (BP Location: Right arm, Patient Position: Sitting, Cuff Size: Adult Large)   Pulse 63   Temp 97.2  F (36.2  C) (Tympanic)   Resp 22   Ht 1.74 m (5' 8.5\")   Wt 89 kg (196 lb 3.2 oz)   SpO2 94%   BMI 29.40 kg/m      Physical Exam    GENERAL APPEARANCE: alert, active and no distress     EYES: EOMI,  " PERRL     HENT: ear canals and TM's normal and nose and mouth without ulcers or lesions     NECK: no adenopathy, no asymmetry, masses, or scars and thyroid normal to palpation     RESP: lungs clear to auscultation - no rales, rhonchi or wheezes     CV: regular rates and rhythm, normal S1 S2, no S3 or S4 and no murmur, click or rub     ABDOMEN:  soft, nontender, no HSM or masses and bowel sounds normal     MS: extremities normal- no gross deformities noted, no evidence of inflammation in joints, FROM in all extremities.     SKIN: no suspicious lesions or rashes     NEURO: Normal strength and tone, sensory exam grossly normal, mentation intact and speech normal     PSYCH: mentation appears normal. and affect normal/bright     LYMPHATICS: No cervical adenopathy    Recent Labs   Lab Test 02/20/23  1005 03/01/22  0843   HGB 15.1 14.3    157    140   POTASSIUM 4.9 4.5   CR 1.59* 1.42*   A1C 5.6 5.5        Diagnostics:  No labs were ordered during this visit.   EKG: Sinus rhythm with occasional ventricular ectopics, low voltage, unchanged from previous tracings    Revised Cardiac Risk Index (RCRI):  The patient has the following serious cardiovascular risks for perioperative complications:   - No serious cardiac risks = 0 points     RCRI Interpretation: 0 points: Class I (very low risk - 0.4% complication rate)           Signed Electronically by: Alexandru Vera MD  Copy of this evaluation report is provided to requesting physician.

## 2023-05-31 NOTE — PROGRESS NOTES
[facility]  [address] [place] [address]  Phone: [phone]  Fax: [phone]  Primary Provider: Alexandru Vera  Pre-op Performing Provider: ALEXANDRU VERA      PREOPERATIVE EVALUATION:  Today's date: 5/31/2023    Geovani Bustos is a 67 year old male who presents for a preoperative evaluation.      5/31/2023     9:32 AM   Additional Questions   Roomed by Elena BREWER CMA   Accompanied by staff Gene     Surgical Information:  Surgery/Procedure: Herniorrhaphy inguinal open, right  Surgery Location: [facility]  Surgeon: Dr. Mccloud Nelson  Surgery Date: 06/08/2023  Time of Surgery: 12:00 PM  Where patient plans to recover: At home with family  Fax number for surgical facility: Note does not need to be faxed, will be available electronically in Epic.    Assessment & Plan     The proposed surgical procedure is considered INTERMEDIATE risk.      ICD-10-CM    1. Preop general physical exam  Z01.818 EKG 12-lead complete w/read - Clinics      2. Unilateral inguinal hernia without obstruction or gangrene, recurrence not specified  K40.90       3. Hyperlipidemia LDL goal <130  E78.5       4. Acquired hypothyroidism  E03.9       5. Benign prostatic hyperplasia, unspecified whether lower urinary tract symptoms present  N40.0       6. Chronic obstructive pulmonary disease, unspecified COPD type (H)  J44.9       7. Tobacco abuse  Z72.0       8. Schizoaffective disorder, bipolar type (H)  F25.0       9. ILD (interstitial lung disease) (H)  J84.9       10. Stage 3 chronic kidney disease, unspecified whether stage 3a or 3b CKD (H)  N18.30            - No identified additional risk factors other than previously addressed    Antiplatelet or Anticoagulation Medication Instructions:   - aspirin: Discontinue aspirin 7-10 days prior to procedure to reduce bleeding risk. It should be resumed postoperatively.     Additional Medication Instructions:  Patient is to take all scheduled  medications on the day of surgery    RECOMMENDATION:  APPROVAL GIVEN to proceed with proposed procedure, without further diagnostic evaluation.  Stressed on smoking cessation      Subjective     HPI related to upcoming procedure:   67-year-old male presents for a preop physical exam.  Patient is scheduled to have open right inguinal hernia repair on June 8, 2023. He requires evaluation and anesthesia risk assessment prior to undergoing surgery/procedure.  Patient denies any fever, chills, sore throat, worsening cough/shortness of breath, chest pain, palpitation, diarrhea, constipation, abdominal pain, headache or other relevant systemic symptoms.  Able to perform >4 METS of activity without any difficulty.          5/31/2023     9:15 AM   Preop Questions   1. Have you ever had a heart attack or stroke? No   2. Have you ever had surgery on your heart or blood vessels, such as a stent placement, a coronary artery bypass, or surgery on an artery in your head, neck, heart, or legs? No   3. Do you have chest pain with activity? No   4. Do you have a history of  heart failure? No   5. Do you currently have a cold, bronchitis or symptoms of other infection? No   6. Do you have a cough, shortness of breath, or wheezing? YES - COPD related    7. Do you or anyone in your family have previous history of blood clots? No   8. Do you or does anyone in your family have a serious bleeding problem such as prolonged bleeding following surgeries or cuts? No   9. Have you ever had problems with anemia or been told to take iron pills? No   10. Have you had any abnormal blood loss such as black, tarry or bloody stools? No   11. Have you ever had a blood transfusion? No   12. Are you willing to have a blood transfusion if it is medically needed before, during, or after your surgery? Yes   13. Have you or any of your relatives ever had problems with anesthesia? No   14. Do you have sleep apnea, excessive snoring or daytime drowsiness? No    15. Do you have any artifical heart valves or other implanted medical devices like a pacemaker, defibrillator, or continuous glucose monitor? No   16. Do you have artificial joints? No   17. Are you allergic to latex? No       Preoperative Review of :   reviewed - no record of controlled substances prescribed.      Status of Chronic Conditions:  See problem list for active medical problems.  Problems all longstanding and stable, except as noted/documented.  See ROS for pertinent symptoms related to these conditions.      Review of Systems  CONSTITUTIONAL: NEGATIVE for fever, chills, change in weight  INTEGUMENTARY/SKIN: NEGATIVE for worrisome rashes, moles or lesions  EYES: NEGATIVE for vision changes or irritation  ENT/MOUTH: NEGATIVE for ear, mouth and throat problems  RESP: NEGATIVE for significant cough or SOB  CV: NEGATIVE for chest pain, palpitations or peripheral edema  GI: NEGATIVE for nausea, abdominal pain, heartburn, or change in bowel habits  : NEGATIVE for frequency, dysuria, or hematuria  MUSCULOSKELETAL: NEGATIVE for significant arthralgias or myalgia  NEURO: NEGATIVE for weakness, dizziness or paresthesias  ENDOCRINE: NEGATIVE for temperature intolerance, skin/hair changes  HEME: NEGATIVE for bleeding problems  PSYCHIATRIC: NEGATIVE for changes in mood or affect    Patient Active Problem List    Diagnosis Date Noted     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) 11/16/2022     Priority: Medium     Smoking 11/16/2022     Priority: Medium     CKD (chronic kidney disease) stage 3, GFR 30-59 ml/min (H) 08/08/2018     Priority: Medium     Stage 1 mild COPD by GOLD classification (H) 07/27/2018     Priority: Medium     Gastroesophageal reflux disease without esophagitis 12/21/2016     Priority: Medium     Cigarette nicotine dependence with nicotine-induced disorder 10/05/2016     Priority: Medium     Incomplete right bundle branch block (RBBB) 01/13/2016     Priority: Medium     Tinea  pedis, unspecified laterality 12/01/2015     Priority: Medium     Bunion of great toe of right foot 11/12/2015     Priority: Medium     Eczema of external ear, bilateral 11/12/2015     Priority: Medium     Bilateral impacted cerumen 10/12/2015     Priority: Medium     Health Care Home 11/12/2014     Priority: Medium     Status:  Unable to reach  Care Coordinator:  Lavinia Samson    See Letters for [facility] Care Plan  Date:  November 12, 2014         BPH (benign prostatic hyperplasia) 12/07/2011     Priority: Medium     Improved with med       Hyperlipidemia LDL goal <130 10/20/2010     Priority: Medium              Proteinuria 03/22/2010     Priority: Medium     Refer to Dr. Harris       IgA nephropathy 03/11/2010     Priority: Medium     Consulted Dr. Harris, last seen 4/2010, very modest renal dysfunction and microscopic hematuria, recent measurement of proteinuria was within normal range, ise ACE inhibitor if develops HTN. Prognosis with regard to   his IgA nephropathy is thought to be excellent        Acquired hypothyroidism 10/08/2009     Priority: Medium     Colon polyps      Priority: Medium     Due in 2017       Schizophrenia (H)      Priority: Medium     VSD (ventricular septal defect)      Priority: Medium     Mitral valve prolapse      Priority: Medium     3/2010 -Echo   The visual ejection fraction is estimated at 55-60%.               Past Medical History:   Diagnosis Date     Cerumen impaction      Chronic kidney disease      Colon polyps      Hyperlipidemia      Mitral valve prolapse      Schizophrenia (H)      Ulcerated penile lesions      VSD (ventricular septal defect)      Past Surgical History:   Procedure Laterality Date     ARTHRODESIS FOOT Right 1/19/2016    Procedure: ARTHRODESIS FOOT;  Surgeon: Holden Harrison DPM;  Location: [facility]     ARTHRODESIS FOOT Left 1/3/2017    Procedure: ARTHRODESIS FOOT;  Surgeon: Holden Harrison DPM;  Location: [facility]     COLONOSCOPY N/A 5/19/2022     Procedure: COLONOSCOPY, FLEXIBLE, WITH LESION REMOVAL USING SNARE;  Surgeon: Ame Nelson MD;  Location: [facility]     SURGICAL HISTORY OF -       leg fracture     Current Outpatient Medications   Medication Sig Dispense Refill     ABILIFY 10 MG OR TABS Take 22 mg by mouth daily        ADULT ASPIRIN REGIMEN 81 MG EC tablet TAKE 1 TABLET BY MOUTH EVERY MORNING 30 tablet 11     albuterol (VENTOLIN HFA) 108 (90 Base) MCG/ACT inhaler Inhale 2 puffs into the lungs every 6 hours as needed for shortness of breath / dyspnea or wheezing 18 g 11     clobetasol (TEMOVATE) 0.05 % external ointment Apply twice daily to ears for two weeks then 3 times weekly thereafter. 30 g 3     EAR DROPS 6.5 % otic solution Place 5 drops into both ears 2 times daily For 5 days August 1-5th and Feb 1-5 th then return for irrigation after the 5th day. 15 mL 3     finasteride (PROSCAR) 5 MG tablet TAKE 1 TABLET BY MOUTH EVERY MORNING 90 tablet 2     fluticasone-vilanterol (BREO ELLIPTA) 100-25 MCG/ACT inhaler Inhale 1 puff into the lungs daily 90 each 3     levothyroxine (SYNTHROID/LEVOTHROID) 125 MCG tablet TAKE 1 TABLET BY MOUTH EVERY MORNING 90 tablet 2     mirabegron (MYRBETRIQ) 50 MG 24 hr tablet Take 1 tablet (50 mg) by mouth daily 90 tablet 0     OLANZapine (ZYPREXA) 2.5 MG tablet        Omega-3 Fatty Acids (FISH OIL) 1200 MG capsule 1 capsule every evening 90 capsule 1     oxybutynin ER (DITROPAN XL) 5 MG 24 hr tablet Take 1 tablet by mouth in the morning 90 tablet 3     polyethylene glycol (CLEARLAX) 17 GM/Dose powder Take 17 grams (1 capful) by mouth daily 510 g 11     simvastatin (ZOCOR) 40 MG tablet TAKE 1 TABLET BY MOUTH AT BEDTIME 90 tablet 2     tamsulosin (FLOMAX) 0.4 MG capsule Take 1 capsule (0.4 mg) by mouth daily 90 capsule 1     traZODone (DESYREL) 50 MG tablet Take 25 mg by mouth At Bedtime        ZOLOFT 100 MG OR TABS 2 TABLETS DAILY       Disposable Gloves (LATEX GLOVES MEDIUM) MISC 4 boxes of gloves  For the application of  "topical medications 400 each 11     Disposable Gloves (NITRILE EXAM GLOVES MEDIUM) MISC 1 each as needed Use PRN daily with administration of otic drops, body lotion and powder to treat dry itchy skin. 4 each 11     Incontinence Supply Disposable (INCONTINENCE BRIEF LARGE) MISC Incontinence pads up to 300 per month 300 each 1     mupirocin (BACTROBAN) 2 % external ointment Apply topically 3 times daily (Patient not taking: Reported on 5/31/2023) 30 g 1     nicotine (NICODERM CQ) 14 MG/24HR 24 hr patch Place 1 patch onto the skin every 24 hours (Patient not taking: Reported on 5/31/2023) 30 patch 3     order for DME Equipment being ordered: callous pad 1 each 1     order for DME Equipment being ordered: Dynaflex insert 1 Units 0     Psyllium (REGULOID) 48.57 % POWD 1 teaspoonful 2 times per day (Patient not taking: Reported on 5/31/2023) 369 g 11     Skin Protectants, Misc. (EUCERIN) cream Apply topically 2 times daily Please ask Group home to schedule a Mid may 2023 follow up Derm appt: [phone] to schedule. 454 g 6       Allergies   Allergen Reactions     Nitrogen Oxide      Sulfa Antibiotics         Social History     Tobacco Use     Smoking status: Every Day     Packs/day: 0.75     Years: 50.00     Pack years: 37.50     Types: Cigarettes     Passive exposure: Current     Smokeless tobacco: Never     Tobacco comments:     cutting one cigarette down a month   Vaping Use     Vaping status: Never Used   Substance Use Topics     Alcohol use: No     Family History   Problem Relation Age of Onset     Emphysema Mother      Coronary Artery Disease Father      History   Drug Use No         Objective     /73 (BP Location: Right arm, Patient Position: Sitting, Cuff Size: Adult Large)   Pulse 63   Temp 97.2  F (36.2  C) (Tympanic)   Resp 22   Ht 1.74 m (5' 8.5\")   Wt 89 kg (196 lb 3.2 oz)   SpO2 94%   BMI 29.40 kg/m      Physical Exam    GENERAL APPEARANCE: alert, active and no distress     EYES: EOMI,  " PERRL     HENT: ear canals and TM's normal and nose and mouth without ulcers or lesions     NECK: no adenopathy, no asymmetry, masses, or scars and thyroid normal to palpation     RESP: lungs clear to auscultation - no rales, rhonchi or wheezes     CV: regular rates and rhythm, normal S1 S2, no S3 or S4 and no murmur, click or rub     ABDOMEN:  soft, nontender, no HSM or masses and bowel sounds normal     MS: extremities normal- no gross deformities noted, no evidence of inflammation in joints, FROM in all extremities.     SKIN: no suspicious lesions or rashes     NEURO: Normal strength and tone, sensory exam grossly normal, mentation intact and speech normal     PSYCH: mentation appears normal. and affect normal/bright     LYMPHATICS: No cervical adenopathy    Recent Labs   Lab Test 02/20/23  1005 03/01/22  0843   HGB 15.1 14.3    157    140   POTASSIUM 4.9 4.5   CR 1.59* 1.42*   A1C 5.6 5.5        Diagnostics:  No labs were ordered during this visit.   EKG: Sinus rhythm with occasional ventricular ectopics, low voltage, unchanged from previous tracings    Revised Cardiac Risk Index (RCRI):  The patient has the following serious cardiovascular risks for perioperative complications:   - No serious cardiac risks = 0 points     RCRI Interpretation: 0 points: Class I (very low risk - 0.4% complication rate)           Signed Electronically by: Alexandru Vera MD  Copy of this evaluation report is provided to requesting physician.

## 2023-06-02 DIAGNOSIS — J44.9 STAGE 1 MILD COPD BY GOLD CLASSIFICATION (H): ICD-10-CM

## 2023-06-02 RX ORDER — FLUTICASONE FUROATE AND VILANTEROL 100; 25 UG/1; UG/1
1 POWDER RESPIRATORY (INHALATION) DAILY
Qty: 90 EACH | Refills: 3 | OUTPATIENT
Start: 2023-06-02

## 2023-06-02 NOTE — TELEPHONE ENCOUNTER
Order  fluticasone-vilanterol (BREO ELLIPTA) 100-25 MCG/ACT inhaler [[id number]] (Order [id number])  Order Information    Ordered Status Priority Ordering User Department   11/16/22 Sent (none) Dakota Pinzon MD  PULMONOLGY     Order History  Outpatient  Date/Time Action Taken User Additional Information   11/16/22 1135 Sign Dakota Pinzon MD Reorder from Order:[id number]   11/16/22 1135 Taking Flag Checked Dakota Pinzon MD [id number]   01/12/23 0951 Taking Flag Checked Angeli Oates LPN    02/20/23 0912 Taking Flag Checked Haily Munoz CMA    05/02/23 1257 Taking Flag Checked Haily Munoz CMA    05/31/23 0935 Taking Flag Checked Kahler, Bailee    06/02/23 1107 Reorder Vicenta Marrero To Order:[id number]     Outpatient Medication Detail     Disp Refills Start End GLORY   fluticasone-vilanterol (BREO ELLIPTA) 100-25 MCG/ACT inhaler 90 each 3 11/16/2022  No   Sig - Route: Inhale 1 puff into the lungs daily - Inhalation   Sent to pharmacy as: Fluticasone Furoate-Vilanterol 100-25 MCG/ACT Inhalation Aerosol Powder Breath Activated   Class: E-Prescribe   Order: [id number]   E-Prescribing Status: Receipt confirmed by pharmacy (11/16/2022 11:41 AM CST)     Printout Tracking    External Result Report     Pharmacy    [facility] [place], [place] - [facility] [address]

## 2023-06-06 ENCOUNTER — VIRTUAL VISIT (OUTPATIENT)
Dept: UROLOGY | Facility: CLINIC | Age: 68
End: 2023-06-06
Attending: STUDENT IN AN ORGANIZED HEALTH CARE EDUCATION/TRAINING PROGRAM
Payer: COMMERCIAL

## 2023-06-06 DIAGNOSIS — R35.0 URINARY FREQUENCY: ICD-10-CM

## 2023-06-06 DIAGNOSIS — R39.9 LOWER URINARY TRACT SYMPTOMS (LUTS): ICD-10-CM

## 2023-06-06 DIAGNOSIS — N32.81 OAB (OVERACTIVE BLADDER): ICD-10-CM

## 2023-06-06 DIAGNOSIS — R35.0 BENIGN PROSTATIC HYPERPLASIA WITH URINARY FREQUENCY: ICD-10-CM

## 2023-06-06 DIAGNOSIS — N40.1 BENIGN PROSTATIC HYPERPLASIA WITH URINARY FREQUENCY: ICD-10-CM

## 2023-06-06 PROCEDURE — 99213 OFFICE O/P EST LOW 20 MIN: CPT | Mod: VID | Performed by: STUDENT IN AN ORGANIZED HEALTH CARE EDUCATION/TRAINING PROGRAM

## 2023-06-06 RX ORDER — OXYBUTYNIN CHLORIDE 5 MG/1
TABLET, EXTENDED RELEASE ORAL
Qty: 90 TABLET | Refills: 3 | Status: SHIPPED | OUTPATIENT
Start: 2023-06-06 | End: 2024-06-19

## 2023-06-06 RX ORDER — MIRABEGRON 50 MG/1
50 TABLET, EXTENDED RELEASE ORAL DAILY
Qty: 90 TABLET | Refills: 3 | Status: SHIPPED | OUTPATIENT
Start: 2023-06-06 | End: 2024-06-19

## 2023-06-06 RX ORDER — FINASTERIDE 5 MG/1
1 TABLET, FILM COATED ORAL EVERY MORNING
Qty: 90 TABLET | Refills: 3 | Status: SHIPPED | OUTPATIENT
Start: 2023-06-06 | End: 2024-06-19

## 2023-06-06 RX ORDER — TAMSULOSIN HYDROCHLORIDE 0.4 MG/1
0.4 CAPSULE ORAL DAILY
Qty: 90 CAPSULE | Refills: 3 | Status: SHIPPED | OUTPATIENT
Start: 2023-06-06 | End: 2024-06-05

## 2023-06-06 RX ORDER — GABAPENTIN 100 MG/1
100 CAPSULE ORAL 2 TIMES DAILY
COMMUNITY
Start: 2023-05-10

## 2023-06-06 NOTE — PROGRESS NOTES
Srinivas is a 67 year old who is being evaluated via a billable video visit.      How would you like to obtain your AVS? Mail a copy  If the video visit is dropped, the invitation should be resent by: Send to e-mail at: [email or web address]  Will anyone else be joining your video visit? No        Video-Visit Details    Type of service:  Video Visit   Video Start Time: 11:55 AM  Video End Time:11:59 AM    Originating Location (pt. Location): Home    Distant Location (provider location):  On-site  Platform used for Video Visit: [email or web address]

## 2023-06-06 NOTE — PROGRESS NOTES
UROLOGY FOLLOW-UP NOTE          Chief Complaint:   Today I had the pleasure of seeing . Geovani Bustos in follow-up for a chief complaint of urinary frequency.          Interval Update   Geovani Bustos is a very pleasant 67 year old male with a history of CKD stage 3, IgA nephropathy, CPFE, and schizophrenia.     Brief History: Mr. Geovani Bustos has followed with Dr. Mack for urinary frequency and nocturia. He takes tamsulosin 0.4 mg, oxybutynin XR 5 mg, finasteride 5 mg, and Myrbetriq 50 mg.     Today's notes: He is doing well today. He continues to report sufficient management of his urinary symptoms with his medication regimen. He denies dysuria and gross hematuria.          Physical Exam:   Patient is a 67 year old male evaluated via video visit.       Labs and Pathology:    I personally reviewed all applicable laboratory data and went over findings with patient  Significant for:    CBC RESULTS:  Recent Labs   Lab Test 02/20/23  1005 03/01/22  0843 08/25/21  1145 02/15/21  1209   WBC 9.5 8.6 9.8 8.3   HGB 15.1 14.3 15.1 14.6    157 170 158        BMP RESULTS:  Recent Labs   Lab Test 02/20/23  1005 03/01/22  0843 08/25/21  1145 02/15/21  1209 07/12/20  1601 11/15/19  0907 10/21/19  1016    140 138 139 139 138 140   POTASSIUM 4.9 4.5 4.3 4.8 4.2 4.3 4.6   CHLORIDE 104 108 105 104 106 103 106   CO2 27 28 31 32 29 34* 31   ANIONGAP 13 4 2* 3 4 1* 4   * 98 137* 107* 83 96 102*   BUN 28.9* 32* 27 31* 31* 17 33*   CR 1.59* 1.42* 1.53* 1.42* 1.56* 1.47* 1.63*   GFRESTIMATED 47* 54* 46* 51* 45* 49* 43*   GFRESTBLACK  --   --   --  59* 53* 57* 50*   RAYRAY 9.7 8.6 9.0 9.6 9.0 8.8 9.2       UA RESULTS:   Recent Labs   Lab Test 08/16/22  1705 07/26/22  1027 03/01/22  0851   SG 1.010 1.010 1.015   URINEPH 6.0 6.0 6.0   NITRITE Negative Negative Negative   RBCU 2-5* None Seen 1   WBCU 0-5 None Seen 0       PSA RESULTS  PSA   Date Value Ref Range Status   09/13/2017 0.78 0 - 4 ug/L Final      Comment:     Assay Method:  Chemiluminescence using Siemens Vista analyzer   12/17/2012 0.67 0 - 4 ug/L Final   09/08/2011 1.44 0 - 4 ug/L Final   03/18/2010 1.24 0 - 4 ug/L Final   10/15/2008 1.40 0.00 - 4.00 ng/mL             Assessment/Plan   67 year old male seen in follow up for urinary frequency and nocturia. His symptoms are well controlled with tamsulosin 0.4 mg, oxybutynin XR 5 mg, finasteride 5 mg, and Myrbetriq 50 mg.        Plan:  1. Continue tamsulosin 0.4 mg, oxybutynin XR 5 mg, finasteride 5 mg, and Myrbetriq 50 mg.   2. Follow up in one year, sooner if concerns.          Past Medical History:     Past Medical History:   Diagnosis Date     Cerumen impaction      Chronic kidney disease      Colon polyps      Hyperlipidemia      Mitral valve prolapse      Schizophrenia (H)      Ulcerated penile lesions      VSD (ventricular septal defect)             Past Surgical History:     Past Surgical History:   Procedure Laterality Date     ARTHRODESIS FOOT Right 1/19/2016    Procedure: ARTHRODESIS FOOT;  Surgeon: Holden Harrison DPM;  Location: [facility]     ARTHRODESIS FOOT Left 1/3/2017    Procedure: ARTHRODESIS FOOT;  Surgeon: Holden Harrison DPM;  Location: [facility]     COLONOSCOPY N/A 5/19/2022    Procedure: COLONOSCOPY, FLEXIBLE, WITH LESION REMOVAL USING SNARE;  Surgeon: Ame Nelson MD;  Location: [facility]     SURGICAL HISTORY OF -       leg fracture            Medications     Current Outpatient Medications   Medication     ABILIFY 10 MG OR TABS     ADULT ASPIRIN REGIMEN 81 MG EC tablet     albuterol (VENTOLIN HFA) 108 (90 Base) MCG/ACT inhaler     clobetasol (TEMOVATE) 0.05 % external ointment     Disposable Gloves (LATEX GLOVES MEDIUM) MISC     Disposable Gloves (NITRILE EXAM GLOVES MEDIUM) MISC     EAR DROPS 6.5 % otic solution     finasteride (PROSCAR) 5 MG tablet     fluticasone-vilanterol (BREO ELLIPTA) 100-25 MCG/ACT inhaler     gabapentin (NEURONTIN) 100 MG capsule     levothyroxine  (SYNTHROID/LEVOTHROID) 125 MCG tablet     mirabegron (MYRBETRIQ) 50 MG 24 hr tablet     nicotine (NICODERM CQ) 14 MG/24HR 24 hr patch     OLANZapine (ZYPREXA) 2.5 MG tablet     Omega-3 Fatty Acids (FISH OIL) 1200 MG capsule     order for DME     oxybutynin ER (DITROPAN XL) 5 MG 24 hr tablet     polyethylene glycol (CLEARLAX) 17 GM/Dose powder     Psyllium (REGULOID) 48.57 % POWD     simvastatin (ZOCOR) 40 MG tablet     Skin Protectants, Misc. (EUCERIN) cream     tamsulosin (FLOMAX) 0.4 MG capsule     traZODone (DESYREL) 50 MG tablet     ZOLOFT 100 MG OR TABS     order for DME     No current facility-administered medications for this visit.     Facility-Administered Medications Ordered in Other Visits   Medication     bupivacaine (MARCAINE) injection 0.5%            Family History:     Family History   Problem Relation Age of Onset     Emphysema Mother      Coronary Artery Disease Father             Social History:     Social History     Socioeconomic History     Marital status: Single     Spouse name: Not on file     Number of children: Not on file     Years of education: Not on file     Highest education level: Not on file   Occupational History     Not on file   Tobacco Use     Smoking status: Every Day     Packs/day: 0.75     Years: 50.00     Pack years: 37.50     Types: Cigarettes     Passive exposure: Current     Smokeless tobacco: Never     Tobacco comments:     cutting one cigarette down a month   Vaping Use     Vaping status: Never Used   Substance and Sexual Activity     Alcohol use: No     Drug use: No     Sexual activity: Never   Other Topics Concern     Parent/sibling w/ CABG, MI or angioplasty before 65F 55M? Not Asked      Service Not Asked     Blood Transfusions No     Caffeine Concern Not Asked     Occupational Exposure Not Asked     Hobby Hazards Not Asked     Sleep Concern Not Asked     Stress Concern Not Asked     Weight Concern Not Asked     Special Diet Not Asked     Back Care Not Asked      Exercise Not Asked     Bike Helmet Not Asked     Seat Belt Not Asked     Self-Exams Not Asked   Social History Narrative    Patient has no interest in smoking cessation.     Her for [employer] 42cm physical - likes to Bowl    Lives in Group home. Grew up in [place].      Social Determinants of Health     Financial Resource Strain: Not on file   Food Insecurity: Not on file   Transportation Needs: Not on file   Physical Activity: Not on file   Stress: Not on file   Social Connections: Not on file   Intimate Partner Violence: Not on file   Housing Stability: Not on file            Allergies:   Nitrogen oxide and Sulfa antibiotics         Review of Systems:  From intake questionnaire   Negative 14 system review except as noted on HPI, nurse's note.        CHEYANNE BARAJAS PA-C  Department of Urology

## 2023-06-06 NOTE — NURSING NOTE
Left voicemail stating to call our office back to get started on 12:00 p.m virtual appointment  10:58 a.m  Leeann Zhang MA

## 2023-06-07 ENCOUNTER — ALLIED HEALTH/NURSE VISIT (OUTPATIENT)
Dept: FAMILY MEDICINE | Facility: CLINIC | Age: 68
End: 2023-06-07
Payer: COMMERCIAL

## 2023-06-07 ENCOUNTER — ANESTHESIA EVENT (OUTPATIENT)
Dept: SURGERY | Facility: CLINIC | Age: 68
End: 2023-06-07
Payer: COMMERCIAL

## 2023-06-07 ENCOUNTER — HOSPITAL ENCOUNTER (OUTPATIENT)
Dept: CARDIOLOGY | Facility: CLINIC | Age: 68
Discharge: HOME | End: 2023-06-07
Attending: INTERNAL MEDICINE | Admitting: INTERNAL MEDICINE
Payer: COMMERCIAL

## 2023-06-07 DIAGNOSIS — Z23 ENCOUNTER FOR IMMUNIZATION: ICD-10-CM

## 2023-06-07 DIAGNOSIS — I34.0 NONRHEUMATIC MITRAL VALVE REGURGITATION: ICD-10-CM

## 2023-06-07 DIAGNOSIS — Z23 NEED FOR VACCINATION: Primary | ICD-10-CM

## 2023-06-07 LAB — LVEF ECHO: NORMAL

## 2023-06-07 PROCEDURE — 99207 PR NO CHARGE NURSE ONLY: CPT

## 2023-06-07 PROCEDURE — 90471 IMMUNIZATION ADMIN: CPT

## 2023-06-07 PROCEDURE — 93306 TTE W/DOPPLER COMPLETE: CPT | Mod: 26 | Performed by: INTERNAL MEDICINE

## 2023-06-07 PROCEDURE — 90714 TD VACC NO PRESV 7 YRS+ IM: CPT

## 2023-06-07 PROCEDURE — 93306 TTE W/DOPPLER COMPLETE: CPT

## 2023-06-07 RX ORDER — ACETAMINOPHEN 325 MG/1
975 TABLET ORAL ONCE
Status: CANCELLED | OUTPATIENT
Start: 2023-06-07 | End: 2023-06-07

## 2023-06-07 ASSESSMENT — COPD QUESTIONNAIRES: COPD: 1

## 2023-06-07 ASSESSMENT — LIFESTYLE VARIABLES: TOBACCO_USE: 1

## 2023-06-07 NOTE — RESULT ENCOUNTER NOTE
EF 65-70%; mod MR; known membranous VSD; mildly dilated asc aorta at 4.0 cm; no changes from previous per reader. Follow up with Mercy De La Cruz NP on 6/9/23.

## 2023-06-07 NOTE — ANESTHESIA PREPROCEDURE EVALUATION
Anesthesia Pre-Procedure Evaluation    Patient: Geovani Bustos   MRN: [id number] : 1955        Procedure : Procedure(s):  HERNIORRHAPHY INGUINALOPEN right          Past Medical History:   Diagnosis Date     Cerumen impaction      Chronic kidney disease      Colon polyps      Hyperlipidemia      Mitral valve prolapse      Schizophrenia (H)      Ulcerated penile lesions      VSD (ventricular septal defect)       Past Surgical History:   Procedure Laterality Date     ARTHRODESIS FOOT Right 2016    Procedure: ARTHRODESIS FOOT;  Surgeon: Holden Harrison DPM;  Location: [facility]     ARTHRODESIS FOOT Left 1/3/2017    Procedure: ARTHRODESIS FOOT;  Surgeon: Holden Harrison DPM;  Location: [facility]     COLONOSCOPY N/A 2022    Procedure: COLONOSCOPY, FLEXIBLE, WITH LESION REMOVAL USING SNARE;  Surgeon: Ame Nelson MD;  Location: [facility]     SURGICAL HISTORY OF -       leg fracture      Allergies   Allergen Reactions     Nitrogen Oxide      Sulfa Antibiotics       Social History     Tobacco Use     Smoking status: Every Day     Packs/day: 0.75     Years: 50.00     Pack years: 37.50     Types: Cigarettes     Passive exposure: Current     Smokeless tobacco: Never     Tobacco comments:     cutting one cigarette down a month   Vaping Use     Vaping status: Never Used   Substance Use Topics     Alcohol use: No      Wt Readings from Last 1 Encounters:   23 89 kg (196 lb 3.2 oz)        Anesthesia Evaluation   Pt has had prior anesthetic. Type: General and MAC.        ROS/MED HX  ENT/Pulmonary: Comment: Combined pulmonary fibrosis and emphysema    (+) tobacco use, Current use, 38  Pack-Year Hx,  COPD,     Neurologic:       Cardiovascular:     (+) Dyslipidemia -----dysrhythmias (Incomplete right bundle branch block (RBBB)), Other, valvular problems/murmurs type: MVP congenital heart disease VSD (ventricular septal defect). 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  Normal left ventricular size and systolic function. The visual ejection fraction is 65-70%.  Normal left atrial size.  Patient has a known membranous ventricular septal defect that was not well visualized on this study.  Normal right ventricular size and systolic function.  Dilated inferior vena cava.  Mildly dilated ascending aorta of 4.0 cm.     Stress Test: Date: Results:    ECG Reviewed: Date: 5/31/23 Results:  Sinus Rhythm - occasional ectopic ventricular beat     Low voltage with rightward P-axis and rotation -possible pulmonary disease.  Cath:  Date: Results:      METS/Exercise Tolerance:     Hematologic:       Musculoskeletal:   (+) arthritis,     GI/Hepatic:     (+) GERD,     Renal/Genitourinary:     (+) renal disease (stage 3), type: CRI, BPH,     Endo:     (+) thyroid problem, hypothyroidism,     Psychiatric/Substance Use:     (+) psychiatric history schizophrenia     Infectious Disease:       Malignancy:       Other:            Physical Exam    Airway  airway exam normal      Mallampati: II   TM distance: > 3 FB   Neck ROM: full   Mouth opening: > 3 cm    Respiratory Devices and Support         Dental       (+) Edentulous      Cardiovascular   cardiovascular exam normal          Pulmonary   pulmonary exam normal                OUTSIDE LABS:  CBC:   Lab Results   Component Value Date    WBC 9.5 02/20/2023    WBC 8.6 03/01/2022    HGB 15.1 02/20/2023    HGB 14.3 03/01/2022    HCT 45.2 02/20/2023    HCT 43.9 03/01/2022     02/20/2023     03/01/2022     BMP:   Lab Results   Component Value Date     02/20/2023     03/01/2022    POTASSIUM 4.9 02/20/2023    POTASSIUM 4.5 03/01/2022    CHLORIDE 104 02/20/2023    CHLORIDE 108 03/01/2022    CO2 27 02/20/2023    CO2 28 03/01/2022    BUN 28.9 (H) 02/20/2023    BUN 32 (H) 03/01/2022    CR 1.59 (H) 02/20/2023    CR 1.42 (H) 03/01/2022      (H) 02/20/2023    GLC 98 03/01/2022     COAGS:   Lab Results   Component Value Date    INR 0.99 04/13/2009     POC: No results found for: BGM, HCG, HCGS  HEPATIC:   Lab Results   Component Value Date    ALBUMIN 4.3 02/20/2023    PROTTOTAL 7.3 02/20/2023    ALT 17 02/20/2023    AST 20 02/20/2023    ALKPHOS 124 02/20/2023    BILITOTAL 0.3 02/20/2023     OTHER:   Lab Results   Component Value Date    LACT 1.2 11/05/2014    A1C 5.6 02/20/2023    RAYRAY 9.7 02/20/2023    PHOS 3.2 03/01/2022    TSH 2.98 11/09/2022       Anesthesia Plan    ASA Status:  3   NPO Status:  NPO Appropriate    Anesthesia Type: General.   Induction: Propofol, Intravenous.   Maintenance: TIVA.        Consents    Anesthesia Plan(s) and associated risks, benefits, and realistic alternatives discussed. Questions answered and patient/representative(s) expressed understanding.     - Discussed: Risks, Benefits and Alternatives for BOTH SEDATION and the PROCEDURE were discussed     - Discussed with:  Patient         Postoperative Care    Pain management: Multi-modal analgesia, IV analgesics, Oral pain medications.   PONV prophylaxis: Ondansetron (or other 5HT-3), Dexamethasone or Solumedrol, Background Propofol Infusion     Comments:                SAUL Foster CRNA

## 2023-06-07 NOTE — PROGRESS NOTES
Prior to immunization administration, verified patients identity using patient s name and date of birth. Please see Immunization Activity for additional information.     Screening Questionnaire for Adult Immunization    Are you sick today?   No   Do you have allergies to medications, food, a vaccine component or latex?   Yes   Have you ever had a serious reaction after receiving a vaccination?   No   Do you have a long-term health problem with heart, lung, kidney, or metabolic disease (e.g., diabetes), asthma, a blood disorder, no spleen, complement component deficiency, a cochlear implant, or a spinal fluid leak?  Are you on long-term aspirin therapy?   Yes   Do you have cancer, leukemia, HIV/AIDS, or any other immune system problem?   No   Do you have a parent, brother, or sister with an immune system problem?   No   In the past 3 months, have you taken medications that affect  your immune system, such as prednisone, other steroids, or anticancer drugs; drugs for the treatment of rheumatoid arthritis, Crohn s disease, or psoriasis; or have you had radiation treatments?   No   Have you had a seizure, or a brain or other nervous system problem?   No   During the past year, have you received a transfusion of blood or blood    products, or been given immune (gamma) globulin or antiviral drug?   No   For women: Are you pregnant or is there a chance you could become       pregnant during the next month?   No   Have you received any vaccinations in the past 4 weeks?   No     Immunization questionnaire was positive for at least one answer.  Ok per guidelines for TD . Patient is having hernia repair tomorrow , I spoke with Jazmine España NP whom gave verbal permission to administer TD     I have reviewed the following standing orders:   This patient is due and qualifies for a TD vaccine.    Click here for Tdap Standing Order    I have reviewed the vaccines inclusion and exclusion criteria; There were concerns regarding  the following exclusions, having surgery tomorrow. I have brought them to a provider who approved vaccination.  Verbal ok per Tash España NP      Injection of TD  given by Dwayne Cee CMA. Patient instructed to remain in clinic for 15 minutes afterwards, and to report any adverse reactions.     Screening performed by Dwayne Cee CMA on 6/7/2023 at 9:40 AM.

## 2023-06-08 ENCOUNTER — ANESTHESIA (OUTPATIENT)
Dept: SURGERY | Facility: CLINIC | Age: 68
End: 2023-06-08
Payer: COMMERCIAL

## 2023-06-08 ENCOUNTER — HOSPITAL ENCOUNTER (OUTPATIENT)
Facility: CLINIC | Age: 68
Discharge: HOME | End: 2023-06-08
Attending: SURGERY | Admitting: SURGERY
Payer: COMMERCIAL

## 2023-06-08 VITALS
HEART RATE: 88 BPM | OXYGEN SATURATION: 95 % | RESPIRATION RATE: 16 BRPM | TEMPERATURE: 97.6 F | DIASTOLIC BLOOD PRESSURE: 74 MMHG | SYSTOLIC BLOOD PRESSURE: 129 MMHG

## 2023-06-08 DIAGNOSIS — Z98.890 S/P INGUINAL HERNIA REPAIR: Primary | ICD-10-CM

## 2023-06-08 DIAGNOSIS — Z87.19 S/P INGUINAL HERNIA REPAIR: Primary | ICD-10-CM

## 2023-06-08 PROCEDURE — 250N000011 HC RX IP 250 OP 636: Performed by: SURGERY

## 2023-06-08 PROCEDURE — 370N000017 HC ANESTHESIA TECHNICAL FEE, PER MIN: Performed by: SURGERY

## 2023-06-08 PROCEDURE — 258N000003 HC RX IP 258 OP 636: Performed by: NURSE ANESTHETIST, CERTIFIED REGISTERED

## 2023-06-08 PROCEDURE — 710N000012 HC RECOVERY PHASE 2, PER MINUTE: Performed by: SURGERY

## 2023-06-08 PROCEDURE — C1781 MESH (IMPLANTABLE): HCPCS | Performed by: SURGERY

## 2023-06-08 PROCEDURE — 272N000001 HC OR GENERAL SUPPLY STERILE: Performed by: SURGERY

## 2023-06-08 PROCEDURE — 250N000009 HC RX 250: Performed by: SURGERY

## 2023-06-08 PROCEDURE — 250N000011 HC RX IP 250 OP 636: Performed by: NURSE ANESTHETIST, CERTIFIED REGISTERED

## 2023-06-08 PROCEDURE — 360N000075 HC SURGERY LEVEL 2, PER MIN: Performed by: SURGERY

## 2023-06-08 PROCEDURE — 49505 PRP I/HERN INIT REDUC >5 YR: CPT | Mod: RT | Performed by: SURGERY

## 2023-06-08 PROCEDURE — 250N000009 HC RX 250: Performed by: NURSE ANESTHETIST, CERTIFIED REGISTERED

## 2023-06-08 PROCEDURE — 999N000141 HC STATISTIC PRE-PROCEDURE NURSING ASSESSMENT: Performed by: SURGERY

## 2023-06-08 PROCEDURE — 250N000013 HC RX MED GY IP 250 OP 250 PS 637: Performed by: SURGERY

## 2023-06-08 DEVICE — SOFT POLYPROPYLENE MESH, NONABSORBABLE SYNTHETIC SURGICAL MESH
Type: IMPLANTABLE DEVICE | Site: GROIN | Status: FUNCTIONAL
Brand: PROLENE

## 2023-06-08 RX ORDER — MAGNESIUM HYDROXIDE 1200 MG/15ML
LIQUID ORAL PRN
Status: DISCONTINUED | OUTPATIENT
Start: 2023-06-08 | End: 2023-06-08 | Stop reason: HOSPADM

## 2023-06-08 RX ORDER — NALOXONE HYDROCHLORIDE 0.4 MG/ML
0.4 INJECTION, SOLUTION INTRAMUSCULAR; INTRAVENOUS; SUBCUTANEOUS
Status: DISCONTINUED | OUTPATIENT
Start: 2023-06-08 | End: 2023-06-08 | Stop reason: HOSPADM

## 2023-06-08 RX ORDER — FENTANYL CITRATE 50 UG/ML
25 INJECTION, SOLUTION INTRAMUSCULAR; INTRAVENOUS EVERY 5 MIN PRN
Status: DISCONTINUED | OUTPATIENT
Start: 2023-06-08 | End: 2023-06-08 | Stop reason: HOSPADM

## 2023-06-08 RX ORDER — DEXAMETHASONE SODIUM PHOSPHATE 4 MG/ML
INJECTION, SOLUTION INTRA-ARTICULAR; INTRALESIONAL; INTRAMUSCULAR; INTRAVENOUS; SOFT TISSUE PRN
Status: DISCONTINUED | OUTPATIENT
Start: 2023-06-08 | End: 2023-06-08

## 2023-06-08 RX ORDER — GLYCOPYRROLATE 0.2 MG/ML
INJECTION, SOLUTION INTRAMUSCULAR; INTRAVENOUS PRN
Status: DISCONTINUED | OUTPATIENT
Start: 2023-06-08 | End: 2023-06-08

## 2023-06-08 RX ORDER — ACETAMINOPHEN 325 MG/1
975 TABLET ORAL ONCE
Status: COMPLETED | OUTPATIENT
Start: 2023-06-08 | End: 2023-06-08

## 2023-06-08 RX ORDER — LIDOCAINE 40 MG/G
CREAM TOPICAL
Status: DISCONTINUED | OUTPATIENT
Start: 2023-06-08 | End: 2023-06-08 | Stop reason: HOSPADM

## 2023-06-08 RX ORDER — NALOXONE HYDROCHLORIDE 0.4 MG/ML
0.2 INJECTION, SOLUTION INTRAMUSCULAR; INTRAVENOUS; SUBCUTANEOUS
Status: DISCONTINUED | OUTPATIENT
Start: 2023-06-08 | End: 2023-06-08 | Stop reason: HOSPADM

## 2023-06-08 RX ORDER — PROPOFOL 10 MG/ML
INJECTION, EMULSION INTRAVENOUS CONTINUOUS PRN
Status: DISCONTINUED | OUTPATIENT
Start: 2023-06-08 | End: 2023-06-08

## 2023-06-08 RX ORDER — CEFAZOLIN SODIUM/WATER 2 G/20 ML
2 SYRINGE (ML) INTRAVENOUS
Status: COMPLETED | OUTPATIENT
Start: 2023-06-08 | End: 2023-06-08

## 2023-06-08 RX ORDER — MAGNESIUM SULFATE HEPTAHYDRATE 40 MG/ML
2 INJECTION, SOLUTION INTRAVENOUS ONCE
Status: DISCONTINUED | OUTPATIENT
Start: 2023-06-08 | End: 2023-06-08 | Stop reason: HOSPADM

## 2023-06-08 RX ORDER — ONDANSETRON 4 MG/1
4 TABLET, ORALLY DISINTEGRATING ORAL EVERY 30 MIN PRN
Status: DISCONTINUED | OUTPATIENT
Start: 2023-06-08 | End: 2023-06-08 | Stop reason: HOSPADM

## 2023-06-08 RX ORDER — LIDOCAINE HYDROCHLORIDE 20 MG/ML
INJECTION, SOLUTION INFILTRATION; PERINEURAL PRN
Status: DISCONTINUED | OUTPATIENT
Start: 2023-06-08 | End: 2023-06-08

## 2023-06-08 RX ORDER — HYDROMORPHONE HCL IN WATER/PF 6 MG/30 ML
0.2 PATIENT CONTROLLED ANALGESIA SYRINGE INTRAVENOUS EVERY 5 MIN PRN
Status: DISCONTINUED | OUTPATIENT
Start: 2023-06-08 | End: 2023-06-08 | Stop reason: HOSPADM

## 2023-06-08 RX ORDER — PROPOFOL 10 MG/ML
INJECTION, EMULSION INTRAVENOUS PRN
Status: DISCONTINUED | OUTPATIENT
Start: 2023-06-08 | End: 2023-06-08

## 2023-06-08 RX ORDER — SODIUM CHLORIDE, SODIUM LACTATE, POTASSIUM CHLORIDE, CALCIUM CHLORIDE 600; 310; 30; 20 MG/100ML; MG/100ML; MG/100ML; MG/100ML
INJECTION, SOLUTION INTRAVENOUS CONTINUOUS
Status: DISCONTINUED | OUTPATIENT
Start: 2023-06-08 | End: 2023-06-08 | Stop reason: HOSPADM

## 2023-06-08 RX ORDER — GABAPENTIN 600 MG/1
600 TABLET ORAL ONCE
Status: COMPLETED | OUTPATIENT
Start: 2023-06-08 | End: 2023-06-08

## 2023-06-08 RX ORDER — HYDROMORPHONE HCL IN WATER/PF 6 MG/30 ML
0.4 PATIENT CONTROLLED ANALGESIA SYRINGE INTRAVENOUS EVERY 5 MIN PRN
Status: DISCONTINUED | OUTPATIENT
Start: 2023-06-08 | End: 2023-06-08 | Stop reason: HOSPADM

## 2023-06-08 RX ORDER — FENTANYL CITRATE 50 UG/ML
INJECTION, SOLUTION INTRAMUSCULAR; INTRAVENOUS PRN
Status: DISCONTINUED | OUTPATIENT
Start: 2023-06-08 | End: 2023-06-08

## 2023-06-08 RX ORDER — ONDANSETRON 2 MG/ML
4 INJECTION INTRAMUSCULAR; INTRAVENOUS EVERY 30 MIN PRN
Status: DISCONTINUED | OUTPATIENT
Start: 2023-06-08 | End: 2023-06-08 | Stop reason: HOSPADM

## 2023-06-08 RX ORDER — ONDANSETRON 2 MG/ML
INJECTION INTRAMUSCULAR; INTRAVENOUS PRN
Status: DISCONTINUED | OUTPATIENT
Start: 2023-06-08 | End: 2023-06-08

## 2023-06-08 RX ORDER — CEFAZOLIN SODIUM/WATER 2 G/20 ML
2 SYRINGE (ML) INTRAVENOUS SEE ADMIN INSTRUCTIONS
Status: DISCONTINUED | OUTPATIENT
Start: 2023-06-08 | End: 2023-06-08 | Stop reason: HOSPADM

## 2023-06-08 RX ORDER — OXYCODONE HYDROCHLORIDE 5 MG/1
5 TABLET ORAL EVERY 6 HOURS PRN
Qty: 12 TABLET | Refills: 0 | Status: SHIPPED | OUTPATIENT
Start: 2023-06-08 | End: 2023-06-16

## 2023-06-08 RX ORDER — FENTANYL CITRATE 50 UG/ML
50 INJECTION, SOLUTION INTRAMUSCULAR; INTRAVENOUS EVERY 5 MIN PRN
Status: DISCONTINUED | OUTPATIENT
Start: 2023-06-08 | End: 2023-06-08 | Stop reason: HOSPADM

## 2023-06-08 RX ORDER — OXYCODONE HYDROCHLORIDE 5 MG/1
5 TABLET ORAL
Status: DISCONTINUED | OUTPATIENT
Start: 2023-06-08 | End: 2023-06-08 | Stop reason: HOSPADM

## 2023-06-08 RX ORDER — BUPIVACAINE HYDROCHLORIDE AND EPINEPHRINE 2.5; 5 MG/ML; UG/ML
INJECTION, SOLUTION EPIDURAL; INFILTRATION; INTRACAUDAL; PERINEURAL PRN
Status: DISCONTINUED | OUTPATIENT
Start: 2023-06-08 | End: 2023-06-08 | Stop reason: HOSPADM

## 2023-06-08 RX ADMIN — PROPOFOL 70 MCG/KG/MIN: 10 INJECTION, EMULSION INTRAVENOUS at 14:32

## 2023-06-08 RX ADMIN — MIDAZOLAM 2 MG: 1 INJECTION INTRAMUSCULAR; INTRAVENOUS at 14:26

## 2023-06-08 RX ADMIN — PROPOFOL 20 MG: 10 INJECTION, EMULSION INTRAVENOUS at 14:32

## 2023-06-08 RX ADMIN — PHENYLEPHRINE HYDROCHLORIDE 50 MCG: 10 INJECTION INTRAVENOUS at 15:12

## 2023-06-08 RX ADMIN — LIDOCAINE HYDROCHLORIDE 0.1 ML: 10 INJECTION, SOLUTION EPIDURAL; INFILTRATION; INTRACAUDAL; PERINEURAL at 11:36

## 2023-06-08 RX ADMIN — DEXAMETHASONE SODIUM PHOSPHATE 4 MG: 4 INJECTION, SOLUTION INTRA-ARTICULAR; INTRALESIONAL; INTRAMUSCULAR; INTRAVENOUS; SOFT TISSUE at 14:32

## 2023-06-08 RX ADMIN — FENTANYL CITRATE 25 MCG: 50 INJECTION, SOLUTION INTRAMUSCULAR; INTRAVENOUS at 14:38

## 2023-06-08 RX ADMIN — SODIUM CHLORIDE, POTASSIUM CHLORIDE, SODIUM LACTATE AND CALCIUM CHLORIDE: 600; 310; 30; 20 INJECTION, SOLUTION INTRAVENOUS at 11:36

## 2023-06-08 RX ADMIN — FENTANYL CITRATE 25 MCG: 50 INJECTION, SOLUTION INTRAMUSCULAR; INTRAVENOUS at 14:45

## 2023-06-08 RX ADMIN — LIDOCAINE HYDROCHLORIDE 40 MG: 20 INJECTION, SOLUTION INFILTRATION; PERINEURAL at 14:30

## 2023-06-08 RX ADMIN — DEXAMETHASONE SODIUM PHOSPHATE 4 MG: 4 INJECTION, SOLUTION INTRA-ARTICULAR; INTRALESIONAL; INTRAMUSCULAR; INTRAVENOUS; SOFT TISSUE at 14:37

## 2023-06-08 RX ADMIN — FENTANYL CITRATE 25 MCG: 50 INJECTION, SOLUTION INTRAMUSCULAR; INTRAVENOUS at 15:26

## 2023-06-08 RX ADMIN — FENTANYL CITRATE 25 MCG: 50 INJECTION, SOLUTION INTRAMUSCULAR; INTRAVENOUS at 15:07

## 2023-06-08 RX ADMIN — ACETAMINOPHEN 975 MG: 325 TABLET, FILM COATED ORAL at 11:37

## 2023-06-08 RX ADMIN — GLYCOPYRROLATE 0.1 MG: 0.2 INJECTION, SOLUTION INTRAMUSCULAR; INTRAVENOUS at 14:26

## 2023-06-08 RX ADMIN — GLYCOPYRROLATE 0.1 MG: 0.2 INJECTION, SOLUTION INTRAMUSCULAR; INTRAVENOUS at 14:49

## 2023-06-08 RX ADMIN — ONDANSETRON 4 MG: 2 INJECTION INTRAMUSCULAR; INTRAVENOUS at 14:29

## 2023-06-08 RX ADMIN — GABAPENTIN 600 MG: 600 TABLET, FILM COATED ORAL at 11:37

## 2023-06-08 RX ADMIN — Medication 2 G: at 14:26

## 2023-06-08 RX ADMIN — PHENYLEPHRINE HYDROCHLORIDE 50 MCG: 10 INJECTION INTRAVENOUS at 14:42

## 2023-06-08 RX ADMIN — LIDOCAINE HYDROCHLORIDE 30 MG: 20 INJECTION, SOLUTION INFILTRATION; PERINEURAL at 14:36

## 2023-06-08 ASSESSMENT — ACTIVITIES OF DAILY LIVING (ADL)
ADLS_ACUITY_SCORE: 35
ADLS_ACUITY_SCORE: 35
ADLS_ACUITY_SCORE: 33
ADLS_ACUITY_SCORE: 35

## 2023-06-08 ASSESSMENT — ENCOUNTER SYMPTOMS: DYSRHYTHMIAS: 1

## 2023-06-08 NOTE — BRIEF OP NOTE
"[facility]    Brief Operative Note    Pre-operative diagnosis: Right, Unilateral inguinal hernia without obstruction or gangrene, recurrence not specified [K40.90]  Post-operative diagnosis Same as pre-operative diagnosis    Procedure: Procedure(s):  HERNIORRHAPHY INGUINALOPEN right with mesh  Surgeon: Surgeon(s) and Role:     * Ame Nelson, MD - Primary  Anesthesia: General   Estimated Blood Loss: Minimal    Drains: None  Specimens: * No specimens in log *  Findings:   Right direct inguinal hernia repair with mesh.  Complications: None.  Implants:   Implant Name Type Inv. Item Serial No.  Lot No. LRB No. Used Action   MESH PROLENE 6X6\" SOFT - [id number] Mesh MESH PROLENE 6X6\" [employer] [facility] [employer] [facility]- [id number] Implanted           "

## 2023-06-08 NOTE — PATIENT INSTRUCTIONS
Medication Changes:  none     Recommendations:  Call with questions  Check blood pressure at least 1 hour after medications. Call the clinic if your blood pressure is consistently greater than 120/80.     Follow-up:  Cardiology follow up at [facility]: Dr. Lott in June 2024  Echo 2024 June   Call 6 months prior, to schedule.     Cardiology Scheduling~[phone]  Cardiology Clinic RN~[phone] (Lois RN, Lilliam RN, Aleksandra RN)

## 2023-06-08 NOTE — ANESTHESIA CARE TRANSFER NOTE
Patient: Geovani Bustos    Procedure: Procedure(s):  HERNIORRHAPHY INGUINALOPEN right with mesh       Diagnosis: Unilateral inguinal hernia without obstruction or gangrene, recurrence not specified [K40.90]  Diagnosis Additional Information: No value filed.    Anesthesia Type:   General     Note:    Oropharynx: oropharynx clear of all foreign objects and spontaneously breathing  Level of Consciousness: awake  Oxygen Supplementation: face mask  Level of Supplemental Oxygen (L/min / FiO2): 6  Independent Airway: airway patency satisfactory and stable  Dentition: dentition unchanged  Vital Signs Stable: post-procedure vital signs reviewed and stable  Report to RN Given: handoff report given  Patient transferred to: Phase II    Handoff Report: Identifed the Patient, Identified the Reponsible Provider, Reviewed the pertinent medical history, Discussed the surgical course, Reviewed Intra-OP anesthesia mangement and issues during anesthesia, Set expectations for post-procedure period and Allowed opportunity for questions and acknowledgement of understanding      Vitals:  Vitals Value Taken Time   BP     Temp     Pulse     Resp     SpO2 94 % 06/08/23 1611   Vitals shown include unvalidated device data.    Electronically Signed By: SAUL Hernandez CRNA  June 8, 2023  4:12 PM

## 2023-06-08 NOTE — OP NOTE
Open Inguinal Herniorrhaphy Procedure Note    Name: Geovani Bustos Date/Time of Admission: 2023 10:59 AM  CSN: [id number] Attending Provider: Ame Nelson MD  MRN: [id number] : 1955 67 year old       Pre-operative Diagnosis: right inguinal hernia    Post-operative Diagnosis: right direct inguinal hernia    Procedure:   1. right inguinal herniorrhaphy with mesh      Surgeon: AME NELSON D.O.    Anesthesia: MAC    Indications: The patient has a symptomatic right inguinal hernia.     Procedure Details   The patient was seen in the Holding Room. The risks, benefits, complications, treatment options, and expected outcomes were discussed with the patient. The possibilities of reaction to medication, pain, infection, bleeding, heart attack, death, injury to internal organs, recurrence, testicular damage, infertility, or need for further surgery were discussed with the patient. The patient concurred with the proposed plan, giving informed consent. The site of surgery properly noted/marked and pre-operative antibiotics were administered. The patient was taken to back to the operation room and placed on the table in the supine position. General anesthesia was administered by the Department of Anesthesia via endotracheal tube. A Time Out was held and the above information confirmed.  An oblique incision was created in the right groin at the level of the external ring and ASIS, carried down through the subcutaneous tissues with cautery. The external oblique fascia was identified and incised parallel to its fibers with a 15-blade scalpel; Metzenbaum scissors were used to extend this cephalad and caudad; It was opened through the external ring with Bovie. Cord structures were encircled at the level of the pubic tubercle and a penrose drain passed. A direct hernia was identified. No cord lipoma was noted.  Hernia content was pushed back into the abdominal cavity. The floor of the inguinal canal was then repaired  using interrupted sutures of 2-0 Neurolon, approximating the conjoined tendon to the shelving edge of the inguinal ligament. A 3x6 Bard onlay mesh was then obtained and trimmed to overlie the floor of the inguinal canal, leaving a slit for the cord to pass. The mesh was then secured using two running sutures of 2-0 Prolene. Several additional interrupted sutures of 2-0 Prolene were used to secured the mesh around the internal ring. This yielded an adequate repair. The spermatic cord was then returned to its normal anatomic position and the testicle was noted to lie normally within the hemiscrotum.  The external oblique was then closed using a running suture of 3-0 Vicryl.  The deep subcutaneous tissues were re-approximated with several interrupted sutures of 3-0 monocryl. The skin was closed using the 3-0 followed by 4-0 monocryl.  The patient tolerated the procedure well with no complications.    Estimated Blood Loss: Minimal  Specimens: none  Implants: Bard 3x6 inch onlay mesh  Complications: None; patient tolerated the procedure well.  Disposition: PACU - hemodynamically stable.  Patient to follow-up in the office in 7-10 days        Electronically signed by: [facility] DO Leslie; [facility] 6/8/2023

## 2023-06-08 NOTE — ANESTHESIA POSTPROCEDURE EVALUATION
Patient: Geovani Bustos    Procedure: Procedure(s):  HERNIORRHAPHY INGUINALOPEN right with mesh       Anesthesia Type:  General    Note:  Disposition: Outpatient   Postop Pain Control: Uneventful            Sign Out: Well controlled pain   PONV: No   Neuro/Psych: Uneventful            Sign Out: Acceptable/Baseline neuro status   Airway/Respiratory: Uneventful            Sign Out: Acceptable/Baseline resp. status   CV/Hemodynamics: Uneventful            Sign Out: Acceptable CV status; No obvious hypovolemia; No obvious fluid overload   Other NRE: NONE   DID A NON-ROUTINE EVENT OCCUR? No           Last vitals:  Vitals Value Taken Time   BP     Temp     Pulse     Resp     SpO2 94 % 06/08/23 1611   Vitals shown include unvalidated device data.    Electronically Signed By: SAUL Hernandez CRNA  June 8, 2023  4:12 PM

## 2023-06-08 NOTE — DISCHARGE INSTRUCTIONS
Home Care Following Hernia Repair    [facility] Nelson, DO      Hernia Type:  Inguinal, right    Pain meds:   600mg ibuprofen every 6hrs prn   650mg of acetaminophen every 6hrs prn  You can alternate these meds so that you take one every 3 hrs.    Make sure you do not go over 4000mg of acetaminophen every 24hrs, especially if you are taking Norco or percocet 5/325mg.    Care of the Incision:  Remove gauze dressing (if present) after 48 hours.  If surgical glue was used, keep your incision dry for 24 hours.  Then you may shower, but don t submerge under water for at least 2 weeks.  Gently pat your incision dry with a freshly laundered towel.  Do not touch your incision with bare hands or pick at scabs.  Leave your incision open to air.  Cover it only if clothing rubs or irritates it.  Remove the gauze and clear tape from the belly button 48 hrs after surgery, if present.   Wear your abdominal binder, if given, while awake for the first 2-4 weeks.   Activity:  Gradually increase your activity.  Walk short distances several times each day and increase the distance as your strength allows.  To promote circulation, do not cross your legs while sitting.  No strenuous lifting or straining for 2-3 weeks.   Do not lift anything over 10-20 pounds until your doctor approves an increase.  Return to work will be determined by the type of work you do and should be discussed with your physician.  Do not drive or operate equipment while taking prescription pain medicines.  You may drive 1 week after surgery if you have stopped taking prescription pain medicines and are pain-free enough to react quickly and make an emergency stop if necessary.    Diet:  Return to the diet you were on before surgery.  Drink plenty of  water.  Avoid foods that cause constipation.    REMEMBER--most prescription pain pills cause constipation.  Walking, extra fluids, and increased fiber (fresh fruits and vegetables, etc.) are natural remedies for  constipation.  You can also take mineral oil, 1-2 Tablespoons per day.  If still constipated you may try a stool softener such as Colace or Miralax.    Call Your Physician if You Have:  Redness, increased swelling or cloudy drainage from your incision.  A temperature of more than 101 degrees F.  Worsening pain in your incision not relieved by your prescription pain pills and/or a short rest.  Any questions or concerns about your recovery, please call     Business hours [phone]     After hours [phone] Nurse Advice Line (24 hours a day)    Follow-up Care:  Make an appointment 1-2 week after your surgery.  Call [phone].                        Same Day Surgery Discharge Instructions  Special Precautions After Surgery - Adult    It is not unusual to feel lightheaded or faint, up to 24 hours after surgery or while taking pain medication.  If you have these symptoms; sit for a few minutes before standing and have someone assist you when getting up.  You should rest and relax for the next 24 hours and must have someone stay with you for at least 24 hours after your discharge.  DO NOT DRIVE any vehicle or operate mechanical equipment for 24 hours following the end of your surgery.  DO NOT DRIVE while taking narcotic pain medications that have been prescribed by your physician.  If you had a limb operated on, you must be able to use it fully to drive.  DO NOT drink alcoholic beverages for 24 hours following surgery or while taking prescription pain medication.  Drink clear liquids (apple juice, ginger ale, broth, 7-Up, etc.).  Progress to your regular diet as you feel able.  Any questions call your physician and do not make important decisions for 24 hours.      __________________________________________________________________________________________________________________________________  IMPORTANT NUMBERS:    [facility] Main Number:  [phone], [phone]  Pharmacy:  [phone]  Same Day Surgery:   [phone], Monday - Friday until 8:30 p.m.  Urgent Care:  [phone]  Emergency Room:  [phone]      [facility]:  [phone]                                                                             [facility]:  [phone] option 1  [facility]:  [phone]     [facility]:  [phone]   [facility]:  [phone]   Home Medical Equipment: [phone]  [facility]:  [phone]

## 2023-06-08 NOTE — INTERVAL H&P NOTE
"I have reviewed the surgical (or preoperative) H&P that is linked to this encounter, and examined the patient. There are no significant changes    Clinical Conditions Present on Arrival:  Clinically Significant Risk Factors Present on Admission                 # Drug Induced Platelet Defect: home medication list includes an antiplatelet medication  # Overweight: Estimated body mass index is 29.4 kg/m  as calculated from the following:    Height as of 5/31/23: 1.74 m (5' 8.5\").    Weight as of 5/31/23: 89 kg (196 lb 3.2 oz).       "

## 2023-06-08 NOTE — PROGRESS NOTES
Mail AVS     Video-Visit Details    Type of service:  Video Visit   Video Start Time: 8:29 AM  Video End Time:8:35 AM    Originating Location (pt. Location): Other long-term    Distant Location (provider location):  On-site  Platform used for Video Visit: [facility]    Cardiology Clinic Progress Note  Geovani Bustos MRN# [id number]   YOB: 1955 Age: 67 year old      Primary Cardiologist:   Dr. 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    He lives in a group home.    Patient presents today for annual visit.  Most recent lipid profile, ALT reviewed today. Echocardiogram 6/7/2023 showing myxomatous mitral valve with bileaflet prolapse and moderate posterior MR, LVEF 65 to 70%, VSD not well visualized on this study but known to have, normal RV, ascending aorta 4 cm.  Per the reader there were no significant changes. Results reviewed today.     He underwent hernia surgery yesterday without any complications and is recovering at home.  Blood pressures at clinic visits over the last few months have been controlled.  He does not get any routine exercise but prior to his hernia was mowing the lawn without any cardiac complaints.  He continues with chronic mild dyspnea on exertion has been stable.  He has chronic lower extremity edema and has been using compression stockings.  He states that this has improved.  He has no cardiac concerns today. Patient reports no chest pain, PND, orthopnea, presyncope, syncope, heart racing,  or palpitations.        Current Cardiac Medications   Aspirin 81 mg daily  Simvastatin 40 mg daily                     Assessment and Plan:       Plan  Patient Instructions   Medication Changes:  1. none     Recommendations:  1. Call with questions  2. Check blood pressure at least 1 hour after medications. Call the clinic if your blood pressure is consistently greater than 120/80.     Follow-up:  1. Cardiology follow up at [facility]: Dr. Lott in June 2024  2. Echo 2024 June   Call 6 months prior, to schedule.     Cardiology Scheduling~[phone]  Cardiology Clinic RN~[phone] (Lois RN, Lilliam RN, Aleksandra RN)              Mitral valve prolapse  MR stable  Follow with echo yearly    VSD (ventricular septal defect)  Not well-visualized  Follow with echo    Dilatation of thoracic aorta (H)  Stable  BP control 120/80  Follow-up echo    Hyperlipidemia LDL goal <70  Controlled  Continue simvastatin             Thank you for allowing me to participate in this delightful patient's care.      This note was completed in part using Dragon voice recognition software. Although reviewed after completion, some word and grammatical errors may occur.    Mercy Tolbert, SAUL CNP

## 2023-06-09 ENCOUNTER — VIRTUAL VISIT (OUTPATIENT)
Dept: CARDIOLOGY | Facility: CLINIC | Age: 68
End: 2023-06-09
Attending: INTERNAL MEDICINE
Payer: COMMERCIAL

## 2023-06-09 VITALS — HEIGHT: 65 IN | WEIGHT: 195 LBS | BODY MASS INDEX: 32.49 KG/M2

## 2023-06-09 DIAGNOSIS — I34.1 MITRAL VALVE PROLAPSE: Primary | ICD-10-CM

## 2023-06-09 DIAGNOSIS — Q21.0 VSD (VENTRICULAR SEPTAL DEFECT): ICD-10-CM

## 2023-06-09 DIAGNOSIS — I77.810 DILATATION OF THORACIC AORTA (H): ICD-10-CM

## 2023-06-09 DIAGNOSIS — E78.5 HYPERLIPIDEMIA LDL GOAL <70: ICD-10-CM

## 2023-06-09 DIAGNOSIS — I34.0 NONRHEUMATIC MITRAL VALVE REGURGITATION: ICD-10-CM

## 2023-06-09 PROCEDURE — 99212 OFFICE O/P EST SF 10 MIN: CPT | Mod: 95 | Performed by: NURSE PRACTITIONER

## 2023-06-09 ASSESSMENT — PAIN SCALES - GENERAL: PAINLEVEL: MILD PAIN (2)

## 2023-06-09 NOTE — LETTER
6/9/2023    Alexandru Vera MD  [address] [place] [id number]    RE: Geovani Bustos       Dear Colleague,     I had the pleasure of seeing Geovani Bustos in the [facility].  Mail AVS     Video-Visit Details    Type of service:  Video Visit   Video Start Time: 8:29 AM  Video End Time:8:35 AM    Originating Location (pt. Location): Other shelter    Distant Location (provider location):  On-site  Platform used for Video Visit: [facility]    Cardiology Clinic Progress Note  Geovani Bustos MRN# [id number]   YOB: 1955 Age: 67 year old      Primary Cardiologist:   Dr. 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    He lives in a group home.    Patient presents today for annual visit.  Most recent lipid profile, ALT reviewed today. Echocardiogram 6/7/2023 showing myxomatous mitral valve with bileaflet prolapse and moderate posterior MR, LVEF 65 to 70%, VSD not well visualized on this study but known to have, normal RV, ascending aorta 4 cm.  Per the reader there were no significant changes. Results reviewed today.     He underwent hernia surgery yesterday without any complications and is recovering at home.  Blood pressures at clinic visits over the last few months have been controlled.  He does not get any routine exercise but prior to his hernia was mowing the lawn without any cardiac complaints.  He continues with chronic mild dyspnea on exertion has been stable.  He has chronic lower  extremity edema and has been using compression stockings.  He states that this has improved.  He has no cardiac concerns today. Patient reports no chest pain, PND, orthopnea, presyncope, syncope, heart racing, or palpitations.        Current Cardiac Medications   Aspirin 81 mg daily  Simvastatin 40 mg daily                     Assessment and Plan:       Plan  Patient Instructions   Medication Changes:  none     Recommendations:  Call with questions  Check blood pressure at least 1 hour after medications. Call the clinic if your blood pressure is consistently greater than 120/80.     Follow-up:  Cardiology follow up at [facility]: Dr. Lott in June 2024  Echo 2024 June   Call 6 months prior, to schedule.     Cardiology Scheduling~[phone]  Cardiology Clinic RN~[phone] (Lois RN, Lilliam RN, Aleksandra RN)              Mitral valve prolapse  MR stable  Follow with echo yearly    VSD (ventricular septal defect)  Not well-visualized  Follow with echo    Dilatation of thoracic aorta (H)  Stable  BP control 120/80  Follow-up echo    Hyperlipidemia LDL goal <70  Controlled  Continue simvastatin             Thank you for allowing me to participate in this delightful patient's care.      This note was completed in part using Dragon voice recognition software. Although reviewed after completion, some word and grammatical errors may occur.    SAUL Penny CNP      Thank you for allowing me to participate in the care of your patient.      Sincerely,     SAUL Penny CNP     [facility]  cc:   Peewee Lott MD

## 2023-06-16 ENCOUNTER — OFFICE VISIT (OUTPATIENT)
Dept: SURGERY | Facility: CLINIC | Age: 68
End: 2023-06-16
Payer: COMMERCIAL

## 2023-06-16 ENCOUNTER — IMMUNIZATION (OUTPATIENT)
Dept: FAMILY MEDICINE | Facility: CLINIC | Age: 68
End: 2023-06-16
Payer: COMMERCIAL

## 2023-06-16 VITALS
BODY MASS INDEX: 32.49 KG/M2 | OXYGEN SATURATION: 94 % | SYSTOLIC BLOOD PRESSURE: 115 MMHG | DIASTOLIC BLOOD PRESSURE: 73 MMHG | HEART RATE: 71 BPM | TEMPERATURE: 97 F | WEIGHT: 195 LBS | HEIGHT: 65 IN

## 2023-06-16 DIAGNOSIS — F17.219 CIGARETTE NICOTINE DEPENDENCE WITH NICOTINE-INDUCED DISORDER: Chronic | ICD-10-CM

## 2023-06-16 DIAGNOSIS — Z98.890 S/P INGUINAL HERNIA REPAIR: Primary | ICD-10-CM

## 2023-06-16 DIAGNOSIS — Z87.19 S/P INGUINAL HERNIA REPAIR: Primary | ICD-10-CM

## 2023-06-16 DIAGNOSIS — Z23 HIGH PRIORITY FOR 2019-NCOV VACCINE: Primary | ICD-10-CM

## 2023-06-16 PROCEDURE — 99024 POSTOP FOLLOW-UP VISIT: CPT | Performed by: SURGERY

## 2023-06-16 PROCEDURE — 0124A COVID-19 BIVALENT 12+ (PFIZER): CPT

## 2023-06-16 PROCEDURE — 91312 COVID-19 BIVALENT 12+ (PFIZER): CPT

## 2023-06-16 ASSESSMENT — PAIN SCALES - GENERAL: PAINLEVEL: MILD PAIN (3)

## 2023-06-16 NOTE — NURSING NOTE
"Initial /73   Pulse 71   Temp 97  F (36.1  C) (Tympanic)   Ht 1.651 m (5' 5\")   Wt 88.5 kg (195 lb)   BMI 32.45 kg/m   Estimated body mass index is 32.45 kg/m  as calculated from the following:    Height as of this encounter: 1.651 m (5' 5\").    Weight as of this encounter: 88.5 kg (195 lb). .    Angeli Oates LPN on 6/16/2023 at 1:01 PM    "

## 2023-06-16 NOTE — LETTER
"    6/16/2023         RE: Geovani Bustos  [address]  [place] [facility] [address]        Dear Colleague,    Thank you for referring your patient, Geovani Bustos, to the [facility]. Please see a copy of my visit note below.      Assessment & Plan   Problem List Items Addressed This Visit        Nervous and Auditory    Cigarette nicotine dependence with nicotine-induced disorder (Chronic)   Other Visit Diagnoses     S/P inguinal hernia repair    -  Primary    BMI 32.0-32.9,adult            68 yo M s/p  open right inguinal with mesh on 6/8/2023  Doing well  Minimal pain  Incisions are CDI  Discussed restrictions of no lifting more than 15lbs for 4 weeks from DOS  Work related Paperwork n/a  All questions were answered.         15 minutes spent by me on the date of the encounter doing chart review, patient visit and documentation        BMI:   Estimated body mass index is 32.45 kg/m  as calculated from the following:    Height as of this encounter: 1.651 m (5' 5\").    Weight as of this encounter: 88.5 kg (195 lb).       No follow-ups on file.    Ame Nelson MD  [facility]    Markell Espino is a 67 year old, presenting for the following health issues:  Post-Op - General Surgery    No issues  Pain controlled; only taking OTC pain meds  Preop pain and bulge no longer present  But pain still 3/10  Eating well  Normal BM  No issues urinating  No F/C/N/V           Review of Systems   Constitutional, HEENT, cardiovascular, pulmonary, gi and gu systems are negative, except as otherwise noted.     Objective    /73   Pulse 71   Temp 97  F (36.1  C) (Tympanic)   Ht 1.651 m (5' 5\")   Wt 88.5 kg (195 lb)   SpO2 94%   BMI 32.45 kg/m    Body mass index is 32.45 kg/m .  Physical Exam   Right groin: incision CDI.  Noted healing bridge at incision as appropiate.                  Again, thank you for allowing me to participate in the care of your patient.  "       Sincerely,        [facility] CenterAnn Marie MD [place]

## 2023-06-16 NOTE — PROGRESS NOTES
"  Assessment & Plan   Problem List Items Addressed This Visit        Nervous and Auditory    Cigarette nicotine dependence with nicotine-induced disorder (Chronic)   Other Visit Diagnoses     S/P inguinal hernia repair    -  Primary    BMI 32.0-32.9,adult            66 yo M s/p  open right inguinal with mesh on 6/8/2023  Doing well  Minimal pain  Incisions are CDI  Discussed restrictions of no lifting more than 15lbs for 4 weeks from DOS  Work related Paperwork n/a  All questions were answered.         15 minutes spent by me on the date of the encounter doing chart review, patient visit and documentation        BMI:   Estimated body mass index is 32.45 kg/m  as calculated from the following:    Height as of this encounter: 1.651 m (5' 5\").    Weight as of this encounter: 88.5 kg (195 lb).       No follow-ups on file.    [facility] Hospital[facility] Marie MD [place]  [facility] NEHA Espino is a 67 year old, presenting for the following health issues:  Post-Op - General Surgery    No issues  Pain controlled; only taking OTC pain meds  Preop pain and bulge no longer present  But pain still 3/10  Eating well  Normal BM  No issues urinating  No F/C/N/V           Review of Systems   Constitutional, HEENT, cardiovascular, pulmonary, gi and gu systems are negative, except as otherwise noted.      Objective    /73   Pulse 71   Temp 97  F (36.1  C) (Tympanic)   Ht 1.651 m (5' 5\")   Wt 88.5 kg (195 lb)   SpO2 94%   BMI 32.45 kg/m    Body mass index is 32.45 kg/m .  Physical Exam   Right groin: incision CDI.  Noted healing bridge at incision as appropiate.               "

## 2023-06-16 NOTE — PROGRESS NOTES
Prior to immunization administration, verified patients identity using patient s name and date of birth. Please see Immunization Activity for additional information.     Screening Questionnaire for Adult Immunization    Are you sick today?   No   Do you have allergies to medications, food, a vaccine component or latex?   No   Have you ever had a serious reaction after receiving a vaccination?   No   Do you have a long-term health problem with heart, lung, kidney, or metabolic disease (e.g., diabetes), asthma, a blood disorder, no spleen, complement component deficiency, a cochlear implant, or a spinal fluid leak?  Are you on long-term aspirin therapy?   No   Do you have cancer, leukemia, HIV/AIDS, or any other immune system problem?   No   Do you have a parent, brother, or sister with an immune system problem?   No   In the past 3 months, have you taken medications that affect  your immune system, such as prednisone, other steroids, or anticancer drugs; drugs for the treatment of rheumatoid arthritis, Crohn s disease, or psoriasis; or have you had radiation treatments?   No   Have you had a seizure, or a brain or other nervous system problem?   No   During the past year, have you received a transfusion of blood or blood    products, or been given immune (gamma) globulin or antiviral drug?   No   For women: Are you pregnant or is there a chance you could become       pregnant during the next month?   No   Have you received any vaccinations in the past 4 weeks?   No     Immunization questionnaire answers were all negative.    I have reviewed the following standing orders:   This patient is due and qualifies for the Covid-19 vaccine.     Click here for COVID-19 Standing Order    I have reviewed the vaccines inclusion and exclusion criteria; No concerns regarding eligibility.     Patient instructed to remain in clinic for 15 minutes afterwards, and to report any adverse reactions.     Screening performed by RICARDO NUNO  FRIEDA RAMSEY on 6/16/2023 at 1:34 PM.

## 2023-06-30 ENCOUNTER — TELEPHONE (OUTPATIENT)
Dept: FAMILY MEDICINE | Facility: CLINIC | Age: 68
End: 2023-06-30
Payer: COMMERCIAL

## 2023-06-30 DIAGNOSIS — J44.9 STAGE 1 MILD COPD BY GOLD CLASSIFICATION (H): ICD-10-CM

## 2023-06-30 RX ORDER — FLUTICASONE FUROATE AND VILANTEROL 100; 25 UG/1; UG/1
1 POWDER RESPIRATORY (INHALATION) DAILY
Qty: 60 EACH | Refills: 4 | Status: SHIPPED | OUTPATIENT
Start: 2023-06-30 | End: 2023-10-20

## 2023-06-30 NOTE — TELEPHONE ENCOUNTER
Called pharmacy spoke with Dwayne informed her script was sent on 11/22/22 for a 1 year supply, should not need refills. Dwayne states the amount to dispense was for 90 but it only comes in packs of 60 so that's what they have been dispensing, thus it was not a full 1 year supply.    Prescription approved per [facility] Refill Protocol.    Amelia bhagat [facility] notified.   Julie Behrendt RN

## 2023-06-30 NOTE — TELEPHONE ENCOUNTER
Pt has 2 puffs left of inhaler. Hoping to get this refilled today if possible.    Vicenta Marrero Patient

## 2023-07-10 ENCOUNTER — TELEPHONE (OUTPATIENT)
Dept: NEPHROLOGY | Facility: CLINIC | Age: 68
End: 2023-07-10
Payer: COMMERCIAL

## 2023-07-10 NOTE — TELEPHONE ENCOUNTER
Called patient with a appointment reminder for Mon. 7/17/23 @ 11:30 video with Dr. Weiss, lab draw on Wed. 7/12/23 @ 9:45 am at the [facility] on 7/10/2023 at 3:30 PM

## 2023-07-12 ENCOUNTER — LAB (OUTPATIENT)
Dept: LAB | Facility: CLINIC | Age: 68
End: 2023-07-12
Payer: COMMERCIAL

## 2023-07-12 DIAGNOSIS — N02.B9 IGA NEPHROPATHY: ICD-10-CM

## 2023-07-12 LAB
ALBUMIN SERPL BCG-MCNC: 4.4 G/DL (ref 3.5–5.2)
ALBUMIN UR-MCNC: NEGATIVE MG/DL
ANION GAP SERPL CALCULATED.3IONS-SCNC: 7 MMOL/L (ref 7–15)
APPEARANCE UR: CLEAR
BACTERIA #/AREA URNS HPF: ABNORMAL /HPF
BILIRUB UR QL STRIP: NEGATIVE
BUN SERPL-MCNC: 24.8 MG/DL (ref 8–23)
CALCIUM SERPL-MCNC: 9.6 MG/DL (ref 8.8–10.2)
CHLORIDE SERPL-SCNC: 104 MMOL/L (ref 98–107)
COLOR UR AUTO: YELLOW
CREAT SERPL-MCNC: 1.49 MG/DL (ref 0.67–1.17)
CREAT UR-MCNC: 54.4 MG/DL
DEPRECATED HCO3 PLAS-SCNC: 32 MMOL/L (ref 22–29)
ERYTHROCYTE [DISTWIDTH] IN BLOOD BY AUTOMATED COUNT: 13.2 % (ref 10–15)
GFR SERPL CREATININE-BSD FRML MDRD: 50 ML/MIN/1.73M2
GLUCOSE SERPL-MCNC: 89 MG/DL (ref 70–99)
GLUCOSE UR STRIP-MCNC: NEGATIVE MG/DL
HCT VFR BLD AUTO: 49.4 % (ref 40–53)
HGB BLD-MCNC: 16 G/DL (ref 13.3–17.7)
HGB UR QL STRIP: ABNORMAL
KETONES UR STRIP-MCNC: NEGATIVE MG/DL
LEUKOCYTE ESTERASE UR QL STRIP: NEGATIVE
MCH RBC QN AUTO: 32 PG (ref 26.5–33)
MCHC RBC AUTO-ENTMCNC: 32.4 G/DL (ref 31.5–36.5)
MCV RBC AUTO: 99 FL (ref 78–100)
MICROALBUMIN UR-MCNC: 32.4 MG/L
MICROALBUMIN/CREAT UR: 59.56 MG/G CR (ref 0–17)
NITRATE UR QL: NEGATIVE
PH UR STRIP: 5.5 [PH] (ref 5–7)
PHOSPHATE SERPL-MCNC: 3.4 MG/DL (ref 2.5–4.5)
PLATELET # BLD AUTO: 152 10E3/UL (ref 150–450)
POTASSIUM SERPL-SCNC: 4.8 MMOL/L (ref 3.4–5.3)
RBC # BLD AUTO: 5 10E6/UL (ref 4.4–5.9)
RBC #/AREA URNS AUTO: ABNORMAL /HPF
SODIUM SERPL-SCNC: 143 MMOL/L (ref 136–145)
SP GR UR STRIP: 1.01 (ref 1–1.03)
SQUAMOUS #/AREA URNS AUTO: ABNORMAL /LPF
UROBILINOGEN UR STRIP-ACNC: 0.2 E.U./DL
WBC # BLD AUTO: 10.8 10E3/UL (ref 4–11)
WBC #/AREA URNS AUTO: ABNORMAL /HPF

## 2023-07-12 PROCEDURE — 82043 UR ALBUMIN QUANTITATIVE: CPT

## 2023-07-12 PROCEDURE — 81001 URINALYSIS AUTO W/SCOPE: CPT

## 2023-07-12 PROCEDURE — 85027 COMPLETE CBC AUTOMATED: CPT

## 2023-07-12 PROCEDURE — 82570 ASSAY OF URINE CREATININE: CPT

## 2023-07-12 PROCEDURE — 36415 COLL VENOUS BLD VENIPUNCTURE: CPT

## 2023-07-12 PROCEDURE — 80069 RENAL FUNCTION PANEL: CPT

## 2023-07-17 ENCOUNTER — VIRTUAL VISIT (OUTPATIENT)
Dept: NEPHROLOGY | Facility: CLINIC | Age: 68
End: 2023-07-17
Attending: INTERNAL MEDICINE
Payer: COMMERCIAL

## 2023-07-17 DIAGNOSIS — N02.B9 IGA NEPHROPATHY: Primary | ICD-10-CM

## 2023-07-17 PROCEDURE — 99213 OFFICE O/P EST LOW 20 MIN: CPT | Mod: 24 | Performed by: INTERNAL MEDICINE

## 2023-07-17 NOTE — NURSING NOTE
Is the patient currently in the state of [place]? YES    Visit mode:VIDEO    If the visit is dropped, the patient can be reconnected by: VIDEO VISIT: Send to e-mail at: [email or web address]    Will anyone else be joining the visit? YES: How would they like to receive their invitation? Other e-mail: Ethan will be on video with PT      How would you like to obtain your AVS? MyChart    Are changes needed to the allergy or medication list? NO    Reason for visit: RECHECK

## 2023-07-17 NOTE — PROGRESS NOTES
Virtual Visit Details    Type of service:  Video Visit   Video Start Time: 11.20 am  Video End Time:11.29 am    Originating Location (pt. Location): Home    Distant Location (provider location):  Off-site  Platform used for Video Visit: [facility]             Nephrology clinic progress note   7/17/23    Geovani Bustos MRN:[id number] YOB: 1955  Date of Admission:(Not on file)  Primary care provider: Alexandru Vera  Requesting physician: Ginger Weiss, *      REASON FOR CONSULT: CKD stage 3, IgA nephropathy       HISTORY OF PRESENT ILLNESS:  Please refer to previous notes      PAST MEDICAL HISTORY:  Reviewed with patient on 07/17/2023   As per HPI    MEDICATIONS:  Reviewed with the patient in detail    ALLERGIES:    Reviewed with the patient in detail    REVIEW OF SYSTEMS:  A comprehensive of systems was negative except as noted above.    SOCIAL HISTORY:   Reviewed with patient, no smoking and no alcohol use     FAMILY MEDICAL HISTORY:   Reviewed, no family history of need for dialysis, transplant or CKD    PHYSICAL EXAM:   Vital signs:There were no vitals taken for this visit.    Physical Exam    Interval History: He sometimes has LE edema and uses compression stockings. He did received COVID vaccines and has done well with that. He has been seeing his PCP regularly.     Interval history 7/17/23: he has been doing well overall. He had a hernia surgery which went well. No other changes in his health or medications.     ASSESSMENT AND RECOMMENDATIONS:     # IgA Nephropathy  # CKD, stage 3  # LUTS - on finasteride  # Hypothyroidism        history of biopsy proven IgA nephropathy (4/2009, Dr Robbie Harris. As per note - kidney tissue looked altogether normal with exception of deposition of IgA in mesangium), treated conservatively. Has not had proteinuria over years, most recent alb/cr ratio is 26 mg/g . Low level microscopic hematuria on and off.He has maintained his blood pressures at a goal of <  130/90 mm of Hg most of the times without needing antihypertensives. His kidney function has been relatively stable and maintains a baseline creatinine of 1.5-1.7 mg/dl.     Plan:   --we discussed his CKD and blood pressure goals. No indication for antihypertensives yet.   --Continue with conservative management including management of BP ( target < 130/80), avoiding nephrotoxins, may continue Fish oil.   --Given lack of proteinuria and fairly low BP, no need to start RASi at this time but should have low threshold if blood pressure goes up or if there is detectable proteinuria.        Follow-up:  1 year       Ginger Weiss MD

## 2023-07-17 NOTE — LETTER
7/17/2023       RE: Geovani Bustos  [address]  [place] [facility] [address]     Dear Colleague,    Thank you for referring your patient, Geovani Bustos, to the [facility] [place] [facility]. Please see a copy of my visit note below.    Virtual Visit Details    Type of service:  Video Visit   Video Start Time: 11.20 am  Video End Time:11.29 am    Originating Location (pt. Location): Home    Distant Location (provider location):  Off-site  Platform used for Video Visit: [facility]             Nephrology clinic progress note   7/17/23    Geovani Bustos MRN:[id number] YOB: 1955  Date of Admission:(Not on file)  Primary care provider: Alexandru Vera  Requesting physician: Ginger Weiss, *      REASON FOR CONSULT: CKD stage 3, IgA nephropathy       HISTORY OF PRESENT ILLNESS:  Please refer to previous notes      PAST MEDICAL HISTORY:  Reviewed with patient on 07/17/2023   As per HPI    MEDICATIONS:  Reviewed with the patient in detail    ALLERGIES:    Reviewed with the patient in detail    REVIEW OF SYSTEMS:  A comprehensive of systems was negative except as noted above.    SOCIAL HISTORY:   Reviewed with patient, no smoking and no alcohol use     FAMILY MEDICAL HISTORY:   Reviewed, no family history of need for dialysis, transplant or CKD    PHYSICAL EXAM:   Vital signs:There were no vitals taken for this visit.    Physical Exam    Interval History: He sometimes has LE edema and uses compression stockings. He did received COVID vaccines and has done well with that. He has been seeing his PCP regularly.     Interval history 7/17/23: he has been doing well overall. He had a hernia surgery which went well. No other changes in his health or medications.     ASSESSMENT AND RECOMMENDATIONS:     # IgA Nephropathy  # CKD, stage 3  # LUTS - on finasteride  # Hypothyroidism        history of biopsy proven IgA nephropathy  (4/2009, Dr Robbie Harris. As per note - kidney tissue looked altogether normal with exception of deposition of IgA in mesangium), treated conservatively. Has not had proteinuria over years, most recent alb/cr ratio is 26 mg/g . Low level microscopic hematuria on and off.He has maintained his blood pressures at a goal of < 130/90 mm of Hg most of the times without needing antihypertensives. His kidney function has been relatively stable and maintains a baseline creatinine of 1.5-1.7 mg/dl.     Plan:   --we discussed his CKD and blood pressure goals. No indication for antihypertensives yet.   --Continue with conservative management including management of BP ( target < 130/80), avoiding nephrotoxins, may continue Fish oil.   --Given lack of proteinuria and fairly low BP, no need to start RASi at this time but should have low threshold if blood pressure goes up or if there is detectable proteinuria.        Follow-up:  1 year       Ginger Weiss MD

## 2023-08-15 ENCOUNTER — ANCILLARY PROCEDURE (OUTPATIENT)
Dept: GENERAL RADIOLOGY | Facility: CLINIC | Age: 68
End: 2023-08-15
Attending: FAMILY MEDICINE
Payer: COMMERCIAL

## 2023-08-15 ENCOUNTER — OFFICE VISIT (OUTPATIENT)
Dept: FAMILY MEDICINE | Facility: CLINIC | Age: 68
End: 2023-08-15
Payer: COMMERCIAL

## 2023-08-15 VITALS
SYSTOLIC BLOOD PRESSURE: 118 MMHG | RESPIRATION RATE: 18 BRPM | HEIGHT: 65 IN | DIASTOLIC BLOOD PRESSURE: 72 MMHG | TEMPERATURE: 98 F | HEART RATE: 77 BPM | OXYGEN SATURATION: 93 % | WEIGHT: 190 LBS | BODY MASS INDEX: 31.65 KG/M2

## 2023-08-15 DIAGNOSIS — M25.551 HIP PAIN, RIGHT: Primary | ICD-10-CM

## 2023-08-15 DIAGNOSIS — M25.551 HIP PAIN, RIGHT: ICD-10-CM

## 2023-08-15 PROCEDURE — 73502 X-RAY EXAM HIP UNI 2-3 VIEWS: CPT | Mod: TC | Performed by: RADIOLOGY

## 2023-08-15 PROCEDURE — 99213 OFFICE O/P EST LOW 20 MIN: CPT | Performed by: FAMILY MEDICINE

## 2023-08-15 ASSESSMENT — PAIN SCALES - GENERAL: PAINLEVEL: SEVERE PAIN (6)

## 2023-08-15 NOTE — PROGRESS NOTES
"  Assessment & Plan     Hip pain, right  Differentials discussed in detail including right hip osteoarthritis.  X-ray findings reviewed independently, consistent with right hip arthritic changes and femoral bony lesion noted, awaiting formal report.  Recommended Tylenol, physical therapy and orthopedic referral placed for further review recommendations.  - XR Pelvis w Hip Right G/E 2 Views; Future  - Physical Therapy Referral; Future  - Orthopedic  Referral; Future      Alexandru Vera MD  [facility]    Markell Espino is a 67 year old, presenting for the following health issues:  Hernia      History of Present Illness       Reason for visit:  Follow up on hernia- had surgery so wondering why still having pain.  Symptoms include:  Pain has been between 4-6 , right hip pain- at about a 6 for about a week  Symptom intensity:  Moderate  Symptom progression:  Staying the same    He eats 2-3 servings of fruits and vegetables daily.He consumes 2 sweetened beverage(s) daily.He exercises with enough effort to increase his heart rate 9 or less minutes per day.  He exercises with enough effort to increase his heart rate 3 or less days per week.   He is taking medications regularly.      Review of Systems   Constitutional, HEENT, cardiovascular, pulmonary, gi and gu systems are negative, except as otherwise noted.        Objective    /72 (Cuff Size: Adult Regular)   Pulse 77   Temp 98  F (36.7  C) (Tympanic)   Resp 18   Ht 1.651 m (5' 5\")   Wt 86.2 kg (190 lb)   SpO2 93%   BMI 31.62 kg/m    Body mass index is 31.62 kg/m .  Physical Exam   GENERAL: alert and no distress  NECK: no adenopathy, no asymmetry, masses, or scars and thyroid normal to palpation  RESP: lungs clear to auscultation - no rales, rhonchi or wheezes  CV: regular rate and rhythm, normal S1 S2, no S3 or S4, no murmur, click or rub, no peripheral edema and peripheral pulses strong  ABDOMEN: soft, nontender, no " hepatosplenomegaly, no masses and bowel sounds normal  MS: Painful right hip internal rotation, no joint swelling or skin discoloration noted, normal lower extremities strength, sensation to touch and pressure intact  SKIN: no suspicious lesions or rashes  NEURO: Grossly intact

## 2023-08-15 NOTE — NURSING NOTE
"Chief Complaint   Patient presents with    Hernia     /72 (Cuff Size: Adult Regular)   Pulse 77   Temp 98  F (36.7  C) (Tympanic)   Resp 18   Ht 1.651 m (5' 5\")   Wt 86.2 kg (190 lb)   SpO2 93%   BMI 31.62 kg/m   Estimated body mass index is 31.62 kg/m  as calculated from the following:    Height as of this encounter: 1.651 m (5' 5\").    Weight as of this encounter: 86.2 kg (190 lb).  Patient presents to the clinic using No DME      Health Maintenance that is potentially due pending provider review:    Health Maintenance Due   Topic Date Due    LUNG CANCER SCREENING  09/06/2023                  "

## 2023-08-24 ENCOUNTER — THERAPY VISIT (OUTPATIENT)
Dept: PHYSICAL THERAPY | Facility: CLINIC | Age: 68
End: 2023-08-24
Attending: FAMILY MEDICINE
Payer: COMMERCIAL

## 2023-08-24 DIAGNOSIS — M25.551 HIP PAIN, RIGHT: ICD-10-CM

## 2023-08-24 PROCEDURE — 97110 THERAPEUTIC EXERCISES: CPT | Mod: GP | Performed by: PHYSICAL MEDICINE & REHABILITATION

## 2023-08-24 PROCEDURE — 97161 PT EVAL LOW COMPLEX 20 MIN: CPT | Mod: GP | Performed by: PHYSICAL MEDICINE & REHABILITATION

## 2023-08-24 NOTE — PROGRESS NOTES
PHYSICAL THERAPY EVALUATION  Type of Visit: Evaluation    See electronic medical record for Abuse and Falls Screening details.    Subjective       Presenting condition or subjective complaint:   Patient reports right hip pain increased post hernia surgery a few months ago. Pain is located on lateral hip. Able to lay on his side and is stiff in the morning.   Date of onset: 06/08/23    Relevant medical history:   Schizophrenia  Dates & types of surgery:  Recent hx of large R inguinal hernia surgery    Prior diagnostic imaging/testing results:     IMPRESSION: Normal hip joint spaces and alignment. No acute fracture. Minimal chondrocalcinosis. 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 Degenerative changes lumbar spine.    Prior therapy history for the same diagnosis, illness or injury:    no    Prior Level of Function  Transfers: Independent  Ambulation: Independent  ADL: Assistive person  IADL:  lives in group home    Employment:    outdoor/yard work  Hobbies/Interests:      Patient goals for therapy:   walk further with less pain     Objective   HIP EVALUATION  PAIN: Pain Level at Rest: 6/10  Pain Level with Use: 7/10  Pain Location: hip  Pain Quality: Aching  Pain Frequency: intermittent or daily  Pain is Worst: daytime  Pain is Exacerbated By: walking, stairs, bending over  Pain is Relieved By: none  INTEGUMENTARY (edema, incisions): WNL  POSTURE: Standing Posture: Rounded shoulders, Forward head, Thoracic kyphosis increased  GAIT:   Weightbearing Status: WBAT  Assistive Device(s): None  Gait Deviations: Antalgic  Decreased dynamic control R, Glut max gait with lurch R  BALANCE/PROPRIOCEPTION: Single Leg Stance Eyes Open (seconds): 2  WEIGHTBEARING ALIGNMENT: Hip WB Alignment:Greater trochanter high R  ROM:  R hip IR 40*; R hip ER 35* with pain; R hip extension -5*  PELVIC/SI SCREEN: WFL  STRENGTH:  weakness secondary to pain; 4-/5 R hip ABD; 3+/5 R hip extension; 4-/5 R hip flexion; 3-/5 R hip ER; 3+/5 R  hip IR  LE FLEXIBILITY: Decreased hip ER R, Decreased piriformis R, Decreased IT band R, Decreased quadriceps R  SPECIAL TESTS:    Left Right   CAYETANO  Positive   FADIR/Labrum/MYLA  Negative    Femoral Nerve  Negative    Zohra's  Positive   Piriformis  Positive   Quadrant Testing  Negative    SLR  Negative    Slump  Negative    Vishal  Positive     FUNCTIONAL TESTS: Double Leg Squat: Anterior knee translation, Knee valgus, Hip internal rotation, Increased trunk lean, Improper use of glutes/hips, and Impaired weight distribution  PALPATION:  TTP at GT, gluteus medius, gluteus luba  JOINT MOBILITY: guarded and stiff, unable to assess    Assessment & Plan   CLINICAL IMPRESSIONS  Medical Diagnosis: Hip pain, right (M25.551)  - Primary    Treatment Diagnosis: right hip pain and weakness   Impression/Assessment: Patient is a 67 year old male with right hip pain complaints.  The following significant findings have been identified: Pain, Decreased ROM/flexibility, Decreased joint mobility, Decreased strength, Impaired balance, Impaired gait, Impaired muscle performance, and Decreased activity tolerance. These impairments interfere with their ability to perform self care tasks, work tasks, recreational activities, and community mobility as compared to previous level of function.     Clinical Decision Making (Complexity):  Clinical Presentation: Stable/Uncomplicated  Clinical Presentation Rationale: based on medical and personal factors listed in PT evaluation  Clinical Decision Making (Complexity): Low complexity    PLAN OF CARE  Treatment Interventions:  Modalities: Biofeedback  Interventions: Gait Training, Manual Therapy, Neuromuscular Re-education, Therapeutic Activity, Therapeutic Exercise    Long Term Goals     PT Goal 1  Goal Identifier: STG  Goal Description: Patient to walk 2 blockw ith <4/10 R hip pain.  Target Date: 09/21/23  PT Goal 2  Goal Identifier: STG  Goal Description: Patient to report going down a flight  of stairs <3/10 R hip pain.  Target Date: 09/28/23  PT Goal 3  Goal Identifier: LTG  Goal Description: Patient to be IND with HEP to aid funcitonal recovery and improve self management strategies.  Target Date: 11/02/23  PT Goal 4  Goal Identifier: LTG  Goal Description: Patient to return to walking community distances and obstacles with <2/10 R hip pain.  Target Date: 11/02/23      Frequency of Treatment: 1x/week  Duration of Treatment: 10 weeks    Education Assessment:   Learner/Method: Patient;Listening;Reading;Demonstration;Pictures/Video    Risks and benefits of evaluation/treatment have been explained.   Patient/Family/caregiver agrees with Plan of Care.     Evaluation Time:        Signing Clinician: Thang Berkowitz, PT      [facility]                                                                                   OUTPATIENT PHYSICAL THERAPY      PLAN OF TREATMENT FOR OUTPATIENT REHABILITATION   Patient's Last Name, First Name, LAUREN BustosGeovani YOB: 1955   Provider's Name   [facility]   Medical Record No.  [id number]     Onset Date: 06/08/23  Start of Care Date: 08/24/23     Medical Diagnosis:  Hip pain, right (M25.551)  - Primary      PT Treatment Diagnosis:  right hip pain and weakness Plan of Treatment  Frequency/Duration: 1x/week/ 10 weeks    Certification date from 08/24/23 to 11/02/23         See note for plan of treatment details and functional goals     Thang Berkowitz, PT                         I CERTIFY THE NEED FOR THESE SERVICES FURNISHED UNDER        THIS PLAN OF TREATMENT AND WHILE UNDER MY CARE     (Physician attestation of this document indicates review and certification of the therapy plan).                Referring Provider:  Alexandru Vera      Initial Assessment  See Epic Evaluation- Start of Care Date: 08/24/23

## 2023-08-29 ENCOUNTER — THERAPY VISIT (OUTPATIENT)
Dept: PHYSICAL THERAPY | Facility: CLINIC | Age: 68
End: 2023-08-29
Attending: FAMILY MEDICINE
Payer: COMMERCIAL

## 2023-08-29 DIAGNOSIS — M25.551 HIP PAIN, RIGHT: Primary | ICD-10-CM

## 2023-08-29 PROCEDURE — 97140 MANUAL THERAPY 1/> REGIONS: CPT | Mod: GP | Performed by: PHYSICAL THERAPIST

## 2023-08-29 PROCEDURE — 97110 THERAPEUTIC EXERCISES: CPT | Mod: GP | Performed by: PHYSICAL THERAPIST

## 2023-09-06 ENCOUNTER — OFFICE VISIT (OUTPATIENT)
Dept: ORTHOPEDICS | Facility: CLINIC | Age: 68
End: 2023-09-06
Attending: FAMILY MEDICINE
Payer: COMMERCIAL

## 2023-09-06 VITALS
HEART RATE: 64 BPM | SYSTOLIC BLOOD PRESSURE: 130 MMHG | DIASTOLIC BLOOD PRESSURE: 83 MMHG | WEIGHT: 190 LBS | BODY MASS INDEX: 31.62 KG/M2

## 2023-09-06 DIAGNOSIS — M25.551 HIP PAIN, RIGHT: ICD-10-CM

## 2023-09-06 DIAGNOSIS — M89.9 BONE LESION: Primary | ICD-10-CM

## 2023-09-06 PROCEDURE — 99214 OFFICE O/P EST MOD 30 MIN: CPT | Performed by: PEDIATRICS

## 2023-09-06 NOTE — PATIENT INSTRUCTIONS
We discussed these other possible diagnosis: Symptoms most likely related to right hip arthritis, however given lesion seen in the right femoral neck will obtain MRI to evaluate.    Plan:  - Today's Plan of Care:  MRI of the Right Hip - Call [phone] to schedule MRI   Discussed activity considerations and other supportive care including Ice/Heat, OTC and other topical medications as needed.    -We also discussed other future treatment options:  Referral to Orthopedic Surgery  Consideration of US guided right hip injection    Follow Up: In clinic with Dr. Schmitt after MRI (wait at least 1-2 days)     If you have any further questions for your physician or physician s care team you can call [phone] and use option 3 to leave a voice message.

## 2023-09-06 NOTE — LETTER
9/6/2023         RE: Geovani Bustos  [address]  [place] [address]        Dear Colleague,    Thank you for referring your patient, Geovani Bustos, to the [facility] [place]. Please see a copy of my visit note below.    ASSESSMENT & PLAN    Geovani was seen today for pain.    Diagnoses and all orders for this visit:    Bone lesion  -     MR Hip Right w/o Contrast; Future    Hip pain, right  -     Orthopedic  Referral  -     MR Hip Right w/o Contrast; Future      This issue is chronic and Unchanged.      ICD-10-CM    1. Bone lesion  M89.9 MR Hip Right w/o Contrast      2. Hip pain, right  M25.551 Orthopedic  Referral     MR Hip Right w/o Contrast        Patient Instructions   We discussed these other possible diagnosis: Symptoms most likely related to right hip arthritis, however given lesion seen in the right femoral neck will obtain MRI to evaluate.    Plan:  - Today's Plan of Care:  MRI of the Right Hip - Call [phone] to schedule MRI   Discussed activity considerations and other supportive care including Ice/Heat, OTC and other topical medications as needed.    -We also discussed other future treatment options:  Referral to Orthopedic Surgery  Consideration of US guided right hip injection    Follow Up: In clinic with Dr. Schmitt after MRI (wait at least 1-2 days)     Concerning signs and symptoms were reviewed and all questions were answered at this time.    Sangeetha Schmitt MD [facility]  Sports Medicine Physician  [facility]    -----  Chief Complaint   Patient presents with     Right Hip - Pain       SUBJECTIVE  Geovani Bustos is a/an 67 year old male who is seen in consultation at the request of  Alexandru Vera M.D. for evaluation of right hip pain.     The patient is seen by themselves.    Onset: 4 month(s) ago. Reports insidious onset without acute precipitating event.  He has been having pain since his hernia  surgery.  Location of Pain: right hip, lateral hip pain, greater trochanter, lateral pain  Worsened by: walking, standing   Better with: nothing   Treatments tried: rest/activity avoidance, Tylenol, physical therapy (2 visits), and previous imaging (xray 8/15/23)  Associated symptoms: stiffness of the right hip    Orthopedic/Surgical history: NO  Social History/Occupation: lives in group home     REVIEW OF SYSTEMS:  Review of Systems    OBJECTIVE:  /83   Pulse 64   Wt 86.2 kg (190 lb)   BMI 31.62 kg/m     General: healthy, alert and in no distress  Skin: no suspicious lesions or rash.  CV: distal perfusion intact   Resp: normal respiratory effort without conversational dyspnea   Psych: normal mood and affect  Gait: NORMAL  Neuro: Normal light sensory exam of lower extremity    Bilateral hip exam    Inspection:      no edema or ecchymosis in hip area    Tender:      greater trochanter right    Non Tender:      remainder of the hip area bilateral    ROM:     Full active and passive ROM  bilateral  - pain with ROM on the right    Strength:      flexion 5-/5 right       extension 5/5 bilateral       abduction 5/5 bilateral       adduction 5/5 bilateral    Sensation:      grossly intact in hip and thigh    Special Tests:      neg (-) CAYETANO right       positive (+) FADIR right    RADIOLOGY:  Final results and radiologist's interpretation, available in the [facility] health record.  Images were reviewed with the patient in the office today.  My personal interpretation of the performed imaging:     Reviewed prior AP Pelvis and right lateral hip XR - right hip degenerative changes, sclerotic lesion mid femoral neck    XR PELVIS AND HIP RIGHT 2 VIEWS 8/15/2023 10:07 AM   HISTORY: Hip pain, right  COMPARISON: None.                                                           IMPRESSION: Normal hip joint spaces and alignment. No acute fracture.  Minimal chondrocalcinosis. Moderate degenerative arthritis of the SI  joints.  Degenerative changes lumbar spine.  There is a 2 cm sclerotic lesion within the proximal right femur  aligning intertrochanteric region which is nonspecific and  indeterminate, benign lesion such as enchondroma or bone island is  most likely. MRI could assess further if no older radiographs can be  obtained to show stability.  LISETH WEATHERS MD     Reviewed PCP note  Review of the result(s) of each unique test - XR             Again, thank you for allowing me to participate in the care of your patient.        Sincerely,        Sangeetha Schmitt MD

## 2023-09-06 NOTE — PROGRESS NOTES
ASSESSMENT & PLAN    Geovani was seen today for pain.    Diagnoses and all orders for this visit:    Bone lesion  -     MR Hip Right w/o Contrast; Future    Hip pain, right  -     Orthopedic  Referral  -     MR Hip Right w/o Contrast; Future      This issue is chronic and Unchanged.      ICD-10-CM    1. Bone lesion  M89.9 MR Hip Right w/o Contrast      2. Hip pain, right  M25.551 Orthopedic  Referral     MR Hip Right w/o Contrast        Patient Instructions   We discussed these other possible diagnosis: Symptoms most likely related to right hip arthritis, however given lesion seen in the right femoral neck will obtain MRI to evaluate.    Plan:  - Today's Plan of Care:  MRI of the Right Hip - Call [phone] to schedule MRI   Discussed activity considerations and other supportive care including Ice/Heat, OTC and other topical medications as needed.    -We also discussed other future treatment options:  Referral to Orthopedic Surgery  Consideration of US guided right hip injection    Follow Up: In clinic with Dr. Schmitt after MRI (wait at least 1-2 days)     Concerning signs and symptoms were reviewed and all questions were answered at this time.    Sangeetha Schmitt MD [facility]  Sports Medicine Physician  [facility]    -----  Chief Complaint   Patient presents with    Right Hip - Pain       SUBJECTIVE  Geovani Bustos is a/an 67 year old male who is seen in consultation at the request of  Alexandru Vera M.D. for evaluation of right hip pain.     The patient is seen by themselves.    Onset: 4 month(s) ago. Reports insidious onset without acute precipitating event.  He has been having pain since his hernia surgery.  Location of Pain: right hip, lateral hip pain, greater trochanter, lateral pain  Worsened by: walking, standing   Better with: nothing   Treatments tried: rest/activity avoidance, Tylenol, physical therapy (2 visits), and previous imaging (xray 8/15/23)  Associated  symptoms: stiffness of the right hip    Orthopedic/Surgical history: NO  Social History/Occupation: lives in group home     REVIEW OF SYSTEMS:  Review of Systems    OBJECTIVE:  /83   Pulse 64   Wt 86.2 kg (190 lb)   BMI 31.62 kg/m     General: healthy, alert and in no distress  Skin: no suspicious lesions or rash.  CV: distal perfusion intact   Resp: normal respiratory effort without conversational dyspnea   Psych: normal mood and affect  Gait: NORMAL  Neuro: Normal light sensory exam of lower extremity    Bilateral hip exam    Inspection:      no edema or ecchymosis in hip area    Tender:      greater trochanter right    Non Tender:      remainder of the hip area bilateral    ROM:     Full active and passive ROM  bilateral  - pain with ROM on the right    Strength:      flexion 5-/5 right       extension 5/5 bilateral       abduction 5/5 bilateral       adduction 5/5 bilateral    Sensation:      grossly intact in hip and thigh    Special Tests:      neg (-) CAYETANO right       positive (+) FADIR right    RADIOLOGY:  Final results and radiologist's interpretation, available in the [facility] health record.  Images were reviewed with the patient in the office today.  My personal interpretation of the performed imaging:     Reviewed prior AP Pelvis and right lateral hip XR - right hip degenerative changes, sclerotic lesion mid femoral neck    XR PELVIS AND HIP RIGHT 2 VIEWS 8/15/2023 10:07 AM   HISTORY: Hip pain, right  COMPARISON: None.                                                           IMPRESSION: Normal hip joint spaces and alignment. No acute fracture.  Minimal chondrocalcinosis. Moderate degenerative arthritis of the SI  joints. Degenerative changes lumbar spine.  There is a 2 cm sclerotic lesion within the proximal right femur  aligning intertrochanteric region which is nonspecific and  indeterminate, benign lesion such as enchondroma or bone island is  most likely. MRI could assess further if no older  radiographs can be  obtained to show stability.  LISETH WEATHERS MD     Reviewed PCP note  Review of the result(s) of each unique test - XR

## 2023-09-07 ENCOUNTER — HOSPITAL ENCOUNTER (OUTPATIENT)
Dept: CT IMAGING | Facility: CLINIC | Age: 68
Discharge: HOME | End: 2023-09-07
Payer: COMMERCIAL

## 2023-09-07 DIAGNOSIS — Z87.891 PERSONAL HISTORY OF NICOTINE DEPENDENCE: ICD-10-CM

## 2023-09-07 PROCEDURE — 71271 CT THORAX LUNG CANCER SCR C-: CPT

## 2023-09-12 ENCOUNTER — THERAPY VISIT (OUTPATIENT)
Dept: PHYSICAL THERAPY | Facility: CLINIC | Age: 68
End: 2023-09-12
Attending: FAMILY MEDICINE
Payer: COMMERCIAL

## 2023-09-12 DIAGNOSIS — M25.551 HIP PAIN, RIGHT: Primary | ICD-10-CM

## 2023-09-12 PROCEDURE — 97140 MANUAL THERAPY 1/> REGIONS: CPT | Mod: GP | Performed by: PHYSICAL THERAPIST

## 2023-09-12 PROCEDURE — 97110 THERAPEUTIC EXERCISES: CPT | Mod: GP | Performed by: PHYSICAL THERAPIST

## 2023-09-13 ENCOUNTER — HOSPITAL ENCOUNTER (OUTPATIENT)
Dept: MRI IMAGING | Facility: CLINIC | Age: 68
Discharge: HOME | End: 2023-09-13
Attending: PEDIATRICS | Admitting: PEDIATRICS
Payer: COMMERCIAL

## 2023-09-13 DIAGNOSIS — M89.9 BONE LESION: ICD-10-CM

## 2023-09-13 DIAGNOSIS — M25.551 HIP PAIN, RIGHT: ICD-10-CM

## 2023-09-13 PROCEDURE — 73721 MRI JNT OF LWR EXTRE W/O DYE: CPT | Mod: RT

## 2023-09-20 ENCOUNTER — OFFICE VISIT (OUTPATIENT)
Dept: ORTHOPEDICS | Facility: CLINIC | Age: 68
End: 2023-09-20
Payer: COMMERCIAL

## 2023-09-20 ENCOUNTER — THERAPY VISIT (OUTPATIENT)
Dept: PHYSICAL THERAPY | Facility: CLINIC | Age: 68
End: 2023-09-20
Attending: FAMILY MEDICINE
Payer: COMMERCIAL

## 2023-09-20 VITALS
HEART RATE: 76 BPM | DIASTOLIC BLOOD PRESSURE: 80 MMHG | SYSTOLIC BLOOD PRESSURE: 126 MMHG | BODY MASS INDEX: 31.62 KG/M2 | WEIGHT: 190 LBS

## 2023-09-20 DIAGNOSIS — S73.191A TEAR OF RIGHT ACETABULAR LABRUM, INITIAL ENCOUNTER: ICD-10-CM

## 2023-09-20 DIAGNOSIS — M25.551 HIP PAIN, RIGHT: Primary | ICD-10-CM

## 2023-09-20 DIAGNOSIS — M85.60 BONE CYST: ICD-10-CM

## 2023-09-20 PROCEDURE — 97110 THERAPEUTIC EXERCISES: CPT | Mod: GP | Performed by: PHYSICAL THERAPIST

## 2023-09-20 PROCEDURE — 99213 OFFICE O/P EST LOW 20 MIN: CPT | Performed by: PEDIATRICS

## 2023-09-20 NOTE — PATIENT INSTRUCTIONS
We discussed these other possible diagnosis: Right hip pain likely labral tear, will continue physical therapy.    Bone lesion - recommend follow up x-rays in 1 year.    Plan:  - Today's Plan of Care:  Continue physical therapy  Discussed activity considerations and other supportive care including Ice/Heat, OTC and other topical medications as needed.    X-rays ordered for 1 year.    -We also discussed other future treatment options:  US guided right hip injection  Referral to orthopedic surgery    Follow Up: 6 - 8 weeks and as needed    If you have any further questions for your physician or physician s care team you can call [phone] and use option 3 to leave a voice message.

## 2023-09-20 NOTE — LETTER
9/20/2023         RE: Geovani Bustos  [address]  [place] [address]        Dear Colleague,    Thank you for referring your patient, Geovani Bustos, to the [facility] [place]. Please see a copy of my visit note below.    ASSESSMENT & PLAN    Geovani was seen today for follow up and mri transcription.    Diagnoses and all orders for this visit:    Hip pain, right    Tear of right acetabular labrum, initial encounter    Bone cyst  -     XR Pelvis 1/2 Views; Future      This issue is chronic and Unchanged.      ICD-10-CM    1. Hip pain, right  M25.551       2. Tear of right acetabular labrum, initial encounter  S73.191A       3. Bone cyst  M85.60 XR Pelvis 1/2 Views        Patient Instructions   We discussed these other possible diagnosis: Right hip pain likely labral tear, will continue physical therapy.    Bone lesion - recommend follow up x-rays in 1 year.    Plan:  - Today's Plan of Care:  Continue physical therapy  Discussed activity considerations and other supportive care including Ice/Heat, OTC and other topical medications as needed.    X-rays ordered for 1 year.    -We also discussed other future treatment options:  US guided right hip injection  Referral to orthopedic surgery    Follow Up: 6 - 8 weeks and as needed    Concerning signs and symptoms were reviewed and all questions were answered at this time.    Sangeetha Schmitt MD [facility]  Sports Medicine Physician  [facility]    SUBJECTIVE- Interim History September 20, 2023    Chief Complaint   Patient presents with     Right Hip - Follow Up, MRI Transcription       Geovani Bustos is a 67 year old male who is seen in f/u up for    Hip pain, right  Tear of right acetabular labrum, initial encounter  Bone cyst. Since last visit on 9/6/23 patient has been doing a little better. He states he can walk on it. Stairs and standing are only sometimes okay.   -Onset: 4 month(s) ago. Reports insidious onset  without acute precipitating event.  He has been having pain since his hernia surgery.    Location of Pain: right hip, lateral hip pain, greater trochanter, lateral pain  Worsened by: walking, standing   Better with: nothing   Treatments tried: rest/activity avoidance, Tylenol, physical therapy (2 visits), and previous imaging (xray 8/15/23, MRI 9/13/23)  Associated symptoms: stiffness of the right hip    REVIEW OF SYSTEMS:  Review of Systems    OBJECTIVE:  /80   Pulse 76   Wt 86.2 kg (190 lb)   BMI 31.62 kg/m     General: healthy, alert and in no distress  Skin: no suspicious lesions or rash.  CV: distal perfusion intact   Resp: normal respiratory effort without conversational dyspnea   Psych: normal mood and affect  Gait: NORMAL  Neuro: Normal light sensory exam of lower extremity     Bilateral hip exam  Inspection:      no edema or ecchymosis in hip area     Tender:     none     Non Tender:      remainder of the hip area bilateral     ROM:     Full active and passive ROM  bilateral     Strength:      flexion 5-/5 right       extension 5/5 bilateral       abduction 5/5 bilateral       adduction 5/5 bilateral     Sensation:      grossly intact in hip and thigh     Special Tests:      neg (-) CAYETANO right       positive (+) FADIR right    RADIOLOGY:  Final results and radiologist's interpretation, available in the [facility] health record.  Images were reviewed with the patient in the office today.  My personal interpretation of the performed imaging:     Reviewed MRI Right hip with likely labral tearing, no effusion or synovitis, right SI joint arthritis, bone cyst    Results for orders placed or performed during the hospital encounter of 09/13/23   MR Hip Right w/o Contrast    Narrative    EXAM: MR HIP RIGHT W/O CONTRAST  LOCATION: [facility]  DATE: 9/13/2023    INDICATION: Evaluate right hip arthritis, femoral neck lesion.  COMPARISON: X-rays 08/15/2023.  TECHNIQUE:  Unenhanced.    FINDINGS:    RIGHT HIP:   -Labrum: There is likely nondisplaced tearing of the labrum anterosuperiorly. No paralabral cyst.   -Cartilage: There is likely low-grade chondromalacia of the acetabulum and femoral head anterosuperiorly.  -Joint space: No effusion or synovitis.   -Joint capsule/ligaments: Intact joint capsule. Ligamentum teres is intact.  -Morphology: Alpha Angle is normal. No bony morphologic changes associated with femoroacetabular impingement.    MUSCLES AND TENDONS:   -Gluteal: No tendon tear or tendinopathy. No trochanteric bursitis.  -Proximal hamstring: No hamstring tendon tear. There is tendinopathy and peritendinitis involving the origin of the right adductor kelly tendon at the ischial tuberosity.   -Iliopsoas: No tendon tear or tendinopathy. No bursitis.  -Rectus femoris origin: No tear or tendinopathy.    BONES:   -1.5 x 1.8 x 2.8 cm circumscribed heterogeneous marrow-replacing lesion in the intertrochanteric proximal right femur, with hypointense sclerosis or calcified matrix. No surrounding marrow abnormality. No suspicious features. Additional circumscribed 7   mm intermediate T1, high T2 signal lesion near the base of the right femoral neck.   -Mild degenerative arthritis of the right SI joint with mild marrow edema. Mild degenerative change pubic symphysis. Mild marrow edema in the left pubic bone adjacent to the symphysis.    SOFT TISSUES:   -Normal muscle bulk. No acute muscular injury.    INTRA-PELVIC CONTENTS:  -Visualized portions are normal.      Impression    IMPRESSION:  1.  Probable nondisplaced tearing of the right acetabular labrum anterosuperiorly and low-grade chondromalacia of the adjacent acetabulum and femoral head.  2.  No right hip joint effusion or synovitis.  3.  No acute fracture.  4.  Proximal right adductor kelly tendinopathy and peritendinitis. No tendon tear.  5.  Mild degenerative arthritis of the right SI joint with mild marrow edema.  6.  Mild  marrow edema in the left pubic bone could be related to abnormal stress.  7.  1.8 x 1.8 x 2.8 cm nonaggressive sclerotic or calcified marrow-replacing lesion in the intertrochanteric proximal right femur. This is likely benign, primary considerations include enchondroma and liposclerosing myxofibrous tumor. Recommend one year   follow-up radiographs to confirm stability.  8.  Additional 7 mm marrow-replacing lesion in the proximal right femur is nonspecific but likely benign.         Review of the result(s) of each unique test - MRI             Again, thank you for allowing me to participate in the care of your patient.        Sincerely,        Sangeetha Schmitt MD

## 2023-09-20 NOTE — PROGRESS NOTES
ASSESSMENT & PLAN    Geovani was seen today for follow up and mri transcription.    Diagnoses and all orders for this visit:    Hip pain, right    Tear of right acetabular labrum, initial encounter    Bone cyst  -     XR Pelvis 1/2 Views; Future      This issue is chronic and Unchanged.      ICD-10-CM    1. Hip pain, right  M25.551       2. Tear of right acetabular labrum, initial encounter  S73.191A       3. Bone cyst  M85.60 XR Pelvis 1/2 Views        Patient Instructions   We discussed these other possible diagnosis: Right hip pain likely labral tear, will continue physical therapy.    Bone lesion - recommend follow up x-rays in 1 year.    Plan:  - Today's Plan of Care:  Continue physical therapy  Discussed activity considerations and other supportive care including Ice/Heat, OTC and other topical medications as needed.    X-rays ordered for 1 year.    -We also discussed other future treatment options:  US guided right hip injection  Referral to orthopedic surgery    Follow Up: 6 - 8 weeks and as needed    Concerning signs and symptoms were reviewed and all questions were answered at this time.    Sangeetha Schmitt MD [facility]  Sports Medicine Physician  [facility]    SUBJECTIVE- Interim History September 20, 2023    Chief Complaint   Patient presents with    Right Hip - Follow Up, MRI Transcription       Geovani Bustos is a 67 year old male who is seen in f/u up for    Hip pain, right  Tear of right acetabular labrum, initial encounter  Bone cyst. Since last visit on 9/6/23 patient has been doing a little better. He states he can walk on it. Stairs and standing are only sometimes okay.   -Onset: 4 month(s) ago. Reports insidious onset without acute precipitating event.  He has been having pain since his hernia surgery.    Location of Pain: right hip, lateral hip pain, greater trochanter, lateral pain  Worsened by: walking, standing   Better with: nothing   Treatments tried: rest/activity avoidance,  Tylenol, physical therapy (2 visits), and previous imaging (xray 8/15/23, MRI 9/13/23)  Associated symptoms: stiffness of the right hip    REVIEW OF SYSTEMS:  Review of Systems    OBJECTIVE:  /80   Pulse 76   Wt 86.2 kg (190 lb)   BMI 31.62 kg/m     General: healthy, alert and in no distress  Skin: no suspicious lesions or rash.  CV: distal perfusion intact   Resp: normal respiratory effort without conversational dyspnea   Psych: normal mood and affect  Gait: NORMAL  Neuro: Normal light sensory exam of lower extremity     Bilateral hip exam  Inspection:      no edema or ecchymosis in hip area     Tender:     none     Non Tender:      remainder of the hip area bilateral     ROM:     Full active and passive ROM  bilateral     Strength:      flexion 5-/5 right       extension 5/5 bilateral       abduction 5/5 bilateral       adduction 5/5 bilateral     Sensation:      grossly intact in hip and thigh     Special Tests:      neg (-) CAYETANO right       positive (+) FADIR right    RADIOLOGY:  Final results and radiologist's interpretation, available in the [facility] health record.  Images were reviewed with the patient in the office today.  My personal interpretation of the performed imaging:     Reviewed MRI Right hip with likely labral tearing, no effusion or synovitis, right SI joint arthritis, bone cyst    Results for orders placed or performed during the hospital encounter of 09/13/23   MR Hip Right w/o Contrast    Narrative    EXAM: MR HIP RIGHT W/O CONTRAST  LOCATION: [facility]  DATE: 9/13/2023    INDICATION: Evaluate right hip arthritis, femoral neck lesion.  COMPARISON: X-rays 08/15/2023.  TECHNIQUE: Unenhanced.    FINDINGS:    RIGHT HIP:   -Labrum: There is likely nondisplaced tearing of the labrum anterosuperiorly. No paralabral cyst.   -Cartilage: There is likely low-grade chondromalacia of the acetabulum and femoral head anterosuperiorly.  -Joint space: No effusion or synovitis.    -Joint capsule/ligaments: Intact joint capsule. Ligamentum teres is intact.  -Morphology: Alpha Angle is normal. No bony morphologic changes associated with femoroacetabular impingement.    MUSCLES AND TENDONS:   -Gluteal: No tendon tear or tendinopathy. No trochanteric bursitis.  -Proximal hamstring: No hamstring tendon tear. There is tendinopathy and peritendinitis involving the origin of the right adductor kelly tendon at the ischial tuberosity.   -Iliopsoas: No tendon tear or tendinopathy. No bursitis.  -Rectus femoris origin: No tear or tendinopathy.    BONES:   -1.5 x 1.8 x 2.8 cm circumscribed heterogeneous marrow-replacing lesion in the intertrochanteric proximal right femur, with hypointense sclerosis or calcified matrix. No surrounding marrow abnormality. No suspicious features. Additional circumscribed 7   mm intermediate T1, high T2 signal lesion near the base of the right femoral neck.   -Mild degenerative arthritis of the right SI joint with mild marrow edema. Mild degenerative change pubic symphysis. Mild marrow edema in the left pubic bone adjacent to the symphysis.    SOFT TISSUES:   -Normal muscle bulk. No acute muscular injury.    INTRA-PELVIC CONTENTS:  -Visualized portions are normal.      Impression    IMPRESSION:  1.  Probable nondisplaced tearing of the right acetabular labrum anterosuperiorly and low-grade chondromalacia of the adjacent acetabulum and femoral head.  2.  No right hip joint effusion or synovitis.  3.  No acute fracture.  4.  Proximal right adductor kelly tendinopathy and peritendinitis. No tendon tear.  5.  Mild degenerative arthritis of the right SI joint with mild marrow edema.  6.  Mild marrow edema in the left pubic bone could be related to abnormal stress.  7.  1.8 x 1.8 x 2.8 cm nonaggressive sclerotic or calcified marrow-replacing lesion in the intertrochanteric proximal right femur. This is likely benign, primary considerations include enchondroma and  liposclerosing myxofibrous tumor. Recommend one year   follow-up radiographs to confirm stability.  8.  Additional 7 mm marrow-replacing lesion in the proximal right femur is nonspecific but likely benign.         Review of the result(s) of each unique test - MRI

## 2023-09-25 ENCOUNTER — TELEPHONE (OUTPATIENT)
Dept: ORTHOPEDICS | Facility: CLINIC | Age: 68
End: 2023-09-25
Payer: COMMERCIAL

## 2023-09-25 NOTE — TELEPHONE ENCOUNTER
Hello,  I'm with ortho con.  [employer] [facility] [employer] is calling Amelia from .  She is asking if XR is to be now or a year from now. There is a current order and the Xr order is for September 2023 this year.  Is this correct?  Clarification is needed.  Thank you.

## 2023-09-25 NOTE — TELEPHONE ENCOUNTER
Spoke With Amelia and clarified x-ray order is good for one year to be done prior to 9/2024.    Ivonne Vega, [facility]    ALDAIR-Reji Quiles

## 2023-09-26 ENCOUNTER — THERAPY VISIT (OUTPATIENT)
Dept: PHYSICAL THERAPY | Facility: CLINIC | Age: 68
End: 2023-09-26
Attending: FAMILY MEDICINE
Payer: COMMERCIAL

## 2023-09-26 DIAGNOSIS — M25.551 HIP PAIN, RIGHT: Primary | ICD-10-CM

## 2023-09-26 PROCEDURE — 97110 THERAPEUTIC EXERCISES: CPT | Mod: GP | Performed by: PHYSICAL THERAPIST

## 2023-09-26 NOTE — PROGRESS NOTES
[facility]     Outpatient Physical Therapy Discharge Note       09/26/23 0500   Appointment Info   Signing clinician's name / credentials Jaki Dhaliwal, PT, DPT   Total/Authorized Visits 10   Visits Used 5   Medical Diagnosis Hip pain, right (M25.551)  - Primary   PT Tx Diagnosis right hip pain and weakness   Quick Adds Certification   Progress Note/Certification   Start of Care Date 08/24/23   Onset of illness/injury or Date of Surgery 06/08/23   Therapy Frequency 1x/week   Predicted Duration 10 weeks   Certification date from 08/24/23   Certification date to 11/02/23   Progress Note Due Date 11/02/23   Progress Note Completed Date 09/26/23   GOALS   PT Goals 2;3;4   PT Goal 1   Goal Identifier STG   Goal Description Patient to walk 2 blockw ith <4/10 R hip pain.   Goal Progress met   Target Date 09/21/23   Date Met 09/20/23   PT Goal 2   Goal Identifier STG   Goal Description Patient to report going down a flight of stairs <3/10 R hip pain.   Goal Progress met   Target Date 09/28/23   Date Met 09/20/23   PT Goal 3   Goal Identifier LTG   Goal Description Patient to be IND with HEP to aid funcitonal recovery and improve self management strategies.   Goal Progress completed this morning   Target Date 11/02/23   Date Met 09/26/23   PT Goal 4   Goal Identifier LTG   Goal Description Patient to return to walking community distances and obstacles with <2/10 R hip pain.   Goal Progress pt relates he doesn't walk that far   Target Date 11/02/23   Subjective Report   Subjective Report Pt relates pain is doing better. Pt relates only has pain on side of hip with exercises. Pt relates sore with stretching   Objective Measures   Objective Measures Objective Measure 1;Objective Measure 2;Objective Measure 3;Objective Measure 4;Objective Measure 5   Objective Measure 1   Objective Measure R Hip ROM   Details WFL no pain    Objective Measure 2   Objective Measure TTP   Details denies pain   Objective Measure 3   Objective Measure MMT   Details WFL and no pain with any motion   Objective Measure 4   Objective Measure VAS   Details 3/10   Objective Measure 5   Objective Measure Slump   Details R: -   Treatment Interventions (PT)   Interventions Therapeutic Procedure/Exercise;Manual Therapy   Therapeutic Procedure/Exercise   Therapeutic Procedures: strength, endurance, ROM, flexibillity minutes (61848) 20   Therapeutic Procedures Ther Proc 2;Ther Proc 4;Ther Proc 3;Ther Proc 5;Ther Proc 6   Ther Proc 1 assessed ROM and strength of R hip as noted above   Skilled Intervention Assessed ROM and strength and reviewed HEP   Patient Response/Progress Pt reported no increase in symptoms throughout session. Pt declined physically going through HEP.   Ther Proc 2 verbally reviewed HEP - see ptrx for specific details, pt has no questiosn and declines futher exercises   Eval/Assessments   Assessments   (Pt reports doing well and that his pain is still minimal. Patient's ROM and strength are within normal limits and is pain free. When PT discussed discharging with pt he feels that he is ready thus plan to d/c at this time.)   Education   Learner/Method Patient;Listening;Reading;Demonstration;Pictures/Video   Plan   Home program [id number]   Plan for next session discharge   Comments   Comments 1x/week   Total Session Time   Timed Code Treatment Minutes 20   Total Treatment Time (sum of timed and untimed services) 20         DISCHARGE  Reason for Discharge: Patient has met all goals.  No further expectation of progress.    Equipment Issued: NA    Discharge Plan: Patient to continue home program.    Referring Provider:  Alexandru Vera

## 2023-09-29 ENCOUNTER — TELEPHONE (OUTPATIENT)
Dept: FAMILY MEDICINE | Facility: CLINIC | Age: 68
End: 2023-09-29
Payer: COMMERCIAL

## 2023-09-29 DIAGNOSIS — E03.9 ACQUIRED HYPOTHYROIDISM: Chronic | ICD-10-CM

## 2023-09-29 DIAGNOSIS — E78.5 HYPERLIPIDEMIA LDL GOAL <130: ICD-10-CM

## 2023-09-29 RX ORDER — LEVOTHYROXINE SODIUM 125 UG/1
TABLET ORAL
Qty: 90 TABLET | Refills: 0 | Status: SHIPPED | OUTPATIENT
Start: 2023-09-29 | End: 2023-10-19

## 2023-09-29 RX ORDER — SIMVASTATIN 40 MG
TABLET ORAL
Qty: 90 TABLET | Refills: 0 | Status: SHIPPED | OUTPATIENT
Start: 2023-09-29 | End: 2024-01-02

## 2023-09-29 NOTE — TELEPHONE ENCOUNTER
Refilled 1 time only, please call patient to schedule for thyroid labs.     Kate Wharton MSN, RN

## 2023-10-04 ENCOUNTER — VIRTUAL VISIT (OUTPATIENT)
Dept: PULMONOLOGY | Facility: CLINIC | Age: 68
End: 2023-10-04
Payer: COMMERCIAL

## 2023-10-04 DIAGNOSIS — J84.10 COMBINED PULMONARY FIBROSIS AND EMPHYSEMA (CPFE) (H): ICD-10-CM

## 2023-10-04 DIAGNOSIS — J43.9 COMBINED PULMONARY FIBROSIS AND EMPHYSEMA (CPFE) (H): ICD-10-CM

## 2023-10-04 DIAGNOSIS — Z87.891 PERSONAL HISTORY OF NICOTINE DEPENDENCE: Primary | ICD-10-CM

## 2023-10-04 PROCEDURE — 99213 OFFICE O/P EST LOW 20 MIN: CPT | Mod: 95

## 2023-10-04 NOTE — PROGRESS NOTES
Does Geovani have home oxygen?  No     Srinivas is a 67 year old who is being evaluated via a billable telephone visit.      What phone number would you like to be contacted at? [phone]  How would you like to obtain your AVS? Mail a copy    Distant Location (provider location):  On-site  Phone call duration: 8 minutes    [facility]  Pulmonary Medicine  Visit Clinic Note  October 4, 2023         ASSESSMENT & PLAN     combined pulmonary fibrosis and emphysema - lung parenchyma does not look there has been much change over a year.  He takes breo daily.      History of smoking: He has no interest in quitting smoking.  Continues to smoke 16 cigarettes a day.   He does qualify for lung cancer screening.  He is open to hearing about diagnostic and treatment options, but he is not certain that he would actually pursue chemo/XRT/surgery. I placed an order for another chest CT to be in September 2024.        Silver Pinzon MD     Duration of phone call 10 minutes           Today's visit note:       Chief Complaint: Geovani Bustos is a 67 year old year old male who is being seen for RECHECK      HISTORY OF PRESENT ILLNESS:    This is a telephone visit to follow-up on lung cancer screening.  He had his chest CT scan performed a few weeks ago.  Over the last year, he has not had any exacerbations of his lung disease requiring steroids or antibiotics.  He takes Breo daily.  He does not experience significant cough or shortness of breath.  He is satisfied with his inhaler.           Past Medical and Surgical History:     Past Medical History:   Diagnosis Date    Cerumen impaction     Chronic kidney disease     Colon polyps     Hyperlipidemia     Mitral valve prolapse     Schizophrenia (H)     Ulcerated penile lesions     VSD (ventricular septal defect)      Schizoaffective  OCD  Mixed personality disorder    Past Surgical History:   Procedure Laterality Date    ARTHRODESIS FOOT Right 1/19/2016    Procedure:  ARTHRODESIS FOOT;  Surgeon: Holden Harrison DPM;  Location: [facility] OR    ARTHRODESIS FOOT Left 1/3/2017    Procedure: ARTHRODESIS FOOT;  Surgeon: Holden Harrison DPM;  Location: [facility] OR    COLONOSCOPY N/A 5/19/2022    Procedure: COLONOSCOPY, FLEXIBLE, WITH LESION REMOVAL USING SNARE;  Surgeon: Ame Nelson MD;  Location: [facility] GI    HERNIORRHAPHY INGUINAL Right 6/8/2023    Procedure: HERNIORRHAPHY INGUINALOPEN right with mesh;  Surgeon: Ame Nelson MD;  Location: [facility] OR    SURGICAL HISTORY OF -       leg fracture           Family History:     Family History   Problem Relation Age of Onset    Emphysema Mother     Coronary Artery Disease Father               Social History:     Social History     Socioeconomic History    Marital status: Single     Spouse name: Not on file    Number of children: Not on file    Years of education: Not on file    Highest education level: Not on file   Occupational History    Not on file   Tobacco Use    Smoking status: Every Day     Packs/day: 0.75     Years: 50.00     Pack years: 37.50     Types: Cigarettes     Passive exposure: Current    Smokeless tobacco: Never    Tobacco comments:     cutting one cigarette down a month   Vaping Use    Vaping Use: Never used   Substance and Sexual Activity    Alcohol use: No    Drug use: No    Sexual activity: Never   Other Topics Concern    Parent/sibling w/ CABG, MI or angioplasty before 65F 55M? Not Asked     Service Not Asked    Blood Transfusions No    Caffeine Concern Not Asked    Occupational Exposure Not Asked    Hobby Hazards Not Asked    Sleep Concern Not Asked    Stress Concern Not Asked    Weight Concern Not Asked    Special Diet Not Asked    Back Care Not Asked    Exercise Not Asked    Bike Helmet Not Asked    Seat Belt Not Asked    Self-Exams Not Asked   Social History Narrative    Patient has no interest in smoking cessation.     Her for [employer] physical - likes to [employer]    Lives in Group home. [place] up in [place]      Social Determinants of Health     Financial Resource Strain: Not on file   Food Insecurity: Not on file   Transportation Needs: Not on file   Physical Activity: Not on file   Stress: Not on file   Social Connections: Not on file   Interpersonal Safety: Not on file   Housing Stability: Not on file            Medications:     Current Outpatient Medications   Medication    ABILIFY 10 MG OR TABS    ADULT ASPIRIN REGIMEN 81 MG EC tablet    albuterol (VENTOLIN HFA) 108 (90 Base) MCG/ACT inhaler    clobetasol (TEMOVATE) 0.05 % external ointment    Disposable Gloves (LATEX GLOVES MEDIUM) MISC    Disposable Gloves (NITRILE EXAM GLOVES MEDIUM) MISC    EAR DROPS 6.5 % otic solution    finasteride (PROSCAR) 5 MG tablet    fluticasone-vilanterol (BREO ELLIPTA) 100-25 MCG/ACT inhaler    gabapentin (NEURONTIN) 100 MG capsule    levothyroxine (SYNTHROID/LEVOTHROID) 125 MCG tablet    mirabegron (MYRBETRIQ) 50 MG 24 hr tablet    Omega-3 Fatty Acids (FISH OIL) 1200 MG capsule    order for DME    order for DME    oxybutynin ER (DITROPAN XL) 5 MG 24 hr tablet    polyethylene glycol (CLEARLAX) 17 GM/Dose powder    Psyllium (REGULOID) 48.57 % POWD    simvastatin (ZOCOR) 40 MG tablet    Skin Protectants, Misc. (EUCERIN) cream    tamsulosin (FLOMAX) 0.4 MG capsule    traZODone (DESYREL) 50 MG tablet    ZOLOFT 100 MG OR TABS     No current facility-administered medications for this visit.     Facility-Administered Medications Ordered in Other Visits   Medication    bupivacaine (MARCAINE) injection 0.5%            Review of Systems:       A complete review of systems was otherwise negative except as noted in the HPI.      PHYSICAL EXAM:  There were no vitals taken for this visit.  Telephone visit             Data:   All laboratory and imaging data reviewed.      Chest CT scan: I reviewed the chest CT scan images from September 7, 2023 and agree with radiologist interpretation below:  Lungs: Upper lobe predominant centrilobular  and paraseptal  emphysematous changes are present. There is an irregular noncalcified  6 mm pulmonary nodule in the anterior right midlung adjacent to the  minor fissure, favoring a benign fissural lymph node (3-176), which is  unchanged. A noncalcified subpleural pulmonary nodule in the posterior  medial right upper lobe measuring 8 x 6 mm, mean diameter of 7 mm, is  similar to comparison as well, previously 6 mm. No new or enlarging  pulmonary nodules. No airspace consolidation or pleural fluid. Central  airways are patent.     Additional findings: Heart size is normal. No pericardial effusion.  Mild to moderate coronary artery atherosclerosis is present. No  enlarged thoracic lymph nodes. Mild fusiform aneurysmal dilation of  the ascending thoracic aorta measuring up to 4 cm (2-28), previously  4.1 cm. Imaged portions of the upper abdomen appear grossly  unremarkable. Similar-appearing mild anterior wedging of mid and lower  thoracic vertebral body segments. No acute osseous abnormality.                                                                      IMPRESSION:   1. ACR Assessment Category:  Lung-RADS Category 2. Benign appearance  or behavior. Recommendation: Continue annual screening, if clinically  relevant (please order exam code G 2290).    2. Significant Incidental Finding(s):  Category S: No.        PFT:    FEV1/FVC is 67%.  His FEV1 is 1.89 L which is 58% predicted.  This improved to 2.31 which is 71% predicted after administration of albuterol.  This is a 22% increase.  His FVC is 2.8 L which is 67% predicted, and increases to 3.49 L which is 83% predicted.  Albuterol made a 24% increase in his FVC.  His residual volume is 154% predicted, and his total lung capacity is 105% predicted.  His diffusion capacity is uninterpretable as the test was not performed appropriately.    Diffusion capacity measured 1 month later showed that it is 71% predicted.    PFT Interpretation:  It appears that the  testing is valid.  I do have concerns that he may have improved in his spirometry just based on a learning effect.  That aside it appears that he has airflow obstruction, with a significant bronchodilator response.  There is gas trapping without hyperinflation.  His diffusion capacity is normal.

## 2023-10-04 NOTE — TELEPHONE ENCOUNTER
Patient notified and transferred to appointment desk to schedule a lab only appointment.    Julie Behrendt RN

## 2023-10-05 ENCOUNTER — OFFICE VISIT (OUTPATIENT)
Dept: DERMATOLOGY | Facility: CLINIC | Age: 68
End: 2023-10-05
Payer: COMMERCIAL

## 2023-10-05 DIAGNOSIS — L20.9 ATOPIC DERMATITIS, UNSPECIFIED TYPE: ICD-10-CM

## 2023-10-05 DIAGNOSIS — H60.543 ECZEMA OF EXTERNAL EAR, BILATERAL: Primary | ICD-10-CM

## 2023-10-05 PROCEDURE — 99213 OFFICE O/P EST LOW 20 MIN: CPT | Performed by: PHYSICIAN ASSISTANT

## 2023-10-05 NOTE — LETTER
10/5/2023         RE: Geovani Bustos  [id number]  [place] [id number]        Dear Colleague,    Thank you for referring your patient, Geovani Bustos, to the [facility]. Please see a copy of my visit note below.    HPI:   Chief complaints: Geovani Bustos is a 67 year old male who presents for recheck of ear eczema. Using clobetasol on M, W and F with great control. He has also been using Eucerin BID and would like to change this order to as needed only. No other spots of concern today.      PHYSICAL EXAM:    There were no vitals taken for this visit.  Skin exam performed as follows: Type 2 skin. Mood appropriate  Alert and Oriented X 3. Well developed, well nourished in no distress.  General appearance: Normal  Head including face: Normal  Eyes: conjunctiva and lids: Normal  Mouth: Lips, teeth, gums: Normal  Neck: Normal  Chest-breast/axillae: Normal  Back: Normal  Spleen and liver: Normal  Cardiovascular: Exam of peripheral vascular system by observation for swelling, varicosities, edema: Normal  Genitalia: groin, buttocks: Normal  Extremities: digits/nails (clubbing): Normal  Eccrine and Apocrine glands: Normal  Right upper extremity: Normal  Left upper extremity: Normal  Right lower extremity: Normal  Left lower extremity: Normal  Skin: Scalp and body hair: See below    Skin clear    ASSESSMENT/PLAN:     Ear eczema - continue clobetasol M, W and F  Atopic dermatitis - can change using Eucerin to as needed only          Follow-up: 1 year/PRN sooner  CC:   Scribed By: Julia Hernandez, MS, PAKIRSTY      Again, thank you for allowing me to participate in the care of your patient.        Sincerely,        Julia Hernandez PA-C

## 2023-10-05 NOTE — PROGRESS NOTES
HPI:   Chief complaints: Geovani Bustos is a 67 year old male who presents for recheck of ear eczema. Using clobetasol on M, W and F with great control. He has also been using Eucerin BID and would like to change this order to as needed only. No other spots of concern today.      PHYSICAL EXAM:    There were no vitals taken for this visit.  Skin exam performed as follows: Type 2 skin. Mood appropriate  Alert and Oriented X 3. Well developed, well nourished in no distress.  General appearance: Normal  Head including face: Normal  Eyes: conjunctiva and lids: Normal  Mouth: Lips, teeth, gums: Normal  Neck: Normal  Chest-breast/axillae: Normal  Back: Normal  Spleen and liver: Normal  Cardiovascular: Exam of peripheral vascular system by observation for swelling, varicosities, edema: Normal  Genitalia: groin, buttocks: Normal  Extremities: digits/nails (clubbing): Normal  Eccrine and Apocrine glands: Normal  Right upper extremity: Normal  Left upper extremity: Normal  Right lower extremity: Normal  Left lower extremity: Normal  Skin: Scalp and body hair: See below    Skin clear    ASSESSMENT/PLAN:     Ear eczema - continue clobetasol M, W and F  Atopic dermatitis - can change using Eucerin to as needed only          Follow-up: 1 year/PRN sooner  CC:   Scribed By: Julia Hernandez, MS, PA-C

## 2023-10-18 ENCOUNTER — LAB (OUTPATIENT)
Dept: LAB | Facility: CLINIC | Age: 68
End: 2023-10-18
Payer: COMMERCIAL

## 2023-10-18 DIAGNOSIS — E03.9 ACQUIRED HYPOTHYROIDISM: ICD-10-CM

## 2023-10-18 LAB
T4 FREE SERPL-MCNC: 1.38 NG/DL (ref 0.9–1.7)
TSH SERPL DL<=0.005 MIU/L-ACNC: 5.03 UIU/ML (ref 0.3–4.2)

## 2023-10-18 PROCEDURE — 84443 ASSAY THYROID STIM HORMONE: CPT

## 2023-10-18 PROCEDURE — 36415 COLL VENOUS BLD VENIPUNCTURE: CPT

## 2023-10-18 PROCEDURE — 84439 ASSAY OF FREE THYROXINE: CPT

## 2023-10-19 DIAGNOSIS — E03.9 ACQUIRED HYPOTHYROIDISM: Primary | ICD-10-CM

## 2023-10-19 RX ORDER — LEVOTHYROXINE SODIUM 137 UG/1
137 TABLET ORAL DAILY
Qty: 90 TABLET | Refills: 1 | Status: SHIPPED | OUTPATIENT
Start: 2023-10-19 | End: 2024-03-06

## 2023-10-20 DIAGNOSIS — J44.9 STAGE 1 MILD COPD BY GOLD CLASSIFICATION (H): ICD-10-CM

## 2023-10-20 RX ORDER — FLUTICASONE FUROATE AND VILANTEROL TRIFENATATE 100; 25 UG/1; UG/1
1 POWDER RESPIRATORY (INHALATION) DAILY
Qty: 60 EACH | Refills: 1 | Status: SHIPPED | OUTPATIENT
Start: 2023-10-20 | End: 2023-12-14

## 2023-10-24 ENCOUNTER — TELEPHONE (OUTPATIENT)
Dept: FAMILY MEDICINE | Facility: CLINIC | Age: 68
End: 2023-10-24
Payer: COMMERCIAL

## 2023-10-24 DIAGNOSIS — M25.551 HIP PAIN, RIGHT: Primary | ICD-10-CM

## 2023-10-24 NOTE — TELEPHONE ENCOUNTER
Amelia calling. Patient was discharged from Physical therapy for his hip, as patient stated it was better. Patient is needing a new referral to see Physical therapy as patients hip did not improve. Since the case was closed, PT needs new referral to schedule. Patient does have PT visit for 10/25.     Please call Amelia once referral is placed @ [phone]        Okay to leave a detailed message?: Yes at Cell number on file:    Telephone Information:   Mobile [phone]

## 2023-11-06 DIAGNOSIS — E78.5 HYPERLIPIDEMIA LDL GOAL <130: ICD-10-CM

## 2023-11-06 RX ORDER — AMOXICILLIN 500 MG
CAPSULE ORAL
Qty: 90 CAPSULE | Refills: 2 | Status: SHIPPED | OUTPATIENT
Start: 2023-11-06 | End: 2024-07-01

## 2023-11-06 NOTE — TELEPHONE ENCOUNTER
"Prescription approved per [facility] Refill Protocol.       Requested Prescriptions   Pending Prescriptions Disp Refills    Omega-3 Fatty Acids (FISH OIL) 1200 MG capsule 90 capsule 2     Si capsule every evening       Vitamin Supplements (Adult) Protocol Passed - 2023  9:02 AM        Passed - High dose Vitamin D not ordered        Passed - Recent (12 mo) or future (30 days) visit within the authorizing provider's specialty     Patient has had an office visit with the authorizing provider or a provider within the authorizing providers department within the previous 12 mos or has a future within next 30 days. See \"Patient Info\" tab in inbasket, or \"Choose Columns\" in Meds & Orders section of the refill encounter.              Passed - Medication is active on med list                 Kriss Matthew RN 23 1:40 PM   "

## 2023-11-09 ENCOUNTER — TELEPHONE (OUTPATIENT)
Dept: FAMILY MEDICINE | Facility: CLINIC | Age: 68
End: 2023-11-09

## 2023-11-09 ENCOUNTER — ALLIED HEALTH/NURSE VISIT (OUTPATIENT)
Dept: FAMILY MEDICINE | Facility: CLINIC | Age: 68
End: 2023-11-09
Payer: COMMERCIAL

## 2023-11-09 ENCOUNTER — OFFICE VISIT (OUTPATIENT)
Dept: FAMILY MEDICINE | Facility: CLINIC | Age: 68
End: 2023-11-09
Payer: COMMERCIAL

## 2023-11-09 VITALS
BODY MASS INDEX: 29.99 KG/M2 | OXYGEN SATURATION: 91 % | TEMPERATURE: 97 F | HEART RATE: 81 BPM | SYSTOLIC BLOOD PRESSURE: 122 MMHG | HEIGHT: 65 IN | WEIGHT: 180 LBS | RESPIRATION RATE: 18 BRPM | DIASTOLIC BLOOD PRESSURE: 66 MMHG

## 2023-11-09 DIAGNOSIS — J44.9 STAGE 1 MILD COPD BY GOLD CLASSIFICATION (H): ICD-10-CM

## 2023-11-09 DIAGNOSIS — J43.9 PULMONARY EMPHYSEMA, UNSPECIFIED EMPHYSEMA TYPE (H): ICD-10-CM

## 2023-11-09 DIAGNOSIS — Z53.9 DIAGNOSIS NOT YET DEFINED: Primary | ICD-10-CM

## 2023-11-09 DIAGNOSIS — G89.29 CHRONIC PAIN OF RIGHT HIP: Primary | ICD-10-CM

## 2023-11-09 DIAGNOSIS — M25.551 CHRONIC PAIN OF RIGHT HIP: Primary | ICD-10-CM

## 2023-11-09 PROCEDURE — 99213 OFFICE O/P EST LOW 20 MIN: CPT | Performed by: FAMILY MEDICINE

## 2023-11-09 RX ORDER — RESPIRATORY SYNCYTIAL VIRUS VACCINE 120MCG/0.5
0.5 KIT INTRAMUSCULAR ONCE
Qty: 1 EACH | Refills: 0 | Status: CANCELLED | OUTPATIENT
Start: 2023-11-09 | End: 2023-11-09

## 2023-11-09 ASSESSMENT — ENCOUNTER SYMPTOMS
COUGH: 1
HIP PAIN: 1

## 2023-11-09 ASSESSMENT — PAIN SCALES - GENERAL: PAINLEVEL: SEVERE PAIN (6)

## 2023-11-09 NOTE — PROGRESS NOTES
Assessment & Plan     Chronic pain of right hip  Differentials discussed in detail.  Recommended to continue Tylenol, topical analgesia and physical therapy.  Orthopedic referral placed, can consider joint injection  - Orthopedic  Referral; Future    Stage 1 mild COPD by GOLD classification (H)  Pulmonary emphysema, unspecified emphysema type (H)  Complains of ongoing cough.  No sign of pneumonia currently.  Stressed on smoking cessation and to continue albuterol, Breo inhaler.  Follow-up in 4 weeks or earlier if needed      Alexandru Vera MD  [facility]    Markell Espino is a 67 year old, presenting for the following health issues:  Asthma, Cough, and Hip Pain (Right)        11/9/2023     9:42 AM   Additional Questions   Roomed by Tabitha CALLAHAN CMA   Accompanied by Staff - Martita Andrade    Hip Pain  Associated symptoms include coughing.   History of Present Illness     Asthma:  He presents for follow up of asthma.  He has some cough, some wheezing, and some shortness of breath. He is not using a relief medication.  He does not miss any doses of his controller medication throughout the week. Patient is aware of the following triggers: same as previous visit. The patient has not had a visit to the Emergency Room, Urgent Care or Hospital due to asthma since the last clinic visit.     Reason for visit:  Check up and cough    He eats 2-3 servings of fruits and vegetables daily.He consumes 1 sweetened beverage(s) daily.He exercises with enough effort to increase his heart rate 9 or less minutes per day.  He exercises with enough effort to increase his heart rate 3 or less days per week.   He is taking medications regularly.       Review of Systems   Respiratory:  Positive for cough.       Constitutional, HEENT, cardiovascular, pulmonary, gi and gu systems are negative, except as otherwise noted.      Objective    /66 (BP Location: Right arm, Patient Position: Sitting,  "Cuff Size: Adult Large)   Pulse 81   Temp 97  F (36.1  C) (Tympanic)   Resp 18   Ht 1.651 m (5' 5\")   Wt 81.6 kg (180 lb)   SpO2 91%   BMI 29.95 kg/m    Body mass index is 29.95 kg/m .  Physical Exam   GENERAL: alert and no distress  NECK: no adenopathy, no asymmetry, masses, or scars and thyroid normal to palpation  RESP: lungs clear to auscultation - no rales, rhonchi or wheezes  CV: regular rate and rhythm, normal S1 S2, no S3 or S4, no murmur, click or rub, no peripheral edema and peripheral pulses strong  ABDOMEN: soft, nontender, no hepatosplenomegaly, no masses and bowel sounds normal  MS: Gait slightly antalgic, normal upper and lower extremities strength, joints range of movement normal  NEURO: Grossly intact                  "

## 2023-11-09 NOTE — PROGRESS NOTES
{PROVIDER CHARTING PREFERENCE:800856}    Markell Espino is a 67 year old, presenting for the following health issues:  Asthma        11/9/2023     9:42 AM   Additional Questions   Roomed by Tabitha CALLAHAN CMA       History of Present Illness     Asthma:  He presents for follow up of asthma.  He has some cough, some wheezing, and some shortness of breath. He is not using a relief medication.  He does not miss any doses of his controller medication throughout the week. Patient is aware of the following triggers: same as previous visit. The patient has not had a visit to the Emergency Room, Urgent Care or Hospital due to asthma since the last clinic visit.     Reason for visit:  Check up and cough    He eats 2-3 servings of fruits and vegetables daily.He consumes 1 sweetened beverage(s) daily.He exercises with enough effort to increase his heart rate 9 or less minutes per day.  He exercises with enough effort to increase his heart rate 3 or less days per week.   He is taking medications regularly.       {MA/LPN/RN Pre-Provider Visit Orders- hCG/UA/Strep (Optional):700345}  {SUPERLIST (Optional):734718}  {additonal problems for provider to add (Optional):152607}      Review of Systems   {ROS COMP (Optional):701088}      Objective    There were no vitals taken for this visit.  There is no height or weight on file to calculate BMI.  Physical Exam   {Exam List (Optional):409769}    {Diagnostic Test Results (Optional):129117}    {AMBULATORY ATTESTATION (Optional):883784}

## 2023-11-09 NOTE — TELEPHONE ENCOUNTER
Pt had appt with Dr. Vera this AM.  Group home staff accompanying pt requesting order for ibuprofen PRN for chronic rt hip pain as only have tylenol standing order which has not been effective.  PT eval scheduled 11-22-23. Ortho referral was given.   Dx CKD stage 3, NSAIDS would not be advised.   Please advise any other oral analgesic.  MIROSLAVA Dhaliwal RN

## 2023-11-09 NOTE — TELEPHONE ENCOUNTER
Patient can try over-the-counter topical analgesia like diclofenac gel and Lidoderm patches.    Dr Vera

## 2023-11-09 NOTE — PROGRESS NOTES
Pt had appt with Dr. Vera this AM.  Group home staff accompanying pt requesting order for ibuprofen PRN as only have tylenol standing order which has not been effective.  See today phone enc sent to Dr. Vera.  MIROSLAVA Dhaliwal RN

## 2023-11-10 NOTE — TELEPHONE ENCOUNTER
Group Home returns call to clinic. Advised of 's response for topical analgesia. Understanding verbalized.     Isha Dove RN

## 2023-11-14 DIAGNOSIS — H61.23 BILATERAL IMPACTED CERUMEN: ICD-10-CM

## 2023-11-14 RX ORDER — ADHESIVE BANDAGE 3/4"
BANDAGE TOPICAL
Qty: 15 ML | Refills: 3 | Status: SHIPPED | OUTPATIENT
Start: 2023-11-14

## 2023-11-22 ENCOUNTER — THERAPY VISIT (OUTPATIENT)
Dept: PHYSICAL THERAPY | Facility: CLINIC | Age: 68
End: 2023-11-22
Attending: FAMILY MEDICINE
Payer: COMMERCIAL

## 2023-11-22 DIAGNOSIS — M25.551 HIP PAIN, RIGHT: Primary | ICD-10-CM

## 2023-11-22 PROCEDURE — 97110 THERAPEUTIC EXERCISES: CPT | Mod: GP | Performed by: PHYSICAL THERAPIST

## 2023-11-22 PROCEDURE — 97161 PT EVAL LOW COMPLEX 20 MIN: CPT | Mod: GP | Performed by: PHYSICAL THERAPIST

## 2023-11-22 NOTE — PROGRESS NOTES
PHYSICAL THERAPY EVALUATION  Type of Visit: Evaluation    See electronic medical record for Abuse and Falls Screening details.    Subjective       Presenting condition or subjective complaint:  Pt reports that since his last physical therapy episode of care for his right hip, his pain has increased.  The pain is the same type of pain with a burning, tingling, and aching pain.  Reports the tingling down his leg to his foot.  Reports tingling pain gets worse at night and notices difficulty getting out of bed at night to go the bathroom.  Reports that he tosses and turns at night.  Denies low back pain, however reports occasional stiffness.  Reports currently doing his HEP every other day.  Doesn't like holding the stretching for 30 sec and doesn't like the lunging exericses.  Reports having arthritis in his hip and back.  Date of onset: 09/20/23    Relevant medical history:     Past Medical History:   Diagnosis Date    Cerumen impaction     Chronic kidney disease     Colon polyps     Hyperlipidemia     Mitral valve prolapse     Schizophrenia (H)     Ulcerated penile lesions     VSD (ventricular septal defect)      Dates & types of surgery:  Hernia surgery August 2023    Prior diagnostic imaging/testing results:       IMPRESSION:  1.  Probable nondisplaced tearing of the right acetabular labrum anterosuperiorly and low-grade chondromalacia of the adjacent acetabulum and femoral head.  2.  No right hip joint effusion or synovitis.  3.  No acute fracture.  4.  Proximal right adductor kelly tendinopathy and peritendinitis. No tendon tear.  5.  Mild degenerative arthritis of the right SI joint with mild marrow edema.  6.  Mild marrow edema in the left pubic bone could be related to abnormal stress.  7.  1.8 x 1.8 x 2.8 cm nonaggressive sclerotic or calcified marrow-replacing lesion in the intertrochanteric proximal right femur. This is likely benign, primary considerations include enchondroma and liposclerosing  myxofibrous tumor. Recommend one year   follow-up radiographs to confirm stability.  8.  Additional 7 mm marrow-replacing lesion in the proximal right femur is nonspecific but likely benign.  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   Hobbies/Interests:  Playing cards, taking care of cat and dog    Patient goals for therapy:  To reduce pain, to be able to go up and down stairs with less pain, to stand and walk with less pain, to wake up with less pain    Pain assessment: Location: R hip pain/Ratin/10     Objective   HIP EVALUATION  PAIN: Pain Level at Rest: 5/10  Pain Level with Use: 7/10  Pain Location: hip  Pain Quality: Aching and Sharp  Pain Frequency: constant  Pain is Worst: nighttime  Pain is Exacerbated By: sitting, walking, lifting, carrying, rest, daily self-care tasks  Pain is Relieved By: none  Pain Progression: Worsened  INTEGUMENTARY (edema, incisions): WNL  POSTURE: Sitting Posture: Rounded shoulders, Forward head  GAIT:   Weightbearing Status: WBAT  Assistive Device(s): None  Gait Deviations:  decreased hip flexion on R LE  BALANCE/PROPRIOCEPTION:  not assessed due to time  ROM:   Lumbar flexion: 65* - pain in lateral R hip and increased numbness and tingling in leg  Lumbar ext: 30*  Lumbar Side Bend R: 4 inches from knee joint line; L: 4 inches from knee joint line    (Degrees) Left AROM Left PROM  Right AROM Right PROM   Hip Flexion 110*  95* - numbness in foot and leg with movement    Hip Extension       Hip Abduction 25*  25* - pain in lateral leg    Hip Adduction       Hip Internal Rotation 35*  25*     Hip External Rotation 40*  30*    Knee Flexion       Knee Extension       Lumbar Side glide     Lumbar Flexion    Lumbar Extension      PELVIC/SI SCREEN:  Thigh Thrust: (+) R  STRENGTH:   Pain: - none + mild ++ moderate +++ severe  Strength Scale: 0-5/5 Left Right   Hip Flexion 5 5   Hip Extension 3+ 3+   Hip Abduction 3+ 3-   Hip Adduction     Hip Internal Rotation 5- 5-   Hip External Rotation 5 5   Knee Flexion 4- 4-   Knee Extension 5; Pain in lateral leg 5     LE FLEXIBILITY: Decreased hamstrings L, Decreased hip IR R, Decreased hip ER R, Decreased piriformis R, Decreased hamstrings R  SPECIAL TESTS:    Left Right   CAYETANO Negative  Positive   FADIR/Labrum/MYLA Negative  Negative    Femoral Nerve Negative  Negative    Zohra's Negative  Negative    Piriformis Negative  Positive   Quadrant Testing     SLR Negative  Positive   Slump     Stork with Extension     Vishal            FUNCTIONAL TESTS:  Not completed due to time  PALPATION:  TTP R piriformis, R lumbar paraspinals, R transverse process of L1-L5, R SIJ, R greater trochanter, R ITBand  JOINT MOBILITY: Hypomobility L1-L5, hip joint not assessed at this time due to pain with prolonged supine position    Assessment & Plan   CLINICAL IMPRESSIONS  Medical Diagnosis: Hip pain, right    Treatment Diagnosis: Low back pain and reduced mobility, right hip pain with reduced moblity and weakness   Impression/Assessment:     Clinical Decision Making (Complexity):  Clinical Presentation: Stable/Uncomplicated  Clinical Presentation Rationale: based on medical and personal factors listed in PT evaluation  Clinical Decision Making (Complexity): Low complexity    PLAN OF CARE  Treatment Interventions:  Modalities: Cryotherapy, E-stim, Hot Pack, Mechanical Traction, Ultrasound  Interventions: Gait Training, Manual Therapy, Neuromuscular Re-education, Therapeutic Activity, Therapeutic Exercise, Self-Care/Home Management    Long Term Goals     PT Goal 1  Goal Identifier: Lumbar  flexoin ROM  Goal Description: Pt will demonstrate 90* lumbar flexion with 50% reduction in symptoms in order to reach down to the floor.  Target Date: 12/20/23  PT Goal 2  Goal Identifier: Walking  Goal Description: Pt will report walking 2 blocks with <4/10 pain in order to ambulate in the community.  Target Date: 01/17/24  PT Goal 3  Goal Identifier: Stairs  Goal Description: Pt will report independence with navigating stairs with <4/10 pain.  Target Date: 01/17/24  PT Goal 4  Goal Identifier: HEP  Goal Description: Pt will be independent with HEP in order to maintain improvements upon discharge.  Target Date: 01/17/24      Frequency of Treatment: 1 x a week  Duration of Treatment: 8 week    Recommended Referrals to Other Professionals:  none  Education Assessment:   Learner/Method: Patient;Caregiver  Education Comments: anatomy of lumbar spine and lumbar radicular symptoms    Risks and benefits of evaluation/treatment have been explained.   Patient/Family/caregiver agrees with Plan of Care.     Evaluation Time:     PT Eval, Low Complexity Minutes (19942): 30       Signing Clinician: Perlita Maradiaga, DEANGELO      [facility]                                                                                   OUTPATIENT PHYSICAL THERAPY      PLAN OF TREATMENT FOR OUTPATIENT REHABILITATION   Patient's Last Name, First Name, Geovani Baker YOB: 1955   Provider's Name   [facility]   Medical Record No.  [id number]     Onset Date: 09/20/23  Start of Care Date: 11/22/23     Medical Diagnosis:  Hip pain, right      PT Treatment Diagnosis:  Low back pain and reduced mobility, right hip pain with reduced moblity and weakness Plan of Treatment  Frequency/Duration: 1 x a week/ 8 week    Certification date from 11/22/23 to 01/17/24         See note for plan of treatment details and functional goals     Perlita Maradiaga, PT                          I CERTIFY THE NEED FOR THESE SERVICES FURNISHED UNDER        THIS PLAN OF TREATMENT AND WHILE UNDER MY CARE     (Physician attestation of this document indicates review and certification of the therapy plan).              Referring Provider:  Alexandru Vera    Initial Assessment  See Epic Evaluation- Start of Care Date: 11/22/23

## 2023-11-29 ENCOUNTER — TELEPHONE (OUTPATIENT)
Dept: ORTHOPEDICS | Facility: CLINIC | Age: 68
End: 2023-11-29

## 2023-11-29 ENCOUNTER — THERAPY VISIT (OUTPATIENT)
Dept: PHYSICAL THERAPY | Facility: CLINIC | Age: 68
End: 2023-11-29
Attending: FAMILY MEDICINE
Payer: COMMERCIAL

## 2023-11-29 DIAGNOSIS — M25.551 HIP PAIN, RIGHT: Primary | ICD-10-CM

## 2023-11-29 PROCEDURE — 97110 THERAPEUTIC EXERCISES: CPT | Mod: GP | Performed by: PHYSICAL THERAPIST

## 2023-11-29 PROCEDURE — 97116 GAIT TRAINING THERAPY: CPT | Mod: GP | Performed by: PHYSICAL THERAPIST

## 2023-11-29 NOTE — TELEPHONE ENCOUNTER
Left detailed message on  Ethan's identified voicemail relaying information from Dr Vera and to call care team with any questions.   Kamla MAGANA RN

## 2023-11-29 NOTE — TELEPHONE ENCOUNTER
Reviewed with PCP.  Given chronic kidney disease, would not recommend ibuprofen.    If pain is not improving with PT plan of care also included  -We also discussed other future treatment options:  US guided right hip injection  Referral to orthopedic surgery       Let me know if Srinivas wants a referral for an injection, can follow up in clinic to discuss with me as well.    Let me know if additional questions.  Sangeetha Schmitt MD

## 2023-11-29 NOTE — TELEPHONE ENCOUNTER
Called [facility], spoke with Amelia. She is stating that Srinivas has a standing order for tylenol only that is prescribed by Dr. Vera.  He is consuming 2000mg daily of Tylenol and that is not working.   They are not allowed to administer any medications not on a standing order to Srinivas.  [facility] is requesting ibuprofen be added to the standing orders as we see appropriate.      Korin Morel ATC, CSCS  Dr. Schmitt's Clinical Coordinator  [facility]

## 2023-11-29 NOTE — TELEPHONE ENCOUNTER
Called Amelia, spoke with her regarding ibuprofen and relayed information given from Dr. Schmitt and PCP.   She is wanting a phone call returned from Dr. Schmitt to; [phone]      She is not satisfied with the answers and is seeking further options for pain control.    They have an OV/injection scheduled with Dr. Hooker on 12/7/23.    Of note; Amelia Driscoll is  for [facility]    Korin Morel [facility], CSCS  Dr. Schmitt's Clinical Coordinator  [facility] Sports Medicine Team

## 2023-11-29 NOTE — TELEPHONE ENCOUNTER
[facility] Call Center    Phone Message    May a detailed message be left on voicemail: yes     Reason for Call: Other: pt is wondering if Dr. Schmitt would be willing to prescribe ibuprofen and send to [facility] pharmacy? Can care team call pt back to discuss? [phone]     Action Taken: Other: [facility] [[id number]]    Travel Screening: Not Applicable

## 2023-11-29 NOTE — TELEPHONE ENCOUNTER
Dr. Vera saw this patient more recently. Will reach out to him about additional pain medication options prior to injection next week given Srinivas is currently on multiple medications.    Sounds like pain has worsened since my visit in September.    Sangeetha Schmitt MD

## 2023-11-29 NOTE — TELEPHONE ENCOUNTER
NSAIDs are contraindicated considering history of CKD stage III.  Will recommend Tylenol, Voltaren gel, Lidoderm patches for pain control along with physical therapy..  Orthopedic can consider joint injection as well.     Dr Vera

## 2023-12-06 ENCOUNTER — THERAPY VISIT (OUTPATIENT)
Dept: PHYSICAL THERAPY | Facility: CLINIC | Age: 68
End: 2023-12-06
Attending: FAMILY MEDICINE
Payer: COMMERCIAL

## 2023-12-06 DIAGNOSIS — M25.551 HIP PAIN, RIGHT: Primary | ICD-10-CM

## 2023-12-06 PROCEDURE — 97110 THERAPEUTIC EXERCISES: CPT | Mod: GP | Performed by: PHYSICAL THERAPIST

## 2023-12-07 ENCOUNTER — OFFICE VISIT (OUTPATIENT)
Dept: ORTHOPEDICS | Facility: CLINIC | Age: 68
End: 2023-12-07
Payer: COMMERCIAL

## 2023-12-07 DIAGNOSIS — G89.29 CHRONIC PAIN OF RIGHT HIP: ICD-10-CM

## 2023-12-07 DIAGNOSIS — M25.551 CHRONIC PAIN OF RIGHT HIP: ICD-10-CM

## 2023-12-07 PROCEDURE — 20611 DRAIN/INJ JOINT/BURSA W/US: CPT | Mod: RT | Performed by: FAMILY MEDICINE

## 2023-12-07 RX ORDER — TRIAMCINOLONE ACETONIDE 40 MG/ML
40 INJECTION, SUSPENSION INTRA-ARTICULAR; INTRAMUSCULAR
Status: SHIPPED | OUTPATIENT
Start: 2023-12-07

## 2023-12-07 RX ORDER — ROPIVACAINE HYDROCHLORIDE 5 MG/ML
3 INJECTION, SOLUTION EPIDURAL; INFILTRATION; PERINEURAL
Status: SHIPPED | OUTPATIENT
Start: 2023-12-07

## 2023-12-07 RX ADMIN — TRIAMCINOLONE ACETONIDE 40 MG: 40 INJECTION, SUSPENSION INTRA-ARTICULAR; INTRAMUSCULAR at 10:30

## 2023-12-07 RX ADMIN — ROPIVACAINE HYDROCHLORIDE 3 ML: 5 INJECTION, SOLUTION EPIDURAL; INFILTRATION; PERINEURAL at 10:30

## 2023-12-07 NOTE — PROGRESS NOTES
Geovani Bustos  :  1955  DOS: 2023  MRN: [id number]    Sports Medicine Clinic Procedure    Ultrasound Guided Right Intra-Articular Hip Injection    Clinical History: Gradual onset of progressing right hip joint pain over the past 6+ months.  Patient has noted minimal improvement with physical therapy.  Patient antalgic gait noted observing patient transfer from sit to stand position.    Diagnosis:   1. Chronic pain of right hip      Referring Physician: Sangeetha Schmitt MD  Large Joint Injection/Arthocentesis: R hip joint    Date/Time: 2023 10:30 AM    Performed by: Jayesh Hooker DO  Authorized by: Jayesh Hooker DO    Indications:  Pain and diagnostic evaluation  Needle Size:  22 G  Guidance: ultrasound    Approach:  Anterior  Location:  Hip      Site:  R hip joint  Medications:  40 mg triamcinolone 40 MG/ML; 3 mL ROPivacaine 5 MG/ML  Outcome:  Tolerated well, no immediate complications  Procedure discussed: discussed risks, benefits, and alternatives    Consent Given by:  Patient  Timeout: timeout called immediately prior to procedure    Prep: patient was prepped and draped in usual sterile fashion     4 ml's of 1% lidocaine was used as local anesthetic prior to injection    Ultrasound images of procedure were permanently stored.       Impression:  Successful Right intra-articular hip injection.    Plan:  Follow up as directed by Dr Schmitt  Expectations and goals of CSI reviewed  Often 2-3 days for steroid effect, and can take up to two weeks for maximum effect  We discussed modified progressive pain-free activity as tolerated  Do not overuse in first two weeks if feeling better due to concern for vulnerability while steroid is working  Supportive care reviewed  All questions were answered today  Contact us with additional questions or concerns  Signs and sx of concern reviewed      Jayesh Hooker DO, CAQ  Primary Care Sports Medicine  [facility]

## 2023-12-07 NOTE — LETTER
2023         RE: Geovani Bustos  [address] [facility] [address]        Dear Colleague,    Thank you for referring your patient, Geovani Bustos, to the [facility] [place]. Please see a copy of my visit note below.    Geovani Bustos  :  1955  DOS: 2023  MRN: [id number]    Sports Medicine Clinic Procedure    Ultrasound Guided Right Intra-Articular Hip Injection    Clinical History: Gradual onset of progressing right hip joint pain over the past 6+ months.  Patient has noted minimal improvement with physical therapy.  Patient antalgic gait noted observing patient transfer from sit to stand position.    Diagnosis:   1. Chronic pain of right hip      Referring Physician: Sangeetha Schmitt MD  Large Joint Injection/Arthocentesis: R hip joint    Date/Time: 2023 10:30 AM    Performed by: Jayesh Hooker DO  Authorized by: Jayesh Hooker DO    Indications:  Pain and diagnostic evaluation  Needle Size:  22 G  Guidance: ultrasound    Approach:  Anterior  Location:  Hip      Site:  R hip joint  Medications:  40 mg triamcinolone 40 MG/ML; 3 mL ROPivacaine 5 MG/ML  Outcome:  Tolerated well, no immediate complications  Procedure discussed: discussed risks, benefits, and alternatives    Consent Given by:  Patient  Timeout: timeout called immediately prior to procedure    Prep: patient was prepped and draped in usual sterile fashion     4 ml's of 1% lidocaine was used as local anesthetic prior to injection    Ultrasound images of procedure were permanently stored.       Impression:  Successful Right intra-articular hip injection.    Plan:  Follow up as directed by Dr Schmitt  Expectations and goals of CSI reviewed  Often 2-3 days for steroid effect, and can take up to two weeks for maximum effect  We discussed modified progressive pain-free activity as tolerated  Do not overuse in first two weeks if feeling better due to concern for  vulnerability while steroid is working  Supportive care reviewed  All questions were answered today  Contact us with additional questions or concerns  Signs and sx of concern reviewed      Jayesh Hooker DO, CAQ  Primary Care Sports Medicine  [facility]       Again, thank you for allowing me to participate in the care of your patient.        Sincerely,        Jayesh Hooker DO

## 2023-12-13 ENCOUNTER — THERAPY VISIT (OUTPATIENT)
Dept: PHYSICAL THERAPY | Facility: CLINIC | Age: 68
End: 2023-12-13
Attending: FAMILY MEDICINE
Payer: COMMERCIAL

## 2023-12-13 ENCOUNTER — LAB (OUTPATIENT)
Dept: LAB | Facility: CLINIC | Age: 68
End: 2023-12-13
Payer: COMMERCIAL

## 2023-12-13 ENCOUNTER — TELEPHONE (OUTPATIENT)
Dept: ORTHOPEDICS | Facility: CLINIC | Age: 68
End: 2023-12-13

## 2023-12-13 DIAGNOSIS — M25.551 HIP PAIN, RIGHT: Primary | ICD-10-CM

## 2023-12-13 DIAGNOSIS — M79.604 RIGHT LEG PAIN: ICD-10-CM

## 2023-12-13 DIAGNOSIS — H60.543 ECZEMA OF BOTH EXTERNAL EARS: ICD-10-CM

## 2023-12-13 DIAGNOSIS — E03.9 ACQUIRED HYPOTHYROIDISM: ICD-10-CM

## 2023-12-13 DIAGNOSIS — J44.9 STAGE 1 MILD COPD BY GOLD CLASSIFICATION (H): ICD-10-CM

## 2023-12-13 LAB
T4 FREE SERPL-MCNC: 1.44 NG/DL (ref 0.9–1.7)
TSH SERPL DL<=0.005 MIU/L-ACNC: 4.38 UIU/ML (ref 0.3–4.2)

## 2023-12-13 PROCEDURE — 36415 COLL VENOUS BLD VENIPUNCTURE: CPT

## 2023-12-13 PROCEDURE — 84439 ASSAY OF FREE THYROXINE: CPT

## 2023-12-13 PROCEDURE — 84443 ASSAY THYROID STIM HORMONE: CPT

## 2023-12-13 PROCEDURE — 97110 THERAPEUTIC EXERCISES: CPT | Mod: GP | Performed by: PHYSICAL THERAPIST

## 2023-12-13 NOTE — TELEPHONE ENCOUNTER
[facility] Call Center    Phone Message    May a detailed message be left on voicemail: yes     Reason for Call: Patient asking for Call Back. Want to Discuss MRI for Back Pain.     Action Taken:     [facility] [place] [facility]       Travel Screening: Not Applicable

## 2023-12-14 ENCOUNTER — TELEPHONE (OUTPATIENT)
Dept: FAMILY MEDICINE | Facility: CLINIC | Age: 68
End: 2023-12-14
Payer: COMMERCIAL

## 2023-12-14 RX ORDER — FLUTICASONE FUROATE AND VILANTEROL TRIFENATATE 100; 25 UG/1; UG/1
1 POWDER RESPIRATORY (INHALATION) DAILY
Qty: 60 EACH | Refills: 5 | Status: SHIPPED | OUTPATIENT
Start: 2023-12-14 | End: 2024-06-03

## 2023-12-14 RX ORDER — CLOBETASOL PROPIONATE 0.5 MG/G
OINTMENT TOPICAL
Qty: 30 G | Refills: 3 | Status: SHIPPED | OUTPATIENT
Start: 2023-12-14 | End: 2024-01-10

## 2023-12-14 NOTE — TELEPHONE ENCOUNTER
Order/Referral Request    Who is requesting: , Ethan Driscoll    Orders being requested: MRI of his lower back    Reason service is needed/diagnosis: Ethan says he had MRI of his R hip. He has seen PT and has had an injection which has not helped. The PT thinks this pain is coming from his right lower back and so they are asking if Dr Vera could order MRI ot this area. He will be making apt to see Dr Hooker but would like this done before he would be seeing him.     When are orders needed by: OK to wait until Dr Vera returns to the office next week    Has this been discussed with Provider: No    Does patient have a preference on a Group/Provider/Facility? No    Does patient have an appointment scheduled?: No    Where to send orders: Place orders within Epic    Okay to leave a detailed message?: Yes at Other phone number:  [phone]

## 2023-12-15 NOTE — TELEPHONE ENCOUNTER
Reviewed with PT who thinks Srinivas's pain is lumbar related.  I did order a lumbar MRI for this patient please instruct him on how to schedule.    I would recommend he follow-up with spine physician after MRI to review results.  I have placed this order as well.  They will call him to schedule, make sure this appointment is after the MRI.    Let me know if additional questions.  Sangeetha Schmitt MD

## 2023-12-15 NOTE — TELEPHONE ENCOUNTER
Contacted patient/care provider regarding his next steps in care.  They are informed of MRI for lumbar spine in addition to following up with a spine provider.  All orders are placed and patient/care provider is understanding.    Korin Morel ATC, CSCS  Dr. Schmitt's Clinical Coordinator  [facility] Sports Medicine Team

## 2023-12-17 DIAGNOSIS — E03.9 HYPOTHYROIDISM (ACQUIRED): Primary | ICD-10-CM

## 2023-12-18 NOTE — TELEPHONE ENCOUNTER
Left message on confidential voice mail for Amelia with Dr Vera's message.  Jennifer Valencia RN

## 2023-12-20 ENCOUNTER — THERAPY VISIT (OUTPATIENT)
Dept: PHYSICAL THERAPY | Facility: CLINIC | Age: 68
End: 2023-12-20
Attending: FAMILY MEDICINE
Payer: COMMERCIAL

## 2023-12-20 DIAGNOSIS — M25.551 HIP PAIN, RIGHT: Primary | ICD-10-CM

## 2023-12-20 PROCEDURE — 97110 THERAPEUTIC EXERCISES: CPT | Mod: GP | Performed by: PHYSICAL THERAPIST

## 2023-12-29 DIAGNOSIS — E78.5 HYPERLIPIDEMIA LDL GOAL <130: ICD-10-CM

## 2024-01-02 ENCOUNTER — OFFICE VISIT (OUTPATIENT)
Dept: FAMILY MEDICINE | Facility: CLINIC | Age: 69
End: 2024-01-02
Payer: COMMERCIAL

## 2024-01-02 ENCOUNTER — HOSPITAL ENCOUNTER (OUTPATIENT)
Dept: MRI IMAGING | Facility: CLINIC | Age: 69
Discharge: HOME | End: 2024-01-02
Attending: PEDIATRICS | Admitting: PEDIATRICS
Payer: COMMERCIAL

## 2024-01-02 ENCOUNTER — TELEPHONE (OUTPATIENT)
Dept: FAMILY MEDICINE | Facility: CLINIC | Age: 69
End: 2024-01-02

## 2024-01-02 VITALS
BODY MASS INDEX: 30.32 KG/M2 | SYSTOLIC BLOOD PRESSURE: 110 MMHG | DIASTOLIC BLOOD PRESSURE: 74 MMHG | WEIGHT: 182 LBS | HEART RATE: 73 BPM | RESPIRATION RATE: 18 BRPM | HEIGHT: 65 IN | OXYGEN SATURATION: 88 % | TEMPERATURE: 97.4 F

## 2024-01-02 DIAGNOSIS — M25.551 HIP PAIN, RIGHT: ICD-10-CM

## 2024-01-02 DIAGNOSIS — M79.604 RIGHT LEG PAIN: ICD-10-CM

## 2024-01-02 DIAGNOSIS — J44.9 STAGE 1 MILD COPD BY GOLD CLASSIFICATION (H): Primary | ICD-10-CM

## 2024-01-02 PROCEDURE — 99213 OFFICE O/P EST LOW 20 MIN: CPT | Performed by: FAMILY MEDICINE

## 2024-01-02 PROCEDURE — 72148 MRI LUMBAR SPINE W/O DYE: CPT

## 2024-01-02 PROCEDURE — 72148 MRI LUMBAR SPINE W/O DYE: CPT | Mod: 26 | Performed by: RADIOLOGY

## 2024-01-02 RX ORDER — RESPIRATORY SYNCYTIAL VIRUS VACCINE 120MCG/0.5
0.5 KIT INTRAMUSCULAR ONCE
Qty: 1 EACH | Refills: 0 | Status: CANCELLED | OUTPATIENT
Start: 2024-01-02 | End: 2024-01-02

## 2024-01-02 RX ORDER — IPRATROPIUM BROMIDE AND ALBUTEROL SULFATE 2.5; .5 MG/3ML; MG/3ML
1 SOLUTION RESPIRATORY (INHALATION) EVERY 6 HOURS PRN
Qty: 90 ML | Refills: 1 | Status: SHIPPED | OUTPATIENT
Start: 2024-01-02

## 2024-01-02 RX ORDER — SIMVASTATIN 40 MG
TABLET ORAL
Qty: 30 TABLET | Refills: 2 | Status: SHIPPED | OUTPATIENT
Start: 2024-01-02 | End: 2024-03-18

## 2024-01-02 ASSESSMENT — PAIN SCALES - GENERAL: PAINLEVEL: EXTREME PAIN (8)

## 2024-01-02 NOTE — TELEPHONE ENCOUNTER
Prior Authorization Retail Medication Request    Medication/Dose: Inno Spire Essence nebulizer   Diagnosis and ICD code (if different than what is on RX):    New/renewal/insurance change PA/secondary ins. PA:  Previously Tried and Failed:    Rationale:      Insurance   Primary:   Insurance ID:      Secondary (if applicable):  Insurance ID:      Pharmacy Information (if different than what is on RX)  Name:  [facility]  Phone:  [phone]  Fax:[phone]    CMM:  [place]: [id number]

## 2024-01-02 NOTE — NURSING NOTE
"Chief Complaint   Patient presents with    Cough     /74 (Cuff Size: Adult Regular)   Pulse 73   Temp 97.4  F (36.3  C) (Tympanic)   Resp 18   Ht 1.651 m (5' 5\")   Wt 82.6 kg (182 lb)   SpO2 (!) 88%   BMI 30.29 kg/m   Estimated body mass index is 30.29 kg/m  as calculated from the following:    Height as of this encounter: 1.651 m (5' 5\").    Weight as of this encounter: 82.6 kg (182 lb).  Patient presents to the clinic using No DME      Health Maintenance that is potentially due pending provider review:    Health Maintenance Due   Topic Date Due    RSV VACCINE (Pregnancy & 60+) (1 - 1-dose 60+ series) Never done    INFLUENZA VACCINE (1) 09/01/2023    COVID-19 Vaccine (6 - 2023-24 season) 09/01/2023                  "

## 2024-01-02 NOTE — PROGRESS NOTES
"  Assessment & Plan     Stage 1 mild COPD by GOLD classification (H)  Patient was having some lingering cough last month, improved.  Stressed on smoking cessation.  DuoNeb prescribed and recommended to continue Breo and albuterol inhaler.  Return criteria explained.  All questions answered.  - ipratropium - albuterol 0.5 mg/2.5 mg/3 mL (DUONEB) 0.5-2.5 (3) MG/3ML neb solution; Take 1 vial (3 mLs) by nebulization every 6 hours as needed for shortness of breath, wheezing or cough  - Nebulizer and Supplies Order for DME - ONLY FOR DME  - Miscellaneous Order for DME - ONLY FOR DME      Alexandru Vera MD  [facility]    Markell Espino is a 68 year old, presenting for the following health issues:  Cough      History of Present Illness       Reason for visit:  Cough follow up  Symptoms include:  Oxygen at 88% today.  Patient states he does not feel short of breath.  Symptom intensity:  Mild  Symptom progression:  Staying the same    He eats 0-1 servings of fruits and vegetables daily.He consumes 2 sweetened beverage(s) daily.He exercises with enough effort to increase his heart rate 9 or less minutes per day.  He exercises with enough effort to increase his heart rate 3 or less days per week.   He is taking medications regularly.       Review of Systems   Constitutional, HEENT, cardiovascular, pulmonary, gi and gu systems are negative, except as otherwise noted.        Objective    /74 (Cuff Size: Adult Regular)   Pulse 73   Temp 97.4  F (36.3  C) (Tympanic)   Resp 18   Ht 1.651 m (5' 5\")   Wt 82.6 kg (182 lb)   SpO2 (!) 88%   BMI 30.29 kg/m    Body mass index is 30.29 kg/m .  Physical Exam   GENERAL: alert and no distress  HEENT: Normal oropharynx  NECK: no adenopathy, no asymmetry, masses, or scars and thyroid normal to palpation  RESP: lungs clear to auscultation - no rales, rhonchi or wheezes  CV: Soft systolic murmur, regular rhythm, JVP nondistended  MS: extremities normal- " no gross deformities noted  NEURO: Grossly intact

## 2024-01-04 NOTE — TELEPHONE ENCOUNTER
At this time the PA team does not handle DME/Medical authorizations. Only PA's for medications through pharmacy benefits.

## 2024-01-09 ENCOUNTER — TELEPHONE (OUTPATIENT)
Dept: UROLOGY | Facility: CLINIC | Age: 69
End: 2024-01-09

## 2024-01-09 ENCOUNTER — OFFICE VISIT (OUTPATIENT)
Dept: PHYSICAL MEDICINE AND REHAB | Facility: CLINIC | Age: 69
End: 2024-01-09
Attending: PEDIATRICS
Payer: COMMERCIAL

## 2024-01-09 VITALS — HEART RATE: 67 BPM | DIASTOLIC BLOOD PRESSURE: 78 MMHG | SYSTOLIC BLOOD PRESSURE: 107 MMHG | OXYGEN SATURATION: 90 %

## 2024-01-09 DIAGNOSIS — M54.16 LUMBAR RADICULOPATHY: Primary | ICD-10-CM

## 2024-01-09 DIAGNOSIS — M79.604 RIGHT LEG PAIN: ICD-10-CM

## 2024-01-09 DIAGNOSIS — R39.81 FUNCTIONAL URINARY INCONTINENCE: Primary | ICD-10-CM

## 2024-01-09 DIAGNOSIS — H60.543 ECZEMA OF BOTH EXTERNAL EARS: ICD-10-CM

## 2024-01-09 DIAGNOSIS — M25.551 HIP PAIN, RIGHT: ICD-10-CM

## 2024-01-09 PROCEDURE — 99204 OFFICE O/P NEW MOD 45 MIN: CPT | Performed by: PAIN MEDICINE

## 2024-01-09 ASSESSMENT — PAIN SCALES - GENERAL: PAINLEVEL: SEVERE PAIN (7)

## 2024-01-09 NOTE — PATIENT INSTRUCTIONS
[facility] Injection Requirements    A  is required for all fluoroscopically-guided injections.  Injection appointments may be cancelled if there are signs/symptoms of an active infection or if the patient is being actively treated with antibiotics for a diagnosed infection.  Patients may have their steroid injection cancelled if they have had another steroid injection within 2 weeks.  Diabetic patients will have their blood glucose levels checked the day of their injection and the appointment will be rescheduled if the blood glucose level is 300 or higher.  Patients with allergies to cortisone, local anesthetics, iodine, or contrast dye should contact the Spine Center to further discuss these considerations.  Patients scheduled for medial branch block diagnostic injections should refrain from taking pain medication the day of the procedure.  The medial branch block injection appointment will be rescheduled if the patient's pain rating is not 5/10 or greater at the time of the procedure.  Patients taking warfarin/Coumadin will have their INR checked the day of the procedure and the procedure may be rescheduled if the INR is greater than 3.0.  Please contact the Spine Center (#[phone]) if you are taking any prescription blood-thinning medications (warfarin, Plavix, Lovenox, Eliquis, Brilinta, Effient, etc.) as special dosing adjustments may need to be made depending on the type of injection you are scheduled to receive.  It is recommended that you delay having your steroid injection if you have received any vaccines within 2 weeks.    ~Please call Nurse Navigation line [phone] with any questions or concerns about your treatment plan, if symptoms worsen and you would like to be seen urgently, or if you have problems controlling bladder and bowel function.

## 2024-01-09 NOTE — TELEPHONE ENCOUNTER
Reason for Call:  Other prescription    Detailed comments: Amelia  is calling from group home asking if she can get an order for male pads. Pt is having trouble getting up fast enough to go the the bathroom and is having a little incontinence. Pharmacy is [facility]    Phone Number Patient can be reached at: Home number on file [phone] (home)    Best Time:       Can we leave a detailed message on this number? YES    Call taken on 1/9/2024 at 1:39 PM by Chastity Bar MA

## 2024-01-09 NOTE — LETTER
1/9/2024         RE: Geovani Bustos  [address]  [place] [address]        Dear Colleague,    Thank you for referring your patient, Geovani Bustos, to the [facility]. Please see a copy of my visit note below.    ASSESSMENT: Geovani Bustos is a 68 year old male who presents for consultation at the request of [facility] PCP Alexandru Vera, with a past medical history significant for cigarette dependence, eczema, right hip pain, COPD, gastroesophageal reflux disease without esophagitis, hypothyroidism, hyperlipidemia, ventricular septal defect, mitral valve prolapse, incomplete right bundle branch block, bunion of great toe on right foot, tinea pedis, BPH, IgA nephropathy, chronic kidney disease stage III, schizophrenia, smoking, proteinuria who presents today for new patient evaluation of low back pain and right lower extremity pain:    -Overall patient's physical exam is reassuring that he has normal strength and reflexes in his lower extremities.  His pain is likely secondary to lumbar radiculopathy.    Patient is neurologically intact on exam. No myelopathic or red flag symptoms.       Diagnoses and all orders for this visit:  Lumbar radiculopathy  -     PAIN Transforaminal ANGÉLICA Inj Lumbosacral One Level Right; Future  Hip pain, right  -     Spine  Referral  Right leg pain  -     Spine  Referral    PLAN:  Reviewed spine anatomy and disease process. Discussed diagnosis and treatment options with the patient today. A shared decision making model was used.  The patient's values and choices were respected. The following represents what was discussed and decided upon by the provider and the patient.      -DIAGNOSTIC TESTS:  Images were personally reviewed and interpreted and explained to patient today using spine model.   -- MRI of lumbar spine dated 1/2/2024 is personally reviewed images interpreted and discussed with the patient.  There is  multilevel lumbar spondylosis without severe spinal canal stenosis.  There is severe right foraminal stenosis at L5-S1.  There is edema associated with the right greater than left L5-S1 facet joint and multilevel facet arthropathy.  -- MRI of right hip dated 9/13/2023 is personally reviewed images interpreted and discussed with the patient.  There is a probable nondisplaced tearing of the right acetabular labrum.  There are no right hip effusions or synovitis.  There is no fracture.  There is proximal right abductor kelly tendinopathy.  There is mild degenerative arthritis of the right SI joint.  There is a nonaggressive sclerotic lesion in the intertrochanteric proximal right femur this is likely benign with recommendations of 1 year follow-up.  There is 7 mm marrow replacing lesion in the proximal right femur which is nonspecific but likely benign.    -PHYSICAL THERAPY: Recommended continue with physical therapy and at back exercises now.  Discussed the importance of core strengthening, ROM, stretching exercises with the patient and how each of these entities is important in decreasing pain.  Explained to the patient that the purpose of physical therapy is to teach the patient a home exercise program.  These exercises need to be performed every day in order to decrease pain and prevent future occurrences of pain.        -MEDICATIONS: No changes to medications.  -  Discussed multiple medication options today with patient. Discussed risks, side effects, and proper use of medications. Patient verbalized understanding.    -INTERVENTIONS: I ordered a right L5-S1 transforaminal epidural steroid injection.  Could consider right L3, L4, L5 medial branch blocks in the future with an eye to radiofrequency ablation.  Discussed risks and benefits of injections with patient today.    -PATIENT EDUCATION: We discussed pain management in a multiple fashion including physical therapy, medication management, possible future  injections.    -FOLLOW-UP:   Patient will follow-up in 2 to 4 weeks after injections.    Advised patient to call the Spine Center if symptoms worsen or you have problems controlling bladder and bowel function.   ______________________________________________________________________    SUBJECTIVE:  HPI:  Geovani Bustos  Is a 68 year old male who presents today for new patient evaluation of low back pain.  Patient has had physical therapy and right hip joint injection recently.  Patient was referred to spine center for further evaluation.  Patient notes that he has had pain since October 2023.  He has pain in the right low back and buttock area with pain that goes down the posterior thigh and calf with numbness and tingling.  He had a hip joint injection on 12/7/2023 without relief.  Is been having physical therapy without relief.  Pain today is 7/10 its worst is 8/10 as best as 5/10.  He lives in a group home and his manager is here with him today.  He denies any bowel or bladder changes, fevers, chills, unintentional weight loss.  He has trialed Tylenol with no relief.  He is not able to have ibuprofen or other NSAIDs secondary to chronic kidney disease.    -Treatment to Date: MRI of lumbar spine dated 1/2/2024.  MRI of right hip dated 9/13/2023.  Right ultrasound-guided injection on 12/7/2023 physical therapy.    -Medications:    Current Outpatient Medications   Medication     ABILIFY 10 MG OR TABS     ADULT ASPIRIN REGIMEN 81 MG EC tablet     albuterol (VENTOLIN HFA) 108 (90 Base) MCG/ACT inhaler     clobetasol (TEMOVATE) 0.05 % external ointment     Disposable Gloves (LATEX GLOVES MEDIUM) MISC     Disposable Gloves (NITRILE EXAM GLOVES MEDIUM) MISC     EAR DROPS 6.5 % otic solution     finasteride (PROSCAR) 5 MG tablet     fluticasone-vilanterol (BREO ELLIPTA) 100-25 MCG/ACT inhaler     gabapentin (NEURONTIN) 100 MG capsule     ipratropium - albuterol 0.5 mg/2.5 mg/3 mL (DUONEB) 0.5-2.5 (3) MG/3ML neb solution      levothyroxine (SYNTHROID/LEVOTHROID) 137 MCG tablet     mirabegron (MYRBETRIQ) 50 MG 24 hr tablet     Omega-3 Fatty Acids (FISH OIL) 1200 MG capsule     order for DME     order for DME     oxybutynin ER (DITROPAN XL) 5 MG 24 hr tablet     polyethylene glycol (CLEARLAX) 17 GM/Dose powder     Psyllium (REGULOID) 48.57 % POWD     simvastatin (ZOCOR) 40 MG tablet     Skin Protectants, Misc. (EUCERIN) cream     tamsulosin (FLOMAX) 0.4 MG capsule     traZODone (DESYREL) 50 MG tablet     ZOLOFT 100 MG OR TABS     Current Facility-Administered Medications   Medication     3 mL ropivacaine (NAROPIN) injection 5 mg/mL     triamcinolone (KENALOG-40) injection 40 mg     Facility-Administered Medications Ordered in Other Visits   Medication     bupivacaine (MARCAINE) injection 0.5%       Allergies   Allergen Reactions     Nitrogen Oxide      Sulfa Antibiotics        Past Medical History:   Diagnosis Date     Cerumen impaction      Chronic kidney disease      Colon polyps      Hyperlipidemia      Mitral valve prolapse      Schizophrenia (H)      Ulcerated penile lesions      VSD (ventricular septal defect)         Patient Active Problem List   Diagnosis     Colon polyps     Schizophrenia (H)     VSD (ventricular septal defect)     Mitral valve prolapse     Acquired hypothyroidism     IgA nephropathy     Proteinuria     Hyperlipidemia LDL goal <130     BPH (benign prostatic hyperplasia)     Health Care Home     Bilateral impacted cerumen     Bunion of great toe of right foot     Eczema of external ear, bilateral     Tinea pedis, unspecified laterality     Incomplete right bundle branch block (RBBB)     Cigarette nicotine dependence with nicotine-induced disorder     Gastroesophageal reflux disease without esophagitis     Stage 1 mild COPD by GOLD classification (H)     CKD (chronic kidney disease) stage 3, GFR 30-59 ml/min (H)     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)     Smoking     Hip pain, right       Past  Surgical History:   Procedure Laterality Date     ARTHRODESIS FOOT Right 1/19/2016    Procedure: ARTHRODESIS FOOT;  Surgeon: Holden Harrison DPM;  Location: [facility] OR     ARTHRODESIS FOOT Left 1/3/2017    Procedure: ARTHRODESIS FOOT;  Surgeon: Holden Harrison DPM;  Location: [facility]     COLONOSCOPY N/A 5/19/2022    Procedure: COLONOSCOPY, FLEXIBLE, WITH LESION REMOVAL USING SNARE;  Surgeon: Ame Nelson MD;  Location: [facility] GI     HERNIORRHAPHY INGUINAL Right 6/8/2023    Procedure: HERNIORRHAPHY INGUINALOPEN right with mesh;  Surgeon: Ame Nelson MD;  Location: [facility] OR     SURGICAL HISTORY OF -       leg fracture       Family History   Problem Relation Age of Onset     Emphysema Mother      Coronary Artery Disease Father        Reviewed past medical, surgical, and family history with patient found on new patient intake packet located in EMR Media tab.     SOCIAL HX: He smokes 16 cigarettes/day.  He denies drinking alcohol or using recreational drugs.    ROS: Specifically negative for bowel/bladder dysfunction, balance changes, headache, dizziness, foot drop, fevers, chills, appetite changes, nausea/vomiting, unexplained weight loss. Otherwise 13 systems reviewed are negative. Please see the patient's intake questionnaire from today for details.    OBJECTIVE:  /78   Pulse 67   SpO2 90%     PHYSICAL EXAMINATION:    --CONSTITUTIONAL:  Vital signs as above.  No acute distress.  The patient is well nourished and well groomed.  --PSYCHIATRIC:  Appropriate mood and affect. The patient is awake, alert, oriented to person, place, time and answering questions appropriately with clear speech.    --SKIN:  Skin over the face, bilateral lower extremities, and posterior torso is clean, dry, intact without rashes.    --RESPIRATORY: Normal rhythm and effort. No abnormal accessory muscle breathing patterns noted.   --ABDOMINAL:  Soft, non-distended, non-tender throughout all quadrants.   --STANDING EXAMINATION:  Normal  lumbar lordosis noted, no lateral shift.  --MUSCULOSKELETAL: Lumbar spine inspection reveals no evidence of deformity. Range of motion is mildly limited in lumbar flexion, extension, lateral rotation.  Tenderness to lumbar spine on the right side. Straight leg raising in the supine position is negative to radicular pain.   --SACROILIAC JOINT: Negative distraction.  Negative Harrison's with reproduction of pain to affected extremity.  One Finger point test negative.  --GROSS MOTOR: Gait is non-antalgic. Easily arises from a seated position. Toe walking and heel walking are normal without significant difficulty.    --LOWER EXTREMITY MOTOR TESTING:  Plantar flexion left 5/5, right 5/5   Dorsiflexion left 5/5, right 5/5   Great toe MTP extension left 5/5, right 5/5  Knee flexion left 5/5, right 5/5  Knee extension left 5/5, right 5/5   Hip flexion left 5/5, right 5/5  Hip abduction left 5/5, right 5/5  Hip adduction left 5/5, right 5/5   --HIPS: Full range of motion bilaterally.  Stinchfield test is negative bilaterally.  --NEUROLOGICAL:  2/4 patellar  and achilles reflexes bilaterally.  Sensation to light touch is intact in the bilateral L4, L5, and S1 dermatomes. Babinski is negative.  --VASCULAR:   Bilateral lower extremities are warm.  There is no pitting edema of the bilateral lower extremities.    RESULTS: Prior medical records from [facility] primary care provider were reviewed today.    Imaging: Lumbar spine Imaging was personally reviewed and interpreted today. The images were shown to the patient and the findings were explained using a spine model.      MRI Lumbar spine w/o contrast    Result Date: 1/2/2024  MR LUMBAR SPINE W/O CONTRAST 1/2/2024 11:43 AM Provided History: eval radiating pain; Hip pain, right; Right leg pain ICD-10: Hip pain, right; Right leg pain Comparison: No prior similar studies Technique: Sagittal T1-weighted, sagittal STIR, sagittal diffusion-weighted (with ADC map), axial  T1-weighted, and 3D volumetric axial and sagittal reconstructed T2-weighted images of the lumbar spine were obtained without intravenous contrast. Findings: Multiple images are degraded by patient motion artifact. There are 5 lumbar-type vertebrae assumed for the purposes of this dictation.  The tip of the conus medullaris is at L1.  Mild anterolisthesis at L4-5.  There is multilevel disc height narrowing and disc desiccation.  Mild opposing endplate edema within the posterior aspect of L2-3 and L4-5. Multilevel Schmorl's-like endplate deformities. On a level by level basis: T12-L1: No spinal canal or neuroforaminal stenosis. L1-2: Disc bulge and superimposed tiny central protrusion. Bilateral facet hypertrophy. Mild bilateral neuroforaminal stenosis. No spinal canal stenosis. L2-3: Disc bulge osteophyte formation and bilateral facet hypertrophy. Mild bilateral neural foraminal stenosis. No spinal canal stenosis. L3-4: Disc bulge. Bilateral facet hypertrophy. Mild bilateral neural foraminal stenosis. No spinal canal stenosis. L4-5: Uncovered disc bulge and bilateral facet hypertrophy. Bilateral facet joint effusion. Mild bilateral neural foraminal stenosis. Mild spinal canal stenosis. L5-S1: Disc bulge and superimposed right central protrusion. T2 hypointense lesion adjacent to the anterior aspect of right facet joint, severely narrowing the neural foraminal entrance and possibly abutting the descending right S1 nerve root. There is edema bilateral facet joints and periarticular soft tissues, right greater than left. Edema also extends in the right L5 pedicle. Bilateral facet joint effusion. Paraspinous tissues are within normal limits. Right renal cyst.     Impression: 1. Multilevel lumbar spondylosis without high-grade spinal canal stenosis. T2 hypointense lesion adjacent to the anterior aspect of the right facet joint at L5-S1 severely narrows the neural foraminal entrance and possibly abuts the descending right  S1 nerve root. This may represent an disc extrusion or synovial cyst origin from the facet joint. 2. Edema associated with right greater than left L5-S1 facet joints and periarticular soft tissues, suggestive of active inflammatory arthropathy. RYAN PANDEY MD   SYSTEM ID:  [id number]           Again, thank you for allowing me to participate in the care of your patient.        Sincerely,        Serafin Dickson, DO

## 2024-01-09 NOTE — PROGRESS NOTES
ASSESSMENT: Geovani Bustos is a 68 year old male who presents for consultation at the request of [facility] PCP Alexandru Vera, with a past medical history significant for cigarette dependence, eczema, right hip pain, COPD, gastroesophageal reflux disease without esophagitis, hypothyroidism, hyperlipidemia, ventricular septal defect, mitral valve prolapse, incomplete right bundle branch block, bunion of great toe on right foot, tinea pedis, BPH, IgA nephropathy, chronic kidney disease stage III, schizophrenia, smoking, proteinuria who presents today for new patient evaluation of low back pain and right lower extremity pain:    -Overall patient's physical exam is reassuring that he has normal strength and reflexes in his lower extremities.  His pain is likely secondary to lumbar radiculopathy.    Patient is neurologically intact on exam. No myelopathic or red flag symptoms.       Diagnoses and all orders for this visit:  Lumbar radiculopathy  -     PAIN Transforaminal ANGÉLICA Inj Lumbosacral One Level Right; Future  Hip pain, right  -     Spine  Referral  Right leg pain  -     Spine  Referral    PLAN:  Reviewed spine anatomy and disease process. Discussed diagnosis and treatment options with the patient today. A shared decision making model was used.  The patient's values and choices were respected. The following represents what was discussed and decided upon by the provider and the patient.      -DIAGNOSTIC TESTS:  Images were personally reviewed and interpreted and explained to patient today using spine model.   -- MRI of lumbar spine dated 1/2/2024 is personally reviewed images interpreted and discussed with the patient.  There is multilevel lumbar spondylosis without severe spinal canal stenosis.  There is severe right foraminal stenosis at L5-S1.  There is edema associated with the right greater than left L5-S1 facet joint and multilevel facet arthropathy.  -- MRI of right hip dated  9/13/2023 is personally reviewed images interpreted and discussed with the patient.  There is a probable nondisplaced tearing of the right acetabular labrum.  There are no right hip effusions or synovitis.  There is no fracture.  There is proximal right abductor kelly tendinopathy.  There is mild degenerative arthritis of the right SI joint.  There is a nonaggressive sclerotic lesion in the intertrochanteric proximal right femur this is likely benign with recommendations of 1 year follow-up.  There is 7 mm marrow replacing lesion in the proximal right femur which is nonspecific but likely benign.    -PHYSICAL THERAPY: Recommended continue with physical therapy and at back exercises now.  Discussed the importance of core strengthening, ROM, stretching exercises with the patient and how each of these entities is important in decreasing pain.  Explained to the patient that the purpose of physical therapy is to teach the patient a home exercise program.  These exercises need to be performed every day in order to decrease pain and prevent future occurrences of pain.        -MEDICATIONS: No changes to medications.  -  Discussed multiple medication options today with patient. Discussed risks, side effects, and proper use of medications. Patient verbalized understanding.    -INTERVENTIONS: I ordered a right L5-S1 transforaminal epidural steroid injection.  Could consider right L3, L4, L5 medial branch blocks in the future with an eye to radiofrequency ablation.  Discussed risks and benefits of injections with patient today.    -PATIENT EDUCATION: We discussed pain management in a multiple fashion including physical therapy, medication management, possible future injections.    -FOLLOW-UP:   Patient will follow-up in 2 to 4 weeks after injections.    Advised patient to call the Spine Center if symptoms worsen or you have problems controlling bladder and bowel function.    ______________________________________________________________________    SUBJECTIVE:  HPI:  Geovani Bustos  Is a 68 year old male who presents today for new patient evaluation of low back pain.  Patient has had physical therapy and right hip joint injection recently.  Patient was referred to spine center for further evaluation.  Patient notes that he has had pain since October 2023.  He has pain in the right low back and buttock area with pain that goes down the posterior thigh and calf with numbness and tingling.  He had a hip joint injection on 12/7/2023 without relief.  Is been having physical therapy without relief.  Pain today is 7/10 its worst is 8/10 as best as 5/10.  He lives in a group home and his manager is here with him today.  He denies any bowel or bladder changes, fevers, chills, unintentional weight loss.  He has trialed Tylenol with no relief.  He is not able to have ibuprofen or other NSAIDs secondary to chronic kidney disease.    -Treatment to Date: MRI of lumbar spine dated 1/2/2024.  MRI of right hip dated 9/13/2023.  Right ultrasound-guided injection on 12/7/2023 physical therapy.    -Medications:    Current Outpatient Medications   Medication    ABILIFY 10 MG OR TABS    ADULT ASPIRIN REGIMEN 81 MG EC tablet    albuterol (VENTOLIN HFA) 108 (90 Base) MCG/ACT inhaler    clobetasol (TEMOVATE) 0.05 % external ointment    Disposable Gloves (LATEX GLOVES MEDIUM) MISC    Disposable Gloves (NITRILE EXAM GLOVES MEDIUM) MISC    EAR DROPS 6.5 % otic solution    finasteride (PROSCAR) 5 MG tablet    fluticasone-vilanterol (BREO ELLIPTA) 100-25 MCG/ACT inhaler    gabapentin (NEURONTIN) 100 MG capsule    ipratropium - albuterol 0.5 mg/2.5 mg/3 mL (DUONEB) 0.5-2.5 (3) MG/3ML neb solution    levothyroxine (SYNTHROID/LEVOTHROID) 137 MCG tablet    mirabegron (MYRBETRIQ) 50 MG 24 hr tablet    Omega-3 Fatty Acids (FISH OIL) 1200 MG capsule    order for DME    order for DME    oxybutynin ER (DITROPAN XL) 5 MG  24 hr tablet    polyethylene glycol (CLEARLAX) 17 GM/Dose powder    Psyllium (REGULOID) 48.57 % POWD    simvastatin (ZOCOR) 40 MG tablet    Skin Protectants, Misc. (EUCERIN) cream    tamsulosin (FLOMAX) 0.4 MG capsule    traZODone (DESYREL) 50 MG tablet    ZOLOFT 100 MG OR TABS     Current Facility-Administered Medications   Medication    3 mL ropivacaine (NAROPIN) injection 5 mg/mL    triamcinolone (KENALOG-40) injection 40 mg     Facility-Administered Medications Ordered in Other Visits   Medication    bupivacaine (MARCAINE) injection 0.5%       Allergies   Allergen Reactions    Nitrogen Oxide     Sulfa Antibiotics        Past Medical History:   Diagnosis Date    Cerumen impaction     Chronic kidney disease     Colon polyps     Hyperlipidemia     Mitral valve prolapse     Schizophrenia (H)     Ulcerated penile lesions     VSD (ventricular septal defect)         Patient Active Problem List   Diagnosis    Colon polyps    Schizophrenia (H)    VSD (ventricular septal defect)    Mitral valve prolapse    Acquired hypothyroidism    IgA nephropathy    Proteinuria    Hyperlipidemia LDL goal <130    BPH (benign prostatic hyperplasia)    Health Care Home    Bilateral impacted cerumen    Bunion of great toe of right foot    Eczema of external ear, bilateral    Tinea pedis, unspecified laterality    Incomplete right bundle branch block (RBBB)    Cigarette nicotine dependence with nicotine-induced disorder    Gastroesophageal reflux disease without esophagitis    Stage 1 mild COPD by GOLD classification (H)    CKD (chronic kidney disease) stage 3, GFR 30-59 ml/min (H)    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)    Smoking    Hip pain, right       Past Surgical History:   Procedure Laterality Date    ARTHRODESIS FOOT Right 1/19/2016    Procedure: ARTHRODESIS FOOT;  Surgeon: Holden Harrison DPM;  Location: [facility]    ARTHRODESIS FOOT Left 1/3/2017    Procedure: ARTHRODESIS FOOT;  Surgeon: Holden Harrison,  DPM;  Location: [facility]    COLONOSCOPY N/A 5/19/2022    Procedure: COLONOSCOPY, FLEXIBLE, WITH LESION REMOVAL USING SNARE;  Surgeon: Ame Nelson MD;  Location: [facility]    HERNIORRHAPHY INGUINAL Right 6/8/2023    Procedure: HERNIORRHAPHY INGUINALOPEN right with mesh;  Surgeon: Ame Nelson MD;  Location: [facility]    SURGICAL HISTORY OF -       leg fracture       Family History   Problem Relation Age of Onset    Emphysema Mother     Coronary Artery Disease Father        Reviewed past medical, surgical, and family history with patient found on new patient intake packet located in EMR Media tab.     SOCIAL HX: He smokes 16 cigarettes/day.  He denies drinking alcohol or using recreational drugs.    ROS: Specifically negative for bowel/bladder dysfunction, balance changes, headache, dizziness, foot drop, fevers, chills, appetite changes, nausea/vomiting, unexplained weight loss. Otherwise 13 systems reviewed are negative. Please see the patient's intake questionnaire from today for details.    OBJECTIVE:  /78   Pulse 67   SpO2 90%     PHYSICAL EXAMINATION:    --CONSTITUTIONAL:  Vital signs as above.  No acute distress.  The patient is well nourished and well groomed.  --PSYCHIATRIC:  Appropriate mood and affect. The patient is awake, alert, oriented to person, place, time and answering questions appropriately with clear speech.    --SKIN:  Skin over the face, bilateral lower extremities, and posterior torso is clean, dry, intact without rashes.    --RESPIRATORY: Normal rhythm and effort. No abnormal accessory muscle breathing patterns noted.   --ABDOMINAL:  Soft, non-distended, non-tender throughout all quadrants.   --STANDING EXAMINATION:  Normal lumbar lordosis noted, no lateral shift.  --MUSCULOSKELETAL: Lumbar spine inspection reveals no evidence of deformity. Range of motion is mildly limited in lumbar flexion, extension, lateral rotation.  Tenderness to lumbar spine on the right side. Straight leg raising in  the supine position is negative to radicular pain.   --SACROILIAC JOINT: Negative distraction.  Negative Harrison's with reproduction of pain to affected extremity.  One Finger point test negative.  --GROSS MOTOR: Gait is non-antalgic. Easily arises from a seated position. Toe walking and heel walking are normal without significant difficulty.    --LOWER EXTREMITY MOTOR TESTING:  Plantar flexion left 5/5, right 5/5   Dorsiflexion left 5/5, right 5/5   Great toe MTP extension left 5/5, right 5/5  Knee flexion left 5/5, right 5/5  Knee extension left 5/5, right 5/5   Hip flexion left 5/5, right 5/5  Hip abduction left 5/5, right 5/5  Hip adduction left 5/5, right 5/5   --HIPS: Full range of motion bilaterally.  Stinchfield test is negative bilaterally.  --NEUROLOGICAL:  2/4 patellar  and achilles reflexes bilaterally.  Sensation to light touch is intact in the bilateral L4, L5, and S1 dermatomes. Babinski is negative.  --VASCULAR:   Bilateral lower extremities are warm.  There is no pitting edema of the bilateral lower extremities.    RESULTS: Prior medical records from [facility] primary care provider were reviewed today.    Imaging: Lumbar spine Imaging was personally reviewed and interpreted today. The images were shown to the patient and the findings were explained using a spine model.      MRI Lumbar spine w/o contrast    Result Date: 1/2/2024  MR LUMBAR SPINE W/O CONTRAST 1/2/2024 11:43 AM Provided History: eval radiating pain; Hip pain, right; Right leg pain ICD-10: Hip pain, right; Right leg pain Comparison: No prior similar studies Technique: Sagittal T1-weighted, sagittal STIR, sagittal diffusion-weighted (with ADC map), axial T1-weighted, and 3D volumetric axial and sagittal reconstructed T2-weighted images of the lumbar spine were obtained without intravenous contrast. Findings: Multiple images are degraded by patient motion artifact. There are 5 lumbar-type vertebrae assumed for the purposes of this  dictation.  The tip of the conus medullaris is at L1.  Mild anterolisthesis at L4-5.  There is multilevel disc height narrowing and disc desiccation.  Mild opposing endplate edema within the posterior aspect of L2-3 and L4-5. Multilevel Schmorl's-like endplate deformities. On a level by level basis: T12-L1: No spinal canal or neuroforaminal stenosis. L1-2: Disc bulge and superimposed tiny central protrusion. Bilateral facet hypertrophy. Mild bilateral neuroforaminal stenosis. No spinal canal stenosis. L2-3: Disc bulge osteophyte formation and bilateral facet hypertrophy. Mild bilateral neural foraminal stenosis. No spinal canal stenosis. L3-4: Disc bulge. Bilateral facet hypertrophy. Mild bilateral neural foraminal stenosis. No spinal canal stenosis. L4-5: Uncovered disc bulge and bilateral facet hypertrophy. Bilateral facet joint effusion. Mild bilateral neural foraminal stenosis. Mild spinal canal stenosis. L5-S1: Disc bulge and superimposed right central protrusion. T2 hypointense lesion adjacent to the anterior aspect of right facet joint, severely narrowing the neural foraminal entrance and possibly abutting the descending right S1 nerve root. There is edema bilateral facet joints and periarticular soft tissues, right greater than left. Edema also extends in the right L5 pedicle. Bilateral facet joint effusion. Paraspinous tissues are within normal limits. Right renal cyst.     Impression: 1. Multilevel lumbar spondylosis without high-grade spinal canal stenosis. T2 hypointense lesion adjacent to the anterior aspect of the right facet joint at L5-S1 severely narrows the neural foraminal entrance and possibly abuts the descending right S1 nerve root. This may represent an disc extrusion or synovial cyst origin from the facet joint. 2. Edema associated with right greater than left L5-S1 facet joints and periarticular soft tissues, suggestive of active inflammatory arthropathy. RYAN PANDEY MD   SYSTEM ID:   [id number]

## 2024-01-10 RX ORDER — CLOBETASOL PROPIONATE 0.5 MG/G
OINTMENT TOPICAL
Qty: 60 G | Refills: 1 | Status: SHIPPED | OUTPATIENT
Start: 2024-01-10

## 2024-01-10 NOTE — TELEPHONE ENCOUNTER
Pending Prescriptions:                       Disp   Refills    clobetasol (TEMOVATE) 0.05 % external oin*60 g   1            Sig: Apply twice daily to ears for two weeks then 3           times weekly thereafter.    Routing refill request to provider for review/approval because:  Drug not on the [facility] refill protocol       Dixon Duque RN

## 2024-01-10 NOTE — TELEPHONE ENCOUNTER
[facility] Fax # [phone].        [place]   [facility] [place]   [facility]  [phone]

## 2024-01-10 NOTE — TELEPHONE ENCOUNTER
LOV 6/6/2023: urinary frequency and nocturia. His symptoms are well controlled with tamsulosin 0.4 mg, oxybutynin XR 5 mg, finasteride 5 mg, and Myrbetriq 50 mg.      Called [facility] center- requesting liner pads for patient to use for bouts of dribbling/inc.  They have no concerns for UTI    Pended DME order.    Okay to send order for incontinence pads?     Kerri GORMAN RN  [facility]

## 2024-01-22 ENCOUNTER — PATIENT OUTREACH (OUTPATIENT)
Dept: CARE COORDINATION | Facility: CLINIC | Age: 69
End: 2024-01-22
Payer: COMMERCIAL

## 2024-01-24 ENCOUNTER — RADIOLOGY INJECTION OFFICE VISIT (OUTPATIENT)
Dept: PHYSICAL MEDICINE AND REHAB | Facility: CLINIC | Age: 69
End: 2024-01-24
Attending: PAIN MEDICINE
Payer: COMMERCIAL

## 2024-01-24 VITALS
SYSTOLIC BLOOD PRESSURE: 114 MMHG | RESPIRATION RATE: 20 BRPM | WEIGHT: 183.4 LBS | HEART RATE: 67 BPM | BODY MASS INDEX: 30.56 KG/M2 | HEIGHT: 65 IN | TEMPERATURE: 98.7 F | OXYGEN SATURATION: 92 % | DIASTOLIC BLOOD PRESSURE: 70 MMHG

## 2024-01-24 DIAGNOSIS — M54.16 LUMBAR RADICULOPATHY: ICD-10-CM

## 2024-01-24 PROCEDURE — 64483 NJX AA&/STRD TFRM EPI L/S 1: CPT | Mod: RT | Performed by: STUDENT IN AN ORGANIZED HEALTH CARE EDUCATION/TRAINING PROGRAM

## 2024-01-24 RX ORDER — LIDOCAINE HYDROCHLORIDE 10 MG/ML
INJECTION, SOLUTION EPIDURAL; INFILTRATION; INTRACAUDAL; PERINEURAL
Status: COMPLETED | OUTPATIENT
Start: 2024-01-24 | End: 2024-01-24

## 2024-01-24 RX ORDER — DEXAMETHASONE SODIUM PHOSPHATE 10 MG/ML
INJECTION, SOLUTION INTRAMUSCULAR; INTRAVENOUS
Status: COMPLETED | OUTPATIENT
Start: 2024-01-24 | End: 2024-01-24

## 2024-01-24 RX ORDER — OLANZAPINE 2.5 MG/1
TABLET, FILM COATED ORAL PRN
COMMUNITY
Start: 2023-12-14

## 2024-01-24 RX ORDER — BUPIVACAINE HYDROCHLORIDE 2.5 MG/ML
INJECTION, SOLUTION EPIDURAL; INFILTRATION; INTRACAUDAL
Status: COMPLETED | OUTPATIENT
Start: 2024-01-24 | End: 2024-01-24

## 2024-01-24 RX ADMIN — BUPIVACAINE HYDROCHLORIDE 1 ML: 2.5 INJECTION, SOLUTION EPIDURAL; INFILTRATION; INTRACAUDAL at 14:00

## 2024-01-24 RX ADMIN — LIDOCAINE HYDROCHLORIDE 1 ML: 10 INJECTION, SOLUTION EPIDURAL; INFILTRATION; INTRACAUDAL; PERINEURAL at 13:56

## 2024-01-24 RX ADMIN — DEXAMETHASONE SODIUM PHOSPHATE 10 MG: 10 INJECTION, SOLUTION INTRAMUSCULAR; INTRAVENOUS at 14:00

## 2024-01-24 ASSESSMENT — PAIN SCALES - GENERAL
PAINLEVEL: SEVERE PAIN (6)
PAINLEVEL: SEVERE PAIN (7)

## 2024-01-24 NOTE — PATIENT INSTRUCTIONS
DISCHARGE INSTRUCTIONS    During office hours (8:00 a.m.- 4:00 p.m.) questions or concerns may be answered  by calling Spine Center Navigation Nurses at  [phone].  Messages received after hours will be returned the following business day.      In the case of an emergency, please dial 911 or seek assistance at the nearest Emergency Room/Urgent Care facility.     All Patients:    You may experience an increase in your symptoms for the first 2 days (It may take anywhere between 2 days- 2 weeks for the steroid to have maximum effect).    You may use ice on the injection site, as frequently as 20 minutes each hour if needed.    You may take your pain medicine.    You may continue taking your regular medication after your injection. If you have had a Medial Branch Block you may resume pain medication once your pain diary is completed.    You may shower. No swimming, tub bath or hot tub for 48 hours.  You may remove your bandaid/bandage as soon as you are home.    You may resume light activities, as tolerated.    Resume your usual diet as tolerated.    It is strongly advised that you do not drive for 1-3 hours post injection.    If you have had oral sedation:  Do not drive for 8 hours post injection.      If you have had IV sedation:  Do not drive for 24 hours post injection.  Do not operate hazardous machinery or make important personal/business decisions for 24 hours.      POSSIBLE STEROID SIDE EFFECTS (If steroid/cortisone was used for your procedure)    -If you experience these symptoms, it should only last for a short period    Swelling of the legs              Skin redness (flushing)     Mouth (oral) irritation   Blood sugar (glucose) levels            Sweats                    Mood changes  Headache  Sleeplessness  Weakened immune system for up to 14 days, which could increase the risk of santy the COVID-19 virus and/or experiencing more severe symptoms of the disease, if exposed.  Decreased  effectiveness of the flu vaccine if given within 2 weeks of the steroid.         POSSIBLE PROCEDURE SIDE EFFECTS  -Call the Spine Center if you are concerned  Increased Pain           Increased numbness/tingling      Nausea/Vomiting          Bruising/bleeding at site      Redness or swelling                                              Difficulty walking      Weakness           Fever greater than 100.5    *In the event of a severe headache after an epidural steroid injection that is relieved by lying down, please call the [facility] to speak with a clinical staff member*

## 2024-01-29 ENCOUNTER — TELEPHONE (OUTPATIENT)
Dept: NEPHROLOGY | Facility: CLINIC | Age: 69
End: 2024-01-29
Payer: COMMERCIAL

## 2024-01-29 ENCOUNTER — TELEPHONE (OUTPATIENT)
Dept: FAMILY MEDICINE | Facility: CLINIC | Age: 69
End: 2024-01-29
Payer: COMMERCIAL

## 2024-01-29 DIAGNOSIS — H91.93 BILATERAL HEARING LOSS, UNSPECIFIED HEARING LOSS TYPE: Primary | ICD-10-CM

## 2024-01-29 NOTE — TELEPHONE ENCOUNTER
[facility] Call Center    Phone Message    May a detailed message be left on voicemail: yes     Reason for Call: Order(s): Other:   Reason for requested: labs  Date needed: 7/10/2024  Provider name: Ginger Weiss  Labs to be drawn at [place]    Action Taken: Message routed to:  Other: neph    Travel Screening: Not Applicable

## 2024-01-29 NOTE — TELEPHONE ENCOUNTER
Pt needs a referral for [facility]; Fax [phone] Please call Ethan back at [phone] to let her know it has been done

## 2024-02-05 ENCOUNTER — PATIENT OUTREACH (OUTPATIENT)
Dept: CARE COORDINATION | Facility: CLINIC | Age: 69
End: 2024-02-05
Payer: COMMERCIAL

## 2024-02-07 ENCOUNTER — THERAPY VISIT (OUTPATIENT)
Dept: PHYSICAL THERAPY | Facility: CLINIC | Age: 69
End: 2024-02-07
Attending: FAMILY MEDICINE
Payer: COMMERCIAL

## 2024-02-07 ENCOUNTER — OFFICE VISIT (OUTPATIENT)
Dept: FAMILY MEDICINE | Facility: CLINIC | Age: 69
End: 2024-02-07
Payer: COMMERCIAL

## 2024-02-07 ENCOUNTER — OFFICE VISIT (OUTPATIENT)
Dept: PHYSICAL MEDICINE AND REHAB | Facility: CLINIC | Age: 69
End: 2024-02-07
Payer: COMMERCIAL

## 2024-02-07 VITALS
SYSTOLIC BLOOD PRESSURE: 110 MMHG | RESPIRATION RATE: 18 BRPM | HEART RATE: 67 BPM | WEIGHT: 181 LBS | HEIGHT: 65 IN | OXYGEN SATURATION: 91 % | DIASTOLIC BLOOD PRESSURE: 70 MMHG | BODY MASS INDEX: 30.16 KG/M2 | TEMPERATURE: 97.4 F

## 2024-02-07 VITALS — HEART RATE: 75 BPM | SYSTOLIC BLOOD PRESSURE: 142 MMHG | OXYGEN SATURATION: 92 % | DIASTOLIC BLOOD PRESSURE: 67 MMHG

## 2024-02-07 DIAGNOSIS — I77.810 DILATATION OF THORACIC AORTA (H): ICD-10-CM

## 2024-02-07 DIAGNOSIS — H61.23 BILATERAL IMPACTED CERUMEN: Primary | ICD-10-CM

## 2024-02-07 DIAGNOSIS — N18.30 STAGE 3 CHRONIC KIDNEY DISEASE, UNSPECIFIED WHETHER STAGE 3A OR 3B CKD (H): ICD-10-CM

## 2024-02-07 DIAGNOSIS — F20.9 SCHIZOPHRENIA, UNSPECIFIED TYPE (H): ICD-10-CM

## 2024-02-07 DIAGNOSIS — M54.16 LUMBAR RADICULOPATHY: Primary | ICD-10-CM

## 2024-02-07 DIAGNOSIS — Z72.0 TOBACCO ABUSE: ICD-10-CM

## 2024-02-07 DIAGNOSIS — M25.551 HIP PAIN, RIGHT: Primary | ICD-10-CM

## 2024-02-07 PROCEDURE — 99213 OFFICE O/P EST LOW 20 MIN: CPT | Mod: 25 | Performed by: FAMILY MEDICINE

## 2024-02-07 PROCEDURE — 97110 THERAPEUTIC EXERCISES: CPT | Mod: GP | Performed by: PHYSICAL THERAPIST

## 2024-02-07 PROCEDURE — 69209 REMOVE IMPACTED EAR WAX UNI: CPT | Mod: 50 | Performed by: FAMILY MEDICINE

## 2024-02-07 PROCEDURE — 99213 OFFICE O/P EST LOW 20 MIN: CPT | Performed by: PAIN MEDICINE

## 2024-02-07 ASSESSMENT — PAIN SCALES - GENERAL
PAINLEVEL: NO PAIN (0)
PAINLEVEL: NO PAIN (0)

## 2024-02-07 NOTE — NURSING NOTE
"Chief Complaint   Patient presents with    Ear Problem     /70 (Cuff Size: Adult Regular)   Pulse 67   Temp 97.4  F (36.3  C) (Tympanic)   Resp 18   Ht 1.651 m (5' 5\")   Wt 82.1 kg (181 lb)   SpO2 91%   BMI 30.12 kg/m   Estimated body mass index is 30.12 kg/m  as calculated from the following:    Height as of this encounter: 1.651 m (5' 5\").    Weight as of this encounter: 82.1 kg (181 lb).  Patient presents to the clinic using No DME      Health Maintenance that is potentially due pending provider review:    Health Maintenance Due   Topic Date Due    RSV VACCINE (Pregnancy & 60+) (1 - 1-dose 60+ series) Never done    INFLUENZA VACCINE (1) 09/01/2023    COVID-19 Vaccine (6 - 2023-24 season) 09/01/2023    NICOTINE/TOBACCO CESSATION COUNSELING Q 1 YR  02/20/2024    MEDICARE ANNUAL WELLNESS VISIT  02/20/2024    LIPID  02/20/2024                  "

## 2024-02-07 NOTE — PROGRESS NOTES
Assessment:     Diagnoses and all orders for this visit:  Lumbar radiculopathy     Geovani Bustos is a 68 year old y.o. male with past medical history significant for cigarette dependence, eczema, right hip pain, COPD, gastroesophageal reflux disease without esophagitis, hypothyroidism, hyperlipidemia, ventricular septal defect, mitral valve prolapse, incomplete right bundle branch block, bunion of great toe on right foot, tinea pedis, BPH, IgA nephropathy, chronic kidney disease stage III, schizophrenia, smoking, proteinuria who presents today for follow-up regarding low back and right lower extremity pain:    -Patient had 100% relief with his right L5-S1 transforaminal epidural steroid injection.  He is doing quite well at this time.     Plan:     A shared decision making plan was used. The patient's values and choices were respected. Prior medical records from our last visit on 1/9/2024 were reviewed today. The following represents what was discussed and decided upon by the provider and the patient.        -DIAGNOSTIC TESTS: Images were personally reviewed and interpreted.   -- MRI of lumbar spine dated 1/2/2024 is personally reviewed images interpreted and discussed with the patient.  There is multilevel lumbar spondylosis without severe spinal canal stenosis.  There is severe right foraminal stenosis at L5-S1.  There is edema associated with the right greater than left L5-S1 facet joint and multilevel facet arthropathy.  -- MRI of right hip dated 9/13/2023 is personally reviewed images interpreted and discussed with the patient.  There is a probable nondisplaced tearing of the right acetabular labrum.  There are no right hip effusions or synovitis.  There is no fracture.  There is proximal right abductor kelly tendinopathy.  There is mild degenerative arthritis of the right SI joint.  There is a nonaggressive sclerotic lesion in the intertrochanteric proximal right femur this is likely benign with  recommendations of 1 year follow-up.  There is 7 mm marrow replacing lesion in the proximal right femur which is nonspecific but likely benign.    -INTERVENTIONS: Could consider repeating right L5-S1 transforaminal epidural steroid injection in the future should he continue to have very good pain relief.    -MEDICATIONS: No changes to medications.  -  Discussed side effects of medications and proper use. Patient verbalized understanding.    -PHYSICAL THERAPY: Recommended continue with physical therapy exercises at home and at consistent basis.  Discussed the importance of core strengthening, ROM, stretching exercises with the patient and how each of these entities is important in decreasing pain.  Explained to the patient that the purpose of physical therapy is to teach the patient a home exercise program.  These exercises need to be performed every day in order to decrease pain and prevent future occurrences of pain.        -PATIENT EDUCATION: We discussed pain management in a multimodal fashion including physical therapy, medication management and possible future injections.    -FOLLOW UP: Patient will follow-up as needed  Advised to contact clinic if symptoms worsen or change.    Subjective:     Geovani Bustos is a 68 year old male who presents today for follow-up regarding right lower extremity pain.  Patient notes that he has had 100% relief with his injection.  He is doing much better and is able to walk where he wants to now and do what he wants.  He is here with his group home aide who feels he is doing much better also.  His pain today is 0/10.  When he came in to see me the first time it was 7/10.  He went to physical therapy today and was DC'd from there as well but asked to continue to do his home exercises on a consistent basis.  In agreement with this.  He denies any bowel or bladder changes, fevers, chills, unintentional weight loss.    -Treatment to Date: MRI of lumbar spine dated 1/2/2024.  MRI of  right hip dated 9/13/2023.  Right ultrasound-guided injection on 12/7/2023 physical therapy.  Right L5-S1 transforaminal epidural steroid injection done on 1/24/2024.    Patient Active Problem List   Diagnosis    Colon polyps    Schizophrenia (H)    VSD (ventricular septal defect)    Mitral valve prolapse    Acquired hypothyroidism    IgA nephropathy    Proteinuria    Hyperlipidemia LDL goal <130    BPH (benign prostatic hyperplasia)    Health Care Home    Bilateral impacted cerumen    Bunion of great toe of right foot    Eczema of external ear, bilateral    Tinea pedis, unspecified laterality    Incomplete right bundle branch block (RBBB)    Cigarette nicotine dependence with nicotine-induced disorder    Gastroesophageal reflux disease without esophagitis    Stage 1 mild COPD by GOLD classification (H)    CKD (chronic kidney disease) stage 3, GFR 30-59 ml/min (H)    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)    Smoking    Hip pain, right    Dilatation of thoracic aorta (H24)       Current Outpatient Medications   Medication    ABILIFY 10 MG OR TABS    ADULT ASPIRIN REGIMEN 81 MG EC tablet    albuterol (VENTOLIN HFA) 108 (90 Base) MCG/ACT inhaler    clobetasol (TEMOVATE) 0.05 % external ointment    Disposable Gloves (LATEX GLOVES MEDIUM) MISC    Disposable Gloves (NITRILE EXAM GLOVES MEDIUM) MISC    EAR DROPS 6.5 % otic solution    finasteride (PROSCAR) 5 MG tablet    fluticasone-vilanterol (BREO ELLIPTA) 100-25 MCG/ACT inhaler    gabapentin (NEURONTIN) 100 MG capsule    ipratropium - albuterol 0.5 mg/2.5 mg/3 mL (DUONEB) 0.5-2.5 (3) MG/3ML neb solution    levothyroxine (SYNTHROID/LEVOTHROID) 137 MCG tablet    mirabegron (MYRBETRIQ) 50 MG 24 hr tablet    OLANZapine (ZYPREXA) 2.5 MG tablet    Omega-3 Fatty Acids (FISH OIL) 1200 MG capsule    order for DME    order for DME    oxybutynin ER (DITROPAN XL) 5 MG 24 hr tablet    polyethylene glycol (CLEARLAX) 17 GM/Dose powder    Psyllium (REGULOID) 48.57 % POWD     simvastatin (ZOCOR) 40 MG tablet    Skin Protectants, Misc. (EUCERIN) cream    tamsulosin (FLOMAX) 0.4 MG capsule    traZODone (DESYREL) 50 MG tablet    ZOLOFT 100 MG OR TABS     Current Facility-Administered Medications   Medication    3 mL ropivacaine (NAROPIN) injection 5 mg/mL    triamcinolone (KENALOG-40) injection 40 mg     Facility-Administered Medications Ordered in Other Visits   Medication    bupivacaine (MARCAINE) injection 0.5%       Allergies   Allergen Reactions    Nitrogen Oxide     Sulfa Antibiotics        Past Medical History:   Diagnosis Date    Cerumen impaction     Chronic kidney disease     Colon polyps     Hyperlipidemia     Mitral valve prolapse     Schizophrenia (H)     Ulcerated penile lesions     VSD (ventricular septal defect)         Review of Systems  ROS:  Specifically negative for bowel/bladder dysfunction, balance changes, headache, dizziness, foot drop, fevers, chills, appetite changes, nausea/vomiting, unexplained weight loss. Otherwise 13 systems reviewed are negative. Please see the patient's intake questionnaire from today for details.    Reviewed Social, Family, Past Medical and Past Surgical history with patient, no significant changes noted since prior visit.     Objective:     BP (!) 142/67   Pulse 75   SpO2 92%     PHYSICAL EXAMINATION:    --CONSTITUTIONAL: Well developed, well nourished, healthy appearing individual.  --PSYCHIATRIC: Appropriate mood and affect. No difficulty interacting due to temper, social withdrawal, or memory issues.  --RESPIRATORY: Normal rhythm and effort. No abnormal accessory muscle breathing patterns noted.   --MUSCULOSKELETAL:  Normal lumbar lordosis noted, no lateral shift.  --GROSS MOTOR: Easily arises from a seated position. Gait is non-antalgic  --LUMBAR SPINE:  Inspection reveals no evidence of deformity. Range of motion is mildly limited in lumbar flexion, extension, lateral rotation. No tenderness to palpation lumbar spine.    --SACROILIAC JOINT: One Finger point test negative.  --LOWER EXTREMITY MOTOR TESTING:  Plantar flexion left 5/5, right 5/5   Dorsiflexion left 5/5, right 5/5   Great toe MTP extension left 5/5, right 5/5  Knee flexion left 5/5, right 5/5  Knee extension left 5/5, right 5/5   Hip flexion left 5/5, right 5/5  Hip abduction left 5/5, right 5/5  Hip adduction left 5/5, right 5/5   --NEUROLOGIC:  Sensation to light touch is intact in the bilateral L4, L5, and S1 dermatomes.    RESULTS:   Imaging: Lumbar spine imaging was reviewed.    PAIN Transforaminal ANGÉLICA Inj Lumbosacral One Level Right    Result Date: 1/24/2024  LUMBAR EPIDURAL STEROID INJECTION TRANSFORAMINAL APPROACH WITH FLUOROSCOPIC GUIDANCE Performed on: 1/24/24 Ordered by: Serafin Dickson DO [id number] [facility] [id number] Pre Procedure Diagnosis:  LBP, Lumbar radiculitis Post Procedure Diagnosis:  Same Procedure Performed:  Lumbar Transforaminal Epidural Steroid Injection with Fluoroscopic Guidance Clinical Scenario:  As per office notes Anesthesia/Fluids:  As per intra-procedure documentation Vital Signs:  As per intra-procedure documentation Level Injected:  L5-S1 Side Injected: right Injectate: 10mg preservative free dexamethasone and 1ml of preservative free 0.25% bupivacaine Geovani Bustos is a 68 year old male who presents today for a lumbar transforaminal epidural steroid injection as ordered.  The patient complains of low back and leg pain. MRI was reviewed today. The patient denies feeling ill today or having an active infection (denies taking antibiotics). The patient denies any allergies to contrast. The procedure of epidural steroid injection was discussed in detail along with the attendant risks, including but not limited to:  back pain, subdural puncture with a subsequent headache (possible need for epidural blood patch), nerve injury, infection, bleeding, epidural hematoma (with need for surgical evacuation), allergic reaction,   worsening of pain along with risk of stroke, paralysis and death. Potential benefits including improvements in pain, function, and sleep were also discussed. Alternatives were offered. The patient was given an opportunity to ask any questions and clarify their understanding. The patient decided to proceed and signed informed consent. The patient denies any symptoms of an active infection and denies taking antibiotics. Patient denies diabetes, any blood thinners, or iodinated contrast allergies. The proceduralist wore sterile gloves and a mask for the procedure, and all other personnel in the room wore a mask. The patient was placed in a prone position on the fluoroscopy table. A procedural time-out was conducted to verify: correct patient identity, correct procedure, correct side and site, correct patient position, and special requirements if needed.  The lower back was prepped and draped in usual fashion. The fluoroscope was adjusted and optimized for coaxial needle advancement in an oblique approach. After anesthetizing the skin with 1% lidocaine, a  22-gauge 5 inch needle was introduced and advanced under fluoroscopic guidance. Multiplanar imaging confirmed correct needle placement. The patient denied any radiating pain or paresthesias in the territory of the target nerve on final needle placement. After aspiration was negative, 1 mL of  Omnipaque 300 was injected under live fluoroscopy.  Epidural flow without vascular uptake was seen in multiple views.  Subsequently, the above noted injectate was slowly instilled at each site. Following the injection the needle was withdrawn slightly and flushed with local anesthetic as it was fully extracted.  The patient tolerated the procedure well and there were no apparent complications.  The patient did not develop any new neurologic deficits.  After appropriate observation, the patient was returned to the recovery area in good condition under their own power.  PRE-PROCEDURE PAIN SCORE:  6/10 POST-PROCEDURE PAIN SCORE:  7/10 The patient tolerated the procedure well.  The patient was instructed to call the [facility] if any questions or concerns arise after the procedure. After a short period of observation the patient was discharged.

## 2024-02-07 NOTE — PROGRESS NOTES
"  Assessment & Plan     Bilateral impacted cerumen  Bilateral impacted cerumen cleaned with saline irrigation by MA.  Home care discussed.    Dilatation of thoracic aorta (H24)  Last echocardiogram findings reviewed.  Following cardiology    Schizophrenia, unspecified type (H)  Will continue Abilify, olanzapine, gabapentin, trazodone and Zoloft    Stage 3 chronic kidney disease, unspecified whether stage 3a or 3b CKD (H)  Renal function stable, following nephrology    Tobacco abuse  Stressed on smoking cessation      Nicotine/Tobacco Cessation  He reports that he has been smoking cigarettes. He has a 37.5 pack-year smoking history. He has been exposed to tobacco smoke. He has never used smokeless tobacco.  Nicotine/Tobacco Cessation Plan  Information offered: Patient not interested at this time      BMI  Estimated body mass index is 30.12 kg/m  as calculated from the following:    Height as of this encounter: 1.651 m (5' 5\").    Weight as of this encounter: 82.1 kg (181 lb).   Weight management plan: Discussed healthy diet and exercise guidelines        Markell Espino is a 68 year old, presenting for the following health issues:  Ear Problem    History of Present Illness       Reason for visit:  Ear flush  Symptoms include:  Routine check and flush as needed    He eats 0-1 servings of fruits and vegetables daily.He consumes 0 sweetened beverage(s) daily.He exercises with enough effort to increase his heart rate 9 or less minutes per day.  He exercises with enough effort to increase his heart rate 3 or less days per week.   He is taking medications regularly.       Review of Systems  Constitutional, HEENT, cardiovascular, pulmonary, gi and gu systems are negative, except as otherwise noted.      Objective    /70 (Cuff Size: Adult Regular)   Pulse 67   Temp 97.4  F (36.3  C) (Tympanic)   Resp 18   Ht 1.651 m (5' 5\")   Wt 82.1 kg (181 lb)   SpO2 91%   BMI 30.12 kg/m    Body mass index is 30.12 " kg/m .  Physical Exam   GENERAL: alert and no distress  EYES: Eyes grossly normal to inspection, PERRL and conjunctivae and sclerae normal  HENT: normal cephalic/atraumatic, right ear: occluded with wax, left ear: occluded with wax, nose and mouth without ulcers or lesions, oropharynx clear, and oral mucous membranes moist  NECK: no adenopathy, no asymmetry, masses, or scars  RESP: no wheezes  MS: No joint swelling or skin discoloration noted  SKIN: no suspicious lesions or rashes  NEURO: Grossly intact  PSYCH: Anxious appearing, well-groomed, good eye contact, normal speech      Signed Electronically by: Alexandru Vera MD

## 2024-02-07 NOTE — PATIENT INSTRUCTIONS
~Please call Nurse Navigation line [phone] with any questions or concerns about your treatment plan, if symptoms worsen and you would like to be seen urgently, or if you have problems controlling bladder and bowel function.

## 2024-02-07 NOTE — LETTER
2/7/2024         RE: Geovani Bustos  [address]  [facility] [address]        Dear Colleague,    Thank you for referring your patient, Geoavni Bustos, to the [facility]. Please see a copy of my visit note below.      Assessment:     Diagnoses and all orders for this visit:  Lumbar radiculopathy     Geovani Bustos is a 68 year old y.o. male with past medical history significant for cigarette dependence, eczema, right hip pain, COPD, gastroesophageal reflux disease without esophagitis, hypothyroidism, hyperlipidemia, ventricular septal defect, mitral valve prolapse, incomplete right bundle branch block, bunion of great toe on right foot, tinea pedis, BPH, IgA nephropathy, chronic kidney disease stage III, schizophrenia, smoking, proteinuria who presents today for follow-up regarding low back and right lower extremity pain:    -Patient had 100% relief with his right L5-S1 transforaminal epidural steroid injection.  He is doing quite well at this time.     Plan:     A shared decision making plan was used. The patient's values and choices were respected. Prior medical records from our last visit on 1/9/2024 were reviewed today. The following represents what was discussed and decided upon by the provider and the patient.        -DIAGNOSTIC TESTS: Images were personally reviewed and interpreted.   -- MRI of lumbar spine dated 1/2/2024 is personally reviewed images interpreted and discussed with the patient.  There is multilevel lumbar spondylosis without severe spinal canal stenosis.  There is severe right foraminal stenosis at L5-S1.  There is edema associated with the right greater than left L5-S1 facet joint and multilevel facet arthropathy.  -- MRI of right hip dated 9/13/2023 is personally reviewed images interpreted and discussed with the patient.  There is a probable nondisplaced tearing of the right acetabular labrum.  There are no right hip effusions or synovitis.   There is no fracture.  There is proximal right abductor kelly tendinopathy.  There is mild degenerative arthritis of the right SI joint.  There is a nonaggressive sclerotic lesion in the intertrochanteric proximal right femur this is likely benign with recommendations of 1 year follow-up.  There is 7 mm marrow replacing lesion in the proximal right femur which is nonspecific but likely benign.    -INTERVENTIONS: Could consider repeating right L5-S1 transforaminal epidural steroid injection in the future should he continue to have very good pain relief.    -MEDICATIONS: No changes to medications.  -  Discussed side effects of medications and proper use. Patient verbalized understanding.    -PHYSICAL THERAPY: Recommended continue with physical therapy exercises at home and at consistent basis.  Discussed the importance of core strengthening, ROM, stretching exercises with the patient and how each of these entities is important in decreasing pain.  Explained to the patient that the purpose of physical therapy is to teach the patient a home exercise program.  These exercises need to be performed every day in order to decrease pain and prevent future occurrences of pain.        -PATIENT EDUCATION: We discussed pain management in a multimodal fashion including physical therapy, medication management and possible future injections.    -FOLLOW UP: Patient will follow-up as needed  Advised to contact clinic if symptoms worsen or change.    Subjective:     Geovani Bustos is a 68 year old male who presents today for follow-up regarding right lower extremity pain.  Patient notes that he has had 100% relief with his injection.  He is doing much better and is able to walk where he wants to now and do what he wants.  He is here with his group home aide who feels he is doing much better also.  His pain today is 0/10.  When he came in to see me the first time it was 7/10.  He went to physical therapy today and was DC'd from there  as well but asked to continue to do his home exercises on a consistent basis.  In agreement with this.  He denies any bowel or bladder changes, fevers, chills, unintentional weight loss.    -Treatment to Date: MRI of lumbar spine dated 1/2/2024.  MRI of right hip dated 9/13/2023.  Right ultrasound-guided injection on 12/7/2023 physical therapy.  Right L5-S1 transforaminal epidural steroid injection done on 1/24/2024.    Patient Active Problem List   Diagnosis     Colon polyps     Schizophrenia (H)     VSD (ventricular septal defect)     Mitral valve prolapse     Acquired hypothyroidism     IgA nephropathy     Proteinuria     Hyperlipidemia LDL goal <130     BPH (benign prostatic hyperplasia)     Health Care Home     Bilateral impacted cerumen     Bunion of great toe of right foot     Eczema of external ear, bilateral     Tinea pedis, unspecified laterality     Incomplete right bundle branch block (RBBB)     Cigarette nicotine dependence with nicotine-induced disorder     Gastroesophageal reflux disease without esophagitis     Stage 1 mild COPD by GOLD classification (H)     CKD (chronic kidney disease) stage 3, GFR 30-59 ml/min (H)     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)     Smoking     Hip pain, right     Dilatation of thoracic aorta (H24)       Current Outpatient Medications   Medication     ABILIFY 10 MG OR TABS     ADULT ASPIRIN REGIMEN 81 MG EC tablet     albuterol (VENTOLIN HFA) 108 (90 Base) MCG/ACT inhaler     clobetasol (TEMOVATE) 0.05 % external ointment     Disposable Gloves (LATEX GLOVES MEDIUM) MISC     Disposable Gloves (NITRILE EXAM GLOVES MEDIUM) MISC     EAR DROPS 6.5 % otic solution     finasteride (PROSCAR) 5 MG tablet     fluticasone-vilanterol (BREO ELLIPTA) 100-25 MCG/ACT inhaler     gabapentin (NEURONTIN) 100 MG capsule     ipratropium - albuterol 0.5 mg/2.5 mg/3 mL (DUONEB) 0.5-2.5 (3) MG/3ML neb solution     levothyroxine (SYNTHROID/LEVOTHROID) 137 MCG tablet     mirabegron  (MYRBETRIQ) 50 MG 24 hr tablet     OLANZapine (ZYPREXA) 2.5 MG tablet     Omega-3 Fatty Acids (FISH OIL) 1200 MG capsule     order for DME     order for DME     oxybutynin ER (DITROPAN XL) 5 MG 24 hr tablet     polyethylene glycol (CLEARLAX) 17 GM/Dose powder     Psyllium (REGULOID) 48.57 % POWD     simvastatin (ZOCOR) 40 MG tablet     Skin Protectants, Misc. (EUCERIN) cream     tamsulosin (FLOMAX) 0.4 MG capsule     traZODone (DESYREL) 50 MG tablet     ZOLOFT 100 MG OR TABS     Current Facility-Administered Medications   Medication     3 mL ropivacaine (NAROPIN) injection 5 mg/mL     triamcinolone (KENALOG-40) injection 40 mg     Facility-Administered Medications Ordered in Other Visits   Medication     bupivacaine (MARCAINE) injection 0.5%       Allergies   Allergen Reactions     Nitrogen Oxide      Sulfa Antibiotics        Past Medical History:   Diagnosis Date     Cerumen impaction      Chronic kidney disease      Colon polyps      Hyperlipidemia      Mitral valve prolapse      Schizophrenia (H)      Ulcerated penile lesions      VSD (ventricular septal defect)         Review of Systems  ROS:  Specifically negative for bowel/bladder dysfunction, balance changes, headache, dizziness, foot drop, fevers, chills, appetite changes, nausea/vomiting, unexplained weight loss. Otherwise 13 systems reviewed are negative. Please see the patient's intake questionnaire from today for details.    Reviewed Social, Family, Past Medical and Past Surgical history with patient, no significant changes noted since prior visit.     Objective:     BP (!) 142/67   Pulse 75   SpO2 92%     PHYSICAL EXAMINATION:    --CONSTITUTIONAL: Well developed, well nourished, healthy appearing individual.  --PSYCHIATRIC: Appropriate mood and affect. No difficulty interacting due to temper, social withdrawal, or memory issues.  --RESPIRATORY: Normal rhythm and effort. No abnormal accessory muscle breathing patterns noted.   --MUSCULOSKELETAL:   Normal lumbar lordosis noted, no lateral shift.  --GROSS MOTOR: Easily arises from a seated position. Gait is non-antalgic  --LUMBAR SPINE:  Inspection reveals no evidence of deformity. Range of motion is mildly limited in lumbar flexion, extension, lateral rotation. No tenderness to palpation lumbar spine.   --SACROILIAC JOINT: One Finger point test negative.  --LOWER EXTREMITY MOTOR TESTING:  Plantar flexion left 5/5, right 5/5   Dorsiflexion left 5/5, right 5/5   Great toe MTP extension left 5/5, right 5/5  Knee flexion left 5/5, right 5/5  Knee extension left 5/5, right 5/5   Hip flexion left 5/5, right 5/5  Hip abduction left 5/5, right 5/5  Hip adduction left 5/5, right 5/5   --NEUROLOGIC:  Sensation to light touch is intact in the bilateral L4, L5, and S1 dermatomes.    RESULTS:   Imaging: Lumbar spine imaging was reviewed.    PAIN Transforaminal ANGÉLICA Inj Lumbosacral One Level Right    Result Date: 1/24/2024  LUMBAR EPIDURAL STEROID INJECTION TRANSFORAMINAL APPROACH WITH FLUOROSCOPIC GUIDANCE Performed on: 1/24/24 Ordered by: Serafin Dickson DO [id number] [facility] [id number] Pre Procedure Diagnosis:  LBP, Lumbar radiculitis Post Procedure Diagnosis:  Same Procedure Performed:  Lumbar Transforaminal Epidural Steroid Injection with Fluoroscopic Guidance Clinical Scenario:  As per office notes Anesthesia/Fluids:  As per intra-procedure documentation Vital Signs:  As per intra-procedure documentation Level Injected:  L5-S1 Side Injected: right Injectate: 10mg preservative free dexamethasone and 1ml of preservative free 0.25% bupivacaine Geovani Bustos is a 68 year old male who presents today for a lumbar transforaminal epidural steroid injection as ordered.  The patient complains of low back and leg pain. MRI was reviewed today. The patient denies feeling ill today or having an active infection (denies taking antibiotics). The patient denies any allergies to contrast. The procedure of epidural  steroid injection was discussed in detail along with the attendant risks, including but not limited to:  back pain, subdural puncture with a subsequent headache (possible need for epidural blood patch), nerve injury, infection, bleeding, epidural hematoma (with need for surgical evacuation), allergic reaction,  worsening of pain along with risk of stroke, paralysis and death. Potential benefits including improvements in pain, function, and sleep were also discussed. Alternatives were offered. The patient was given an opportunity to ask any questions and clarify their understanding. The patient decided to proceed and signed informed consent. The patient denies any symptoms of an active infection and denies taking antibiotics. Patient denies diabetes, any blood thinners, or iodinated contrast allergies. The proceduralist wore sterile gloves and a mask for the procedure, and all other personnel in the room wore a mask. The patient was placed in a prone position on the fluoroscopy table. A procedural time-out was conducted to verify: correct patient identity, correct procedure, correct side and site, correct patient position, and special requirements if needed.  The lower back was prepped and draped in usual fashion. The fluoroscope was adjusted and optimized for coaxial needle advancement in an oblique approach. After anesthetizing the skin with 1% lidocaine, a  22-gauge 5 inch needle was introduced and advanced under fluoroscopic guidance. Multiplanar imaging confirmed correct needle placement. The patient denied any radiating pain or paresthesias in the territory of the target nerve on final needle placement. After aspiration was negative, 1 mL of  Omnipaque 300 was injected under live fluoroscopy.  Epidural flow without vascular uptake was seen in multiple views.  Subsequently, the above noted injectate was slowly instilled at each site. Following the injection the needle was withdrawn slightly and flushed with  local anesthetic as it was fully extracted.  The patient tolerated the procedure well and there were no apparent complications.  The patient did not develop any new neurologic deficits.  After appropriate observation, the patient was returned to the recovery area in good condition under their own power. PRE-PROCEDURE PAIN SCORE:  6/10 POST-PROCEDURE PAIN SCORE:  7/10 The patient tolerated the procedure well.  The patient was instructed to call the [facility] if any questions or concerns arise after the procedure. After a short period of observation the patient was discharged.                             Again, thank you for allowing me to participate in the care of your patient.        Sincerely,        Serafin Dickson, DO

## 2024-02-07 NOTE — NURSING NOTE
Patient identified using two patient identifiers.  Ear exam showing wax occlusion completed by provider.  H202/H20 was placed in the bilateral ear(s) via irrigation tool: elephant ear.  Cerumen successfully debrided.  Patient tolerated well.     Bailee Kahler on 2/7/2024 at 9:57 AM

## 2024-02-07 NOTE — PROGRESS NOTES
02/07/24 0500   Appointment Info   Signing clinician's name / credentials Jaki Dhaliwal PT DPT   Visits Used 6 B+   Medical Diagnosis Hip pain, right   PT Tx Diagnosis Low back pain and reduced mobility, right hip pain with reduced moblity and weakness   Progress Note/Certification   Start of Care Date 11/22/23   Onset of illness/injury or Date of Surgery 09/20/23   Therapy Frequency 1 visit for HEP   Certification date from 01/17/23   Certification date to 02/07/24   Progress Note Due Date 01/17/24   Progress Note Completed Date 02/07/24   GOALS   PT Goals 2;3;4   PT Goal 1   Goal Identifier Lumbar flexoin ROM   Goal Description Pt will demonstrate 90* lumbar flexion with 50% reduction in symptoms in order to reach down to the floor.   Target Date 12/20/23   Date Met 02/07/24   PT Goal 2   Goal Identifier Walking   Goal Description Pt will report walking 2 blocks with <4/10 pain in order to ambulate in the community.   Goal Progress has not tried   Target Date 01/17/24   PT Goal 3   Goal Identifier Stairs   Goal Description Pt will report independence with navigating stairs with <4/10 pain.   Goal Progress goal met   Target Date 01/17/24   Date Met 02/07/24   PT Goal 4   Goal Identifier HEP   Goal Description Pt will be independent with HEP in order to maintain improvements upon discharge.   Goal Progress per staff not compliant   Target Date 01/17/24   Subjective Report   Subjective Report Pt relates he had lumbar MRI and injection which took away most of his pain. Pt relates he is doing good now and no longer having pain.   Objective Measures   Objective Measures Objective Measure 1;Objective Measure 2;Objective Measure 3;Objective Measure 4;Objective Measure 5;Objective Measure 6   Objective Measure 1   Objective Measure Lumbar ROM   Details flex: 12 in from floor ext: 100%   Objective Measure 2   Objective Measure walking   Details L foot turns out, mild fwd lean - fatigues after 300 ft but denies pain    Objective Measure 3   Objective Measure SLR   Details R: 45, L 50   Objective Measure 4   Objective Measure R Hip AROM   Details flex: 100, abd: 40, add: 20 , IR 10 , ER: 30   Objective Measure 5   Objective Measure LE MMT   Details hip flex: 5/5 knee flex:5/5 knee ext:5/5   Objective Measure 6   Objective Measure SL balance   Details 1-2 secs B - denies pain   Treatment Interventions (PT)   Interventions Therapeutic Procedure/Exercise;Manual Therapy   Therapeutic Procedure/Exercise   Therapeutic Procedures: strength, endurance, ROM, flexibillity minutes (95131) 30   Therapeutic Procedures Ther Proc 2;Ther Proc 3;Ther Proc 4;Ther Proc 5;Ther Proc 7;Ther Proc 6;Ther Proc 8;Ther Proc 9;Ther Proc 10   Ther Proc 1 walking program   Ther Proc 1 - Details start 5 min - goal 10   Ther Proc 2 clams x 20   Ther Proc 3 hamstring stretch x 10   Ther Proc 4 single knee to chest x 2   Ther Proc 5 bridge x 10   Ther Proc 6 sidestep 10 ft x 3   Skilled Intervention reviewed HEP and modified as need to reduce pain and promote adherence   Patient Response/Progress pt demonstrated understanding   Eval/Assessments   Assessments   (Pt returns to therapy after injection and is no longer in pain and able to complete all functional tasks thus appropriate to d/c to HEP. Pt is not compliant with HEP but has one available for him to do at home. Pt is also encouraged to walk more)   Education   Learner/Method Patient;Caregiver   Education Comments anatomy of lumbar spine and lumbar radicular symptoms   Plan   Home program [id number]   Plan for next session d/c   Comments   Comments spine specialiast 1/9/24. MRI 1/2/24   Total Session Time   Timed Code Treatment Minutes 30   Total Treatment Time (sum of timed and untimed services) 30         [facility]                                                                                   OUTPATIENT PHYSICAL THERAPY    PLAN OF TREATMENT FOR OUTPATIENT REHABILITATION    Patient's Last Name, First Name, Geovani Baker YOB: 1955   Provider's Name   [facility]   Medical Record No.  [id number]     Onset Date: 09/20/23  Start of Care Date: 11/22/23     Medical Diagnosis:  Hip pain, right      PT Treatment Diagnosis:  Low back pain and reduced mobility, right hip pain with reduced moblity and weakness Plan of Treatment  Frequency/Duration: 1 visit for HEP/      Certification date from 01/17/23 to 02/07/24         See note for plan of treatment details and functional goals     Jaki Dhaliwal, PT                         I CERTIFY THE NEED FOR THESE SERVICES FURNISHED UNDER        THIS PLAN OF TREATMENT AND WHILE UNDER MY CARE     (Physician attestation of this document indicates review and certification of the therapy plan).              Referring Provider:  Alexandru Vera    Initial Assessment  See Epic Evaluation- Start of Care Date: 11/22/23            DISCHARGE  Reason for Discharge: Patient has met all goals.  No further expectation of progress.    Equipment Issued: NA    Discharge Plan: Patient to continue home program    Referring Provider:  Alexandru Vera

## 2024-02-21 ENCOUNTER — OFFICE VISIT (OUTPATIENT)
Dept: FAMILY MEDICINE | Facility: CLINIC | Age: 69
End: 2024-02-21
Payer: COMMERCIAL

## 2024-02-21 VITALS
HEART RATE: 62 BPM | RESPIRATION RATE: 18 BRPM | TEMPERATURE: 97.5 F | DIASTOLIC BLOOD PRESSURE: 70 MMHG | HEIGHT: 65 IN | WEIGHT: 185 LBS | BODY MASS INDEX: 30.82 KG/M2 | OXYGEN SATURATION: 92 % | SYSTOLIC BLOOD PRESSURE: 110 MMHG

## 2024-02-21 DIAGNOSIS — Z12.5 SCREENING FOR PROSTATE CANCER: ICD-10-CM

## 2024-02-21 DIAGNOSIS — R73.03 PREDIABETES: ICD-10-CM

## 2024-02-21 DIAGNOSIS — Z00.00 ENCOUNTER FOR MEDICARE ANNUAL WELLNESS EXAM: Primary | ICD-10-CM

## 2024-02-21 DIAGNOSIS — E78.5 HYPERLIPIDEMIA LDL GOAL <130: ICD-10-CM

## 2024-02-21 DIAGNOSIS — Z13.1 SCREENING FOR DIABETES MELLITUS: ICD-10-CM

## 2024-02-21 LAB
CHOLEST SERPL-MCNC: 186 MG/DL
FASTING STATUS PATIENT QL REPORTED: NO
HBA1C MFR BLD: 5.7 % (ref 0–5.6)
HDLC SERPL-MCNC: 55 MG/DL
LDLC SERPL CALC-MCNC: 103 MG/DL
NONHDLC SERPL-MCNC: 131 MG/DL
PSA SERPL DL<=0.01 NG/ML-MCNC: 1.17 NG/ML (ref 0–6.5)
TRIGL SERPL-MCNC: 139 MG/DL

## 2024-02-21 PROCEDURE — G0103 PSA SCREENING: HCPCS | Performed by: FAMILY MEDICINE

## 2024-02-21 PROCEDURE — 99213 OFFICE O/P EST LOW 20 MIN: CPT | Mod: 25 | Performed by: FAMILY MEDICINE

## 2024-02-21 PROCEDURE — G0439 PPPS, SUBSEQ VISIT: HCPCS | Performed by: FAMILY MEDICINE

## 2024-02-21 PROCEDURE — 83036 HEMOGLOBIN GLYCOSYLATED A1C: CPT | Performed by: FAMILY MEDICINE

## 2024-02-21 PROCEDURE — 80061 LIPID PANEL: CPT | Performed by: FAMILY MEDICINE

## 2024-02-21 PROCEDURE — 36415 COLL VENOUS BLD VENIPUNCTURE: CPT | Performed by: FAMILY MEDICINE

## 2024-02-21 SDOH — HEALTH STABILITY: PHYSICAL HEALTH: ON AVERAGE, HOW MANY DAYS PER WEEK DO YOU ENGAGE IN MODERATE TO STRENUOUS EXERCISE (LIKE A BRISK WALK)?: 0 DAYS

## 2024-02-21 ASSESSMENT — SOCIAL DETERMINANTS OF HEALTH (SDOH): HOW OFTEN DO YOU GET TOGETHER WITH FRIENDS OR RELATIVES?: THREE TIMES A WEEK

## 2024-02-21 ASSESSMENT — PAIN SCALES - GENERAL: PAINLEVEL: NO PAIN (0)

## 2024-02-21 NOTE — LETTER
February 21, 2024      Srinivas VALE Juli  [address] [facility] [id number]        Dear ,    We are writing to inform you of your test results.    Hemoglobin A1c 5.7, consistent with borderline diabetes.  Cholesterol levels mildly elevated and PSA (marker of prostate cancer) level normal.     Will recommend to continue regular walks, healthy diet, weight loss and current medications.       Resulted Orders   Hemoglobin A1c   Result Value Ref Range    Hemoglobin A1C 5.7 (H) 0.0 - 5.6 %      Comment:      Normal <5.7%   Prediabetes 5.7-6.4%    Diabetes 6.5% or higher     Note: Adopted from ADA consensus guidelines.   PSA, screen   Result Value Ref Range    Prostate Specific Antigen Screen 1.17 0.00 - 6.50 ng/mL    Narrative    This result is obtained using the Roche Elecsys total PSA method on the jaime e601 immunoassay analyzer. Results obtained with different assay methods or kits cannot be used interchangeably.   Lipid panel reflex to direct LDL Non-fasting   Result Value Ref Range    Cholesterol 186 <200 mg/dL    Triglycerides 139 <150 mg/dL    Direct Measure HDL 55 >=40 mg/dL    LDL Cholesterol Calculated 103 (H) <=100 mg/dL    Non HDL Cholesterol 131 (H) <130 mg/dL    Patient Fasting > 8hrs? No     Narrative    Cholesterol  Desirable:  <200 mg/dL    Triglycerides  Normal:  Less than 150 mg/dL  Borderline High:  150-199 mg/dL  High:  200-499 mg/dL  Very High:  Greater than or equal to 500 mg/dL    Direct Measure HDL  Female:  Greater than or equal to 50 mg/dL   Male:  Greater than or equal to 40 mg/dL    LDL Cholesterol  Desirable:  <100mg/dL  Above Desirable:  100-129 mg/dL   Borderline High:  130-159 mg/dL   High:  160-189 mg/dL   Very High:  >= 190 mg/dL    Non HDL Cholesterol  Desirable:  130 mg/dL  Above Desirable:  130-159 mg/dL  Borderline High:  160-189 mg/dL  High:  190-219 mg/dL  Very High:  Greater than or equal to 220 mg/dL       If you have any questions or concerns, please call the clinic  at the number listed above.       Sincerely,      Alexandru Vera MD             Detail Level: Simple Instructions: This plan will send the code FBSE to the PM system.  DO NOT or CHANGE the price. Price (Do Not Change): 0.00

## 2024-02-21 NOTE — NURSING NOTE
"Chief Complaint   Patient presents with    Physical     /70 (Cuff Size: Adult Regular)   Pulse 62   Temp 97.5  F (36.4  C) (Tympanic)   Resp 18   Ht 1.651 m (5' 5\")   Wt 83.9 kg (185 lb)   SpO2 92%   BMI 30.79 kg/m   Estimated body mass index is 30.79 kg/m  as calculated from the following:    Height as of this encounter: 1.651 m (5' 5\").    Weight as of this encounter: 83.9 kg (185 lb).  Patient presents to the clinic using No DME      Health Maintenance that is potentially due pending provider review:    Health Maintenance Due   Topic Date Due    RSV VACCINE (Pregnancy & 60+) (1 - 1-dose 60+ series) Never done    INFLUENZA VACCINE (1) 09/01/2023    COVID-19 Vaccine (6 - 2023-24 season) 09/01/2023    LIPID  02/20/2024    MEDICARE ANNUAL WELLNESS VISIT  02/20/2024                  "

## 2024-02-21 NOTE — PROGRESS NOTES
Preventive Care Visit  [facility]  Alexandru Vera MD, Family Medicine  Feb 21, 2024    Assessment & Plan     Encounter for Medicare annual wellness exam  Medically stable.  Medications reviewed and no changes made.  Recommended regular walks, healthy diet and stressed on smoking cessation    Hyperlipidemia LDL goal <130  Lipid panel ordered  - Lipid panel reflex to direct LDL Non-fasting; Future    Screening for diabetes mellitus  - Hemoglobin A1c; Future    Screening for prostate cancer  - PSA, screen; Future    Prediabetes  Hemoglobin A1c came back 5.7, will recommend regular exercise, healthy diet, weight loss    Counseling  Appropriate preventive services were discussed with this patient, including applicable screening as appropriate for fall prevention, nutrition, physical activity, Tobacco-use cessation, weight loss and cognition.  Checklist reviewing preventive services available has been given to the patient.  Reviewed patient's diet, addressing concerns and/or questions.   Updated plan of care.  Patient reported difficulty with activities of daily living were addressed today.Information on urinary incontinence and treatment options given to patient.       aMrkell Espino is a 68 year old, presenting for the following:  Physical        2/21/2024    10:04 AM   Additional Questions   Roomed by ES   Accompanied by Group Home Staff         Health Care Directive  Patient does not have a Health Care Directive or Living Will: Discussed advance care planning with patient; information given to patient to review.    HPI    CONCERNS:        2/21/2024   General Health   How would you rate your overall physical health? Good   Feel stress (tense, anxious, or unable to sleep) Not at all         2/21/2024   Nutrition   Diet: Low salt         2/21/2024   Exercise   Days per week of moderate/strenous exercise 0 days   (!) EXERCISE CONCERN      2/21/2024   Social Factors   Frequency of gathering  with friends or relatives Three times a week   Worry food won't last until get money to buy more No   Food not last or not have enough money for food? No   Do you have housing?  Yes   Are you worried about losing your housing? No   Lack of transportation? No   Unable to get utilities (heat,electricity)? No         2/21/2024   Fall Risk   Fallen 2 or more times in the past year? No   Trouble with walking or balance? No          2/21/2024   Activities of Daily Living- Home Safety   Needs help with the following daily activites Preparing meals    Medication administration    Money management   Safety concerns in the home None of the above         2/21/2024   Dental   Dentist two times every year? Yes         2/21/2024   Hearing Screening   Hearing concerns? None of the above         2/21/2024   Driving Risk Screening   Patient/family members have concerns about driving No         2/21/2024   General Alertness/Fatigue Screening   Have you been more tired than usual lately? No         2/21/2024   Urinary Incontinence Screening   Bothered by leaking urine in past 6 months Yes            Today's PHQ-2 Score:       2/21/2024    10:00 AM   PHQ-2 ( 1999 [employer])   Q1: Little interest or pleasure in doing things 0   Q2: Feeling down, depressed or hopeless 0   PHQ-2 Score 0   Q1: Little interest or pleasure in doing things Not at all   Q2: Feeling down, depressed or hopeless Not at all   PHQ-2 Score 0           2/21/2024   Substance Use   Alcohol more than 3/day or more than 7/wk No   Do you have a current opioid prescription? No   How severe/bad is pain from 1 to 10? 0/10 (No Pain)   Do you use any other substances recreationally? No     Social History     Tobacco Use    Smoking status: Every Day     Packs/day: 0.75     Years: 50.00     Additional pack years: 0.00     Total pack years: 37.50     Types: Cigarettes     Passive exposure: Current    Smokeless tobacco: Never    Tobacco comments:     cutting one cigarette down a  month   Vaping Use    Vaping Use: Never used   Substance Use Topics    Alcohol use: No    Drug use: No       The 10-year ASCVD risk score (Silvia DK, et al., 2019) is: 15.2%    Values used to calculate the score:      Age: 68 years      Sex: Male      Is Non- : No      Diabetic: No      Tobacco smoker: Yes      Systolic Blood Pressure: 110 mmHg      Is BP treated: No      HDL Cholesterol: 55 mg/dL      Total Cholesterol: 169 mg/dL        Reviewed and updated as needed this visit by Provider                    Past Medical History:   Diagnosis Date    Cerumen impaction     Chronic kidney disease     Colon polyps     Hyperlipidemia     Mitral valve prolapse     Schizophrenia (H)     Ulcerated penile lesions     VSD (ventricular septal defect)      Past Surgical History:   Procedure Laterality Date    ARTHRODESIS FOOT Right 1/19/2016    Procedure: ARTHRODESIS FOOT;  Surgeon: Holden Harrison DPM;  Location: [facility]    ARTHRODESIS FOOT Left 1/3/2017    Procedure: ARTHRODESIS FOOT;  Surgeon: Holden Harrison DPM;  Location: [facility]    COLONOSCOPY N/A 5/19/2022    Procedure: COLONOSCOPY, FLEXIBLE, WITH LESION REMOVAL USING SNARE;  Surgeon: Ame Nelson MD;  Location: [facility]    HERNIORRHAPHY INGUINAL Right 6/8/2023    Procedure: HERNIORRHAPHY INGUINALOPEN right with mesh;  Surgeon: Ame Nelson MD;  Location: [facility]    SURGICAL HISTORY OF -       leg fracture     OB History   No obstetric history on file.     Lab work is in process  Labs reviewed in EPIC  BP Readings from Last 3 Encounters:   02/21/24 110/70   02/07/24 (!) 142/67   02/07/24 110/70    Wt Readings from Last 3 Encounters:   02/21/24 83.9 kg (185 lb)   02/07/24 82.1 kg (181 lb)   01/24/24 83.2 kg (183 lb 6.4 oz)                  Patient Active Problem List   Diagnosis    Colon polyps    Schizophrenia (H)    VSD (ventricular septal defect)    Mitral valve prolapse    Acquired hypothyroidism    IgA nephropathy    Proteinuria     Hyperlipidemia LDL goal <130    BPH (benign prostatic hyperplasia)    Health Care Home    Bilateral impacted cerumen    Bunion of great toe of right foot    Eczema of external ear, bilateral    Tinea pedis, unspecified laterality    Incomplete right bundle branch block (RBBB)    Cigarette nicotine dependence with nicotine-induced disorder    Gastroesophageal reflux disease without esophagitis    Stage 1 mild COPD by GOLD classification (H)    CKD (chronic kidney disease) stage 3, GFR 30-59 ml/min (H)    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)    Smoking    Hip pain, right    Dilatation of thoracic aorta (H24)     Past Surgical History:   Procedure Laterality Date    ARTHRODESIS FOOT Right 1/19/2016    Procedure: ARTHRODESIS FOOT;  Surgeon: Holden Harrison DPM;  Location: [facility]    ARTHRODESIS FOOT Left 1/3/2017    Procedure: ARTHRODESIS FOOT;  Surgeon: Holden Harrison DPM;  Location: [facility]    COLONOSCOPY N/A 5/19/2022    Procedure: COLONOSCOPY, FLEXIBLE, WITH LESION REMOVAL USING SNARE;  Surgeon: Ame Nelson MD;  Location: [facility]    HERNIORRHAPHY INGUINAL Right 6/8/2023    Procedure: HERNIORRHAPHY INGUINALOPEN right with mesh;  Surgeon: Ame Nelson MD;  Location: [facility]    SURGICAL HISTORY OF -       leg fracture       Social History     Tobacco Use    Smoking status: Every Day     Packs/day: 0.75     Years: 50.00     Additional pack years: 0.00     Total pack years: 37.50     Types: Cigarettes     Passive exposure: Current    Smokeless tobacco: Never    Tobacco comments:     cutting one cigarette down a month   Substance Use Topics    Alcohol use: No     Family History   Problem Relation Age of Onset    Emphysema Mother     Coronary Artery Disease Father          Current Outpatient Medications   Medication Sig Dispense Refill    ABILIFY 10 MG OR TABS Take 22 mg by mouth daily       ADULT ASPIRIN REGIMEN 81 MG EC tablet TAKE 1 TABLET BY MOUTH EVERY MORNING 30 tablet 11    albuterol  (VENTOLIN HFA) 108 (90 Base) MCG/ACT inhaler Inhale 2 puffs into the lungs every 6 hours as needed for shortness of breath / dyspnea or wheezing 18 g 11    clobetasol (TEMOVATE) 0.05 % external ointment Apply twice daily to ears for two weeks then 3 times weekly thereafter. 60 g 1    Disposable Gloves (LATEX GLOVES MEDIUM) MISC 4 boxes of gloves  For the application of topical medications 400 each 11    Disposable Gloves (NITRILE EXAM GLOVES MEDIUM) MISC 1 each as needed Use PRN daily with administration of otic drops, body lotion and powder to treat dry itchy skin. 4 each 11    EAR DROPS 6.5 % otic solution Place 5 drops into both ears 2 times daily For 5 days August 1-5th and Feb 1-5 th then return for irrigation after the 5th day. 15 mL 3    finasteride (PROSCAR) 5 MG tablet Take 1 tablet (5 mg) by mouth every morning 90 tablet 3    fluticasone-vilanterol (BREO ELLIPTA) 100-25 MCG/ACT inhaler Inhale 1 puff into the lungs daily 60 each 5    gabapentin (NEURONTIN) 100 MG capsule       ipratropium - albuterol 0.5 mg/2.5 mg/3 mL (DUONEB) 0.5-2.5 (3) MG/3ML neb solution Take 1 vial (3 mLs) by nebulization every 6 hours as needed for shortness of breath, wheezing or cough 90 mL 1    levothyroxine (SYNTHROID/LEVOTHROID) 137 MCG tablet Take 1 tablet (137 mcg) by mouth daily 90 tablet 1    mirabegron (MYRBETRIQ) 50 MG 24 hr tablet Take 1 tablet (50 mg) by mouth daily 90 tablet 3    OLANZapine (ZYPREXA) 2.5 MG tablet       Omega-3 Fatty Acids (FISH OIL) 1200 MG capsule 1 capsule every evening 90 capsule 2    order for DME Equipment being ordered: callous pad 1 each 1    order for DME Equipment being ordered: Dynaflex insert 1 Units 0    oxybutynin ER (DITROPAN XL) 5 MG 24 hr tablet Take 1 tablet by mouth in the morning 90 tablet 3    polyethylene glycol (CLEARLAX) 17 GM/Dose powder Take 17 grams (1 capful) by mouth daily 510 g 11    Psyllium (REGULOID) 48.57 % POWD 1 teaspoonful 2 times per day 369 g 11    simvastatin  (ZOCOR) 40 MG tablet TAKE 1 TABLET BY MOUTH AT BEDTIME 30 tablet 2    Skin Protectants, Misc. (EUCERIN) cream Apply topically 2 times daily Please ask Group home to schedule a Mid may 2023 follow up Derm appt: [phone] to schedule. 454 g 6    tamsulosin (FLOMAX) 0.4 MG capsule Take 1 capsule (0.4 mg) by mouth daily 90 capsule 3    traZODone (DESYREL) 50 MG tablet Take 25 mg by mouth At Bedtime      ZOLOFT 100 MG OR TABS 2 TABLETS DAILY       Allergies   Allergen Reactions    Nitrogen Oxide     Sulfa Antibiotics      Recent Labs   Lab Test 12/13/23  0929 10/18/23  0929 07/12/23  0938 02/20/23  1005 11/09/22  0937 03/01/22  0843 08/25/21  1145 02/15/21  1209 09/15/20  0924 07/12/20  1601   A1C  --   --   --  5.6  --  5.5  --  5.4  --   --    LDL  --   --   --  93  --  65  --  87  --   --    HDL  --   --   --  55  --  51  --  56  --   --    TRIG  --   --   --  106  --  124  --  122  --   --    ALT  --   --   --  17  --  36  --  30  --   --    CR  --   --  1.49* 1.59*  --  1.42*   < > 1.42*  --  1.56*   GFRESTIMATED  --   --  50* 47*  --  54*   < > 51*  --  45*   GFRESTBLACK  --   --   --   --   --   --   --  59*  --  53*   POTASSIUM  --   --  4.8 4.9  --  4.5   < > 4.8  --  4.2   TSH 4.38* 5.03*  --   --    < >  --    < >  --    < >  --     < > = values in this interval not displayed.      Current providers sharing in care for this patient include:  Patient Care Team:  Alexandru Vera MD as PCP - General (Family Practice)  Ginger Weiss MD as MD (Nephrology)  Ginger Weiss MD as Assigned Nephrology Provider  Alexandru Vera MD as Assigned PCP  Sangeetha Schmitt MD as Assigned Musculoskeletal Provider  Dakota Pinzon MD as Assigned Pulmonology Provider  [facility] APRN CNP as Nurse Practitioner (Cardiovascular Disease)  Julia Hernandez PA-C as Physician Assistant (Dermatology)  Vanesa Montanez PA-C as Physician Assistant (Urology)  [facility] La [facility] APRN CNP  "as Assigned Heart and Vascular Provider  Ame Nelson MD as Assigned Surgical Provider  Peewee Lott MD as MD (Cardiovascular Disease)    The following health maintenance items are reviewed in Epic and correct as of today:  Health Maintenance   Topic Date Due    RSV VACCINE (Pregnancy & 60+) (1 - 1-dose 60+ series) Never done    INFLUENZA VACCINE (1) 09/01/2023    COVID-19 Vaccine (6 - 2023-24 season) 09/01/2023    LIPID  02/20/2024    MEDICARE ANNUAL WELLNESS VISIT  02/20/2024    ANNUAL REVIEW OF HM ORDERS  05/31/2024    BMP  07/12/2024    MICROALBUMIN  07/12/2024    HEMOGLOBIN  07/12/2024    LUNG CANCER SCREENING  09/07/2024    TSH W/FREE T4 REFLEX  12/13/2024    NICOTINE/TOBACCO CESSATION COUNSELING Q 1 YR  02/07/2025    FALL RISK ASSESSMENT  02/21/2025    GLUCOSE  07/12/2026    COLORECTAL CANCER SCREENING  05/19/2027    ADVANCE CARE PLANNING  02/20/2028    DTAP/TDAP/TD IMMUNIZATION (3 - Td or Tdap) 06/07/2033    SPIROMETRY  Completed    HEPATITIS C SCREENING  Completed    COPD ACTION PLAN  Completed    PHQ-2 (once per calendar year)  Completed    Pneumococcal Vaccine: 65+ Years  Completed    URINALYSIS  Completed    ZOSTER IMMUNIZATION  Completed    AORTIC ANEURYSM SCREENING (SYSTEM ASSIGNED)  Completed    IPV IMMUNIZATION  Aged Out    HPV IMMUNIZATION  Aged Out    MENINGITIS IMMUNIZATION  Aged Out    RSV MONOCLONAL ANTIBODY  Aged Out         Review of Systems  Constitutional, neuro, ENT, endocrine, pulmonary, cardiac, gastrointestinal, genitourinary, musculoskeletal, integument and psychiatric systems are negative, except as otherwise noted.     Objective    Exam  /70 (Cuff Size: Adult Regular)   Pulse 62   Temp 97.5  F (36.4  C) (Tympanic)   Resp 18   Ht 1.651 m (5' 5\")   Wt 83.9 kg (185 lb)   SpO2 92%   BMI 30.79 kg/m     Estimated body mass index is 30.79 kg/m  as calculated from the following:    Height as of this encounter: 1.651 m (5' 5\").    Weight as of this encounter: 83.9 " kg (185 lb).    Physical Exam  GENERAL: alert and no distress  EYES: Eyes grossly normal to inspection, PERRL and conjunctivae and sclerae normal  HENT: normal cephalic/atraumatic, nose and mouth without ulcers or lesions, oropharynx clear, and oral mucous membranes moist  NECK: no adenopathy, no asymmetry, masses, or scars  RESP: lungs clear to auscultation - no rales, rhonchi or wheezes  CV:   ABDOMEN: soft, nontender, no hepatosplenomegaly, no masses and bowel sounds normal  MS: no gross musculoskeletal defects noted, no edema  SKIN: no suspicious lesions or rashes  NEURO: Grossly intact        2/21/2024   Mini Cog   Mini-Cog Not Completed (choose reason) Mental handicap            Signed Electronically by: Alexandru Vera MD

## 2024-02-21 NOTE — PATIENT INSTRUCTIONS
Preventive Care Advice   This is general advice given by our system to help you stay healthy. However, your care team may have specific advice just for you. Please talk to your care team about your preventive care needs.  Nutrition  Eat 5 or more servings of fruits and vegetables each day.  Try wheat bread, brown rice and whole grain pasta (instead of white bread, rice, and pasta).  Get enough calcium and vitamin D. Check the label on foods and aim for 100% of the RDA (recommended daily allowance).  Lifestyle  Exercise at least 150 minutes each week  (30 minutes a day, 5 days a week).  Do muscle strengthening activities 2 days a week. These help control your weight and prevent disease.  No smoking.  Wear sunscreen to prevent skin cancer.  Have a dental exam and cleaning every 6 months.  Yearly exams  See your health care team every year to talk about:  Any changes in your health.  Any medicines your care team has prescribed.  Preventive care, family planning, and ways to prevent chronic diseases.  Shots (vaccines)   HPV shots (up to age 26), if you've never had them before.  Hepatitis B shots (up to age 59), if you've never had them before.  COVID-19 shot: Get this shot when it's due.  Flu shot: Get a flu shot every year.  Tetanus shot: Get a tetanus shot every 10 years.  Pneumococcal, hepatitis A, and RSV shots: Ask your care team if you need these based on your risk.  Shingles shot (for age 50 and up)  General health tests  Diabetes screening:  Starting at age 35, Get screened for diabetes at least every 3 years.  If you are younger than age 35, ask your care team if you should be screened for diabetes.  Cholesterol test: At age 39, start having a cholesterol test every 5 years, or more often if advised.  Bone density scan (DEXA): At age 50, ask your care team if you should have this scan for osteoporosis (brittle bones).  Hepatitis C: Get tested at least once in your life.  STIs (sexually transmitted  infections)  Before age 24: Ask your care team if you should be screened for STIs.  After age 24: Get screened for STIs if you're at risk. You are at risk for STIs (including HIV) if:  You are sexually active with more than one person.  You don't use condoms every time.  You or a partner was diagnosed with a sexually transmitted infection.  If you are at risk for HIV, ask about PrEP medicine to prevent HIV.  Get tested for HIV at least once in your life, whether you are at risk for HIV or not.  Cancer screening tests  Cervical cancer screening: If you have a cervix, begin getting regular cervical cancer screening tests starting at age 21.  Breast cancer scan (mammogram): If you've ever had breasts, begin having regular mammograms starting at age 40. This is a scan to check for breast cancer.  Colon cancer screening: It is important to start screening for colon cancer at age 45.  Have a colonoscopy test every 10 years (or more often if you're at risk) Or, ask your provider about stool tests like a FIT test every year or Cologuard test every 3 years.  To learn more about your testing options, visit:   https://www.Chois[employer]/679389.pdf.  For help making a decision, visit:   https://bit.ly/ce57035.  Prostate cancer screening test: If you have a prostate, ask your care team if a prostate cancer screening test (PSA) at age 55 is right for you.  Lung cancer screening: If you are a current or former smoker ages 50 to 80, ask your care team if ongoing lung cancer screenings are right for you.  For informational purposes only. Not to replace the advice of your health care provider. Copyright   2023 [facility]. All rights reserved. Clinically reviewed by the [facility]. [employer] 537747 - REV 01/24.    Learning About Activities of Daily Living  What are activities of daily living?     Activities of daily living (ADLs) are the basic self-care tasks you do every day. As you age, and if  you have health problems, you may find that it's harder to do these things for yourself. That's when you may need some help.  Your doctor uses ADLs to measure how much help you need. Knowing what you can and can't do for yourself is an important first step to getting help. And when you have the help you need, you can stay as independent as possible.  Your doctor will want to know if you are able to do tasks such as:  Take a bath or shower without help.  Go to the bathroom by yourself.  Dress and undress without help.  Shave, comb your hair, and brush teeth on your own.  Get in and out of bed or a chair without help.  Feed yourself without help.  If you are having trouble doing basic self-care tasks, talk with your doctor. You may want to bring a caregiver or family member who can help the doctor understand your needs and abilities.  How will a doctor assess your ADLs?  Asking about ADLs is part of a routine health checkup your doctor will likely do as you age. Your health check might be done in a doctor's office, in your home, or at a hospital. The goal is to find out if you are having any problems that could make your health problems worse or that make it unsafe for you to be on your own.  To measure your ADLs, your doctor will ask how hard it is for you to do routine tasks. He or she may also want to know if you have changed the way you do a task because of a health problem. He or she may watch how you:  Walk back and forth.  Keep your balance while you stand or walk.  Move from sitting to standing or from a bed to a chair.  Button or unbutton a shirt or sweater.  Remove and put on your shoes.  It's normal to feel a little worried or anxious if you find you can't do all the things you used to be able to do. Talking with your doctor about ADLs isn't a test that you either pass or fail. It's just a way to get more information about your health and safety.  Follow-up care is a key part of your treatment and safety.  Be sure to make and go to all appointments, and call your doctor if you are having problems. It's also a good idea to know your test results and keep a list of the medicines you take.  Current as of: February 26, 2023               Content Version: 13.8    1584-5640 "[employer].   Care instructions adapted under license by your healthcare professional. If you have questions about a medical condition or this instruction, always ask your healthcare professional. "[employer] disclaims any warranty or liability for your use of this information.      Bladder Training: Care Instructions  Your Care Instructions     Bladder training is used to treat urge incontinence and stress incontinence. Urge incontinence means that the need to urinate comes on so fast that you can't get to a toilet in time. Stress incontinence means that you leak urine because of pressure on your bladder. For example, it may happen when you laugh, cough, or lift something heavy.  Bladder training can increase how long you can wait before you have to urinate. It can also help your bladder hold more urine. And it can give you better control over the urge to urinate.  It is important to remember that bladder training takes a few weeks to a few months to make a difference. You may not see results right away, but don't give up.  Follow-up care is a key part of your treatment and safety. Be sure to make and go to all appointments, and call your doctor if you are having problems. It's also a good idea to know your test results and keep a list of the medicines you take.  How can you care for yourself at home?  Work with your doctor to come up with a bladder training program that is right for you. You may use one or more of the following methods.  Delayed urination  In the beginning, try to keep from urinating for 5 minutes after you first feel the need to go.  While you wait, take deep, slow breaths to relax. Kegel exercises can also  "help you delay the need to go to the bathroom.  After some practice, when you can easily wait 5 minutes to urinate, try to wait 10 minutes before you urinate.  Slowly increase the waiting period until you are able to control when you have to urinate.  Scheduled urination  Empty your bladder when you first wake up in the morning.  Schedule times throughout the day when you will urinate.  Start by going to the bathroom every hour, even if you don't need to go.  Slowly increase the time between trips to the bathroom.  When you have found a schedule that works well for you, keep doing it.  If you wake up during the night and have to urinate, do it. Apply your schedule to waking hours only.  Kegel exercises  These tighten and strengthen pelvic muscles, which can help you control the flow of urine. (If doing these exercises causes pain, stop doing them and talk with your doctor.) To do Kegel exercises:  Squeeze your muscles as if you were trying not to pass gas. Or squeeze your muscles as if you were stopping the flow of urine. Your belly, legs, and buttocks shouldn't move.  Hold the squeeze for 3 seconds, then relax for 5 to 10 seconds.  Start with 3 seconds, then add 1 second each week until you are able to squeeze for 10 seconds.  Repeat the exercise 10 times a session. Do 3 to 8 sessions a day.  When should you call for help?  Watch closely for changes in your health, and be sure to contact your doctor if:    Your incontinence is getting worse.     You do not get better as expected.   Where can you learn more?  Go to https://[email or web address]..net/patiented  Enter V684 in the search box to learn more about \"Bladder Training: Care Instructions.\"  Current as of: February 28, 2023               Content Version: 13.8    3417-1587 [employer].   Care instructions adapted under license by your healthcare professional. If you have questions about a medical condition or this instruction, always ask your healthcare " professional. [employer] disclaims any warranty or liability for your use of this information.

## 2024-02-26 DIAGNOSIS — J44.9 STAGE 1 MILD COPD BY GOLD CLASSIFICATION (H): ICD-10-CM

## 2024-02-26 RX ORDER — ALBUTEROL SULFATE 90 UG/1
2 AEROSOL, METERED RESPIRATORY (INHALATION) EVERY 6 HOURS PRN
Qty: 18 G | Refills: 3 | Status: SHIPPED | OUTPATIENT
Start: 2024-02-26

## 2024-02-26 NOTE — TELEPHONE ENCOUNTER
Medication Question or Refill    Contacts         Type Contact Phone/Fax    02/26/2024 03:51 PM CST Fax (Incoming) [address] (Pharmacy) [phone]            What medication are you calling about (include dose and sig)?: Ventolin HFA inhaler    Preferred Pharmacy:  [address] [address]  Phone: [phone] Fax: [phone]    MARS Olmos

## 2024-03-06 ENCOUNTER — LAB (OUTPATIENT)
Dept: LAB | Facility: CLINIC | Age: 69
End: 2024-03-06
Payer: COMMERCIAL

## 2024-03-06 DIAGNOSIS — E03.9 HYPOTHYROIDISM (ACQUIRED): Primary | ICD-10-CM

## 2024-03-06 DIAGNOSIS — E03.9 HYPOTHYROIDISM (ACQUIRED): ICD-10-CM

## 2024-03-06 LAB
T4 FREE SERPL-MCNC: 1.26 NG/DL (ref 0.9–1.7)
TSH SERPL DL<=0.005 MIU/L-ACNC: 4.71 UIU/ML (ref 0.3–4.2)

## 2024-03-06 PROCEDURE — 84443 ASSAY THYROID STIM HORMONE: CPT

## 2024-03-06 PROCEDURE — 84439 ASSAY OF FREE THYROXINE: CPT

## 2024-03-06 PROCEDURE — 36415 COLL VENOUS BLD VENIPUNCTURE: CPT

## 2024-03-06 RX ORDER — LEVOTHYROXINE SODIUM 150 UG/1
150 TABLET ORAL DAILY
Qty: 90 TABLET | Refills: 1 | Status: SHIPPED | OUTPATIENT
Start: 2024-03-06 | End: 2024-07-30

## 2024-03-06 NOTE — LETTER
March 14, 2024      Srinivas Bustos  [address] [facility] [address]        Dear ,    We are writing to inform you of your test results.  TSH level 4.71, slightly above target goal.  Will recommend to increase levothyroxine to 150 mcg, prescription sent to your pharmacy.  Needs repeat TSH in 2 to 3 months, order placed, kindly schedule lab only appointment.       Resulted Orders   **TSH with free T4 reflex FUTURE 2mo   Result Value Ref Range    TSH 4.71 (H) 0.30 - 4.20 uIU/mL   T4 free   Result Value Ref Range    Free T4 1.26 0.90 - 1.70 ng/dL       If you have any questions or concerns, please call the clinic at the number listed above.       Sincerely,      Alexandru Vera MD/toño

## 2024-04-08 ENCOUNTER — TRANSFERRED RECORDS (OUTPATIENT)
Dept: HEALTH INFORMATION MANAGEMENT | Facility: CLINIC | Age: 69
End: 2024-04-08
Payer: COMMERCIAL

## 2024-04-10 NOTE — PROGRESS NOTES
Geovani presents to the office s/p one week arthrodesis of the first metatarsophalangeal joint of the left foot .  The patient relates keeping the bandages clean, dry and intact.  The patient relates good compliance with postoperative instructions.  The patient denies any severe pain, fevers, chills, nausea or vomiting.      Physical Exam:    General: The patient appears to be in no distress and in good spirits.  The bandages appear clean, dry and intact with no strikethrough noted. Vascular exam: Neurovascular status unchanged with < 3 sec capillary refill to all digits.  Positive pedal pulses and epicritic sensations intact with no evidence of allodynia.  One notes moderate edema to the forefoot on the left.  Sutures are intact and the skin is well coapted with no erythema noted.      Radiograph:  AP, lateral and medial oblique evaluation of left foot reveals surgical correction of structure with hardware properly placed and intact.      Assessment: Hallux valgus status post one week arthrodesis of the first metatarsophalangeal joint of the left foot.    Plan:  Sutures remain intact.  A sterile dressing was reapplied.  The patient was instructed to continue non-weightbearing to the left foot.  The patient is to keep the dressings clean, dry and intact and to continue with elevation of the left foot.  The patient will return to the office in one week for suture removal.    Disclaimer: This note consists of symbols derived from keyboarding, dictation and/or voice recognition software. As a result, there may be errors in the script that have gone undetected. Please consider this when interpreting information found in this chart.       BREONNA Harrison D.P.M., OLYA.F.A.S.     2

## 2024-05-01 ENCOUNTER — LAB (OUTPATIENT)
Dept: LAB | Facility: CLINIC | Age: 69
End: 2024-05-01
Payer: COMMERCIAL

## 2024-05-01 DIAGNOSIS — N18.30 CKD (CHRONIC KIDNEY DISEASE) STAGE 3, GFR 30-59 ML/MIN (H): Primary | ICD-10-CM

## 2024-05-01 DIAGNOSIS — E03.9 HYPOTHYROIDISM (ACQUIRED): ICD-10-CM

## 2024-05-01 LAB
HGB BLD-MCNC: 16.3 G/DL (ref 13.3–17.7)
TSH SERPL DL<=0.005 MIU/L-ACNC: 2.43 UIU/ML (ref 0.3–4.2)

## 2024-05-01 PROCEDURE — 36415 COLL VENOUS BLD VENIPUNCTURE: CPT

## 2024-05-01 PROCEDURE — 85018 HEMOGLOBIN: CPT

## 2024-05-01 PROCEDURE — 84443 ASSAY THYROID STIM HORMONE: CPT

## 2024-05-15 DIAGNOSIS — K59.00 CONSTIPATION, UNSPECIFIED CONSTIPATION TYPE: ICD-10-CM

## 2024-05-15 RX ORDER — POLYETHYLENE GLYCOL 3350 17 G/17G
POWDER, FOR SOLUTION ORAL
Qty: 510 G | Refills: 5 | Status: SHIPPED | OUTPATIENT
Start: 2024-05-15

## 2024-06-03 DIAGNOSIS — K59.00 CONSTIPATION: ICD-10-CM

## 2024-06-03 DIAGNOSIS — J44.9 STAGE 1 MILD COPD BY GOLD CLASSIFICATION (H): ICD-10-CM

## 2024-06-03 RX ORDER — FLUTICASONE FUROATE AND VILANTEROL TRIFENATATE 100; 25 UG/1; UG/1
1 POWDER RESPIRATORY (INHALATION) DAILY
Qty: 180 EACH | Refills: 1 | Status: SHIPPED | OUTPATIENT
Start: 2024-06-03

## 2024-06-03 RX ORDER — PSYLLIUM HUSK 3 G/5.4 G
POWDER (GRAM) ORAL
Qty: 369 G | Refills: 11 | Status: SHIPPED | OUTPATIENT
Start: 2024-06-03

## 2024-06-05 ENCOUNTER — HOSPITAL ENCOUNTER (OUTPATIENT)
Dept: CARDIOLOGY | Facility: CLINIC | Age: 69
Discharge: HOME | End: 2024-06-05
Attending: NURSE PRACTITIONER | Admitting: NURSE PRACTITIONER
Payer: COMMERCIAL

## 2024-06-05 DIAGNOSIS — I34.1 MITRAL VALVE PROLAPSE: ICD-10-CM

## 2024-06-05 DIAGNOSIS — Q21.0 VSD (VENTRICULAR SEPTAL DEFECT): ICD-10-CM

## 2024-06-05 DIAGNOSIS — R39.9 LOWER URINARY TRACT SYMPTOMS (LUTS): ICD-10-CM

## 2024-06-05 DIAGNOSIS — I77.810 DILATATION OF THORACIC AORTA (H): ICD-10-CM

## 2024-06-05 LAB — LVEF ECHO: NORMAL

## 2024-06-05 PROCEDURE — 93306 TTE W/DOPPLER COMPLETE: CPT | Mod: 26 | Performed by: INTERNAL MEDICINE

## 2024-06-05 PROCEDURE — 93306 TTE W/DOPPLER COMPLETE: CPT

## 2024-06-05 RX ORDER — TAMSULOSIN HYDROCHLORIDE 0.4 MG/1
0.4 CAPSULE ORAL DAILY
Qty: 90 CAPSULE | Refills: 0 | Status: SHIPPED | OUTPATIENT
Start: 2024-06-05 | End: 2024-06-19

## 2024-06-05 NOTE — TELEPHONE ENCOUNTER
Requested Prescriptions   Pending Prescriptions Disp Refills    tamsulosin (FLOMAX) 0.4 MG capsule 90 capsule 3     Sig: Take 1 capsule (0.4 mg) by mouth daily       There is no refill protocol information for this order        Last office visit: 7/26/2022 ; last virtual visit: 6/6/2023 with prescribing provider:  Vanesa Montanez     Future Office Visit:   Next 5 appointments (look out 90 days)      Jul 17, 2024 10:20 AM  (Arrive by 10:00 AM)  Provider Visit with Alexandru Vera MD  [facility] ([facility] ) [address] [place] [id number]  [phone]         [facility]   Clinic Station    [facility]  [phone] ( Please do not share number with patient)

## 2024-06-05 NOTE — TELEPHONE ENCOUNTER
One refill sent as pt has follow up appt 6/19/24 in urology.  Jenna MENEZES RN BSN PHN  Specialty Clinics

## 2024-06-17 NOTE — PROGRESS NOTES
CARDIOLOGY VISIT    REASON FOR VISIT: VSD, mitral regurgitation    SUBJECTIVE:  68-year-old male seen for VSD and mitral valve prolapse.  He has CKD secondary to IgA nephropathy, dyslipidemia, hypothyroid, schizophrenia, history of tobacco use.     Echo 2010 showed EF 60%, membranous VSD.     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     Abdominal ultrasound March 2021 showed normal-sized abdominal aorta, mild atherosclerotic changes.     Echo May 2021 showed EF 55%, normal RV, mild bileaflet mitral valve prolapse with 2+ MR, aorta 3.9 cm.    Echo June 2024 showed EF 60%, small restrictive membranous VSD, myxomatous appearing mitral valve with mild bileaflet prolapse, at least moderate MR, aorta 4.0cm.    He has been feeling about the same.  He does lawnmowing in the summer and feels okay with this.  His walking is somewhat limited by low back pain.  Weight is steady around 180 pounds.  He denies any chest pain, palpitations, or lower extremity edema.    MEDICATIONS:  Current Outpatient Medications   Medication Sig Dispense Refill    ABILIFY 10 MG OR TABS Take 22 mg by mouth daily       albuterol (VENTOLIN HFA) 108 (90 Base) MCG/ACT inhaler Inhale 2 puffs into the lungs every 6 hours as needed for shortness of breath or wheezing 18 g 3    ASPIRIN LOW DOSE 81 MG EC tablet TAKE 1 TABLET BY MOUTH EVERY MORNING 30 tablet 11    clobetasol (TEMOVATE) 0.05 % external ointment Apply twice daily to ears for two weeks then 3 times weekly thereafter. 60 g 1    Disposable Gloves (LATEX GLOVES MEDIUM) MISC 4 boxes of gloves  For the application of topical medications 400 each 11    Disposable Gloves (NITRILE EXAM GLOVES MEDIUM) MISC 1 each as needed Use PRN daily with administration of otic drops, body lotion and powder to treat dry itchy skin. 4 each 11    EAR DROPS 6.5 % otic solution Place 5 drops into both ears 2 times daily For 5 days August 1-5th and Feb  1-5 th then return for irrigation after the 5th day. 15 mL 3    finasteride (PROSCAR) 5 MG tablet Take 1 tablet (5 mg) by mouth every morning 90 tablet 3    fluticasone-vilanterol (BREO ELLIPTA) 100-25 MCG/ACT inhaler Inhale 1 puff into the lungs daily 180 each 1    gabapentin (NEURONTIN) 100 MG capsule       ipratropium - albuterol 0.5 mg/2.5 mg/3 mL (DUONEB) 0.5-2.5 (3) MG/3ML neb solution Take 1 vial (3 mLs) by nebulization every 6 hours as needed for shortness of breath, wheezing or cough 90 mL 1    levothyroxine (SYNTHROID/LEVOTHROID) 150 MCG tablet Take 1 tablet (150 mcg) by mouth daily 90 tablet 1    mirabegron (MYRBETRIQ) 50 MG 24 hr tablet Take 1 tablet (50 mg) by mouth daily 90 tablet 3    OLANZapine (ZYPREXA) 2.5 MG tablet       Omega-3 Fatty Acids (FISH OIL) 1200 MG capsule 1 capsule every evening 90 capsule 2    order for DME Equipment being ordered: callous pad 1 each 1    order for DME Equipment being ordered: Dynaflex insert 1 Units 0    oxybutynin ER (DITROPAN XL) 5 MG 24 hr tablet Take 1 tablet by mouth in the morning 90 tablet 3    polyethylene glycol (GAVILAX) 17 GM/Dose powder Take 17 grams (1 capful) by mouth daily 510 g 5    REGULOID 51.7 % POWD TAKE 1 TEASPOONFUL TWICE DAILY 369 g 11    simvastatin (ZOCOR) 40 MG tablet TAKE 1 TABLET BY MOUTH AT BEDTIME 30 tablet 11    Skin Protectants, Misc. (EUCERIN) cream Apply topically 2 times daily Please ask Group home to schedule a Mid may 2023 follow up Derm appt: [phone] to schedule. 454 g 6    tamsulosin (FLOMAX) 0.4 MG capsule Take 1 capsule (0.4 mg) by mouth daily 90 capsule 0    traZODone (DESYREL) 50 MG tablet Take 25 mg by mouth At Bedtime      ZOLOFT 100 MG OR TABS 2 TABLETS DAILY       Current Facility-Administered Medications   Medication Dose Route Frequency Provider Last Rate Last Admin    3 mL ropivacaine (NAROPIN) injection 5 mg/mL  3 mL   Jayesh Hooker, DO   3 mL at 12/07/23 1030    triamcinolone (KENALOG-40) injection 40  mg  40 mg   Jayesh Hooker DO   40 mg at 12/07/23 1030     Facility-Administered Medications Ordered in Other Visits   Medication Dose Route Frequency Provider Last Rate Last Admin    bupivacaine (MARCAINE) injection 0.5%    PRN Holden Harrison DPM   10 mL at 01/03/17 0988       ALLERGIES:  Allergies   Allergen Reactions    Nitrogen Oxide     Sulfa Antibiotics        REVIEW OF SYSTEMS:  Constitutional:  No weight loss, fever, chills  HEENT:  Eyes:  No visual loss, blurred vision, double vision or yellow sclerae. No hearing loss, sneezing, congestion, runny nose or sore throat.  Skin:  No rash or itching.  Cardiovascular: per HPI  Respiratory: per HPI  GI:  No anorexia, nausea, vomiting or diarrhea. No abdominal pain or blood.  :  No dysurea, hematuria  Neurologic:  No headache, paralysis, ataxia, numbness or tingling in the extremities. No change in bowel or bladder control.  Musculoskeletal:  No muscle pain  Hematologic:  No bleeding or bruising.  Lymphatics:  No enlarged nodes. No history of splenectomy.  Endocrine:  No reports of sweating, cold or heat intolerance. No polyuria or polydipsia.  Allergies:  No history of asthma, hives, eczema or rhinitis.    PHYSICAL EXAM:  /69 (BP Location: Right arm, Patient Position: Sitting)   Pulse 75   Resp 16   Wt 82.6 kg (182 lb)   SpO2 93%   BMI 30.29 kg/m    Constitutional: awake, alert, no distress  Eyes: PERRL, sclera nonicteric  ENT: trachea midline  Respiratory: Lungs clear  Cardiovascular: Regular rate and rhythm, 3/6 holosystolic murmur at the apex  GI: nondistended, nontender, bowel sounds present  Lymph/Hematologic: no lymphadenopathy  Skin: dry, no rash  Musculoskeletal: good muscle tone, strength 5/5 in upper and lower extremities  Neurologic: no focal deficits  Neuropsychiatric: appropriate affact    DATA:  Lab: May 2024: Hemoglobin 16, TSH 2.4  Recent Labs   Lab Test 02/21/24  1038 02/20/23  1005   CHOL 186 169   HDL 55 55   LDL  103* 93   TRIG 139 106     ASSESSMENT:  68-year-old male seen for VSD and mitral regurgitation.He has no new cardiac symptoms.  He has moderate to moderate plus mitral regurgitation.  There is a very posterior directed eccentric jet, there is bileaflet prolapse, but anterior leaflet is probably worse.  He has no symptoms from this, therefore we will continue with periodic echoes.  He has a small membranous VSD which has been stable for quite some time.    RECOMMENDATIONS:  1.  Bileaflet mitral valve prolapse with moderate mitral regurgitation  -Echo in 1 year  -Low threshold for TREY in the future if any new symptoms    2.  Membranous VSD  -Echo in 1 year    Follow-up in 1 year with HARVINDER with echo.    Peewee Lott MD  Cardiology - [facility]  Pager:  [phone]  Text Page  June 20, 2024

## 2024-06-18 ENCOUNTER — TELEPHONE (OUTPATIENT)
Dept: PHYSICAL MEDICINE AND REHAB | Facility: CLINIC | Age: 69
End: 2024-06-18
Payer: COMMERCIAL

## 2024-06-18 DIAGNOSIS — M54.16 LUMBAR RADICULOPATHY: Primary | ICD-10-CM

## 2024-06-18 NOTE — TELEPHONE ENCOUNTER
PSP:  Dr. Dickson  Last clinic visit:  2-7-24  Reason for call: Repeat injection request  Clinical information:  Pt had right L5-S1 TFESI on 1-24 with 100 percent pain relief up until 1-2 weeks ago. Per Amelia, pt's care giver, pain has returned in exactly the same location and he rates it a 9-10. He is requesting a repeat injection.  Provider to address: OK to order repeat right L5-S1 TFESI?

## 2024-06-18 NOTE — CONFIDENTIAL NOTE
UROLOGY FOLLOW-UP NOTE          Chief Complaint:   Today I had the pleasure of seeing . Geovani Bustos in follow-up for a chief complaint of LUTS.          Interval Update   Geovani Bustos is a very pleasant 68 year old male with a history of CKD stage 3, IgA nephropathy, CPFE, and schizophrenia.      Brief History: Mr. Geovani Bustos has followed with Dr. Mack for urinary frequency and nocturia. He takes tamsulosin 0.4 mg, oxybutynin XR 5 mg, finasteride 5 mg, and Myrbetriq 50 mg.     Today's notes: He is doing well. He reports some weakening of his urinary stream, urinary urgency with near urge incontinence.          Physical Exam:   Patient is a 68 year old male evaluated via video visit.       Labs and Pathology:    I personally reviewed all applicable laboratory data and went over findings with patient  Significant for:    CBC RESULTS:  Recent Labs   Lab Test 05/01/24  0917 07/12/23  0938 02/20/23  1005 03/01/22  0843 08/25/21  1145   WBC  --  10.8 9.5 8.6 9.8   HGB 16.3 16.0 15.1 14.3 15.1   PLT  --  152 165 157 170        BMP RESULTS:  Recent Labs   Lab Test 07/12/23  0938 02/20/23  1005 03/01/22  0843 08/25/21  1145 02/15/21  1209 07/12/20  1601 11/15/19  0907 10/21/19  1016    144 140 138 139 139 138 140   POTASSIUM 4.8 4.9 4.5 4.3 4.8 4.2 4.3 4.6   CHLORIDE 104 104 108 105 104 106 103 106   CO2 32* 27 28 31 32 29 34* 31   ANIONGAP 7 13 4 2* 3 4 1* 4   GLC 89 100* 98 137* 107* 83 96 102*   BUN 24.8* 28.9* 32* 27 31* 31* 17 33*   CR 1.49* 1.59* 1.42* 1.53* 1.42* 1.56* 1.47* 1.63*   GFRESTIMATED 50* 47* 54* 46* 51* 45* 49* 43*   GFRESTBLACK  --   --   --   --  59* 53* 57* 50*   RAYRAY 9.6 9.7 8.6 9.0 9.6 9.0 8.8 9.2       UA RESULTS:   Recent Labs   Lab Test 07/12/23  0938 08/16/22  1705 07/26/22  1027   SG 1.010 1.010 1.010   URINEPH 5.5 6.0 6.0   NITRITE Negative Negative Negative   RBCU 0-2 2-5* None Seen   WBCU 0-5 0-5 None Seen       PSA RESULTS  PSA   Date Value Ref Range Status    09/13/2017 0.78 0 - 4 ug/L Final     Comment:     Assay Method:  Chemiluminescence using Siemens Vista analyzer   12/17/2012 0.67 0 - 4 ug/L Final   09/08/2011 1.44 0 - 4 ug/L Final   03/18/2010 1.24 0 - 4 ug/L Final   10/15/2008 1.40 0.00 - 4.00 ng/mL      Prostate Specific Antigen Screen   Date Value Ref Range Status   02/21/2024 1.17 0.00 - 6.50 ng/mL Final            Assessment/Plan   68 year old male seen in follow up for LUTS, currently managed with tamsulosin 0.4 mg, oxybutynin XR 5 mg, finasteride 5 mg, and Myrbetriq 50 mg. He reports ongoing weak stream, urinary urgency, and near urge incontinence. He is bothered by these symptoms.     We discussed that he is fairly optimized on medications, but we could consider evaluation for procedures. He had a cystoscopy in 2019 which showed minimal outlet obstruction, but patient would prefer to start with this for recheck. If he is a candidate for an outlet procedure, he would be interested. Otherwise, we could consider increasing his oxybutynin dose vs consideration of Interstim or intravesical Botox.     Plan:  Continue tamsulosin 0.4 mg, finasteride 5 mg, oxybutynin XR 5 mg, and Myrbetriq 50 mg daily.   Cystoscopy next available for evaluation of ongoing symptoms and consideration of bladder outlet procedure.   Follow up pending cystoscopy results.              Past Medical History:     Past Medical History:   Diagnosis Date    Cerumen impaction     Chronic kidney disease     Colon polyps     Hyperlipidemia     Mitral valve prolapse     Schizophrenia (H)     Ulcerated penile lesions     VSD (ventricular septal defect)             Past Surgical History:     Past Surgical History:   Procedure Laterality Date    ARTHRODESIS FOOT Right 1/19/2016    Procedure: ARTHRODESIS FOOT;  Surgeon: Holden Harrison DPM;  Location: [facility]    ARTHRODESIS FOOT Left 1/3/2017    Procedure: ARTHRODESIS FOOT;  Surgeon: Holden Harrison DPM;  Location: [facility]    COLONOSCOPY N/A  5/19/2022    Procedure: COLONOSCOPY, FLEXIBLE, WITH LESION REMOVAL USING SNARE;  Surgeon: Ame Nelson MD;  Location: [facility]    HERNIORRHAPHY INGUINAL Right 6/8/2023    Procedure: HERNIORRHAPHY INGUINALOPEN right with mesh;  Surgeon: Ame Nelson MD;  Location: [facility]    SURGICAL HISTORY OF -       leg fracture            Medications     Current Outpatient Medications   Medication Sig Dispense Refill    ABILIFY 10 MG OR TABS Take 22 mg by mouth daily       albuterol (VENTOLIN HFA) 108 (90 Base) MCG/ACT inhaler Inhale 2 puffs into the lungs every 6 hours as needed for shortness of breath or wheezing 18 g 3    ASPIRIN LOW DOSE 81 MG EC tablet TAKE 1 TABLET BY MOUTH EVERY MORNING 30 tablet 11    clobetasol (TEMOVATE) 0.05 % external ointment Apply twice daily to ears for two weeks then 3 times weekly thereafter. 60 g 1    Disposable Gloves (LATEX GLOVES MEDIUM) MISC 4 boxes of gloves  For the application of topical medications 400 each 11    Disposable Gloves (NITRILE EXAM GLOVES MEDIUM) MISC 1 each as needed Use PRN daily with administration of otic drops, body lotion and powder to treat dry itchy skin. 4 each 11    EAR DROPS 6.5 % otic solution Place 5 drops into both ears 2 times daily For 5 days August 1-5th and Feb 1-5 th then return for irrigation after the 5th day. 15 mL 3    finasteride (PROSCAR) 5 MG tablet Take 1 tablet (5 mg) by mouth every morning 90 tablet 3    fluticasone-vilanterol (BREO ELLIPTA) 100-25 MCG/ACT inhaler Inhale 1 puff into the lungs daily 180 each 1    gabapentin (NEURONTIN) 100 MG capsule       ipratropium - albuterol 0.5 mg/2.5 mg/3 mL (DUONEB) 0.5-2.5 (3) MG/3ML neb solution Take 1 vial (3 mLs) by nebulization every 6 hours as needed for shortness of breath, wheezing or cough 90 mL 1    levothyroxine (SYNTHROID/LEVOTHROID) 150 MCG tablet Take 1 tablet (150 mcg) by mouth daily 90 tablet 1    mirabegron (MYRBETRIQ) 50 MG 24 hr tablet Take 1 tablet (50 mg) by mouth daily 90 tablet 3     OLANZapine (ZYPREXA) 2.5 MG tablet       Omega-3 Fatty Acids (FISH OIL) 1200 MG capsule 1 capsule every evening 90 capsule 2    order for DME Equipment being ordered: callous pad 1 each 1    order for DME Equipment being ordered: Dynaflex insert 1 Units 0    oxybutynin ER (DITROPAN XL) 5 MG 24 hr tablet Take 1 tablet by mouth in the morning 90 tablet 3    polyethylene glycol (GAVILAX) 17 GM/Dose powder Take 17 grams (1 capful) by mouth daily 510 g 5    REGULOID 51.7 % POWD TAKE 1 TEASPOONFUL TWICE DAILY 369 g 11    simvastatin (ZOCOR) 40 MG tablet TAKE 1 TABLET BY MOUTH AT BEDTIME 30 tablet 11    Skin Protectants, Misc. (EUCERIN) cream Apply topically 2 times daily Please ask Group home to schedule a Mid may 2023 follow up Derm appt: [phone] to schedule. 454 g 6    tamsulosin (FLOMAX) 0.4 MG capsule Take 1 capsule (0.4 mg) by mouth daily 90 capsule 0    traZODone (DESYREL) 50 MG tablet Take 25 mg by mouth At Bedtime      ZOLOFT 100 MG OR TABS 2 TABLETS DAILY       Current Facility-Administered Medications   Medication Dose Route Frequency Provider Last Rate Last Admin    3 mL ropivacaine (NAROPIN) injection 5 mg/mL  3 mL   Jayesh Hooker DO   3 mL at 12/07/23 1030    triamcinolone (KENALOG-40) injection 40 mg  40 mg   Jayesh Hooker DO   40 mg at 12/07/23 1030     Facility-Administered Medications Ordered in Other Visits   Medication Dose Route Frequency Provider Last Rate Last Admin    bupivacaine (MARCAINE) injection 0.5%    Holden Doherty DPM   10 mL at 01/03/17 0945            Family History:     Family History   Problem Relation Age of Onset    Emphysema Mother     Coronary Artery Disease Father             Social History:     Social History     Socioeconomic History    Marital status: Single     Spouse name: Not on file    Number of children: Not on file    Years of education: Not on file    Highest education level: Not on file   Occupational History    Not on file   Tobacco  Use    Smoking status: Every Day     Current packs/day: 0.75     Average packs/day: 0.8 packs/day for 50.0 years (37.5 ttl pk-yrs)     Types: Cigarettes     Passive exposure: Current    Smokeless tobacco: Never    Tobacco comments:     cutting one cigarette down a month   Vaping Use    Vaping status: Never Used   Substance and Sexual Activity    Alcohol use: No    Drug use: No    Sexual activity: Never   Other Topics Concern    Parent/sibling w/ CABG, MI or angioplasty before 65F 55M? Not Asked     Service Not Asked    Blood Transfusions No    Caffeine Concern Not Asked    Occupational Exposure Not Asked    Hobby Hazards Not Asked    Sleep Concern Not Asked    Stress Concern Not Asked    Weight Concern Not Asked    Special Diet Not Asked    Back Care Not Asked    Exercise Not Asked    Bike Helmet Not Asked    Seat Belt Not Asked    Self-Exams Not Asked   Social History Narrative    Patient has no interest in smoking cessation.     Her for [employer] physical - likes to Bowl    Lives in Group home. Grew up in [place].      Social Determinants of Health     Financial Resource Strain: Low Risk  (2/21/2024)    Financial Resource Strain     Within the past 12 months, have you or your family members you live with been unable to get utilities (heat, electricity) when it was really needed?: No   Food Insecurity: Low Risk  (2/21/2024)    Food Insecurity     Within the past 12 months, did you worry that your food would run out before you got money to buy more?: No     Within the past 12 months, did the food you bought just not last and you didn t have money to get more?: No   Transportation Needs: Low Risk  (2/21/2024)    Transportation Needs     Within the past 12 months, has lack of transportation kept you from medical appointments, getting your medicines, non-medical meetings or appointments, work, or from getting things that you need?: No   Physical Activity: Unknown (2/21/2024)    Exercise Vital Sign      Days of Exercise per Week: 0 days     Minutes of Exercise per Session: Not on file   Stress: No Stress Concern Present (2/21/2024)    [employer] [place] [employer] - Occupational Stress Questionnaire     Feeling of Stress : Not at all   Social Connections: Unknown (2/21/2024)    Social Connection and Isolation Panel [NHANES]     Frequency of Communication with Friends and Family: Not on file     Frequency of Social Gatherings with Friends and Family: Three times a week     Attends Hinduism Services: Not on file     Active Member of Clubs or Organizations: Not on file     Attends Club or Organization Meetings: Not on file     Marital Status: Not on file   Interpersonal Safety: Low Risk  (2/21/2024)    Interpersonal Safety     Do you feel physically and emotionally safe where you currently live?: Yes     Within the past 12 months, have you been hit, slapped, kicked or otherwise physically hurt by someone?: No     Within the past 12 months, have you been humiliated or emotionally abused in other ways by your partner or ex-partner?: No   Housing Stability: Low Risk  (2/21/2024)    Housing Stability     Do you have housing? : Yes     Are you worried about losing your housing?: No            Allergies:   Nitrogen oxide and Sulfa antibiotics         Review of Systems:  From intake questionnaire   Negative 14 system review except as noted on HPI, nurse's note.        CHEYANNE BARAJAS PA-C  Department of Urology

## 2024-06-19 ENCOUNTER — VIRTUAL VISIT (OUTPATIENT)
Dept: UROLOGY | Facility: CLINIC | Age: 69
End: 2024-06-19
Payer: COMMERCIAL

## 2024-06-19 DIAGNOSIS — N32.81 OAB (OVERACTIVE BLADDER): ICD-10-CM

## 2024-06-19 DIAGNOSIS — R35.0 BENIGN PROSTATIC HYPERPLASIA WITH URINARY FREQUENCY: ICD-10-CM

## 2024-06-19 DIAGNOSIS — R39.9 LOWER URINARY TRACT SYMPTOMS (LUTS): ICD-10-CM

## 2024-06-19 DIAGNOSIS — N40.1 BENIGN PROSTATIC HYPERPLASIA WITH URINARY FREQUENCY: ICD-10-CM

## 2024-06-19 DIAGNOSIS — R35.0 URINARY FREQUENCY: ICD-10-CM

## 2024-06-19 PROCEDURE — 99214 OFFICE O/P EST MOD 30 MIN: CPT | Mod: 95 | Performed by: STUDENT IN AN ORGANIZED HEALTH CARE EDUCATION/TRAINING PROGRAM

## 2024-06-19 RX ORDER — TAMSULOSIN HYDROCHLORIDE 0.4 MG/1
0.4 CAPSULE ORAL DAILY
Qty: 90 CAPSULE | Refills: 3 | Status: SHIPPED | OUTPATIENT
Start: 2024-06-19

## 2024-06-19 RX ORDER — OXYBUTYNIN CHLORIDE 5 MG/1
TABLET, EXTENDED RELEASE ORAL
Qty: 90 TABLET | Refills: 3 | Status: SHIPPED | OUTPATIENT
Start: 2024-06-19

## 2024-06-19 RX ORDER — FINASTERIDE 5 MG/1
1 TABLET, FILM COATED ORAL EVERY MORNING
Qty: 90 TABLET | Refills: 3 | Status: SHIPPED | OUTPATIENT
Start: 2024-06-19

## 2024-06-19 RX ORDER — MIRABEGRON 50 MG/1
50 TABLET, EXTENDED RELEASE ORAL DAILY
Qty: 90 TABLET | Refills: 3 | Status: SHIPPED | OUTPATIENT
Start: 2024-06-19

## 2024-06-19 NOTE — PROGRESS NOTES
Geovani Bustos is a 68 year old year old who is being evaluated via a billable video visit.      How would you like to obtain your AVS? Email a copy  If the video visit is dropped, the invitation should be resent by: Send to e-mail at: [email or web address]  Will anyone else be joining your video visit? No    Video-Visit Details    Type of service:  Video Visit     Originating Location (pt. Location): Long term Care    Distant Location (provider location):  Off-site  Platform used for Video Visit: Doron

## 2024-06-20 ENCOUNTER — OFFICE VISIT (OUTPATIENT)
Dept: CARDIOLOGY | Facility: CLINIC | Age: 69
End: 2024-06-20
Payer: COMMERCIAL

## 2024-06-20 VITALS
DIASTOLIC BLOOD PRESSURE: 69 MMHG | RESPIRATION RATE: 16 BRPM | HEART RATE: 75 BPM | OXYGEN SATURATION: 93 % | BODY MASS INDEX: 30.29 KG/M2 | WEIGHT: 182 LBS | SYSTOLIC BLOOD PRESSURE: 119 MMHG

## 2024-06-20 DIAGNOSIS — Q21.0 VENTRICULAR SEPTAL DEFECT (VSD), MEMBRANOUS: Primary | ICD-10-CM

## 2024-06-20 DIAGNOSIS — I34.0 NONRHEUMATIC MITRAL VALVE REGURGITATION: ICD-10-CM

## 2024-06-20 PROCEDURE — 99214 OFFICE O/P EST MOD 30 MIN: CPT | Performed by: INTERNAL MEDICINE

## 2024-06-20 NOTE — LETTER
6/20/2024    Alexandru Vera MD  [address] [facility] [place] [address]    RE: Geovani AKANKSHA Bustos       Dear Colleague,     I had the pleasure of seeing Geovani AKANKSHA Bustos in the [facility].  CARDIOLOGY VISIT    REASON FOR VISIT: VSD, mitral regurgitation    SUBJECTIVE:  68-year-old male seen for VSD and mitral valve prolapse.  He has CKD secondary to IgA nephropathy, dyslipidemia, hypothyroid, schizophrenia, history of tobacco use.     Echo 2010 showed EF 60%, membranous VSD.     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     Abdominal ultrasound March 2021 showed normal-sized abdominal aorta, mild atherosclerotic changes.     Echo May 2021 showed EF 55%, normal RV, mild bileaflet mitral valve prolapse with 2+ MR, aorta 3.9 cm.    Echo June 2024 showed EF 60%, small restrictive membranous VSD, myxomatous appearing mitral valve with mild bileaflet prolapse, at least moderate MR, aorta 4.0cm.    He has been feeling about the same.  He does lawnmowing in the summer and feels okay with this.  His walking is somewhat limited by low back pain.  Weight is steady around 180 pounds.  He denies any chest pain, palpitations, or lower extremity edema.    MEDICATIONS:  Current Outpatient Medications   Medication Sig Dispense Refill    ABILIFY 10 MG OR TABS Take 22 mg by mouth daily       albuterol (VENTOLIN HFA) 108 (90 Base) MCG/ACT inhaler Inhale 2 puffs into the lungs every 6 hours as needed for shortness of breath or wheezing 18 g 3    ASPIRIN LOW DOSE 81 MG EC tablet TAKE 1 TABLET BY MOUTH EVERY MORNING 30 tablet 11    clobetasol (TEMOVATE) 0.05 % external ointment Apply twice daily to ears for two weeks then 3 times weekly thereafter. 60 g 1    Disposable Gloves (LATEX GLOVES MEDIUM) MISC 4 boxes of gloves  For the application of topical medications 400 each 11    Disposable Gloves (NITRILE EXAM GLOVES MEDIUM) MISC 1 each as needed Use PRN  daily with administration of otic drops, body lotion and powder to treat dry itchy skin. 4 each 11    EAR DROPS 6.5 % otic solution Place 5 drops into both ears 2 times daily For 5 days August 1-5th and Feb 1-5 th then return for irrigation after the 5th day. 15 mL 3    finasteride (PROSCAR) 5 MG tablet Take 1 tablet (5 mg) by mouth every morning 90 tablet 3    fluticasone-vilanterol (BREO ELLIPTA) 100-25 MCG/ACT inhaler Inhale 1 puff into the lungs daily 180 each 1    gabapentin (NEURONTIN) 100 MG capsule       ipratropium - albuterol 0.5 mg/2.5 mg/3 mL (DUONEB) 0.5-2.5 (3) MG/3ML neb solution Take 1 vial (3 mLs) by nebulization every 6 hours as needed for shortness of breath, wheezing or cough 90 mL 1    levothyroxine (SYNTHROID/LEVOTHROID) 150 MCG tablet Take 1 tablet (150 mcg) by mouth daily 90 tablet 1    mirabegron (MYRBETRIQ) 50 MG 24 hr tablet Take 1 tablet (50 mg) by mouth daily 90 tablet 3    OLANZapine (ZYPREXA) 2.5 MG tablet       Omega-3 Fatty Acids (FISH OIL) 1200 MG capsule 1 capsule every evening 90 capsule 2    order for DME Equipment being ordered: callous pad 1 each 1    order for DME Equipment being ordered: Dynaflex insert 1 Units 0    oxybutynin ER (DITROPAN XL) 5 MG 24 hr tablet Take 1 tablet by mouth in the morning 90 tablet 3    polyethylene glycol (GAVILAX) 17 GM/Dose powder Take 17 grams (1 capful) by mouth daily 510 g 5    REGULOID 51.7 % POWD TAKE 1 TEASPOONFUL TWICE DAILY 369 g 11    simvastatin (ZOCOR) 40 MG tablet TAKE 1 TABLET BY MOUTH AT BEDTIME 30 tablet 11    Skin Protectants, Misc. (EUCERIN) cream Apply topically 2 times daily Please ask Group home to schedule a Mid may 2023 follow up Derm appt: [phone] to schedule. 454 g 6    tamsulosin (FLOMAX) 0.4 MG capsule Take 1 capsule (0.4 mg) by mouth daily 90 capsule 0    traZODone (DESYREL) 50 MG tablet Take 25 mg by mouth At Bedtime      ZOLOFT 100 MG OR TABS 2 TABLETS DAILY       Current Facility-Administered Medications    Medication Dose Route Frequency Provider Last Rate Last Admin    3 mL ropivacaine (NAROPIN) injection 5 mg/mL  3 mL   Jayesh Hooker DO   3 mL at 12/07/23 1030    triamcinolone (KENALOG-40) injection 40 mg  40 mg   Jayesh Hooker DO   40 mg at 12/07/23 1030     Facility-Administered Medications Ordered in Other Visits   Medication Dose Route Frequency Provider Last Rate Last Admin    bupivacaine (MARCAINE) injection 0.5%    PRN Holden Harrison DPM   10 mL at 01/03/17 0945       ALLERGIES:  Allergies   Allergen Reactions    Nitrogen Oxide     Sulfa Antibiotics        REVIEW OF SYSTEMS:  Constitutional:  No weight loss, fever, chills  HEENT:  Eyes:  No visual loss, blurred vision, double vision or yellow sclerae. No hearing loss, sneezing, congestion, runny nose or sore throat.  Skin:  No rash or itching.  Cardiovascular: per HPI  Respiratory: per HPI  GI:  No anorexia, nausea, vomiting or diarrhea. No abdominal pain or blood.  :  No dysurea, hematuria  Neurologic:  No headache, paralysis, ataxia, numbness or tingling in the extremities. No change in bowel or bladder control.  Musculoskeletal:  No muscle pain  Hematologic:  No bleeding or bruising.  Lymphatics:  No enlarged nodes. No history of splenectomy.  Endocrine:  No reports of sweating, cold or heat intolerance. No polyuria or polydipsia.  Allergies:  No history of asthma, hives, eczema or rhinitis.    PHYSICAL EXAM:  /69 (BP Location: Right arm, Patient Position: Sitting)   Pulse 75   Resp 16   Wt 82.6 kg (182 lb)   SpO2 93%   BMI 30.29 kg/m    Constitutional: awake, alert, no distress  Eyes: PERRL, sclera nonicteric  ENT: trachea midline  Respiratory: Lungs clear  Cardiovascular: Regular rate and rhythm, 3/6 holosystolic murmur at the apex  GI: nondistended, nontender, bowel sounds present  Lymph/Hematologic: no lymphadenopathy  Skin: dry, no rash  Musculoskeletal: good muscle tone, strength 5/5 in upper and lower  extremities  Neurologic: no focal deficits  Neuropsychiatric: appropriate affact    DATA:  Lab: May 2024: Hemoglobin 16, TSH 2.4  Recent Labs   Lab Test 02/21/24  1038 02/20/23  1005   CHOL 186 169   HDL 55 55   * 93   TRIG 139 106     ASSESSMENT:  68-year-old male seen for VSD and mitral regurgitation.He has no new cardiac symptoms.  He has moderate to moderate plus mitral regurgitation.  There is a very posterior directed eccentric jet, there is bileaflet prolapse, but anterior leaflet is probably worse.  He has no symptoms from this, therefore we will continue with periodic echoes.  He has a small membranous VSD which has been stable for quite some time.    RECOMMENDATIONS:  1.  Bileaflet mitral valve prolapse with moderate mitral regurgitation  -Echo in 1 year  -Low threshold for TREY in the future if any new symptoms    2.  Membranous VSD  -Echo in 1 year    Follow-up in 1 year with HARVINDER with echo.    Peewee Lott MD  Cardiology - [facility]  Pager:  [phone]  Text Page  June 20, 2024        Thank you for allowing me to participate in the care of your patient.      Sincerely,     Peewee Lott MD     [facility]  cc:   Peewee Lott MD

## 2024-06-20 NOTE — PATIENT INSTRUCTIONS
Your recent echo showed no changes from your previous.  Your heart function is normal.  There is a moderate amount of leaking of the mitral valve.  This will need to be monitored in the future.    If you become more short of breath, much more fatigued, or notice any rapid heart rate or palpitations, please contact our clinic for follow-up.  Otherwise we would like to see you back in about 1 years time with another echo.

## 2024-07-01 DIAGNOSIS — E78.5 HYPERLIPIDEMIA LDL GOAL <130: ICD-10-CM

## 2024-07-01 RX ORDER — AMOXICILLIN 500 MG
CAPSULE ORAL
Qty: 90 CAPSULE | Refills: 2 | Status: SHIPPED | OUTPATIENT
Start: 2024-07-01

## 2024-07-03 ENCOUNTER — RADIOLOGY INJECTION OFFICE VISIT (OUTPATIENT)
Dept: PHYSICAL MEDICINE AND REHAB | Facility: CLINIC | Age: 69
End: 2024-07-03
Attending: PAIN MEDICINE
Payer: COMMERCIAL

## 2024-07-03 VITALS
BODY MASS INDEX: 30.32 KG/M2 | OXYGEN SATURATION: 92 % | WEIGHT: 182 LBS | TEMPERATURE: 98 F | DIASTOLIC BLOOD PRESSURE: 66 MMHG | RESPIRATION RATE: 16 BRPM | SYSTOLIC BLOOD PRESSURE: 124 MMHG | HEIGHT: 65 IN | HEART RATE: 71 BPM

## 2024-07-03 DIAGNOSIS — M54.16 LUMBAR RADICULOPATHY: ICD-10-CM

## 2024-07-03 PROCEDURE — 64483 NJX AA&/STRD TFRM EPI L/S 1: CPT | Mod: RT | Performed by: STUDENT IN AN ORGANIZED HEALTH CARE EDUCATION/TRAINING PROGRAM

## 2024-07-03 RX ORDER — LIDOCAINE HYDROCHLORIDE 10 MG/ML
INJECTION, SOLUTION EPIDURAL; INFILTRATION; INTRACAUDAL; PERINEURAL
Status: COMPLETED | OUTPATIENT
Start: 2024-07-03 | End: 2024-07-03

## 2024-07-03 RX ORDER — BUPIVACAINE HYDROCHLORIDE 2.5 MG/ML
INJECTION, SOLUTION EPIDURAL; INFILTRATION; INTRACAUDAL
Status: COMPLETED | OUTPATIENT
Start: 2024-07-03 | End: 2024-07-03

## 2024-07-03 RX ORDER — DEXAMETHASONE SODIUM PHOSPHATE 10 MG/ML
INJECTION, SOLUTION INTRAMUSCULAR; INTRAVENOUS
Status: COMPLETED | OUTPATIENT
Start: 2024-07-03 | End: 2024-07-03

## 2024-07-03 RX ADMIN — LIDOCAINE HYDROCHLORIDE 1 ML: 10 INJECTION, SOLUTION EPIDURAL; INFILTRATION; INTRACAUDAL; PERINEURAL at 09:06

## 2024-07-03 RX ADMIN — BUPIVACAINE HYDROCHLORIDE 1 ML: 2.5 INJECTION, SOLUTION EPIDURAL; INFILTRATION; INTRACAUDAL at 09:07

## 2024-07-03 RX ADMIN — DEXAMETHASONE SODIUM PHOSPHATE 10 MG: 10 INJECTION, SOLUTION INTRAMUSCULAR; INTRAVENOUS at 09:07

## 2024-07-03 ASSESSMENT — PAIN SCALES - GENERAL
PAINLEVEL: EXTREME PAIN (9)
PAINLEVEL: SEVERE PAIN (6)

## 2024-07-03 NOTE — PATIENT INSTRUCTIONS
Follow-up visit with Dr. Dickson in 2-4 weeks if symptoms worsen or fail to improve.  Otherwise, please follow-up on an as-needed basis going forward.       DISCHARGE INSTRUCTIONS    During office hours (8:00 a.m.- 4:00 p.m.) questions or concerns may be answered  by calling [facility] Navigation Nurses at  [phone].  Messages received after hours will be returned the following business day.      In the case of an emergency, please dial 911 or seek assistance at the nearest Emergency Room/Urgent Care facility.     All Patients:    You may experience an increase in your symptoms for the first 2 days (It may take anywhere between 2 days- 2 weeks for the steroid to have maximum effect).    You may use ice on the injection site, as frequently as 20 minutes each hour if needed.    You may take your pain medicine.    You may continue taking your regular medication after your injection. If you have had a Medial Branch Block you may resume pain medication once your pain diary is completed.    You may shower. No swimming, tub bath or hot tub for 48 hours.  You may remove your bandaid/bandage as soon as you are home.    You may resume light activities, as tolerated.    Resume your usual diet as tolerated.    It is strongly advised that you do not drive for 1-3 hours post injection.    If you have had oral sedation:  Do not drive for 8 hours post injection.      If you have had IV sedation:  Do not drive for 24 hours post injection.  Do not operate hazardous machinery or make important personal/business decisions for 24 hours.      POSSIBLE STEROID SIDE EFFECTS (If steroid/cortisone was used for your procedure)    -If you experience these symptoms, it should only last for a short period    Swelling of the legs              Skin redness (flushing)     Mouth (oral) irritation   Blood sugar (glucose) levels            Sweats                    Mood changes  Headache  Sleeplessness  Weakened immune system for up to 14  days, which could increase the risk of santy the COVID-19 virus and/or experiencing more severe symptoms of the disease, if exposed.  Decreased effectiveness of the flu vaccine if given within 2 weeks of the steroid.         POSSIBLE PROCEDURE SIDE EFFECTS  -Call the Spine Center if you are concerned  Increased Pain           Increased numbness/tingling      Nausea/Vomiting          Bruising/bleeding at site      Redness or swelling                                              Difficulty walking      Weakness           Fever greater than 100.5    *In the event of a severe headache after an epidural steroid injection that is relieved by lying down, please call the [facility] to speak with a clinical staff member*

## 2024-07-12 DIAGNOSIS — N18.30 CKD (CHRONIC KIDNEY DISEASE) STAGE 3, GFR 30-59 ML/MIN (H): Primary | ICD-10-CM

## 2024-07-12 DIAGNOSIS — N02.B9 IGA NEPHROPATHY: ICD-10-CM

## 2024-07-12 DIAGNOSIS — E55.9 VITAMIN D DEFICIENCY: ICD-10-CM

## 2024-07-17 ENCOUNTER — OFFICE VISIT (OUTPATIENT)
Dept: FAMILY MEDICINE | Facility: CLINIC | Age: 69
End: 2024-07-17
Payer: COMMERCIAL

## 2024-07-17 ENCOUNTER — LAB (OUTPATIENT)
Dept: LAB | Facility: CLINIC | Age: 69
End: 2024-07-17
Payer: COMMERCIAL

## 2024-07-17 VITALS
SYSTOLIC BLOOD PRESSURE: 110 MMHG | BODY MASS INDEX: 29.79 KG/M2 | HEART RATE: 55 BPM | WEIGHT: 179 LBS | OXYGEN SATURATION: 94 % | TEMPERATURE: 96.7 F | DIASTOLIC BLOOD PRESSURE: 76 MMHG | RESPIRATION RATE: 16 BRPM

## 2024-07-17 DIAGNOSIS — N02.B9 IGA NEPHROPATHY: ICD-10-CM

## 2024-07-17 DIAGNOSIS — E78.2 MIXED HYPERLIPIDEMIA: ICD-10-CM

## 2024-07-17 DIAGNOSIS — N18.30 CKD (CHRONIC KIDNEY DISEASE) STAGE 3, GFR 30-59 ML/MIN (H): ICD-10-CM

## 2024-07-17 DIAGNOSIS — E03.9 HYPOTHYROIDISM, UNSPECIFIED TYPE: Primary | ICD-10-CM

## 2024-07-17 DIAGNOSIS — Z72.0 TOBACCO ABUSE: ICD-10-CM

## 2024-07-17 DIAGNOSIS — E55.9 VITAMIN D DEFICIENCY: ICD-10-CM

## 2024-07-17 LAB
ALBUMIN MFR UR ELPH: <6 MG/DL
ALBUMIN SERPL BCG-MCNC: 4.2 G/DL (ref 3.5–5.2)
ALBUMIN UR-MCNC: NEGATIVE MG/DL
ANION GAP SERPL CALCULATED.3IONS-SCNC: 10 MMOL/L (ref 7–15)
APPEARANCE UR: CLEAR
BILIRUB UR QL STRIP: NEGATIVE
BUN SERPL-MCNC: 31.3 MG/DL (ref 8–23)
CALCIUM SERPL-MCNC: 9.5 MG/DL (ref 8.8–10.4)
CHLORIDE SERPL-SCNC: 105 MMOL/L (ref 98–107)
COLOR UR AUTO: YELLOW
CREAT SERPL-MCNC: 1.62 MG/DL (ref 0.67–1.17)
CREAT UR-MCNC: 21 MG/DL
CREAT UR-MCNC: 21.3 MG/DL
EGFRCR SERPLBLD CKD-EPI 2021: 45 ML/MIN/1.73M2
ERYTHROCYTE [DISTWIDTH] IN BLOOD BY AUTOMATED COUNT: 13.3 % (ref 10–15)
GLUCOSE SERPL-MCNC: 91 MG/DL (ref 70–99)
GLUCOSE UR STRIP-MCNC: NEGATIVE MG/DL
HCO3 SERPL-SCNC: 29 MMOL/L (ref 22–29)
HCT VFR BLD AUTO: 48.1 % (ref 40–53)
HGB BLD-MCNC: 15.4 G/DL (ref 13.3–17.7)
HGB UR QL STRIP: ABNORMAL
KETONES UR STRIP-MCNC: NEGATIVE MG/DL
LEUKOCYTE ESTERASE UR QL STRIP: NEGATIVE
MCH RBC QN AUTO: 31.2 PG (ref 26.5–33)
MCHC RBC AUTO-ENTMCNC: 32 G/DL (ref 31.5–36.5)
MCV RBC AUTO: 97 FL (ref 78–100)
MICROALBUMIN UR-MCNC: <12 MG/L
MICROALBUMIN/CREAT UR: NORMAL MG/G{CREAT}
NITRATE UR QL: NEGATIVE
PH UR STRIP: 6 [PH] (ref 5–7)
PHOSPHATE SERPL-MCNC: 3.5 MG/DL (ref 2.5–4.5)
PLATELET # BLD AUTO: 140 10E3/UL (ref 150–450)
POTASSIUM SERPL-SCNC: 4.8 MMOL/L (ref 3.4–5.3)
PROT/CREAT 24H UR: NORMAL MG/G{CREAT}
PTH-INTACT SERPL-MCNC: 34 PG/ML (ref 15–65)
RBC # BLD AUTO: 4.94 10E6/UL (ref 4.4–5.9)
RBC #/AREA URNS AUTO: NORMAL /HPF
SODIUM SERPL-SCNC: 144 MMOL/L (ref 135–145)
SP GR UR STRIP: <=1.005 (ref 1–1.03)
UROBILINOGEN UR STRIP-ACNC: 0.2 E.U./DL
VIT D+METAB SERPL-MCNC: 42 NG/ML (ref 20–50)
WBC # BLD AUTO: 9.3 10E3/UL (ref 4–11)
WBC #/AREA URNS AUTO: NORMAL /HPF

## 2024-07-17 PROCEDURE — 84156 ASSAY OF PROTEIN URINE: CPT

## 2024-07-17 PROCEDURE — G2211 COMPLEX E/M VISIT ADD ON: HCPCS | Performed by: FAMILY MEDICINE

## 2024-07-17 PROCEDURE — 83970 ASSAY OF PARATHORMONE: CPT

## 2024-07-17 PROCEDURE — 82570 ASSAY OF URINE CREATININE: CPT

## 2024-07-17 PROCEDURE — 80069 RENAL FUNCTION PANEL: CPT

## 2024-07-17 PROCEDURE — 99213 OFFICE O/P EST LOW 20 MIN: CPT | Performed by: FAMILY MEDICINE

## 2024-07-17 PROCEDURE — 82043 UR ALBUMIN QUANTITATIVE: CPT

## 2024-07-17 PROCEDURE — 85027 COMPLETE CBC AUTOMATED: CPT

## 2024-07-17 PROCEDURE — 81001 URINALYSIS AUTO W/SCOPE: CPT

## 2024-07-17 PROCEDURE — 36415 COLL VENOUS BLD VENIPUNCTURE: CPT

## 2024-07-17 PROCEDURE — 82306 VITAMIN D 25 HYDROXY: CPT

## 2024-07-17 ASSESSMENT — PAIN SCALES - GENERAL: PAINLEVEL: NO PAIN (0)

## 2024-07-17 NOTE — NURSING NOTE
"Chief Complaint   Patient presents with    Lipids    Thyroid Disease     /76   Pulse 55   Temp (!) 96.7  F (35.9  C) (Tympanic)   Resp 16   Wt 81.2 kg (179 lb)   SpO2 94%   BMI 29.79 kg/m   Estimated body mass index is 29.79 kg/m  as calculated from the following:    Height as of 7/3/24: 1.651 m (5' 5\").    Weight as of this encounter: 81.2 kg (179 lb).  Patient presents to the clinic using No DME      Health Maintenance that is potentially due pending provider review:    Health Maintenance Due   Topic Date Due    RSV VACCINE (Pregnancy & 60+) (1 - 1-dose 60+ series) Never done    COVID-19 Vaccine (6 - 2023-24 season) 09/01/2023    ANNUAL REVIEW OF HM ORDERS  05/31/2024    BMP  07/12/2024    MICROALBUMIN  07/12/2024    LUNG CANCER SCREENING  09/07/2024                  "

## 2024-07-17 NOTE — PROGRESS NOTES
Assessment & Plan     Hypothyroidism, unspecified type  With TSH within reference range.  Suggested to continue levothyroxine current dose  Lab Results   Component Value Date    TSH 2.43 05/01/2024    TSH 2.39 11/10/2021    TSH 2.33 09/15/2020      Tobacco abuse  Stressed on smoking cessation, associated health hazards explained, not ready to quit currently    Mixed hyperlipidemia  Suggested regular walks, healthy diet, weight loss and to continue simvastatin      Subjective   Srinivas is a 68 year old, presenting for the following health issues:  Lipids and Thyroid Disease      7/17/2024     9:34 AM   Additional Questions   Roomed by Tashia XIONG   Accompanied by Staff         7/17/2024   Forms   Any forms needing to be completed Yes          7/17/2024     9:34 AM   Patient Reported Additional Medications   Patient reports taking the following new medications .     Thyroid Disease    History of Present Illness       Hypothyroidism:     Since last visit, patient describes the following symptoms::  None    He eats 0-1 servings of fruits and vegetables daily.He consumes 2 sweetened beverage(s) daily.He exercises with enough effort to increase his heart rate 9 or less minutes per day.  He exercises with enough effort to increase his heart rate 3 or less days per week.   He is taking medications regularly.         Review of Systems  Constitutional, HEENT, cardiovascular, pulmonary, gi and gu systems are negative, except as otherwise noted.      Objective    /76   Pulse 55   Temp (!) 96.7  F (35.9  C) (Tympanic)   Resp 16   Wt 81.2 kg (179 lb)   SpO2 94%   BMI 29.79 kg/m    Body mass index is 29.79 kg/m .  Physical Exam   GENERAL: alert and no distress  EYES: Eyes grossly normal to inspection, PERRL and conjunctivae and sclerae normal  NECK: no adenopathy, no asymmetry, masses, or scars  RESP: lungs clear to auscultation - no rales, rhonchi or wheezes  CV: regular rate and rhythm, normal S1 S2, no S3 or S4, no  murmur, click or rub, no peripheral edema  MS: no gross musculoskeletal defects noted, no edema  SKIN: no suspicious lesions or rashes        Signed Electronically by: Alexandru Vera MD

## 2024-07-29 DIAGNOSIS — E03.9 HYPOTHYROIDISM (ACQUIRED): ICD-10-CM

## 2024-07-30 ENCOUNTER — OFFICE VISIT (OUTPATIENT)
Dept: UROLOGY | Facility: CLINIC | Age: 69
End: 2024-07-30
Payer: COMMERCIAL

## 2024-07-30 ENCOUNTER — VIRTUAL VISIT (OUTPATIENT)
Dept: NEPHROLOGY | Facility: CLINIC | Age: 69
End: 2024-07-30
Payer: COMMERCIAL

## 2024-07-30 VITALS
OXYGEN SATURATION: 94 % | BODY MASS INDEX: 29.82 KG/M2 | SYSTOLIC BLOOD PRESSURE: 127 MMHG | WEIGHT: 179 LBS | HEIGHT: 65 IN | RESPIRATION RATE: 18 BRPM | DIASTOLIC BLOOD PRESSURE: 78 MMHG | TEMPERATURE: 98.2 F | HEART RATE: 64 BPM

## 2024-07-30 DIAGNOSIS — N40.1 BENIGN PROSTATIC HYPERPLASIA WITH URINARY FREQUENCY: Primary | ICD-10-CM

## 2024-07-30 DIAGNOSIS — R35.0 BENIGN PROSTATIC HYPERPLASIA WITH URINARY FREQUENCY: Primary | ICD-10-CM

## 2024-07-30 DIAGNOSIS — N02.B9 IGA NEPHROPATHY: Primary | ICD-10-CM

## 2024-07-30 DIAGNOSIS — R35.0 URINARY FREQUENCY: ICD-10-CM

## 2024-07-30 PROCEDURE — 99213 OFFICE O/P EST LOW 20 MIN: CPT | Mod: 95 | Performed by: INTERNAL MEDICINE

## 2024-07-30 PROCEDURE — 99212 OFFICE O/P EST SF 10 MIN: CPT | Mod: 25 | Performed by: STUDENT IN AN ORGANIZED HEALTH CARE EDUCATION/TRAINING PROGRAM

## 2024-07-30 PROCEDURE — 52000 CYSTOURETHROSCOPY: CPT | Performed by: STUDENT IN AN ORGANIZED HEALTH CARE EDUCATION/TRAINING PROGRAM

## 2024-07-30 PROCEDURE — 51798 US URINE CAPACITY MEASURE: CPT | Performed by: STUDENT IN AN ORGANIZED HEALTH CARE EDUCATION/TRAINING PROGRAM

## 2024-07-30 RX ORDER — LEVOTHYROXINE SODIUM 150 UG/1
150 TABLET ORAL EVERY MORNING
Qty: 30 TABLET | Refills: 11 | Status: SHIPPED | OUTPATIENT
Start: 2024-07-30

## 2024-07-30 ASSESSMENT — PAIN SCALES - GENERAL: PAINLEVEL: NO PAIN (0)

## 2024-07-30 NOTE — NURSING NOTE
Active order to obtain bladder scan? Yes   Name of ordering provider:  Colby    Bladder scan preformed post void Yes: 200ml.  Bladder scan reveled 208ML  Provider notified?  Yes    Violeta XIONG CMA

## 2024-07-30 NOTE — PROGRESS NOTES
Reason for cystoscopy: LUTS    Brief History:  68-year-old male seen for VSD and mitral valve prolapse. He has CKD secondary to IgA nephropathy, dyslipidemia, hypothyroid, schizophrenia, history of tobacco use.  He currently lives in a group home    He has been followed by urology since 2017.  He is initially seen Dr. Zepeda.  He was initially started on tamsulosin 0.4 mg daily in addition to finasteride and oxybutynin.    In 2019 he had a cystoscopy Inspection of the bladder revealed orthotopic UOs. There were moderate to severe trabeculations noted throughout the bladder with small cellules scattered throughout without davidson diverticula. On retroflexion, there was no obvious prostate tissue protruding into the bladder. Upon pulling the scope back into the prostatic fossa, there was little in the way of lateral lobe hypertrophy. There were no strictures noted in the urethra.     2020 he had a urodynamics demonstrates significant bladder overactivity with small bladder capacities of around 200-230 mL with terminal evacuation contractions elicited at about these volumes.  There was no evidence for obstruction during the voiding phase.  Storage pressures were otherwise normal.     followed with Dr. Mack for urinary frequency and nocturia. He takes tamsulosin 0.4 mg, oxybutynin XR 5 mg, finasteride 5 mg, and Myrbetriq 50 mg.     In June 19, 2024 he saw Vanesa Montanez He reports some weakening of his urinary stream, urinary urgency with near urge incontinence.  He was referred for repeat cystoscopy.      The following distinct labs were reviewed   Most Recent 3 CBC's:  Recent Labs   Lab Test 07/17/24  0924 05/01/24  0917 07/12/23  0938 02/20/23  1005   WBC 9.3  --  10.8 9.5   HGB 15.4 16.3 16.0 15.1   MCV 97  --  99 95   *  --  152 165     Most Recent 3 BMP's:  Recent Labs   Lab Test 07/17/24  0924 07/12/23  0938 02/20/23  1005    143 144   POTASSIUM 4.8 4.8 4.9   CHLORIDE 105 104 104   CO2 29 32* 27    BUN 31.3* 24.8* 28.9*   CR 1.62* 1.49* 1.59*   ANIONGAP 10 7 13   RAYRAY 9.5 9.6 9.7   GLC 91 89 100*     Most Recent Urinalysis:  Recent Labs   Lab Test 07/17/24  0924   COLOR Yellow   APPEARANCE Clear   URINEGLC Negative   URINEBILI Negative   URINEKETONE Negative   SG <=1.005   UBLD Trace*   URINEPH 6.0   PROTEIN Negative   UROBILINOGEN 0.2   NITRITE Negative   LEUKEST Negative   RBCU 0-2   WBCU 0-5         CYSTOSCOPY  We discussed the risks and benefits of the procedure which include risk of bleeding and infection.  Informed consent was obtained, the patient was prepped and draped in the standard sterile fashion.  A flexible cystoscope was introduced through a well-lubricated urethra.     Anterior urethra strictures were absent.   The urinary sphincter was intact.  The prostate demonstrated no hypertrophy or obstruction.  Bladder neck was open.   Bladder signififcant for presence of the following:      Diverticuli: present near dome of bladder      Cellules: present many      Trabeculation: present moderate      Tumors: absent      Stones: absent  The ureteral orifices  were identified on each side in orthotopic position  On retroflexion there was the usual bladder neck hyperemia.  There no intravesical protrusion of the prostate.      The flexible cystoscope was removed and the findings were described to the patient.                     He voided about 250-300 ml and post void residual around 200 measured on US    EVALUATION AND MANAGEMENT   Upon completion of the cystoscopy the patient was dressed to discuss findings of above testing, review available treatment options and make a plan for further steps in care     Assessment and Plan  68-year-old male seen for VSD and mitral valve prolapse. He has CKD secondary to IgA nephropathy, dyslipidemia, hypothyroid, schizophrenia, history of tobacco use.  He currently lives in a group home    Lower Urinary Tract Symptoms longstanding for almost 6 7 years.  He has been  on medical therapy with a combination of tamsulosin 0.4 mg, finasteride 5 mg and Myrbetriq 50 mg and oxybutynin.  He has had urodynamics that showed mostly overactive bladder.   I sat down today with him, his caregiver from the group home as well as the  on the phone to have a discussion about the findings of the cystoscopy.  It actually does not show any strictures or any obstruction from the prostate however his bladder does have trabeculations and the diverticulum which suggest somewhat of a high-pressure voiding.  This may be due to pelvic floor dysfunction that is causing this.  It is hard to gauge I had to get some history from the group home and it sounds like his urinary symptoms have not really changed from their perspective but the patient is concerned that his medications are not working as well as they used to be however from what they have noticed his voiding frequency and pattern have been unchanged and stable on the medication.  The only thing that I noticed today is his postvoid residual was somewhat more elevated than previously documented values however I did artificially fill him up quite a bit with a cystoscopy and he voided >50%    Will plan to see him back in around 3 months with a postvoid residual      16 minutes spent were spent on the E/M portion of the visit in after cystoscopy discussing findings, available treatment options and next steps in care.

## 2024-07-30 NOTE — NURSING NOTE
"Chief Complaint   Patient presents with    Cystoscopy       Initial /78 (BP Location: Right arm, Patient Position: Chair, Cuff Size: Adult Regular)   Pulse 64   Temp 98.2  F (36.8  C) (Oral)   Resp 18   Ht 1.651 m (5' 5\")   Wt 81.2 kg (179 lb)   SpO2 94%   BMI 29.79 kg/m   Estimated body mass index is 29.79 kg/m  as calculated from the following:    Height as of this encounter: 1.651 m (5' 5\").    Weight as of this encounter: 81.2 kg (179 lb).  BP completed using cuff size: regular  Medications and allergies reviewed.      Violeta XIONG CMA     "

## 2024-07-30 NOTE — PROGRESS NOTES
Virtual Visit Details    Type of service:  Video Visit   Video Start Time: 7:58 am  Video End Time: 8:10 am    Originating Location (pt. Location): Home    Distant Location (provider location):  On-site  Platform used for Video Visit: [facility]             Nephrology clinic progress note     Geovani Bustos MRN:[id number] YOB: 1955  Date of Admission:(Not on file)  Primary care provider: Alexandru Vera    Follow up for CKD stage 3, IgA nephropathy     Interval History:   - Seen via virtual visit today with  Keturahzakiya Americo ( for [facility] where he resides).   - He has been doing well since last visit, no major health changes.  - They just purchased a new cuff and have started checking BP about once a week. Last week was 149/87, week prior was 151/87.  - Blaynesheriezakiya is wondering if cuff may be inaccurate, as his BP at last few clinic visits has been closer to 110s-120s systolics.   - Srinivas has a cystoscopy today -- they will bring his cuff to compare to clinic cuff.  - Mild swelling in legs, stable. Has not needed compression stockings.   - He denies difficulty voiding, no gross hematuria    ROS: negative except as per HPI      Current Outpatient Medications:     ABILIFY 10 MG OR TABS, Take 22 mg by mouth daily , Disp: , Rfl:     albuterol (VENTOLIN HFA) 108 (90 Base) MCG/ACT inhaler, Inhale 2 puffs into the lungs every 6 hours as needed for shortness of breath or wheezing, Disp: 18 g, Rfl: 3    ASPIRIN LOW DOSE 81 MG EC tablet, TAKE 1 TABLET BY MOUTH EVERY MORNING, Disp: 30 tablet, Rfl: 11    clobetasol (TEMOVATE) 0.05 % external ointment, Apply twice daily to ears for two weeks then 3 times weekly thereafter., Disp: 60 g, Rfl: 1    EAR DROPS 6.5 % otic solution, Place 5 drops into both ears 2 times daily For 5 days August 1-5th and Feb 1-5 th then return for irrigation after the 5th day., Disp: 15 mL, Rfl: 3    finasteride (PROSCAR) 5 MG tablet, Take 1 tablet (5  mg) by mouth every morning, Disp: 90 tablet, Rfl: 3    fluticasone-vilanterol (BREO ELLIPTA) 100-25 MCG/ACT inhaler, Inhale 1 puff into the lungs daily, Disp: 180 each, Rfl: 1    gabapentin (NEURONTIN) 100 MG capsule, Take 100 mg by mouth 2 times daily, Disp: , Rfl:     ipratropium - albuterol 0.5 mg/2.5 mg/3 mL (DUONEB) 0.5-2.5 (3) MG/3ML neb solution, Take 1 vial (3 mLs) by nebulization every 6 hours as needed for shortness of breath, wheezing or cough, Disp: 90 mL, Rfl: 1    levothyroxine (SYNTHROID/LEVOTHROID) 150 MCG tablet, Take 1 tablet (150 mcg) by mouth daily, Disp: 90 tablet, Rfl: 1    mirabegron (MYRBETRIQ) 50 MG 24 hr tablet, Take 1 tablet (50 mg) by mouth daily, Disp: 90 tablet, Rfl: 3    OLANZapine (ZYPREXA) 2.5 MG tablet, as needed, Disp: , Rfl:     Omega-3 Fatty Acids (FISH OIL) 1200 MG capsule, 1 capsule every evening, Disp: 90 capsule, Rfl: 2    oxyBUTYnin ER (DITROPAN XL) 5 MG 24 hr tablet, Take 1 tablet by mouth in the morning, Disp: 90 tablet, Rfl: 3    polyethylene glycol (GAVILAX) 17 GM/Dose powder, Take 17 grams (1 capful) by mouth daily, Disp: 510 g, Rfl: 5    REGULOID 51.7 % POWD, TAKE 1 TEASPOONFUL TWICE DAILY, Disp: 369 g, Rfl: 11    simvastatin (ZOCOR) 40 MG tablet, TAKE 1 TABLET BY MOUTH AT BEDTIME, Disp: 30 tablet, Rfl: 11    Skin Protectants, Misc. (EUCERIN) cream, Apply topically 2 times daily Please ask Group home to schedule a Mid may 2023 follow up Derm appt: [phone] to schedule., Disp: 454 g, Rfl: 6    tamsulosin (FLOMAX) 0.4 MG capsule, Take 1 capsule (0.4 mg) by mouth daily, Disp: 90 capsule, Rfl: 3    traZODone (DESYREL) 50 MG tablet, Take 25 mg by mouth At Bedtime, Disp: , Rfl:     ZOLOFT 100 MG OR TABS, 2 TABLETS DAILY, Disp: , Rfl:     Disposable Gloves (LATEX GLOVES MEDIUM) MISC, 4 boxes of gloves For the application of topical medications, Disp: 400 each, Rfl: 11    Disposable Gloves (NITRILE EXAM GLOVES MEDIUM) MISC, 1 each as needed Use PRN daily with  administration of otic drops, body lotion and powder to treat dry itchy skin., Disp: 4 each, Rfl: 11    order for DME, Equipment being ordered: callous pad, Disp: 1 each, Rfl: 1    order for DME, Equipment being ordered: Dynaflex insert, Disp: 1 Units, Rfl: 0    Current Facility-Administered Medications:     3 mL ropivacaine (NAROPIN) injection 5 mg/mL, 3 mL, , , RepJayesh awad, DO, 3 mL at 12/07/23 1030    triamcinolone (KENALOG-40) injection 40 mg, 40 mg, , , RepaJayesh, DO, 40 mg at 12/07/23 1030    Facility-Administered Medications Ordered in Other Visits:     bupivacaine (MARCAINE) injection 0.5%, , , PRN, Holden Harrison, DPM, 10 mL at 01/03/17 0945    Physical Exam:  Gen: well-appearing male, NAD  HENT: NC/AT, no scleral icterus  CV: no cyanosis  RESP: breathing unlabored on RA  NEURO: alert and oriented, no focal deficit observed  PSYCH: pleasant, cooperative, mood/affect congruent    ASSESSMENT AND RECOMMENDATIONS:     # IgA Nephropathy  # CKD, stage 3  # LUTS - on finasteride  # Hypothyroidism    - Biopsy proven IgA nephropathy (4/2009, Dr Robbie Harris. As per note - kidney tissue looked normal with exception of deposition of IgA in mesangium), has been treated conservatively. Has not had proteinuria over years. Low level microscopic hematuria on and off. He has maintained his blood pressures at a goal of < 130/90 mm of Hg most of the times without needing antihypertensives. His kidney function has been relatively stable and maintains a baseline creatinine of 1.5-1.7 mg/dl.   - Kidney function is stable (Cr 1.6) and no proteinuria (UACR <12 mg/g). Excellent.  - BP control unclear -- above goal on home cuff readings (150/80s) but in office readings much lower and at goal.    Plan:   -- Patient will bring his BP cuff to cystoscopy appointment today to have it checked against clinic cuff. I suspect cuff is not accurate, given multiple readings at recent office visits ranging  110-120/60-70s but would like to confirm.  -- Patient does not have MyChart set up. Our nurse will contact Blay[place] to find out about accuracy of BP cuff against clinic cuff at appointment today.   --Continue with conservative management including management of BP ( target < 130/80), avoiding nephrotoxins, continuing fish oil.   -- Given lack of proteinuria and fairly low BP, no need to start RASi or SGLT2i at this time but will have low threshold if blood pressure goes up or if there is detectable proteinuria.     Follow-up:  1 year     Lea Mehta DO, MSCI    I spent 23 minutes on this date of encounter reviewing chart, evaluating patient, formulating plan of care, and documenting.

## 2024-08-12 ENCOUNTER — TELEPHONE (OUTPATIENT)
Dept: NEPHROLOGY | Facility: CLINIC | Age: 69
End: 2024-08-12
Payer: COMMERCIAL

## 2024-08-12 NOTE — TELEPHONE ENCOUNTER
Patient's  Amelia was concerned about his BP cuff being inaccurate. They are going to bring BP cuff to cystoscopy appointment to check home cuff against clinic cuff. Can you follow up with Amelia to see if home cuff was similar to clinic cuff?   -----------------------------      Called alma Long with number to call back.

## 2024-08-13 NOTE — TELEPHONE ENCOUNTER
Ethan called back- states that at the appt, the cuff was accurate. It read within a point of what the clinic got. She thinks that it may have to do with how they are taking it at home. They take it in the morning before meds, right after he has gone out and smoked. Writer suggested switching to a different time of the day and see if that makes a difference. Trying after he takes his meds especially. Ethan is agreeable. Will call back in a few days to see how it goes.        126/77-

## 2024-09-04 ENCOUNTER — ALLIED HEALTH/NURSE VISIT (OUTPATIENT)
Dept: FAMILY MEDICINE | Facility: CLINIC | Age: 69
End: 2024-09-04
Payer: COMMERCIAL

## 2024-09-04 DIAGNOSIS — H61.23 BILATERAL IMPACTED CERUMEN: Primary | ICD-10-CM

## 2024-09-04 PROCEDURE — 99207 PR NO CHARGE NURSE ONLY: CPT

## 2024-09-04 PROCEDURE — 69209 REMOVE IMPACTED EAR WAX UNI: CPT | Mod: 50 | Performed by: FAMILY MEDICINE

## 2024-09-04 NOTE — PROGRESS NOTES
Patient identified using two patient identifiers.  Ear exam showing wax occlusion completed by RN.  Solution: warm water was placed in the bilateral ear(s) via irrigation tool: elephant ear.      Mercy Sky MA on 9/4/2024 at 10:05 AM

## 2024-09-05 ENCOUNTER — TELEPHONE (OUTPATIENT)
Dept: ORTHOPEDICS | Facility: CLINIC | Age: 69
End: 2024-09-05
Payer: COMMERCIAL

## 2024-09-05 DIAGNOSIS — M85.60 BONE CYST: Primary | ICD-10-CM

## 2024-09-05 DIAGNOSIS — M89.9 BONE LESION: ICD-10-CM

## 2024-09-05 NOTE — TELEPHONE ENCOUNTER
LVM with detailed information regarding information/scheduling number below for repeat XR of pelvis.  Plan to contact patient/group home with results, otherwise may follow up in clinic for reassessment if hip pain is returning.    Francisco Javier Plunkett, ATC

## 2024-09-05 NOTE — LETTER
September 19, 2024      Geovani Bustos  [address] [facility] [address]        Dear Geovani,     We are writing to inform you that we have ordered an xray for your pelvis.  This is to follow up for your bone lesion one year after it has occurred.  This is being ordered by Dr. Schmitt.  You may follow up with us in office if you would like, otherwise we can contact you if there are any changes to be mentioned.     To Schedule xray;  [phone] [place]     To contact us as needed; [phone]      Sincerely,        Sangeetha Schmitt MD

## 2024-09-05 NOTE — TELEPHONE ENCOUNTER
Please call patient.    Pelvic XRs were ordered to follow up bone lesion in one year.  Zoë let patient know to obtain these XRs at any clinic.  Could also follow up with me for repeat XRs.    Let me know if additional questions.  Sangeetha Schmitt MD

## 2024-09-09 ENCOUNTER — HOSPITAL ENCOUNTER (OUTPATIENT)
Dept: CT IMAGING | Facility: CLINIC | Age: 69
Discharge: HOME | End: 2024-09-09
Payer: COMMERCIAL

## 2024-09-09 DIAGNOSIS — Z87.891 PERSONAL HISTORY OF NICOTINE DEPENDENCE: ICD-10-CM

## 2024-09-09 PROCEDURE — 71271 CT THORAX LUNG CANCER SCR C-: CPT

## 2024-09-17 ENCOUNTER — LAB (OUTPATIENT)
Dept: LAB | Facility: CLINIC | Age: 69
End: 2024-09-17
Payer: COMMERCIAL

## 2024-09-17 DIAGNOSIS — R79.9 ABNORMAL FINDING OF BLOOD CHEMISTRY, UNSPECIFIED: ICD-10-CM

## 2024-09-17 DIAGNOSIS — F25.0 SCHIZOAFFECTIVE DISORDER, BIPOLAR TYPE (H): Primary | ICD-10-CM

## 2024-09-17 LAB
ALT SERPL W P-5'-P-CCNC: 18 U/L (ref 0–70)
ANION GAP SERPL CALCULATED.3IONS-SCNC: 6 MMOL/L (ref 7–15)
AST SERPL W P-5'-P-CCNC: 22 U/L (ref 0–45)
BASOPHILS # BLD AUTO: 0 10E3/UL (ref 0–0.2)
BASOPHILS NFR BLD AUTO: 0 %
BUN SERPL-MCNC: 25.1 MG/DL (ref 8–23)
CALCIUM SERPL-MCNC: 9.4 MG/DL (ref 8.8–10.4)
CHLORIDE SERPL-SCNC: 107 MMOL/L (ref 98–107)
CHOLEST SERPL-MCNC: 133 MG/DL
CREAT SERPL-MCNC: 1.3 MG/DL (ref 0.67–1.17)
EGFRCR SERPLBLD CKD-EPI 2021: 59 ML/MIN/1.73M2
EOSINOPHIL # BLD AUTO: 0.2 10E3/UL (ref 0–0.7)
EOSINOPHIL NFR BLD AUTO: 3 %
ERYTHROCYTE [DISTWIDTH] IN BLOOD BY AUTOMATED COUNT: 13.2 % (ref 10–15)
FASTING STATUS PATIENT QL REPORTED: YES
FASTING STATUS PATIENT QL REPORTED: YES
GLUCOSE SERPL-MCNC: 100 MG/DL (ref 70–99)
HBA1C MFR BLD: 5.6 % (ref 0–5.6)
HCO3 SERPL-SCNC: 29 MMOL/L (ref 22–29)
HCT VFR BLD AUTO: 47.4 % (ref 40–53)
HDLC SERPL-MCNC: 42 MG/DL
HGB BLD-MCNC: 15.5 G/DL (ref 13.3–17.7)
IMM GRANULOCYTES # BLD: 0 10E3/UL
IMM GRANULOCYTES NFR BLD: 0 %
LDLC SERPL CALC-MCNC: 72 MG/DL
LYMPHOCYTES # BLD AUTO: 1.2 10E3/UL (ref 0.8–5.3)
LYMPHOCYTES NFR BLD AUTO: 16 %
MCH RBC QN AUTO: 31.6 PG (ref 26.5–33)
MCHC RBC AUTO-ENTMCNC: 32.7 G/DL (ref 31.5–36.5)
MCV RBC AUTO: 97 FL (ref 78–100)
MONOCYTES # BLD AUTO: 0.4 10E3/UL (ref 0–1.3)
MONOCYTES NFR BLD AUTO: 5 %
NEUTROPHILS # BLD AUTO: 5.6 10E3/UL (ref 1.6–8.3)
NEUTROPHILS NFR BLD AUTO: 75 %
NONHDLC SERPL-MCNC: 91 MG/DL
PLATELET # BLD AUTO: 157 10E3/UL (ref 150–450)
POTASSIUM SERPL-SCNC: 4.4 MMOL/L (ref 3.4–5.3)
RBC # BLD AUTO: 4.91 10E6/UL (ref 4.4–5.9)
SODIUM SERPL-SCNC: 142 MMOL/L (ref 135–145)
TRIGL SERPL-MCNC: 93 MG/DL
WBC # BLD AUTO: 7.4 10E3/UL (ref 4–11)

## 2024-09-17 PROCEDURE — 80048 BASIC METABOLIC PNL TOTAL CA: CPT

## 2024-09-17 PROCEDURE — 84460 ALANINE AMINO (ALT) (SGPT): CPT

## 2024-09-17 PROCEDURE — 36415 COLL VENOUS BLD VENIPUNCTURE: CPT

## 2024-09-17 PROCEDURE — 85025 COMPLETE CBC W/AUTO DIFF WBC: CPT

## 2024-09-17 PROCEDURE — 84450 TRANSFERASE (AST) (SGOT): CPT

## 2024-09-17 PROCEDURE — 80061 LIPID PANEL: CPT

## 2024-09-17 PROCEDURE — 83036 HEMOGLOBIN GLYCOSYLATED A1C: CPT

## 2024-09-19 NOTE — TELEPHONE ENCOUNTER
Letter is created and mailed to the patient.     Korin Morel ATC, CSCS  Dr. Schmitt's Clinical Coordinator  [facility] Sports Medicine Team

## 2024-10-07 ENCOUNTER — HOSPITAL ENCOUNTER (OUTPATIENT)
Dept: GENERAL RADIOLOGY | Facility: CLINIC | Age: 69
Discharge: HOME | End: 2024-10-07
Attending: PEDIATRICS | Admitting: PEDIATRICS
Payer: COMMERCIAL

## 2024-10-07 PROCEDURE — 73502 X-RAY EXAM HIP UNI 2-3 VIEWS: CPT

## 2024-10-09 ENCOUNTER — TELEPHONE (OUTPATIENT)
Dept: ORTHOPEDICS | Facility: CLINIC | Age: 69
End: 2024-10-09
Payer: COMMERCIAL

## 2024-10-09 NOTE — TELEPHONE ENCOUNTER
Please call patient or send letter with XR results:    XR Pelvis and Hip Right 1 View  Result Date: 10/7/2024  XR PELVIS AND HIP RIGHT 1 VIEW 10/7/2024 10:11 AM HISTORY: Bone cyst; Bone lesion COMPARISON: Right hip radiographs dated 8/15/2023, right hip MRI dated 9/13/2023   IMPRESSION: Similar size and appearance of a roughly 2.5 cm sclerotic lesion within the intertrochanteric right proximal femur when compared to the 8/15/2023 radiographs. This has a nonaggressive radiographic appearance and is better evaluated on the prior MRI from 9/13/2023 but most likely reflects a benign lesion such as an enchondroma or liposclerosing myxofibrous tumor. Normal hip joint spacing. Negative for acute right hip fracture. No displaced pelvic fracture. There are degenerative changes in the lower lumbar spine and at the sacroiliac joints. NIMA LUCERO MD   SYSTEM ID:  [id number]    XR of the pelvis obtained to monitor lesion is similar in appearance, likely benign.  This likely doesn't need further follow up.  Follow up if having right hip pain.    Let me know if additional questions.  Sangeetha Schmitt MD

## 2024-10-09 NOTE — LETTER
October 14, 2024      Srinivas Bustos  [address] [facility] [address]        Dear ,    We are writing to inform you of your xray test results.    Your Xray of the pelvis obtained to monitor lesion is similar in appearance, likely benign.    This likely doesn't need further follow up.    Follow up if having right hip pain.      If you have any questions or concerns, please call the clinic at the number listed above.       Sincerely,

## 2024-10-10 NOTE — TELEPHONE ENCOUNTER
Called Srinivas @ his group home. Group home staff has taken a message to call us back regarding;     XR of the pelvis obtained to monitor lesion is similar in appearance, likely benign.  This likely doesn't need further follow up.  Follow up if having right hip pain.     Let me know if additional questions.  MD Korin Moffett ATC, CSCS  Dr. Schmitt's Clinical Coordinator  [facility] Sports Medicine Team

## 2024-10-14 ENCOUNTER — TELEPHONE (OUTPATIENT)
Dept: ORTHOPEDICS | Facility: CLINIC | Age: 69
End: 2024-10-14
Payer: COMMERCIAL

## 2024-10-14 NOTE — TELEPHONE ENCOUNTER
Letter created and mailed to the patient.      Korin Morel ATC, CSCS  Dr. Schmitt's Clinical Coordinator  [facility] Medicine Team

## 2024-10-14 NOTE — LETTER
10/14/2024      Geovani Bustos  [address] [facility] [address]      We are writing to inform you of your xray test results.    Your Xray of the pelvis obtained to monitor lesion is similar in appearance, likely benign.    This likely doesn't need further follow up.    Follow up if having right hip pain.      If you have any questions or concerns, please call the clinic at the number listed above.           Sincerely,        Sangeetha Schmitt MD

## 2024-10-21 ENCOUNTER — TELEPHONE (OUTPATIENT)
Dept: ORTHOPEDICS | Facility: CLINIC | Age: 69
End: 2024-10-21
Payer: COMMERCIAL

## 2024-10-21 NOTE — TELEPHONE ENCOUNTER
[facility] Call Center    Phone Message    May a detailed message be left on voicemail: yes     Reason for Call: Patient asking for call back about Xray Results on Hip. He had Done Oct 07. Please call with Results. Thanks    Action Taken: Message routed to:  Other: [place] [facility]    Travel Screening: Not Applicable     Date of Service:

## 2024-10-21 NOTE — TELEPHONE ENCOUNTER
There is a letter created and mailed to the patient regarding this information.   He has been called previously with no contact.     Korin Morel ATC, CSCS  Dr. Schmitt's Clinical Coordinator  [facility] Sports Medicine Team

## 2024-10-31 ENCOUNTER — OFFICE VISIT (OUTPATIENT)
Dept: UROLOGY | Facility: CLINIC | Age: 69
End: 2024-10-31
Payer: COMMERCIAL

## 2024-10-31 VITALS
SYSTOLIC BLOOD PRESSURE: 112 MMHG | TEMPERATURE: 97.1 F | BODY MASS INDEX: 29.45 KG/M2 | WEIGHT: 177 LBS | DIASTOLIC BLOOD PRESSURE: 71 MMHG | HEART RATE: 62 BPM

## 2024-10-31 DIAGNOSIS — R35.0 BENIGN PROSTATIC HYPERPLASIA WITH URINARY FREQUENCY: Primary | ICD-10-CM

## 2024-10-31 DIAGNOSIS — N40.1 BENIGN PROSTATIC HYPERPLASIA WITH URINARY FREQUENCY: Primary | ICD-10-CM

## 2024-10-31 PROCEDURE — 51798 US URINE CAPACITY MEASURE: CPT | Performed by: STUDENT IN AN ORGANIZED HEALTH CARE EDUCATION/TRAINING PROGRAM

## 2024-10-31 PROCEDURE — 99214 OFFICE O/P EST MOD 30 MIN: CPT | Mod: 25 | Performed by: STUDENT IN AN ORGANIZED HEALTH CARE EDUCATION/TRAINING PROGRAM

## 2024-10-31 ASSESSMENT — PAIN SCALES - GENERAL: PAINLEVEL_OUTOF10: NO PAIN (0)

## 2024-10-31 NOTE — PROGRESS NOTES
UROLOGY FOLLOW-UP NOTE          Chief Complaint:   Today I had the pleasure of seeing . Geovani Bustos in follow-up for a chief complaint of LUTS.          Interval Update   Geovani Bustos is a very pleasant 68 year old male  with a history of CKD stage 3, IgA nephropathy, CPFE, and schizophrenia.      Brief History: Mr. Geovani Bustos has followed with urology for urinary frequency and nocturia. He takes tamsulosin 0.4 mg, oxybutynin XR 5 mg, finasteride 5 mg, and Myrbetriq 50 mg.     He had a cystoscopy on 7/30/2024 which showed no strictures or prostate obstruction.     Today's notes: He is doing well. His urinary symptoms are well controlled with his current medications.          Physical Exam:   Patient is a 68 year old  male   Vitals: Blood pressure 112/71, pulse 62, temperature 97.1  F (36.2  C), temperature source Tympanic, weight 80.3 kg (177 lb).  General: Alert and oriented x 3, no acute distress.  Respiratory: Non-labored breathing.  Cardiac: Regular rate.    PVR: 19 mL        Labs and Pathology:    I personally reviewed all applicable laboratory data and went over findings with patient  Significant for:    CBC RESULTS:  Recent Labs   Lab Test 09/17/24  0918 07/17/24  0924 05/01/24  0917 07/12/23  0938 02/20/23  1005   WBC 7.4 9.3  --  10.8 9.5   HGB 15.5 15.4 16.3 16.0 15.1    140*  --  152 165        BMP RESULTS:  Recent Labs   Lab Test 09/17/24  0918 07/17/24  0924 07/12/23  0938 02/20/23  1005 08/25/21  1145 02/15/21  1209 07/12/20  1601 11/15/19  0907 10/21/19  1016    144 143 144   < > 139 139 138 140   POTASSIUM 4.4 4.8 4.8 4.9   < > 4.8 4.2 4.3 4.6   CHLORIDE 107 105 104 104   < > 104 106 103 106   CO2 29 29 32* 27   < > 32 29 34* 31   ANIONGAP 6* 10 7 13   < > 3 4 1* 4   * 91 89 100*   < > 107* 83 96 102*   BUN 25.1* 31.3* 24.8* 28.9*   < > 31* 31* 17 33*   CR 1.30* 1.62* 1.49* 1.59*   < > 1.42* 1.56* 1.47* 1.63*   GFRESTIMATED 59* 45* 50* 47*   < > 51* 45* 49*  43*   GFRESTBLACK  --   --   --   --   --  59* 53* 57* 50*   RAYRAY 9.4 9.5 9.6 9.7   < > 9.6 9.0 8.8 9.2    < > = values in this interval not displayed.       UA RESULTS:   Recent Labs   Lab Test 07/17/24  0924 07/12/23  0938 08/16/22  1705   SG <=1.005 1.010 1.010   URINEPH 6.0 5.5 6.0   NITRITE Negative Negative Negative   RBCU 0-2 0-2 2-5*   WBCU 0-5 0-5 0-5       PSA RESULTS  PSA   Date Value Ref Range Status   09/13/2017 0.78 0 - 4 ug/L Final     Comment:     Assay Method:  Chemiluminescence using Siemens Vista analyzer   12/17/2012 0.67 0 - 4 ug/L Final   09/08/2011 1.44 0 - 4 ug/L Final   03/18/2010 1.24 0 - 4 ug/L Final   10/15/2008 1.40 0.00 - 4.00 ng/mL      Prostate Specific Antigen Screen   Date Value Ref Range Status   02/21/2024 1.17 0.00 - 6.50 ng/mL Final              Assessment/Plan   68 year old male seen in follow up for urinary frequency and nocturia. He takes tamsulosin 0.4 mg, oxybutynin XR 5 mg, finasteride 5 mg, and Myrbetriq 50 mg.     He had a cystoscopy on 7/30/2024 which showed no strictures or prostate obstruction.     He is doing well. We will plan for follow up in June 2025, at the time of his typical annual visit.     Plan:   Continue tamsulosin 0.4 mg, oxybutynin XR 5 mg, finasteride 5 mg, and Myrbetriq 50 mg.   Follow up in June 2025, sooner if concerns.            Past Medical History:     Past Medical History:   Diagnosis Date    Cerumen impaction     Chronic kidney disease     Colon polyps     Hyperlipidemia     Mitral valve prolapse     Schizophrenia (H)     Ulcerated penile lesions     VSD (ventricular septal defect)             Past Surgical History:     Past Surgical History:   Procedure Laterality Date    ARTHRODESIS FOOT Right 1/19/2016    Procedure: ARTHRODESIS FOOT;  Surgeon: Holden Harrison, DPTYRESE;  Location: [facility]    ARTHRODESIS FOOT Left 1/3/2017    Procedure: ARTHRODESIS FOOT;  Surgeon: Holden Harrison DPM;  Location: [facility]    COLONOSCOPY N/A 5/19/2022     Procedure: COLONOSCOPY, FLEXIBLE, WITH LESION REMOVAL USING SNARE;  Surgeon: Ame Nelson MD;  Location: [facility]    HERNIORRHAPHY INGUINAL Right 6/8/2023    Procedure: HERNIORRHAPHY INGUINALOPEN right with mesh;  Surgeon: Ame Nelson MD;  Location: [facility]    SURGICAL HISTORY OF -       leg fracture            Medications     Current Outpatient Medications   Medication Sig Dispense Refill    ABILIFY 10 MG OR TABS Take 22 mg by mouth daily       albuterol (VENTOLIN HFA) 108 (90 Base) MCG/ACT inhaler Inhale 2 puffs into the lungs every 6 hours as needed for shortness of breath or wheezing 18 g 3    ASPIRIN LOW DOSE 81 MG EC tablet TAKE 1 TABLET BY MOUTH EVERY MORNING 30 tablet 11    clobetasol (TEMOVATE) 0.05 % external ointment Apply twice daily to ears for two weeks then 3 times weekly thereafter. 60 g 1    EAR DROPS 6.5 % otic solution Place 5 drops into both ears 2 times daily For 5 days August 1-5th and Feb 1-5 th then return for irrigation after the 5th day. 15 mL 3    fluticasone-vilanterol (BREO ELLIPTA) 100-25 MCG/ACT inhaler Inhale 1 puff into the lungs daily 180 each 1    gabapentin (NEURONTIN) 100 MG capsule Take 100 mg by mouth 2 times daily      ipratropium - albuterol 0.5 mg/2.5 mg/3 mL (DUONEB) 0.5-2.5 (3) MG/3ML neb solution Take 1 vial (3 mLs) by nebulization every 6 hours as needed for shortness of breath, wheezing or cough 90 mL 1    levothyroxine (SYNTHROID/LEVOTHROID) 150 MCG tablet TAKE 1 TABLET BY MOUTH EVERY MORNING 30 tablet 11    mirabegron (MYRBETRIQ) 50 MG 24 hr tablet Take 1 tablet (50 mg) by mouth daily 90 tablet 3    OLANZapine (ZYPREXA) 2.5 MG tablet as needed      order for DME Equipment being ordered: callous pad 1 each 1    order for DME Equipment being ordered: Dynaflex insert 1 Units 0    oxyBUTYnin ER (DITROPAN XL) 5 MG 24 hr tablet Take 1 tablet by mouth in the morning 90 tablet 3    polyethylene glycol (GAVILAX) 17 GM/Dose powder Take 17 grams (1 capful) by mouth daily 510 g 5     REGULOID 51.7 % POWD TAKE 1 TEASPOONFUL TWICE DAILY 369 g 11    simvastatin (ZOCOR) 40 MG tablet TAKE 1 TABLET BY MOUTH AT BEDTIME 30 tablet 11    Skin Protectants, Misc. (EUCERIN) cream Apply topically 2 times daily Please ask Group home to schedule a Mid may 2023 follow up Derm appt: [phone] to schedule. 454 g 6    tamsulosin (FLOMAX) 0.4 MG capsule Take 1 capsule (0.4 mg) by mouth daily 90 capsule 3    traZODone (DESYREL) 50 MG tablet Take 25 mg by mouth At Bedtime      ZOLOFT 100 MG OR TABS 2 TABLETS DAILY      Disposable Gloves (LATEX GLOVES MEDIUM) MISC 4 boxes of gloves  For the application of topical medications 400 each 11    Disposable Gloves (NITRILE EXAM GLOVES MEDIUM) MISC 1 each as needed Use PRN daily with administration of otic drops, body lotion and powder to treat dry itchy skin. 4 each 11    finasteride (PROSCAR) 5 MG tablet Take 1 tablet (5 mg) by mouth every morning 90 tablet 3    Omega-3 Fatty Acids (FISH OIL) 1200 MG capsule 1 capsule every evening 90 capsule 2     Current Facility-Administered Medications   Medication Dose Route Frequency Provider Last Rate Last Admin    3 mL ropivacaine (NAROPIN) injection 5 mg/mL  3 mL   Jayesh Hooker DO   3 mL at 12/07/23 1030    triamcinolone (KENALOG-40) injection 40 mg  40 mg   Jayesh Hooker DO   40 mg at 12/07/23 1030     Facility-Administered Medications Ordered in Other Visits   Medication Dose Route Frequency Provider Last Rate Last Admin    bupivacaine (MARCAINE) injection 0.5%    PRN Holden Harrison DPM   10 mL at 01/03/17 0945            Family History:     Family History   Problem Relation Age of Onset    Emphysema Mother     Coronary Artery Disease Father             Social History:     Social History     Socioeconomic History    Marital status: Single     Spouse name: Not on file    Number of children: Not on file    Years of education: Not on file    Highest education level: Not on file   Occupational  History    Not on file   Tobacco Use    Smoking status: Every Day     Current packs/day: 0.75     Average packs/day: 0.8 packs/day for 50.0 years (37.5 ttl pk-yrs)     Types: Cigarettes     Passive exposure: Current    Smokeless tobacco: Never   Vaping Use    Vaping status: Never Used   Substance and Sexual Activity    Alcohol use: No    Drug use: No    Sexual activity: Never   Other Topics Concern    Parent/sibling w/ CABG, MI or angioplasty before 65F 55M? Not Asked     Service Not Asked    Blood Transfusions No    Caffeine Concern Not Asked    Occupational Exposure Not Asked    Hobby Hazards Not Asked    Sleep Concern Not Asked    Stress Concern Not Asked    Weight Concern Not Asked    Special Diet Not Asked    Back Care Not Asked    Exercise Not Asked    Bike Helmet Not Asked    Seat Belt Not Asked    Self-Exams Not Asked   Social History Narrative    Patient has no interest in smoking cessation.     Her for [employer] - likes to [employer]    Lives in Group home. [place]      Social Drivers of Health     Financial Resource Strain: Low Risk  (2/21/2024)    Financial Resource Strain     Within the past 12 months, have you or your family members you live with been unable to get utilities (heat, electricity) when it was really needed?: No   Food Insecurity: Low Risk  (2/21/2024)    Food Insecurity     Within the past 12 months, did you worry that your food would run out before you got money to buy more?: No     Within the past 12 months, did the food you bought just not last and you didn t have money to get more?: No   Transportation Needs: Low Risk  (2/21/2024)    Transportation Needs     Within the past 12 months, has lack of transportation kept you from medical appointments, getting your medicines, non-medical meetings or appointments, work, or from getting things that you need?: No   Physical Activity: Unknown (2/21/2024)    Exercise Vital Sign     Days of Exercise per Week: 0  days     Minutes of Exercise per Session: Not on file   Stress: No Stress Concern Present (2/21/2024)    [employer] [place] [employer] - Occupational Stress Questionnaire     Feeling of Stress : Not at all   Social Connections: Unknown (2/21/2024)    Social Connection and Isolation Panel [NHANES]     Frequency of Communication with Friends and Family: Not on file     Frequency of Social Gatherings with Friends and Family: Three times a week     Attends [employer] Services: Not on file     Active Member of Clubs or Organizations: Not on file     Attends Club or Organization Meetings: Not on file     Marital Status: Not on file   Interpersonal Safety: Low Risk  (2/21/2024)    Interpersonal Safety     Do you feel physically and emotionally safe where you currently live?: Yes     Within the past 12 months, have you been hit, slapped, kicked or otherwise physically hurt by someone?: No     Within the past 12 months, have you been humiliated or emotionally abused in other ways by your partner or ex-partner?: No   Housing Stability: Low Risk  (2/21/2024)    Housing Stability     Do you have housing? : Yes     Are you worried about losing your housing?: No            Allergies:   Nitrogen oxide and Sulfa antibiotics         Review of Systems:  From intake questionnaire   Negative 14 system review except as noted on HPI, nurse's note.        CHEYANNE BARAJAS PA-C  Department of Urology

## 2024-10-31 NOTE — NURSING NOTE
"Initial /71 (BP Location: Right arm, Patient Position: Chair, Cuff Size: Adult Large)   Pulse 62   Temp 97.1  F (36.2  C) (Tympanic)   Wt 80.3 kg (177 lb)   BMI 29.45 kg/m   Estimated body mass index is 29.45 kg/m  as calculated from the following:    Height as of 7/30/24: 1.651 m (5' 5\").    Weight as of this encounter: 80.3 kg (177 lb). .    Active order to obtain bladder scan? Yes:    Name of ordering provider:  Josemanuel  Bladder scan preformed post void Yes: 1`9.  Bladder scan reveled 19ML.  Provider notified?  Yes:            "

## 2024-11-01 DIAGNOSIS — J44.9 STAGE 1 MILD COPD BY GOLD CLASSIFICATION (H): ICD-10-CM

## 2024-11-01 RX ORDER — FLUTICASONE FUROATE AND VILANTEROL TRIFENATATE 100; 25 UG/1; UG/1
1 POWDER RESPIRATORY (INHALATION) DAILY
Qty: 180 EACH | Refills: 3 | Status: SHIPPED | OUTPATIENT
Start: 2024-11-01

## 2024-11-27 DIAGNOSIS — K59.00 CONSTIPATION, UNSPECIFIED CONSTIPATION TYPE: ICD-10-CM

## 2024-11-27 RX ORDER — POLYETHYLENE GLYCOL 3350 17 G/17G
POWDER, FOR SOLUTION ORAL
Qty: 510 G | Refills: 1 | Status: SHIPPED | OUTPATIENT
Start: 2024-11-27

## 2024-12-26 ENCOUNTER — TELEPHONE (OUTPATIENT)
Dept: DERMATOLOGY | Facility: CLINIC | Age: 69
End: 2024-12-26

## 2024-12-26 NOTE — TELEPHONE ENCOUNTER
Patient Contact    Attempt # 1    Was call answered?  No    Called  Ethan. No answer. Left message to call back to reschedule.     Kriss Agosto LPN   [facility]   [phone]

## 2024-12-26 NOTE — TELEPHONE ENCOUNTER
[facility] Call Center    Phone Message    May a detailed message be left on voicemail: yes     Reason for Call: Other: caregiver called asking if the appt can be switched to later today or if it can be virtual, pt doesn't have [employer] so no way of uploading photos, Lois advised that Julia was booked for today. I have canceled current appt please call back to reschedule- Follow up ears     Action Taken: Other: TE to [facility]    Travel Screening: Not Applicable     Date of Service:

## 2024-12-30 DIAGNOSIS — J44.9 STAGE 1 MILD COPD BY GOLD CLASSIFICATION (H): ICD-10-CM

## 2024-12-31 RX ORDER — IPRATROPIUM BROMIDE AND ALBUTEROL SULFATE 2.5; .5 MG/3ML; MG/3ML
1 SOLUTION RESPIRATORY (INHALATION) EVERY 6 HOURS PRN
Qty: 90 ML | Refills: 0 | Status: SHIPPED | OUTPATIENT
Start: 2024-12-31

## 2025-02-03 ENCOUNTER — TELEPHONE (OUTPATIENT)
Dept: FAMILY MEDICINE | Facility: CLINIC | Age: 70
End: 2025-02-03
Payer: COMMERCIAL

## 2025-02-03 DIAGNOSIS — R60.0 PEDAL EDEMA: Primary | ICD-10-CM

## 2025-02-03 NOTE — TELEPHONE ENCOUNTER
Nikki Ospina  [facility] Pool1 hour ago (10:38 AM)     AJ  Needs order to be faxed to [facility] - [phone]       Orders faxed.

## 2025-02-03 NOTE — TELEPHONE ENCOUNTER
Order/Referral Request    Who is requesting: Nurse Manager    Orders being requested: Compression socks    Reason service is needed/diagnosis: Swelling in legs    When are orders needed by: ASAP    Has this been discussed with Provider: Yes    Does patient have a preference on a Group/Provider/Facility? No    Does patient have an appointment scheduled?: No    Where to send orders: Place orders within Epic    Okay to leave a detailed message?: Yes at Cell number on file:    No relevant phone numbers on file.

## 2025-02-06 ENCOUNTER — TELEPHONE (OUTPATIENT)
Dept: PHARMACY | Facility: OTHER | Age: 70
End: 2025-02-06
Payer: COMMERCIAL

## 2025-02-06 NOTE — TELEPHONE ENCOUNTER
MT Recruitment: [facility]  insurance     Referral outreach attempt #1 on February 6, 2025      Outcome: left voicemail- Call back number [phone] - with , [facility]

## 2025-02-15 NOTE — TELEPHONE ENCOUNTER
tamsulosin 0.4 mg capsule  Last Written Prescription Date:   5/18/2022  Last Fill Quantity: 30,   # refills: 0  Last Office Visit :  5/27/2021  (No show on 7/5/2022)  Future Office visit:  7/26/2022  30 Day sent to pharm     oxybutynin chloride 5 mg tablet  Last Written Prescription Date:   6/27/2022  Last Fill Quantity: 180   # refills: 0  Last Office Visit :  5/27/2021  (No show on 7/5/2022)  Future Office visit:  7/26/2022  30 Day sent to jayce Betts RN  Central Triage Red Flags/Med Refills       show

## 2025-02-26 ENCOUNTER — OFFICE VISIT (OUTPATIENT)
Dept: FAMILY MEDICINE | Facility: CLINIC | Age: 70
End: 2025-02-26
Payer: COMMERCIAL

## 2025-02-26 VITALS
WEIGHT: 178.6 LBS | DIASTOLIC BLOOD PRESSURE: 73 MMHG | RESPIRATION RATE: 22 BRPM | SYSTOLIC BLOOD PRESSURE: 122 MMHG | OXYGEN SATURATION: 93 % | HEART RATE: 65 BPM | BODY MASS INDEX: 29.76 KG/M2 | HEIGHT: 65 IN | TEMPERATURE: 97.4 F

## 2025-02-26 DIAGNOSIS — N18.30 STAGE 3 CHRONIC KIDNEY DISEASE, UNSPECIFIED WHETHER STAGE 3A OR 3B CKD (H): ICD-10-CM

## 2025-02-26 DIAGNOSIS — Z23 HIGH PRIORITY FOR 2019-NCOV VACCINE: ICD-10-CM

## 2025-02-26 DIAGNOSIS — F20.5 RESIDUAL SCHIZOPHRENIA (H): ICD-10-CM

## 2025-02-26 DIAGNOSIS — M67.40 GANGLION CYST: ICD-10-CM

## 2025-02-26 DIAGNOSIS — Z12.5 SCREENING FOR PROSTATE CANCER: ICD-10-CM

## 2025-02-26 DIAGNOSIS — Z23 NEED FOR PROPHYLACTIC VACCINATION AND INOCULATION AGAINST INFLUENZA: ICD-10-CM

## 2025-02-26 DIAGNOSIS — J84.9 ILD (INTERSTITIAL LUNG DISEASE) (H): ICD-10-CM

## 2025-02-26 DIAGNOSIS — E03.9 ACQUIRED HYPOTHYROIDISM: Chronic | ICD-10-CM

## 2025-02-26 DIAGNOSIS — Z00.00 ROUTINE HISTORY AND PHYSICAL EXAMINATION OF ADULT: Primary | ICD-10-CM

## 2025-02-26 LAB
PSA SERPL DL<=0.01 NG/ML-MCNC: 1.69 NG/ML (ref 0–6.5)
TSH SERPL DL<=0.005 MIU/L-ACNC: 3.39 UIU/ML (ref 0.3–4.2)

## 2025-02-26 PROCEDURE — G0103 PSA SCREENING: HCPCS | Performed by: FAMILY MEDICINE

## 2025-02-26 PROCEDURE — 36415 COLL VENOUS BLD VENIPUNCTURE: CPT | Performed by: FAMILY MEDICINE

## 2025-02-26 PROCEDURE — 84443 ASSAY THYROID STIM HORMONE: CPT | Performed by: FAMILY MEDICINE

## 2025-02-26 SDOH — HEALTH STABILITY: PHYSICAL HEALTH: ON AVERAGE, HOW MANY DAYS PER WEEK DO YOU ENGAGE IN MODERATE TO STRENUOUS EXERCISE (LIKE A BRISK WALK)?: 0 DAYS

## 2025-02-26 ASSESSMENT — PAIN SCALES - GENERAL: PAINLEVEL_OUTOF10: NO PAIN (0)

## 2025-02-26 ASSESSMENT — SOCIAL DETERMINANTS OF HEALTH (SDOH): HOW OFTEN DO YOU GET TOGETHER WITH FRIENDS OR RELATIVES?: ONCE A WEEK

## 2025-02-26 NOTE — PROGRESS NOTES
"Preventive Care Visit  [facility]  Alexandru Vera MD, Family Medicine  Feb 26, 2025      Assessment & Plan     Routine history and physical examination of adult  Need for prophylactic vaccination and inoculation against influenza  High priority for 2019-nCoV vaccine  Medically stable.  Medications reviewed and no changes made.  Influenza and COVID-vaccine administered in office today.  Stressed on smoking cessation, regular exercise, healthy diet and weight loss.  Group home standing orders signed.  - REVIEW OF HEALTH MAINTENANCE PROTOCOL ORDERS    Ganglion cyst  Involving left thumb.  Orthopedic referral placed.  - Orthopedic  Referral; Future    Screening for prostate cancer  - PSA, screen; Future  - PSA, screen    Acquired hypothyroidism  TSH ordered, will titrate levothyroxine dose accordingly.  - TSH with free T4 reflex; Future  - TSH with free T4 reflex    Residual schizophrenia (H)  Following psychiatrist    ILD (interstitial lung disease) (H)  Symptoms stable.  Stressed on smoking cessation.  Following pulmonologist    Stage 3 chronic kidney disease, unspecified whether stage 3a or 3b CKD (H)  Recommended well hydration and to avoid NSAIDs.  Will continue to monitor renal function periodically      Nicotine/Tobacco Cessation  He reports that he has been smoking cigarettes. He has a 37.5 pack-year smoking history. He has been exposed to tobacco smoke. He has never used smokeless tobacco.  Nicotine/Tobacco Cessation Plan  Information offered: Patient not interested at this time      BMI  Estimated body mass index is 29.72 kg/m  as calculated from the following:    Height as of this encounter: 1.651 m (5' 5\").    Weight as of this encounter: 81 kg (178 lb 9.6 oz).   Weight management plan: Discussed healthy diet and exercise guidelines    Counseling  Appropriate preventive services were addressed with this patient via screening, questionnaire, or discussion as appropriate " for fall prevention, nutrition, physical activity, Tobacco-use cessation, social engagement, weight loss and cognition.  Checklist reviewing preventive services available has been given to the patient.  Reviewed patient's diet, addressing concerns and/or questions.   Updated plan of care.  Patient reported difficulty with activities of daily living were addressed today.The patient was provided with written information regarding signs of hearing loss.   Information on urinary incontinence and treatment options given to patient.         Markell Espino is a 69 year old, presenting for the following:  Physical, Ear Problem (Needs ear cleaned), and Lesion (Bump on left thumb)        2/26/2025    12:52 PM   Additional Questions   Roomed by Elena BREWER CMA   Accompanied by staff       HPI  -Needs ears cleaned.    -Has bump on left thumb.  Sometimes tender.  Getting bigger.  First noticed it a few months ago.           2/26/2025   General Health   How would you rate your overall physical health? Excellent   Feel stress (tense, anxious, or unable to sleep) Not at all         2/26/2025   Nutrition   Diet: Regular (no restrictions)    I don't know       Multiple values from one day are sorted in reverse-chronological order         2/26/2025   Exercise   Days per week of moderate/strenous exercise 0 days   (!) EXERCISE CONCERN      2/26/2025   Social Factors   Frequency of gathering with friends or relatives Once a week   Worry food won't last until get money to buy more No   Food not last or not have enough money for food? No   Do you have housing? (Housing is defined as stable permanent housing and does not include staying ouside in a car, in a tent, in an abandoned building, in an overnight shelter, or couch-surfing.) Yes   Are you worried about losing your housing? No   Lack of transportation? No   Unable to get utilities (heat,electricity)? No         2/26/2025   Fall Risk   Fallen 2 or more times in the past year? No    Trouble with walking or balance? No          2/26/2025   Activities of Daily Living- Home Safety   Needs help with the following daily activites Telephone use    Transportation    Shopping    Preparing meals    Medication administration    Money management   Safety concerns in the home None of the above       Multiple values from one day are sorted in reverse-chronological order         2/26/2025   Dental   Dentist two times every year? Yes         2/26/2025   Hearing Screening   Hearing concerns? (!) I NEED TO ASK PEOPLE TO SPEAK UP OR REPEAT THEMSELVES.    (!) IT'S HARD TO FOLLOW A CONVERSATION IN A NOISY RESTAURANT OR CROWDED ROOM.    (!) TROUBLE UNDERSTANDING SOFT OR WHISPERED SPEECH.       Multiple values from one day are sorted in reverse-chronological order         2/26/2025   Driving Risk Screening   Patient/family members have concerns about driving No         2/26/2025   General Alertness/Fatigue Screening   Have you been more tired than usual lately? No         2/26/2025   Urinary Incontinence Screening   Bothered by leaking urine in past 6 months Yes            Today's PHQ-2 Score:       2/26/2025    12:47 PM   PHQ-2 ( 1999 [employer])   Q1: Little interest or pleasure in doing things 0   Q2: Feeling down, depressed or hopeless 0   PHQ-2 Score 0    Q1: Little interest or pleasure in doing things Not at all   Q2: Feeling down, depressed or hopeless Not at all   PHQ-2 Score 0       Patient-reported           2/26/2025   Substance Use   If I could quit smoking, I would Completely disagree   I want to quit somking, worry about health affects Completely disagree   Willing to make a plan to quit smoking Completely disagree   Willing to cut down before quitting Completely disagree   Alcohol more than 3/day or more than 7/wk No   Do you have a current opioid prescription? No   How severe/bad is pain from 1 to 10? 7/10   Do you use any other substances recreationally? No     Social History     Tobacco Use     Smoking status: Every Day     Current packs/day: 0.75     Average packs/day: 0.8 packs/day for 50.0 years (37.5 ttl pk-yrs)     Types: Cigarettes     Passive exposure: Current    Smokeless tobacco: Never   Vaping Use    Vaping status: Never Used   Substance Use Topics    Alcohol use: No    Drug use: No       ASCVD Risk   The 10-year ASCVD risk score (Silvia HOWARD, et al., 2019) is: 18.9%    Values used to calculate the score:      Age: 69 years      Sex: Male      Is Non- : No      Diabetic: No      Tobacco smoker: Yes      Systolic Blood Pressure: 122 mmHg      Is BP treated: No      HDL Cholesterol: 42 mg/dL      Total Cholesterol: 133 mg/dL    A      Reviewed and updated as needed this visit by Provider                    Past Medical History:   Diagnosis Date    Cerumen impaction     Chronic kidney disease     Colon polyps     Hyperlipidemia     Mitral valve prolapse     Schizophrenia (H)     Ulcerated penile lesions     VSD (ventricular septal defect)      Past Surgical History:   Procedure Laterality Date    ARTHRODESIS FOOT Right 1/19/2016    Procedure: ARTHRODESIS FOOT;  Surgeon: Holden Harrison DPM;  Location: [facility]    ARTHRODESIS FOOT Left 1/3/2017    Procedure: ARTHRODESIS FOOT;  Surgeon: Holden Harrison DPM;  Location: [facility]    COLONOSCOPY N/A 5/19/2022    Procedure: COLONOSCOPY, FLEXIBLE, WITH LESION REMOVAL USING SNARE;  Surgeon: Ame Nelson MD;  Location: [facility]    HERNIORRHAPHY INGUINAL Right 6/8/2023    Procedure: HERNIORRHAPHY INGUINALOPEN right with mesh;  Surgeon: Ame Nelson MD;  Location: [facility]    SURGICAL HISTORY OF -       leg fracture     OB History   No obstetric history on file.     Lab work is in process  Labs reviewed in EPIC  BP Readings from Last 3 Encounters:   02/26/25 122/73   10/31/24 112/71   07/30/24 127/78    Wt Readings from Last 3 Encounters:   02/26/25 81 kg (178 lb 9.6 oz)   10/31/24 80.3 kg (177 lb)   07/30/24 81.2 kg (179 lb)                   Patient Active Problem List   Diagnosis    Colon polyps    Schizophrenia (H)    VSD (ventricular septal defect)    Mitral valve prolapse    Acquired hypothyroidism    IgA nephropathy    Proteinuria    Hyperlipidemia LDL goal <130    BPH (benign prostatic hyperplasia)    Bilateral impacted cerumen    Bunion of great toe of right foot    Eczema of external ear, bilateral    Tinea pedis, unspecified laterality    Incomplete right bundle branch block (RBBB)    Cigarette nicotine dependence with nicotine-induced disorder    Gastroesophageal reflux disease without esophagitis    Stage 1 mild COPD by GOLD classification (H)    CKD (chronic kidney disease) stage 3, GFR 30-59 ml/min (H)    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)    Smoking    Hip pain, right    Dilatation of thoracic aorta     Past Surgical History:   Procedure Laterality Date    ARTHRODESIS FOOT Right 1/19/2016    Procedure: ARTHRODESIS FOOT;  Surgeon: Holden Harrison DPM;  Location: [facility]    ARTHRODESIS FOOT Left 1/3/2017    Procedure: ARTHRODESIS FOOT;  Surgeon: Holden Harrison DPM;  Location: [facility]    COLONOSCOPY N/A 5/19/2022    Procedure: COLONOSCOPY, FLEXIBLE, WITH LESION REMOVAL USING SNARE;  Surgeon: Ame Nelson MD;  Location: [facility]    HERNIORRHAPHY INGUINAL Right 6/8/2023    Procedure: HERNIORRHAPHY INGUINALOPEN right with mesh;  Surgeon: Ame Nelson MD;  Location: [facility]    SURGICAL HISTORY OF -       leg fracture       Social History     Tobacco Use    Smoking status: Every Day     Current packs/day: 0.75     Average packs/day: 0.8 packs/day for 50.0 years (37.5 ttl pk-yrs)     Types: Cigarettes     Passive exposure: Current    Smokeless tobacco: Never   Substance Use Topics    Alcohol use: No     Family History   Problem Relation Age of Onset    Emphysema Mother     Coronary Artery Disease Father          Current Outpatient Medications   Medication Sig Dispense Refill    ABILIFY 10 MG OR TABS Take  22 mg by mouth daily       albuterol (VENTOLIN HFA) 108 (90 Base) MCG/ACT inhaler Inhale 2 puffs into the lungs every 6 hours as needed for shortness of breath or wheezing 18 g 3    aspirin (ASPIRIN LOW DOSE) 81 MG EC tablet TAKE 1 TABLET BY MOUTH EVERY MORNING 30 tablet 11    clobetasol (TEMOVATE) 0.05 % external ointment Apply twice daily to ears for two weeks then 3 times weekly thereafter. 60 g 1    EAR DROPS 6.5 % otic solution Place 5 drops into both ears 2 times daily For 5 days August 1-5th and Feb 1-5 th then return for irrigation after the 5th day. 15 mL 3    FIBER PO Take 1 teaspoonful by mouth 2 times daily. At 7 AM and 8 PM      fluticasone-vilanterol (BREO ELLIPTA) 100-25 MCG/ACT inhaler INHALE 1 PUFF INTO THE LUNGS DAILY 180 each 3    gabapentin (NEURONTIN) 100 MG capsule Take 100 mg by mouth 2 times daily      ipratropium - albuterol 0.5 mg/2.5 mg/3 mL (DUONEB) 0.5-2.5 (3) MG/3ML neb solution Take 1 vial (3 mLs) by nebulization every 6 hours as needed for shortness of breath, wheezing or cough 90 mL 0    levothyroxine (SYNTHROID/LEVOTHROID) 150 MCG tablet TAKE 1 TABLET BY MOUTH EVERY MORNING 30 tablet 11    mirabegron (MYRBETRIQ) 50 MG 24 hr tablet Take 1 tablet (50 mg) by mouth daily 90 tablet 3    OLANZapine (ZYPREXA) 2.5 MG tablet as needed      Omega-3 Fatty Acids (FISH OIL) 1200 MG capsule 1 capsule every evening 90 capsule 2    oxyBUTYnin ER (DITROPAN XL) 5 MG 24 hr tablet Take 1 tablet by mouth in the morning 90 tablet 3    polyethylene glycol (GAVILAX) 17 GM/Dose powder MIX ONE CAPFUL (17GM) AND DISSOLVE IN 4 TO 8 FLOZ OF LIQUID AND DRINK BY MOUTH ONCE DAILY 510 g 1    simvastatin (ZOCOR) 40 MG tablet TAKE 1 TABLET BY MOUTH AT BEDTIME 30 tablet 11    Skin Protectants, Misc. (EUCERIN) cream Apply topically 2 times daily Please ask Group home to schedule a Mid may 2023 follow up Derm appt: [phone] to schedule. 454 g 6    tamsulosin (FLOMAX) 0.4 MG capsule Take 1 capsule (0.4 mg) by mouth  daily 90 capsule 3    traZODone (DESYREL) 50 MG tablet Take 25 mg by mouth At Bedtime      ZOLOFT 100 MG OR TABS 2 TABLETS DAILY      Disposable Gloves (LATEX GLOVES MEDIUM) MISC 4 boxes of gloves  For the application of topical medications 400 each 11    Disposable Gloves (NITRILE EXAM GLOVES MEDIUM) MISC 1 each as needed Use PRN daily with administration of otic drops, body lotion and powder to treat dry itchy skin. 4 each 11    finasteride (PROSCAR) 5 MG tablet Take 1 tablet (5 mg) by mouth every morning (Patient not taking: Reported on 2/26/2025) 90 tablet 3    order for DME Equipment being ordered: callous pad 1 each 1    order for DME Equipment being ordered: Dynaflex insert 1 Units 0    REGULOID 51.7 % POWD TAKE 1 TEASPOONFUL TWICE DAILY 369 g 11     Allergies   Allergen Reactions    Nitrogen Oxide     Sulfa Antibiotics      Recent Labs   Lab Test 09/17/24  0918 07/17/24  0924 05/01/24  0917 03/06/24  0936 02/21/24  1038 07/12/23  0938 02/20/23  1005 11/09/22  0937 03/01/22  0843 08/25/21  1145 02/15/21  1209 09/15/20  0924 07/12/20  1601   A1C 5.6  --   --   --  5.7*  --  5.6  --  5.5   < > 5.4  --   --    LDL 72  --   --   --  103*  --  93  --  65   < > 87  --   --    HDL 42  --   --   --  55  --  55  --  51   < > 56  --   --    TRIG 93  --   --   --  139  --  106  --  124   < > 122  --   --    ALT 18  --   --   --   --   --  17  --  36  --  30  --   --    CR 1.30* 1.62*  --   --   --    < > 1.59*  --  1.42*   < > 1.42*  --  1.56*   GFRESTIMATED 59* 45*  --   --   --    < > 47*  --  54*   < > 51*  --  45*   GFRESTBLACK  --   --   --   --   --   --   --   --   --   --  59*  --  53*   POTASSIUM 4.4 4.8  --   --   --    < > 4.9  --  4.5   < > 4.8  --  4.2   TSH  --   --  2.43 4.71*  --    < >  --    < >  --    < >  --    < >  --     < > = values in this interval not displayed.      Current providers sharing in care for this patient include:  Patient Care Team:  Alexandru Vera MD as PCP - General (Family  Practice)  Ginger Weiss MD as MD (Nephrology)  Alexandru eVra MD as Assigned PCP  Sangeetha Schmitt MD as Assigned Musculoskeletal Provider  Dakota Pinzon MD as Assigned Pulmonology Provider  Mercy De La Cruz APRN CNP as Nurse Practitioner (Cardiovascular Disease)  Julia Hernandez PA-C as Physician Assistant (Dermatology)  Vanesa Montanez PA-C as Physician Assistant (Urology)  Peewee Lott MD as MD (Cardiovascular Disease)  Vanesa Montanez PA-C as Assigned Surgical Provider  Peewee Lott MD as Assigned Heart and Vascular Provider  Lea Mehta DO as Assigned Nephrology Provider    The following health maintenance items are reviewed in Epic and correct as of today:  Health Maintenance   Topic Date Due    RSV VACCINE (1 - Risk 60-74 years 1-dose series) Never done    INFLUENZA VACCINE (1) 09/01/2024    COVID-19 Vaccine (6 - 2024-25 season) 09/01/2024    Medicare Annual MTM Pharmacist Visit (once per calendar year)  Never done    NICOTINE/TOBACCO CESSATION COUNSELING Q 1 YR  02/07/2025    MEDICARE ANNUAL WELLNESS VISIT  02/21/2025    ANNUAL REVIEW OF HM ORDERS  07/17/2025    TSH W/FREE T4 REFLEX  05/01/2025    MICROALBUMIN  07/17/2025    LUNG CANCER SCREENING  09/09/2025    BMP  09/17/2025    LIPID  09/17/2025    HEMOGLOBIN  09/17/2025    FALL RISK ASSESSMENT  02/26/2026    COLORECTAL CANCER SCREENING  05/19/2027    GLUCOSE  09/17/2027    ADVANCE CARE PLANNING  02/21/2029    DTAP/TDAP/TD IMMUNIZATION (3 - Td or Tdap) 06/07/2033    SPIROMETRY  Completed    HEPATITIS C SCREENING  Completed    COPD ACTION PLAN  Completed    PHQ-2 (once per calendar year)  Completed    Pneumococcal Vaccine: 50+ Years  Completed    URINALYSIS  Completed    ZOSTER IMMUNIZATION  Completed    AORTIC ANEURYSM SCREENING (SYSTEM ASSIGNED)  Completed    HPV IMMUNIZATION  Aged Out    MENINGITIS IMMUNIZATION  Aged Out         Review of Systems  Constitutional, neuro, ENT,  "endocrine, pulmonary, cardiac, gastrointestinal, genitourinary, musculoskeletal, integument and psychiatric systems are negative, except as otherwise noted.     Objective    Exam  /73 (BP Location: Right arm, Patient Position: Sitting, Cuff Size: Adult Large)   Pulse 65   Temp 97.4  F (36.3  C) (Tympanic)   Resp 22   Ht 1.651 m (5' 5\")   Wt 81 kg (178 lb 9.6 oz)   SpO2 93%   BMI 29.72 kg/m     Estimated body mass index is 29.72 kg/m  as calculated from the following:    Height as of this encounter: 1.651 m (5' 5\").    Weight as of this encounter: 81 kg (178 lb 9.6 oz).    Physical Exam  GENERAL: alert and no distress  EYES: Eyes grossly normal to inspection, PERRL and conjunctivae and sclerae normal  HENT: normal cephalic/atraumatic, ear canals and TM's normal, nose and mouth without ulcers or lesions, oropharynx clear, and oral mucous membranes moist  NECK: no adenopathy, no asymmetry, masses, or scars  RESP: lungs clear to auscultation - no rales, rhonchi or wheezes  CV: normal S1 S2, no S3 or S4, grade 3/6 systolic murmur, peripheral pulses strong, and no peripheral edema  ABDOMEN: soft, nontender  MS: no gross musculoskeletal defects noted, no edema  SKIN: Small lump involving left thumb, slightly erythematous, no tenderness or warmth noted.  NEURO: Grossly intact.        2/26/2025   Mini Cog   Mini-Cog Not Completed (choose reason) Mental handicap              Signed Electronically by: Alexandru Vera MD    "

## 2025-03-04 ENCOUNTER — TELEPHONE (OUTPATIENT)
Dept: PHYSICAL MEDICINE AND REHAB | Facility: CLINIC | Age: 70
End: 2025-03-04
Payer: COMMERCIAL

## 2025-03-04 NOTE — TELEPHONE ENCOUNTER
Voicemail received from pt's  (on CTC) inquiring about repeat injection for patient. It has been over 1 year since patient has had office visit with Dr. Dickson. Pt will need office visit to follow-up prior to any injection ordered.   Scheduling, please call and assist. # given was [phone] for call back.

## 2025-03-05 ENCOUNTER — TELEPHONE (OUTPATIENT)
Dept: PHYSICAL MEDICINE AND REHAB | Facility: CLINIC | Age: 70
End: 2025-03-05

## 2025-03-05 DIAGNOSIS — M54.16 LUMBAR RADICULOPATHY: Primary | ICD-10-CM

## 2025-03-05 NOTE — TELEPHONE ENCOUNTER
"Phone call to patient to discuss response. Spoke with both patient and , Ethan. She reports patient had good relief for 7.5 months; \"He was walking, working, shoveling without complaint of pain. Went to see his primary doctor last week and said his back was bothering him again. He isn't using the cane to walk yet.\" Per patient, pain is the same now as before. Chart reviewed. Order placed in Epic for repeat Right L5-S1 TFESI. Injection requirements reviewed in full with patient and . Stated understanding. Transferred to scheduling to make appointment.      Appointment for today will be cancelled. He will be scheduled for the repeat injection and then follow up 2 weeks later.   "
Other: Pt is wanting to know if he can change his appointment today to a virtual, please call regarding this     Could we send this information to you in [place] or would you prefer to receive a phone call?:   Patient would prefer a phone call   Okay to leave a detailed message?: Yes at Home number on file [phone] (home)    
Please call the patient and see if he wants to repeat his injection.  This is the case please ask him what percentage of relief he had and for how long.  I am fine if we move forward with injection without visit if he did well with his injection.  If he would like to talk further or if it is a new problem I like to have him seen in person.  
no cough, and no shortness of breath.

## 2025-03-07 ENCOUNTER — ANCILLARY PROCEDURE (OUTPATIENT)
Dept: GENERAL RADIOLOGY | Facility: CLINIC | Age: 70
End: 2025-03-07
Attending: PEDIATRICS
Payer: COMMERCIAL

## 2025-03-07 PROCEDURE — 73140 X-RAY EXAM OF FINGER(S): CPT | Mod: TC | Performed by: RADIOLOGY

## 2025-03-09 NOTE — PROGRESS NOTES
Medication Therapy Management (MTM) Encounter    ASSESSMENT:                            Medication Adherence/Access: No issues identified.    Schizophrenia: Stable per patient. Deferring to psychiatry. Discussed would consider melatonin if needs help with sleep in the future.    BPH/Urinary Frequency/Overactive Bladder: Current symptoms are well controlled on current regimen. Removed finasteride from med list as directed by urology MD.    Hyperlipidemia/Ventricular Septal Defect/Mitral Valve Prolapse: Reasonable to continue simvastatin plus fish oil given history high trigs. Will reach out to cardiology to verify if needs low-dose aspirin given age >68.    Hypothyroidism: Stable. Last TSH is within normal limits.     COPD: Stable per patient. Not interested in quitting smoking at this time.    Eczema: Stable.    Ear Wax: Stable.    Constipation: Stable.    Standing Orders: Stable.    PLAN:                            Discontinue aspirin 81mg daily per cardiology Dr. Lott (AVS faxed to [facility] [phone]).  Continue all other medications without change.    Follow-up: Return in about 1 year (around 3/10/2026) for Medication Therapy Management.    SUBJECTIVE/OBJECTIVE:                          Srinivas Bustos is a 69 year old male seen for an initial visit. He was referred to me from Alexandru Vera MD. Patient was accompanied by  Amelia.     Reason for visit: Comprehensive medication review, no specific concerns. Today's visit is mostly conducted with Amelia (consent to communicate on file), but patient is nearby and available for questions.    Allergies/ADRs: Reviewed in chart  Past Medical History: Reviewed in chart  Tobacco: He reports that he has been smoking cigarettes. He has a 37.5 pack-year smoking history. He has been exposed to tobacco smoke. He has never used smokeless tobacco.Nicotine/Tobacco Cessation Plan Information offered: Patient not interested at this time 16 cigs/day  Alcohol:  none    Medication Adherence/Access:   Patient uses bubble pack(s) and has assistance with medication administration: group home.  Patient takes medications 3 time(s) per day.   Per patient, misses medication 0 time(s) per week.   Medication barriers: none.   The patient fills medications at [facility]: NO, fills medications at [facility].    Schizophrenia:  Aripiprazole 22mg every morning (as 20mg plus 2mg tabs).  Olanzapine 2.5mg as needed - rare use.  Sertraline 200mg daily.  Gabapentin 100mg daily (at 5pm and 8pm) for anxiety.  Trazodone 25mg at bedtime as needed - never uses because causes much drowsiness. Never tried melatonin but overall sleeps well.   Staff reports mental health is stable and no recent med changes.  Patient reports no other medication side effects.  Follows with psychiatry Balbina Fonseca NP - has a visit tomorrow. Also sees therapist for half hour visits monthly.    BPH/Urinary Frequency/Overactive Bladder:  Tamsulosin 0.4mg daily.  Oxybutynin ER 5mg every morning.  Mirabegron 50mg daily.   - no longer taking, stopped since cystoscopy by Dr. Bowman on 7/30/24.  Staff reports sometimes leaks in the morning because holds too long, but otherwise feels urinary symptoms are much improved and well-controlled. Patient reports no current medication side effects.  Follows with urology Vanesa WATSON - reviewed last note from 10/31/24.    Hyperlipidemia/Ventricular Septal Defect/Mitral Valve Prolapse:   Simvastatin 40mg at bedtime.  Fish oil 1200mg daily. History of trigs >400 mg/dL in 2017.  Aspirin 81mg daily - for general heart protection.  Patient reports no current medication side effects.  Follows with cardiology Dr. Lott - reviewed last note from 6/20/24.  The 10-year ASCVD risk score (Silvia HOWARD, et al., 2019) is: 18.9%    Values used to calculate the score:      Age: 69 years      Sex: Male      Is Non- : No      Diabetic: No      Tobacco smoker: Yes       "Systolic Blood Pressure: 122 mmHg      Is BP treated: No      HDL Cholesterol: 42 mg/dL      Total Cholesterol: 133 mg/dL    Recent Labs   Lab Test 09/17/24  0918 02/21/24  1038   CHOL 133 186   HDL 42 55   LDL 72 103*   TRIG 93 139     Hypothyroidism:  Levothyroxine 150 mcg daily.  Patient is having the following symptoms: none.     TSH   Date Value Ref Range Status   02/26/2025 3.39 0.30 - 4.20 uIU/mL Final     COPD:  Breo Ellipta 100-25mcg 1 puff once daily.  Duonebs as needed if catching a cold.  Albuterol MDI available but no recent use.   Breathing is good lately. Rinses mouth after steroid inhaler.  Patient reports no current medication side effects.    Eczema:  Clobetasol 0.05% ointment 3 times weekly on ears.  Eucerin cream as needed on ankles.  Follows with dermatology. No skin concerns at this time.    Ear Wax:  Debrox 5 drops both ears twice daily for 5 days (August 1-5th and February 1-5th). This works well and no concerns.    Constipation:  Miralax 17 grams daily.  Benefiber 1 teaspoonful twice daily.  BM's are satisfactory at present.    Standing Orders:  Occasional Tylenol for headaches or something for stomach upset, but nothing on a regular basis.    Today's Vitals: There were no vitals taken for this visit.    BP Readings from Last 1 Encounters:   02/26/25 122/73     Pulse Readings from Last 1 Encounters:   02/26/25 65     Wt Readings from Last 1 Encounters:   02/26/25 178 lb 9.6 oz (81 kg)     Ht Readings from Last 1 Encounters:   02/26/25 5' 5\" (1.651 m)     Estimated body mass index is 29.72 kg/m  as calculated from the following:    Height as of 2/26/25: 5' 5\" (1.651 m).    Weight as of 2/26/25: 178 lb 9.6 oz (81 kg).  ----------------    I spent 30 minutes with this patient today. All changes were made via collaborative practice agreement with Alexandru Vera MD.     A summary of these recommendations was sent via [employer].    Rebecca Alvarado, PharmD, BCACP  Medication Therapy Management " Pharmacist  [facility]    Telemedicine Visit Details  The patient's medications can be safely assessed via a telemedicine encounter.  Type of service:  Telephone visit  Originating Location (pt. Location): Home    Distant Location (provider location):  On-site  Start Time:  0835  End Time:  0905     Medication Therapy Recommendations  VSD (ventricular septal defect)   1 Current Medication: aspirin (ASPIRIN LOW DOSE) 81 MG EC tablet   Current Medication Sig: TAKE 1 TABLET BY MOUTH EVERY MORNING   Rationale: No medical indication at this time - Unnecessary medication therapy - Indication   Recommendation: Discontinue Medication - As directed by cards   Status: Contact Provider - Awaiting Response   Identified Date: 3/10/2025

## 2025-03-10 ENCOUNTER — VIRTUAL VISIT (OUTPATIENT)
Dept: PHARMACY | Facility: CLINIC | Age: 70
End: 2025-03-10
Payer: COMMERCIAL

## 2025-03-10 DIAGNOSIS — E78.5 HYPERLIPIDEMIA LDL GOAL <130: ICD-10-CM

## 2025-03-10 DIAGNOSIS — Z78.9 LIVES IN GROUP HOME: ICD-10-CM

## 2025-03-10 DIAGNOSIS — N32.81 OVERACTIVE BLADDER: ICD-10-CM

## 2025-03-10 DIAGNOSIS — R35.0 URINARY FREQUENCY: ICD-10-CM

## 2025-03-10 DIAGNOSIS — K59.00 CONSTIPATION, UNSPECIFIED CONSTIPATION TYPE: ICD-10-CM

## 2025-03-10 DIAGNOSIS — I34.1 MITRAL VALVE PROLAPSE: Chronic | ICD-10-CM

## 2025-03-10 DIAGNOSIS — J44.9 STAGE 1 MILD COPD BY GOLD CLASSIFICATION (H): ICD-10-CM

## 2025-03-10 DIAGNOSIS — F20.9 SCHIZOPHRENIA, UNSPECIFIED TYPE (H): Primary | Chronic | ICD-10-CM

## 2025-03-10 DIAGNOSIS — N40.1 BENIGN PROSTATIC HYPERPLASIA WITH URINARY FREQUENCY: Chronic | ICD-10-CM

## 2025-03-10 DIAGNOSIS — R35.0 BENIGN PROSTATIC HYPERPLASIA WITH URINARY FREQUENCY: Chronic | ICD-10-CM

## 2025-03-10 DIAGNOSIS — Q21.0 VSD (VENTRICULAR SEPTAL DEFECT): Chronic | ICD-10-CM

## 2025-03-10 DIAGNOSIS — H60.543 ECZEMA OF EXTERNAL EAR, BILATERAL: ICD-10-CM

## 2025-03-10 DIAGNOSIS — H61.23 BILATERAL IMPACTED CERUMEN: ICD-10-CM

## 2025-03-10 DIAGNOSIS — E03.9 ACQUIRED HYPOTHYROIDISM: Chronic | ICD-10-CM

## 2025-03-10 PROCEDURE — 99607 MTMS BY PHARM ADDL 15 MIN: CPT | Mod: 93 | Performed by: PHARMACIST

## 2025-03-10 PROCEDURE — 99605 MTMS BY PHARM NP 15 MIN: CPT | Mod: 93 | Performed by: PHARMACIST

## 2025-03-10 RX ORDER — OXYBUTYNIN CHLORIDE 5 MG/1
5 TABLET, EXTENDED RELEASE ORAL DAILY
COMMUNITY
End: 2025-03-10

## 2025-03-10 RX ORDER — SERTRALINE HYDROCHLORIDE 100 MG/1
200 TABLET, FILM COATED ORAL EVERY MORNING
COMMUNITY

## 2025-03-10 RX ORDER — ARIPIPRAZOLE 20 MG/1
20 TABLET ORAL EVERY MORNING
COMMUNITY

## 2025-03-10 RX ORDER — ARIPIPRAZOLE 2 MG/1
2 TABLET ORAL EVERY MORNING
COMMUNITY

## 2025-03-10 NOTE — PROGRESS NOTES
Thank you - I notified group home and pharmacy of discontinuation.    Rebecca Alvarado PharmD, BCACP  Medication Therapy Management Pharmacist  ----- Message -----  From: Peewee Lott MD  Sent: 3/10/2025  10:44 AM CDT  To: Rebecca Alvarado [facility]; *  Subject: RE: Aspirin Question                             I don't think he needs aspirin for prevention.  Thanks for the update.    Cyrus Lott  ----- Message -----  From: eRbecca Alvarado [facility]  Sent: 3/10/2025   9:48 AM CDT  To: Peewee Lott MD; *  Subject: Aspirin Question                                 Hi cardiology team - I had a MTM visit with patient today. Wondering does he still need aspirin 81mg daily? Unsure if this is indicated for his VSD and/or mitral valve prolapse. But perhaps it can be stopped if just for primary prevention given age >68. Please advise then I can call group home. Thanks in advance for the teamwork!    Rebecca Alvarado PharmD, ADANCP  Medication Therapy Management Pharmacist

## 2025-03-10 NOTE — LETTER
_  Medication List        Prepared on: Mar 10, 2025     Bring your Medication List when you go to the doctor, hospital, or   emergency room. And, share it with your family or caregivers.     Note any changes to how you take your medications.  Cross out medications when you no longer use them.    Medication How I take it Why I use it Prescriber   albuterol (VENTOLIN HFA) 108 (90 Base) MCG/ACT inhaler Inhale 2 puffs into the lungs every 6 hours as needed for shortness of breath or wheezing COPD Alexandru Vera MD   ARIPiprazole (ABILIFY) 2 MG tablet Take 1 tablet (2 mg) by mouth every morning. Take along with one 20mg tablet for a total dose of 22mg daily. Mental health Balbina Fonseca   ARIPiprazole (ABILIFY) 20 MG tablet Take 1 tablet (20 mg) by mouth every morning. Take along with one 2mg tablet for a total dose of 22mg daily. Mental health Balbina Fonseca   aspirin (ASPIRIN LOW DOSE) 81 MG EC tablet TAKE 1 TABLET (81 MG) BY MOUTH EVERY MORNING Heart protection Alexandru Vera MD   clobetasol (TEMOVATE) 0.05 % external ointment Apply twice daily to ears for two weeks then 3 times weekly thereafter. Eczema  Alexandru Vera MD   Disposable Gloves (LATEX GLOVES MEDIUM) MISC 4 boxes of gloves for the application of topical medications Eczema Alexandru Vera MD   EAR DROPS 6.5 % otic solution Place 5 drops into both ears 2 times daily For 5 days August 1-5th and February 1-5th then return for irrigation after the 5th day. Ear wax Alexandru Vera MD   fluticasone-vilanterol (BREO ELLIPTA) 100-25 MCG/ACT inhaler INHALE 1 PUFF INTO THE LUNGS DAILY COPD Alexandru Vera MD   gabapentin (NEURONTIN) 100 MG capsule Take 1 capsule (100 mg) by mouth 2 times daily (at 5pm and 8pm) Anxiety Balbina Fonseca   ipratropium - albuterol 0.5 mg/2.5 mg/3 mL (DUONEB) 0.5-2.5 (3) MG/3ML neb solution Take 1 vial (3 mLs) by nebulization every 6 hours as needed for shortness of breath, wheezing or cough COPD  Alexandru Vera MD   levothyroxine (SYNTHROID/LEVOTHROID) 150 MCG tablet TAKE 1 TABLET (150 MCG) BY MOUTH EVERY MORNING Thyroid Alexandru Vera MD   mirabegron (MYRBETRIQ) 50 MG 24 hr tablet Take 1 tablet (50 mg) by mouth daily Bladder Vanesa Montanez PA-C   OLANZapine (ZYPREXA) 2.5 MG tablet Take 1 tablet (2.5 mg) by mouth up to 2 times daily as needed (agitation and aggression). Mental health Balbina Fonseca   Omega-3 Fatty Acids (FISH OIL) 1200 MG capsule Take 1 capsule by mouth every evening Cholesterol Alexandru Vera MD   oxyBUTYnin ER (DITROPAN XL) 5 MG 24 hr tablet Take 1 tablet (5 mg) by mouth in the morning Bladder Vanesa Montanez PA-C   polyethylene glycol (GAVILAX) 17 GM/Dose powder MIX ONE CAPFUL (17GM) AND DISSOLVE IN 4 TO 8 FLOZ OF LIQUID AND DRINK BY MOUTH ONCE DAILY Constipation Alexandru Vera MD   REGULOID 51.7 % POWD TAKE 1 TEASPOONFUL BY MOUTH TWICE DAILY Constipation Alexandru Vera MD   sertraline (ZOLOFT) 100 MG tablet Take 2 tablets (200 mg) by mouth every morning. Mental health Balbina Fonseca   simvastatin (ZOCOR) 40 MG tablet TAKE 1 TABLET (40 MG) BY MOUTH AT BEDTIME Cholesterol Alexandru Vera MD   Skin Protectants, Misc. (EUCERIN) cream Apply topically 2 times daily as needed Skin moisturizer Roxy Borges PA-C   tamsulosin (FLOMAX) 0.4 MG capsule Take 1 capsule (0.4 mg) by mouth daily Prostate Vanesa Montanez PA-C   traZODone (DESYREL) 50 MG tablet Take one-half tablet (25 mg) by mouth nightly as needed for sleep. Sleep Balbina Fonseca         Add new medications, over-the-counter drugs, herbals, vitamins, or  minerals in the blank rows below.    Medication How I take it Why I use it Prescriber                                      Allergies:      - Nitrogen Oxide  - Sulfa Antibiotics        Side effects I have had:      Not on File        Other Information:              My notes and questions:

## 2025-03-10 NOTE — Clinical Note
Brady Alexis - Just wanted to let you know that patient is off finasteride. It sounds like Dr. Bowman stopped since cystoscopy on 7/30/24, which Epic documentation doesn't reflect that, but was signed off on group home papers. Anyways currently urinary symptoms are well-controlled so all is good, just wanted to keep you in the loop on what I found out. Thanks!  Rebecca Alvarado, PharmD, BCACP Medication Therapy Management Pharmacist

## 2025-03-10 NOTE — PATIENT INSTRUCTIONS
"Recommendations from today's MTM visit:                                                    MTM (medication therapy management) is a service provided by a clinical pharmacist designed to help you get the most of out of your medicines.   Today we reviewed what your medicines are for, how to know if they are working, that your medicines are safe and how to make your medicine regimen as easy as possible.      Discontinue aspirin 81mg daily per cardiology Dr. Lott (AVS faxed to [facility] [phone]).  Continue all other medications without change.    Follow-up: Return in about 1 year (around 3/10/2026) for Medication Therapy Management.    It was great speaking with you today.  I value your experience and would be very thankful for your time in providing feedback in our clinic survey. In the next few days, you may receive an email or text message from [employer] with a link to a survey related to your  clinical pharmacist.\"     To schedule another MTM appointment, please call the clinic directly or you may call the MTM scheduling line at [phone] or toll-free at [phone].     My Clinical Pharmacist's contact information:                                                      Please feel free to contact me with any questions or concerns you have.      Rebecca Alvarado, PharmD, BCACP  Medication Therapy Management Pharmacist  Voicemail: [phone] (Mon/Wed only)     "

## 2025-03-10 NOTE — LETTER
"Recommended To-Do List      Prepared on: Mar 10, 2025       You can get the best results from your medications by completing the items on this \"To-Do List.\"      Bring your To-Do List when you go to your doctor. And, share it with your family or caregivers.    My To-Do List:  What we talked about: What I should do:   Aspirin question    Rebecca to discuss aspirin with cardiology.          What we talked about: What I should do:                     "

## 2025-03-10 NOTE — LETTER
March 10, 2025  Geovani VALE Juli  [id number] [facility] [address]    Dear  Juli, TYRESE [facility]     Thank you for talking with me on Mar 10, 2025 about your health and medications. As a follow-up to our conversation, I have included two documents:      Your Recommended To-Do List has steps you should take to get the best results from your medications.  Your Medication List will help you keep track of your medications and how to take them.    If you want to talk about these documents, please call Rebecca Alvarado RPH at phone: [phone], Monday-Friday 8-4:30pm.    I look forward to working with you and your doctors to make sure your medications work well for you.    Sincerely,  Rebecca Alvarado RPH  [facility] Pharmacist, [facility]

## 2025-03-13 ENCOUNTER — TELEPHONE (OUTPATIENT)
Dept: FAMILY MEDICINE | Facility: CLINIC | Age: 70
End: 2025-03-13
Payer: COMMERCIAL

## 2025-03-13 NOTE — TELEPHONE ENCOUNTER
[facility] home called the clinic today requesting an order from the doctor to discontinue his aspirin.   They stated they never received the fax yesterday.    Fax is [phone]  Email is [email or web address]    Routing to care team, provider, and pharmacist.   Kriss Matthew RN on 3/13/2025 at 1:17 PM

## 2025-03-13 NOTE — TELEPHONE ENCOUNTER
Covering for Rebecca. Aspirin was stopped per her recommendation and discussion with Cardiology. Discontinuation note faxed to Group Home.     John Ayala PharmD  Medication Therapy Management (MTM) Pharmacist  [facility]

## 2025-03-27 NOTE — PROGRESS NOTES
Chief Complaint:   Chief Complaint   Patient presents with    Left Hand - Pain     Srinivas is here today in clinic for a left thumb cyst.  He was referred over by Dr. Schmitt.  Wants to discuss having the cyst surgically removed.  Has been present for about 2-3 years, is gradually increasing in size.  Causes intermittent achy-ness in the thumb.  Patient is right hand dominate.  No previous history.          Geovani Bustos is seen today in the [facility] for evaluation of left thumb mass at the request of Dr. Sangeetha Schmitt       HPI: Geovani Bustos is a 69 year old male , right -hand dominant, who presents for evaluation and management of a left  thumb mass. The mass as been noticed for 2-3 years, gradually increased in size. He reports having mild pain/discomfort around the mass site on occasion. He denies associated numbness or tingling. He denies any associated injuries or punctures to his finger in the area of the mass.  He'd like to discuss surgical removal..        Changes in size:  slight larger the past year    Changes in color: No   Tenderness of mass: Yes   Hot/cold sensitivity: No   Pain severity: 2/10  Pain quality: aching  Frequency of symptoms: occasionally.  Night pain: No   Fevers, chills, night sweats: No     Previous treatment: none.    Past medical history:  has a past medical history of Cerumen impaction, Chronic kidney disease, Colon polyps, Hyperlipidemia, Mitral valve prolapse, Schizophrenia (H), Ulcerated penile lesions, and VSD (ventricular septal defect).    He has no past medical history of Basal cell carcinoma, Malignant melanoma (H), or Squamous cell carcinoma.   Patient Active Problem List   Diagnosis    Colon polyps    Schizophrenia (H)    VSD (ventricular septal defect)    Mitral valve prolapse    Acquired hypothyroidism    IgA nephropathy    Proteinuria    Hyperlipidemia LDL goal <130    BPH (benign prostatic hyperplasia)    Bilateral impacted  cerumen    Bunion of great toe of right foot    Eczema of external ear, bilateral    Tinea pedis, unspecified laterality    Incomplete right bundle branch block (RBBB)    Cigarette nicotine dependence with nicotine-induced disorder    Gastroesophageal reflux disease without esophagitis    Stage 1 mild COPD by GOLD classification (H)    CKD (chronic kidney disease) stage 3, GFR 30-59 ml/min (H)    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     Medications:   Current Outpatient Medications:     albuterol (VENTOLIN HFA) 108 (90 Base) MCG/ACT inhaler, Inhale 2 puffs into the lungs every 6 hours as needed for shortness of breath or wheezing, Disp: 18 g, Rfl: 3    ARIPiprazole (ABILIFY) 2 MG tablet, Take 2 mg by mouth every morning. Take along with one 20mg tablet for a total dose of 22mg daily., Disp: , Rfl:     ARIPiprazole (ABILIFY) 20 MG tablet, Take 20 mg by mouth every morning. Take along with one 2mg tablet for a total dose of 22mg daily., Disp: , Rfl:     clobetasol (TEMOVATE) 0.05 % external ointment, Apply twice daily to ears for two weeks then 3 times weekly thereafter., Disp: 60 g, Rfl: 1    Disposable Gloves (LATEX GLOVES MEDIUM) MISC, 4 boxes of gloves For the application of topical medications, Disp: 400 each, Rfl: 11    EAR DROPS 6.5 % otic solution, Place 5 drops into both ears 2 times daily For 5 days August 1-5th and Feb 1-5 th then return for irrigation after the 5th day., Disp: 15 mL, Rfl: 3    fluticasone-vilanterol (BREO ELLIPTA) 100-25 MCG/ACT inhaler, INHALE 1 PUFF INTO THE LUNGS DAILY, Disp: 180 each, Rfl: 3    gabapentin (NEURONTIN) 100 MG capsule, Take 100 mg by mouth 2 times daily.  (at 5pm and 8pm), Disp: , Rfl:     ipratropium - albuterol 0.5 mg/2.5 mg/3 mL (DUONEB) 0.5-2.5 (3) MG/3ML neb solution, Take 1 vial (3 mLs) by nebulization every 6 hours as needed for shortness of breath, wheezing or cough, Disp: 90 mL, Rfl: 0    levothyroxine (SYNTHROID/LEVOTHROID) 150 MCG tablet, TAKE 1 TABLET BY MOUTH EVERY MORNING, Disp: 30 tablet, Rfl: 11    mirabegron (MYRBETRIQ) 50 MG 24 hr tablet, Take 1 tablet (50 mg) by mouth daily, Disp: 90 tablet, Rfl: 3    OLANZapine (ZYPREXA) 2.5 MG tablet, Take 2.5 mg by mouth 2 times daily as needed (agitation and aggression)., Disp: , Rfl:     Omega-3 Fatty Acids (FISH OIL) 1200 MG capsule, 1 capsule every evening, Disp: 90 capsule, Rfl: 3    oxyBUTYnin ER (DITROPAN XL) 5 MG 24 hr tablet, Take 1 tablet by mouth in the morning, Disp: 90 tablet, Rfl: 3    polyethylene glycol (GAVILAX) 17 GM/Dose powder, MIX ONE CAPFUL (17GM) AND DISSOLVE IN 4 TO 8 FLOZ OF LIQUID AND DRINK BY MOUTH ONCE DAILY, Disp: 510 g, Rfl: 1    REGULOID 51.7 % POWD, TAKE 1 TEASPOONFUL TWICE DAILY, Disp: 369 g, Rfl: 11    sertraline (ZOLOFT) 100 MG tablet, Take 200 mg by mouth every morning., Disp: , Rfl:     simvastatin (ZOCOR) 40 MG tablet, TAKE 1 TABLET BY MOUTH AT BEDTIME, Disp: 30 tablet, Rfl: 11    Skin Protectants, Misc. (EUCERIN) cream, Apply topically 2 times daily Please ask Group home to schedule a Mid may 2023 follow up Derm appt: [phone] to schedule., Disp: 454 g, Rfl: 6    tamsulosin (FLOMAX) 0.4 MG capsule, Take 1 capsule (0.4 mg) by mouth daily, Disp: 90 capsule, Rfl: 3    traZODone (DESYREL) 50 MG tablet, Take 25 mg by mouth nightly as needed for sleep., Disp: , Rfl:   No current facility-administered medications for this visit.    Facility-Administered Medications Ordered in Other Visits:     bupivacaine (MARCAINE) injection 0.5%, , , PRN, Holden Harrison, ROSARIO, 10 mL at 01/03/17 0924      Allergies:     Allergies   Allergen Reactions    Nitrogen Oxide     Sulfa  "Antibiotics         Family History: family history includes Coronary Artery Disease in his father; Emphysema in his mother.     Social History:  reports that he has been smoking cigarettes. He has a 37.5 pack-year smoking history. He has been exposed to tobacco smoke. He has never used smokeless tobacco. He reports that he does not drink alcohol and does not use drugs.    Review of Systems:  ROS: 10 point ROS neg other than the symptoms noted above in the HPI and past medical history.    Physical Exam  GENERAL APPEARANCE: healthy, alert, no distress. Accompanied by group home employee.  SKIN: no suspicious lesions or rashes  NEURO: Normal strength and tone, mentation intact and speech normal  PSYCH:  mentation appears normal and affect normal. Not anxious.  RESPIRATORY: No increased work of breathing.  LYMPH: no palpable epitrochlear lymphadenopathy at the elbow      Ht 1.778 m (5' 10\")   Wt 80.7 kg (178 lb)   BMI 25.54 kg/m       HAND EXAM:  Normal wear pattern, color and tone.  Noticeable mass, ~1cm diameter, located over dorsoulnar aspect of the IP joint of the thumb.  Mass does transiluminate with light.  Mass slight  tender to palpation. Firm, fluctuant.  Negative Tinel's over mass.      No other observable or palpable masses of the fingers or palm or wrist.  No palpable triggering of fingers.   No observable or palpable cords or nodules of the fingers or palm.     Range of motion: slight decreased flexion compared to the right thumb.   Intact sensation median, radial, ulnar nerves of the hand, and intact sensation to light touch.   Brisk capillary refill to all fingers, warm and well perfused.   Palpable radial pulse.  No palpable epitrochlear lymphadenopathy at the elbow.    X-rays:  3 views left thumb   from 3/7/2025 were reviewed in clinic today.  No fracture. Normal alignment. Mild degenerative changes at the first CMC joint. .    Assessment:  Geovani Bustos is a 69 year old male , right -hand " dominant, with left thumb IP joint mass, consistent with digital mucous/ganglion cyst.      Plan:  Discussed with patient that the mass is consistent with ganglion cyst, a common, benign tumor of the upper extremity. Less likely another type of tumor or neoplasm. Treatment options include: observation if asymptomatic or minimal symptoms, activity modification, splint, aspiration with cortisone injection (risks of recurrence > 50%), or surgical excision (risk of recurrence < 5-10%). Risks and benefits of each discussed in detail.    * in particular, risks of surgery include, but not limited to: bleeding, infection, pain, scar, damage to adjacent structures (nerves, vessels, bone, cartilage, tendons, ligaments), temporary versus permanent nerve injury, failure to relieve symptoms, recurrence of symptoms, failure to remove the entire mass or recurrence of the mass, stiffness, need for further surgery, risks of anesthesia, blood clots, death.     At this time, he'd like to proceed with surgical excision  Will plan left thumb mass excision, outpatient procedure, local anesthetic only.   No H+P needed if local anesthetic only.  Return to clinic 2 weeks time for wound check, suture removal, review of pathology, sooner if needed.    * All questions were addressed and answered prior to discharge from clinic today. The patient acknowledges an understanding of and agreement with the plan set forth during today's visit. Patient was advised to call our office or MyChart us if any further questions arise upon leaving our office today.      Manohar Oliva M.D., M.S.  Dept. of Orthopaedic Surgery  [facility]

## 2025-03-31 DIAGNOSIS — E78.5 HYPERLIPIDEMIA LDL GOAL <130: ICD-10-CM

## 2025-04-01 PROBLEM — F17.200 NICOTINE DEPENDENCE, UNSPECIFIED, UNCOMPLICATED: Status: ACTIVE | Noted: 2022-11-16

## 2025-04-01 RX ORDER — AMOXICILLIN 500 MG
CAPSULE ORAL
Qty: 90 CAPSULE | Refills: 3 | Status: SHIPPED | OUTPATIENT
Start: 2025-04-01

## 2025-04-02 ENCOUNTER — OFFICE VISIT (OUTPATIENT)
Dept: ORTHOPEDICS | Facility: CLINIC | Age: 70
End: 2025-04-02
Attending: PEDIATRICS
Payer: COMMERCIAL

## 2025-04-02 ENCOUNTER — TELEPHONE (OUTPATIENT)
Dept: SURGERY | Facility: CLINIC | Age: 70
End: 2025-04-02

## 2025-04-02 VITALS — HEIGHT: 70 IN | WEIGHT: 178 LBS | BODY MASS INDEX: 25.48 KG/M2

## 2025-04-02 DIAGNOSIS — M67.442 DIGITAL MUCOUS CYST OF FINGER OF LEFT HAND: Primary | ICD-10-CM

## 2025-04-02 DIAGNOSIS — M67.40 GANGLION CYST: ICD-10-CM

## 2025-04-02 PROCEDURE — 99243 OFF/OP CNSLTJ NEW/EST LOW 30: CPT | Performed by: ORTHOPAEDIC SURGERY

## 2025-04-02 ASSESSMENT — PAIN SCALES - GENERAL: PAINLEVEL_OUTOF10: MILD PAIN (2)

## 2025-04-02 NOTE — LETTER
4/2/2025      Geovani Bustos  [address] [facility] [address]      Dear Colleague,    Thank you for referring your patient, Geovani Bustos, to the [facility] [place]. Please see a copy of my visit note below.    Chief Complaint:   Chief Complaint   Patient presents with     Left Hand - Pain     Srinivas is here today in clinic for a left thumb cyst.  He was referred over by Dr. Schmitt.  Wants to discuss having the cyst surgically removed.  Has been present for about 2-3 years, is gradually increasing in size.  Causes intermittent achy-ness in the thumb.  Patient is right hand dominate.  No previous history.          Geovani Bustos is seen today in the [facility] for evaluation of left thumb mass at the request of Dr. Sangeetha Schmitt       HPI: Geovani Bustos is a 69 year old male , right -hand dominant, who presents for evaluation and management of a left  thumb mass. The mass as been noticed for 2-3 years, gradually increased in size. He reports having mild pain/discomfort around the mass site on occasion. He denies associated numbness or tingling. He denies any associated injuries or punctures to his finger in the area of the mass.  He'd like to discuss surgical removal..        Changes in size:  slight larger the past year    Changes in color: No   Tenderness of mass: Yes   Hot/cold sensitivity: No   Pain severity: 2/10  Pain quality: aching  Frequency of symptoms: occasionally.  Night pain: No   Fevers, chills, night sweats: No     Previous treatment: none.    Past medical history:  has a past medical history of Cerumen impaction, Chronic kidney disease, Colon polyps, Hyperlipidemia, Mitral valve prolapse, Schizophrenia (H), Ulcerated penile lesions, and VSD (ventricular septal defect).    He has no past medical history of Basal cell carcinoma, Malignant melanoma (H), or Squamous cell carcinoma.   Patient Active Problem List   Diagnosis      Colon polyps     Schizophrenia (H)     VSD (ventricular septal defect)     Mitral valve prolapse     Acquired hypothyroidism     IgA nephropathy     Proteinuria     Hyperlipidemia LDL goal <130     BPH (benign prostatic hyperplasia)     Bilateral impacted cerumen     Bunion of great toe of right foot     Eczema of external ear, bilateral     Tinea pedis, unspecified laterality     Incomplete right bundle branch block (RBBB)     Cigarette nicotine dependence with nicotine-induced disorder     Gastroesophageal reflux disease without esophagitis     Stage 1 mild COPD by GOLD classification (H)     CKD (chronic kidney disease) stage 3, GFR 30-59 ml/min (H)     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     Medications:   Current Outpatient Medications:      albuterol (VENTOLIN HFA) 108 (90 Base) MCG/ACT inhaler, Inhale 2 puffs into the lungs every 6 hours as needed for shortness of breath or wheezing, Disp: 18 g, Rfl: 3     ARIPiprazole (ABILIFY) 2 MG tablet, Take 2 mg by mouth every morning. Take along with one 20mg tablet for a total dose of 22mg daily., Disp: , Rfl:      ARIPiprazole (ABILIFY) 20 MG tablet, Take 20 mg by mouth every morning. Take along with one 2mg tablet for a total dose of 22mg daily., Disp: , Rfl:      clobetasol (TEMOVATE) 0.05 % external ointment, Apply twice daily to ears for two weeks then 3 times weekly thereafter., Disp: 60 g, Rfl: 1     Disposable Gloves (LATEX GLOVES MEDIUM) MISC, 4 boxes of gloves For the application of topical medications, Disp: 400 each, Rfl: 11     EAR DROPS 6.5 % otic solution, Place 5 drops into both ears 2 times daily For 5 days August 1-5th and Feb 1-5  th then return for irrigation after the 5th day., Disp: 15 mL, Rfl: 3     fluticasone-vilanterol (BREO ELLIPTA) 100-25 MCG/ACT inhaler, INHALE 1 PUFF INTO THE LUNGS DAILY, Disp: 180 each, Rfl: 3     gabapentin (NEURONTIN) 100 MG capsule, Take 100 mg by mouth 2 times daily. (at 5pm and 8pm), Disp: , Rfl:      ipratropium - albuterol 0.5 mg/2.5 mg/3 mL (DUONEB) 0.5-2.5 (3) MG/3ML neb solution, Take 1 vial (3 mLs) by nebulization every 6 hours as needed for shortness of breath, wheezing or cough, Disp: 90 mL, Rfl: 0     levothyroxine (SYNTHROID/LEVOTHROID) 150 MCG tablet, TAKE 1 TABLET BY MOUTH EVERY MORNING, Disp: 30 tablet, Rfl: 11     mirabegron (MYRBETRIQ) 50 MG 24 hr tablet, Take 1 tablet (50 mg) by mouth daily, Disp: 90 tablet, Rfl: 3     OLANZapine (ZYPREXA) 2.5 MG tablet, Take 2.5 mg by mouth 2 times daily as needed (agitation and aggression)., Disp: , Rfl:      Omega-3 Fatty Acids (FISH OIL) 1200 MG capsule, 1 capsule every evening, Disp: 90 capsule, Rfl: 3     oxyBUTYnin ER (DITROPAN XL) 5 MG 24 hr tablet, Take 1 tablet by mouth in the morning, Disp: 90 tablet, Rfl: 3     polyethylene glycol (GAVILAX) 17 GM/Dose powder, MIX ONE CAPFUL (17GM) AND DISSOLVE IN 4 TO 8 FLOZ OF LIQUID AND DRINK BY MOUTH ONCE DAILY, Disp: 510 g, Rfl: 1     REGULOID 51.7 % POWD, TAKE 1 TEASPOONFUL TWICE DAILY, Disp: 369 g, Rfl: 11     sertraline (ZOLOFT) 100 MG tablet, Take 200 mg by mouth every morning., Disp: , Rfl:      simvastatin (ZOCOR) 40 MG tablet, TAKE 1 TABLET BY MOUTH AT BEDTIME, Disp: 30 tablet, Rfl: 11     Skin Protectants, Misc. (EUCERIN) cream, Apply topically 2 times daily Please ask Group home to schedule a Mid may 2023 follow up Derm appt: [phone] to schedule., Disp: 454 g, Rfl: 6     tamsulosin (FLOMAX) 0.4 MG capsule, Take 1 capsule (0.4 mg) by mouth daily, Disp: 90 capsule, Rfl: 3     traZODone (DESYREL) 50 MG tablet, Take 25 mg by mouth nightly as needed for sleep., Disp: , Rfl:   No current  "facility-administered medications for this visit.    Facility-Administered Medications Ordered in Other Visits:      bupivacaine (MARCAINE) injection 0.5%, , , PRN, Christy, Holden Vides, ROSARIO, 10 mL at 01/03/17 7514      Allergies:     Allergies   Allergen Reactions     Nitrogen Oxide      Sulfa Antibiotics         Family History: family history includes Coronary Artery Disease in his father; Emphysema in his mother.     Social History:  reports that he has been smoking cigarettes. He has a 37.5 pack-year smoking history. He has been exposed to tobacco smoke. He has never used smokeless tobacco. He reports that he does not drink alcohol and does not use drugs.    Review of Systems:  ROS: 10 point ROS neg other than the symptoms noted above in the HPI and past medical history.    Physical Exam  GENERAL APPEARANCE: healthy, alert, no distress. Accompanied by group home employee.  SKIN: no suspicious lesions or rashes  NEURO: Normal strength and tone, mentation intact and speech normal  PSYCH:  mentation appears normal and affect normal. Not anxious.  RESPIRATORY: No increased work of breathing.  LYMPH: no palpable epitrochlear lymphadenopathy at the elbow      Ht 1.778 m (5' 10\")   Wt 80.7 kg (178 lb)   BMI 25.54 kg/m       HAND EXAM:  Normal wear pattern, color and tone.  Noticeable mass, ~1cm diameter, located over dorsoulnar aspect of the IP joint of the thumb.  Mass does transiluminate with light.  Mass slight  tender to palpation. Firm, fluctuant.  Negative Tinel's over mass.      No other observable or palpable masses of the fingers or palm or wrist.  No palpable triggering of fingers.   No observable or palpable cords or nodules of the fingers or palm.     Range of motion: slight decreased flexion compared to the right thumb.   Intact sensation median, radial, ulnar nerves of the hand, and intact sensation to light touch.   Brisk capillary refill to all fingers, warm and well perfused.   Palpable radial " pulse.  No palpable epitrochlear lymphadenopathy at the elbow.    X-rays:  3 views left thumb   from 3/7/2025 were reviewed in clinic today.  No fracture. Normal alignment. Mild degenerative changes at the first CMC joint. .    Assessment:  Geovani Bustos is a 69 year old male , right -hand dominant, with left thumb IP joint mass, consistent with digital mucous/ganglion cyst.      Plan:  Discussed with patient that the mass is consistent with ganglion cyst, a common, benign tumor of the upper extremity. Less likely another type of tumor or neoplasm. Treatment options include: observation if asymptomatic or minimal symptoms, activity modification, splint, aspiration with cortisone injection (risks of recurrence > 50%), or surgical excision (risk of recurrence < 5-10%). Risks and benefits of each discussed in detail.    * in particular, risks of surgery include, but not limited to: bleeding, infection, pain, scar, damage to adjacent structures (nerves, vessels, bone, cartilage, tendons, ligaments), temporary versus permanent nerve injury, failure to relieve symptoms, recurrence of symptoms, failure to remove the entire mass or recurrence of the mass, stiffness, need for further surgery, risks of anesthesia, blood clots, death.     At this time, he'd like to proceed with surgical excision  Will plan left thumb mass excision, outpatient procedure, local anesthetic only.   No H+P needed if local anesthetic only.  Return to clinic 2 weeks time for wound check, suture removal, review of pathology, sooner if needed.    * All questions were addressed and answered prior to discharge from clinic today. The patient acknowledges an understanding of and agreement with the plan set forth during today's visit. Patient was advised to call our office or MyChart us if any further questions arise upon leaving our office today.      Manohar Oliva M.D., M.S.  Dept. of Orthopaedic Surgery  [facility]       Again, thank  you for allowing me to participate in the care of your patient.        Sincerely,        Manohar Oliva MD    Electronically signed

## 2025-04-02 NOTE — TELEPHONE ENCOUNTER
Left message for patient to call us back to discuss surgery dates    Audrey Stoddard M.A.  Specialty surgery Scheduler

## 2025-04-02 NOTE — TELEPHONE ENCOUNTER
Type of surgery: EXCISION, MASS, LEFT THUMB   Location of surgery: [place] OR  Date and time of surgery: 5/6  Surgeon:magdaleno   Pre-Op Appt Date: not needed   Post-Op Appt Date: 5/21   Packet sent out: Yes  Pre-cert/Authorization completed:  no   Date:

## 2025-04-09 NOTE — TELEPHONE ENCOUNTER
Surgery Scheduling left message for patient to call back to schedule surgery [phone]. Returning message to call     Other: Amelia  for patient is requesting a call back to schedule surgery okay to leave  [phone]      Could we send this information to you in [facility] or would you prefer to receive a phone call?:   Patient would prefer a phone call   Okay to leave a detailed message?: Yes at Other phone number:  [phone]

## 2025-04-10 NOTE — TELEPHONE ENCOUNTER
Surgery Scheduling left message for patient to call back to schedule surgery [phone]. Returning a call to [facility] manage. [phone]

## 2025-04-22 DIAGNOSIS — R39.9 LOWER URINARY TRACT SYMPTOMS (LUTS): ICD-10-CM

## 2025-04-22 DIAGNOSIS — N32.81 OAB (OVERACTIVE BLADDER): ICD-10-CM

## 2025-04-22 DIAGNOSIS — R35.0 URINARY FREQUENCY: ICD-10-CM

## 2025-04-22 RX ORDER — MIRABEGRON 50 MG/1
50 TABLET, FILM COATED, EXTENDED RELEASE ORAL DAILY
Qty: 90 TABLET | Refills: 0 | Status: SHIPPED | OUTPATIENT
Start: 2025-04-22

## 2025-04-22 RX ORDER — TAMSULOSIN HYDROCHLORIDE 0.4 MG/1
0.4 CAPSULE ORAL DAILY
Qty: 90 CAPSULE | Refills: 3 | Status: SHIPPED | OUTPATIENT
Start: 2025-04-22

## 2025-04-22 RX ORDER — OXYBUTYNIN CHLORIDE 5 MG/1
TABLET, EXTENDED RELEASE ORAL
Qty: 90 TABLET | Refills: 0 | Status: SHIPPED | OUTPATIENT
Start: 2025-04-22

## 2025-04-22 NOTE — TELEPHONE ENCOUNTER
"Tamsulosin 0.4m cap daily  Last filled 24 for 12 mo supply  [place] [facility] [place]  Last visit with Tarik 10/31/24:  \"He is doing well. We will plan for follow up in 2025, at the time of his typical annual visit.     Plan:   Continue tamsulosin 0.4 mg, oxybutynin XR 5 mg, finasteride 5 mg, and Myrbetriq 50 mg.   Follow up in 2025, sooner if concerns. \"    Writer will provide 3 month fill to see patient until Jillian     Encounter closed.    Marcellus ANGLIN [facility]        "

## 2025-04-22 NOTE — TELEPHONE ENCOUNTER
Requested Prescriptions   Pending Prescriptions Disp Refills    mirabegron (MYRBETRIQ) 50 MG 24 hr tablet 90 tablet 3     Sig: Take 1 tablet (50 mg) by mouth daily.       There is no refill protocol information for this order       oxyBUTYnin ER (DITROPAN XL) 5 MG 24 hr tablet 90 tablet 3     Sig: Take 1 tablet by mouth in the morning       There is no refill protocol information for this order          Last office visit: 10/31/2024 ; last virtual visit: 6/19/2024 with prescribing provider:  Vanesa Montanez   Future Office Visit:      Thank you,  Maddi Russ  [facility]  [address] [place] [address]  Priority line: [phone] (please do not share number with patient)   Employed by [facility]

## 2025-04-22 NOTE — TELEPHONE ENCOUNTER
Requested Prescriptions   Pending Prescriptions Disp Refills    tamsulosin (FLOMAX) 0.4 MG capsule 90 capsule 3     Sig: Take 1 capsule (0.4 mg) by mouth daily.       There is no refill protocol information for this order          Last office visit: 10/31/2024 ; last virtual visit: 6/19/2024 with prescribing provider:  Vanesa Montanez   Future Office Visit:      Thank you,  Maddi Russ  [facility]  [address] [place] [address]  Priority line: [phone] (please do not share number with patient)   Employed by [facility]

## 2025-04-23 DIAGNOSIS — H61.23 BILATERAL IMPACTED CERUMEN: ICD-10-CM

## 2025-04-23 DIAGNOSIS — K59.00 CONSTIPATION, UNSPECIFIED CONSTIPATION TYPE: ICD-10-CM

## 2025-04-24 RX ORDER — ADHESIVE BANDAGE 3/4"
BANDAGE TOPICAL
Qty: 15 ML | Refills: 3 | Status: SHIPPED | OUTPATIENT
Start: 2025-04-24

## 2025-04-24 RX ORDER — POLYETHYLENE GLYCOL 3350 17 G/17G
POWDER, FOR SOLUTION ORAL
Qty: 510 G | Refills: 1 | Status: SHIPPED | OUTPATIENT
Start: 2025-04-24

## 2025-05-05 ENCOUNTER — ANESTHESIA EVENT (OUTPATIENT)
Dept: SURGERY | Facility: CLINIC | Age: 70
End: 2025-05-05
Payer: COMMERCIAL

## 2025-05-06 ENCOUNTER — HOSPITAL ENCOUNTER (OUTPATIENT)
Facility: CLINIC | Age: 70
Discharge: HOME | End: 2025-05-06
Attending: ORTHOPAEDIC SURGERY | Admitting: ORTHOPAEDIC SURGERY
Payer: COMMERCIAL

## 2025-05-06 ENCOUNTER — ANESTHESIA (OUTPATIENT)
Dept: SURGERY | Facility: CLINIC | Age: 70
End: 2025-05-06
Payer: COMMERCIAL

## 2025-05-06 VITALS
TEMPERATURE: 97.4 F | WEIGHT: 178 LBS | DIASTOLIC BLOOD PRESSURE: 73 MMHG | SYSTOLIC BLOOD PRESSURE: 113 MMHG | HEART RATE: 64 BPM | RESPIRATION RATE: 16 BRPM | BODY MASS INDEX: 25.48 KG/M2 | HEIGHT: 70 IN | OXYGEN SATURATION: 91 %

## 2025-05-06 DIAGNOSIS — M67.442 DIGITAL MUCOUS CYST OF FINGER OF LEFT HAND: Primary | ICD-10-CM

## 2025-05-06 PROCEDURE — 250N000009 HC RX 250: Performed by: ORTHOPAEDIC SURGERY

## 2025-05-06 PROCEDURE — 88305 TISSUE EXAM BY PATHOLOGIST: CPT | Mod: TC | Performed by: ORTHOPAEDIC SURGERY

## 2025-05-06 PROCEDURE — 26160 REMOVE TENDON SHEATH LESION: CPT | Mod: FA | Performed by: ORTHOPAEDIC SURGERY

## 2025-05-06 PROCEDURE — 88304 TISSUE EXAM BY PATHOLOGIST: CPT | Mod: 26 | Performed by: PATHOLOGY

## 2025-05-06 PROCEDURE — 710N000012 HC RECOVERY PHASE 2, PER MINUTE: Performed by: ORTHOPAEDIC SURGERY

## 2025-05-06 PROCEDURE — 250N000011 HC RX IP 250 OP 636: Performed by: ORTHOPAEDIC SURGERY

## 2025-05-06 PROCEDURE — 272N000001 HC OR GENERAL SUPPLY STERILE: Performed by: ORTHOPAEDIC SURGERY

## 2025-05-06 PROCEDURE — 999N000141 HC STATISTIC PRE-PROCEDURE NURSING ASSESSMENT: Performed by: ORTHOPAEDIC SURGERY

## 2025-05-06 PROCEDURE — 360N000075 HC SURGERY LEVEL 2, PER MIN: Performed by: ORTHOPAEDIC SURGERY

## 2025-05-06 RX ORDER — ONDANSETRON 4 MG/1
4 TABLET, ORALLY DISINTEGRATING ORAL
Status: DISCONTINUED | OUTPATIENT
Start: 2025-05-06 | End: 2025-05-06 | Stop reason: HOSPADM

## 2025-05-06 RX ORDER — LIDOCAINE HYDROCHLORIDE 10 MG/ML
INJECTION, SOLUTION EPIDURAL; INFILTRATION; INTRACAUDAL; PERINEURAL PRN
Status: DISCONTINUED | OUTPATIENT
Start: 2025-05-06 | End: 2025-05-06 | Stop reason: HOSPADM

## 2025-05-06 RX ORDER — BUPIVACAINE HYDROCHLORIDE 2.5 MG/ML
INJECTION, SOLUTION INFILTRATION; PERINEURAL PRN
Status: DISCONTINUED | OUTPATIENT
Start: 2025-05-06 | End: 2025-05-06 | Stop reason: HOSPADM

## 2025-05-06 RX ORDER — ACETAMINOPHEN 325 MG/1
650 TABLET ORAL
Status: DISCONTINUED | OUTPATIENT
Start: 2025-05-06 | End: 2025-05-06 | Stop reason: HOSPADM

## 2025-05-06 RX ORDER — HYDROXYZINE HYDROCHLORIDE 10 MG/1
10 TABLET, FILM COATED ORAL
Status: DISCONTINUED | OUTPATIENT
Start: 2025-05-06 | End: 2025-05-06 | Stop reason: HOSPADM

## 2025-05-06 ASSESSMENT — ACTIVITIES OF DAILY LIVING (ADL)
ADLS_ACUITY_SCORE: 18

## 2025-05-06 NOTE — DISCHARGE INSTRUCTIONS
Same Day Surgery Discharge Instructions  Special Precautions After Surgery - Adult    It is not unusual to feel lightheaded or faint, up to 24 hours after surgery or while taking pain medication.  If you have these symptoms; sit for a few minutes before standing and have someone assist you when getting up.  You should rest and relax for the next 24 hours and must have someone stay with you for at least 24 hours after your discharge.  DO NOT DRIVE any vehicle or operate mechanical equipment for 24 hours following the end of your surgery.  DO NOT DRIVE while taking narcotic pain medications that have been prescribed by your physician.  If you had a limb operated on, you must be able to use it fully to drive.  DO NOT drink alcoholic beverages for 24 hours following surgery or while taking prescription pain medication.  Drink clear liquids (apple juice, ginger ale, broth, 7-Up, etc.).  Progress to your regular diet as you feel able.  Any questions call your physician and do not make important decisions for 24 hours.    Nausea and Vomiting: Nausea and vomiting can occur any time after receiving anesthesia. If you experience nausea and vomiting we encourage you to move to a clear liquid diet and advance your diet as tolerated. If nausea and vomiting do not improve within 12 hours please call the surgeon or present to the Emergency department.     Break-through Bleeding: If your experience bleeding from your surgical site apply pressure and additional dressing per nurse instruction. For simple problems such as a saturated dressing, you may need to reinforce the dressing with more gauze and tape and put slight pressure on the site. If bleeding does not subside contact the surgeon or present to the Emergency Department.    Post-op Infection: If you develop a fever of 100.4 or greater, have pus like drainage, redness, swelling or severe pain at the surgical site not alleviated with pain medications; please  contact the surgeon or present to the Emergency Department.     __________________________________________________________________________________________________________________________________  IMPORTANT NUMBERS:    [facility] [place]  [phone], [phone]  Pharmacy:  [phone]  Same Day Surgery:  [phone], for general post-op questions call Monday - Thursday until 8:30 p.m., Fridays until 6:00 p.m.   Mental Health Mobile Crisis line: [phone]                                                                      [facility], podiatry:  [phone]  [facility]:  [phone]     OB Clinic:  [phone]   General Surgery:  [phone]  Urology: [phone]  Specialty Access Center: [phone]

## 2025-05-06 NOTE — OP NOTE
DATE OF SERVICE:  5/6/2025    PREOPERATIVE DIAGNOSIS:  left thumb IP joint mass     POSTOPERATIVE DIAGNOSIS: left thumb IP joint cyst    OPERATION PERFORMED:  1. left thumb IP joint digital mucous cyst excision.    ATTENDING SURGEON: Manohar Oliva M.D., M.S.    ASSISTANT(S): PRESTON berumen    ANESTHESIA:  local only    IV FLUIDS:  none.    EBL:   10.    URINE OUTPUT: no stokes    TOURNIQUET TIME: 9 minutes at 200mmHg.    IMPLANTS / GRAFTS:     none    COMPLICATIONS:  NONE    ANTIBIOTICS:  none    SPECIMENS: 1cm cystic mass left thumb IP joint    FINDINGS: 1cm cystic mass left thumb IP joint, ulnarly    DRAINS: none.    INDICATIONS FOR PROCEDURE:  Geovani Bustos is a 69 year old male , right -hand dominant, with a left  thumb mass. The mass as been noticed for 2-3 years, gradually increased in size. He reports having mild pain/discomfort around the mass site on occasion. He denies associated numbness or tingling. He denies any associated injuries or punctures to his thumb in the area of the mass.     Risks of surgery discussed, including, but not limited to, bleeding, infection, pain, scar, damage to adjacent structures (nerves, vessels, tendons, ligaments, cartilage), failure to remove the entire mass, recurrence of the mass, need for further surgery, risks of anesthesia.     Despite these risks, as a quality of life decision, the patient elected to proceed.    And with informed and written consent the patient was taken to the operating room.    PROCEDURE AND FINDINGS:    The patient was identified in the preoperative holding area. The correct surgical procedure and procedureal site was confirmed with the patient. Again, the risks of surgery were reviewed. The patient elected to proceed understanding the risks. Written informed consent was obtained. The correct surgical site was marked with an indelible marker by myself, Manohar Oliva MD, the surgeon.     The patient was then taken to the operating room and  placed supine on the operating table.  a non-sterile tourniquet was placed to the left forearm. All bony prominences were well padded. The arms were well positioned and padded to be comfortable. A local digital block of the thumb was performed using a 1:1 mixture of 1% lidocaine plain and 0.25% marcaine plain. The left hand was prepped and draped in the usual sterile fashion.    A time-out was completed confirming the correct patient, the correct surgery and surgical site, positioning, the availability of instruments and implants, the administration of prophylactic antibiotics, and review of known allergies by all surgical staff.    At that time, a chevron type incision was made over the thumb IP joint, apex radially. Sharply through skin and subcutaneous tissues. Cares was taken of underlying nerves, vessels, tendons, ligaments. The mass was encountered and sharply excised from the undersurface of the skin. The mass was then sharply excised from the underlying joint including the capsule. The mass was removed in its entirety. Inflammed synovium was cauterized.      The wound was then irrigated, tourniquet deflated. The wound was closed with interrupted 4-0 nylon sutures, followed by betadine soaked adaptec gauze, soft dressing.      The patient was then taken to recovery in stable condition.    The postoperative plan will be elevation, gentle range of motion, light weight bearing. Over the counter pain medications only as patient declines opioid prescription. Return to clinic 2 weeks.  We look forward to following the patient through the perioperative time period.    Manohar Oliva M.D., M.S.  Dept. of Orthopaedic Surgery  [facility]

## 2025-05-06 NOTE — H&P
Chief Complaint:   No chief complaint on file.          HPI: Geovani Bustos is a 69 year old male , right -hand dominant, with  a left  thumb mass. The mass as been noticed for 2-3 years, gradually increased in size. He reports having mild pain/discomfort around the mass site on occasion. He denies associated numbness or tingling. He denies any associated injuries or punctures to his finger in the area of the mass.  He'd like to discuss surgical removal..        Changes in size:  slight larger the past year    Changes in color: No   Tenderness of mass: Yes   Hot/cold sensitivity: No   Pain severity: 2/10  Pain quality: aching  Frequency of symptoms: occasionally.  Night pain: No   Fevers, chills, night sweats: No     Previous treatment: none.    Past medical history:  has a past medical history of Cerumen impaction, Chronic kidney disease, Colon polyps, Hyperlipidemia, Mitral valve prolapse, Schizophrenia (H), Ulcerated penile lesions, and VSD (ventricular septal defect).    He has no past medical history of Basal cell carcinoma, Malignant melanoma (H), or Squamous cell carcinoma.   Patient Active Problem List   Diagnosis    Colon polyps    Schizophrenia (H)    VSD (ventricular septal defect)    Mitral valve prolapse    Acquired hypothyroidism    IgA nephropathy    Proteinuria    Hyperlipidemia LDL goal <130    BPH (benign prostatic hyperplasia)    Bilateral impacted cerumen    Bunion of great toe of right foot    Eczema of external ear, bilateral    Tinea pedis, unspecified laterality    Incomplete right bundle branch block (RBBB)    Cigarette nicotine dependence with nicotine-induced disorder    Gastroesophageal reflux disease without esophagitis    Stage 1 mild COPD by GOLD classification (H)    CKD (chronic kidney disease) stage 3, GFR 30-59 ml/min (H)    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     Medications: No current facility-administered medications for this encounter.    Facility-Administered Medications Ordered in Other Encounters:     bupivacaine (MARCAINE) injection 0.5%, , , PRN, Holden Harrison, ROSARIO, 10 mL at 01/03/17 0945      Allergies:     Allergies   Allergen Reactions    Nitrogen Oxide     Sulfa Antibiotics         Family History: family history includes Coronary Artery Disease in his father; Emphysema in his mother.     Social History:  reports that he has been smoking cigarettes. He has a 75 pack-year smoking history. He has been exposed to tobacco smoke. He has never used smokeless tobacco. He reports that he does not drink alcohol and does not use drugs.    Review of Systems:       Physical Exam  GENERAL APPEARANCE: healthy, alert, no distress. Accompanied by group home employee.  SKIN: no suspicious lesions or rashes  NEURO: Normal strength and tone, mentation intact and speech normal  PSYCH:  mentation appears normal and affect normal. Not anxious.  RESPIRATORY: No increased work of breathing.  LYMPH: no palpable epitrochlear lymphadenopathy at the elbow      BP (!) 140/71 (Cuff Size: Adult Regular)   Pulse 64   Temp 97.4  F (36.3  C) (Oral)   Resp 18   Ht 1.778 m (5' 10\")   Wt 80.7 kg (178 lb)   SpO2 (!) 91%   BMI 25.54 kg/m       HAND EXAM:  Normal wear pattern, color and tone.  Noticeable mass, ~1cm diameter, located over dorsoulnar aspect of the IP joint of the thumb.  Mass does transiluminate with light.  Mass slight  tender to palpation. Firm, fluctuant.  Negative Tinel's over mass.      No other observable or palpable masses of the fingers or palm or wrist.  No palpable triggering of fingers.   No observable or palpable cords " or nodules of the fingers or palm.     Range of motion: slight decreased flexion compared to the right thumb.   Intact sensation median, radial, ulnar nerves of the hand, and intact sensation to light touch.   Brisk capillary refill to all fingers, warm and well perfused.   Palpable radial pulse.  No palpable epitrochlear lymphadenopathy at the elbow.    X-rays:  3 views left thumb   from 3/7/2025 were reviewed in clinic today.  No fracture. Normal alignment. Mild degenerative changes at the first CMC joint. .    Assessment:  Geovani Bustos is a 69 year old male , right -hand dominant, with left thumb IP joint mass, consistent with digital mucous/ganglion cyst.      Plan:  Discussed with patient that the mass is consistent with ganglion cyst, a common, benign tumor of the upper extremity. Less likely another type of tumor or neoplasm. Treatment options include: observation if asymptomatic or minimal symptoms, activity modification, splint, aspiration with cortisone injection (risks of recurrence > 50%), or surgical excision (risk of recurrence < 5-10%). Risks and benefits of each discussed in detail.    * in particular, risks of surgery include, but not limited to: bleeding, infection, pain, scar, damage to adjacent structures (nerves, vessels, bone, cartilage, tendons, ligaments), temporary versus permanent nerve injury, failure to relieve symptoms, recurrence of symptoms, failure to remove the entire mass or recurrence of the mass, stiffness, need for further surgery, risks of anesthesia, blood clots, death.     At this time, he'd like to proceed with surgical excision  Will plan left thumb mass excision, outpatient procedure, local anesthetic only.      Risks and perceived benefits of surgery again discussed with patient. Patient's questions addressed and answered. Written informed consent obtained and reviewed. Surgical site marked with indelible marker with patient's participation after confirming site with  patient.       Manohar Oliva M.D., M.S.  Dept. of Orthopaedic Surgery  [facility]

## 2025-05-13 NOTE — PROGRESS NOTES
CHIEF COMPLAINT:   Chief Complaint   Patient presents with    Left Thumb - Surgical Followup     Mass excision. DOS 5/6/25, 2 wk s/p. Patient notes his thumb is doing good. He has kept it clean and dry. Denies any drainage. Patient is accompanied by the . No issues or concerns.          SURGICAL PROCEDURE: left thumb IP joint digital mucous cyst excision  DATE OF PROCEDURE: 5/8/2025      HISTORY OF PRESENT ILLNESS    Geovani Bustos is a 69 year old male seen for postoperative follow-up of a left thumb IP joint cyst excision that occurred 2 weeks ago. Since surgery, pain has been improving. Denies fevers, chills, night sweats. No wound problems. Compliant with weight bearing restrictions and elevation. There have been no issues since surgery. Denies numbness or tingling.      Present symptoms: mild pain, mild swelling.    Pain severity: 0/10  Pain quality: dull and aching        Other PMH:  has a past medical history of Cerumen impaction, Chronic kidney disease, Colon polyps, Hyperlipidemia, Mitral valve prolapse, Schizophrenia (H), Ulcerated penile lesions, and VSD (ventricular septal defect).    He has no past medical history of Basal cell carcinoma, Malignant melanoma (H), or Squamous cell carcinoma.  Patient Active Problem List   Diagnosis    Colon polyps    Schizophrenia (H)    VSD (ventricular septal defect)    Mitral valve prolapse    Acquired hypothyroidism    IgA nephropathy    Proteinuria    Hyperlipidemia LDL goal <130    BPH (benign prostatic hyperplasia)    Bilateral impacted cerumen    Bunion of great toe of right foot    Eczema of external ear, bilateral    Tinea pedis, unspecified laterality    Incomplete right bundle branch block (RBBB)    Cigarette nicotine dependence with nicotine-induced disorder    Gastroesophageal reflux disease without esophagitis    Stage 1 mild COPD by GOLD classification (H)    CKD (chronic kidney disease) stage 3, GFR 30-59 ml/min (H)    Combined pulmonary  fibrosis and emphysema (CPFE) (H)    Nicotine dependence, unspecified, uncomplicated    Hip pain, right    Dilatation of thoracic aorta       Past surgical History:  has a past surgical history that includes surgical history of - ; Arthrodesis foot (Right, 1/19/2016); Arthrodesis foot (Left, 1/3/2017); Colonoscopy (N/A, 5/19/2022); Herniorrhaphy inguinal (Right, 6/8/2023); and Excise mass finger (Left, 5/6/2025).    Medications:   Current Outpatient Medications:     albuterol (VENTOLIN HFA) 108 (90 Base) MCG/ACT inhaler, Inhale 2 puffs into the lungs every 6 hours as needed for shortness of breath or wheezing, Disp: 18 g, Rfl: 3    ARIPiprazole (ABILIFY) 2 MG tablet, Take 2 mg by mouth every morning. Take along with one 20mg tablet for a total dose of 22mg daily., Disp: , Rfl:     ARIPiprazole (ABILIFY) 20 MG tablet, Take 20 mg by mouth every morning. Take along with one 2mg tablet for a total dose of 22mg daily., Disp: , Rfl:     clobetasol (TEMOVATE) 0.05 % external ointment, Apply to ears during flares only, for a max of a few weeks at a time, then switch to tacrolimus ointment, Disp: 60 g, Rfl: 1    Disposable Gloves (LATEX GLOVES MEDIUM) MISC, 4 boxes of gloves For the application of topical medications, Disp: 400 each, Rfl: 11    EAR DROPS 6.5 % otic solution, Place 5 drops into both ears 2 times daily For 5 days. August 1-5th and Feb 1-5 then return for irrigation after the 5th day., Disp: 15 mL, Rfl: 3    fluticasone-vilanterol (BREO ELLIPTA) 100-25 MCG/ACT inhaler, INHALE 1 PUFF INTO THE LUNGS DAILY, Disp: 180 each, Rfl: 3    gabapentin (NEURONTIN) 100 MG capsule, Take 100 mg by mouth 2 times daily. (at 5pm and 8pm), Disp: , Rfl:     GOODSENSE CLEARLAX 17 GM/SCOOP powder, MIX ONE CAPFUL (17GM) AND DISSOLVE IN 4 TO 8 FLOZ OF LIQUID AND DRINK BY MOUTH ONCE DAILY, Disp: 510 g, Rfl: 1    ipratropium - albuterol 0.5 mg/2.5 mg/3 mL (DUONEB) 0.5-2.5 (3) MG/3ML neb solution, Take 1 vial (3 mLs) by nebulization  every 6 hours as needed for shortness of breath, wheezing or cough, Disp: 90 mL, Rfl: 0    levothyroxine (SYNTHROID/LEVOTHROID) 150 MCG tablet, TAKE 1 TABLET BY MOUTH EVERY MORNING, Disp: 30 tablet, Rfl: 11    mirabegron (MYRBETRIQ) 50 MG 24 hr tablet, Take 1 tablet (50 mg) by mouth daily., Disp: 90 tablet, Rfl: 0    OLANZapine (ZYPREXA) 2.5 MG tablet, Take 2.5 mg by mouth 2 times daily as needed (agitation and aggression)., Disp: , Rfl:     Omega-3 Fatty Acids (FISH OIL) 1200 MG capsule, 1 capsule every evening, Disp: 90 capsule, Rfl: 3    oxyBUTYnin ER (DITROPAN XL) 5 MG 24 hr tablet, Take 1 tablet by mouth in the morning, Disp: 90 tablet, Rfl: 0    REGULOID 51.7 % POWD, TAKE 1 TEASPOONFUL TWICE DAILY, Disp: 369 g, Rfl: 11    sertraline (ZOLOFT) 100 MG tablet, Take 200 mg by mouth every morning., Disp: , Rfl:     simvastatin (ZOCOR) 40 MG tablet, TAKE 1 TABLET BY MOUTH AT BEDTIME, Disp: 30 tablet, Rfl: 11    Skin Protectants, Misc. (EUCERIN) cream, Apply topically 2 times daily Please ask Group home to schedule a Mid may 2023 follow up Derm appt: [phone] to schedule., Disp: 454 g, Rfl: 6    tacrolimus (PROTOPIC) 0.1 % external ointment, Apply topically three times a week. Apply to ears., Disp: 60 g, Rfl: 5    tamsulosin (FLOMAX) 0.4 MG capsule, Take 1 capsule (0.4 mg) by mouth daily., Disp: 90 capsule, Rfl: 3    traZODone (DESYREL) 50 MG tablet, Take 25 mg by mouth nightly as needed for sleep. (Patient not taking: Reported on 4/29/2025), Disp: , Rfl:   No current facility-administered medications for this visit.    Facility-Administered Medications Ordered in Other Visits:     bupivacaine (MARCAINE) injection 0.5%, , , PRN, Holden Harrison, ROSARIO, 10 mL at 01/03/17 0945    Allergies:   Allergies   Allergen Reactions    Nitrogen Oxide     Sulfa Antibiotics        REVIEW OF SYSTEMS:  CONSTITUTIONAL:NEGATIVE for fever, chills, change in weight  INTEGUMENTARY/SKIN: NEGATIVE for worrisome rashes, moles or  "lesions  MUSCULOSKELETAL:See HPI above  NEURO: NEGATIVE for weakness, dizziness or paresthesias      PHYSICAL EXAM:  Ht 1.778 m (5' 10\")   Wt 79.1 kg (174 lb 6.4 oz)   BMI 25.02 kg/m     GENERAL APPEARANCE: healthy, alert, no distress ; accompanied by group home   SKIN: no suspicious lesions or rashes  NEURO: Normal strength and tone, mentation intact and speech normal  PSYCH:  mentation appears normal and affect normal  RESPIRATORY: No increased work of breathing.      LEFT UPPER EXTREMITY:  Wound / Incision: dorsal left thumb IP joint chevron incision healing well, dry, no drainage.  Inspection: swelling, ecchymosis, no deformity  Palpation: diffuse tender to palpation.   There is no erythema of the surrounding skin.  There is no maceration of the skin.  There is no deformity in the area.  Range of motion: decreased     Sensation intact to light touch in median, radial, ulnar and axillary nerve distributions  Palpable 2+ radial pulse, brisk capillary refill to all fingers, wwp  Intact epl fpl fdp edc wrist flexion/extension biceps triceps deltoid    Pathology: ganglion cyst        Impression: 69 year old male  2 weeks  postoperative excision left thumb IP joint digital mucous cyst, doing well.        Plan:   Suture removal  Wound cares discussed.  Pathology reviewed, benign cyst, as we had expected  Weight bearing status: progress per comfort. Avoid heavy/strenuous activities for another couple weeks.  No soaking/submerging for another couple of weeks, but ok to wash/shower hand.  Pain control: over the counter as needed.  Immobilization: none. Work on finger range of motion.  Elevation of affected extremity  Images: none  Return to clinic   as needed.  Paperwork completed.      Manohar Oliva M.D., M.S.  Dept. of Orthopaedic Surgery  [facility]   "

## 2025-05-21 ENCOUNTER — OFFICE VISIT (OUTPATIENT)
Dept: ORTHOPEDICS | Facility: CLINIC | Age: 70
End: 2025-05-21
Payer: COMMERCIAL

## 2025-05-21 VITALS — WEIGHT: 174.4 LBS | BODY MASS INDEX: 24.97 KG/M2 | HEIGHT: 70 IN

## 2025-05-21 DIAGNOSIS — M67.442 DIGITAL MUCOUS CYST OF FINGER OF LEFT HAND: Primary | ICD-10-CM

## 2025-05-21 PROCEDURE — 99024 POSTOP FOLLOW-UP VISIT: CPT | Performed by: ORTHOPAEDIC SURGERY

## 2025-05-21 RX ORDER — SERTRALINE HYDROCHLORIDE 200 MG/1
200 CAPSULE ORAL DAILY
COMMUNITY

## 2025-05-21 ASSESSMENT — PAIN SCALES - GENERAL: PAINLEVEL_OUTOF10: NO PAIN (0)

## 2025-05-21 NOTE — LETTER
5/21/2025      Geovani Bustos  [id number]  [facility] [address]      Dear Colleague,    Thank you for referring your patient, Geovani Bustos, to the [facility] [place]. Please see a copy of my visit note below.    CHIEF COMPLAINT:   Chief Complaint   Patient presents with     Left Thumb - Surgical Followup     Mass excision. DOS 5/6/25, 2 wk s/p. Patient notes his thumb is doing good. He has kept it clean and dry. Denies any drainage. Patient is accompanied by the . No issues or concerns.          SURGICAL PROCEDURE: left thumb IP joint digital mucous cyst excision  DATE OF PROCEDURE: 5/8/2025      HISTORY OF PRESENT ILLNESS    Geovani Bustos is a 69 year old male seen for postoperative follow-up of a left thumb IP joint cyst excision that occurred 2 weeks ago. Since surgery, pain has been improving. Denies fevers, chills, night sweats. No wound problems. Compliant with weight bearing restrictions and elevation. There have been no issues since surgery. Denies numbness or tingling.      Present symptoms: mild pain, mild swelling.    Pain severity: 0/10  Pain quality: dull and aching        Other PMH:  has a past medical history of Cerumen impaction, Chronic kidney disease, Colon polyps, Hyperlipidemia, Mitral valve prolapse, Schizophrenia (H), Ulcerated penile lesions, and VSD (ventricular septal defect).    He has no past medical history of Basal cell carcinoma, Malignant melanoma (H), or Squamous cell carcinoma.  Patient Active Problem List   Diagnosis     Colon polyps     Schizophrenia (H)     VSD (ventricular septal defect)     Mitral valve prolapse     Acquired hypothyroidism     IgA nephropathy     Proteinuria     Hyperlipidemia LDL goal <130     BPH (benign prostatic hyperplasia)     Bilateral impacted cerumen     Bunion of great toe of right foot     Eczema of external ear, bilateral     Tinea pedis, unspecified laterality     Incomplete right bundle branch  block (RBBB)     Cigarette nicotine dependence with nicotine-induced disorder     Gastroesophageal reflux disease without esophagitis     Stage 1 mild COPD by GOLD classification (H)     CKD (chronic kidney disease) stage 3, GFR 30-59 ml/min (H)     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    Medications:   Current Outpatient Medications:      albuterol (VENTOLIN HFA) 108 (90 Base) MCG/ACT inhaler, Inhale 2 puffs into the lungs every 6 hours as needed for shortness of breath or wheezing, Disp: 18 g, Rfl: 3     ARIPiprazole (ABILIFY) 2 MG tablet, Take 2 mg by mouth every morning. Take along with one 20mg tablet for a total dose of 22mg daily., Disp: , Rfl:      ARIPiprazole (ABILIFY) 20 MG tablet, Take 20 mg by mouth every morning. Take along with one 2mg tablet for a total dose of 22mg daily., Disp: , Rfl:      clobetasol (TEMOVATE) 0.05 % external ointment, Apply to ears during flares only, for a max of a few weeks at a time, then switch to tacrolimus ointment, Disp: 60 g, Rfl: 1     Disposable Gloves (LATEX GLOVES MEDIUM) MISC, 4 boxes of gloves For the application of topical medications, Disp: 400 each, Rfl: 11     EAR DROPS 6.5 % otic solution, Place 5 drops into both ears 2 times daily For 5 days. August 1-5th and Feb 1-5 then return for irrigation after the 5th day., Disp: 15 mL, Rfl: 3     fluticasone-vilanterol (BREO ELLIPTA) 100-25 MCG/ACT inhaler, INHALE 1 PUFF INTO THE LUNGS DAILY, Disp: 180 each, Rfl: 3     gabapentin (NEURONTIN) 100 MG capsule, Take 100 mg by mouth 2 times daily. (at 5pm and 8pm), Disp: , Rfl:       [employer] CLEARLAX 17 GM/SCOOP powder, MIX ONE CAPFUL (17GM) AND DISSOLVE IN 4 TO 8 FLOZ OF LIQUID AND DRINK BY MOUTH ONCE DAILY, Disp: 510 g, Rfl: 1     ipratropium - albuterol 0.5 mg/2.5 mg/3 mL (DUONEB) 0.5-2.5 (3) MG/3ML neb solution, Take 1 vial (3 mLs) by nebulization every 6 hours as needed for shortness of breath, wheezing or cough, Disp: 90 mL, Rfl: 0     levothyroxine (SYNTHROID/LEVOTHROID) 150 MCG tablet, TAKE 1 TABLET BY MOUTH EVERY MORNING, Disp: 30 tablet, Rfl: 11     mirabegron (MYRBETRIQ) 50 MG 24 hr tablet, Take 1 tablet (50 mg) by mouth daily., Disp: 90 tablet, Rfl: 0     OLANZapine (ZYPREXA) 2.5 MG tablet, Take 2.5 mg by mouth 2 times daily as needed (agitation and aggression)., Disp: , Rfl:      Omega-3 Fatty Acids (FISH OIL) 1200 MG capsule, 1 capsule every evening, Disp: 90 capsule, Rfl: 3     oxyBUTYnin ER (DITROPAN XL) 5 MG 24 hr tablet, Take 1 tablet by mouth in the morning, Disp: 90 tablet, Rfl: 0     REGULOID 51.7 % POWD, TAKE 1 TEASPOONFUL TWICE DAILY, Disp: 369 g, Rfl: 11     sertraline (ZOLOFT) 100 MG tablet, Take 200 mg by mouth every morning., Disp: , Rfl:      simvastatin (ZOCOR) 40 MG tablet, TAKE 1 TABLET BY MOUTH AT BEDTIME, Disp: 30 tablet, Rfl: 11     Skin Protectants, Misc. (EUCERIN) cream, Apply topically 2 times daily Please ask Group home to schedule a Mid may 2023 follow up Derm appt: [phone] to schedule., Disp: 454 g, Rfl: 6     tacrolimus (PROTOPIC) 0.1 % external ointment, Apply topically three times a week. Apply to ears., Disp: 60 g, Rfl: 5     tamsulosin (FLOMAX) 0.4 MG capsule, Take 1 capsule (0.4 mg) by mouth daily., Disp: 90 capsule, Rfl: 3     traZODone (DESYREL) 50 MG tablet, Take 25 mg by mouth nightly as needed for sleep. (Patient not taking: Reported on 4/29/2025), Disp: , Rfl:   No current facility-administered medications for this visit.    Facility-Administered Medications Ordered in Other Visits:      bupivacaine (MARCAINE) injection 0.5%, , ,  "Christy LUNDBERG John Benjamin, DPTYRESE, 10 mL at 01/03/17 0945    Allergies:   Allergies   Allergen Reactions     Nitrogen Oxide      Sulfa Antibiotics        REVIEW OF SYSTEMS:  CONSTITUTIONAL:NEGATIVE for fever, chills, change in weight  INTEGUMENTARY/SKIN: NEGATIVE for worrisome rashes, moles or lesions  MUSCULOSKELETAL:See HPI above  NEURO: NEGATIVE for weakness, dizziness or paresthesias      PHYSICAL EXAM:  Ht 1.778 m (5' 10\")   Wt 79.1 kg (174 lb 6.4 oz)   BMI 25.02 kg/m     GENERAL APPEARANCE: healthy, alert, no distress ; accompanied by group home   SKIN: no suspicious lesions or rashes  NEURO: Normal strength and tone, mentation intact and speech normal  PSYCH:  mentation appears normal and affect normal  RESPIRATORY: No increased work of breathing.      LEFT UPPER EXTREMITY:  Wound / Incision: dorsal left thumb IP joint chevron incision healing well, dry, no drainage.  Inspection: swelling, ecchymosis, no deformity  Palpation: diffuse tender to palpation.   There is no erythema of the surrounding skin.  There is no maceration of the skin.  There is no deformity in the area.  Range of motion: decreased     Sensation intact to light touch in median, radial, ulnar and axillary nerve distributions  Palpable 2+ radial pulse, brisk capillary refill to all fingers, wwp  Intact epl fpl fdp edc wrist flexion/extension biceps triceps deltoid    Pathology: ganglion cyst        Impression: 69 year old male  2 weeks  postoperative excision left thumb IP joint digital mucous cyst, doing well.        Plan:   Suture removal  Wound cares discussed.  Pathology reviewed, benign cyst, as we had expected  Weight bearing status: progress per comfort. Avoid heavy/strenuous activities for another couple weeks.  No soaking/submerging for another couple of weeks, but ok to wash/shower hand.  Pain control: over the counter as needed.  Immobilization: none. Work on finger range of motion.  Elevation of affected " extremity  Images: none  Return to clinic   as needed.  Paperwork completed.      Manohar Oliva M.D., M.S.  Dept. of Orthopaedic Surgery  [facility]     Again, thank you for allowing me to participate in the care of your patient.        Sincerely,        Manohar Oliva MD    Electronically signed

## 2025-05-22 NOTE — CONFIDENTIAL NOTE
UROLOGY FOLLOW-UP NOTE          Chief Complaint:   Today I had the pleasure of seeing . Geovani Bustos in follow-up for a chief complaint of LUTS         Interval Update   Geovani Bustos is a very pleasant 69 year old male with a history of CKD stage 3, IgA nephropathy, CPFE, and schizophrenia.      Brief History: . Geovani Bustos has followed with urology for urinary frequency and nocturia. He takes tamsulosin 0.4 mg, oxybutynin XR 5 mg, finasteride 5 mg, and Myrbetriq 50 mg.      He had a cystoscopy on 7/30/2024 which showed no strictures or prostate obstruction. Finasteride was discontinued at this time.     Today's notes: He is doing well. Urinary symptoms are well controlled. He denies dysuria and gross hematuria.          Physical Exam:   Patient is a 69 year old male evaluated via video visit.       Labs and Pathology:    I personally reviewed all applicable laboratory data and went over findings with patient  Significant for:    CBC RESULTS:  Recent Labs   Lab Test 09/17/24  0918 07/17/24  0924 05/01/24  0917 07/12/23  0938 02/20/23  1005   WBC 7.4 9.3  --  10.8 9.5   HGB 15.5 15.4 16.3 16.0 15.1    140*  --  152 165        BMP RESULTS:  Recent Labs   Lab Test 09/17/24  0918 07/17/24  0924 07/12/23  0938 02/20/23  1005 08/25/21  1145 02/15/21  1209 07/12/20  1601 11/15/19  0907 10/21/19  1016    144 143 144   < > 139 139 138 140   POTASSIUM 4.4 4.8 4.8 4.9   < > 4.8 4.2 4.3 4.6   CHLORIDE 107 105 104 104   < > 104 106 103 106   CO2 29 29 32* 27   < > 32 29 34* 31   ANIONGAP 6* 10 7 13   < > 3 4 1* 4   * 91 89 100*   < > 107* 83 96 102*   BUN 25.1* 31.3* 24.8* 28.9*   < > 31* 31* 17 33*   CR 1.30* 1.62* 1.49* 1.59*   < > 1.42* 1.56* 1.47* 1.63*   GFRESTIMATED 59* 45* 50* 47*   < > 51* 45* 49* 43*   GFRESTBLACK  --   --   --   --   --  59* 53* 57* 50*   RAYRAY 9.4 9.5 9.6 9.7   < > 9.6 9.0 8.8 9.2    < > = values in this interval not displayed.       UA RESULTS:   Recent Labs    Lab Test 07/17/24  0924 07/12/23  0938 08/16/22  1705   SG <=1.005 1.010 1.010   URINEPH 6.0 5.5 6.0   NITRITE Negative Negative Negative   RBCU 0-2 0-2 2-5*   WBCU 0-5 0-5 0-5       PSA RESULTS  PSA   Date Value Ref Range Status   09/13/2017 0.78 0 - 4 ug/L Final     Comment:     Assay Method:  Chemiluminescence using Siemens Vista analyzer   12/17/2012 0.67 0 - 4 ug/L Final   09/08/2011 1.44 0 - 4 ug/L Final   03/18/2010 1.24 0 - 4 ug/L Final   10/15/2008 1.40 0.00 - 4.00 ng/mL      Prostate Specific Antigen Screen   Date Value Ref Range Status   02/26/2025 1.69 0.00 - 6.50 ng/mL Final   02/21/2024 1.17 0.00 - 6.50 ng/mL Final                Assessment/Plan   69 year old male seen in follow up for LUTS well managed with tamsulosin 0.4 mg, oxybutynin XR 5 mg, and Myrbetriq 50 mg daily.     He has no concerns at this time.     Plan:  Continue tamsulosin 0.4 mg, oxybutynin XR 5 mg, and Myrbetriq 50 mg daily.   Follow up in one year, sooner if concerns.                Past Medical History:     Past Medical History:   Diagnosis Date    Cerumen impaction     Chronic kidney disease     Colon polyps     Hyperlipidemia     Mitral valve prolapse     Schizophrenia (H)     Ulcerated penile lesions     VSD (ventricular septal defect)             Past Surgical History:     Past Surgical History:   Procedure Laterality Date    ARTHRODESIS FOOT Right 1/19/2016    Procedure: ARTHRODESIS FOOT;  Surgeon: Holden Harrison DPM;  Location: [facility]    ARTHRODESIS FOOT Left 1/3/2017    Procedure: ARTHRODESIS FOOT;  Surgeon: Holden Harrison DPM;  Location: [facility]    COLONOSCOPY N/A 5/19/2022    Procedure: COLONOSCOPY, FLEXIBLE, WITH LESION REMOVAL USING SNARE;  Surgeon: Ame Nelson MD;  Location: [facility]    EXCISE MASS FINGER Left 5/6/2025    Procedure: EXCISION, MASS, LEFT THUMB;  Surgeon: Manohar Oliva MD;  Location: [facility]    HERNIORRHAPHY INGUINAL Right 6/8/2023    Procedure: HERNIORRHAPHY INGUINALOPEN right with mesh;   Surgeon: Ame Nelson MD;  Location: [facility]    SURGICAL HISTORY OF -       leg fracture            Medications     Current Outpatient Medications   Medication Sig Dispense Refill    albuterol (VENTOLIN HFA) 108 (90 Base) MCG/ACT inhaler Inhale 2 puffs into the lungs every 6 hours as needed for shortness of breath or wheezing 18 g 3    ARIPiprazole (ABILIFY) 2 MG tablet Take 2 mg by mouth every morning. Take along with one 20mg tablet for a total dose of 22mg daily.      ARIPiprazole (ABILIFY) 20 MG tablet Take 20 mg by mouth every morning. Take along with one 2mg tablet for a total dose of 22mg daily.      clobetasol (TEMOVATE) 0.05 % external ointment Apply to ears during flares only, for a max of a few weeks at a time, then switch to tacrolimus ointment 60 g 1    Disposable Gloves (LATEX GLOVES MEDIUM) MISC 4 boxes of gloves  For the application of topical medications 400 each 11    EAR DROPS 6.5 % otic solution Place 5 drops into both ears 2 times daily For 5 days. August 1-5th and Feb 1-5 then return for irrigation after the 5th day. 15 mL 3    fluticasone-vilanterol (BREO ELLIPTA) 100-25 MCG/ACT inhaler INHALE 1 PUFF INTO THE LUNGS DAILY 180 each 3    gabapentin (NEURONTIN) 100 MG capsule Take 100 mg by mouth 2 times daily. (at 5pm and 8pm)      [employer] CLEARLAX 17 GM/SCOOP powder MIX ONE CAPFUL (17GM) AND DISSOLVE IN 4 TO 8 FLOZ OF LIQUID AND DRINK BY MOUTH ONCE DAILY 510 g 1    ipratropium - albuterol 0.5 mg/2.5 mg/3 mL (DUONEB) 0.5-2.5 (3) MG/3ML neb solution Take 1 vial (3 mLs) by nebulization every 6 hours as needed for shortness of breath, wheezing or cough 90 mL 0    levothyroxine (SYNTHROID/LEVOTHROID) 150 MCG tablet TAKE 1 TABLET BY MOUTH EVERY MORNING 30 tablet 11    mirabegron (MYRBETRIQ) 50 MG 24 hr tablet Take 1 tablet (50 mg) by mouth daily. 90 tablet 0    OLANZapine (ZYPREXA) 2.5 MG tablet Take 2.5 mg by mouth 2 times daily as needed (agitation and aggression).      Omega-3 Fatty Acids (FISH  OIL) 1200 MG capsule 1 capsule every evening 90 capsule 3    oxyBUTYnin ER (DITROPAN XL) 5 MG 24 hr tablet Take 1 tablet by mouth in the morning 90 tablet 0    REGULOID 51.7 % POWD TAKE 1 TEASPOONFUL TWICE DAILY 369 g 11    sertraline (ZOLOFT) 100 MG tablet Take 200 mg by mouth every morning.      Sertraline HCl 200 MG CAPS Take 200 mg by mouth daily.      simvastatin (ZOCOR) 40 MG tablet TAKE 1 TABLET BY MOUTH AT BEDTIME 30 tablet 11    Skin Protectants, Misc. (EUCERIN) cream Apply topically 2 times daily Please ask Group home to schedule a Mid may 2023 follow up Derm appt: [phone] to schedule. 454 g 6    tacrolimus (PROTOPIC) 0.1 % external ointment Apply topically three times a week. Apply to ears. 60 g 5    tamsulosin (FLOMAX) 0.4 MG capsule Take 1 capsule (0.4 mg) by mouth daily. 90 capsule 3    traZODone (DESYREL) 50 MG tablet Take 25 mg by mouth nightly as needed for sleep. (Patient not taking: Reported on 5/21/2025)       No current facility-administered medications for this visit.     Facility-Administered Medications Ordered in Other Visits   Medication Dose Route Frequency Provider Last Rate Last Admin    bupivacaine (MARCAINE) injection 0.5%    Holden Doherty DPM   10 mL at 01/03/17 0945            Family History:     Family History   Problem Relation Age of Onset    Emphysema Mother     Coronary Artery Disease Father             Social History:     Social History     Socioeconomic History    Marital status: Single     Spouse name: Not on file    Number of children: Not on file    Years of education: Not on file    Highest education level: Not on file   Occupational History    Not on file   Tobacco Use    Smoking status: Every Day     Current packs/day: 1.50     Average packs/day: 1.5 packs/day for 50.0 years (75.0 ttl pk-yrs)     Types: Cigarettes     Passive exposure: Current    Smokeless tobacco: Never   Vaping Use    Vaping status: Never Used   Substance and Sexual Activity    Alcohol  use: No    Drug use: No    Sexual activity: Never   Other Topics Concern    Parent/sibling w/ CABG, MI or angioplasty before 65F 55M? Not Asked     Service Not Asked    Blood Transfusions No    Caffeine Concern Not Asked    Occupational Exposure Not Asked    Hobby Hazards Not Asked    Sleep Concern Not Asked    Stress Concern Not Asked    Weight Concern Not Asked    Special Diet Not Asked    Back Care Not Asked    Exercise Not Asked    Bike Helmet Not Asked    Seat Belt Not Asked    Self-Exams Not Asked   Social History Narrative    Patient has no interest in smoking cessation.     Her for Special [employer] physical - likes to Bowl    Lives in Group home. Grew up in [place].      Social Drivers of Health     Financial Resource Strain: Low Risk  (2/26/2025)    Financial Resource Strain     Within the past 12 months, have you or your family members you live with been unable to get utilities (heat, electricity) when it was really needed?: No   Food Insecurity: Low Risk  (2/26/2025)    Food Insecurity     Within the past 12 months, did you worry that your food would run out before you got money to buy more?: No     Within the past 12 months, did the food you bought just not last and you didn t have money to get more?: No   Transportation Needs: Low Risk  (2/26/2025)    Transportation Needs     Within the past 12 months, has lack of transportation kept you from medical appointments, getting your medicines, non-medical meetings or appointments, work, or from getting things that you need?: No   Physical Activity: Unknown (2/26/2025)    Exercise Vital Sign     Days of Exercise per Week: 0 days     Minutes of Exercise per Session: Not on file   Stress: No Stress Concern Present (2/26/2025)    [employer] - Occupational Stress Questionnaire     Feeling of Stress : Not at all   Social Connections: Unknown (2/26/2025)    Social Connection and Isolation Panel [NHANES]     Frequency of  Communication with Friends and Family: Not on file     Frequency of Social Gatherings with Friends and Family: Once a week     Attends Taoist Services: Not on file     Active Member of Clubs or Organizations: Not on file     Attends Club or Organization Meetings: Not on file     Marital Status: Not on file   Interpersonal Safety: Low Risk  (5/6/2025)    Interpersonal Safety     Do you feel physically and emotionally safe where you currently live?: Yes     Within the past 12 months, have you been hit, slapped, kicked or otherwise physically hurt by someone?: No     Within the past 12 months, have you been humiliated or emotionally abused in other ways by your partner or ex-partner?: No   Housing Stability: Low Risk  (2/26/2025)    Housing Stability     Do you have housing? : Yes     Are you worried about losing your housing?: No            Allergies:   Nitrogen oxide and Sulfa antibiotics         Review of Systems:  From intake questionnaire   Negative 14 system review except as noted on HPI, nurse's note.        Vanesa Montanez PA-C  Department of Urology

## 2025-06-04 ENCOUNTER — VIRTUAL VISIT (OUTPATIENT)
Dept: UROLOGY | Facility: CLINIC | Age: 70
End: 2025-06-04
Payer: COMMERCIAL

## 2025-06-04 DIAGNOSIS — R39.9 LOWER URINARY TRACT SYMPTOMS (LUTS): ICD-10-CM

## 2025-06-04 DIAGNOSIS — N32.81 OAB (OVERACTIVE BLADDER): ICD-10-CM

## 2025-06-04 DIAGNOSIS — R35.0 URINARY FREQUENCY: ICD-10-CM

## 2025-06-04 RX ORDER — MIRABEGRON 50 MG/1
50 TABLET, FILM COATED, EXTENDED RELEASE ORAL DAILY
Qty: 90 TABLET | Refills: 3 | Status: SHIPPED | OUTPATIENT
Start: 2025-06-04

## 2025-06-04 RX ORDER — OXYBUTYNIN CHLORIDE 5 MG/1
TABLET, EXTENDED RELEASE ORAL
Qty: 90 TABLET | Refills: 3 | Status: SHIPPED | OUTPATIENT
Start: 2025-06-04

## 2025-06-04 RX ORDER — TAMSULOSIN HYDROCHLORIDE 0.4 MG/1
0.4 CAPSULE ORAL DAILY
Qty: 90 CAPSULE | Refills: 3 | Status: SHIPPED | OUTPATIENT
Start: 2025-06-04

## 2025-06-04 NOTE — PROGRESS NOTES
Geovani Bustos is a 69 year old year old who is being evaluated via a billable video visit.      How would you like to obtain your AVS? MyChart  If the video visit is dropped, the invitation should be resent by: Send to e-mail at: [email or web address]  Will anyone else be joining your video visit?     Video-Visit Details    Type of service:  Video Visit     Originating Location (pt. Location): Home    Distant Location (provider location):  Off-site  Platform used for Video Visit: Doron

## 2025-06-09 NOTE — PROGRESS NOTES
SUBJECTIVE   Geovani Bustos is a 64 year old male who presents with     Wound Check       Duration: Recheck from 1/29/2020     Description (location/character/radiation): Right ankle     Intensity:  moderate    Accompanying signs and symptoms: Seems to be getting better now     History (similar episodes/previous evaluation): None    Precipitating or alleviating factors: None    Therapies tried and outcome: Clindamycin          PCP   Alexandru Vera [phone]    Health Maintenance        Health Maintenance Due   Topic Date Due     ADVANCE CARE PLANNING  1955     ZOSTER IMMUNIZATION (2 of 3) 04/12/2016     AORTIC ANEURYSM SCREENING (SYSTEM ASSIGNED)  12/05/2018     MEDICARE ANNUAL WELLNESS VISIT  02/07/2019     PNEUMOCOCCAL IMMUNIZATION 65+ HIGH/HIGHEST RISK (2 of 2 - PPSV23) 11/07/2019     FALL RISK ASSESSMENT  02/07/2020       HPI        Patient Active Problem List   Diagnosis     Colon polyps     Schizophrenia (H)     VSD (ventricular septal defect)     Mitral valve prolapse     Acquired hypothyroidism     IgA nephropathy     Proteinuria     Hyperlipidemia LDL goal <130     BPH (benign prostatic hyperplasia)     Health Care Home     Bilateral impacted cerumen     Bunion of great toe of right foot     Eczema of external ear, bilateral     Tinea pedis, unspecified laterality     Incomplete right bundle branch block (RBBB)     Cigarette nicotine dependence with nicotine-induced disorder     Gastroesophageal reflux disease without esophagitis     Stage 1 mild COPD by GOLD classification (H)     CKD (chronic kidney disease) stage 3, GFR 30-59 ml/min (H)     Current Outpatient Medications   Medication     ABILIFY 10 MG OR TABS     albuterol (PROAIR HFA/PROVENTIL HFA/VENTOLIN HFA) 108 (90 Base) MCG/ACT inhaler     albuterol (PROAIR HFA/PROVENTIL HFA/VENTOLIN HFA) 108 (90 Base) MCG/ACT inhaler     amoxicillin (AMOXIL) 250 MG capsule     aspirin (QC LO-DOSE ASPIRIN) 81 MG EC tablet     augmented betamethasone  dipropionate (DIPROLENE-AF) 0.05 % external ointment     clindamycin (CLEOCIN) 300 MG capsule     Disposable Gloves (LATEX GLOVES MEDIUM) MISC     Disposable Gloves (NITRILE EXAM GLOVES MEDIUM) MISC     EAR DROPS EARWAX AID 6.5 % otic solution     finasteride (PROSCAR) 5 MG tablet     fluticasone-vilanterol (BREO ELLIPTA) 100-25 MCG/INH inhaler     gabapentin (NEURONTIN) 100 MG capsule     Incontinence Supply Disposable (INCONTINENCE BRIEF LARGE) MISC     levothyroxine (SYNTHROID/LEVOTHROID) 125 MCG tablet     nicotine (NICODERM CQ) 14 MG/24HR 24 hr patch     Omega-3 Fatty Acids (FISH OIL) 1200 MG capsule     order for DME     order for DME     oxybutynin (DITROPAN) 5 MG tablet     oxybutynin (DITROPAN) 5 MG tablet     polyethylene glycol (GAVILAX) powder     REGULOID 48.57 % POWD     simvastatin (ZOCOR) 40 MG tablet     Skin Protectants, Misc. (EUCERIN) cream     tamsulosin (FLOMAX) 0.4 MG capsule     traZODone (DESYREL) 50 MG tablet     vitamin D3 (CHOLECALCIFEROL) 2000 units (50 mcg) tablet     ZOLOFT 100 MG OR TABS     No current facility-administered medications for this visit.      Facility-Administered Medications Ordered in Other Visits   Medication     bupivacaine (MARCAINE) injection 0.5%       Patient Active Problem List   Diagnosis     Colon polyps     Schizophrenia (H)     VSD (ventricular septal defect)     Mitral valve prolapse     Acquired hypothyroidism     IgA nephropathy     Proteinuria     Hyperlipidemia LDL goal <130     BPH (benign prostatic hyperplasia)     Health Care Home     Bilateral impacted cerumen     Bunion of great toe of right foot     Eczema of external ear, bilateral     Tinea pedis, unspecified laterality     Incomplete right bundle branch block (RBBB)     Cigarette nicotine dependence with nicotine-induced disorder     Gastroesophageal reflux disease without esophagitis     Stage 1 mild COPD by GOLD classification (H)     CKD (chronic kidney disease) stage 3, GFR 30-59 ml/min (H)      Past Surgical History:   Procedure Laterality Date     ARTHRODESIS FOOT Right 1/19/2016    Procedure: ARTHRODESIS FOOT;  Surgeon: Holden Harrison DPM;  Location: [facility]     ARTHRODESIS FOOT Left 1/3/2017    Procedure: ARTHRODESIS FOOT;  Surgeon: Holden Harrison DPM;  Location: [facility]     SURGICAL HISTORY OF -       leg fracture       Social History     Tobacco Use     Smoking status: Current Every Day Smoker     Packs/day: 1.00     Years: 47.00     Pack years: 47.00     Types: Cigarettes     Smokeless tobacco: Never Used     Tobacco comment: cutting one cigarette down a month   Substance Use Topics     Alcohol use: No     Family History   Problem Relation Age of Onset     Emphysema Mother      Coronary Artery Disease Father          Current Outpatient Medications   Medication Sig Dispense Refill     ABILIFY 10 MG OR TABS Take 22 mg by mouth daily        albuterol (PROAIR HFA/PROVENTIL HFA/VENTOLIN HFA) 108 (90 Base) MCG/ACT inhaler Inhale 2 puffs into the lungs every 6 hours as needed for shortness of breath / dyspnea or wheezing 1 Inhaler 1     albuterol (PROAIR HFA/PROVENTIL HFA/VENTOLIN HFA) 108 (90 Base) MCG/ACT inhaler Inhale 2 puffs into the lungs every 4 hours as needed for shortness of breath / dyspnea or wheezing 1 Inhaler 2     amoxicillin (AMOXIL) 250 MG capsule Take 2,000 mg by mouth       aspirin (QC LO-DOSE ASPIRIN) 81 MG EC tablet Take 1 tablet (81 mg) by mouth daily 100 tablet 0     augmented betamethasone dipropionate (DIPROLENE-AF) 0.05 % external ointment Apply to AA twice daily 1-2 weeks then as needed only. Do not apply to face. 45 g 5     clindamycin (CLEOCIN) 300 MG capsule Take 1 capsule (300 mg) by mouth 3 times daily for 10 days 30 capsule 0     Disposable Gloves (LATEX GLOVES MEDIUM) MISC 4 boxes of gloves 400 each 1     Disposable Gloves (NITRILE EXAM GLOVES MEDIUM) MISC 1 each as needed Use PRN daily with administration of otic drops, body lotion and powder to treat dry  itchy skin. 4 each 11     EAR DROPS EARWAX AID 6.5 % otic solution Place 5 drops into both ears 2 times daily For 5 days August 1-5th and Feb 1-5 th then return for irrigation after the 5th day. 15 mL 3     finasteride (PROSCAR) 5 MG tablet TAKE ONE TABLET BY MOUTH EVERY DAY 30 tablet 11     fluticasone-vilanterol (BREO ELLIPTA) 100-25 MCG/INH inhaler Inhale 1 puff into the lungs daily 1 Inhaler 11     gabapentin (NEURONTIN) 100 MG capsule Take 100 mg by mouth 2 times daily       Incontinence Supply Disposable (INCONTINENCE BRIEF LARGE) MISC Incontinence pads up to 300 per month 300 each 1     levothyroxine (SYNTHROID/LEVOTHROID) 125 MCG tablet Take 1 tablet (125 mcg) by mouth daily 90 tablet 3     nicotine (NICODERM CQ) 14 MG/24HR 24 hr patch Place 1 patch onto the skin every 24 hours 30 patch 1     Omega-3 Fatty Acids (FISH OIL) 1200 MG capsule Take 1 capsule (1.2 g) by mouth daily 90 capsule 2     order for DME Equipment being ordered: callous pad 1 each 1     order for DME Equipment being ordered: Dynaflex insert 1 Units 0     oxybutynin (DITROPAN) 5 MG tablet Take 2 tablets (10 mg) by mouth 3 times daily 90 tablet 11     oxybutynin (DITROPAN) 5 MG tablet TAKE ONE TABLET BY MOUTH THREE TIMES DAILY 90 tablet 11     polyethylene glycol (GAVILAX) powder Take 17 g (1 capful) by mouth daily 527 g 1     REGULOID 48.57 % POWD Take 1 each by mouth See Admin Instructions Take 1 teaspoonful every day at 7pm and 9 pm. 369 g 11     simvastatin (ZOCOR) 40 MG tablet TAKE ONE TABLET BY MOUTH EVERY DAY AT 9 PM 30 tablet 2     Skin Protectants, Misc. (EUCERIN) cream Apply topically 2 times daily 454 g 5     tamsulosin (FLOMAX) 0.4 MG capsule Take 1 capsule (0.4 mg) by mouth daily 30 capsule 11     traZODone (DESYREL) 50 MG tablet Take 25 mg by mouth At Bedtime        vitamin D3 (CHOLECALCIFEROL) 2000 units (50 mcg) tablet Take 1 tablet (2,000 Units) by mouth daily 100 tablet 0     ZOLOFT 100 MG OR TABS 2 TABLETS DAILY    "    Allergies   Allergen Reactions     Nitrogen Oxide      Sulfa Drugs      Recent Labs   Lab Test 11/15/19  0907 10/21/19  1016 08/08/19  1100  12/05/18  0840 08/08/18  1405 03/21/18  0900  11/08/17  0840  02/01/13  0815  05/14/12  0830   A1C  --   --   --   --   --   --  5.6  --   --   --  5.5  --  5.6   LDL 75  --   --   --  118*  --   --   --  80   < > 117   < > 101   HDL 45  --   --   --  48  --   --   --  47   < > 45   < > 43   TRIG 155*  --   --   --  150*  --   --   --  119   < > 123   < > 143   ALT 21  --   --   --  28  --   --   --  22   < >  --    < >  --    CR 1.47* 1.63*  --    < > 1.60*  --  1.36*   < > 1.31*   < >  --    < >  --    GFRESTIMATED 49* 43*  --    < > 44*  --  53*   < > 55*   < >  --    < >  --    GFRESTBLACK 57* 50*  --    < > 53*  --  64   < > 67   < >  --    < >  --    POTASSIUM 4.3 4.6  --    < > 4.6  --  4.4   < > 4.6   < >  --    < >  --    TSH  --   --  1.63  --   --  2.13  --   --   --    < >  --    < >  --     < > = values in this interval not displayed.      BP Readings from Last 3 Encounters:   02/03/20 104/70   01/29/20 120/74   01/24/20 128/68    Wt Readings from Last 3 Encounters:   02/03/20 82.1 kg (181 lb)   01/29/20 82.1 kg (181 lb)   01/24/20 82.1 kg (181 lb)                    Reviewed and updated:  Tobacco  Allergies  Meds  Med Hx  Surg Hx  Fam Hx  Soc Hx     ROS:  Constitutional, HEENT, cardiovascular, pulmonary, gi and gu systems are negative, except as otherwise noted.    PHYSICAL EXAM   /70 (Cuff Size: Adult Regular)   Pulse 68   Temp 97.6  F (36.4  C) (Tympanic)   Resp 18   Ht 1.765 m (5' 9.5\")   Wt 82.1 kg (181 lb)   BMI 26.35 kg/m    Body mass index is 26.35 kg/m .  GENERAL: alert and no distress  NECK: no adenopathy, no asymmetry, masses, or scars and thyroid normal to palpation  RESP: lungs clear to auscultation - no rales, rhonchi or wheezes  CV: regular rates and rhythm, normal S1 S2, no S3 or S4 and no murmur, click or rub  ABDOMEN: soft, " nontender  SKIN: small ulcerative skin involving right lower leg with some skin erythema as shown below, no significant discharge noted    02/03/2020 01/29/2020        Assessment & Plan     (L03.90) Wound cellulitis  (primary encounter diagnosis)  Comment: 64-year-old male presents for a follow-up visit, diagnosed with wound cellulitis on January 29, 2020.  Patient was started on clindamycin.  Wound much improved appearing, reassurance provided and suggested to continue clindamycin and regular dressing.  Other medications reviewed and no changes made.  All questions answered.         Alexandru Vera MD  [facility]       Previously Declined (within the last year)

## 2025-06-11 ENCOUNTER — RESULTS FOLLOW-UP (OUTPATIENT)
Dept: CARDIOLOGY | Facility: CLINIC | Age: 70
End: 2025-06-11

## 2025-06-11 ENCOUNTER — HOSPITAL ENCOUNTER (OUTPATIENT)
Dept: CARDIOLOGY | Facility: CLINIC | Age: 70
Discharge: HOME | End: 2025-06-11
Attending: INTERNAL MEDICINE
Payer: COMMERCIAL

## 2025-06-11 DIAGNOSIS — Q21.0 VENTRICULAR SEPTAL DEFECT (VSD), MEMBRANOUS: ICD-10-CM

## 2025-06-11 DIAGNOSIS — I34.0 NONRHEUMATIC MITRAL VALVE REGURGITATION: ICD-10-CM

## 2025-06-11 LAB — LVEF ECHO: NORMAL

## 2025-06-11 PROCEDURE — 93306 TTE W/DOPPLER COMPLETE: CPT

## 2025-06-13 DIAGNOSIS — K59.00 CONSTIPATION: ICD-10-CM

## 2025-06-14 RX ORDER — PSYLLIUM HUSK 3 G/5.4 G
POWDER (GRAM) ORAL
Qty: 369 G | Refills: 11 | Status: SHIPPED | OUTPATIENT
Start: 2025-06-14

## 2025-06-16 DIAGNOSIS — E03.9 HYPOTHYROIDISM (ACQUIRED): ICD-10-CM

## 2025-06-17 RX ORDER — LEVOTHYROXINE SODIUM 150 UG/1
150 TABLET ORAL EVERY MORNING
Qty: 30 TABLET | Refills: 9 | Status: SHIPPED | OUTPATIENT
Start: 2025-06-17

## 2025-06-20 PROBLEM — I25.10 CORONARY ARTERY CALCIFICATION SEEN ON CT SCAN: Status: ACTIVE | Noted: 2025-06-20

## 2025-07-15 DIAGNOSIS — K59.00 CONSTIPATION, UNSPECIFIED CONSTIPATION TYPE: ICD-10-CM

## 2025-07-15 RX ORDER — POLYETHYLENE GLYCOL 3350 17 G/17G
POWDER, FOR SOLUTION ORAL
Qty: 510 G | Refills: 2 | Status: SHIPPED | OUTPATIENT
Start: 2025-07-15

## 2025-07-22 ENCOUNTER — TELEPHONE (OUTPATIENT)
Dept: FAMILY MEDICINE | Facility: CLINIC | Age: 70
End: 2025-07-22
Payer: COMMERCIAL

## 2025-07-22 DIAGNOSIS — R39.81 FUNCTIONAL URINARY INCONTINENCE: Primary | ICD-10-CM

## 2025-07-22 NOTE — TELEPHONE ENCOUNTER
Order/Referral Request    Who is requesting: [facility]    Orders being requested: Gloves (Please include order for one year)  400 per month for patient cares  Pt has incontinence       Reason service is needed/diagnosis:    Functional urinary incontinence [R39.81]  - Primary          When are orders needed by: as able    Does patient have an appointment scheduled?: Yes: 8/6/25 with Dr Vera      Where to send orders: Fax to [facility] [phone]

## 2025-08-06 ENCOUNTER — RESULTS FOLLOW-UP (OUTPATIENT)
Dept: FAMILY MEDICINE | Facility: CLINIC | Age: 70
End: 2025-08-06

## 2025-08-06 ENCOUNTER — OFFICE VISIT (OUTPATIENT)
Dept: FAMILY MEDICINE | Facility: CLINIC | Age: 70
End: 2025-08-06
Payer: COMMERCIAL

## 2025-08-06 VITALS
WEIGHT: 174.4 LBS | SYSTOLIC BLOOD PRESSURE: 108 MMHG | TEMPERATURE: 97.3 F | HEART RATE: 63 BPM | RESPIRATION RATE: 24 BRPM | HEIGHT: 69 IN | BODY MASS INDEX: 25.83 KG/M2 | DIASTOLIC BLOOD PRESSURE: 60 MMHG | OXYGEN SATURATION: 92 %

## 2025-08-06 DIAGNOSIS — N18.31 STAGE 3A CHRONIC KIDNEY DISEASE (H): Primary | ICD-10-CM

## 2025-08-06 DIAGNOSIS — Z87.891 HISTORY OF TOBACCO USE, PRESENTING HAZARDS TO HEALTH: ICD-10-CM

## 2025-08-06 DIAGNOSIS — H61.22 IMPACTED CERUMEN OF LEFT EAR: ICD-10-CM

## 2025-08-06 LAB
ANION GAP SERPL CALCULATED.3IONS-SCNC: 11 MMOL/L (ref 7–15)
BUN SERPL-MCNC: 27.9 MG/DL (ref 8–23)
CALCIUM SERPL-MCNC: 9.7 MG/DL (ref 8.8–10.4)
CHLORIDE SERPL-SCNC: 103 MMOL/L (ref 98–107)
CREAT SERPL-MCNC: 1.52 MG/DL (ref 0.67–1.17)
CREAT UR-MCNC: 111.2 MG/DL
EGFRCR SERPLBLD CKD-EPI 2021: 49 ML/MIN/1.73M2
GLUCOSE SERPL-MCNC: 95 MG/DL (ref 70–99)
HCO3 SERPL-SCNC: 28 MMOL/L (ref 22–29)
HGB BLD-MCNC: 15.7 G/DL (ref 13.3–17.7)
MCV RBC AUTO: 97 FL (ref 78–100)
MICROALBUMIN UR-MCNC: 37.6 MG/L
MICROALBUMIN/CREAT UR: 33.81 MG/G CR (ref 0–17)
POTASSIUM SERPL-SCNC: 4.9 MMOL/L (ref 3.4–5.3)
SODIUM SERPL-SCNC: 142 MMOL/L (ref 135–145)

## 2025-08-06 PROCEDURE — 99213 OFFICE O/P EST LOW 20 MIN: CPT | Mod: 25 | Performed by: FAMILY MEDICINE

## 2025-08-06 PROCEDURE — 80048 BASIC METABOLIC PNL TOTAL CA: CPT | Performed by: FAMILY MEDICINE

## 2025-08-06 PROCEDURE — 82570 ASSAY OF URINE CREATININE: CPT | Performed by: FAMILY MEDICINE

## 2025-08-06 PROCEDURE — 69209 REMOVE IMPACTED EAR WAX UNI: CPT | Mod: LT | Performed by: FAMILY MEDICINE

## 2025-08-06 PROCEDURE — 82043 UR ALBUMIN QUANTITATIVE: CPT | Performed by: FAMILY MEDICINE

## 2025-08-06 PROCEDURE — 36415 COLL VENOUS BLD VENIPUNCTURE: CPT | Performed by: FAMILY MEDICINE

## 2025-08-06 PROCEDURE — 85018 HEMOGLOBIN: CPT | Performed by: FAMILY MEDICINE

## 2025-08-06 ASSESSMENT — PAIN SCALES - GENERAL: PAINLEVEL_OUTOF10: NO PAIN (0)

## (undated) DEVICE — NEEDLE HYPO 23GA 1.5IN BD REG BVL STRL 305194

## (undated) DEVICE — SUCTION MANIFOLD NEPTUNE 2 SYS 4 PORT 0702-020-000

## (undated) DEVICE — PACK HAND

## (undated) DEVICE — SOL WATER IRRIG 1000ML BOTTLE 07139-09

## (undated) DEVICE — SU MONOCRYL 3-0 PS-2 18" UND Y497G

## (undated) DEVICE — DRSG ADAPTIC 3X3" 6112

## (undated) DEVICE — GOWN XLG DISP 9545

## (undated) DEVICE — ESU PENCIL SMOKE EVAC W/ROCKER SWITCH 0703-047-000

## (undated) DEVICE — BLADE KNIFE SURG 15 371115

## (undated) DEVICE — GLOVE BIOGEL PI MICRO INDICATOR UNDERGLOVE SZ 6.0 48960

## (undated) DEVICE — SU PROLENE 2-0 SHDA 36" 8523H

## (undated) DEVICE — SOL ADH LIQUID BENZOIN SWAB 0.6ML C1544

## (undated) DEVICE — GLOVE BIOGEL PI MICRO SZ 7.5 48575

## (undated) DEVICE — TOURNIQUET CUFF 18" STERILE

## (undated) DEVICE — SU MONOCRYL 4-0 PS-2 18" UND Y496G

## (undated) DEVICE — SU ETHILON 4-0 PS-2 18" 1667G

## (undated) DEVICE — PREP POVIDONE IODINE SOLUTION 10% 120ML

## (undated) DEVICE — GLOVE BIOGEL PI MICRO SZ 8.0 48580

## (undated) DEVICE — GLOVE BIOGEL PI MICRO INDICATOR UNDERGLOVE SZ 7.5 48975

## (undated) DEVICE — ENDO SNARE EXACTO COLD 9MM LOOP 2.4MMX230CM 00711115

## (undated) DEVICE — DECANTER VIAL 2006S

## (undated) DEVICE — BNDG KLING 3" 2232

## (undated) DEVICE — SU VICRYL 3-0 SH 27" UND J416H

## (undated) DEVICE — SU DERMABOND ADVANCED .7ML DNX12

## (undated) DEVICE — PACK LAPAROTOMY CUSTOM LAKES

## (undated) DEVICE — GOWN XXL 9575

## (undated) DEVICE — GOWN LG DISP 9515

## (undated) DEVICE — DRAIN PENROSE 1/4X12" LATEX

## (undated) DEVICE — PREP CHLORAPREP 26ML TINTED ORANGE  260815

## (undated) DEVICE — SU ETHIBOND 0 CT-2 CR 8X18" CX27D

## (undated) DEVICE — DRSG STERI STRIP 1/2X4" R1547

## (undated) DEVICE — SOL NACL 0.9% IRRIG 1000ML BOTTLE 2F7124

## (undated) DEVICE — SU NUROLON 0 MO-7 CR 8X18" C541D

## (undated) DEVICE — DRAPE STERI TOWEL SM 1000

## (undated) DEVICE — GLOVE BIOGEL PI MICRO SZ 5.5 48555

## (undated) RX ORDER — BUPIVACAINE HYDROCHLORIDE 2.5 MG/ML
INJECTION, SOLUTION EPIDURAL; INFILTRATION; INTRACAUDAL; PERINEURAL
Status: DISPENSED
Start: 2025-05-06

## (undated) RX ORDER — LIDOCAINE HYDROCHLORIDE 10 MG/ML
INJECTION, SOLUTION INFILTRATION; PERINEURAL
Status: DISPENSED
Start: 2025-05-06

## (undated) RX ORDER — LIDOCAINE HYDROCHLORIDE 10 MG/ML
INJECTION, SOLUTION EPIDURAL; INFILTRATION; INTRACAUDAL; PERINEURAL
Status: DISPENSED
Start: 2023-06-08

## (undated) RX ORDER — DEXAMETHASONE SODIUM PHOSPHATE 4 MG/ML
INJECTION, SOLUTION INTRA-ARTICULAR; INTRALESIONAL; INTRAMUSCULAR; INTRAVENOUS; SOFT TISSUE
Status: DISPENSED
Start: 2023-06-08

## (undated) RX ORDER — ONDANSETRON 2 MG/ML
INJECTION INTRAMUSCULAR; INTRAVENOUS
Status: DISPENSED
Start: 2023-06-08

## (undated) RX ORDER — ACETAMINOPHEN 325 MG/1
TABLET ORAL
Status: DISPENSED
Start: 2023-06-08

## (undated) RX ORDER — PROPOFOL 10 MG/ML
INJECTION, EMULSION INTRAVENOUS
Status: DISPENSED
Start: 2022-05-19

## (undated) RX ORDER — FENTANYL CITRATE 50 UG/ML
INJECTION, SOLUTION INTRAMUSCULAR; INTRAVENOUS
Status: DISPENSED
Start: 2023-06-08

## (undated) RX ORDER — PROPOFOL 10 MG/ML
INJECTION, EMULSION INTRAVENOUS
Status: DISPENSED
Start: 2023-06-08

## (undated) RX ORDER — LIDOCAINE HYDROCHLORIDE 10 MG/ML
INJECTION, SOLUTION INFILTRATION; PERINEURAL
Status: DISPENSED
Start: 2023-06-08

## (undated) RX ORDER — LIDOCAINE HYDROCHLORIDE 10 MG/ML
INJECTION, SOLUTION EPIDURAL; INFILTRATION; INTRACAUDAL; PERINEURAL
Status: DISPENSED
Start: 2022-05-19

## (undated) RX ORDER — BUPIVACAINE HYDROCHLORIDE AND EPINEPHRINE 2.5; 5 MG/ML; UG/ML
INJECTION, SOLUTION EPIDURAL; INFILTRATION; INTRACAUDAL; PERINEURAL
Status: DISPENSED
Start: 2023-06-08

## (undated) RX ORDER — GABAPENTIN 600 MG/1
TABLET ORAL
Status: DISPENSED
Start: 2023-06-08

## (undated) RX ORDER — FENTANYL CITRATE-0.9 % NACL/PF 10 MCG/ML
PLASTIC BAG, INJECTION (ML) INTRAVENOUS
Status: DISPENSED
Start: 2023-06-08